# Patient Record
Sex: FEMALE | Race: WHITE | NOT HISPANIC OR LATINO | Employment: UNEMPLOYED | URBAN - METROPOLITAN AREA
[De-identification: names, ages, dates, MRNs, and addresses within clinical notes are randomized per-mention and may not be internally consistent; named-entity substitution may affect disease eponyms.]

---

## 2019-02-08 ENCOUNTER — LAB VISIT (OUTPATIENT)
Dept: LAB | Facility: HOSPITAL | Age: 49
End: 2019-02-08
Attending: INTERNAL MEDICINE
Payer: COMMERCIAL

## 2019-02-08 ENCOUNTER — OFFICE VISIT (OUTPATIENT)
Dept: CARDIOLOGY | Facility: CLINIC | Age: 49
End: 2019-02-08
Payer: COMMERCIAL

## 2019-02-08 ENCOUNTER — CLINICAL SUPPORT (OUTPATIENT)
Dept: CARDIOLOGY | Facility: CLINIC | Age: 49
End: 2019-02-08
Payer: COMMERCIAL

## 2019-02-08 VITALS
WEIGHT: 158 LBS | HEIGHT: 60 IN | DIASTOLIC BLOOD PRESSURE: 80 MMHG | HEART RATE: 72 BPM | SYSTOLIC BLOOD PRESSURE: 110 MMHG | BODY MASS INDEX: 31.02 KG/M2

## 2019-02-08 DIAGNOSIS — I25.810 CORONARY ARTERY DISEASE INVOLVING CORONARY BYPASS GRAFT WITHOUT ANGINA PECTORIS, UNSPECIFIED WHETHER NATIVE OR TRANSPLANTED HEART: Primary | ICD-10-CM

## 2019-02-08 DIAGNOSIS — I50.30 (HFPEF) HEART FAILURE WITH PRESERVED EJECTION FRACTION: ICD-10-CM

## 2019-02-08 DIAGNOSIS — I25.810 CORONARY ARTERY DISEASE INVOLVING CORONARY BYPASS GRAFT WITHOUT ANGINA PECTORIS, UNSPECIFIED WHETHER NATIVE OR TRANSPLANTED HEART: ICD-10-CM

## 2019-02-08 DIAGNOSIS — R00.2 PALPITATIONS: ICD-10-CM

## 2019-02-08 DIAGNOSIS — G47.39 OTHER SLEEP APNEA: ICD-10-CM

## 2019-02-08 DIAGNOSIS — Z95.2 HX OF MECHANICAL AORTIC VALVE REPLACEMENT: ICD-10-CM

## 2019-02-08 DIAGNOSIS — I25.118 CORONARY ARTERY DISEASE OF NATIVE ARTERY OF NATIVE HEART WITH STABLE ANGINA PECTORIS: Primary | ICD-10-CM

## 2019-02-08 DIAGNOSIS — R07.89 OTHER CHEST PAIN: ICD-10-CM

## 2019-02-08 DIAGNOSIS — Z95.1 HX OF CABG: ICD-10-CM

## 2019-02-08 PROBLEM — G47.30 SLEEP APNEA SYNDROME: Status: ACTIVE | Noted: 2019-02-08

## 2019-02-08 LAB
INR PPP: 2.5
PROTHROMBIN TIME: 25.6 SEC

## 2019-02-08 PROCEDURE — 93000 EKG 12-LEAD: ICD-10-PCS | Mod: S$GLB,,, | Performed by: INTERNAL MEDICINE

## 2019-02-08 PROCEDURE — 36415 COLL VENOUS BLD VENIPUNCTURE: CPT

## 2019-02-08 PROCEDURE — 93000 ELECTROCARDIOGRAM COMPLETE: CPT | Mod: S$GLB,,, | Performed by: INTERNAL MEDICINE

## 2019-02-08 PROCEDURE — 99205 OFFICE O/P NEW HI 60 MIN: CPT | Mod: S$GLB,,, | Performed by: INTERNAL MEDICINE

## 2019-02-08 PROCEDURE — 3008F BODY MASS INDEX DOCD: CPT | Mod: CPTII,S$GLB,, | Performed by: INTERNAL MEDICINE

## 2019-02-08 PROCEDURE — 85610 PROTHROMBIN TIME: CPT

## 2019-02-08 PROCEDURE — 3008F PR BODY MASS INDEX (BMI) DOCUMENTED: ICD-10-PCS | Mod: CPTII,S$GLB,, | Performed by: INTERNAL MEDICINE

## 2019-02-08 PROCEDURE — 99999 PR PBB SHADOW E&M-NEW PATIENT-LVL IV: CPT | Mod: PBBFAC,,, | Performed by: INTERNAL MEDICINE

## 2019-02-08 PROCEDURE — 99999 PR PBB SHADOW E&M-NEW PATIENT-LVL IV: ICD-10-PCS | Mod: PBBFAC,,, | Performed by: INTERNAL MEDICINE

## 2019-02-08 PROCEDURE — 99205 PR OFFICE/OUTPT VISIT, NEW, LEVL V, 60-74 MIN: ICD-10-PCS | Mod: S$GLB,,, | Performed by: INTERNAL MEDICINE

## 2019-02-08 RX ORDER — DESVENLAFAXINE 100 MG/1
100 TABLET, EXTENDED RELEASE ORAL DAILY
COMMUNITY
End: 2019-05-06

## 2019-02-08 RX ORDER — TIZANIDINE 4 MG/1
4 TABLET ORAL NIGHTLY
Status: ON HOLD | COMMUNITY
End: 2020-01-28 | Stop reason: HOSPADM

## 2019-02-08 RX ORDER — POTASSIUM CHLORIDE 20 MEQ/1
20 TABLET, EXTENDED RELEASE ORAL 2 TIMES DAILY
COMMUNITY
End: 2020-02-21 | Stop reason: SDUPTHER

## 2019-02-08 RX ORDER — ISOSORBIDE MONONITRATE 30 MG/1
30 TABLET, EXTENDED RELEASE ORAL DAILY
COMMUNITY
End: 2019-02-08

## 2019-02-08 RX ORDER — WARFARIN 6 MG/1
TABLET ORAL
COMMUNITY
End: 2019-05-14 | Stop reason: DRUGHIGH

## 2019-02-08 RX ORDER — NITROGLYCERIN 80 MG/1
1 PATCH TRANSDERMAL DAILY
COMMUNITY
End: 2019-04-15 | Stop reason: SDUPTHER

## 2019-02-08 RX ORDER — DICLOFENAC SODIUM 10 MG/G
2 GEL TOPICAL 4 TIMES DAILY PRN
COMMUNITY

## 2019-02-08 RX ORDER — ISOSORBIDE DINITRATE 10 MG/1
10 TABLET ORAL 3 TIMES DAILY
Qty: 90 TABLET | Refills: 3 | Status: ON HOLD | OUTPATIENT
Start: 2019-02-08 | End: 2019-04-20 | Stop reason: HOSPADM

## 2019-02-08 RX ORDER — FUROSEMIDE 40 MG/1
40 TABLET ORAL 2 TIMES DAILY
COMMUNITY
End: 2019-07-15 | Stop reason: SDUPTHER

## 2019-02-08 RX ORDER — NICOTINE 7MG/24HR
1 PATCH, TRANSDERMAL 24 HOURS TRANSDERMAL
COMMUNITY
End: 2020-02-21 | Stop reason: RX

## 2019-02-08 RX ORDER — CLONAZEPAM 1 MG/1
1 TABLET ORAL 3 TIMES DAILY
COMMUNITY

## 2019-02-08 RX ORDER — NEBIVOLOL 5 MG/1
10 TABLET ORAL DAILY
Status: ON HOLD | COMMUNITY
End: 2019-04-20 | Stop reason: HOSPADM

## 2019-02-08 RX ORDER — TRAMADOL HYDROCHLORIDE 50 MG/1
50 TABLET ORAL EVERY 6 HOURS
Status: ON HOLD | COMMUNITY
End: 2019-04-19

## 2019-02-08 RX ORDER — TRAZODONE HYDROCHLORIDE 150 MG/1
150 TABLET ORAL NIGHTLY
Status: ON HOLD | COMMUNITY
End: 2019-11-19 | Stop reason: HOSPADM

## 2019-02-08 RX ORDER — AMITRIPTYLINE HYDROCHLORIDE 25 MG/1
25 TABLET, FILM COATED ORAL NIGHTLY
COMMUNITY
End: 2019-04-02

## 2019-02-08 RX ORDER — BUSPIRONE HYDROCHLORIDE 15 MG/1
15 TABLET ORAL 2 TIMES DAILY
COMMUNITY

## 2019-02-08 RX ORDER — FLUTICASONE PROPIONATE 50 MCG
1 SPRAY, SUSPENSION (ML) NASAL DAILY
COMMUNITY

## 2019-02-08 RX ORDER — PANTOPRAZOLE SODIUM 40 MG/1
40 TABLET, DELAYED RELEASE ORAL DAILY
Status: ON HOLD | COMMUNITY
End: 2019-11-07 | Stop reason: SDUPTHER

## 2019-02-08 RX ORDER — CHOLECALCIFEROL (VITAMIN D3) 25 MCG
1000 TABLET ORAL DAILY
COMMUNITY
End: 2019-07-03

## 2019-02-08 RX ORDER — ROSUVASTATIN CALCIUM 20 MG/1
20 TABLET, COATED ORAL DAILY
Status: ON HOLD | COMMUNITY
End: 2019-11-19 | Stop reason: HOSPADM

## 2019-02-08 RX ORDER — TOPIRAMATE 100 MG/1
100 TABLET, FILM COATED ORAL 2 TIMES DAILY
COMMUNITY

## 2019-02-08 NOTE — PROGRESS NOTES
Subjective:   Patient ID:  Meg Sal is a 48 y.o. female who presents for cardiac consult of Coronary Artery Disease (consult) and Congestive Heart Failure      HPI  The patient came in today for cardiac consult of Coronary Artery Disease (consult) and Congestive Heart Failure    2/8/19  Meg Sal is a 48 y.o. female with CAD s/p 2V CABG - LAD/LCX with occluded, s/p mechanical AVR, s/p mitral annuloplasty ring, Pulm HTN, HFpEF,CHD, HTN, HLD, CVA presents to establish CV care at [facility].     Pt had first MI at age 31, was taken to [facility], back was hurting. Felt like she had a heavy wet blanket. She was breaking up a dog fight when paramedics came. Dr. Cortez did LHC, had one stent placed. She also had other blockages which were medically treated. She had treadmill nuclear stress tests after, had been off plavix. In 2014, when she went to [place] she couldn't walk and was out of breath. She came back to  and she did another LHC with Dr. Cortez, Dr. Mcmahon was consulted as she had severe LAD disease. She went to Dr. Heller with [facility], had LHC in May 2018 with chronically occluded RCA with collaterals but patent grafts to her left system.     She has been having a lot of chest pain lately. She was taking NTG two or three times a week. She was told to take Ranexa and Imdur but had more depression so stopped taking it. She has no car and has difficulty getting to coumadin clinic, discussed will to get home health for labwork.     Patient feels no leg swelling, no PND, no dizziness, no syncope, no CNS symptoms.    Patient has fairly good exercise tolerance.    Patient is compliant with medications.    ECG - NSR, RBBB    Past Medical History:   Diagnosis Date    Acute coronary syndrome     CHF (congestive heart failure)     Coronary artery disease     Hyperlipidemia     Hypertension     Sleep apnea syndrome 2/8/2019    Stroke        Past Surgical History:   Procedure Laterality Date    CARDIAC  CATHETERIZATION      CARDIAC VALVE SURGERY      CORONARY ANGIOPLASTY      CORONARY ARTERY BYPASS GRAFT         Social History     Tobacco Use    Smoking status: Former Smoker     Last attempt to quit: 2018     Years since quittin.7   Substance Use Topics    Alcohol use: Yes     Comment: rarely    Drug use: Not on file       History reviewed. No pertinent family history.    Patient's Medications   New Prescriptions    ISOSORBIDE DINITRATE (ISORDIL) 10 MG TABLET    Take 1 tablet (10 mg total) by mouth 3 (three) times daily.   Previous Medications    AMITRIPTYLINE (ELAVIL) 25 MG TABLET    Take 25 mg by mouth every evening.    BUSPIRONE (BUSPAR) 15 MG TABLET    Take 15 mg by mouth 3 (three) times daily.    CLONAZEPAM (KLONOPIN) 1 MG TABLET    Take 1 mg by mouth 3 (three) times daily as needed for Anxiety.    DESVENLAFAXINE SUCCINATE (PRISTIQ) 100 MG TB24    Take 100 mg by mouth once daily.    DICLOFENAC SODIUM (VOLTAREN) 1 % GEL    Apply 2 g topically 4 (four) times daily.    FLUTICASONE (FLONASE) 50 MCG/ACTUATION NASAL SPRAY    1 spray by Each Nare route once daily.    FUROSEMIDE (LASIX) 40 MG TABLET    Take 40 mg by mouth 2 (two) times daily.    NEBIVOLOL (BYSTOLIC) 5 MG TAB    Take 10 mg by mouth once daily.    NICOTINE (NICODERM CQ) 14 MG/24 HR    Place 1 patch onto the skin every 24 hours.    NITROGLYCERIN 0.4 MG/HR TD PT24 (NITRODUR) 0.4 MG/HR    Place 1 patch onto the skin once daily.    PANTOPRAZOLE (PROTONIX) 40 MG TABLET    Take 40 mg by mouth once daily.    POTASSIUM CHLORIDE SA (K-DUR,KLOR-CON) 20 MEQ TABLET    Take 20 mEq by mouth 2 (two) times daily.    ROSUVASTATIN (CRESTOR) 20 MG TABLET    Take 20 mg by mouth once daily.    TIZANIDINE (ZANAFLEX) 4 MG TABLET    Take 4 mg by mouth every evening.    TOPIRAMATE (TOPAMAX) 100 MG TABLET    Take 100 mg by mouth 2 (two) times daily.    TRAMADOL (ULTRAM) 50 MG TABLET    Take 50 mg by mouth every 6 (six) hours.    TRAZODONE (DESYREL) 150 MG TABLET     Take 150 mg by mouth nightly.    VITAMIN D (VITAMIN D3) 1000 UNITS TAB    Take 1,000 Units by mouth once daily.    WARFARIN (COUMADIN) 6 MG TABLET    Take 6 mg by mouth once daily.   Modified Medications    No medications on file   Discontinued Medications    ISOSORBIDE MONONITRATE (IMDUR) 30 MG 24 HR TABLET    Take 30 mg by mouth once daily.       Review of Systems   Constitutional: Negative.    HENT: Negative.    Eyes: Negative.    Respiratory: Positive for shortness of breath.    Cardiovascular: Positive for chest pain and palpitations.   Gastrointestinal: Negative.    Genitourinary: Negative.    Musculoskeletal: Negative.    Skin: Negative.    Neurological: Negative.    Endo/Heme/Allergies: Negative.    Psychiatric/Behavioral: The patient is nervous/anxious.    All 12 systems otherwise negative.      Wt Readings from Last 3 Encounters:   02/08/19 71.7 kg (158 lb)     Temp Readings from Last 3 Encounters:   No data found for Temp     BP Readings from Last 3 Encounters:   02/08/19 110/80     Pulse Readings from Last 3 Encounters:   02/08/19 72       /80 (BP Method: Large (Manual))   Pulse 72   Ht 5' (1.524 m)   Wt 71.7 kg (158 lb)   BMI 30.86 kg/m²     Objective:   Physical Exam   Constitutional: She is oriented to person, place, and time. She appears well-developed and well-nourished. No distress.   HENT:   Head: Normocephalic and atraumatic.   Nose: Nose normal.   Mouth/Throat: Oropharynx is clear and moist.   Eyes: Conjunctivae and EOM are normal. No scleral icterus.   Neck: Normal range of motion. Neck supple. No JVD present. No thyromegaly present.   Cardiovascular: Normal rate, regular rhythm, S1 normal and S2 normal. Exam reveals no gallop, no S3, no S4 and no friction rub.   Murmur heard.  Pulmonary/Chest: Effort normal and breath sounds normal. No stridor. No respiratory distress. She has no wheezes. She has no rales. She exhibits no tenderness.   Abdominal: Soft. Bowel sounds are normal. She  exhibits no distension and no mass. There is no tenderness. There is no rebound.   Genitourinary:   Genitourinary Comments: Deferred   Musculoskeletal: Normal range of motion. She exhibits no edema, tenderness or deformity.   Lymphadenopathy:     She has no cervical adenopathy.   Neurological: She is alert and oriented to person, place, and time. She exhibits normal muscle tone. Coordination normal.   Skin: Skin is warm and dry. No rash noted. She is not diaphoretic. No erythema. No pallor.   Psychiatric: She has a normal mood and affect. Her behavior is normal. Judgment and thought content normal.   Nursing note and vitals reviewed.      Lab Results   Component Value Date    INR 2.5 (H) 02/08/2019     Assessment:      1. Coronary artery disease of native artery of native heart with stable angina pectoris    2. (HFpEF) heart failure with preserved ejection fraction    3. Hx of mechanical aortic valve replacement    4. Hx of CABG    5. Other sleep apnea    6. Other chest pain    7. Palpitations        Plan:   1. CAD s/p CABG with CP  - cont current meds  - add Isordil for CP  - exercise nuclear stress to r/o ischemia  - 2D ECHO  - cannot tolerate Imdur or Ranexa    2. HFpEF  - cont lasix and K     3. Palpitations  - Holter    4. Sleep apnea syndrome  - refer for sleep study    5. [facility] AVR  - cont coumadin  - refer to coumadin clinic  - INR today 2.5  - will try to refer to home health for INR checks    Thank you for allowing me to participate in this patient's care. Please do not hesitate to contact me with any questions or concerns. Consult note has been forwarded to the referral physician.

## 2019-02-11 ENCOUNTER — TELEPHONE (OUTPATIENT)
Dept: CARDIOLOGY | Facility: CLINIC | Age: 49
End: 2019-02-11

## 2019-02-12 DIAGNOSIS — I50.30 (HFPEF) HEART FAILURE WITH PRESERVED EJECTION FRACTION: ICD-10-CM

## 2019-02-12 DIAGNOSIS — I50.9 CONGESTIVE HEART FAILURE, UNSPECIFIED HF CHRONICITY, UNSPECIFIED HEART FAILURE TYPE: ICD-10-CM

## 2019-02-12 DIAGNOSIS — R76.8 ANA POSITIVE: Primary | ICD-10-CM

## 2019-02-12 DIAGNOSIS — I25.10 CORONARY ARTERY DISEASE, ANGINA PRESENCE UNSPECIFIED, UNSPECIFIED VESSEL OR LESION TYPE, UNSPECIFIED WHETHER NATIVE OR TRANSPLANTED HEART: ICD-10-CM

## 2019-02-12 DIAGNOSIS — I27.20 PULMONARY HYPERTENSION: ICD-10-CM

## 2019-02-12 DIAGNOSIS — Z95.2 AORTIC VALVE REPLACED: ICD-10-CM

## 2019-02-12 DIAGNOSIS — I25.118 CORONARY ARTERY DISEASE OF NATIVE ARTERY OF NATIVE HEART WITH STABLE ANGINA PECTORIS: ICD-10-CM

## 2019-02-12 DIAGNOSIS — Z95.2 HX OF MECHANICAL AORTIC VALVE REPLACEMENT: Primary | ICD-10-CM

## 2019-02-13 ENCOUNTER — ANTI-COAG VISIT (OUTPATIENT)
Dept: CARDIOLOGY | Facility: CLINIC | Age: 49
End: 2019-02-13

## 2019-02-13 ENCOUNTER — TELEPHONE (OUTPATIENT)
Dept: CARDIOLOGY | Facility: CLINIC | Age: 49
End: 2019-02-13

## 2019-02-13 LAB — INR PPP: 2.3

## 2019-02-13 NOTE — TELEPHONE ENCOUNTER
Returned call to Miley with [facility]--states admitted patient to home health services today--states INR was checked--Miley was advised to call results in to [facility] at [phone]. Miley verbalizes understanding

## 2019-02-13 NOTE — TELEPHONE ENCOUNTER
----- Message from Chico Caraballo sent at 2/13/2019  3:08 PM CST -----  Contact: [facility]  Type:  Needs Medical Advice    Who Called: oren  Symptoms (please be specific): n/a   How long has patient had these symptoms:  n/a  Pharmacy name and phone #:  n/a  Would the patient rather a call back or a response via [facility]? Call back  Best Call Back Number: [phone]  Additional Information: requesting pt PTINR results      Thanks,  Chico Caraballo

## 2019-02-20 ENCOUNTER — TELEPHONE (OUTPATIENT)
Dept: ADMINISTRATIVE | Facility: CLINIC | Age: 49
End: 2019-02-20

## 2019-02-20 NOTE — TELEPHONE ENCOUNTER
Home Health SOC 02/13/2019 - 04/13/2019 with [facility] ([facility]) - Dr. Ayaan Banda. Patient received SN services.

## 2019-02-21 ENCOUNTER — ANTI-COAG VISIT (OUTPATIENT)
Dept: CARDIOLOGY | Facility: CLINIC | Age: 49
End: 2019-02-21

## 2019-02-21 LAB — INR PPP: 2

## 2019-02-21 NOTE — PROGRESS NOTES
Verbal results taken from _Meena with [facility]__. PT/INR _23.6 / 2.0__ Date drawn_2/21/2019.  Reports no recent changes.  Orders given:  Maintain current dose of Warfarin 6 mg every evening.  Recheck on 3/07/2019.  Hard copy (fax) to follow.

## 2019-02-25 ENCOUNTER — HOSPITAL ENCOUNTER (OUTPATIENT)
Dept: RADIOLOGY | Facility: HOSPITAL | Age: 49
Discharge: HOME | End: 2019-02-25
Attending: INTERNAL MEDICINE
Payer: COMMERCIAL

## 2019-02-25 ENCOUNTER — CLINICAL SUPPORT (OUTPATIENT)
Dept: CARDIOLOGY | Facility: CLINIC | Age: 49
End: 2019-02-25
Attending: INTERNAL MEDICINE
Payer: COMMERCIAL

## 2019-02-25 DIAGNOSIS — R00.2 PALPITATIONS: ICD-10-CM

## 2019-02-25 DIAGNOSIS — G47.39 OTHER SLEEP APNEA: ICD-10-CM

## 2019-02-25 DIAGNOSIS — R07.89 OTHER CHEST PAIN: ICD-10-CM

## 2019-02-25 LAB
DIASTOLIC DYSFUNCTION: NO
ESTIMATED PA SYSTOLIC PRESSURE: 60.76
MITRAL VALVE MOBILITY: NORMAL
RETIRED EF AND QEF - SEE NOTES: 55 (ref 55–65)
TRICUSPID VALVE REGURGITATION: ABNORMAL

## 2019-02-25 PROCEDURE — 78452 NM MULTI PHARM STRESS CARDIAC COMPONENT: ICD-10-PCS | Mod: 26,,, | Performed by: NUCLEAR MEDICINE

## 2019-02-25 PROCEDURE — 93224 XTRNL ECG REC UP TO 48 HRS: CPT | Mod: S$GLB,,, | Performed by: INTERNAL MEDICINE

## 2019-02-25 PROCEDURE — 93015 CV STRESS TEST SUPVJ I&R: CPT | Mod: S$GLB,,, | Performed by: NUCLEAR MEDICINE

## 2019-02-25 PROCEDURE — 93306 TTE W/DOPPLER COMPLETE: CPT | Mod: S$GLB,,, | Performed by: NUCLEAR MEDICINE

## 2019-02-25 PROCEDURE — A9502 TC99M TETROFOSMIN: HCPCS

## 2019-02-25 PROCEDURE — 93306 2D ECHO WITH COLOR FLOW DOPPLER: ICD-10-PCS | Mod: S$GLB,,, | Performed by: NUCLEAR MEDICINE

## 2019-02-25 PROCEDURE — 78452 HT MUSCLE IMAGE SPECT MULT: CPT | Mod: 26,,, | Performed by: NUCLEAR MEDICINE

## 2019-02-25 PROCEDURE — 93015 NM MULTI PHARM STRESS CARDIAC COMPONENT: ICD-10-PCS | Mod: S$GLB,,, | Performed by: NUCLEAR MEDICINE

## 2019-02-25 PROCEDURE — 93224 HOLTER MONITOR - 48 HOUR: ICD-10-PCS | Mod: S$GLB,,, | Performed by: INTERNAL MEDICINE

## 2019-02-28 ENCOUNTER — TELEPHONE (OUTPATIENT)
Dept: CARDIOLOGY | Facility: CLINIC | Age: 49
End: 2019-02-28

## 2019-02-28 DIAGNOSIS — I49.3 PVC'S (PREMATURE VENTRICULAR CONTRACTIONS): Primary | ICD-10-CM

## 2019-02-28 RX ORDER — VERAPAMIL HYDROCHLORIDE 120 MG/1
120 CAPSULE, EXTENDED RELEASE ORAL DAILY
Qty: 30 CAPSULE | Refills: 3 | Status: ON HOLD | OUTPATIENT
Start: 2019-02-28 | End: 2019-04-20 | Stop reason: HOSPADM

## 2019-02-28 NOTE — TELEPHONE ENCOUNTER
----- Message from Ayaan Banda MD sent at 2/28/2019  8:57 AM CST -----  Please call the patient regarding her abnormal result. Frequent PVCS - heart beats from bottom chamber of heart, started Verapamil daily. Follow up soon to discuss symptoms and repeat monitor and discuss further treatment.

## 2019-02-28 NOTE — TELEPHONE ENCOUNTER
Spoke with patient, gave abnormal results of Holter monitor. Gave new medication order per Dr Banda, to start Verapamil daily, advised to  from pharmacy. Follow up appointment currently scheduled for 3/22/2019, but will call clinic back when she know for sure what day she can come in sooner to discuss further treatment.

## 2019-03-01 ENCOUNTER — TELEPHONE (OUTPATIENT)
Dept: CARDIOLOGY | Facility: CLINIC | Age: 49
End: 2019-03-01

## 2019-03-01 ENCOUNTER — TELEPHONE (OUTPATIENT)
Dept: PULMONOLOGY | Facility: CLINIC | Age: 49
End: 2019-03-01

## 2019-03-01 NOTE — TELEPHONE ENCOUNTER
----- Message from Vito Siddiqui sent at 3/1/2019 11:03 AM CST -----  Contact: [phone]  Pt would like to consult with the nurse about appt visit.  Please call back at [phone].  x-

## 2019-03-01 NOTE — TELEPHONE ENCOUNTER
Returned call to patient --needs results of echo, nuclear stress test and holter monitor--please advise

## 2019-03-01 NOTE — TELEPHONE ENCOUNTER
----- Message from Jasmin Mars sent at 3/1/2019  1:32 PM CST -----  Contact: Patient  Type:  Needs Medical Advice    Who Called: Patient  Symptoms (please be specific):    How long has patient had these symptoms:    Pharmacy name and phone #:    Would the patient rather a call back or a response via [facility]? call  Best Call Back Number: [phone]  Additional Information: Patient needs to know if she stops taking the Bystolic.

## 2019-03-04 ENCOUNTER — TELEPHONE (OUTPATIENT)
Dept: CARDIOLOGY | Facility: CLINIC | Age: 49
End: 2019-03-04

## 2019-03-04 ENCOUNTER — TELEPHONE (OUTPATIENT)
Dept: PULMONOLOGY | Facility: CLINIC | Age: 49
End: 2019-03-04

## 2019-03-04 NOTE — TELEPHONE ENCOUNTER
Spoke to Meena at  who stated pt gained 2lbs in one day but denied being SOB and no Edema.   just called to let Dr Banda know and also pt was seeing her PCP today.

## 2019-03-04 NOTE — TELEPHONE ENCOUNTER
Called patient and rescheduled appt.   Patient confirmed time and date and had no further questions     ----- Message from Bob Benjamin sent at 3/4/2019  9:51 AM CST -----  Contact: Pt  ..Type:  Sooner Apoointment Request    Caller is requesting a sooner appointment.    Name of Caller: Pt  When is the first available appointment? 03/29  Symptoms: Sleep Apnea  Would the patient rather a call back or a response via [facility]? Call back  Best Call Back Number: ..[phone] (home)     Additional Information:  Pt can't make appt today due to transportation issues and wants to see if she can be worked in another day

## 2019-03-04 NOTE — TELEPHONE ENCOUNTER
Spoke with Meena at  and let her know if pt gains another pound in the next day or two to give an extra dose of lasix.  All questions answered.

## 2019-03-05 NOTE — TELEPHONE ENCOUNTER
Called to patient and gave results of ech o and nuclear stress test--all questions answered--patient has appointment with Dr. Salazar and Dr. Banda on 3/11/19 to discuss

## 2019-03-07 ENCOUNTER — TELEPHONE (OUTPATIENT)
Dept: CARDIOLOGY | Facility: CLINIC | Age: 49
End: 2019-03-07

## 2019-03-07 ENCOUNTER — ANTI-COAG VISIT (OUTPATIENT)
Dept: CARDIOLOGY | Facility: CLINIC | Age: 49
End: 2019-03-07

## 2019-03-07 LAB — INR PPP: 3

## 2019-03-07 NOTE — TELEPHONE ENCOUNTER
[facility]  nurse [facility] called to report she saw pt today and pt c/o pain in her left arm, intermittently radiating under her collar bone and down to her rib cage and to the small of her back. At time of HH visit pain was about 5-6 of 10. Also nurse noted heart sounds click and whoosh. Wt also fluctuating was 154lbs, up to 159lbs a few days ago , back down to 157lbs today.     I called pt for further information. She says her pain started yesterday. She says it was bad enough this morning she was unable to get out of bed. Feels like pressure, now rating pain as 8.5 of 10 on pain scale. She says she took a tramadol and pain was not relieved. BP this AM was 108 systolic. She denies any Pnd or orthopnea. Unsure of any other ANDRES as she has just been relaxing this morning.     Also pt says she was wondering if you had a preference in monitoring device she should wear such as a fitbit. What do you recommend?

## 2019-03-07 NOTE — PROGRESS NOTES
Verbal results taken from _Meena with [facility]__. PT/INR _35.7 / 3.0_ Date drawn_3/07/2019_.  Reports no recent changes.  Orders given:  Maintain current dose of Warfarin 6 mg daily.  Recheck on 3/21/2019.  Fax copy to follow.

## 2019-03-07 NOTE — TELEPHONE ENCOUNTER
The patient has been notified of this information and all questions answered.  appt is scheduled for Monday.

## 2019-03-11 ENCOUNTER — OFFICE VISIT (OUTPATIENT)
Dept: PULMONOLOGY | Facility: CLINIC | Age: 49
End: 2019-03-11
Payer: COMMERCIAL

## 2019-03-11 ENCOUNTER — HOSPITAL ENCOUNTER (OUTPATIENT)
Dept: RADIOLOGY | Facility: HOSPITAL | Age: 49
Discharge: HOME | End: 2019-03-11
Attending: INTERNAL MEDICINE
Payer: COMMERCIAL

## 2019-03-11 ENCOUNTER — OFFICE VISIT (OUTPATIENT)
Dept: CARDIOLOGY | Facility: CLINIC | Age: 49
End: 2019-03-11
Payer: COMMERCIAL

## 2019-03-11 VITALS
BODY MASS INDEX: 31.55 KG/M2 | SYSTOLIC BLOOD PRESSURE: 108 MMHG | WEIGHT: 160.69 LBS | DIASTOLIC BLOOD PRESSURE: 78 MMHG | HEART RATE: 60 BPM | HEIGHT: 60 IN

## 2019-03-11 VITALS
RESPIRATION RATE: 18 BRPM | DIASTOLIC BLOOD PRESSURE: 66 MMHG | HEIGHT: 60 IN | BODY MASS INDEX: 31.25 KG/M2 | OXYGEN SATURATION: 96 % | WEIGHT: 159.19 LBS | SYSTOLIC BLOOD PRESSURE: 114 MMHG | HEART RATE: 68 BPM

## 2019-03-11 DIAGNOSIS — I50.30 (HFPEF) HEART FAILURE WITH PRESERVED EJECTION FRACTION: ICD-10-CM

## 2019-03-11 DIAGNOSIS — I27.20 PULMONARY HYPERTENSION: ICD-10-CM

## 2019-03-11 DIAGNOSIS — I49.3 PVC'S (PREMATURE VENTRICULAR CONTRACTIONS): ICD-10-CM

## 2019-03-11 DIAGNOSIS — R06.83 SNORING: ICD-10-CM

## 2019-03-11 DIAGNOSIS — J44.9 CHRONIC OBSTRUCTIVE PULMONARY DISEASE, UNSPECIFIED COPD TYPE: ICD-10-CM

## 2019-03-11 DIAGNOSIS — R29.818 SUSPECTED SLEEP APNEA: Primary | ICD-10-CM

## 2019-03-11 DIAGNOSIS — G47.19 EXCESSIVE DAYTIME SLEEPINESS: ICD-10-CM

## 2019-03-11 DIAGNOSIS — Z95.1 HX OF CABG: ICD-10-CM

## 2019-03-11 DIAGNOSIS — I25.118 CORONARY ARTERY DISEASE OF NATIVE ARTERY OF NATIVE HEART WITH STABLE ANGINA PECTORIS: Primary | ICD-10-CM

## 2019-03-11 DIAGNOSIS — Z95.2 HX OF MECHANICAL AORTIC VALVE REPLACEMENT: ICD-10-CM

## 2019-03-11 DIAGNOSIS — R76.8 ANA POSITIVE: ICD-10-CM

## 2019-03-11 DIAGNOSIS — G47.39 OTHER SLEEP APNEA: ICD-10-CM

## 2019-03-11 PROCEDURE — 71046 X-RAY EXAM CHEST 2 VIEWS: CPT | Mod: TC

## 2019-03-11 PROCEDURE — 99999 PR PBB SHADOW E&M-EST. PATIENT-LVL III: CPT | Mod: PBBFAC,,, | Performed by: INTERNAL MEDICINE

## 2019-03-11 PROCEDURE — 3008F PR BODY MASS INDEX (BMI) DOCUMENTED: ICD-10-PCS | Mod: CPTII,S$GLB,, | Performed by: INTERNAL MEDICINE

## 2019-03-11 PROCEDURE — 99205 PR OFFICE/OUTPT VISIT, NEW, LEVL V, 60-74 MIN: ICD-10-PCS | Mod: S$GLB,,, | Performed by: INTERNAL MEDICINE

## 2019-03-11 PROCEDURE — 99214 OFFICE O/P EST MOD 30 MIN: CPT | Mod: S$GLB,,, | Performed by: INTERNAL MEDICINE

## 2019-03-11 PROCEDURE — 99999 PR PBB SHADOW E&M-EST. PATIENT-LVL III: ICD-10-PCS | Mod: PBBFAC,,, | Performed by: INTERNAL MEDICINE

## 2019-03-11 PROCEDURE — 3008F BODY MASS INDEX DOCD: CPT | Mod: CPTII,S$GLB,, | Performed by: INTERNAL MEDICINE

## 2019-03-11 PROCEDURE — 99205 OFFICE O/P NEW HI 60 MIN: CPT | Mod: S$GLB,,, | Performed by: INTERNAL MEDICINE

## 2019-03-11 PROCEDURE — 71046 XR CHEST PA AND LATERAL: ICD-10-PCS | Mod: 26,,, | Performed by: RADIOLOGY

## 2019-03-11 PROCEDURE — 99214 PR OFFICE/OUTPT VISIT, EST, LEVL IV, 30-39 MIN: ICD-10-PCS | Mod: S$GLB,,, | Performed by: INTERNAL MEDICINE

## 2019-03-11 PROCEDURE — 71046 X-RAY EXAM CHEST 2 VIEWS: CPT | Mod: 26,,, | Performed by: RADIOLOGY

## 2019-03-11 RX ORDER — SUMATRIPTAN SUCCINATE 100 MG/1
TABLET ORAL
Refills: 3 | COMMUNITY
Start: 2019-03-08 | End: 2019-04-02

## 2019-03-11 RX ORDER — AMOXICILLIN 500 MG/1
500 CAPSULE ORAL 3 TIMES DAILY
Refills: 1 | COMMUNITY
Start: 2019-03-06 | End: 2019-04-02

## 2019-03-11 RX ORDER — ISOSORBIDE MONONITRATE 30 MG/1
30 TABLET, EXTENDED RELEASE ORAL
COMMUNITY
Start: 2019-01-08 | End: 2019-04-02

## 2019-03-11 RX ORDER — PROMETHAZINE HYDROCHLORIDE 12.5 MG/1
TABLET ORAL
COMMUNITY
End: 2019-04-02

## 2019-03-11 RX ORDER — ALBUTEROL SULFATE 90 UG/1
2 AEROSOL, METERED RESPIRATORY (INHALATION) EVERY 6 HOURS PRN
Qty: 18 G | Refills: 11 | Status: SHIPPED | OUTPATIENT
Start: 2019-03-11 | End: 2020-03-10

## 2019-03-11 RX ORDER — CLOTRIMAZOLE AND BETAMETHASONE DIPROPIONATE 10; .64 MG/G; MG/G
CREAM TOPICAL
COMMUNITY
Start: 2019-01-24 | End: 2019-05-06

## 2019-03-11 NOTE — PROGRESS NOTES
Subjective:   Patient ID:  Meg Sal is a 49 y.o. female who presents for cardiac consult of Chest Pain (from tuesday to friday); Shortness of Breath; Dizziness; and Tingling (fingers)      Chest Pain    Associated symptoms include dizziness, palpitations and shortness of breath.   Shortness of Breath   Associated symptoms include chest pain.   Dizziness:    Associated symptoms: palpitations and chest pain.    The patient came in today for cardiac consult of Chest Pain (from tuesday to friday); Shortness of Breath; Dizziness; and Tingling (fingers)      Meg Sal is a 49 y.o. female with CAD s/p 2V CABG - LAD/LCX with occluded, s/p mechanical AVR, s/p mitral annuloplasty ring, Pulm HTN, HFpEF,CHD, HTN, HLD, CVA here for follow up CV eval.     2/8/19  Pt had first MI at age 31, was taken to [facility], back was hurting. Felt like she had a heavy wet blanket. She was breaking up a dog fight when paramedics came. Dr. Cortez did LHC, had one stent placed. She also had other blockages which were medically treated. She had treadmill nuclear stress tests after, had been off plavix. In 2014, when she went to [place] she couldn't walk and was out of breath. She came back to  and she did another LHC with Dr. Cortez, Dr. Mcmahon was consulted as she had severe LAD disease. She went to Dr. Heller with [facility], had LHC in May 2018 with chronically occluded RCA with collaterals but patent grafts to her left system. She has been having a lot of chest pain lately. She was taking NTG two or three times a week. She was told to take Ranexa and Imdur but had more depression so stopped taking it. She has no car and has difficulty getting to coumadin clinic, discussed will to get home health for labwork.     3/11/19  She had another episode of severe chest pain last week. Started on Tues and continued till Friday. Had home health nurse that came. She took NTG which didn't help much, took 2 tabs. Discussed she  needs ER eval if severe CP again that is not improvd with NTG. Started verapamil for PVCs. Will refer to Ep for PVC ablation vs AAD. Pulm eval this AM, will have sleep study/PFTs/rheum referral. Recent stress and echo negative, mech AVR mean gradient 15 mmhg    Patient feels no leg swelling, no PND, no dizziness, no syncope, no CNS symptoms.    Patient has fairly good exercise tolerance.    Patient is compliant with medications.    ECG - NSR, RBBB    Nuclear Quantitative Functional Analysis:   LVEF: 65 %    Impression: NORMAL MYOCARDIAL PERFUSION  1. The perfusion scan is free of evidence for myocardial ischemia or injury.   2. Resting wall motion is physiologic.   3. Resting LV function is normal.   4. The ventricular volumes are normal at rest and stress.   5. The extracardiac distribution of radioactivity is normal.           This document has been electronically    SIGNED BY: Hermilo Pete MD On: 02/25/2019 16:47    CONCLUSIONS     1 - Mild left atrial enlargement.     2 - Eccentric hypertrophy.     3 - No wall motion abnormalities.     4 - Normal left ventricular systolic function (EF 55-60%).     5 - Normal right ventricular systolic function .     6 - Aortic valve prosthesis, CATINA = 1.61 cm2, AVAi = 0.95 cm2/m2, peak velocity = 2.48 m/s, mean gradient = 15 mmHg.     7 - Moderate tricuspid regurgitation.     8 - Pulmonary hypertension. The estimated PA systolic pressure is 61 mmHg.         This document has been electronically    SIGNED BY: Hermilo Pete MD On: 02/25/2019 14:50    TEST DESCRIPTION   PREDOMINANT RHYTHM  1. Sinus rhythm with heart rates varying between 66 and 105 bpm with an average of 78 bpm.   VENTRICULAR ARRHYTHMIAS  1. There were very frequent PVCs totalling 18966 and averaging 489 per hour.  There were 23 couplets. There were 5 triplets.   2. There were no episodes of ventricular tachycardia.    SUPRA VENTRICULAR ARRHYTHMIAS  1. There were rare PACs totalling 382 and averaging 7  per hour.   2. There were no episodes of sustained supraventricular tachycardia.    Past Medical History:   Diagnosis Date    Acute coronary syndrome     CHF (congestive heart failure)     Coronary artery disease     Hyperlipidemia     Hypertension     Sleep apnea syndrome 2019    Stroke        Past Surgical History:   Procedure Laterality Date    CARDIAC CATHETERIZATION      CARDIAC VALVE SURGERY      CORONARY ANGIOPLASTY      CORONARY ARTERY BYPASS GRAFT         Social History     Tobacco Use    Smoking status: Former Smoker     Packs/day: 1.00     Types: Cigarettes     Last attempt to quit: 2018     Years since quittin.8    Smokeless tobacco: Never Used   Substance Use Topics    Alcohol use: Yes     Comment: rarely    Drug use: Not on file       History reviewed. No pertinent family history.    Patient's Medications   New Prescriptions    No medications on file   Previous Medications    ALBUTEROL (VENTOLIN HFA) 90 MCG/ACTUATION INHALER    Inhale 2 puffs into the lungs every 6 (six) hours as needed for Wheezing. Rescue    AMITRIPTYLINE (ELAVIL) 25 MG TABLET    Take 25 mg by mouth every evening.    AMOXICILLIN (AMOXIL) 500 MG CAPSULE    Take 500 mg by mouth 3 (three) times daily.    BUSPIRONE (BUSPAR) 15 MG TABLET    Take 15 mg by mouth 3 (three) times daily.    CLONAZEPAM (KLONOPIN) 1 MG TABLET    Take 1 mg by mouth 3 (three) times daily as needed for Anxiety.    CLOTRIMAZOLE-BETAMETHASONE 1-0.05% (LOTRISONE) CREAM    Apply to affected area 2 times daily    DESVENLAFAXINE SUCCINATE (PRISTIQ) 100 MG TB24    Take 100 mg by mouth once daily.    DICLOFENAC SODIUM (VOLTAREN) 1 % GEL    Apply 2 g topically 4 (four) times daily.    FLUTICASONE (FLONASE) 50 MCG/ACTUATION NASAL SPRAY    1 spray by Each Nare route once daily.    FUROSEMIDE (LASIX) 40 MG TABLET    Take 40 mg by mouth 2 (two) times daily.    ISOSORBIDE DINITRATE (ISORDIL) 10 MG TABLET    Take 1 tablet (10 mg total) by mouth 3  (three) times daily.    ISOSORBIDE MONONITRATE (IMDUR) 30 MG 24 HR TABLET    Take 30 mg by mouth.    NEBIVOLOL (BYSTOLIC) 5 MG TAB    Take 10 mg by mouth once daily.    NICOTINE (NICODERM CQ) 14 MG/24 HR    Place 1 patch onto the skin every 24 hours.    NITROGLYCERIN 0.4 MG/HR TD PT24 (NITRODUR) 0.4 MG/HR    Place 1 patch onto the skin once daily.    PANTOPRAZOLE (PROTONIX) 40 MG TABLET    Take 40 mg by mouth once daily.    POTASSIUM CHLORIDE SA (K-DUR,KLOR-CON) 20 MEQ TABLET    Take 20 mEq by mouth 2 (two) times daily.    PROMETHAZINE (PHENERGAN) 12.5 MG TAB    promethazine 12.5 mg tablet    ROSUVASTATIN (CRESTOR) 20 MG TABLET    Take 20 mg by mouth once daily.    SUMATRIPTAN (IMITREX) 100 MG TABLET    TAKE ONE TABLET BY MOUTH EVERY 2 HOURS AS NEEDED FOR MIGRAINE   LIMIT 2 24 HOURS    TIZANIDINE (ZANAFLEX) 4 MG TABLET    Take 4 mg by mouth every evening.    TOPIRAMATE (TOPAMAX) 100 MG TABLET    Take 100 mg by mouth 2 (two) times daily.    TRAMADOL (ULTRAM) 50 MG TABLET    Take 50 mg by mouth every 6 (six) hours.    TRAZODONE (DESYREL) 150 MG TABLET    Take 150 mg by mouth nightly.    UMECLIDINIUM-VILANTEROL (ANORO ELLIPTA) 62.5-25 MCG/ACTUATION DSDV    Inhale 1 puff into the lungs once daily. Controller    VERAPAMIL (VERELAN) 120 MG C24P    Take 1 capsule (120 mg total) by mouth once daily.    VITAMIN B COMPLEX ORAL    Take 1 capsule by mouth.    VITAMIN D (VITAMIN D3) 1000 UNITS TAB    Take 1,000 Units by mouth once daily.    WARFARIN (COUMADIN) 6 MG TABLET    Take 6 mg by mouth once daily.   Modified Medications    No medications on file   Discontinued Medications    No medications on file       Review of Systems   Constitutional: Negative.    HENT: Negative.    Eyes: Negative.    Respiratory: Positive for shortness of breath.    Cardiovascular: Positive for chest pain and palpitations.   Gastrointestinal: Negative.    Genitourinary: Negative.    Musculoskeletal: Negative.    Skin: Negative.    Neurological:  Positive for dizziness.   Endo/Heme/Allergies: Negative.    Psychiatric/Behavioral: The patient is nervous/anxious.    All 12 systems otherwise negative.      Wt Readings from Last 3 Encounters:   03/11/19 72.9 kg (160 lb 11.5 oz)   03/11/19 72.2 kg (159 lb 2.8 oz)   02/08/19 71.7 kg (158 lb)     Temp Readings from Last 3 Encounters:   No data found for Temp     BP Readings from Last 3 Encounters:   03/11/19 108/78   03/11/19 114/66   02/08/19 110/80     Pulse Readings from Last 3 Encounters:   03/11/19 60   03/11/19 68   02/08/19 72       /78 (BP Location: Right arm, Patient Position: Sitting, BP Method: Medium (Manual))   Pulse 60   Ht 5' (1.524 m)   Wt 72.9 kg (160 lb 11.5 oz)   BMI 31.39 kg/m²     Objective:   Physical Exam   Constitutional: She is oriented to person, place, and time. She appears well-developed and well-nourished. No distress.   HENT:   Head: Normocephalic and atraumatic.   Nose: Nose normal.   Mouth/Throat: Oropharynx is clear and moist.   Eyes: Conjunctivae and EOM are normal. No scleral icterus.   Neck: Normal range of motion. Neck supple. No JVD present. No thyromegaly present.   Cardiovascular: Normal rate, regular rhythm, S1 normal and S2 normal. Exam reveals no gallop, no S3, no S4 and no friction rub.   Murmur heard.  Pulmonary/Chest: Effort normal and breath sounds normal. No stridor. No respiratory distress. She has no wheezes. She has no rales. She exhibits no tenderness.   Abdominal: Soft. Bowel sounds are normal. She exhibits no distension and no mass. There is no tenderness. There is no rebound.   Genitourinary:   Genitourinary Comments: Deferred   Musculoskeletal: Normal range of motion. She exhibits no edema, tenderness or deformity.   Lymphadenopathy:     She has no cervical adenopathy.   Neurological: She is alert and oriented to person, place, and time. She exhibits normal muscle tone. Coordination normal.   Skin: Skin is warm and dry. No rash noted. She is not  diaphoretic. No erythema. No pallor.   Psychiatric: She has a normal mood and affect. Her behavior is normal. Judgment and thought content normal.   Nursing note and vitals reviewed.      Lab Results   Component Value Date    INR 3.0 03/07/2019     Assessment:      1. Coronary artery disease of native artery of native heart with stable angina pectoris    2. (HFpEF) heart failure with preserved ejection fraction    3. Hx of mechanical aortic valve replacement    4. Hx of CABG    5. Other sleep apnea    6. PVC's (premature ventricular contractions)        Plan:   1. CAD s/p CABG, ongoing atypical CP  - cont current meds  - cont Isordil for CP  - exercise nuclear stress to r/o ischemia - negative  - 2D ECHO - stable  - cannot tolerate Ranexa    2. HFpEF  - cont lasix and K     3. PVCs  - refer to EP    4. Sleep apnea syndrome  - cont workup by pulm, appreciate recs    5. [facility] AVR  - recent echo gradient WNL  - cont coumadin  - cont coumadin clinic     Thank you for allowing me to participate in this patient's care. Please do not hesitate to contact me with any questions or concerns. Consult note has been forwarded to the referral physician.

## 2019-03-11 NOTE — PATIENT INSTRUCTIONS
Understanding Premature Ventricular Contractions (PVCs)  Premature ventricular contractions (PVCs) are a type of abnormal heartbeat (arrhythmia). They are very common. They can occur in people of all ages from time to time. They usually cause only mild symptoms.  How PVCs happen    Your heart has 4 chambers: 2 upper atria and 2 lower ventricles. Normally, a special group of cells begins the signal to start your heartbeat. These cells are in the sinoatrial (SA) node in the right atrium. The signal quickly travels down your hearts conducting system. It travels to the left and right ventricle. As it travels, the signal triggers nearby parts of your heart to contract. This allows your heart to squeeze in a coordinated way.  During a premature ventricular contraction, the signal to start your heartbeat instead comes from one of the ventricles. This signal is premature, meaning it happens before the SA node has had a chance to fire. The signal spreads through the rest of your heart, causing a heartbeat. If this happens very soon after the previous heartbeat, your heart will push out very little blood. This causes a feeling of a pause between beats. If it happens a little later, your heart pushes out an almost normal amount of blood. This leads to a feeling of an extra heartbeat. So, the heart has a premature heartbeat in between normal heartbeats.  What causes PVCs?  Certain things can help set off a premature signal in the ventricles. These include:  · Reduced blood flow to your heart  · Scarring after a heart attack (myocardial infarction)  · Electrolyte problems, such as low sodium or potassium levels  · Increased adrenaline, such as with anxiety  · Certain medicines, like digoxin  Many heart conditions raise the risk for PVCs. These include:  · Mitral valve prolapse  · High blood pressure  · Heart attack  · Coronary heart disease  · cardiomyopathy  · Hypertrophic cardiomyopathy  · Congenital heart disease  They  often happen in people without any heart disease. However, PVCs are somewhat more common in people with some kind of heart disease.  Symptoms of PVCs  Most people with occasional PVCs dont have symptoms. You are also more likely to have symptoms if you have PVCs often. When symptoms do happen, they are usually minor. Symptoms may include:  · An awareness of the heart beating  · A fluttering or flip-flop feeling in your chest  · Feeling of a skipped or extra heartbeat  · Dizziness and near-fainting  · A pulsing sensation in the neck  PVCs may cause more severe symptoms if you have another heart problem, such as heart failure.  Diagnosing PVCs  Your healthcare provider will ask about your health history and give you a physical exam. An electrocardiogram (ECG) is the main test for diagnosis. This test allows your provider to look at the signal of your heartbeat for a brief time. Any PVCs that occur during this time will show up on the ECG. In some cases, your healthcare provider might advise ECG monitoring over a day or more, up to 30 days. This can help to catch PVCs that dont happen often. This is done with a monitor you wear night and day for the test period.  These may be the only tests your healthcare provider will need. You may need more testing if you have PVCs often, or many in a row. Your provider may look at other causes, including possible heart problems. These tests might include:  · Echocardiography, to look at your hearts structure and function  · Cardiac stress testing, to see how your heart responds to exercise and to evaluate blood flow through your heart  · Blood tests, to check potassium and thyroid levels  Date Last Reviewed: 2/17/2015  © 5564-2476 [employer]. [address] [place] [address], [place] [address]. All rights reserved. This information is not intended as a substitute for professional medical care. Always follow your healthcare professional's instructions.

## 2019-03-11 NOTE — PROGRESS NOTES
Initial Outpatient Pulmonary Evaluation       SUBJECTIVE:     Chief Complaint   Patient presents with    Sleep Apnea       History of Present Illness:    Patient is a 49 y.o. female referred for evaluation of obstructive sleep apnea.    Patient was admitted to the hospital January 2018 for acute on chronic diastolic heart failure.    Thirty pack year smoking history quit smoking May 2018.    She complains of a chronic cough with white sputum production, intermittent wheezing and shortness of Breath. Pulmonary hypertension was showed to be severe on echo February 2019.    Patient with pulmonary hypertension resulting in inability to carry on any physical activity without symptoms. The patient manifests signs of right heart failure. Dyspnea and/or fatigue may be present even at rest. Discomfort is increased by physical activity. WHO functional class IV.     Patient complain of snoring, excessive daytime sleepiness, Mendenhall Sleepiness Scale score today of 8, chronic fatigue.     Status post CABG, mitral annuloplasty and aortic valve replacement.  On Coumadin.      STOP - BANG Questionnaire:     1. Snoring : Do you snore loudly ?    Yes    2. Tired : Do you often feel tired, fatigued, or sleepy during daytime? Yes    3. Observed: Has anyone observed you stop breathing during your sleep?   Yes     4. Blood pressure : Do you have or are you being treated for high blood pressure?   Yes    5. BMI :BMI more than 35 kg/m2?   No    6. Age : Age over 50 yr old?   Yes    7. Neck circumference:   For male, is your shirt collar 17 inches / 43cm or larger?  For female, is your shirt collar 16 inches / 41cm or larger?    No    8. Gender: Gender male?   No    STOP BANG SCORE 5    High risk of BANDAR: Yes 5 - 8  Intermediate risk of BANDAR: Yes 3 - 4  Low risk of BANDAR: Yes 0 - 2      References:   STOP Questionnaire   A Tool to Screen Patients for Obstructive Sleep Apnea: Martha Sarkar  HEBERT.C.P.C., MINDI GarciaB.B.S., Jesus Hendrickson M.D.,Rae Sarkar, Ph.D., MINDI MederosB.B.S.,_ Nanci Duong.,_ Marie Luna M.D., NIMESH LynR.C.P.C.; Anesthesiology 2008; 108:812-21 Copyright © 2008, the American Society of Anesthesiologists, Inc. [employer]    Review of Systems   Constitutional: Negative for fever and chills.   HENT: Negative for nosebleeds.    Eyes: Negative for redness.   Respiratory: Positive for snoring, cough, shortness of breath, dyspnea on extertion and somnolence. Negative for choking.    Cardiovascular: Positive for leg swelling.   Genitourinary: Negative for hematuria.   Endocrine: Negative for cold intolerance.    Musculoskeletal: Positive for arthralgias.   Skin: Negative for rash.   Gastrointestinal: Negative for vomiting.   Neurological: Negative for syncope.        History of stroke   Hematological: Negative for adenopathy.   Psychiatric/Behavioral: Positive for sleep disturbance. Negative for confusion.       Review of patient's allergies indicates:  No Known Allergies    Current Outpatient Medications   Medication Sig Dispense Refill    amitriptyline (ELAVIL) 25 MG tablet Take 25 mg by mouth every evening.      busPIRone (BUSPAR) 15 MG tablet Take 15 mg by mouth 3 (three) times daily.      clonazePAM (KLONOPIN) 1 MG tablet Take 1 mg by mouth 3 (three) times daily as needed for Anxiety.      clotrimazole-betamethasone 1-0.05% (LOTRISONE) cream Apply to affected area 2 times daily      desvenlafaxine succinate (PRISTIQ) 100 MG Tb24 Take 100 mg by mouth once daily.      diclofenac sodium (VOLTAREN) 1 % Gel Apply 2 g topically 4 (four) times daily.      fluticasone (FLONASE) 50 mcg/actuation nasal spray 1 spray by Each Nare route once daily.      furosemide (LASIX) 40 MG tablet Take 40 mg by mouth 2 (two) times daily.      isosorbide dinitrate (ISORDIL) 10 MG tablet Take 1 tablet (10 mg total) by mouth 3 (three)  times daily. 90 tablet 3    isosorbide mononitrate (IMDUR) 30 MG 24 hr tablet Take 30 mg by mouth.      nebivolol (BYSTOLIC) 5 MG Tab Take 10 mg by mouth once daily.      nicotine (NICODERM CQ) 14 mg/24 hr Place 1 patch onto the skin every 24 hours.      nitroGLYCERIN 0.4 MG/HR TD PT24 (NITRODUR) 0.4 mg/hr Place 1 patch onto the skin once daily.      pantoprazole (PROTONIX) 40 MG tablet Take 40 mg by mouth once daily.      potassium chloride SA (K-DUR,KLOR-CON) 20 MEQ tablet Take 20 mEq by mouth 2 (two) times daily.      rosuvastatin (CRESTOR) 20 MG tablet Take 20 mg by mouth once daily.      tiZANidine (ZANAFLEX) 4 MG tablet Take 4 mg by mouth every evening.      topiramate (TOPAMAX) 100 MG tablet Take 100 mg by mouth 2 (two) times daily.      traMADol (ULTRAM) 50 mg tablet Take 50 mg by mouth every 6 (six) hours.      traZODone (DESYREL) 150 MG tablet Take 150 mg by mouth nightly.      verapamil (VERELAN) 120 MG C24P Take 1 capsule (120 mg total) by mouth once daily. 30 capsule 3    VITAMIN B COMPLEX ORAL Take 1 capsule by mouth.      vitamin D (VITAMIN D3) 1000 units Tab Take 1,000 Units by mouth once daily.      warfarin (COUMADIN) 6 MG tablet Take 6 mg by mouth once daily.      albuterol (VENTOLIN HFA) 90 mcg/actuation inhaler Inhale 2 puffs into the lungs every 6 (six) hours as needed for Wheezing. Rescue 18 g 11    amoxicillin (AMOXIL) 500 MG capsule Take 500 mg by mouth 3 (three) times daily.  1    promethazine (PHENERGAN) 12.5 MG Tab promethazine 12.5 mg tablet      sumatriptan (IMITREX) 100 MG tablet TAKE ONE TABLET BY MOUTH EVERY 2 HOURS AS NEEDED FOR MIGRAINE   LIMIT 2 24 HOURS  3    umeclidinium-vilanterol (ANORO ELLIPTA) 62.5-25 mcg/actuation DsDv Inhale 1 puff into the lungs once daily. Controller 1 each 5     No current facility-administered medications for this visit.        Past Medical History:   Diagnosis Date    Acute coronary syndrome     CHF (congestive heart failure)      Coronary artery disease     Hyperlipidemia     Hypertension     Sleep apnea syndrome 2019    Stroke      Past Surgical History:   Procedure Laterality Date    CARDIAC CATHETERIZATION      CARDIAC VALVE SURGERY      CORONARY ANGIOPLASTY      CORONARY ARTERY BYPASS GRAFT       History reviewed. No pertinent family history.  Social History     Tobacco Use    Smoking status: Former Smoker     Packs/day: 1.00     Types: Cigarettes     Last attempt to quit: 2018     Years since quittin.8    Smokeless tobacco: Never Used   Substance Use Topics    Alcohol use: Yes     Comment: rarely    Drug use: Not on file          OBJECTIVE:     Vital Signs (Most Recent)  Vital Signs  Pulse: 68  Resp: 18  SpO2: 96 %  BP: 114/66  Height and Weight  Height: 5' (152.4 cm)  Weight: 72.2 kg (159 lb 2.8 oz)  BSA (Calculated - sq m): 1.75 sq meters  BMI (Calculated): 31.2  Weight in (lb) to have BMI = 25: 127.7]  Wt Readings from Last 2 Encounters:   19 72.2 kg (159 lb 2.8 oz)   19 71.7 kg (158 lb)         Physical Exam:  Physical Exam   Constitutional: She is oriented to person, place, and time. She appears well-developed.   HENT:   Head: Normocephalic.   Neck: Neck supple.   Cardiovascular: Normal rate and regular rhythm.   Pulmonary/Chest: Normal expansion and effort normal. No stridor. No respiratory distress. She has rales. She exhibits no tenderness.   Abdominal: Soft.   Musculoskeletal: She exhibits edema. She exhibits no tenderness.   Lymphadenopathy:     She has no cervical adenopathy.   Neurological: She is alert and oriented to person, place, and time. Gait normal.   Skin: Skin is warm. Nails show no clubbing.   Psychiatric: She has a normal mood and affect. Her behavior is normal. Judgment and thought content normal.   Nursing note and vitals reviewed.      Laboratory  No results found for: WBC, RBC, HGB, HCT, MCV, MCH, MCHC, RDW, PLT, MPV, GRAN, LYMPH, MONO, EOS, BASO, EOSINOPHIL,  BASOPHIL    BMP  No results found for: NA, K, CL, CO2, BUN, CREATININE, CALCIUM, ANIONGAP, ESTGFRAFRICA, EGFRNONAA, AST, ALT, PROT    No results found for: BNP    No results found for: TSH    No results found for: SEDRATE    No results found for: CRP    8/17 [facility] + [facility]    Diagnostic Results:  I have personally reviewed today the following studies :      CT chest [facility] January 2016   There are small cystic changes within the lungs consistent with COPD. Interstitial scarring noted at size is at the upper limits of normal. Mitral and aortic valve prosthesis are seen. Sternotomy sutures are in place with no alignment of the sternum. No sternal erosive changes or parasternal fluid noted. Within the lungs with no focal pneumonia, mass or nodularity. There is no pleural or pericardial effusion. Aorta is normal in caliber. There are small lymph nodes in the middle mediastinum.    ASSESSMENT/PLAN:     Suspected sleep apnea  -     Polysomnogram (CPAP will be added if patient meets diagnostic criteria.); Future    Snoring  -     Polysomnogram (CPAP will be added if patient meets diagnostic criteria.); Future    Excessive daytime sleepiness  -     Polysomnogram (CPAP will be added if patient meets diagnostic criteria.); Future    Pulmonary hypertension  -     Stress test, pulmonary; Future  -     Brain natriuretic peptide; Future; Expected date: 03/11/2019    (HFpEF) heart failure with preserved ejection fraction  -     Polysomnogram (CPAP will be added if patient meets diagnostic criteria.); Future  -     Stress test, pulmonary; Future    [facility] positive  -     [facility]; Future; Expected date: 03/11/2019  -     ANTI-DNA ANTIBODY, DOUBLE-STRANDED; Future; Expected date: 03/11/2019  -     ANGIOTENSIN CONVERTING ENZYME; Future; Expected date: 03/11/2019    Chronic obstructive pulmonary disease, unspecified COPD type  -     Complete PFT with bronchodilator; Future; Expected date: 03/25/2019  -     X-Ray  Chest PA And Lateral; Future; Expected date: 03/11/2019  -     ALPHA-1-ANTITRYPSIN; Future; Expected date: 03/11/2019  -     Stress test, pulmonary; Future  -     albuterol (VENTOLIN HFA) 90 mcg/actuation inhaler; Inhale 2 puffs into the lungs every 6 (six) hours as needed for Wheezing. Rescue  Dispense: 18 g; Refill: 11  -     umeclidinium-vilanterol (ANORO ELLIPTA) 62.5-25 mcg/actuation DsDv; Inhale 1 puff into the lungs once daily. Controller  Dispense: 1 each; Refill: 5      Patient was referred for evaluation of suspected sleep apnea, she has history of COPD suggested by her evaluation today and her previous CT scan and her smoking history, severe pulmonary hypertension evident on echocardiogram, clinically she complains of shortness of breath with any physical activity.    Pulmonary hypertension WHO functional group 4.  Her pulmonary hypertension is likely related to left ventricular heart disease and hypoxemic respiratory disorders.  Continue Lasix 40 mg b.i.d..  She might benefit from vascular targeted therapy.    Will check [facility] again and anti double stranded DNA.    Will referred to Rheumatology.    She will likely need right heart catheterization.    Further recommendation to follow above workup including a PFT, 6 min walking test, in-lab polysomnography.    Up-to-date on influenza vaccination and P PSV 23.    MEDICAL DECISION MAKING: Moderate to high complexity.  Overall, the multiple problems listed are of moderate to high severity that may impact quality of life and activities of daily living. Side effects of medications, treatment plan as well as options and alternatives reviewed and discussed with patient. There was counseling of patient concerning these issues.     TIME SPENT WITH PATIENT: Time spent: 60 minutes in face to face  discussion concerning diagnosis, prognosis, review of lab and test results, benefits of treatment as well as management of disease, counseling of patient and coordination of  care between various health  care providers . Greater than half the time spent was used for coordination of care and counseling of patient.         Follow-up in about 1 month (around 4/11/2019).    This note was prepared using voice recognition system and is likely to have sound alike errors that may have been overlooked even after proof reading.  Please call me with any questions    Discussed diagnosis, its evaluation, treatment and usual course. All questions answered.    Thank you for the courtesy of participating in the care of this patient    Miguel Salazar MD

## 2019-03-11 NOTE — PATIENT INSTRUCTIONS
Your provider has scheduled you for a sleep study.   You should be receiving a phone call from the sleep lab shortly after your sleep study had been approved by your insurance. Please make sure you have your current phone number in the [email or web address] system. If you do not hear from anyone in the next 10 business days, please call the sleep lab at [phone] to schedule your sleep study.   The sleep studies are performed at [facility] seven nights a week. When you are scheduling your sleep study, they will also make you a follow up appointment with your provider. This follow up appointment will be 10-14 days after your sleep study to review the results. If it is noted that you do not have sleep apnea on your initial sleep study, you may receive a call back for a second night sleep study with the CPAP before you come back to the office.

## 2019-03-11 NOTE — LETTER
March 11, 2019      Ayaan Banda MD  [address]  [place] [address]           [facility]  [address] [place] [id number]  Phone: [phone]  Fax: [phone]          Patient: Meg Sal   MR Number: [id number]   YOB: 1970   Date of Visit: 3/11/2019       Dear Dr. Ayaan Banda:    Thank you for referring Meg Sal to me for evaluation. Attached you will find relevant portions of my assessment and plan of care.    If you have questions, please do not hesitate to call me. I look forward to following Meg Sal along with you.    Sincerely,    Miguel Salazar MD    Enclosure  CC:  No Recipients    If you would like to receive this communication electronically, please contact [email or web address] or [phone] to request more information on [employer] access.    For providers and/or their staff who would like to refer a patient to [facility], please contact us through our one-stop-shop provider referral line, [facility], at [phone].    If you feel you have received this communication in error or would no longer like to receive these types of communications, please e-mail [email or web address]

## 2019-03-13 ENCOUNTER — TELEPHONE (OUTPATIENT)
Dept: RHEUMATOLOGY | Facility: CLINIC | Age: 49
End: 2019-03-13

## 2019-03-13 NOTE — TELEPHONE ENCOUNTER
Called patient regarding referral from Dr. Salazar, scheduled appointment with Dr. Guzman for 3.20.19 at 1 pm. Pt verbalized understanding.

## 2019-03-15 ENCOUNTER — TELEPHONE (OUTPATIENT)
Dept: PULMONOLOGY | Facility: CLINIC | Age: 49
End: 2019-03-15

## 2019-03-15 DIAGNOSIS — J84.9 ILD (INTERSTITIAL LUNG DISEASE): Primary | ICD-10-CM

## 2019-03-15 NOTE — TELEPHONE ENCOUNTER
Called patient and discussed chest x-ray results.  Will proceed with high-resolution CT scan results.

## 2019-03-18 ENCOUNTER — TELEPHONE (OUTPATIENT)
Dept: PULMONOLOGY | Facility: CLINIC | Age: 49
End: 2019-03-18

## 2019-03-18 NOTE — TELEPHONE ENCOUNTER
Returned patients phone call and rescheduled CT scan. Patient confirmed time and date ----- Message from Christiane Choudhury sent at 3/18/2019  2:47 PM CDT -----  Contact: self/[phone]  Type:  Patient Returning Call    Who Called: Meg  Who Left Message for Patient:Priti  Does the patient know what this is regarding?: a CT scan  Would the patient rather a call back or a response via [facility]?  Call back  Best Call Back Number:[phone]  Additional Information: Can she change her CT Scan appointment?  She also wanted it marked urgent

## 2019-03-21 ENCOUNTER — ANTI-COAG VISIT (OUTPATIENT)
Dept: CARDIOLOGY | Facility: CLINIC | Age: 49
End: 2019-03-21

## 2019-03-21 LAB — INR PPP: 3.3

## 2019-03-21 NOTE — PROGRESS NOTES
Verbal result taken from Carole with [facility]. PT/INR 39.5 / 3.3.   Date drawn 3/21/19.  No significant changes or extra doses reported.  No signs of bleeding noted.  Order given: Take coumadin 3mg on today; then resume current dose of 6mg every evening.  Recheck in 2 weeks.

## 2019-03-28 ENCOUNTER — HOSPITAL ENCOUNTER (OUTPATIENT)
Dept: RADIOLOGY | Facility: HOSPITAL | Age: 49
Discharge: HOME | End: 2019-03-28
Attending: INTERNAL MEDICINE
Payer: COMMERCIAL

## 2019-03-28 DIAGNOSIS — J84.9 ILD (INTERSTITIAL LUNG DISEASE): ICD-10-CM

## 2019-03-28 PROCEDURE — 71250 CT THORAX DX C-: CPT | Mod: TC

## 2019-04-02 ENCOUNTER — TELEPHONE (OUTPATIENT)
Dept: RHEUMATOLOGY | Facility: CLINIC | Age: 49
End: 2019-04-02

## 2019-04-02 ENCOUNTER — OFFICE VISIT (OUTPATIENT)
Dept: RHEUMATOLOGY | Facility: CLINIC | Age: 49
End: 2019-04-02
Payer: COMMERCIAL

## 2019-04-02 ENCOUNTER — LAB VISIT (OUTPATIENT)
Dept: LAB | Facility: HOSPITAL | Age: 49
End: 2019-04-02
Payer: COMMERCIAL

## 2019-04-02 VITALS
SYSTOLIC BLOOD PRESSURE: 104 MMHG | BODY MASS INDEX: 30.81 KG/M2 | HEART RATE: 68 BPM | HEIGHT: 60 IN | WEIGHT: 156.94 LBS | DIASTOLIC BLOOD PRESSURE: 68 MMHG

## 2019-04-02 DIAGNOSIS — R76.8 ANA POSITIVE: ICD-10-CM

## 2019-04-02 DIAGNOSIS — Z71.89 COUNSELING ON HEALTH PROMOTION AND DISEASE PREVENTION: ICD-10-CM

## 2019-04-02 DIAGNOSIS — R76.8 ANA POSITIVE: Primary | ICD-10-CM

## 2019-04-02 LAB
C3 SERPL-MCNC: 147 MG/DL (ref 50–180)
C4 SERPL-MCNC: 29 MG/DL (ref 11–44)
CK SERPL-CCNC: 33 U/L (ref 20–180)

## 2019-04-02 PROCEDURE — 86235 NUCLEAR ANTIGEN ANTIBODY: CPT

## 2019-04-02 PROCEDURE — 82085 ASSAY OF ALDOLASE: CPT

## 2019-04-02 PROCEDURE — 99999 PR PBB SHADOW E&M-EST. PATIENT-LVL IV: CPT | Mod: PBBFAC,,, | Performed by: INTERNAL MEDICINE

## 2019-04-02 PROCEDURE — 86160 COMPLEMENT ANTIGEN: CPT

## 2019-04-02 PROCEDURE — 86160 COMPLEMENT ANTIGEN: CPT | Mod: 59

## 2019-04-02 PROCEDURE — 86256 FLUORESCENT ANTIBODY TITER: CPT

## 2019-04-02 PROCEDURE — 3008F BODY MASS INDEX DOCD: CPT | Mod: CPTII,S$GLB,, | Performed by: INTERNAL MEDICINE

## 2019-04-02 PROCEDURE — 99205 PR OFFICE/OUTPT VISIT, NEW, LEVL V, 60-74 MIN: ICD-10-PCS | Mod: S$GLB,,, | Performed by: INTERNAL MEDICINE

## 2019-04-02 PROCEDURE — 82550 ASSAY OF CK (CPK): CPT

## 2019-04-02 PROCEDURE — 99205 OFFICE O/P NEW HI 60 MIN: CPT | Mod: S$GLB,,, | Performed by: INTERNAL MEDICINE

## 2019-04-02 PROCEDURE — 99999 PR PBB SHADOW E&M-EST. PATIENT-LVL IV: ICD-10-PCS | Mod: PBBFAC,,, | Performed by: INTERNAL MEDICINE

## 2019-04-02 PROCEDURE — 3008F PR BODY MASS INDEX (BMI) DOCUMENTED: ICD-10-PCS | Mod: CPTII,S$GLB,, | Performed by: INTERNAL MEDICINE

## 2019-04-02 NOTE — PROGRESS NOTES
RHEUMATOLOGY OUTPATIENT CLINIC NOTE    4/2/2019    Attending Rheumatologist: Misael Guzman  Primary Care Provider: Primary Doctor No   Physician Requesting Consultation: Miguel Salazar MD  [id number] [facility] [place] [address]  Chief Complaint/Reason For Consultation:  Positive TRINITY and pulmonary htn    Subjective:       HPI  Meg Sal is a 49 y.o. White female with positive autoantibodies and pulmonary hypertension refer for rheumatic evaluation.  Noted to have positive TRINITY as part of ILD workup.  Being managed by Cardiology for CAD s/p CABG, s/p mechanical aortic valve replacement and hearth failure, currently on chronic AC with warfarin.  Following with Pulmonary, suspected for sleep apnea in addition to COPD and pHTN.  Patient voices no acute complaints today.  Her main complaint is chronic lower back pain.  Pain is achy in nature, constant.  Worst at the end of the day, aggravated by prolonged standing and activity.  Refers wakes her up at night on occasions.  Denies association fever,  or GI complaints.  Denies having any focal symptoms.  Reports association with daytime fatigue and somnolence.  Also complains of mental fogginess.  Denies any muscle weakness, photosensitive rash, or discoloration fingertips upon cold exposure.    Review of Systems   Constitutional: Positive for malaise/fatigue. Negative for fever and weight loss.   HENT:        Denies eye or mouth sicca symptoms, denies oral ulcers, denies parotid swelling, denies swollen glands.   Eyes: Negative for blurred vision, pain and redness.   Respiratory: Positive for cough (Chronic, nonproductive.) and shortness of breath (Chronic.). Negative for hemoptysis and sputum production.         +snoring.  Former smoker.   Cardiovascular: Negative for chest pain, orthopnea and PND.   Gastrointestinal: Negative for abdominal pain, blood in stool, constipation, diarrhea and heartburn.        Denies dysphagia.   Genitourinary:  Negative for dysuria and hematuria.        Denies genital ulcers or sicca symptoms, denies foaming of the urine, denies nephrolithiasis.   Musculoskeletal: Positive for back pain (No alarm features.  Chronic.) and joint pain (Knees, mechanical pattern.). Negative for myalgias and neck pain.   Skin: Negative for rash.        Denies photosensitivity, denies alopecia, denies sclerodactyly, denies Raynaud's phenomenon,denies digital ulcers, no psoriasis, ulcerations, no purpura, denies nodules.   Neurological: Positive for weakness. Negative for tingling, sensory change, focal weakness, seizures and headaches.        Denies transient ischemic attack, denies strokes, denies seizures   Endo/Heme/Allergies: Does not bruise/bleed easily.        Denies fetal loss,frequent infections, denies deep vein thrombosis or pulmonary embolism.   Psychiatric/Behavioral: Positive for memory loss. The patient has insomnia (Intermittently, mostly due to pain.).    All other systems reviewed and are negative.    Chronic comorbid conditions affecting medical decision making today:  Past Medical History:   Diagnosis Date    Acute coronary syndrome     CHF (congestive heart failure)     Coronary artery disease     Hyperlipidemia     Hypertension     Sleep apnea syndrome 2019    Stroke      Past Surgical History:   Procedure Laterality Date    CARDIAC CATHETERIZATION      CARDIAC VALVE SURGERY      CORONARY ANGIOPLASTY      CORONARY ARTERY BYPASS GRAFT       History reviewed. No pertinent family history.  Social History     Substance and Sexual Activity   Alcohol Use Yes    Comment: rarely     Social History     Tobacco Use   Smoking Status Former Smoker    Packs/day: 1.00    Types: Cigarettes    Last attempt to quit: 2018    Years since quittin.8   Smokeless Tobacco Never Used     Social History     Substance and Sexual Activity   Drug Use Not on file       Current Outpatient Medications:     albuterol (VENTOLIN  HFA) 90 mcg/actuation inhaler, Inhale 2 puffs into the lungs every 6 (six) hours as needed for Wheezing. Rescue, Disp: 18 g, Rfl: 11    busPIRone (BUSPAR) 15 MG tablet, Take 15 mg by mouth 3 (three) times daily., Disp: , Rfl:     clonazePAM (KLONOPIN) 1 MG tablet, Take 1 mg by mouth 3 (three) times daily as needed for Anxiety., Disp: , Rfl:     clotrimazole-betamethasone 1-0.05% (LOTRISONE) cream, Apply to affected area 2 times daily, Disp: , Rfl:     desvenlafaxine succinate (PRISTIQ) 100 MG Tb24, Take 100 mg by mouth once daily., Disp: , Rfl:     diclofenac sodium (VOLTAREN) 1 % Gel, Apply 2 g topically 4 (four) times daily., Disp: , Rfl:     fluticasone (FLONASE) 50 mcg/actuation nasal spray, 1 spray by Each Nare route once daily., Disp: , Rfl:     furosemide (LASIX) 40 MG tablet, Take 40 mg by mouth 2 (two) times daily., Disp: , Rfl:     isosorbide dinitrate (ISORDIL) 10 MG tablet, Take 1 tablet (10 mg total) by mouth 3 (three) times daily., Disp: 90 tablet, Rfl: 3    nebivolol (BYSTOLIC) 5 MG Tab, Take 10 mg by mouth once daily., Disp: , Rfl:     nicotine (NICODERM CQ) 14 mg/24 hr, Place 1 patch onto the skin every 24 hours., Disp: , Rfl:     nitroGLYCERIN 0.4 MG/HR TD PT24 (NITRODUR) 0.4 mg/hr, Place 1 patch onto the skin once daily., Disp: , Rfl:     pantoprazole (PROTONIX) 40 MG tablet, Take 40 mg by mouth once daily., Disp: , Rfl:     potassium chloride SA (K-DUR,KLOR-CON) 20 MEQ tablet, Take 20 mEq by mouth 2 (two) times daily., Disp: , Rfl:     rosuvastatin (CRESTOR) 20 MG tablet, Take 20 mg by mouth once daily., Disp: , Rfl:     tiZANidine (ZANAFLEX) 4 MG tablet, Take 4 mg by mouth every evening., Disp: , Rfl:     topiramate (TOPAMAX) 100 MG tablet, Take 100 mg by mouth 2 (two) times daily., Disp: , Rfl:     traMADol (ULTRAM) 50 mg tablet, Take 50 mg by mouth every 6 (six) hours., Disp: , Rfl:     traZODone (DESYREL) 150 MG tablet, Take 150 mg by mouth nightly., Disp: , Rfl:      umeclidinium-vilanterol (ANORO ELLIPTA) 62.5-25 mcg/actuation DsDv, Inhale 1 puff into the lungs once daily. Controller, Disp: 1 each, Rfl: 5    verapamil (VERELAN) 120 MG C24P, Take 1 capsule (120 mg total) by mouth once daily., Disp: 30 capsule, Rfl: 3    VITAMIN B COMPLEX ORAL, Take 1 capsule by mouth., Disp: , Rfl:     vitamin D (VITAMIN D3) 1000 units Tab, Take 1,000 Units by mouth once daily., Disp: , Rfl:     warfarin (COUMADIN) 6 MG tablet, Take 6 mg by mouth once daily., Disp: , Rfl:      Objective:         Vitals:    04/02/19 1452   BP: 104/68   Pulse: 68     Physical Exam   Nursing note and vitals reviewed.  Constitutional: She is oriented to person, place, and time and well-developed, well-nourished, and in no distress.   Obese BMI 30.6   HENT:   Head: Normocephalic.   no oral ulcers, normal pooling of saliva.  no parotid enlargement.   Eyes: Conjunctivae are normal. Pupils are equal, round, and reactive to light.   Absent Episcleritis/scleritis.   Neck: Normal range of motion.   Cardiovascular: Normal rate and intact distal pulses.    Pulmonary/Chest: Effort normal. No respiratory distress.   Abdominal: Soft. She exhibits no distension.   Lymphadenopathy:     She has no cervical adenopathy.   Neurological: She is alert and oriented to person, place, and time. Gait normal.   absent sensory deficits  muscle strength 5/5 through.   SLR -, Lhermitte's sign - Spurling's test -   Skin: No rash noted.     No Skin fibrosis, teleangiectasias, rashes, discoid lesions, purpura, skin ulcers, nodules, or livedo reticularis, nail pitting.    Capillaroscopy: grossly with slight  arborization.   Musculoskeletal: Normal range of motion. She exhibits no edema, tenderness or deformity.   : strong  No synovitis or enthesitis.    AROM: intact  PROM: intact    Devices used by patient: none    +crepitus knees.       Reviewed old and all outside pertinent medical records available.    All lab results personally  reviewed and interpreted by me.  No results found for: WBC, HGB, HCT, MCV, MCH, MCHC, RDW, PLT, MPV, NEUTROABS, LYMPHOABS, MONOABS, EOSINOABS, BASOSABS, NEUTROPCT, LYMPHOPCT, MONOPCT, EOSINOPCT, BASOPCT, DIFFTYPE, RBCMORPHOLOG, PLTEST    No results found for: NA, K, CL, CO2, GLU, BUN, LABCREA, CALCIUM, PROT, ALBUMIN, BILITOT, AST, ALKPHOS, ALT, GFRAA, GFRNONAA    No results found for: COLORU, APPEARANCEUA, SPECGRAV, PHUR, PROTEINUA, GLUCOSEU, KETONESU, BLOODU, LEUKOCYTESUR, NITRITE, UROBILINOGEN    No results found for: CRP    No results found for: SEDRATE, ERYTHROCYTES    No results found for: TRINITY, RF, SEDRATE    No components found for: 25OHVITDTOT, 94OQXEVY5, 57XKCQNV6, METHODNOTE    No results found for: URICACID    No components found for: TSPOTTB    TSH: WNR, last 8/2017  Alpha1 antitrypsin: 258 (ref <190)    CBC 1/2019: Hb 9.6 (MCV 99) from 10.1 (1/14)(11.9 on 12/2018)  CMP: grossly unremarkable    Rheum Labs:  TRINITY 1 in 320 homogeneous pattern  Double-stranded DNA negative  MORGAN panel negative  Ace within normal range     Infectious Labs:  HIV nonreactive September 2017  Hepatitis profile nonreactive May 2018    Imaging:  All imaging reviewed and independently  interpreted by me.    CT chest without contrast March 2019  no mediastinal mass or bulky adenopathy.  There are scattered small nodes.  No hilar mass or enlargement.  The lungs are grossly abnormal.  There is rather extensive thickening of the interlobular septa throughout with rather pronounced centrilobular emphysematous change.  There are extensive areas of scarring bilaterally.  These are more pronounced along the periphery of the lungs.  There is definite evidence of honeycombing specially involving the posterior aspect of the lungs in the mid to lower lung fields there is no consolidation or effusion.  No neil pneumothorax.    TTE February 2019    1 - Mild left atrial enlargement.     2 - Eccentric hypertrophy.     3 - No wall motion  abnormalities.     4 - Normal left ventricular systolic function (EF 55-60%).     5 - Normal right ventricular systolic function .     6 - Aortic valve prosthesis, CATINA = 1.61 cm2, AVAi = 0.95 cm2/m2, peak velocity = 2.48 m/s, mean gradient = 15 mmHg.     7 - Moderate tricuspid regurgitation.     8 - Pulmonary hypertension. The estimated PA systolic pressure is 61 mmHg.      ASSESSMENT / PLAN:     Meg Sal is a 49 y.o. White female with:    1. TRINITY positive  - honeycombing on imaging, suspected for ILD.  Also with evidence of pulmonary hypertension on echocardiogram.  - recommend for blood work as below to assess for autoimmune disorder with lung involvement  - continue management per Pulmonary and Cardiology.  - will follow-up PFTs.  - CK; Standing  - Anti-scleroderma antibody; Future  - Anti-Centromere Antibody; Future  - CK; Standing  - Aldolase; Future  - C3 complement; Standing  - C4 complement; Standing  - RF/CCP    2. Other specified counseling  - over 10 minutes spent regarding below topics:  - Immunization counseling done.  - Weight loss counseling done.  - Nutrition and exercise counseling.  - Medication counseling provided.    3. Tobacco use  - at least 3 minutes spent on this topic  - smoking cessation encouraged.    Follow up in about 1 month (around 5/2/2019).    Method of contact with patient concerns: [facility]    Disclaimer:  This note is prepared using voice recognition software and as such is likely to have errors and has not been proof read. Please contact me for questions.     Misael Guzman M.D.  Rheumatology Department   [facility]

## 2019-04-02 NOTE — LETTER
April 2, 2019      Miguel Salazar MD  [address] [place] [address] [address] [place] [address]           [facility]  [address] [facility]  [place] [address]  Phone: [phone]  Fax: [phone]          Patient: Meg Sal   MR Number: [id number]   YOB: 1970   Date of Visit: 4/2/2019       Dear Dr. Miguel Salazar:    Thank you for referring Meg Sal to me for evaluation. Attached you will find relevant portions of my assessment and plan of care.    If you have questions, please do not hesitate to call me. I look forward to following Meg Sal along with you.    Sincerely,    Misael Guzman MD    Enclosure  CC:  No Recipients    If you would like to receive this communication electronically, please contact [email or web address] or [phone] to request more information on [employer] Link access.    For providers and/or their staff who would like to refer a patient to [facility], please contact us through our one-stop-shop provider referral line, [facility] , at [phone].    If you feel you have received this communication in error or would no longer like to receive these types of communications, please e-mail [email or web address]

## 2019-04-03 ENCOUNTER — TELEPHONE (OUTPATIENT)
Dept: PULMONOLOGY | Facility: CLINIC | Age: 49
End: 2019-04-03

## 2019-04-03 LAB — ALDOLASE SERPL-CCNC: 4.7 U/L (ref 1.2–7.6)

## 2019-04-03 NOTE — TELEPHONE ENCOUNTER
----- Message from Bob Benjamin sent at 4/2/2019  4:11 PM CDT -----  Contact: Pt  Please give pt a call at .[phone] (home) she needs to have a sleep study scheduled.

## 2019-04-04 ENCOUNTER — TELEPHONE (OUTPATIENT)
Dept: CARDIOLOGY | Facility: CLINIC | Age: 49
End: 2019-04-04

## 2019-04-04 ENCOUNTER — TELEPHONE (OUTPATIENT)
Dept: PULMONOLOGY | Facility: CLINIC | Age: 49
End: 2019-04-04

## 2019-04-04 ENCOUNTER — ANTI-COAG VISIT (OUTPATIENT)
Dept: CARDIOLOGY | Facility: CLINIC | Age: 49
End: 2019-04-04

## 2019-04-04 DIAGNOSIS — M54.9 BACK PAIN, UNSPECIFIED BACK LOCATION, UNSPECIFIED BACK PAIN LATERALITY, UNSPECIFIED CHRONICITY: Primary | ICD-10-CM

## 2019-04-04 LAB — INR PPP: 3.3

## 2019-04-04 NOTE — TELEPHONE ENCOUNTER
Patient stated you and her discussed referral to pain  Management.    Patient requesting referral.

## 2019-04-04 NOTE — TELEPHONE ENCOUNTER
Called patient and informed her that miss saravia will be calling her about sleep study. Patient verbally stated understanding     ----- Message from Jyoti Lilly RN sent at 4/4/2019 10:22 AM CDT -----  Patient stated, been waiting on return call for 2 days.    Thanks

## 2019-04-04 NOTE — PROGRESS NOTES
Verbal result taken from Meena with [facility]. PT/INR 40.0 / 3.3.   Date drawn 4/4/19.  No significant changes or extra doses reported. Home health reports patient noticed a small amount of blood in stool; patient has been constipated. Advised patient should seek immediate medical attention if this bleeding continues and/or worsens. Will hold dose of coumadin on today and monitor closely.   Order given: Hold dose of coumadin on today; then resume current dose of 6mg every evening.  Recheck on 4/10/2019.

## 2019-04-04 NOTE — TELEPHONE ENCOUNTER
----- Message from Bob Benjamin sent at 4/4/2019 10:09 AM CDT -----  Contact: pt  Please give pt a call at ..[phone] (home) regarding her being scheduled for pain management and no one has contacted her

## 2019-04-05 ENCOUNTER — TELEPHONE (OUTPATIENT)
Dept: CARDIOLOGY | Facility: CLINIC | Age: 49
End: 2019-04-05

## 2019-04-05 LAB
ANTI-CENTROMERE ANTIBODY: NEGATIVE
CENTROMERE AB TITR SER IF: NORMAL TITER
ENA SCL70 AB SER-ACNC: 3 UNITS

## 2019-04-05 NOTE — TELEPHONE ENCOUNTER
"Colton with [facility]  called and left voicemail that pt c/o bright red blood in stool.   I called and spoke with pt. She is having problems with constipation right now. She said it was a "good bit" of blood with BM.   Vitals were "good" per Colton. Pt on coumadin.   I advised pt if bleeding does not stop to report to Er. Awaiting further instructions from MD.   "

## 2019-04-05 NOTE — TELEPHONE ENCOUNTER
nitin notified of orders and will notify pt. She says she spoke with coumadin clinic as well. Also pt was having hemorrhoids she did not mention to her before. Cbc to be collected at next visit.

## 2019-04-09 ENCOUNTER — TELEPHONE (OUTPATIENT)
Dept: RHEUMATOLOGY | Facility: CLINIC | Age: 49
End: 2019-04-09

## 2019-04-09 NOTE — TELEPHONE ENCOUNTER
----- Message from Misael Guzman MD sent at 4/9/2019  9:32 AM CDT -----  Please contact the patient and inform I reviewed the lab results.  I am happy to report that the patient's results are within acceptable range.  We can continue with the plan discussed on our previous encounter.  For any questions or concerns, do not hesitate to contact me; and have the patient call the office or send a message through the patient portal for any concerns.    Thank you very much!    Sincerely,    Misael Guzman  Rheumatology Department   [facility]

## 2019-04-09 NOTE — TELEPHONE ENCOUNTER
Called patient to inform her of her lab results and advised her to continue the plan that was discussed, and to follow with pulmonary closely, per Dr. Guzman. Patient verbalized understanding.

## 2019-04-10 ENCOUNTER — LAB VISIT (OUTPATIENT)
Dept: LAB | Facility: HOSPITAL | Age: 49
End: 2019-04-10
Attending: INTERNAL MEDICINE
Payer: COMMERCIAL

## 2019-04-10 ENCOUNTER — ANTI-COAG VISIT (OUTPATIENT)
Dept: CARDIOLOGY | Facility: CLINIC | Age: 49
End: 2019-04-10

## 2019-04-10 DIAGNOSIS — I25.118 ATHSCL HEART DISEASE OF NATIVE COR ART W OTH ANG PCTRS: Primary | ICD-10-CM

## 2019-04-10 LAB
BASOPHILS # BLD AUTO: 0.05 K/UL (ref 0–0.2)
BASOPHILS NFR BLD: 0.7 % (ref 0–1.9)
DIFFERENTIAL METHOD: ABNORMAL
EOSINOPHIL # BLD AUTO: 0.3 K/UL (ref 0–0.5)
EOSINOPHIL NFR BLD: 3.7 % (ref 0–8)
ERYTHROCYTE [DISTWIDTH] IN BLOOD BY AUTOMATED COUNT: 17.7 % (ref 11.5–14.5)
HCT VFR BLD AUTO: 42.8 % (ref 37–48.5)
HGB BLD-MCNC: 12.3 G/DL (ref 12–16)
IMM GRANULOCYTES # BLD AUTO: 0.01 K/UL (ref 0–0.04)
IMM GRANULOCYTES NFR BLD AUTO: 0.1 % (ref 0–0.5)
INR PPP: 1.7
LYMPHOCYTES # BLD AUTO: 1.7 K/UL (ref 1–4.8)
LYMPHOCYTES NFR BLD: 24.8 % (ref 18–48)
MCH RBC QN AUTO: 29.9 PG (ref 27–31)
MCHC RBC AUTO-ENTMCNC: 28.7 G/DL (ref 32–36)
MCV RBC AUTO: 104 FL (ref 82–98)
MONOCYTES # BLD AUTO: 0.5 K/UL (ref 0.3–1)
MONOCYTES NFR BLD: 7.6 % (ref 4–15)
NEUTROPHILS # BLD AUTO: 4.4 K/UL (ref 1.8–7.7)
NEUTROPHILS NFR BLD: 63.1 % (ref 38–73)
NRBC BLD-RTO: 0 /100 WBC
PLATELET # BLD AUTO: 226 K/UL (ref 150–350)
PMV BLD AUTO: 10.1 FL (ref 9.2–12.9)
RBC # BLD AUTO: 4.12 M/UL (ref 4–5.4)
WBC # BLD AUTO: 6.94 K/UL (ref 3.9–12.7)

## 2019-04-10 PROCEDURE — 85025 COMPLETE CBC W/AUTO DIFF WBC: CPT

## 2019-04-10 RX ORDER — AMOXICILLIN 500 MG/1
500 TABLET, FILM COATED ORAL 3 TIMES DAILY
COMMUNITY
Start: 2019-04-08 | End: 2019-04-12

## 2019-04-10 NOTE — PROGRESS NOTES
Verbal results taken from _Meena with [facility]__. PT/INR _20.2 / 1.7__ Date drawn_4/10/2019_.  Started on Amoxicillin 500 mg TID.

## 2019-04-10 NOTE — PROGRESS NOTES
Contacted Meena with [facility].  Mrs. Sal is taking amxiocillin 500mg TID x 7 days, started Monday 04/08.  Due to potential increase in INR while taking antibiotic will resume current dose and monitor closely.  Order given: Continue dose of 6mg every evening.  Recheck on 4/17/19

## 2019-04-11 ENCOUNTER — OFFICE VISIT (OUTPATIENT)
Dept: CARDIOLOGY | Facility: CLINIC | Age: 49
End: 2019-04-11
Payer: COMMERCIAL

## 2019-04-11 ENCOUNTER — OFFICE VISIT (OUTPATIENT)
Dept: PAIN MEDICINE | Facility: CLINIC | Age: 49
End: 2019-04-11
Payer: COMMERCIAL

## 2019-04-11 VITALS
RESPIRATION RATE: 18 BRPM | WEIGHT: 156 LBS | DIASTOLIC BLOOD PRESSURE: 80 MMHG | HEART RATE: 77 BPM | SYSTOLIC BLOOD PRESSURE: 130 MMHG | HEIGHT: 60 IN | BODY MASS INDEX: 30.63 KG/M2

## 2019-04-11 VITALS
HEIGHT: 60 IN | WEIGHT: 156.75 LBS | HEART RATE: 84 BPM | BODY MASS INDEX: 30.77 KG/M2 | DIASTOLIC BLOOD PRESSURE: 70 MMHG | SYSTOLIC BLOOD PRESSURE: 108 MMHG

## 2019-04-11 DIAGNOSIS — M54.9 BACK PAIN, UNSPECIFIED BACK LOCATION, UNSPECIFIED BACK PAIN LATERALITY, UNSPECIFIED CHRONICITY: Primary | ICD-10-CM

## 2019-04-11 DIAGNOSIS — I49.3 PVC (PREMATURE VENTRICULAR CONTRACTION): ICD-10-CM

## 2019-04-11 DIAGNOSIS — Z95.2 HX OF MECHANICAL AORTIC VALVE REPLACEMENT: ICD-10-CM

## 2019-04-11 DIAGNOSIS — M54.16 LUMBAR RADICULOPATHY: ICD-10-CM

## 2019-04-11 DIAGNOSIS — M47.816 LUMBAR SPONDYLOSIS: Primary | ICD-10-CM

## 2019-04-11 DIAGNOSIS — Z95.1 HX OF CABG: Primary | ICD-10-CM

## 2019-04-11 PROCEDURE — 99999 PR PBB SHADOW E&M-EST. PATIENT-LVL III: ICD-10-PCS | Mod: PBBFAC,,, | Performed by: INTERNAL MEDICINE

## 2019-04-11 PROCEDURE — 99999 PR PBB SHADOW E&M-EST. PATIENT-LVL III: CPT | Mod: PBBFAC,,, | Performed by: INTERNAL MEDICINE

## 2019-04-11 PROCEDURE — 3008F BODY MASS INDEX DOCD: CPT | Mod: CPTII,S$GLB,, | Performed by: PAIN MEDICINE

## 2019-04-11 PROCEDURE — 99245 OFF/OP CONSLTJ NEW/EST HI 55: CPT | Mod: S$GLB,,, | Performed by: INTERNAL MEDICINE

## 2019-04-11 PROCEDURE — 3008F PR BODY MASS INDEX (BMI) DOCUMENTED: ICD-10-PCS | Mod: CPTII,S$GLB,, | Performed by: PAIN MEDICINE

## 2019-04-11 PROCEDURE — 99999 PR PBB SHADOW E&M-EST. PATIENT-LVL IV: ICD-10-PCS | Mod: PBBFAC,,, | Performed by: PAIN MEDICINE

## 2019-04-11 PROCEDURE — 99999 PR PBB SHADOW E&M-EST. PATIENT-LVL IV: CPT | Mod: PBBFAC,,, | Performed by: PAIN MEDICINE

## 2019-04-11 PROCEDURE — 99204 PR OFFICE/OUTPT VISIT, NEW, LEVL IV, 45-59 MIN: ICD-10-PCS | Mod: S$GLB,,, | Performed by: PAIN MEDICINE

## 2019-04-11 PROCEDURE — 99245 PR OFFICE CONSULTATION,LEVEL V: ICD-10-PCS | Mod: S$GLB,,, | Performed by: INTERNAL MEDICINE

## 2019-04-11 PROCEDURE — 99204 OFFICE O/P NEW MOD 45 MIN: CPT | Mod: S$GLB,,, | Performed by: PAIN MEDICINE

## 2019-04-11 NOTE — PATIENT INSTRUCTIONS
Provide a referral physical therapy  -obtain lumbar x-ray  -provide a referral to comprehensive pain  -follow up in clinic in 8 weeks

## 2019-04-11 NOTE — PROGRESS NOTES
Chief Pain Complaint:  Low-back Pain      History of Present Illness:   This patient is a 49 y.o. female who presents today complaining of the above noted pain/s. The patient describes the pain as follows.  Ms. Sal is a new patient clinic with complaints of low back pain.  She has been having symptoms for the last several years which has become constant and is currently rated as a 9/10.  She does have radiation from her low back down her bilateral lower extremities to the top of her feet in the L5 distribution bilaterally.  She denies having had surgery on her lumbar spine in addition to injections before.  She has not participated in physical therapy for her lumbar spine.  She does endorse having numbness and weakness in the bilateral lower extremities in the L5 distribution.  She has been tried on numerous medications including Cymbalta, Topamax, Lyrica, gabapentin, tramadol which have all had varying degrees of benefit.  She denies having bowel and bladder difficulties.  She has also received recent prescription for Norco which has been helpful.    Previous Therapy:  Medications:Lortab, Cymbalta, Lyrica, Gabapentin and Tramadol  Injections: None  Surgeries: None  Physical Therapy: None    Past Surgical History:   Procedure Laterality Date    CARDIAC CATHETERIZATION      CARDIAC VALVE SURGERY      CORONARY ANGIOPLASTY      CORONARY ARTERY BYPASS GRAFT         Imaging / Labs / Studies (reviewed on 4/11/2019):    Review of Systems:  Review of Systems   Constitutional: Negative for fever.   Eyes: Negative for blurred vision.   Respiratory: Negative for cough and wheezing.    Cardiovascular: Negative for chest pain and orthopnea.   Gastrointestinal: Negative for constipation, diarrhea, nausea and vomiting.   Genitourinary: Negative for dysuria.   Musculoskeletal: Positive for back pain.   Skin: Negative for itching and rash.   Neurological: Positive for weakness.   Endo/Heme/Allergies: Does not bruise/bleed  easily.       Physical Exam:  /80 (BP Location: Right arm, Patient Position: Sitting, BP Method: Medium (Automatic))   Pulse 77   Resp 18   Ht 5' (1.524 m)   Wt 70.8 kg (156 lb)   BMI 30.47 kg/m²  (reviewed on 2019)\  General    Constitutional: She is oriented to person, place, and time. She appears well-developed and well-nourished.   HENT:   Head: Normocephalic and atraumatic.   Eyes: EOM are normal.   Neck: Neck supple.   Pulmonary/Chest: Effort normal.   Abdominal: She exhibits no distension.   Neurological: She is alert and oriented to person, place, and time. No cranial nerve deficit.   Psychiatric: She has a normal mood and affect.     General Musculoskeletal Exam   Gait: antalgic     Back (L-Spine & T-Spine) / Neck (C-Spine) Exam     Tenderness Right paramedian tenderness of the Lower L-Spine. Left paramedian tenderness of the Lower L-Spine.     Back (L-Spine & T-Spine) Range of Motion   Lateral bend right: normal   Lateral bend left: normal   Rotation right: normal   Rotation left: normal     Spinal Sensation   Right Side Sensation  L-Spine Level: normal  Left Side Sensation  L-Spine Level: normal    Comments:  Minimal increased pain with lumbar flexion extension; straight leg raise negative bilaterally for radicular symptoms      Muscle Strength   Right Lower Extremity   Hip Flexion: 5/5   Quadriceps:  5/5   Hamstrin/5   Anterior tibial:  5/5/5  Left Lower Extremity   Hip Flexion: 5/5   Quadriceps:  5/5   Hamstrin/5   Anterior tibial:  5/5/5     Reflexes     Left Side  Quadriceps:  2+  Achilles:  2+  Ankle Clonus:  absent    Right Side   Quadriceps:  2+  Achilles:  2+  Ankle Clonus:  absent      Assessment  Lumbar Radiculopathy  Lumbar Spondylosis    1. 49 y.o. year old patient with PMH of   Past Medical History:   Diagnosis Date    Acute coronary syndrome     CHF (congestive heart failure)     Coronary artery disease     Hyperlipidemia     Hypertension     Sleep apnea  syndrome 2/8/2019    Stroke       presenting with pain located lumbar spine, bilateral lower extremities  2. Pain Generators / Etiology: Lumbar Radiculopathy and Lumbar Spondylosis  3. Failed Meds (E- Effective, NE- Not Effective):  Norco-effective, tramadol-effective, Lyrica-minimally effective  4. Physical Therapy - None  5. Psychological comorbidities - None  6. Anticoagulants / Antiplatelets: Coumadin     PLAN:  1. Medications:  Recommend turmeric 500 mg twice daily however she would need to discuss with Cardiology for starting this medication; recommend alpha lipoic acid 300 mg twice daily; explained patient that our clinic is not prescribe opioids and that if this is the type of therapy she would like to continue we can provide a referral to another pain clinic which she would like to pursue  2. PT - offered referral to physical therapy however patient reported that she would not be able to attend therapy sessions 7 stat offered home health physical therapy to which she replied that if they come for 1 hr 2 days a week that were require her to lock her dogs up in another room and she seemed reluctant  3. Psychological - none  4. Labs - obtain  none  5. Imaging - obtain lumbar spine x-ray  6. Interventions - schedule none; will consider lumbar epidural steroid injections in the future  7. Referrals - none  8. Records - none  9. Follow up visit - follow up in clinic in 8 weeks  10. Patient Questions - answered all of the patient's questions regarding diagnosis, therapy, and treatment  11.  This condition does not require this patient to take time off of work    KIM Greer MD  Interventional Pain  [facility]

## 2019-04-11 NOTE — PROGRESS NOTES
Subjective:    Patient ID:  Meg Sal is a 49 y.o. female who presents for evaluation of PVCs      49 yoF MI, MVR, AVR, CAD s/p CABG here for management of PVCs. She had MV cleft and aortic valve disease. She underwent CABG, MV annuloplasty and mechanical AVR 2014. She is on warfarin for mechanical Aortic valve. She had a difficult post operative course with residual chest wall pain. She recently noticed palpitations leading to evaluation. She had a normal stress test and normal EF on echo 2/19. Her main complaint is of left breast and chest wall pain. Holter with 12% monomorphic PVCs. RBBB V3 pattern break, with left superior axis, ~160 ms. Her symptoms are vague relative to her PVCs. She hears them more than she feels them.    NM Stress 2/19:  Nuclear Quantitative Functional Analysis:   LVEF: 65 %    Impression: NORMAL MYOCARDIAL PERFUSION  1. The perfusion scan is free of evidence for myocardial ischemia or injury.   2. Resting wall motion is physiologic.   3. Resting LV function is normal.   4. The ventricular volumes are normal at rest and stress.   5. The extracardiac distribution of radioactivity is normal.     Echo 2/19:  CONCLUSIONS     1 - Mild left atrial enlargement.     2 - Eccentric hypertrophy.     3 - No wall motion abnormalities.     4 - Normal left ventricular systolic function (EF 55-60%).     5 - Normal right ventricular systolic function .     6 - Aortic valve prosthesis, CATINA = 1.61 cm2, AVAi = 0.95 cm2/m2, peak velocity = 2.48 m/s, mean gradient = 15 mmHg.     7 - Moderate tricuspid regurgitation.     8 - Pulmonary hypertension. The estimated PA systolic pressure is 61 mmHg.     Past Medical History:  No date: Acute coronary syndrome  No date: CHF (congestive heart failure)  No date: Coronary artery disease  No date: Hyperlipidemia  No date: Hypertension  2/8/2019: Sleep apnea syndrome  No date: Stroke    Past Surgical History:  No date: CARDIAC CATHETERIZATION  No date: CARDIAC  VALVE SURGERY  No date: CORONARY ANGIOPLASTY  No date: CORONARY ARTERY BYPASS GRAFT    Social History    Socioeconomic History      Marital status:       Spouse name: Not on file      Number of children: Not on file      Years of education: Not on file      Highest education level: Not on file    Occupational History      Not on file    Social Needs      Financial resource strain: Not on file      Food insecurity:        Worry: Not on file        Inability: Not on file      Transportation needs:        Medical: Not on file        Non-medical: Not on file    Tobacco Use      Smoking status: Former Smoker        Packs/day: 1.00        Types: Cigarettes        Quit date: 2018        Years since quittin.8      Smokeless tobacco: Never Used    Substance and Sexual Activity      Alcohol use: Yes        Comment: rarely      Drug use: Not on file      Sexual activity: Not on file    Lifestyle      Physical activity:        Days per week: Not on file        Minutes per session: Not on file      Stress: Not on file    Relationships      Social connections:        Talks on phone: Not on file        Gets together: Not on file        Attends Zoroastrian service: Not on file        Active member of club or organization: Not on file        Attends meetings of clubs or organizations: Not on file        Relationship status: Not on file    Other Topics      Concerns:        Not on file    Social History Narrative      Not on file    History reviewed.  No pertinent family history.        Review of Systems   Constitution: Negative.   HENT: Negative.    Eyes: Negative.    Cardiovascular: Positive for chest pain, irregular heartbeat and palpitations. Negative for dyspnea on exertion, leg swelling, near-syncope and syncope.   Respiratory: Negative.  Negative for shortness of breath.    Endocrine: Negative.    Hematologic/Lymphatic: Negative.    Skin: Negative.    Musculoskeletal: Negative.    Gastrointestinal: Negative.     Genitourinary: Negative.    Neurological: Negative.  Negative for dizziness and light-headedness.   Psychiatric/Behavioral: Negative.    Allergic/Immunologic: Negative.         Objective:    Physical Exam   Constitutional: She is oriented to person, place, and time. She appears well-developed and well-nourished. No distress.   HENT:   Head: Normocephalic and atraumatic.   Eyes: Pupils are equal, round, and reactive to light. EOM are normal.   Neck: Normal range of motion. No JVD present. No thyromegaly present.   Cardiovascular: Normal rate, regular rhythm, S1 normal, S2 normal and normal heart sounds. PMI is not displaced. Exam reveals no gallop and no friction rub.   No murmur heard.  Metallic click in aortic position   Pulmonary/Chest: Effort normal and breath sounds normal. No respiratory distress. She has no wheezes. She has no rales.   Abdominal: Soft. Bowel sounds are normal. She exhibits no distension. There is no tenderness. There is no rebound and no guarding.   Musculoskeletal: Normal range of motion. She exhibits no edema or tenderness.   Neurological: She is alert and oriented to person, place, and time. No cranial nerve deficit.   Skin: Skin is warm and dry. No rash noted. No erythema.   Psychiatric: She has a normal mood and affect. Her behavior is normal. Judgment and thought content normal.   Vitals reviewed.    ECG: NSR nl CT, RBBB; PVCs RBBB V3 pattern break, left superior axis        Assessment:       1. Hx of CABG    2. PVC (premature ventricular contraction)    3. Hx of mechanical aortic valve replacement         Plan:       49 yoF CAD s/p CABG, MV annuloplasty and mechanical AVR here for PVC assessment. She has 12% PVCs with normal LV function and no ischemia on stress. She is minimally symptomatic. Given her valvular disease, ablation is not a good option. Also since her LV function is normal and her PVCs are similar in QRS duration to her RBBB, I am not sure ablation is necessary. I would  continue her current therapy. Options for AAD therapy are limited to amiodarone which would not be ideal given her lung condition. RTC 6m

## 2019-04-11 NOTE — LETTER
April 11, 2019      Ayaan Banda MD  [address] [address] [place] [address]           [facility]  [address] [place] [address] , [address]  [place] [address]  Phone: [phone]  Fax: [phone]          Patient: Meg Sal   MR Number: [id number]   YOB: 1970   Date of Visit: 4/11/2019       Dear Dr. Ayaan Banda:    Thank you for referring Meg Sal to me for evaluation. Attached you will find relevant portions of my assessment and plan of care.    If you have questions, please do not hesitate to call me. I look forward to following Meg Sal along with you.    Sincerely,    Ad Montero MD    Enclosure  CC:  No Recipients    If you would like to receive this communication electronically, please contact [email or web address] or [phone] to request more information on [employer] Link access.    For providers and/or their staff who would like to refer a patient to [facility], please contact us through our one-stop-shop provider referral line, [facility], at [phone].    If you feel you have received this communication in error or would no longer like to receive these types of communications, please e-mail [email or web address]

## 2019-04-11 NOTE — LETTER
April 11, 2019      Ayaan Banda MD  [address]  [place] [address]           [facility]  [id number] [place] [address]  Phone: [phone]  Fax: [phone]          Patient: Meg Sal   MR Number: [id number]   YOB: 1970   Date of Visit: 4/11/2019       Dear Dr. Ayaan Banda:    Thank you for referring Meg Sal to me for evaluation. Attached you will find relevant portions of my assessment and plan of care.    If you have questions, please do not hesitate to call me. I look forward to following Meg Sal along with you.    Sincerely,    Ousmane Greer MD    Enclosure  CC:  No Recipients    If you would like to receive this communication electronically, please contact [email or web address] or [phone] to request more information on [employer] Link access.    For providers and/or their staff who would like to refer a patient to [facility], please contact us through our one-stop-shop provider referral line, [facility] , at [phone].    If you feel you have received this communication in error or would no longer like to receive these types of communications, please e-mail [email or web address]

## 2019-04-12 ENCOUNTER — HOSPITAL ENCOUNTER (OUTPATIENT)
Dept: RADIOLOGY | Facility: HOSPITAL | Age: 49
Discharge: HOME | End: 2019-04-12
Attending: PAIN MEDICINE
Payer: COMMERCIAL

## 2019-04-12 ENCOUNTER — OFFICE VISIT (OUTPATIENT)
Dept: PULMONOLOGY | Facility: CLINIC | Age: 49
End: 2019-04-12
Payer: COMMERCIAL

## 2019-04-12 ENCOUNTER — CLINICAL SUPPORT (OUTPATIENT)
Dept: PULMONOLOGY | Facility: CLINIC | Age: 49
End: 2019-04-12
Payer: COMMERCIAL

## 2019-04-12 VITALS
DIASTOLIC BLOOD PRESSURE: 77 MMHG | BODY MASS INDEX: 30.43 KG/M2 | OXYGEN SATURATION: 90 % | HEART RATE: 60 BPM | SYSTOLIC BLOOD PRESSURE: 136 MMHG | RESPIRATION RATE: 16 BRPM | WEIGHT: 155 LBS | WEIGHT: 155.31 LBS | BODY MASS INDEX: 30.49 KG/M2 | HEIGHT: 60 IN | HEIGHT: 60 IN

## 2019-04-12 DIAGNOSIS — J44.9 CHRONIC OBSTRUCTIVE PULMONARY DISEASE, UNSPECIFIED COPD TYPE: ICD-10-CM

## 2019-04-12 DIAGNOSIS — R29.818 SUSPECTED SLEEP APNEA: ICD-10-CM

## 2019-04-12 DIAGNOSIS — M54.9 BACK PAIN, UNSPECIFIED BACK LOCATION, UNSPECIFIED BACK PAIN LATERALITY, UNSPECIFIED CHRONICITY: ICD-10-CM

## 2019-04-12 DIAGNOSIS — Z87.891 HISTORY OF SMOKING: ICD-10-CM

## 2019-04-12 DIAGNOSIS — I50.30 (HFPEF) HEART FAILURE WITH PRESERVED EJECTION FRACTION: ICD-10-CM

## 2019-04-12 DIAGNOSIS — J84.10 PULMONARY FIBROSIS: ICD-10-CM

## 2019-04-12 DIAGNOSIS — J43.2 CENTRILOBULAR EMPHYSEMA: ICD-10-CM

## 2019-04-12 DIAGNOSIS — J41.1 BRONCHITIS, CHRONIC, MUCOPURULENT: ICD-10-CM

## 2019-04-12 DIAGNOSIS — I27.20 PULMONARY HYPERTENSION: Primary | ICD-10-CM

## 2019-04-12 DIAGNOSIS — R06.83 SNORING: ICD-10-CM

## 2019-04-12 DIAGNOSIS — I27.20 PULMONARY HYPERTENSION: ICD-10-CM

## 2019-04-12 DIAGNOSIS — G47.19 EXCESSIVE DAYTIME SLEEPINESS: ICD-10-CM

## 2019-04-12 PROCEDURE — 94618 PULMONARY STRESS TESTING: ICD-10-PCS | Mod: S$GLB,,, | Performed by: INTERNAL MEDICINE

## 2019-04-12 PROCEDURE — 94060 PR EVAL OF BRONCHOSPASM: ICD-10-PCS | Mod: 59,S$GLB,, | Performed by: INTERNAL MEDICINE

## 2019-04-12 PROCEDURE — 99215 OFFICE O/P EST HI 40 MIN: CPT | Mod: 25,S$GLB,, | Performed by: INTERNAL MEDICINE

## 2019-04-12 PROCEDURE — 94618 PULMONARY STRESS TESTING: CPT | Mod: S$GLB,,, | Performed by: INTERNAL MEDICINE

## 2019-04-12 PROCEDURE — 72110 X-RAY EXAM L-2 SPINE 4/>VWS: CPT | Mod: 26,,, | Performed by: RADIOLOGY

## 2019-04-12 PROCEDURE — 3008F BODY MASS INDEX DOCD: CPT | Mod: CPTII,S$GLB,, | Performed by: INTERNAL MEDICINE

## 2019-04-12 PROCEDURE — 94060 EVALUATION OF WHEEZING: CPT | Mod: 59,S$GLB,, | Performed by: INTERNAL MEDICINE

## 2019-04-12 PROCEDURE — 94729 DIFFUSING CAPACITY: CPT | Mod: S$GLB,,, | Performed by: INTERNAL MEDICINE

## 2019-04-12 PROCEDURE — 72110 XR LUMBAR SPINE COMPLETE 5 VIEW: ICD-10-PCS | Mod: 26,,, | Performed by: RADIOLOGY

## 2019-04-12 PROCEDURE — 94729 PR C02/MEMBANE DIFFUSE CAPACITY: ICD-10-PCS | Mod: S$GLB,,, | Performed by: INTERNAL MEDICINE

## 2019-04-12 PROCEDURE — 94726 PULM FUNCT TST PLETHYSMOGRAP: ICD-10-PCS | Mod: S$GLB,,, | Performed by: INTERNAL MEDICINE

## 2019-04-12 PROCEDURE — 99215 PR OFFICE/OUTPT VISIT, EST, LEVL V, 40-54 MIN: ICD-10-PCS | Mod: 25,S$GLB,, | Performed by: INTERNAL MEDICINE

## 2019-04-12 PROCEDURE — 99999 PR PBB SHADOW E&M-EST. PATIENT-LVL III: CPT | Mod: PBBFAC,,, | Performed by: INTERNAL MEDICINE

## 2019-04-12 PROCEDURE — 99999 PR PBB SHADOW E&M-EST. PATIENT-LVL III: ICD-10-PCS | Mod: PBBFAC,,, | Performed by: INTERNAL MEDICINE

## 2019-04-12 PROCEDURE — 3008F PR BODY MASS INDEX (BMI) DOCUMENTED: ICD-10-PCS | Mod: CPTII,S$GLB,, | Performed by: INTERNAL MEDICINE

## 2019-04-12 PROCEDURE — 72110 X-RAY EXAM L-2 SPINE 4/>VWS: CPT | Mod: TC

## 2019-04-12 PROCEDURE — 94726 PLETHYSMOGRAPHY LUNG VOLUMES: CPT | Mod: S$GLB,,, | Performed by: INTERNAL MEDICINE

## 2019-04-12 NOTE — PROGRESS NOTES
Pulmonary Outpatient Follow Up Visit     Subjective:       Patient ID: Meg Sal is a 49 y.o. female.    Chief Complaint: Pulmonary Hypertension; Emphysema; and Pulmonary Fibrosis      HPI          49-year-old female patient presenting for follow-up.    Patient initially referred by Cardiology for evaluation of obstructive sleep apnea.    She did have an acute on chronic diastolic heart failure January 2018 with hospital admission.    She does have about 30 pack year smoking history she did quit smoking in 2018.    She has a chronic cough with whitish sometimes brownish and yellowish sputum production sometimes blood-tinged, in addition to intermittent wheezing and shortness of breath at rest and on exertion.    Pulmonary hypertension was showed to be severe on echo February 2019.     Patient with pulmonary hypertension resulting in inability to carry on any physical activity without symptoms. The patient manifests signs of right heart failure. Dyspnea and/or fatigue may be present even at rest. Discomfort is increased by physical activity. WHO functional class IV.      Patient complain of snoring, excessive daytime sleepiness, Luther Sleepiness Scale score today of 8, chronic fatigue.  Did not undergo in-lab polysomnography due to co-pay.  Will attempt home sleep study.       Status post CABG, mitral annuloplasty and aortic valve replacement.  On Coumadin.       STOP - BANG Questionnaire:      1. Snoring : Do you snore loudly ?    Yes     2. Tired : Do you often feel tired, fatigued, or sleepy during daytime? Yes     3. Observed: Has anyone observed you stop breathing during your sleep?   Yes      4. Blood pressure : Do you have or are you being treated for high blood pressure?   Yes     5. BMI :BMI more than 35 kg/m2?   No     6. Age : Age over 50 yr old?   Yes     7. Neck circumference:   For male, is your shirt collar 17 inches / 43cm or larger?  For female,  is your shirt collar 16 inches / 41cm or larger?     No     8. Gender: Gender male?   No     STOP BANG SCORE 5     High risk of BANDAR: Yes 5 - 8  Intermediate risk of BANDAR: Yes 3 - 4  Low risk of BANDAR: Yes 0 - 2        References:   STOP Questionnaire     A Tool to Screen Patients for Obstructive Sleep Apnea: PAIGE TeranC.P.C., MINDI GarciaB.B.S., Jesus Hendrickson M.D.,Rae Sarkar, Ph.D., KATALINA Mederos.B.B.S.,_ Nanci Duong.,_ Marie Luna M.D., Jean Baron, F.R.C.P.C.; Anesthesiology 2008; 108:812-21 Copyright © 2008, the American Society of Anesthesiologists, Inc. [employer]        Review of Systems   Constitutional: Negative for fever and chills.   HENT: Negative for nosebleeds.    Eyes: Negative for redness.   Respiratory: Positive for snoring, cough, shortness of breath, dyspnea on extertion and somnolence. Negative for choking.    Cardiovascular: Positive for leg swelling.        AVR on Coumadin metallic   Genitourinary: Negative for hematuria.   Endocrine: Negative for cold intolerance.    Musculoskeletal: Positive for arthralgias and back pain.   Skin: Negative for rash.   Gastrointestinal: Negative for vomiting.   Neurological: Negative for syncope.        History of stroke   Hematological: Negative for adenopathy.   Psychiatric/Behavioral: Positive for sleep disturbance. Negative for confusion.       Outpatient Encounter Medications as of 4/12/2019   Medication Sig Dispense Refill    albuterol (VENTOLIN HFA) 90 mcg/actuation inhaler Inhale 2 puffs into the lungs every 6 (six) hours as needed for Wheezing. Rescue 18 g 11    busPIRone (BUSPAR) 15 MG tablet Take 15 mg by mouth 3 (three) times daily.      clonazePAM (KLONOPIN) 1 MG tablet Take 1 mg by mouth 3 (three) times daily as needed for Anxiety.      clotrimazole-betamethasone 1-0.05% (LOTRISONE) cream Apply to affected area 2 times daily      desvenlafaxine succinate (PRISTIQ) 100 MG  Tb24 Take 100 mg by mouth once daily.      diclofenac sodium (VOLTAREN) 1 % Gel Apply 2 g topically 4 (four) times daily.      fluticasone (FLONASE) 50 mcg/actuation nasal spray 1 spray by Each Nare route once daily.      furosemide (LASIX) 40 MG tablet Take 40 mg by mouth 2 (two) times daily.      isosorbide dinitrate (ISORDIL) 10 MG tablet Take 1 tablet (10 mg total) by mouth 3 (three) times daily. 90 tablet 3    nebivolol (BYSTOLIC) 5 MG Tab Take 10 mg by mouth once daily.      nicotine (NICODERM CQ) 14 mg/24 hr Place 1 patch onto the skin every 24 hours.      nitroGLYCERIN 0.4 MG/HR TD PT24 (NITRODUR) 0.4 mg/hr Place 1 patch onto the skin once daily.      pantoprazole (PROTONIX) 40 MG tablet Take 40 mg by mouth once daily.      potassium chloride SA (K-DUR,KLOR-CON) 20 MEQ tablet Take 20 mEq by mouth 2 (two) times daily.      rosuvastatin (CRESTOR) 20 MG tablet Take 20 mg by mouth once daily.      tiZANidine (ZANAFLEX) 4 MG tablet Take 4 mg by mouth every evening.      topiramate (TOPAMAX) 100 MG tablet Take 100 mg by mouth 2 (two) times daily.      traMADol (ULTRAM) 50 mg tablet Take 50 mg by mouth every 6 (six) hours.      traZODone (DESYREL) 150 MG tablet Take 150 mg by mouth nightly.      umeclidinium-vilanterol (ANORO ELLIPTA) 62.5-25 mcg/actuation DsDv Inhale 1 puff into the lungs once daily. Controller 1 each 5    verapamil (VERELAN) 120 MG C24P Take 1 capsule (120 mg total) by mouth once daily. 30 capsule 3    VITAMIN B COMPLEX ORAL Take 1 capsule by mouth.      vitamin D (VITAMIN D3) 1000 units Tab Take 1,000 Units by mouth once daily.      warfarin (COUMADIN) 6 MG tablet Take 6 mg by mouth once daily.      [DISCONTINUED] amoxicillin (AMOXIL) 500 MG Tab Take 500 mg by mouth 3 (three) times daily.       No facility-administered encounter medications on file as of 4/12/2019.        Objective:     Vital Signs (Most Recent)  Vital Signs  Pulse: 60  Resp: 16  SpO2: (!) 90 %  BP:  136/77  Height and Weight  Height: 5' (152.4 cm)  Weight: 70.3 kg (155 lb)  BSA (Calculated - sq m): 1.73 sq meters  BMI (Calculated): 30.3  Weight in (lb) to have BMI = 25: 127.7]  Wt Readings from Last 2 Encounters:   04/12/19 70.3 kg (155 lb)   04/12/19 70.5 kg (155 lb 5 oz)       Physical Exam   Constitutional: She is oriented to person, place, and time. She appears well-developed.   HENT:   Head: Normocephalic.   Neck: Neck supple.   Cardiovascular: Normal rate and regular rhythm.   Murmur heard.  Pulmonary/Chest: Normal expansion and effort normal. No stridor. No respiratory distress. She has rales. She exhibits no tenderness.   Abdominal: Soft.   Musculoskeletal: She exhibits edema. She exhibits no tenderness.   Lymphadenopathy:     She has no cervical adenopathy.   Neurological: She is alert and oriented to person, place, and time. Gait normal.   Skin: Skin is warm. Nails show no clubbing.   Psychiatric: She has a normal mood and affect. Her behavior is normal. Judgment and thought content normal.   Nursing note and vitals reviewed.      Laboratory  Lab Results   Component Value Date    WBC 6.94 04/10/2019    RBC 4.12 04/10/2019    HGB 12.3 04/10/2019    HCT 42.8 04/10/2019     (H) 04/10/2019    MCH 29.9 04/10/2019    MCHC 28.7 (L) 04/10/2019    RDW 17.7 (H) 04/10/2019     04/10/2019    MPV 10.1 04/10/2019    GRAN 4.4 04/10/2019    GRAN 63.1 04/10/2019    LYMPH 1.7 04/10/2019    LYMPH 24.8 04/10/2019    MONO 0.5 04/10/2019    MONO 7.6 04/10/2019    EOS 0.3 04/10/2019    BASO 0.05 04/10/2019    EOSINOPHIL 3.7 04/10/2019    BASOPHIL 0.7 04/10/2019       BMP  No results found for: NA, K, CL, CO2, BUN, CREATININE, CALCIUM, ANIONGAP, ESTGFRAFRICA, EGFRNONAA, AST, ALT, PROT    Lab Results   Component Value Date     (H) 03/11/2019       No results found for: TSH    No results found for: SEDRATE    No results found for: CRP  TRINITY positive otherwise rest of the rheumatology blood work was  negative.    Diagnostic Results:    I have personally reviewed today the following studies:    PFT done 04/12/2019 shows isolated severe reduction of DLCO.  Small airways disease.    CT chest [facility] January 2016   There are small cystic changes within the lungs consistent with COPD. Interstitial scarring noted at size is at the upper limits of normal. Mitral and aortic valve prosthesis are seen. Sternotomy sutures are in place with no alignment of the sternum. No sternal erosive changes or parasternal fluid noted. Within the lungs with no focal pneumonia, mass or nodularity. There is no pleural or pericardial effusion. Aorta is normal in caliber. There are small lymph nodes in the middle mediastinum.    CT scan of the chest March 28, 2019 personally reviewed  The lungs are grossly abnormal.  There is rather extensive thickening of the interlobular septa throughout with rather pronounced centrilobular emphysematous change.  There are extensive areas of scarring bilaterally.  These are more pronounced along the periphery of the lungs.  There is definite evidence of honeycombing specially involving the posterior aspect of the lungs in the mid to lower lung fields there is no consolidation or effusion.  No neil pneumothorax.    6 min walking test today shows moderate exercise capacity reduction and moderate hypoxemia.  No need for O2 on exertion.    Assessment/Plan:   Pulmonary hypertension    -No need for O2 at the moment.  Continue Coumadin which she is receiving for metallic AVR.  Continue diuretics.    Pulmonary fibrosis  -     Sjogrens syndrome-A extractable nuclear antibody; Future; Expected date: 04/12/2019  -     Sjogrens syndrome-B extractable nuclear antibody; Future; Expected date: 04/12/2019  -     Myositis AssessR Plus Rose-1; Future; Expected date: 04/12/2019  -     Anti Sm/RNP Antibody; Future; Expected date: 04/12/2019    Centrilobular emphysema    -Continue albuterol p.r.n..  Continue Anoro  1 puff daily.     (HFpEF) heart failure with preserved ejection fraction    -continue diuresis    History of smoking    -encouragde patient to continue smoking cessation    Bronchitis, chronic, mucopurulent    -     Fungus culture; Future; Expected date: 04/12/2019  -     AFB Culture & Smear; Future; Expected date: 04/12/2019  -     Fungus culture; Future; Expected date: 04/12/2019        -     continue albuterol p.r.n. and Anoro 1 puff daily.      Suspected sleep apnea  -     Home Sleep Studies; Future    Snoring  -     Home Sleep Studies; Future    Excessive daytime sleepiness  -     Home Sleep Studies; Future    Patient has multiple respiratory disorders, with emphysema evident on CT chest in addition to honeycombing and pulmonary fibrosis which is more likely idiopathic with negative rheumatology workup so far, I will proceed with the rest of connective tissue workup.  She will probably benefit from anti fibrotics.  Rheumatology workup remains negative will likely proceed with OFEV.     Her pulmonary hypertension is severe, she has significant functional impairment, pulmonary hypertension is likely related to left ventricular heart disease(valvular disease status post AVR, HFpEF and hypoxemic respiratory disorder.  Will discuss with Cardiology the indication for right heart catheterization to further define her pulmonary hypertension.    MEDICAL DECISION MAKING: Moderate to high complexity.  Overall, the multiple problems listed are of moderate to high severity that may impact quality of life and activities of daily living. Side effects of medications, treatment plan as well as options and alternatives reviewed and discussed with patient. There was counseling of patient concerning these issues.     Total time spent in face to face counseling and coordination of care - 40  minutes over 50% of time was used in discussion of prognosis, risks, benefits of treatment, instructions and compliance with regimen .  Discussion with other physicians or health care providers (DME, NP, pharmacy, respiratory therapy) occurred.    Follow up in about 6 weeks (around 5/24/2019).    This note was prepared using voice recognition system and is likely to have sound alike errors that may have been overlooked even after proof reading.  Please call me with any questions    Discussed diagnosis, its evaluation, treatment and usual course. All questions answered.      Miguel Salazar MD

## 2019-04-12 NOTE — PROCEDURES
[facility]  Six Minute Walk     SUMMARY     Ordering Provider: Dr. Salazar   Interpreting Provider: Dr. Salazar  Performing nurse/tech/RT: ISABEL Cordero RRT  Diagnosis: Pulmonary Hypertension, COPD, Congestive Heart Failure  Height: 5' (152.4 cm)  Weight: 70.5 kg (155 lb 5 oz)  BMI (Calculated): 30.4   Patient Race:             Phase Oxygen Assessment Supplemental O2 Heart   Rate Blood Pressure Ron Dyspnea Scale Rating   Resting 96 % Room Air 76 bpm 136/77 2   Exercise        Minute        1 91 % Room Air 104 bpm     2 94 % Room Air 106 bpm     3 93 % Room Air 112 bpm     4 92 % Room Air 115 bpm     5 91 % Room Air 112 bpm     6  91 % Room Air 114 bpm 153/80 10   Recovery        Minute        1 92 % Room Air 102 bpm     2 95 % Room Air 90 bpm     3 95 % Room Air 96 bpm     4 97 % Room Air 91 bpm 139/76 5-6     Six Minute Walk Summary  6MWT Status: completed without stopping  Patient Reported: Chest pain, Dyspnea, Leg pain     Interpretation:  Did the patient stop or pause?: No                  Total Time Walked (Calculated): 360 seconds  Final Partial Lap Distance (feet): 0 feet  Total Distance Meters (Calculated): 304.8 meters  Predicted Distance Meters (Calculated): 538.23 meters  Percentage of Predicted (Calculated): 56.63  Peak VO2 (Calculated): 13.12  Mets: 3.75  Has The Patient Had a Previous Six Minute Walk Test?: No       Previous 6MWT Results  Has The Patient Had a Previous Six Minute Walk Test?: No

## 2019-04-13 DIAGNOSIS — I27.20 PULMONARY HYPERTENSION: ICD-10-CM

## 2019-04-13 DIAGNOSIS — J84.10 PULMONARY FIBROSIS: Primary | ICD-10-CM

## 2019-04-13 LAB
BRPFT: ABNORMAL
DLCO ADJ PRE: 6.44 ML/(MIN*MMHG) (ref 15.9–27.36)
DLCO SINGLE BREATH LLN: 15.9
DLCO SINGLE BREATH PRE REF: 28.7 %
DLCO SINGLE BREATH REF: 21.63
DLCOC SBVA LLN: 3.3
DLCOC SBVA PRE REF: 40 %
DLCOC SBVA REF: 5.1
DLCOC SINGLE BREATH LLN: 15.9
DLCOC SINGLE BREATH PRE REF: 29.8 %
DLCOC SINGLE BREATH REF: 21.63
DLCOVA LLN: 3.3
DLCOVA PRE REF: 38.6 %
DLCOVA PRE: 1.97 ML/(MIN*MMHG*L) (ref 3.3–6.9)
DLCOVA REF: 5.1
DLVAADJ PRE: 2.04 ML/(MIN*MMHG*L) (ref 3.3–6.9)
ERV LLN: 0.9
ERV PRE REF: 81.6 %
ERV REF: 0.9
FEF 25 75 CHG: 4 %
FEF 25 75 LLN: 1.39
FEF 25 75 POST REF: 62.6 %
FEF 25 75 PRE REF: 60.2 %
FEF 25 75 REF: 2.48
FET100 CHG: -3.4 %
FEV1 CHG: 3.2 %
FEV1 FVC CHG: -0.5 %
FEV1 FVC LLN: 70
FEV1 FVC POST REF: 91.2 %
FEV1 FVC PRE REF: 91.7 %
FEV1 FVC REF: 81
FEV1 LLN: 1.87
FEV1 POST REF: 82.8 %
FEV1 PRE REF: 80.2 %
FEV1 REF: 2.4
FEV6 CHG: 7.3 %
FEV6 LLN: 2.37
FEV6 POST REF: 89.1 %
FEV6 POST: 2.64 L (ref 2.37–3.56)
FEV6 PRE REF: 83 %
FEV6 PRE: 2.46 L (ref 2.37–3.56)
FEV6 REF: 2.97
FRCPLETH LLN: 1.63
FRCPLETH PREREF: 86.2 %
FRCPLETH REF: 2.45
FVC CHG: 3.7 %
FVC LLN: 2.32
FVC POST REF: 90.4 %
FVC PRE REF: 87.2 %
FVC REF: 2.96
IVC PRE: 2.39 L (ref 2.32–3.61)
IVC SINGLE BREATH LLN: 2.32
IVC SINGLE BREATH PRE REF: 80.5 %
IVC SINGLE BREATH REF: 2.96
MVV LLN: 73
MVV PRE REF: 82.4 %
MVV REF: 86
PEF CHG: -9.5 %
PEF LLN: 4.62
PEF POST REF: 63.6 %
PEF PRE REF: 70.3 %
PEF REF: 6.12
POST FEF 25 75: 1.55 L/S (ref 1.39–3.56)
POST FET 100: 7 SEC
POST FEV1 FVC: 73.99 % (ref 69.87–92.34)
POST FEV1: 1.98 L (ref 1.87–2.92)
POST FVC: 2.68 L (ref 2.32–3.61)
POST PEF: 3.89 L/S (ref 4.62–7.61)
PRE DLCO: 6.22 ML/(MIN*MMHG) (ref 15.9–27.36)
PRE ERV: 0.74 L (ref 0.9–0.9)
PRE FEF 25 75: 1.49 L/S (ref 1.39–3.56)
PRE FET 100: 7.25 SEC
PRE FEV1 FVC: 74.34 % (ref 69.87–92.34)
PRE FEV1: 1.92 L (ref 1.87–2.92)
PRE FRC PL: 2.12 L
PRE FVC: 2.58 L (ref 2.32–3.61)
PRE MVV: 71 L/MIN (ref 73.24–99.08)
PRE PEF: 4.3 L/S (ref 4.62–7.61)
PRE RV: 1.38 L (ref 0.98–2.13)
PRE TLC: 4.07 L (ref 3.25–5.23)
RAW LLN: 3.06
RAW PRE REF: 72.4 %
RAW PRE: 2.21 CMH2O*S/L (ref 3.06–3.06)
RAW REF: 3.06
RV LLN: 0.98
RV PRE REF: 88.6 %
RV REF: 1.55
RVTLC LLN: 26
RVTLC PRE REF: 94 %
RVTLC PRE: 33.82 % (ref 26.37–45.55)
RVTLC REF: 36
TLC LLN: 3.25
TLC PRE REF: 95.9 %
TLC REF: 4.24
VA PRE: 3.16 L (ref 4.09–4.09)
VA SINGLE BREATH LLN: 4.09
VA SINGLE BREATH PRE REF: 77.2 %
VA SINGLE BREATH REF: 4.09
VC LLN: 2.32
VC PRE REF: 90.8 %
VC PRE: 2.69 L (ref 2.32–3.61)
VC REF: 2.96
VTGRAWPRE: 2.84 L

## 2019-04-15 ENCOUNTER — TELEPHONE (OUTPATIENT)
Dept: TRANSPLANT | Facility: CLINIC | Age: 49
End: 2019-04-15

## 2019-04-15 ENCOUNTER — TELEPHONE (OUTPATIENT)
Dept: PULMONOLOGY | Facility: CLINIC | Age: 49
End: 2019-04-15

## 2019-04-15 DIAGNOSIS — R07.9 CHEST PAIN, UNSPECIFIED TYPE: Primary | ICD-10-CM

## 2019-04-15 DIAGNOSIS — Z76.82 LUNG TRANSPLANT CANDIDATE: ICD-10-CM

## 2019-04-15 DIAGNOSIS — M35.02 SJOGREN'S SYNDROME WITH LUNG INVOLVEMENT: Primary | ICD-10-CM

## 2019-04-15 RX ORDER — NITROGLYCERIN 80 MG/1
1 PATCH TRANSDERMAL DAILY
Qty: 30 PATCH | Refills: 3 | Status: ON HOLD | OUTPATIENT
Start: 2019-04-15 | End: 2019-04-20 | Stop reason: HOSPADM

## 2019-04-15 NOTE — TELEPHONE ENCOUNTER
----- Message from Nettie Neely sent at 4/15/2019 10:27 AM CDT -----  Contact: pt  She's calling in regards to pain management and Rx nitroGLYCERIN 0.4 MG/HR  pls call pt back at [phone] (home)

## 2019-04-15 NOTE — TELEPHONE ENCOUNTER
Called to patient and informed that Dr. Banda would like for patient to schedule office appointment in a few weeks to discuss right heart catheterization--patient states she has appointment with a provider at [facility] in [place] concerning a transplant--states she would like to inform Dr. Banda -states she would not like to schedule appointment until Dr. Banda and Dr. Salazar is aware

## 2019-04-15 NOTE — TELEPHONE ENCOUNTER
----- Message from Miguel Salazar MD sent at 4/13/2019  9:51 PM CDT -----  Please inform patient , I would like her to have an initial evaluation by the transplant team in [facility].   Order in     Thank you

## 2019-04-15 NOTE — TELEPHONE ENCOUNTER
Contacted patient regarding scheduling consult. Offered to schedule her 4/25.  She prefers a later date as her  is out of town next week. Offered, 5/2.  She accepts.  Scheduling card placed on 's desk.     ----- Message from Mike Lamb MD sent at 4/14/2019  9:06 PM CDT -----  Consult  ----- Message -----  From: Miguel Salazar MD  Sent: 4/13/2019   9:43 PM  To: Mike Lamb MD    Please evaluate this patient for pulmonary transplant. Pulm fibrosis / pulmonary HTN .     Thank you

## 2019-04-15 NOTE — TELEPHONE ENCOUNTER
Spoke to patient and informed her that Dr. Salazar was working rounds in the hospital. Informed her I will send him a message inquiring about a letter to her pain esther.  Patient understood and had no further questions ----- Message from Fred Meraz sent at 4/15/2019  2:31 PM CDT -----  Contact: pt   .Type:  Patient Returning Call    Who Called: pt   Who Left Message for Patient: Priti   Does the patient know what this is regarding? Pt declined to elaborate   Would the patient rather a call back or a response via [facility]?  Callback   Best Call Back Number: ..[phone] (home)    Additional Information:

## 2019-04-15 NOTE — LETTER
04/17/2019    Miguel Salazar  [address] [place] [address] [address] [address]  Phone: [phone]  Fax: [phone]         Dear Dr. Miguel Salazar    Patient: Meg Sal     MR Number: [id number]     YOB: 1970       Thank you for the referral of Meg Sal to our lung transplant program. An initial appointment with the transplant team has been scheduled for 5/2/19. You will receive an after-visit summary following the completion of your patients appointment in our clinic.    Thank you again for your trust in our program. If there is anything we can do for you or your staff, please feel free to contact us at [phone].    Sincerely,         Mike Lamb MD   Director, Lung Transplantation   Pulmonary & Critical Care Medicine    [facility]  [address] [place] [id number]  [phone]

## 2019-04-15 NOTE — TELEPHONE ENCOUNTER
Returned call to patient--states needs refill for nitroglycerin patches sent to pharmacy--informed patient medication refill will be sent--patient verbalizes understanding

## 2019-04-15 NOTE — TELEPHONE ENCOUNTER
----- Message from Ayaan Banda MD sent at 4/15/2019 10:29 AM CDT -----  Regarding: Follow up appt  Hi can you schedule her to see me in a few weeks. I want to discuss right heart cath. Thanks    - PM  ----- Message -----  From: Miguel Salazar MD  Sent: 4/13/2019   9:32 PM  To: MD Joanie Torres,     I think this patient will benefit from a right heart cath , I think we need to explore her wedge pressure and real PA pressure to better define her Pulm HTN and if diuresis versus vascular targeted therapy can be more beneficial for her symptoms.     Please advise     Miguel

## 2019-04-15 NOTE — TELEPHONE ENCOUNTER
----- Message from Shannon Young sent at 4/15/2019 10:15 AM CDT -----  Contact: self/[phone]  Would like to consult with nurse regarding appt on 04-12-19, please call back at [phone]. Thanks/ar

## 2019-04-16 NOTE — TELEPHONE ENCOUNTER
Pt had questions regarding the urine screens.  Explained what they are for.  She states she wears a nicotine patch.  Explained that she would have to be 6 months nicotine free to be consdered for lung listing.  Pt states she sits outside with her daughter while she smokes and her daughter also smokes marijuana.  Advised patient to avoid second hand smoke exposure of any kind.  Explained that if she comes back as positive for THC, we have no way of differentiating how she has been exposed, whether it be first hand, second hand, smoking, or edibles.  She verbalized understanding.

## 2019-04-17 ENCOUNTER — TELEPHONE (OUTPATIENT)
Dept: PAIN MEDICINE | Facility: CLINIC | Age: 49
End: 2019-04-17

## 2019-04-17 ENCOUNTER — ANTI-COAG VISIT (OUTPATIENT)
Dept: CARDIOLOGY | Facility: CLINIC | Age: 49
End: 2019-04-17
Payer: COMMERCIAL

## 2019-04-17 DIAGNOSIS — M47.816 LUMBAR SPONDYLOSIS: Primary | ICD-10-CM

## 2019-04-17 DIAGNOSIS — M54.16 LUMBAR RADICULOPATHY: ICD-10-CM

## 2019-04-17 LAB — INR PPP: 2.6

## 2019-04-17 PROCEDURE — 93793 PR ANTICOAGULANT MGMT FOR PT TAKING WARFARIN: ICD-10-PCS | Mod: S$GLB,,, | Performed by: INTERNAL MEDICINE

## 2019-04-17 PROCEDURE — 93793 ANTICOAG MGMT PT WARFARIN: CPT | Mod: S$GLB,,, | Performed by: INTERNAL MEDICINE

## 2019-04-17 NOTE — TELEPHONE ENCOUNTER
----- Message from Bernie Huber MA sent at 4/17/2019 10:51 AM CDT -----  Please switch referral to [facility]

## 2019-04-17 NOTE — PROGRESS NOTES
Verbal results taken from _Meena ZELAYA with [facility]_. PT/INR _30.8 / 2.6_ Date drawn_4/17/2019_.  Amxiocillin 500mg - completed Sunday, 4/14/2019.  No other changes reported.  Orders given:  Maintain current dose of Warfarin 6 mg daily.  Recheck on Wednesday, 4/24/2019.  Fax orders to follow.

## 2019-04-18 ENCOUNTER — TELEPHONE (OUTPATIENT)
Dept: CARDIOLOGY | Facility: CLINIC | Age: 49
End: 2019-04-18

## 2019-04-18 ENCOUNTER — HOSPITAL ENCOUNTER (OUTPATIENT)
Facility: HOSPITAL | Age: 49
Discharge: HOME | End: 2019-04-20
Attending: EMERGENCY MEDICINE | Admitting: INTERNAL MEDICINE
Payer: COMMERCIAL

## 2019-04-18 DIAGNOSIS — M47.816 LUMBAR SPONDYLOSIS: Primary | ICD-10-CM

## 2019-04-18 DIAGNOSIS — R55 SYNCOPE: ICD-10-CM

## 2019-04-18 DIAGNOSIS — R09.02 HYPOXIA: ICD-10-CM

## 2019-04-18 DIAGNOSIS — I27.20 PULMONARY HTN: ICD-10-CM

## 2019-04-18 DIAGNOSIS — J44.1 COPD EXACERBATION: Primary | ICD-10-CM

## 2019-04-18 LAB
ALBUMIN SERPL BCP-MCNC: 4.4 G/DL (ref 3.5–5.2)
ALP SERPL-CCNC: 116 U/L (ref 55–135)
ALT SERPL W/O P-5'-P-CCNC: 27 U/L (ref 10–44)
ANION GAP SERPL CALC-SCNC: 13 MMOL/L (ref 8–16)
AST SERPL-CCNC: 39 U/L (ref 10–40)
BASOPHILS # BLD AUTO: 0.05 K/UL (ref 0–0.2)
BASOPHILS NFR BLD: 0.5 % (ref 0–1.9)
BILIRUB SERPL-MCNC: 0.5 MG/DL (ref 0.1–1)
BUN SERPL-MCNC: 14 MG/DL (ref 6–20)
CALCIUM SERPL-MCNC: 9.8 MG/DL (ref 8.7–10.5)
CHLORIDE SERPL-SCNC: 103 MMOL/L (ref 95–110)
CK MB SERPL-MCNC: 0.8 NG/ML (ref 0.1–6.5)
CK MB SERPL-RTO: 1.3 % (ref 0–5)
CK SERPL-CCNC: 64 U/L (ref 20–180)
CK SERPL-CCNC: 64 U/L (ref 20–180)
CO2 SERPL-SCNC: 23 MMOL/L (ref 23–29)
CREAT SERPL-MCNC: 1.3 MG/DL (ref 0.5–1.4)
DIFFERENTIAL METHOD: ABNORMAL
EOSINOPHIL # BLD AUTO: 0.2 K/UL (ref 0–0.5)
EOSINOPHIL NFR BLD: 2.5 % (ref 0–8)
ERYTHROCYTE [DISTWIDTH] IN BLOOD BY AUTOMATED COUNT: 17.9 % (ref 11.5–14.5)
EST. GFR  (AFRICAN AMERICAN): 55 ML/MIN/1.73 M^2
EST. GFR  (NON AFRICAN AMERICAN): 48 ML/MIN/1.73 M^2
GLUCOSE SERPL-MCNC: 110 MG/DL (ref 70–110)
HCT VFR BLD AUTO: 37.6 % (ref 37–48.5)
HGB BLD-MCNC: 11.6 G/DL (ref 12–16)
INR PPP: 2.5 (ref 0.8–1.2)
LYMPHOCYTES # BLD AUTO: 3 K/UL (ref 1–4.8)
LYMPHOCYTES NFR BLD: 32.1 % (ref 18–48)
MAGNESIUM SERPL-MCNC: 2.1 MG/DL (ref 1.6–2.6)
MCH RBC QN AUTO: 29.9 PG (ref 27–31)
MCHC RBC AUTO-ENTMCNC: 30.9 G/DL (ref 32–36)
MCV RBC AUTO: 97 FL (ref 82–98)
MONOCYTES # BLD AUTO: 0.7 K/UL (ref 0.3–1)
MONOCYTES NFR BLD: 7.9 % (ref 4–15)
NEUTROPHILS # BLD AUTO: 5.3 K/UL (ref 1.8–7.7)
NEUTROPHILS NFR BLD: 57.2 % (ref 38–73)
PLATELET # BLD AUTO: 185 K/UL (ref 150–350)
PMV BLD AUTO: 10.1 FL (ref 9.2–12.9)
POTASSIUM SERPL-SCNC: 3.7 MMOL/L (ref 3.5–5.1)
PROT SERPL-MCNC: 8.4 G/DL (ref 6–8.4)
PROTHROMBIN TIME: 26.6 SEC (ref 9–12.5)
RBC # BLD AUTO: 3.88 M/UL (ref 4–5.4)
SODIUM SERPL-SCNC: 139 MMOL/L (ref 136–145)
TROPONIN I SERPL DL<=0.01 NG/ML-MCNC: 0.01 NG/ML (ref 0–0.03)
WBC # BLD AUTO: 9.31 K/UL (ref 3.9–12.7)

## 2019-04-18 PROCEDURE — 93010 EKG 12-LEAD: ICD-10-PCS | Mod: NTX,,, | Performed by: INTERNAL MEDICINE

## 2019-04-18 PROCEDURE — 93010 ELECTROCARDIOGRAM REPORT: CPT | Mod: NTX,,, | Performed by: INTERNAL MEDICINE

## 2019-04-18 PROCEDURE — 93005 ELECTROCARDIOGRAM TRACING: CPT | Mod: NTX

## 2019-04-18 PROCEDURE — 99285 EMERGENCY DEPT VISIT HI MDM: CPT | Mod: 25,NTX

## 2019-04-18 PROCEDURE — 84484 ASSAY OF TROPONIN QUANT: CPT | Mod: NTX

## 2019-04-18 PROCEDURE — 82550 ASSAY OF CK (CPK): CPT | Mod: NTX

## 2019-04-18 PROCEDURE — 85025 COMPLETE CBC W/AUTO DIFF WBC: CPT | Mod: NTX

## 2019-04-18 PROCEDURE — 85610 PROTHROMBIN TIME: CPT | Mod: NTX

## 2019-04-18 PROCEDURE — 80053 COMPREHEN METABOLIC PANEL: CPT | Mod: NTX

## 2019-04-18 PROCEDURE — 83735 ASSAY OF MAGNESIUM: CPT | Mod: NTX

## 2019-04-18 PROCEDURE — 82553 CREATINE MB FRACTION: CPT | Mod: NTX

## 2019-04-18 PROCEDURE — 36415 COLL VENOUS BLD VENIPUNCTURE: CPT | Mod: NTX

## 2019-04-18 RX ORDER — HYDROCODONE BITARTRATE AND ACETAMINOPHEN 7.5; 325 MG/1; MG/1
1 TABLET ORAL EVERY 6 HOURS PRN
Status: DISCONTINUED | OUTPATIENT
Start: 2019-04-19 | End: 2019-04-19

## 2019-04-18 RX ADMIN — HYDROCODONE BITARTRATE AND ACETAMINOPHEN 1 TABLET: 7.5; 325 TABLET ORAL at 11:04

## 2019-04-18 NOTE — TELEPHONE ENCOUNTER
Colton returned call--states patient reported on 4/17/19 that she fell and hit her head on bed rail on yesterday--states tried to encourage patient to go to E.R. for evaluation due to patient taking blood thinner--states patient refused to go to E.R.--states PT and OT will be going out on today to evaluate for services--will have them give our office a call with updated report of symptoms

## 2019-04-18 NOTE — TELEPHONE ENCOUNTER
Called patient to check symptoms from fall on 4/17/19--left voice message to call our office back at [phone].

## 2019-04-18 NOTE — TELEPHONE ENCOUNTER
----- Message from Hollie Edwards sent at 4/18/2019 10:25 AM CDT -----  Contact: pt   Rije farrar/ [facility] states that pt had a fall yesterday and due to her being on blood thinner and losing consciousness caller suggested she go to ER and pt refuse. Caller said pt think she hit her head on wooden bed rail.   [phone]

## 2019-04-18 NOTE — TELEPHONE ENCOUNTER
----- Message from Shannon Young sent at 4/18/2019 12:58 PM CDT -----  Contact: self/[phone]  Type:  Patient Returning Call    Who Called:Meg Sal    Who Left Message for Patient:nurse  Does the patient know what this is regarding?:no  Would the patient rather a call back or a response via [email or web address]? Call back   Best Call Back Number:[phone]  Additional Information:

## 2019-04-18 NOTE — TELEPHONE ENCOUNTER
Spoke with patient--states she fell on the morning of 4/18/19 and hurt her neck--states she can't turn her neck due to the pain--states received multiple bruises on arms, legs and back--denies any bleeding--advised patient to go to E.R. for evaluation--patient is taking coumadin

## 2019-04-19 PROBLEM — Z95.1 STATUS POST CORONARY ARTERY BYPASS GRAFT: Status: ACTIVE | Noted: 2018-06-21

## 2019-04-19 PROBLEM — I50.30 (HFPEF) HEART FAILURE WITH PRESERVED EJECTION FRACTION: Chronic | Status: ACTIVE | Noted: 2019-02-08

## 2019-04-19 PROBLEM — I10 ESSENTIAL HYPERTENSION: Status: ACTIVE | Noted: 2017-08-03

## 2019-04-19 PROBLEM — F51.04 PSYCHOPHYSIOLOGICAL INSOMNIA: Status: ACTIVE | Noted: 2017-02-14

## 2019-04-19 PROBLEM — R55 SYNCOPE: Status: ACTIVE | Noted: 2019-04-19

## 2019-04-19 PROBLEM — R07.89 CHEST PAIN, ATYPICAL: Status: ACTIVE | Noted: 2019-04-19

## 2019-04-19 PROBLEM — I27.20 PULMONARY HTN: Chronic | Status: ACTIVE | Noted: 2017-08-15

## 2019-04-19 PROBLEM — R73.03 PRE-DIABETES: Status: ACTIVE | Noted: 2017-05-15

## 2019-04-19 PROBLEM — J43.2 CENTRILOBULAR EMPHYSEMA: Status: ACTIVE | Noted: 2019-04-16

## 2019-04-19 PROBLEM — J96.21 ACUTE ON CHRONIC RESPIRATORY FAILURE WITH HYPOXIA: Status: ACTIVE | Noted: 2019-04-19

## 2019-04-19 PROBLEM — R10.13 DYSPEPSIA: Status: ACTIVE | Noted: 2018-12-18

## 2019-04-19 PROBLEM — J84.10 PULMONARY FIBROSIS: Chronic | Status: ACTIVE | Noted: 2019-04-16

## 2019-04-19 PROBLEM — K59.01 SLOW TRANSIT CONSTIPATION: Status: ACTIVE | Noted: 2018-05-15

## 2019-04-19 PROBLEM — I35.2 NONRHEUMATIC AORTIC INSUFFICIENCY WITH AORTIC STENOSIS: Status: ACTIVE | Noted: 2018-06-21

## 2019-04-19 PROBLEM — F32.A DEPRESSION: Status: ACTIVE | Noted: 2018-09-17

## 2019-04-19 PROBLEM — N17.9 AKI (ACUTE KIDNEY INJURY): Status: ACTIVE | Noted: 2019-04-19

## 2019-04-19 PROBLEM — G61.9 INFLAMMATORY NEUROPATHY: Status: ACTIVE | Noted: 2017-08-07

## 2019-04-19 PROBLEM — J44.1 COPD EXACERBATION: Status: ACTIVE | Noted: 2019-04-19

## 2019-04-19 PROBLEM — J84.10 PULMONARY FIBROSIS: Status: ACTIVE | Noted: 2019-04-16

## 2019-04-19 PROBLEM — I25.118 CORONARY ARTERY DISEASE OF NATIVE ARTERY OF NATIVE HEART WITH STABLE ANGINA PECTORIS: Chronic | Status: ACTIVE | Noted: 2019-02-08

## 2019-04-19 PROBLEM — I27.20 PULMONARY HTN: Status: ACTIVE | Noted: 2017-08-15

## 2019-04-19 PROBLEM — Z98.890 H/O MITRAL VALVE REPAIR: Status: ACTIVE | Noted: 2018-06-21

## 2019-04-19 PROBLEM — Z79.899 POLYPHARMACY: Status: ACTIVE | Noted: 2019-04-19

## 2019-04-19 PROBLEM — E78.49 OTHER HYPERLIPIDEMIA: Status: ACTIVE | Noted: 2019-01-08

## 2019-04-19 LAB
ALLENS TEST: ABNORMAL
ANION GAP SERPL CALC-SCNC: 8 MMOL/L (ref 8–16)
BASOPHILS # BLD AUTO: 0.04 K/UL (ref 0–0.2)
BASOPHILS NFR BLD: 0.5 % (ref 0–1.9)
BNP SERPL-MCNC: 748 PG/ML (ref 0–99)
BUN SERPL-MCNC: 13 MG/DL (ref 6–20)
CALCIUM SERPL-MCNC: 9.3 MG/DL (ref 8.7–10.5)
CHLORIDE SERPL-SCNC: 104 MMOL/L (ref 95–110)
CO2 SERPL-SCNC: 25 MMOL/L (ref 23–29)
CREAT SERPL-MCNC: 1.1 MG/DL (ref 0.5–1.4)
DELSYS: ABNORMAL
DIFFERENTIAL METHOD: ABNORMAL
EOSINOPHIL # BLD AUTO: 0.3 K/UL (ref 0–0.5)
EOSINOPHIL NFR BLD: 3.6 % (ref 0–8)
ERYTHROCYTE [DISTWIDTH] IN BLOOD BY AUTOMATED COUNT: 18.1 % (ref 11.5–14.5)
EST. GFR  (AFRICAN AMERICAN): >60 ML/MIN/1.73 M^2
EST. GFR  (NON AFRICAN AMERICAN): 59 ML/MIN/1.73 M^2
FIO2: 21
GLUCOSE SERPL-MCNC: 124 MG/DL (ref 70–110)
HCO3 UR-SCNC: 23.6 MMOL/L (ref 24–28)
HCT VFR BLD AUTO: 33.4 % (ref 37–48.5)
HGB BLD-MCNC: 10.1 G/DL (ref 12–16)
INR PPP: 2.2 (ref 0.8–1.2)
LYMPHOCYTES # BLD AUTO: 2.7 K/UL (ref 1–4.8)
LYMPHOCYTES NFR BLD: 34 % (ref 18–48)
MAGNESIUM SERPL-MCNC: 2.1 MG/DL (ref 1.6–2.6)
MCH RBC QN AUTO: 29.4 PG (ref 27–31)
MCHC RBC AUTO-ENTMCNC: 30.2 G/DL (ref 32–36)
MCV RBC AUTO: 97 FL (ref 82–98)
MODE: ABNORMAL
MONOCYTES # BLD AUTO: 0.7 K/UL (ref 0.3–1)
MONOCYTES NFR BLD: 8.6 % (ref 4–15)
NEUTROPHILS # BLD AUTO: 4.2 K/UL (ref 1.8–7.7)
NEUTROPHILS NFR BLD: 53.3 % (ref 38–73)
PCO2 BLDA: 42.9 MMHG (ref 35–45)
PH SMN: 7.35 [PH] (ref 7.35–7.45)
PLATELET # BLD AUTO: 145 K/UL (ref 150–350)
PMV BLD AUTO: 9.5 FL (ref 9.2–12.9)
PO2 BLDA: 55 MMHG (ref 80–100)
POC BE: -2 MMOL/L
POC SATURATED O2: 87 % (ref 95–100)
POTASSIUM SERPL-SCNC: 3.6 MMOL/L (ref 3.5–5.1)
PROTHROMBIN TIME: 23 SEC (ref 9–12.5)
RBC # BLD AUTO: 3.44 M/UL (ref 4–5.4)
SAMPLE: ABNORMAL
SITE: ABNORMAL
SODIUM SERPL-SCNC: 137 MMOL/L (ref 136–145)
TROPONIN I SERPL DL<=0.01 NG/ML-MCNC: 0.01 NG/ML (ref 0–0.03)
WBC # BLD AUTO: 7.79 K/UL (ref 3.9–12.7)

## 2019-04-19 PROCEDURE — 99900035 HC TECH TIME PER 15 MIN (STAT): Mod: NTX

## 2019-04-19 PROCEDURE — 84484 ASSAY OF TROPONIN QUANT: CPT | Mod: NTX

## 2019-04-19 PROCEDURE — 25000003 PHARM REV CODE 250: Mod: NTX | Performed by: INTERNAL MEDICINE

## 2019-04-19 PROCEDURE — 27000221 HC OXYGEN, UP TO 24 HOURS: Mod: NTX

## 2019-04-19 PROCEDURE — 25000003 PHARM REV CODE 250: Mod: NTX | Performed by: NURSE PRACTITIONER

## 2019-04-19 PROCEDURE — 25000242 PHARM REV CODE 250 ALT 637 W/ HCPCS: Mod: NTX | Performed by: INTERNAL MEDICINE

## 2019-04-19 PROCEDURE — 82803 BLOOD GASES ANY COMBINATION: CPT | Mod: NTX

## 2019-04-19 PROCEDURE — G0378 HOSPITAL OBSERVATION PER HR: HCPCS | Mod: NTX

## 2019-04-19 PROCEDURE — 83735 ASSAY OF MAGNESIUM: CPT | Mod: NTX

## 2019-04-19 PROCEDURE — 25000242 PHARM REV CODE 250 ALT 637 W/ HCPCS: Mod: NTX | Performed by: NURSE PRACTITIONER

## 2019-04-19 PROCEDURE — 25000003 PHARM REV CODE 250: Mod: NTX | Performed by: EMERGENCY MEDICINE

## 2019-04-19 PROCEDURE — 36415 COLL VENOUS BLD VENIPUNCTURE: CPT | Mod: NTX

## 2019-04-19 PROCEDURE — 94760 N-INVAS EAR/PLS OXIMETRY 1: CPT | Mod: NTX

## 2019-04-19 PROCEDURE — 85610 PROTHROMBIN TIME: CPT | Mod: NTX

## 2019-04-19 PROCEDURE — 63600175 PHARM REV CODE 636 W HCPCS: Mod: NTX | Performed by: NURSE PRACTITIONER

## 2019-04-19 PROCEDURE — 99244 OFF/OP CNSLTJ NEW/EST MOD 40: CPT | Mod: NTX,,, | Performed by: INTERNAL MEDICINE

## 2019-04-19 PROCEDURE — 80048 BASIC METABOLIC PNL TOTAL CA: CPT | Mod: NTX

## 2019-04-19 PROCEDURE — 94640 AIRWAY INHALATION TREATMENT: CPT | Mod: NTX

## 2019-04-19 PROCEDURE — 83880 ASSAY OF NATRIURETIC PEPTIDE: CPT | Mod: NTX

## 2019-04-19 PROCEDURE — 99244 PR OFFICE CONSULTATION,LEVEL IV: ICD-10-PCS | Mod: NTX,,, | Performed by: INTERNAL MEDICINE

## 2019-04-19 PROCEDURE — 96374 THER/PROPH/DIAG INJ IV PUSH: CPT

## 2019-04-19 PROCEDURE — 36600 WITHDRAWAL OF ARTERIAL BLOOD: CPT | Mod: NTX

## 2019-04-19 PROCEDURE — 85025 COMPLETE CBC W/AUTO DIFF WBC: CPT | Mod: NTX

## 2019-04-19 PROCEDURE — 96376 TX/PRO/DX INJ SAME DRUG ADON: CPT | Mod: NTX

## 2019-04-19 RX ORDER — IPRATROPIUM BROMIDE AND ALBUTEROL SULFATE 2.5; .5 MG/3ML; MG/3ML
3 SOLUTION RESPIRATORY (INHALATION) EVERY 4 HOURS PRN
Status: DISCONTINUED | OUTPATIENT
Start: 2019-04-19 | End: 2019-04-19

## 2019-04-19 RX ORDER — ROSUVASTATIN CALCIUM 10 MG/1
20 TABLET, COATED ORAL DAILY
Status: DISCONTINUED | OUTPATIENT
Start: 2019-04-19 | End: 2019-04-19

## 2019-04-19 RX ORDER — VERAPAMIL HYDROCHLORIDE 120 MG/1
120 TABLET, FILM COATED, EXTENDED RELEASE ORAL DAILY
Status: DISCONTINUED | OUTPATIENT
Start: 2019-04-19 | End: 2019-04-19

## 2019-04-19 RX ORDER — AMITRIPTYLINE HYDROCHLORIDE 25 MG/1
25 TABLET, FILM COATED ORAL NIGHTLY
COMMUNITY
Start: 2019-04-16

## 2019-04-19 RX ORDER — BUSPIRONE HYDROCHLORIDE 5 MG/1
15 TABLET ORAL 3 TIMES DAILY
Status: DISCONTINUED | OUTPATIENT
Start: 2019-04-19 | End: 2019-04-20 | Stop reason: HOSPADM

## 2019-04-19 RX ORDER — POTASSIUM CHLORIDE 20 MEQ/1
20 TABLET, EXTENDED RELEASE ORAL 2 TIMES DAILY
Status: DISCONTINUED | OUTPATIENT
Start: 2019-04-19 | End: 2019-04-19

## 2019-04-19 RX ORDER — ISOSORBIDE DINITRATE 10 MG/1
10 TABLET ORAL 3 TIMES DAILY
Status: DISCONTINUED | OUTPATIENT
Start: 2019-04-19 | End: 2019-04-19

## 2019-04-19 RX ORDER — AMITRIPTYLINE HYDROCHLORIDE 25 MG/1
25 TABLET, FILM COATED ORAL NIGHTLY
Status: DISCONTINUED | OUTPATIENT
Start: 2019-04-19 | End: 2019-04-20 | Stop reason: HOSPADM

## 2019-04-19 RX ORDER — ROSUVASTATIN CALCIUM 10 MG/1
20 TABLET, COATED ORAL NIGHTLY
Status: DISCONTINUED | OUTPATIENT
Start: 2019-04-19 | End: 2019-04-20 | Stop reason: HOSPADM

## 2019-04-19 RX ORDER — PANTOPRAZOLE SODIUM 40 MG/1
40 TABLET, DELAYED RELEASE ORAL DAILY
Status: DISCONTINUED | OUTPATIENT
Start: 2019-04-19 | End: 2019-04-19

## 2019-04-19 RX ORDER — CLONAZEPAM 0.5 MG/1
0.5 TABLET ORAL 2 TIMES DAILY
Status: DISCONTINUED | OUTPATIENT
Start: 2019-04-19 | End: 2019-04-20 | Stop reason: HOSPADM

## 2019-04-19 RX ORDER — IPRATROPIUM BROMIDE 0.5 MG/2.5ML
0.5 SOLUTION RESPIRATORY (INHALATION) EVERY 6 HOURS
Status: DISCONTINUED | OUTPATIENT
Start: 2019-04-19 | End: 2019-04-19

## 2019-04-19 RX ORDER — TIZANIDINE 4 MG/1
4 TABLET ORAL NIGHTLY PRN
Status: DISCONTINUED | OUTPATIENT
Start: 2019-04-19 | End: 2019-04-20 | Stop reason: HOSPADM

## 2019-04-19 RX ORDER — WARFARIN 2 MG/1
6 TABLET ORAL DAILY
Status: DISCONTINUED | OUTPATIENT
Start: 2019-04-19 | End: 2019-04-20

## 2019-04-19 RX ORDER — SUMATRIPTAN SUCCINATE 100 MG/1
100 TABLET ORAL
COMMUNITY
Start: 2019-04-16

## 2019-04-19 RX ORDER — PANTOPRAZOLE SODIUM 40 MG/1
40 TABLET, DELAYED RELEASE ORAL DAILY
Status: DISCONTINUED | OUTPATIENT
Start: 2019-04-19 | End: 2019-04-20 | Stop reason: HOSPADM

## 2019-04-19 RX ORDER — ACETAMINOPHEN 325 MG/1
650 TABLET ORAL EVERY 6 HOURS PRN
Status: DISCONTINUED | OUTPATIENT
Start: 2019-04-19 | End: 2019-04-20 | Stop reason: HOSPADM

## 2019-04-19 RX ORDER — SODIUM CHLORIDE 9 MG/ML
INJECTION, SOLUTION INTRAVENOUS CONTINUOUS
Status: DISCONTINUED | OUTPATIENT
Start: 2019-04-19 | End: 2019-04-20 | Stop reason: HOSPADM

## 2019-04-19 RX ORDER — NEBIVOLOL 5 MG/1
10 TABLET ORAL DAILY
Status: DISCONTINUED | OUTPATIENT
Start: 2019-04-19 | End: 2019-04-19

## 2019-04-19 RX ORDER — HYDROCODONE BITARTRATE AND ACETAMINOPHEN 5; 325 MG/1; MG/1
TABLET ORAL
COMMUNITY
Start: 2019-04-11 | End: 2019-05-06

## 2019-04-19 RX ORDER — FUROSEMIDE 40 MG/1
40 TABLET ORAL 2 TIMES DAILY
Status: DISCONTINUED | OUTPATIENT
Start: 2019-04-19 | End: 2019-04-19

## 2019-04-19 RX ORDER — IPRATROPIUM BROMIDE AND ALBUTEROL SULFATE 2.5; .5 MG/3ML; MG/3ML
3 SOLUTION RESPIRATORY (INHALATION) EVERY 6 HOURS
Status: DISCONTINUED | OUTPATIENT
Start: 2019-04-19 | End: 2019-04-20 | Stop reason: HOSPADM

## 2019-04-19 RX ORDER — ONDANSETRON 2 MG/ML
4 INJECTION INTRAMUSCULAR; INTRAVENOUS EVERY 6 HOURS PRN
Status: DISCONTINUED | OUTPATIENT
Start: 2019-04-19 | End: 2019-04-20 | Stop reason: HOSPADM

## 2019-04-19 RX ORDER — FUROSEMIDE 10 MG/ML
20 INJECTION INTRAMUSCULAR; INTRAVENOUS ONCE
Status: DISCONTINUED | OUTPATIENT
Start: 2019-04-19 | End: 2019-04-19

## 2019-04-19 RX ORDER — HYDROCODONE BITARTRATE AND ACETAMINOPHEN 7.5; 325 MG/1; MG/1
1 TABLET ORAL EVERY 6 HOURS PRN
Status: DISCONTINUED | OUTPATIENT
Start: 2019-04-19 | End: 2019-04-19 | Stop reason: SDUPTHER

## 2019-04-19 RX ORDER — TOPIRAMATE 100 MG/1
100 TABLET, FILM COATED ORAL 2 TIMES DAILY
Status: DISCONTINUED | OUTPATIENT
Start: 2019-04-19 | End: 2019-04-20 | Stop reason: HOSPADM

## 2019-04-19 RX ORDER — ARFORMOTEROL TARTRATE 15 UG/2ML
15 SOLUTION RESPIRATORY (INHALATION) EVERY 12 HOURS
Status: DISCONTINUED | OUTPATIENT
Start: 2019-04-19 | End: 2019-04-20 | Stop reason: HOSPADM

## 2019-04-19 RX ORDER — HYDROCODONE BITARTRATE AND ACETAMINOPHEN 5; 325 MG/1; MG/1
1 TABLET ORAL EVERY 6 HOURS PRN
Status: DISCONTINUED | OUTPATIENT
Start: 2019-04-19 | End: 2019-04-20 | Stop reason: HOSPADM

## 2019-04-19 RX ADMIN — IPRATROPIUM BROMIDE AND ALBUTEROL SULFATE 3 ML: .5; 3 SOLUTION RESPIRATORY (INHALATION) at 12:04

## 2019-04-19 RX ADMIN — PANTOPRAZOLE SODIUM 40 MG: 40 TABLET, DELAYED RELEASE ORAL at 03:04

## 2019-04-19 RX ADMIN — BUSPIRONE HYDROCHLORIDE 15 MG: 5 TABLET ORAL at 04:04

## 2019-04-19 RX ADMIN — BUSPIRONE HYDROCHLORIDE 15 MG: 5 TABLET ORAL at 08:04

## 2019-04-19 RX ADMIN — ROSUVASTATIN CALCIUM 20 MG: 10 TABLET, FILM COATED ORAL at 08:04

## 2019-04-19 RX ADMIN — IPRATROPIUM BROMIDE AND ALBUTEROL SULFATE 3 ML: .5; 3 SOLUTION RESPIRATORY (INHALATION) at 07:04

## 2019-04-19 RX ADMIN — ARFORMOTEROL TARTRATE 15 MCG: 15 SOLUTION RESPIRATORY (INHALATION) at 07:04

## 2019-04-19 RX ADMIN — CLONAZEPAM 0.5 MG: 0.5 TABLET ORAL at 08:04

## 2019-04-19 RX ADMIN — HYDROCODONE BITARTRATE AND ACETAMINOPHEN 1 TABLET: 7.5; 325 TABLET ORAL at 07:04

## 2019-04-19 RX ADMIN — BUSPIRONE HYDROCHLORIDE 15 MG: 5 TABLET ORAL at 10:04

## 2019-04-19 RX ADMIN — IPRATROPIUM BROMIDE 0.5 MG: 0.5 SOLUTION RESPIRATORY (INHALATION) at 12:04

## 2019-04-19 RX ADMIN — WARFARIN SODIUM 6 MG: 5 TABLET ORAL at 04:04

## 2019-04-19 RX ADMIN — AMITRIPTYLINE HYDROCHLORIDE 25 MG: 25 TABLET, FILM COATED ORAL at 08:04

## 2019-04-19 RX ADMIN — AMITRIPTYLINE HYDROCHLORIDE 25 MG: 25 TABLET, FILM COATED ORAL at 03:04

## 2019-04-19 RX ADMIN — ROSUVASTATIN CALCIUM 20 MG: 10 TABLET, FILM COATED ORAL at 03:04

## 2019-04-19 RX ADMIN — METHYLPREDNISOLONE SODIUM SUCCINATE 40 MG: 40 INJECTION, POWDER, FOR SOLUTION INTRAMUSCULAR; INTRAVENOUS at 12:04

## 2019-04-19 RX ADMIN — HYDROCODONE BITARTRATE AND ACETAMINOPHEN 1 TABLET: 7.5; 325 TABLET ORAL at 01:04

## 2019-04-19 RX ADMIN — TOPIRAMATE 100 MG: 100 TABLET, FILM COATED ORAL at 08:04

## 2019-04-19 RX ADMIN — WARFARIN SODIUM 6 MG: 5 TABLET ORAL at 03:04

## 2019-04-19 RX ADMIN — IPRATROPIUM BROMIDE 0.5 MG: 0.5 SOLUTION RESPIRATORY (INHALATION) at 06:04

## 2019-04-19 RX ADMIN — SODIUM CHLORIDE: 0.9 INJECTION, SOLUTION INTRAVENOUS at 12:04

## 2019-04-19 RX ADMIN — HYDROCODONE BITARTRATE AND ACETAMINOPHEN 1 TABLET: 5; 325 TABLET ORAL at 08:04

## 2019-04-19 RX ADMIN — METHYLPREDNISOLONE SODIUM SUCCINATE 40 MG: 40 INJECTION, POWDER, FOR SOLUTION INTRAMUSCULAR; INTRAVENOUS at 06:04

## 2019-04-19 NOTE — ASSESSMENT & PLAN NOTE
- Possibly secondary to pulmonary hypertension vs. Orthostasis.  - Check orthostatic vital signs.  - BP borderline low in the ED.  Will hold home antihypertensives for now to prevent hypotension.  - CT of the head negative for acute process.  - Cardiology consult for pHTN eval.  - Continue O2 supplementation for hypoxia.  - Neuro checks.  - Fall precautions.

## 2019-04-19 NOTE — SUBJECTIVE & OBJECTIVE
Interval History:+SOB, dizziness     Review of Systems   Constitutional: Positive for fatigue. Negative for appetite change, chills, diaphoresis, fever and unexpected weight change.   HENT: Negative for congestion, nosebleeds, sinus pressure and sore throat.    Eyes: Negative for pain, discharge and visual disturbance.   Respiratory: Positive for shortness of breath. Negative for cough, chest tightness, wheezing and stridor.    Cardiovascular: Negative for chest pain, palpitations and leg swelling.   Gastrointestinal: Negative for abdominal distention, abdominal pain, blood in stool, constipation, diarrhea, nausea and vomiting.   Endocrine: Negative for cold intolerance and heat intolerance.   Genitourinary: Negative for difficulty urinating, dysuria, flank pain, frequency and urgency.   Musculoskeletal: Negative for arthralgias, back pain, joint swelling, myalgias, neck pain and neck stiffness.   Skin: Negative for rash and wound.   Allergic/Immunologic: Negative for food allergies and immunocompromised state.   Neurological: Positive for dizziness and weakness. Negative for seizures, syncope, facial asymmetry, speech difficulty, light-headedness, numbness and headaches.   Hematological: Negative for adenopathy.   Psychiatric/Behavioral: Negative for agitation, confusion and hallucinations.     Objective:     Vital Signs (Most Recent):  Temp: 98.3 °F (36.8 °C) (04/19/19 1126)  Pulse: 79 (04/19/19 1225)  Resp: 18 (04/19/19 1225)  BP: (!) 133/58 (04/19/19 1202)  SpO2: 97 % (04/19/19 1225) Vital Signs (24h Range):  Temp:  [98.3 °F (36.8 °C)-98.6 °F (37 °C)] 98.3 °F (36.8 °C)  Pulse:  [57-83] 79  Resp:  [17-26] 18  SpO2:  [90 %-98 %] 97 %  BP: ()/(50-77) 133/58     Weight: 73.3 kg (161 lb 9.6 oz)  Body mass index is 31.56 kg/m².  No intake or output data in the 24 hours ending 04/19/19 1326   Physical Exam   Constitutional: She is oriented to person, place, and time. She appears well-developed and well-nourished.  No distress.   HENT:   Head: Normocephalic and atraumatic.   Nose: Nose normal.   Mouth/Throat: Oropharynx is clear and moist.   Eyes: Conjunctivae and EOM are normal. No scleral icterus.   Neck: Normal range of motion. Neck supple. No tracheal deviation present.   Cardiovascular: Normal rate, regular rhythm, normal heart sounds and intact distal pulses. Exam reveals no gallop and no friction rub.   No murmur heard.  Chest wall tenderness    Pulmonary/Chest: Effort normal. No stridor. No respiratory distress. She has no wheezes. She has no rales. She exhibits no tenderness.   Fine crackles bilaterally    Abdominal: Soft. Bowel sounds are normal. She exhibits no distension and no mass. There is no tenderness. There is no rebound and no guarding.   Musculoskeletal: Normal range of motion. She exhibits no edema, tenderness or deformity.   Neurological: She is alert and oriented to person, place, and time. No cranial nerve deficit. She exhibits normal muscle tone. Coordination normal.   Skin: Skin is warm and dry. No rash noted. She is not diaphoretic. No erythema. No pallor.   Psychiatric: She has a normal mood and affect. Her behavior is normal. Thought content normal.   Nursing note and vitals reviewed.      Significant Labs:   BMP:   Recent Labs   Lab 04/19/19  0643   *      K 3.6      CO2 25   BUN 13   CREATININE 1.1   CALCIUM 9.3   MG 2.1     CBC:   Recent Labs   Lab 04/18/19 2250 04/19/19  0643   WBC 9.31 7.79   HGB 11.6* 10.1*   HCT 37.6 33.4*    145*     CMP:   Recent Labs   Lab 04/18/19 2250 04/19/19  0643    137   K 3.7 3.6    104   CO2 23 25    124*   BUN 14 13   CREATININE 1.3 1.1   CALCIUM 9.8 9.3   PROT 8.4  --    ALBUMIN 4.4  --    BILITOT 0.5  --    ALKPHOS 116  --    AST 39  --    ALT 27  --    ANIONGAP 13 8   EGFRNONAA 48* 59*     All pertinent labs within the past 24 hours have been reviewed.    Significant Imaging:   Imaging Results          X-Ray Chest  PA And Lateral (Final result)  Result time 04/18/19 22:41:33    Final result by Kayode Desai Jr., MD (04/18/19 22:41:33)                 Impression:      1. Stable exam.  No acute chest findings.      Electronically signed by: Kayode Desai Jr., MD  Date:    04/18/2019  Time:    22:41             Narrative:    EXAMINATION:  XR CHEST PA AND LATERAL    CLINICAL HISTORY:  Syncope and collapse    COMPARISON:  03/11/2019    FINDINGS:  Coarsened interstitium consistent with pulmonary fibrosis unchanged.  No consolidation or effusion.  No pneumothorax.  The cardiac silhouette remains enlarged.  Previous CABG and valve replacement.  Osseous structures intact.                               CT Head Without Contrast (Final result)  Result time 04/18/19 22:40:19    Final result by Kayode Desai Jr., MD (04/18/19 22:40:19)                 Impression:      1. No acute intracranial findings.  All CT scans at this facility are performed  using dose modulation techniques as appropriate to performed exam including the following:  automated exposure control; adjustment of mA and/or kV according to the patients size (this includes techniques or standardized protocols for targeted exams where dose is matched to indication/reason for exam: i.e. extremities or head);  iterative reconstruction technique.      Electronically signed by: Kayode Desai Jr., MD  Date:    04/18/2019  Time:    22:40             Narrative:    EXAMINATION:  CT HEAD WITHOUT CONTRAST    CLINICAL HISTORY:  Headache, acute, norm neuro exam;    TECHNIQUE:  CT scan was obtained of the head without administration of contrast.    COMPARISON:  None    FINDINGS:  Ventricles and basal cisterns are normal.  No hemorrhage, mass effect or midline shift.  No cerebral or cerebellar parenchymal abnormality.  Paranasal sinuses are clear.  Mastoid air cells are clear.  Calvarium is intact.

## 2019-04-19 NOTE — ASSESSMENT & PLAN NOTE
- Recent 2D echo on 2/25 showed mild left atrial enlargement, eccentric hypertrophy, no wall motion abnormalities, normal left ventricular systolic function (EF 55-60%), normal right ventricular systolic function, aortic valve prosthesis (CATINA = 1.61 cm2, AVAi = 0.95 cm2/m2, peak velocity = 2.48 m/s, mean gradient = 15 mmHg), moderate tricuspid regurgitation, pulmonary hypertension with PAP 61 mmHg.

## 2019-04-19 NOTE — PLAN OF CARE
Problem: Adult Inpatient Plan of Care  Goal: Plan of Care Review  Outcome: Ongoing (interventions implemented as appropriate)  Patient on O2 tolerating well. No distress noted at this time.

## 2019-04-19 NOTE — PROGRESS NOTES
Pharmacy Coumadin Consult Note    INR=2.5  Hx of mechanical aortic valve replacement  Goal INR=2-3 (per last coumadin clinic visit)    Home dose: coumadin 6mg daily  Home dose can be resumed tonight (4/19 @1700)    Daily INR ordered.  Pharmacy is consulted to dose  Patient will need to be educated.    Thank you for allowing us to participate in this patient's care.  Lucia Wilcox, Pharm D 4/19/2019 3:19 AM

## 2019-04-19 NOTE — ED PROVIDER NOTES
SCRIBE #1 NOTE: I, Kulwant Farooq/Alice Carson, am scribing for, and in the presence of, Mandi Ydaav MD. I have scribed the entire note.      History      Chief Complaint   Patient presents with    Head Injury     pt reports she had syncopal event on the 17th around 5am; pt reports drowsiness and generalized pain; takes coumadin       Review of patient's allergies indicates:   Allergen Reactions    Nsaids (non-steroidal anti-inflammatory drug) Other (See Comments)     Mechanical heart valve        HPI   HPI    4/18/2019, 10:12 PM   History obtained from the patient      History of Present Illness: Meg Sal is a 49 y.o. female patient with a PMHx of GERD, HTN, Hyperlipidemia, CAD, stroke, and CHF who presents to the Emergency Department for head injury, onset 2 days ago. Pt reports she had a syncopal episode after getting out of bed which is when she struck her head. Symptoms are constant and moderate in severity. No mitigating or exacerbating factors reported. Associated sxs include diffuse back pain. Patient denies any SOB, chest tightness, n/v, weakness, numbness, dizziness, visual disturbances, incontinence, saddle anesthesia, and all other sxs at this time. No prior Tx reported. No further complaints or concerns at this time.     Arrival mode: Personal vehicle     PCP: Primary Doctor No       Past Medical History:  Past Medical History:   Diagnosis Date    Acute coronary syndrome     CHF (congestive heart failure)     Coronary artery disease     Hyperlipidemia     Hypertension     Sleep apnea syndrome 2/8/2019    Stroke        Past Surgical History:  Past Surgical History:   Procedure Laterality Date    CARDIAC CATHETERIZATION      CARDIAC VALVE SURGERY      CORONARY ANGIOPLASTY      CORONARY ARTERY BYPASS GRAFT           Family History:  History reviewed. No pertinent family history.     Social History:  Social History     Tobacco Use    Smoking status: Former Smoker      Packs/day: 1.00     Types: Cigarettes     Last attempt to quit: 2018     Years since quittin.9    Smokeless tobacco: Never Used   Substance and Sexual Activity    Alcohol use: Yes     Comment: rarely    Drug use: Unknown    Sexual activity: Unknown       ROS   Review of Systems   Constitutional: Negative for chills, diaphoresis and fever.   HENT: Negative for sore throat.    Respiratory: Negative for chest tightness and shortness of breath.    Cardiovascular: Negative for chest pain.   Gastrointestinal: Negative for abdominal pain, diarrhea, nausea and vomiting.   Genitourinary: Negative for dysuria.   Musculoskeletal: Positive for back pain.   Skin: Negative for rash.   Neurological: Positive for syncope. Negative for dizziness, seizures, speech difficulty, weakness, light-headedness, numbness and headaches.        (+) head trauma   Hematological: Does not bruise/bleed easily.   All other systems reviewed and are negative.    Physical Exam      Initial Vitals [19 2131]   BP Pulse Resp Temp SpO2   (!) 111/59 79 20 98.6 °F (37 °C) (!) 90 %      MAP       --          Physical Exam  Nursing Notes and Vital Signs Reviewed.  Constitutional: Patient is in no acute distress. Well-developed and well-nourished.  Head: Atraumatic. Normocephalic.  Eyes: PERRL. EOM intact. Conjunctivae are not pale. No scleral icterus.  ENT: Mucous membranes are moist. Oropharynx is clear and symmetric.    Neck: Supple. Full ROM. No lymphadenopathy.  Cardiovascular: Regular rate. Regular rhythm. No murmurs, rubs, or gallops. Distal pulses are 2+ and symmetric.  Pulmonary/Chest: No respiratory distress. Clear to auscultation bilaterally. No wheezing or rales.  Abdominal: Soft and non-distended.  There is no tenderness.  No rebound, guarding, or rigidity. Good bowel sounds.  Musculoskeletal: Moves all extremities. No obvious deformities. No edema. No calf tenderness.   Skin: Warm and dry.  Neurological: Patient is alert and  oriented to person, place and time. Pupils ERRL and EOM normal. Cranial nerves II-XII are intact. Strength is full bilaterally; it is equal and 5/5 in bilateral upper and lower extremities. There is no pronator drift of outstretched arms. Light touch sense is intact. Speech is clear and normal. No acute focal neurological deficits noted.  Psychiatric: Normal affect. Good eye contact. Appropriate in content.    ED Course    Procedures  ED Vital Signs:  Vitals:    04/18/19 2131 04/18/19 2255 04/18/19 2257 04/18/19 2300   BP: (!) 111/59 123/60 (!) 108/57 (!) 107/57   Pulse: 79 (!) 57     Resp: 20      Temp: 98.6 °F (37 °C)      TempSrc: Oral      SpO2: (!) 90% 97% 95% 95%   Weight: 73.3 kg (161 lb 9.6 oz)      Height: 5' (1.524 m)       04/18/19 2335 04/19/19 0020 04/19/19 0130 04/19/19 0200   BP: 104/62 114/74 (!) 106/56 117/62   Pulse: 70 72 65 70   Resp: 20 19 17 20   Temp:  98.5 °F (36.9 °C)     TempSrc:  Oral     SpO2: 95% (!) 93% 95% 96%   Weight:       Height:           Abnormal Lab Results:  Labs Reviewed   PROTIME-INR - Abnormal; Notable for the following components:       Result Value    Prothrombin Time 26.6 (*)     INR 2.5 (*)     All other components within normal limits   CBC W/ AUTO DIFFERENTIAL - Abnormal; Notable for the following components:    RBC 3.88 (*)     Hemoglobin 11.6 (*)     MCHC 30.9 (*)     RDW 17.9 (*)     All other components within normal limits   COMPREHENSIVE METABOLIC PANEL - Abnormal; Notable for the following components:    eGFR if  55 (*)     eGFR if non  48 (*)     All other components within normal limits   ISTAT PROCEDURE - Abnormal; Notable for the following components:    POC PH 7.349 (*)     POC PO2 55 (*)     POC HCO3 23.6 (*)     POC SATURATED O2 87 (*)     All other components within normal limits   CK   CK-MB   TROPONIN I   MAGNESIUM        All Lab Results:  Results for orders placed or performed during the hospital encounter of 04/18/19    CK   Result Value Ref Range    CPK 64 20 - 180 U/L   CK-MB   Result Value Ref Range    CPK 64 20 - 180 U/L    CPK MB 0.8 0.1 - 6.5 ng/mL    MB% 1.3 0.0 - 5.0 %   Troponin I   Result Value Ref Range    Troponin I 0.012 0.000 - 0.026 ng/mL   Magnesium   Result Value Ref Range    Magnesium 2.1 1.6 - 2.6 mg/dL   Protime-INR   Result Value Ref Range    Prothrombin Time 26.6 (H) 9.0 - 12.5 sec    INR 2.5 (H) 0.8 - 1.2   CBC auto differential   Result Value Ref Range    WBC 9.31 3.90 - 12.70 K/uL    RBC 3.88 (L) 4.00 - 5.40 M/uL    Hemoglobin 11.6 (L) 12.0 - 16.0 g/dL    Hematocrit 37.6 37.0 - 48.5 %    MCV 97 82 - 98 fL    MCH 29.9 27.0 - 31.0 pg    MCHC 30.9 (L) 32.0 - 36.0 g/dL    RDW 17.9 (H) 11.5 - 14.5 %    Platelets 185 150 - 350 K/uL    MPV 10.1 9.2 - 12.9 fL    Gran # (ANC) 5.3 1.8 - 7.7 K/uL    Lymph # 3.0 1.0 - 4.8 K/uL    Mono # 0.7 0.3 - 1.0 K/uL    Eos # 0.2 0.0 - 0.5 K/uL    Baso # 0.05 0.00 - 0.20 K/uL    Gran% 57.2 38.0 - 73.0 %    Lymph% 32.1 18.0 - 48.0 %    Mono% 7.9 4.0 - 15.0 %    Eosinophil% 2.5 0.0 - 8.0 %    Basophil% 0.5 0.0 - 1.9 %    Differential Method Automated    Comprehensive metabolic panel   Result Value Ref Range    Sodium 139 136 - 145 mmol/L    Potassium 3.7 3.5 - 5.1 mmol/L    Chloride 103 95 - 110 mmol/L    CO2 23 23 - 29 mmol/L    Glucose 110 70 - 110 mg/dL    BUN, Bld 14 6 - 20 mg/dL    Creatinine 1.3 0.5 - 1.4 mg/dL    Calcium 9.8 8.7 - 10.5 mg/dL    Total Protein 8.4 6.0 - 8.4 g/dL    Albumin 4.4 3.5 - 5.2 g/dL    Total Bilirubin 0.5 0.1 - 1.0 mg/dL    Alkaline Phosphatase 116 55 - 135 U/L    AST 39 10 - 40 U/L    ALT 27 10 - 44 U/L    Anion Gap 13 8 - 16 mmol/L    eGFR if African American 55 (A) >60 mL/min/1.73 m^2    eGFR if non African American 48 (A) >60 mL/min/1.73 m^2   ISTAT PROCEDURE   Result Value Ref Range    POC PH 7.349 (L) 7.35 - 7.45    POC PCO2 42.9 35 - 45 mmHg    POC PO2 55 (LL) 80 - 100 mmHg    POC HCO3 23.6 (L) 24 - 28 mmol/L    POC BE -2 -2 to 2 mmol/L    POC  SATURATED O2 87 (L) 95 - 100 %    Sample ARTERIAL     Site RR     Allens Test Pass     DelSys Room Air     Mode SPONT     FiO2 21          Imaging Results:  Imaging Results          X-Ray Chest PA And Lateral (Final result)  Result time 04/18/19 22:41:33    Final result by Kayode Desai Jr., MD (04/18/19 22:41:33)                 Impression:      1. Stable exam.  No acute chest findings.      Electronically signed by: Kayode Desai Jr., MD  Date:    04/18/2019  Time:    22:41             Narrative:    EXAMINATION:  XR CHEST PA AND LATERAL    CLINICAL HISTORY:  Syncope and collapse    COMPARISON:  03/11/2019    FINDINGS:  Coarsened interstitium consistent with pulmonary fibrosis unchanged.  No consolidation or effusion.  No pneumothorax.  The cardiac silhouette remains enlarged.  Previous CABG and valve replacement.  Osseous structures intact.                               CT Head Without Contrast (Final result)  Result time 04/18/19 22:40:19    Final result by Kayode Desai Jr., MD (04/18/19 22:40:19)                 Impression:      1. No acute intracranial findings.  All CT scans at this facility are performed  using dose modulation techniques as appropriate to performed exam including the following:  automated exposure control; adjustment of mA and/or kV according to the patients size (this includes techniques or standardized protocols for targeted exams where dose is matched to indication/reason for exam: i.e. extremities or head);  iterative reconstruction technique.      Electronically signed by: Kayode Desai Jr., MD  Date:    04/18/2019  Time:    22:40             Narrative:    EXAMINATION:  CT HEAD WITHOUT CONTRAST    CLINICAL HISTORY:  Headache, acute, norm neuro exam;    TECHNIQUE:  CT scan was obtained of the head without administration of contrast.    COMPARISON:  None    FINDINGS:  Ventricles and basal cisterns are normal.  No hemorrhage, mass effect or midline shift.  No cerebral or cerebellar  parenchymal abnormality.  Paranasal sinuses are clear.  Mastoid air cells are clear.  Calvarium is intact.                              The EKG was ordered, reviewed, and independently interpreted by the ED provider.  Interpretation time: 2235  Rate: 76 BPM  Rhythm: Sinus rhythm with frequent premature ventricular complexes  Interpretation: RBBB. No STEMI.  When compared to EKG performed on 2/25/19, previous ECG has undetermined rhythm, needs review. RBBB is now present.            The Emergency Provider reviewed the vital signs and test results, which are outlined above.    ED Discussion     12:40 AM: Discussed pt's case with Dr. Salazar (Pulmonology), who is very familiar with the pt and recommends that the pt be admitted to . He states pt's syncope is likely related to severe pulmonary HTN.    12:50 AM: Discussed case with Laurie Antonio NP (Hospital Medicine). Dr. Everett agrees with current care and management of pt and accepts admission.   Admitting Service: Hospital Medicine   Admitting Physician: Dr. Everett  Admit to: Obs Tele    1:00 AM: Re-evaluated pt. I have discussed test results, shared treatment plan, and the need for admission with patient and family at bedside. Pt and family express understanding at this time and agree with all information. All questions answered. Pt and family have no further questions or concerns at this time. Pt is ready for admit.    ED Medication(s):  Medications   HYDROcodone-acetaminophen 7.5-325 mg per tablet 1 tablet (1 tablet Oral Given 4/18/19 3051)         Medical Decision Making    Medical Decision Making:   Clinical Tests:   Lab Tests: Ordered and Reviewed  Radiological Study: Ordered and Reviewed  Medical Tests: Ordered and Reviewed           Scribe Attestation:   Scribe #1: I performed the above scribed service and the documentation accurately describes the services I performed. I attest to the accuracy of the note.    Attending:   Physician Attestation Statement  for Scribe #1: I, Mandi Yadav MD, personally performed the services described in this documentation, as scribed by Kulwant Farooq/Alice Carson, in my presence, and it is both accurate and complete.          Clinical Impression       ICD-10-CM ICD-9-CM   1. Hypoxia R09.02 799.02   2. Syncope R55 780.2   3. Pulmonary HTN I27.20 416.8       Disposition:   Disposition: Placed in Observation  Condition: Fair         Mandi Yadav MD  04/19/19 7842

## 2019-04-19 NOTE — HOSPITAL COURSE
The pt was placed in observation for hypoxemia, ARF, chest pain, and syncope on IV hydration. +orthostasis. CT head showed nothing acute. EKG NSR and PVC. Troponin normal. Cardiac echo 2/2019 showed normal LVEF, Mechanical Aortic valve, Pulmonary hypertension. Carotid U/S pending. Bystolic and Isosorbide were held. Pt reports she had just started a new medication- she placed a Nitropatch on the day of the fall. Pt was also taking isosorbide. Cardiology evaluated pt and felt syncope likely secondary to positional hypotension at night. He recommended Hold Nitro patch and Verapamil and continue IV hydration. Needs PET scan as op for ischemia burden evaluation  Pt also noted to have hypoxemia. Fine crackles noted. Hypoxemia likely secondary to ILD exacerbation. Pt placed on IV Steroids, MANOHAR, ICS, and LABA.    As of 4/20 patient feels better and is requesting to DC home today.  Carotid ultrasound showed 50-59% stenosis within the right internal carotid artery.  CT head on admit was negative for acute findings.  MICHELLE resolved with IV fluids.  Elevation in BG today is attributed to IV steroids and will improve once steroids are stopped.  She is currently resting comfortably in bed with her  at bedside in NAD.  Respirations are even and nonlabored, clear to auscultation bilaterally.  Patient is noted to have extreme anxiety regarding her multiple co morbidities and is not redirectable.  Attempted to answer all questions to alleviate anxiety, but patient was not receptive.  BP is better since home Verapamil, Bystolic, Isosorbide, and NTG patch have been held, and patient is currently denying any further syncopal episodes.  Case d/w Dr. Van, ok to DC home today from Cardiology standpoint with outpatient follow-up in clinic next week.  Per Dr. Van, patient will need to undergo a heart catheterization as an outpatient to further evaluate pulmonary HTN and will need a PET scan as outpatient to evaluate ischemic  lexie.  Per Cardiology recommendations, patient's home nitroglycerin patch, Bystolic, Isosorbide, and Verapamil will be held at the time of discharge until patient can be re-evaluated by Dr. Van next week.  Per Cardiology patient can resume her home Lasix at 40 mg twice daily.  Home oxygen evaluation completed today and patient qualified with an exertional room air O2 saturation of 87% which improved to 96% on 2 L nasal cannula.  Social work consulted to arrange oxygen prior to discharge, and patient will DC home on 2L NC.  Patient was also instructed to follow up with her primary care physician within 3 days for post hospital evaluation and with Dr. Marte within 1-2 weeks for monitoring of oxygen requirements, and verbalized positive understanding.  Given history of extreme anxiety patient was also instructed to establish care with a psychiatrist who can monitor and adjust her home medications accordingly.  Given mild exacerbation of COPD for which patient was started on IV Solu-Medrol yesterday, patient will discharge home on a Prednisone taper.  [facility] will be resumed at time of discharge to monitor patient's INR on a weekly basis and report results to the [facility] and patient's primary care provider.  Medications reconciled for discharge home.

## 2019-04-19 NOTE — ASSESSMENT & PLAN NOTE
Compensated  - Appears mildly hypovolemic on admission.  - Will hold home Lasix for now.  Monitor closely for volume overload.  - Strict I&Os, daily weights, NA/fluid restriction.

## 2019-04-19 NOTE — SUBJECTIVE & OBJECTIVE
Past Medical History:   Diagnosis Date    Acute coronary syndrome     CHF (congestive heart failure)     Coronary artery disease     Hyperlipidemia     Hypertension     Sleep apnea syndrome 2/8/2019    Stroke        Past Surgical History:   Procedure Laterality Date    CARDIAC CATHETERIZATION      CARDIAC VALVE SURGERY      CORONARY ANGIOPLASTY      CORONARY ARTERY BYPASS GRAFT         Review of patient's allergies indicates:   Allergen Reactions    Nsaids (non-steroidal anti-inflammatory drug) Other (See Comments)     Mechanical heart valve       No current facility-administered medications on file prior to encounter.      Current Outpatient Medications on File Prior to Encounter   Medication Sig    amitriptyline (ELAVIL) 25 MG tablet Take 25 mg by mouth.    HYDROcodone-acetaminophen (NORCO) 5-325 mg per tablet TK 1 T PO  Q 6 H PRN    sumatriptan (IMITREX) 100 MG tablet Take 100 mg by mouth every 2 (two) hours as needed.    albuterol (VENTOLIN HFA) 90 mcg/actuation inhaler Inhale 2 puffs into the lungs every 6 (six) hours as needed for Wheezing. Rescue    busPIRone (BUSPAR) 15 MG tablet Take 15 mg by mouth 3 (three) times daily.    clonazePAM (KLONOPIN) 1 MG tablet Take 1 mg by mouth 3 (three) times daily as needed for Anxiety.    clotrimazole-betamethasone 1-0.05% (LOTRISONE) cream Apply to affected area 2 times daily    desvenlafaxine succinate (PRISTIQ) 100 MG Tb24 Take 100 mg by mouth once daily.    diclofenac sodium (VOLTAREN) 1 % Gel Apply 2 g topically 4 (four) times daily.    fluticasone (FLONASE) 50 mcg/actuation nasal spray 1 spray by Each Nare route once daily.    furosemide (LASIX) 40 MG tablet Take 40 mg by mouth 2 (two) times daily.    isosorbide dinitrate (ISORDIL) 10 MG tablet Take 1 tablet (10 mg total) by mouth 3 (three) times daily.    nebivolol (BYSTOLIC) 5 MG Tab Take 10 mg by mouth once daily.    nicotine (NICODERM CQ) 14 mg/24 hr Place 1 patch onto the skin every 24  hours.    nitroGLYCERIN 0.4 MG/HR TD PT24 (NITRODUR) 0.4 mg/hr Place 1 patch onto the skin once daily.    pantoprazole (PROTONIX) 40 MG tablet Take 40 mg by mouth once daily.    potassium chloride SA (K-DUR,KLOR-CON) 20 MEQ tablet Take 20 mEq by mouth 2 (two) times daily.    rosuvastatin (CRESTOR) 20 MG tablet Take 20 mg by mouth once daily.    tiZANidine (ZANAFLEX) 4 MG tablet Take 4 mg by mouth every evening.    topiramate (TOPAMAX) 100 MG tablet Take 100 mg by mouth 2 (two) times daily.    traMADol (ULTRAM) 50 mg tablet Take 50 mg by mouth every 6 (six) hours.    traZODone (DESYREL) 150 MG tablet Take 150 mg by mouth nightly.    umeclidinium-vilanterol (ANORO ELLIPTA) 62.5-25 mcg/actuation DsDv Inhale 1 puff into the lungs once daily. Controller    verapamil (VERELAN) 120 MG C24P Take 1 capsule (120 mg total) by mouth once daily.    VITAMIN B COMPLEX ORAL Take 1 capsule by mouth.    vitamin D (VITAMIN D3) 1000 units Tab Take 1,000 Units by mouth once daily.    warfarin (COUMADIN) 6 MG tablet Take 6 mg by mouth once daily.     Family History     Reviewed and not pertinent.        Tobacco Use    Smoking status: Former Smoker     Packs/day: 1.00     Types: Cigarettes     Last attempt to quit: 2018     Years since quittin.9    Smokeless tobacco: Never Used   Substance and Sexual Activity    Alcohol use: Yes     Comment: rarely    Drug use: Not on file    Sexual activity: Not on file     Review of Systems   Constitutional: Negative for activity change, chills, diaphoresis, fatigue, fever and unexpected weight change.   HENT: Negative for congestion and sore throat.    Eyes: Negative for visual disturbance.   Respiratory: Negative for cough, shortness of breath and wheezing.    Cardiovascular: Negative for chest pain, palpitations and leg swelling.   Gastrointestinal: Negative for abdominal distention, abdominal pain, blood in stool, constipation, diarrhea, nausea and vomiting.    Genitourinary: Negative for dysuria and hematuria.   Musculoskeletal: Positive for back pain and neck pain. Negative for arthralgias, myalgias and neck stiffness.   Skin: Negative for pallor, rash and wound.   Neurological: Positive for syncope and headaches. Negative for dizziness, seizures, facial asymmetry, speech difficulty, weakness, light-headedness and numbness.   Psychiatric/Behavioral: Negative for confusion. The patient is not nervous/anxious.    All other systems reviewed and are negative.    Objective:     Vital Signs (Most Recent):  Temp: 98.5 °F (36.9 °C) (04/19/19 0020)  Pulse: 71 (04/19/19 0308)  Resp: 18 (04/19/19 0308)  BP: (!) 98/53 (04/19/19 0308)  SpO2: 95 % (04/19/19 0305) Vital Signs (24h Range):  Temp:  [98.5 °F (36.9 °C)-98.6 °F (37 °C)] 98.5 °F (36.9 °C)  Pulse:  [57-79] 71  Resp:  [17-20] 18  SpO2:  [90 %-97 %] 95 %  BP: ()/(50-74) 98/53     Weight: 73.3 kg (161 lb 9.6 oz)  Body mass index is 31.56 kg/m².    Physical Exam   Constitutional: She is oriented to person, place, and time. She appears well-developed and well-nourished. No distress.   HENT:   Head: Normocephalic and atraumatic.   Eyes: Conjunctivae are normal.   PERRL; EOM intact.   Neck: Normal range of motion. Neck supple. No JVD present.   Cardiovascular: Normal rate, regular rhythm, S1 normal, S2 normal and intact distal pulses. Frequent extrasystoles are present. Exam reveals no gallop and no friction rub.   Murmur heard.  Pulses:       Radial pulses are 2+ on the right side, and 2+ on the left side.        Dorsalis pedis pulses are 2+ on the right side, and 2+ on the left side.        Posterior tibial pulses are 2+ on the right side, and 2+ on the left side.   Mechanical click appreciated.   Pulmonary/Chest: Effort normal. No accessory muscle usage. No tachypnea. No respiratory distress. She has no wheezes. She has no rhonchi. She has rales.   Abdominal: Soft. Bowel sounds are normal. She exhibits no distension.  There is no tenderness. There is no rebound, no guarding and no CVA tenderness.   Musculoskeletal: Normal range of motion. She exhibits no edema, tenderness or deformity.   Neurological: She is alert and oriented to person, place, and time. She has normal strength. No cranial nerve deficit or sensory deficit. GCS eye subscore is 4. GCS verbal subscore is 5. GCS motor subscore is 6.   No acute focal deficits appreciated.   Skin: Skin is warm, dry and intact. Capillary refill takes less than 2 seconds. No rash noted. She is not diaphoretic. No cyanosis or erythema.   Psychiatric: She has a normal mood and affect. Her speech is normal and behavior is normal. Cognition and memory are normal.   Nursing note and vitals reviewed.          Significant Labs:  Results for orders placed or performed during the hospital encounter of 04/18/19   CK   Result Value Ref Range    CPK 64 20 - 180 U/L   CK-MB   Result Value Ref Range    CPK 64 20 - 180 U/L    CPK MB 0.8 0.1 - 6.5 ng/mL    MB% 1.3 0.0 - 5.0 %   Troponin I   Result Value Ref Range    Troponin I 0.012 0.000 - 0.026 ng/mL   Magnesium   Result Value Ref Range    Magnesium 2.1 1.6 - 2.6 mg/dL   Protime-INR   Result Value Ref Range    Prothrombin Time 26.6 (H) 9.0 - 12.5 sec    INR 2.5 (H) 0.8 - 1.2   CBC auto differential   Result Value Ref Range    WBC 9.31 3.90 - 12.70 K/uL    RBC 3.88 (L) 4.00 - 5.40 M/uL    Hemoglobin 11.6 (L) 12.0 - 16.0 g/dL    Hematocrit 37.6 37.0 - 48.5 %    MCV 97 82 - 98 fL    MCH 29.9 27.0 - 31.0 pg    MCHC 30.9 (L) 32.0 - 36.0 g/dL    RDW 17.9 (H) 11.5 - 14.5 %    Platelets 185 150 - 350 K/uL    MPV 10.1 9.2 - 12.9 fL    Gran # (ANC) 5.3 1.8 - 7.7 K/uL    Lymph # 3.0 1.0 - 4.8 K/uL    Mono # 0.7 0.3 - 1.0 K/uL    Eos # 0.2 0.0 - 0.5 K/uL    Baso # 0.05 0.00 - 0.20 K/uL    Gran% 57.2 38.0 - 73.0 %    Lymph% 32.1 18.0 - 48.0 %    Mono% 7.9 4.0 - 15.0 %    Eosinophil% 2.5 0.0 - 8.0 %    Basophil% 0.5 0.0 - 1.9 %    Differential Method Automated     Comprehensive metabolic panel   Result Value Ref Range    Sodium 139 136 - 145 mmol/L    Potassium 3.7 3.5 - 5.1 mmol/L    Chloride 103 95 - 110 mmol/L    CO2 23 23 - 29 mmol/L    Glucose 110 70 - 110 mg/dL    BUN, Bld 14 6 - 20 mg/dL    Creatinine 1.3 0.5 - 1.4 mg/dL    Calcium 9.8 8.7 - 10.5 mg/dL    Total Protein 8.4 6.0 - 8.4 g/dL    Albumin 4.4 3.5 - 5.2 g/dL    Total Bilirubin 0.5 0.1 - 1.0 mg/dL    Alkaline Phosphatase 116 55 - 135 U/L    AST 39 10 - 40 U/L    ALT 27 10 - 44 U/L    Anion Gap 13 8 - 16 mmol/L    eGFR if African American 55 (A) >60 mL/min/1.73 m^2    eGFR if non African American 48 (A) >60 mL/min/1.73 m^2   ISTAT PROCEDURE   Result Value Ref Range    POC PH 7.349 (L) 7.35 - 7.45    POC PCO2 42.9 35 - 45 mmHg    POC PO2 55 (LL) 80 - 100 mmHg    POC HCO3 23.6 (L) 24 - 28 mmol/L    POC BE -2 -2 to 2 mmol/L    POC SATURATED O2 87 (L) 95 - 100 %    Sample ARTERIAL     Site RR     Allens Test Pass     DelSys Room Air     Mode SPONT     FiO2 21       All pertinent labs within the past 24 hours have been reviewed.    Significant Imaging:  Imaging Results          X-Ray Chest PA And Lateral (Final result)  Result time 04/18/19 22:41:33    Final result by Kayode Desai Jr., MD (04/18/19 22:41:33)                 Impression:      1. Stable exam.  No acute chest findings.      Electronically signed by: Kayode Desai Jr., MD  Date:    04/18/2019  Time:    22:41             Narrative:    EXAMINATION:  XR CHEST PA AND LATERAL    CLINICAL HISTORY:  Syncope and collapse    COMPARISON:  03/11/2019    FINDINGS:  Coarsened interstitium consistent with pulmonary fibrosis unchanged.  No consolidation or effusion.  No pneumothorax.  The cardiac silhouette remains enlarged.  Previous CABG and valve replacement.  Osseous structures intact.                               CT Head Without Contrast (Final result)  Result time 04/18/19 22:40:19    Final result by Kayode Desai Jr., MD (04/18/19 22:40:19)                  Impression:      1. No acute intracranial findings.  All CT scans at this facility are performed  using dose modulation techniques as appropriate to performed exam including the following:  automated exposure control; adjustment of mA and/or kV according to the patients size (this includes techniques or standardized protocols for targeted exams where dose is matched to indication/reason for exam: i.e. extremities or head);  iterative reconstruction technique.      Electronically signed by: Kayode Desai Jr., MD  Date:    04/18/2019  Time:    22:40             Narrative:    EXAMINATION:  CT HEAD WITHOUT CONTRAST    CLINICAL HISTORY:  Headache, acute, norm neuro exam;    TECHNIQUE:  CT scan was obtained of the head without administration of contrast.    COMPARISON:  None    FINDINGS:  Ventricles and basal cisterns are normal.  No hemorrhage, mass effect or midline shift.  No cerebral or cerebellar parenchymal abnormality.  Paranasal sinuses are clear.  Mastoid air cells are clear.  Calvarium is intact.                               I have reviewed all pertinent imaging results/findings within the past 24 hours.     EKG: (personally reviewed)  Sinus rhythm with frequent PVCs, RBBB (chronic).  No significant change from previous tracings.

## 2019-04-19 NOTE — ASSESSMENT & PLAN NOTE
Cont gentle hydration   +orthostasis  Cardiology evaluated pt- Suspect the syncope caused by positional hypotension at night  Hold verapamil   - CT of the head negative for acute process.  - Neuro checks.  - Fall precautions.  Carotid U/S pending

## 2019-04-19 NOTE — ASSESSMENT & PLAN NOTE
- Secondary to severe pulmonary hypertension + ILD.  - O2 sat stable on 2 L NC, continue supplementation and wean as tolerated.  - Continue bronchodilators.  - Cardiology and pulmonology consults for further recommendations.

## 2019-04-19 NOTE — SUBJECTIVE & OBJECTIVE
Past Medical History:   Diagnosis Date    Acute coronary syndrome     CHF (congestive heart failure)     Coronary artery disease     Hyperlipidemia     Hypertension     Sleep apnea syndrome 2/8/2019    Stroke        Past Surgical History:   Procedure Laterality Date    CARDIAC CATHETERIZATION      CARDIAC VALVE SURGERY      CORONARY ANGIOPLASTY      CORONARY ARTERY BYPASS GRAFT         Review of patient's allergies indicates:   Allergen Reactions    Nsaids (non-steroidal anti-inflammatory drug) Other (See Comments)     Mechanical heart valve       No current facility-administered medications on file prior to encounter.      Current Outpatient Medications on File Prior to Encounter   Medication Sig    amitriptyline (ELAVIL) 25 MG tablet Take 25 mg by mouth.    HYDROcodone-acetaminophen (NORCO) 5-325 mg per tablet TK 1 T PO  Q 6 H PRN    sumatriptan (IMITREX) 100 MG tablet Take 100 mg by mouth every 2 (two) hours as needed.    albuterol (VENTOLIN HFA) 90 mcg/actuation inhaler Inhale 2 puffs into the lungs every 6 (six) hours as needed for Wheezing. Rescue    busPIRone (BUSPAR) 15 MG tablet Take 15 mg by mouth 3 (three) times daily.    clonazePAM (KLONOPIN) 1 MG tablet Take 1 mg by mouth 3 (three) times daily as needed for Anxiety.    clotrimazole-betamethasone 1-0.05% (LOTRISONE) cream Apply to affected area 2 times daily    desvenlafaxine succinate (PRISTIQ) 100 MG Tb24 Take 100 mg by mouth once daily.    diclofenac sodium (VOLTAREN) 1 % Gel Apply 2 g topically 4 (four) times daily.    fluticasone (FLONASE) 50 mcg/actuation nasal spray 1 spray by Each Nare route once daily.    furosemide (LASIX) 40 MG tablet Take 40 mg by mouth 2 (two) times daily.    isosorbide dinitrate (ISORDIL) 10 MG tablet Take 1 tablet (10 mg total) by mouth 3 (three) times daily.    nebivolol (BYSTOLIC) 5 MG Tab Take 10 mg by mouth once daily.    nicotine (NICODERM CQ) 14 mg/24 hr Place 1 patch onto the skin every 24  hours.    nitroGLYCERIN 0.4 MG/HR TD PT24 (NITRODUR) 0.4 mg/hr Place 1 patch onto the skin once daily.    pantoprazole (PROTONIX) 40 MG tablet Take 40 mg by mouth once daily.    potassium chloride SA (K-DUR,KLOR-CON) 20 MEQ tablet Take 20 mEq by mouth 2 (two) times daily.    rosuvastatin (CRESTOR) 20 MG tablet Take 20 mg by mouth once daily.    tiZANidine (ZANAFLEX) 4 MG tablet Take 4 mg by mouth every evening.    topiramate (TOPAMAX) 100 MG tablet Take 100 mg by mouth 2 (two) times daily.    traMADol (ULTRAM) 50 mg tablet Take 50 mg by mouth every 6 (six) hours.    traZODone (DESYREL) 150 MG tablet Take 150 mg by mouth nightly.    umeclidinium-vilanterol (ANORO ELLIPTA) 62.5-25 mcg/actuation DsDv Inhale 1 puff into the lungs once daily. Controller    verapamil (VERELAN) 120 MG C24P Take 1 capsule (120 mg total) by mouth once daily.    VITAMIN B COMPLEX ORAL Take 1 capsule by mouth.    vitamin D (VITAMIN D3) 1000 units Tab Take 1,000 Units by mouth once daily.    warfarin (COUMADIN) 6 MG tablet Take 6 mg by mouth once daily.     Family History     None        Tobacco Use    Smoking status: Former Smoker     Packs/day: 1.00     Types: Cigarettes     Last attempt to quit: 2018     Years since quittin.9    Smokeless tobacco: Never Used   Substance and Sexual Activity    Alcohol use: Yes     Comment: rarely    Drug use: Not on file    Sexual activity: Not on file     Review of Systems   Constitution: Negative for decreased appetite, diaphoresis, fever, malaise/fatigue and night sweats.   HENT: Negative for nosebleeds.    Eyes: Negative for blurred vision and double vision.   Cardiovascular: Positive for chest pain. Negative for claudication, dyspnea on exertion, irregular heartbeat, leg swelling, near-syncope, orthopnea, palpitations, paroxysmal nocturnal dyspnea and syncope.   Respiratory: Negative for cough, shortness of breath, sleep disturbances due to breathing, snoring, sputum production  and wheezing.    Endocrine: Negative for cold intolerance and polyuria.   Hematologic/Lymphatic: Does not bruise/bleed easily.   Skin: Negative for rash.   Musculoskeletal: Negative for back pain, falls, joint pain, joint swelling and neck pain.   Gastrointestinal: Negative for abdominal pain, heartburn, nausea and vomiting.   Genitourinary: Negative for dysuria, frequency and hematuria.   Neurological: Negative for difficulty with concentration, dizziness, focal weakness, headaches, light-headedness, numbness, seizures and weakness.   Psychiatric/Behavioral: Negative for depression, memory loss and substance abuse. The patient does not have insomnia.    Allergic/Immunologic: Negative for HIV exposure and hives.     Objective:     Vital Signs (Most Recent):  Temp: 98.4 °F (36.9 °C) (04/19/19 0308)  Pulse: 66 (04/19/19 0832)  Resp: (!) 22 (04/19/19 0701)  BP: 99/65 (04/19/19 0832)  SpO2: 97 % (04/19/19 0802) Vital Signs (24h Range):  Temp:  [98.4 °F (36.9 °C)-98.6 °F (37 °C)] 98.4 °F (36.9 °C)  Pulse:  [57-80] 66  Resp:  [17-26] 22  SpO2:  [90 %-98 %] 97 %  BP: ()/(50-77) 99/65     Weight: 73.3 kg (161 lb 9.6 oz)  Body mass index is 31.56 kg/m².    SpO2: 97 %  O2 Device (Oxygen Therapy): nasal cannula    No intake or output data in the 24 hours ending 04/19/19 0910    Lines/Drains/Airways     Peripheral Intravenous Line                 Peripheral IV - Single Lumen 04/18/19 2311 20 G Left Antecubital less than 1 day                Physical Exam   Constitutional: She is oriented to person, place, and time. She appears well-nourished.   HENT:   Head: Normocephalic.   Eyes: Pupils are equal, round, and reactive to light.   Neck: Normal carotid pulses and no JVD present. Carotid bruit is not present. No thyromegaly present.   Cardiovascular: Normal rate, regular rhythm and normal pulses.  No extrasystoles are present. PMI is not displaced. Exam reveals no gallop and no S3.   Murmur heard.  Pulmonary/Chest: Breath  sounds normal. No stridor. No respiratory distress.   Chest wall tenderness on left chest and sternal surgical scar   Abdominal: Soft. Bowel sounds are normal. There is no tenderness. There is no rebound.   Musculoskeletal: Normal range of motion.   Neurological: She is alert and oriented to person, place, and time.   Skin: Skin is intact. No rash noted.   Psychiatric: Her behavior is normal.       Significant Labs:   ABG:   Recent Labs   Lab 04/19/19  0020   PH 7.349*   PCO2 42.9   HCO3 23.6*   POCSATURATED 87*   BE -2   , Blood Culture: No results for input(s): LABBLOO in the last 48 hours., BMP:   Recent Labs   Lab 04/18/19 2250 04/19/19  0643    124*    137   K 3.7 3.6    104   CO2 23 25   BUN 14 13   CREATININE 1.3 1.1   CALCIUM 9.8 9.3   MG 2.1 2.1   , CMP   Recent Labs   Lab 04/18/19 2250 04/19/19  0643    137   K 3.7 3.6    104   CO2 23 25    124*   BUN 14 13   CREATININE 1.3 1.1   CALCIUM 9.8 9.3   PROT 8.4  --    ALBUMIN 4.4  --    BILITOT 0.5  --    ALKPHOS 116  --    AST 39  --    ALT 27  --    ANIONGAP 13 8   ESTGFRAFRICA 55* >60   EGFRNONAA 48* 59*   , CBC   Recent Labs   Lab 04/18/19 2250 04/19/19  0643   WBC 9.31 7.79   HGB 11.6* 10.1*   HCT 37.6 33.4*    145*   , INR   Recent Labs   Lab 04/18/19 2250 04/19/19  0643   INR 2.5* 2.2*   , Lipid Panel No results for input(s): CHOL, HDL, LDLCALC, TRIG, CHOLHDL in the last 48 hours. and Troponin   Recent Labs   Lab 04/18/19 2250   TROPONINI 0.012       Significant Imaging: EKG:

## 2019-04-19 NOTE — ASSESSMENT & PLAN NOTE
Some exertional component. Can not r/o chest wall pain.    Hold NTG pastes and verapamil now due to low BP  IVF  Needs PET scan as op for ischemia burden evaluation.

## 2019-04-19 NOTE — PROGRESS NOTES
Pharmacy Brief Progress Note: Coumadin Education     Patient/caregiver educated on warfarin indication, side effects, and drug interactions. Discussed importance of medication compliance and INR monitoring and reviewed signs of abnormal bleeding. Patient/caregiver given warfarin educational handout. Patient/caregiver expressed understanding and had no further questions.    Thank you for allowing us to participate in this patient's care.     Carloina Goff 4/19/2019 1:59 PM

## 2019-04-19 NOTE — ASSESSMENT & PLAN NOTE
- Appears mildly hypovolemic on admission.  - Will hold home Lasix for now.  Monitor closely for volume overload.  - Strict I&Os, daily weights, NA/fluid restriction.

## 2019-04-19 NOTE — H&P
[facility] Hospital Medicine  History & Physical    Patient Name: Meg Sal  MRN: [id number]  Admission Date: 4/19/2019  Attending Physician: Abner Page, *   Primary Care Provider: Primary Doctor No         Patient information was obtained from patient, past medical records and ER records.     Subjective:     Principal Problem:Syncope    Chief Complaint:   Chief Complaint   Patient presents with    Head Injury     pt reports she had syncopal event on the 17th around 5am; pt reports drowsiness and generalized pain; takes coumadin        HPI:  Ms. Sal is a 48 yo female with a past medical history of CAD with remote CABG, valvular heart disease with remote mechanical AVR on Coumadin and mitral valve repair, severe pulmonary hypertension, interstitial lung disease, hypertension, hyperlipidemia, GERD, anxiety, and chronic back pain. She presented to the ED with complaints of headache that started after she hit her head on the side of her bed during a syncopal episode yesterday morning.  Associated neck pain and chronic back pain. No aggravating or alleviating factors.  Denies any visual disturbances, altered mental status, lightheadedness or dizziness, focal deficits, palpitations, chest pain, shortness of breath, cough, abdominal pain, nausea, vomiting, diarrhea, dysuria, fever, or chills.  In the ED, patient found to be mildly hypoxic with O2 sat of 90% on room air.  ABG showed pH of 7.349, pCO2 43, PO2 55, SA O2 87 on room air.  CT of the head was negative for acute process, CXR unremarkable, EKG unrevealing.  Case was discussed with pulmonology per ED, who recommend admission for cardiology evaluation of pulmonary hypertension.  Hospital Medicine was called for admission.      Past Medical History:   Diagnosis Date    Acute coronary syndrome     CHF (congestive heart failure)     Coronary artery disease     Hyperlipidemia     Hypertension     Sleep apnea syndrome  2/8/2019    Stroke        Past Surgical History:   Procedure Laterality Date    CARDIAC CATHETERIZATION      CARDIAC VALVE SURGERY      CORONARY ANGIOPLASTY      CORONARY ARTERY BYPASS GRAFT         Review of patient's allergies indicates:   Allergen Reactions    Nsaids (non-steroidal anti-inflammatory drug) Other (See Comments)     Mechanical heart valve       No current facility-administered medications on file prior to encounter.      Current Outpatient Medications on File Prior to Encounter   Medication Sig    amitriptyline (ELAVIL) 25 MG tablet Take 25 mg by mouth.    HYDROcodone-acetaminophen (NORCO) 5-325 mg per tablet TK 1 T PO  Q 6 H PRN    sumatriptan (IMITREX) 100 MG tablet Take 100 mg by mouth every 2 (two) hours as needed.    albuterol (VENTOLIN HFA) 90 mcg/actuation inhaler Inhale 2 puffs into the lungs every 6 (six) hours as needed for Wheezing. Rescue    busPIRone (BUSPAR) 15 MG tablet Take 15 mg by mouth 3 (three) times daily.    clonazePAM (KLONOPIN) 1 MG tablet Take 1 mg by mouth 3 (three) times daily as needed for Anxiety.    clotrimazole-betamethasone 1-0.05% (LOTRISONE) cream Apply to affected area 2 times daily    desvenlafaxine succinate (PRISTIQ) 100 MG Tb24 Take 100 mg by mouth once daily.    diclofenac sodium (VOLTAREN) 1 % Gel Apply 2 g topically 4 (four) times daily.    fluticasone (FLONASE) 50 mcg/actuation nasal spray 1 spray by Each Nare route once daily.    furosemide (LASIX) 40 MG tablet Take 40 mg by mouth 2 (two) times daily.    isosorbide dinitrate (ISORDIL) 10 MG tablet Take 1 tablet (10 mg total) by mouth 3 (three) times daily.    nebivolol (BYSTOLIC) 5 MG Tab Take 10 mg by mouth once daily.    nicotine (NICODERM CQ) 14 mg/24 hr Place 1 patch onto the skin every 24 hours.    nitroGLYCERIN 0.4 MG/HR TD PT24 (NITRODUR) 0.4 mg/hr Place 1 patch onto the skin once daily.    pantoprazole (PROTONIX) 40 MG tablet Take 40 mg by mouth once daily.    potassium  chloride SA (K-DUR,KLOR-CON) 20 MEQ tablet Take 20 mEq by mouth 2 (two) times daily.    rosuvastatin (CRESTOR) 20 MG tablet Take 20 mg by mouth once daily.    tiZANidine (ZANAFLEX) 4 MG tablet Take 4 mg by mouth every evening.    topiramate (TOPAMAX) 100 MG tablet Take 100 mg by mouth 2 (two) times daily.    traMADol (ULTRAM) 50 mg tablet Take 50 mg by mouth every 6 (six) hours.    traZODone (DESYREL) 150 MG tablet Take 150 mg by mouth nightly.    umeclidinium-vilanterol (ANORO ELLIPTA) 62.5-25 mcg/actuation DsDv Inhale 1 puff into the lungs once daily. Controller    verapamil (VERELAN) 120 MG C24P Take 1 capsule (120 mg total) by mouth once daily.    VITAMIN B COMPLEX ORAL Take 1 capsule by mouth.    vitamin D (VITAMIN D3) 1000 units Tab Take 1,000 Units by mouth once daily.    warfarin (COUMADIN) 6 MG tablet Take 6 mg by mouth once daily.     Family History     Reviewed and not pertinent.        Tobacco Use    Smoking status: Former Smoker     Packs/day: 1.00     Types: Cigarettes     Last attempt to quit: 2018     Years since quittin.9    Smokeless tobacco: Never Used   Substance and Sexual Activity    Alcohol use: Yes     Comment: rarely    Drug use: No       Review of Systems   Constitutional: Negative for activity change, chills, diaphoresis, fatigue, fever and unexpected weight change.   HENT: Negative for congestion and sore throat.    Eyes: Negative for visual disturbance.   Respiratory: Negative for cough, shortness of breath and wheezing.    Cardiovascular: Negative for chest pain, palpitations and leg swelling.   Gastrointestinal: Negative for abdominal distention, abdominal pain, blood in stool, constipation, diarrhea, nausea and vomiting.   Genitourinary: Negative for dysuria and hematuria.   Musculoskeletal: Positive for back pain and neck pain. Negative for arthralgias, myalgias and neck stiffness.   Skin: Negative for pallor, rash and wound.   Neurological: Positive for  syncope and headaches. Negative for dizziness, seizures, facial asymmetry, speech difficulty, weakness, light-headedness and numbness.   Psychiatric/Behavioral: Negative for confusion. The patient is not nervous/anxious.    All other systems reviewed and are negative.    Objective:     Vital Signs (Most Recent):  Temp: 98.5 °F (36.9 °C)   Pulse: 71   Resp: 18   BP: (!) 98/53   SpO2: 95 %  Vital Signs (24h Range):  Temp:  [98.5 °F (36.9 °C)-98.6 °F (37 °C)] 98.5 °F (36.9 °C)  Pulse:  [57-79] 71  Resp:  [17-20] 18  SpO2:  [90 %-97 %] 95 %  BP: ()/(50-74) 98/53     Weight: 73.3 kg (161 lb 9.6 oz)  Body mass index is 31.56 kg/m².    Physical Exam   Constitutional: She is oriented to person, place, and time. She appears well-developed and well-nourished. No distress.   HENT:   Head: Normocephalic and atraumatic.   Eyes: Conjunctivae are normal.   PERRL; EOM intact.   Neck: Normal range of motion. Neck supple. No JVD present.   Cardiovascular: Normal rate, regular rhythm, S1 normal, S2 normal and intact distal pulses. Frequent extrasystoles are present. Exam reveals no gallop and no friction rub.   Murmur heard.  Pulses:       Radial pulses are 2+ on the right side, and 2+ on the left side.        Dorsalis pedis pulses are 2+ on the right side, and 2+ on the left side.        Posterior tibial pulses are 2+ on the right side, and 2+ on the left side.   Mechanical click appreciated.   Pulmonary/Chest: Effort normal. No accessory muscle usage. No tachypnea. No respiratory distress. She has no wheezes. She has no rhonchi. She has rales.   Abdominal: Soft. Bowel sounds are normal. She exhibits no distension. There is no tenderness. There is no rebound, no guarding and no CVA tenderness.   Musculoskeletal: Normal range of motion. She exhibits no edema, tenderness or deformity.   Neurological: She is alert and oriented to person, place, and time. She has normal strength. No cranial nerve deficit or sensory deficit. GCS eye  subscore is 4. GCS verbal subscore is 5. GCS motor subscore is 6.   No acute focal deficits appreciated.   Skin: Skin is warm, dry and intact. Capillary refill takes less than 2 seconds. No rash noted. She is not diaphoretic. No cyanosis or erythema.   Psychiatric: She has a normal mood and affect. Her speech is normal and behavior is normal. Cognition and memory are normal.   Nursing note and vitals reviewed.          Significant Labs:  Results for orders placed or performed during the hospital encounter of 04/18/19   CK   Result Value Ref Range    CPK 64 20 - 180 U/L   CK-MB   Result Value Ref Range    CPK 64 20 - 180 U/L    CPK MB 0.8 0.1 - 6.5 ng/mL    MB% 1.3 0.0 - 5.0 %   Troponin I   Result Value Ref Range    Troponin I 0.012 0.000 - 0.026 ng/mL   Magnesium   Result Value Ref Range    Magnesium 2.1 1.6 - 2.6 mg/dL   Protime-INR   Result Value Ref Range    Prothrombin Time 26.6 (H) 9.0 - 12.5 sec    INR 2.5 (H) 0.8 - 1.2   CBC auto differential   Result Value Ref Range    WBC 9.31 3.90 - 12.70 K/uL    RBC 3.88 (L) 4.00 - 5.40 M/uL    Hemoglobin 11.6 (L) 12.0 - 16.0 g/dL    Hematocrit 37.6 37.0 - 48.5 %    MCV 97 82 - 98 fL    MCH 29.9 27.0 - 31.0 pg    MCHC 30.9 (L) 32.0 - 36.0 g/dL    RDW 17.9 (H) 11.5 - 14.5 %    Platelets 185 150 - 350 K/uL    MPV 10.1 9.2 - 12.9 fL    Gran # (ANC) 5.3 1.8 - 7.7 K/uL    Lymph # 3.0 1.0 - 4.8 K/uL    Mono # 0.7 0.3 - 1.0 K/uL    Eos # 0.2 0.0 - 0.5 K/uL    Baso # 0.05 0.00 - 0.20 K/uL    Gran% 57.2 38.0 - 73.0 %    Lymph% 32.1 18.0 - 48.0 %    Mono% 7.9 4.0 - 15.0 %    Eosinophil% 2.5 0.0 - 8.0 %    Basophil% 0.5 0.0 - 1.9 %    Differential Method Automated    Comprehensive metabolic panel   Result Value Ref Range    Sodium 139 136 - 145 mmol/L    Potassium 3.7 3.5 - 5.1 mmol/L    Chloride 103 95 - 110 mmol/L    CO2 23 23 - 29 mmol/L    Glucose 110 70 - 110 mg/dL    BUN, Bld 14 6 - 20 mg/dL    Creatinine 1.3 0.5 - 1.4 mg/dL    Calcium 9.8 8.7 - 10.5 mg/dL    Total Protein  8.4 6.0 - 8.4 g/dL    Albumin 4.4 3.5 - 5.2 g/dL    Total Bilirubin 0.5 0.1 - 1.0 mg/dL    Alkaline Phosphatase 116 55 - 135 U/L    AST 39 10 - 40 U/L    ALT 27 10 - 44 U/L    Anion Gap 13 8 - 16 mmol/L    eGFR if African American 55 (A) >60 mL/min/1.73 m^2    eGFR if non African American 48 (A) >60 mL/min/1.73 m^2   ISTAT PROCEDURE   Result Value Ref Range    POC PH 7.349 (L) 7.35 - 7.45    POC PCO2 42.9 35 - 45 mmHg    POC PO2 55 (LL) 80 - 100 mmHg    POC HCO3 23.6 (L) 24 - 28 mmol/L    POC BE -2 -2 to 2 mmol/L    POC SATURATED O2 87 (L) 95 - 100 %    Sample ARTERIAL     Site RR     Allens Test Pass     DelSys Room Air     Mode SPONT     FiO2 21       All pertinent labs within the past 24 hours have been reviewed.    Significant Imaging:  Imaging Results          X-Ray Chest PA And Lateral (Final result)  Result time 04/18/19 22:41:33    Final result by Kayode Desai Jr., MD (04/18/19 22:41:33)                 Impression:      1. Stable exam.  No acute chest findings.      Electronically signed by: Kayode Desai Jr., MD  Date:    04/18/2019  Time:    22:41             Narrative:    EXAMINATION:  XR CHEST PA AND LATERAL    CLINICAL HISTORY:  Syncope and collapse    COMPARISON:  03/11/2019    FINDINGS:  Coarsened interstitium consistent with pulmonary fibrosis unchanged.  No consolidation or effusion.  No pneumothorax.  The cardiac silhouette remains enlarged.  Previous CABG and valve replacement.  Osseous structures intact.                               CT Head Without Contrast (Final result)  Result time 04/18/19 22:40:19    Final result by Kayode Desai Jr., MD (04/18/19 22:40:19)                 Impression:      1. No acute intracranial findings.  All CT scans at this facility are performed  using dose modulation techniques as appropriate to performed exam including the following:  automated exposure control; adjustment of mA and/or kV according to the patients size (this includes techniques or  standardized protocols for targeted exams where dose is matched to indication/reason for exam: i.e. extremities or head);  iterative reconstruction technique.      Electronically signed by: Kayode Desai Jr., MD  Date:    04/18/2019  Time:    22:40             Narrative:    EXAMINATION:  CT HEAD WITHOUT CONTRAST    CLINICAL HISTORY:  Headache, acute, norm neuro exam;    TECHNIQUE:  CT scan was obtained of the head without administration of contrast.    COMPARISON:  None    FINDINGS:  Ventricles and basal cisterns are normal.  No hemorrhage, mass effect or midline shift.  No cerebral or cerebellar parenchymal abnormality.  Paranasal sinuses are clear.  Mastoid air cells are clear.  Calvarium is intact.                               I have reviewed all pertinent imaging results/findings within the past 24 hours.     EKG: (personally reviewed)  Sinus rhythm with frequent PVCs, RBBB (chronic).  No significant change from previous tracings.            Assessment/Plan:     * Syncope  - Possibly secondary to pulmonary hypertension vs. Orthostasis.  - Check orthostatic vital signs.  - BP borderline low in the ED.  Will hold home antihypertensives for now to prevent hypotension.  - CT of the head negative for acute process.  - Cardiology consult for pHTN eval.  - Continue O2 supplementation for hypoxia.  - Neuro checks.  - Fall precautions.    Acute on chronic respiratory failure with hypoxia  - Secondary to severe pulmonary hypertension + ILD.  - O2 sat stable on 2 L NC, continue supplementation and wean as tolerated.  - Continue bronchodilators.  - Cardiology and pulmonology consults for further recommendations.    Pulmonary HTN  - Recent 2D echo on 2/25 showed mild left atrial enlargement, eccentric hypertrophy, no wall motion abnormalities, normal left ventricular systolic function (EF 55-60%), normal right ventricular systolic function, aortic valve prosthesis (CATINA = 1.61 cm2, AVAi = 0.95 cm2/m2, peak velocity = 2.48  m/s, mean gradient = 15 mmHg), moderate tricuspid regurgitation, pulmonary hypertension with PAP 61 mmHg.   - Cardiology consult for further evaluation and recommendations.    MICHELLE (acute kidney injury)  - Initial creatinine 1.3 (baseline 0.9), possibly secondary to IVVD.  - Avoid nephrotoxic agents.  Hold diuretics for now.  - Follow labs.    Hx of mechanical aortic valve replacement  - INR therapeutic on admission.  - Continue Coumadin, pharmacy consult for dosing.  - Daily INR.    (HFpEF) heart failure with preserved ejection fraction  - Appears mildly hypovolemic on admission.  - Will hold home Lasix for now.  Monitor closely for volume overload.  - Strict I&Os, daily weights, NA/fluid restriction.    Coronary artery disease of native artery of native heart with stable angina pectoris  - Stable, no angina.  - Continue statin.  Beta-blocker on hold as above.    - No aspirin due to allergy.      VTE Risk Mitigation (From admission, onward)        Ordered     warfarin tablet 6 mg  Daily      04/19/19 0321     Place sequential compression device  Until discontinued      04/19/19 0300             Laurie Antonio NP  Department of Hospital Medicine   [facility]

## 2019-04-19 NOTE — ASSESSMENT & PLAN NOTE
- Initial creatinine 1.3 (baseline 0.9), possibly secondary to IVVD.  - Avoid nephrotoxic agents.  Hold diuretics for now.  - Follow labs.

## 2019-04-19 NOTE — HPI
Ms. Sal is a 48 yo female with a past medical history of CAD with remote CABG, valvular heart disease with remote mechanical AVR on Coumadin and mitral valve repair, severe pulmonary hypertension, interstitial lung disease, hypertension, hyperlipidemia, GERD, anxiety, and chronic back pain. She presented to the ED with complaints of headache that started after she hit her head on the side of her bed during a syncopal episode yesterday morning.  Associated neck pain and chronic back pain. No aggravating or alleviating factors.  Denies any visual disturbances, altered mental status, lightheadedness or dizziness, focal deficits, palpitations, chest pain, shortness of breath, cough, abdominal pain, nausea, vomiting, diarrhea, dysuria, fever, or chills.  In the ED, patient found to be mildly hypoxic with O2 sat of 90% on room air.  ABG showed pH of 7.349, pCO2 43, PO2 55, SA O2 87 on room air.  CT of the head was negative for acute process, CXR unremarkable, EKG unrevealing.  Case was discussed with pulmonology per ED, who recommend admission for cardiology evaluation of pulmonary hypertension.  Hospital Medicine was called for admission.

## 2019-04-19 NOTE — PLAN OF CARE
Problem: Adult Inpatient Plan of Care  Goal: Plan of Care Review  Outcome: Ongoing (interventions implemented as appropriate)  Patient AAOX4. No c/o pain at this time. Patient NSR during shift on Tele monitor. Patient admitted to unit from ED. Patient informed of pain management during shift. Patient's vitals wnl during shift. Family present at bedside. Will continue to monitor patient

## 2019-04-19 NOTE — ASSESSMENT & PLAN NOTE
Suspect the syncope caused by positional hypotension at night.  -advise IVF  -hold NTG paste and verapamil  -fall precaution

## 2019-04-19 NOTE — PROGRESS NOTES
[facility] Hospital Medicine  Progress Note    Patient Name: Meg Sal  MRN: [id number]  Patient Class: OP- Observation   Admission Date: 4/18/2019  Length of Stay: 0 days  Attending Physician: Abner Page, *  Primary Care Provider: Primary Doctor No        Subjective:     Principal Problem:Syncope    HPI:   Ms. Sal is a 50 yo female with a past medical history of CAD with remote CABG, valvular heart disease with remote mechanical AVR on Coumadin and mitral valve repair, severe pulmonary hypertension, interstitial lung disease, hypertension, hyperlipidemia, GERD, anxiety, and chronic back pain. She presented to the ED with complaints of headache that started after she hit her head on the side of her bed during a syncopal episode yesterday morning.  Associated neck pain and chronic back pain. No aggravating or alleviating factors.  Denies any visual disturbances, altered mental status, lightheadedness or dizziness, focal deficits, palpitations, chest pain, shortness of breath, cough, abdominal pain, nausea, vomiting, diarrhea, dysuria, fever, or chills.  In the ED, patient found to be mildly hypoxic with O2 sat of 90% on room air.  ABG showed pH of 7.349, pCO2 43, PO2 55, SA O2 87 on room air.  CT of the head was negative for acute process, CXR unremarkable, EKG unrevealing.  Case was discussed with pulmonology per ED, who recommend admission for cardiology evaluation of pulmonary hypertension.  Hospital Medicine was called for admission.      Hospital Course:    The pt was placed in observation for hypoxemia, ARF, chest pain, and syncope on IV hydration. +orthostasis. CT head showed nothing acute. EKG NSR and PVC. Troponin normal. Cardiac echo 2/2019 showed normal LVEF, Mechanical Aortic valve, Pulmonary hypertension. Carotid U/S pending. Bystolic and Isosorbide were held. Pt reports she had just started a new medication- she placed a Nitropatch on the day of the fall. Pt  was also taking isosorbide. Cardiology evaluated pt and felt syncope likely secondary to positional hypotension at night. He recommended Hold Nitro patch and Verapamil and continue IV hydration. Needs PET scan as op for ischemia burden evaluation.   Besides taking Isosorbide and nitro patch, pt is also on multiple sedating home medications including Klonopin 1mg TID, Buspar, Pristiq, Trazodone, Zanaflex, Topamax, Norco.   Pt also noted to have hypoxemia. Fine crackles noted. Hypoxemia likely secondary to ILD exacerbation. Pt placed on IV Steroids, MANOHAR, ICS, and LABA,           Interval History:+SOB, dizziness     Review of Systems   Constitutional: Positive for fatigue. Negative for appetite change, chills, diaphoresis, fever and unexpected weight change.   HENT: Negative for congestion, nosebleeds, sinus pressure and sore throat.    Eyes: Negative for pain, discharge and visual disturbance.   Respiratory: Positive for shortness of breath. Negative for cough, chest tightness, wheezing and stridor.    Cardiovascular: Negative for chest pain, palpitations and leg swelling.   Gastrointestinal: Negative for abdominal distention, abdominal pain, blood in stool, constipation, diarrhea, nausea and vomiting.   Endocrine: Negative for cold intolerance and heat intolerance.   Genitourinary: Negative for difficulty urinating, dysuria, flank pain, frequency and urgency.   Musculoskeletal: Negative for arthralgias, back pain, joint swelling, myalgias, neck pain and neck stiffness.   Skin: Negative for rash and wound.   Allergic/Immunologic: Negative for food allergies and immunocompromised state.   Neurological: Positive for dizziness and weakness. Negative for seizures, syncope, facial asymmetry, speech difficulty, light-headedness, numbness and headaches.   Hematological: Negative for adenopathy.   Psychiatric/Behavioral: Negative for agitation, confusion and hallucinations.     Objective:     Vital Signs (Most  Recent):  Temp: 98.3 °F (36.8 °C) (04/19/19 1126)  Pulse: 79 (04/19/19 1225)  Resp: 18 (04/19/19 1225)  BP: (!) 133/58 (04/19/19 1202)  SpO2: 97 % (04/19/19 1225) Vital Signs (24h Range):  Temp:  [98.3 °F (36.8 °C)-98.6 °F (37 °C)] 98.3 °F (36.8 °C)  Pulse:  [57-83] 79  Resp:  [17-26] 18  SpO2:  [90 %-98 %] 97 %  BP: ()/(50-77) 133/58     Weight: 73.3 kg (161 lb 9.6 oz)  Body mass index is 31.56 kg/m².  No intake or output data in the 24 hours ending 04/19/19 1326   Physical Exam   Constitutional: She is oriented to person, place, and time. She appears well-developed and well-nourished. No distress.   HENT:   Head: Normocephalic and atraumatic.   Nose: Nose normal.   Mouth/Throat: Oropharynx is clear and moist.   Eyes: Conjunctivae and EOM are normal. No scleral icterus.   Neck: Normal range of motion. Neck supple. No tracheal deviation present.   Cardiovascular: Normal rate, regular rhythm, normal heart sounds and intact distal pulses. Exam reveals no gallop and no friction rub.   No murmur heard.  Chest wall tenderness    Pulmonary/Chest: Effort normal. No stridor. No respiratory distress. She has no wheezes. She has no rales. She exhibits no tenderness.   Fine crackles bilaterally    Abdominal: Soft. Bowel sounds are normal. She exhibits no distension and no mass. There is no tenderness. There is no rebound and no guarding.   Musculoskeletal: Normal range of motion. She exhibits no edema, tenderness or deformity.   Neurological: She is alert and oriented to person, place, and time. No cranial nerve deficit. She exhibits normal muscle tone. Coordination normal.   Skin: Skin is warm and dry. No rash noted. She is not diaphoretic. No erythema. No pallor.   Psychiatric: She has a normal mood and affect. Her behavior is normal. Thought content normal.   Nursing note and vitals reviewed.      Significant Labs:   BMP:   Recent Labs   Lab 04/19/19  0643   *      K 3.6      CO2 25   BUN 13    CREATININE 1.1   CALCIUM 9.3   MG 2.1     CBC:   Recent Labs   Lab 04/18/19 2250 04/19/19  0643   WBC 9.31 7.79   HGB 11.6* 10.1*   HCT 37.6 33.4*    145*     CMP:   Recent Labs   Lab 04/18/19 2250 04/19/19  0643    137   K 3.7 3.6    104   CO2 23 25    124*   BUN 14 13   CREATININE 1.3 1.1   CALCIUM 9.8 9.3   PROT 8.4  --    ALBUMIN 4.4  --    BILITOT 0.5  --    ALKPHOS 116  --    AST 39  --    ALT 27  --    ANIONGAP 13 8   EGFRNONAA 48* 59*     All pertinent labs within the past 24 hours have been reviewed.    Significant Imaging:   Imaging Results          X-Ray Chest PA And Lateral (Final result)  Result time 04/18/19 22:41:33    Final result by Kayode Desai Jr., MD (04/18/19 22:41:33)                 Impression:      1. Stable exam.  No acute chest findings.      Electronically signed by: Kayode Desai Jr., MD  Date:    04/18/2019  Time:    22:41             Narrative:    EXAMINATION:  XR CHEST PA AND LATERAL    CLINICAL HISTORY:  Syncope and collapse    COMPARISON:  03/11/2019    FINDINGS:  Coarsened interstitium consistent with pulmonary fibrosis unchanged.  No consolidation or effusion.  No pneumothorax.  The cardiac silhouette remains enlarged.  Previous CABG and valve replacement.  Osseous structures intact.                               CT Head Without Contrast (Final result)  Result time 04/18/19 22:40:19    Final result by Kayode Desai Jr., MD (04/18/19 22:40:19)                 Impression:      1. No acute intracranial findings.  All CT scans at this facility are performed  using dose modulation techniques as appropriate to performed exam including the following:  automated exposure control; adjustment of mA and/or kV according to the patients size (this includes techniques or standardized protocols for targeted exams where dose is matched to indication/reason for exam: i.e. extremities or head);  iterative reconstruction technique.      Electronically signed  by: Kayode Desai Jr., MD  Date:    04/18/2019  Time:    22:40             Narrative:    EXAMINATION:  CT HEAD WITHOUT CONTRAST    CLINICAL HISTORY:  Headache, acute, norm neuro exam;    TECHNIQUE:  CT scan was obtained of the head without administration of contrast.    COMPARISON:  None    FINDINGS:  Ventricles and basal cisterns are normal.  No hemorrhage, mass effect or midline shift.  No cerebral or cerebellar parenchymal abnormality.  Paranasal sinuses are clear.  Mastoid air cells are clear.  Calvarium is intact.                              Assessment/Plan:      * Syncope  Cont gentle hydration   +orthostasis  Pt was taking Isosorbide and placed a nitro patch for first time the day of syncopal episode.   Pt is also on multiple sedating home medications  BP was low- Nitro patch, Isosorbide, Bystolic were held   Cardiology recommended also holding Verapamil rdiology evaluated pt- Suspect the syncope caused by positional hypotension at night  CT of the head negative for acute process.  - Neuro checks.  - Fall precautions.  Carotid U/S pending     COPD exacerbation  IV steroids  ICS, MANOHAR, LABA neb txs  Oxygen       Acute on chronic respiratory failure with hypoxia  Cont oxygen     Chest pain, atypical  Chest wall pain.   Cardiology recommends PET scan as op for ischemia burden evaluation.          MICHELLE (acute kidney injury)  Improved   Cont IVFs  - Avoid nephrotoxic agents.  Hold diuretics for now.  - Follow labs.    Pulmonary HTN  - Recent 2D echo on 2/25 showed mild left atrial enlargement, eccentric hypertrophy, no wall motion abnormalities, normal left ventricular systolic function (EF 55-60%), normal right ventricular systolic function, aortic valve prosthesis (CATINA = 1.61 cm2, AVAi = 0.95 cm2/m2, peak velocity = 2.48 m/s, mean gradient = 15 mmHg), moderate tricuspid regurgitation, pulmonary hypertension with PAP 61 mmHg.       Hx of mechanical aortic valve replacement  - INR therapeutic   - Continue  Coumadin, pharmacy consult for dosing.  - Daily INR.    (HFpEF) heart failure with preserved ejection fraction  Compensated  - Appears mildly hypovolemic on admission.  - Will hold home Lasix for now.  Monitor closely for volume overload.  - Strict I&Os, daily weights, NA/fluid restriction.    Coronary artery disease of native artery of native heart with stable angina pectoris  Continue statin.    Hold Bystolic for now   - No aspirin due to allergy.    Polypharmacy  Pt needs to be counseled on polypharmacy      VTE Risk Mitigation (From admission, onward)        Ordered     warfarin tablet 6 mg  Daily      04/19/19 0321     Place sequential compression device  Until discontinued      04/19/19 0300              Niecy Rivas NP  Department of Hospital Medicine   [facility]

## 2019-04-19 NOTE — ASSESSMENT & PLAN NOTE
- Recent 2D echo on 2/25 showed mild left atrial enlargement, eccentric hypertrophy, no wall motion abnormalities, normal left ventricular systolic function (EF 55-60%), normal right ventricular systolic function, aortic valve prosthesis (CATINA = 1.61 cm2, AVAi = 0.95 cm2/m2, peak velocity = 2.48 m/s, mean gradient = 15 mmHg), moderate tricuspid regurgitation, pulmonary hypertension with PAP 61 mmHg.   - Cardiology consult for further evaluation and recommendations.

## 2019-04-19 NOTE — CONSULTS
[facility]  Cardiology  Consult Note    Patient Name: Meg Sal  MRN: [id number]  Admission Date: 4/18/2019  Hospital Length of Stay: 0 days  Code Status: Full Code   Attending Provider: Abner Page, *   Consulting Provider: Jamar Van MD  Primary Care Physician: Primary Doctor No  Principal Problem:Syncope    Patient information was obtained from patient and ER records.     Inpatient consult to Cardiology  Consult performed by: Jamar Van MD  Consult ordered by: Laurie Antonio NP        Subjective:       HPI:   50 yo female, consult for syncope. F/u with Dr. Banda at cardiology office.  PMH CAD s/p CABGx2, s/p mechanical AVR and MV annuloplasty in 2014, normal EF, frequent PVCs, interstitial lung fibrosis,  And HTN.  C/o chest pain multiple times a day, at rest and with exertion. Over a yr. Also f/u with EP for PVCs.  Verapamil added in  for PVCs and NTG paste added one day ago. Could not tolerate Ranexa.  One ago, at night woke up due to GERD and blacked out after standing up from the bed. Woke up by herself and called the cardiology office in the morning and came to ER yesterday.  Now some chest and back pain.  SBP at 90 to 99 mmHg  EKG NSR And PVC.  CXr and brain CT negative   Troponin negative  BNP chronic elevation      Past Medical History:   Diagnosis Date    Acute coronary syndrome     CHF (congestive heart failure)     Coronary artery disease     Hyperlipidemia     Hypertension     Sleep apnea syndrome 2/8/2019    Stroke        Past Surgical History:   Procedure Laterality Date    CARDIAC CATHETERIZATION      CARDIAC VALVE SURGERY      CORONARY ANGIOPLASTY      CORONARY ARTERY BYPASS GRAFT         Review of patient's allergies indicates:   Allergen Reactions    Nsaids (non-steroidal anti-inflammatory drug) Other (See Comments)     Mechanical heart valve       No current facility-administered medications on file prior to encounter.      Current  Outpatient Medications on File Prior to Encounter   Medication Sig    amitriptyline (ELAVIL) 25 MG tablet Take 25 mg by mouth.    HYDROcodone-acetaminophen (NORCO) 5-325 mg per tablet TK 1 T PO  Q 6 H PRN    sumatriptan (IMITREX) 100 MG tablet Take 100 mg by mouth every 2 (two) hours as needed.    albuterol (VENTOLIN HFA) 90 mcg/actuation inhaler Inhale 2 puffs into the lungs every 6 (six) hours as needed for Wheezing. Rescue    busPIRone (BUSPAR) 15 MG tablet Take 15 mg by mouth 3 (three) times daily.    clonazePAM (KLONOPIN) 1 MG tablet Take 1 mg by mouth 3 (three) times daily as needed for Anxiety.    clotrimazole-betamethasone 1-0.05% (LOTRISONE) cream Apply to affected area 2 times daily    desvenlafaxine succinate (PRISTIQ) 100 MG Tb24 Take 100 mg by mouth once daily.    diclofenac sodium (VOLTAREN) 1 % Gel Apply 2 g topically 4 (four) times daily.    fluticasone (FLONASE) 50 mcg/actuation nasal spray 1 spray by Each Nare route once daily.    furosemide (LASIX) 40 MG tablet Take 40 mg by mouth 2 (two) times daily.    isosorbide dinitrate (ISORDIL) 10 MG tablet Take 1 tablet (10 mg total) by mouth 3 (three) times daily.    nebivolol (BYSTOLIC) 5 MG Tab Take 10 mg by mouth once daily.    nicotine (NICODERM CQ) 14 mg/24 hr Place 1 patch onto the skin every 24 hours.    nitroGLYCERIN 0.4 MG/HR TD PT24 (NITRODUR) 0.4 mg/hr Place 1 patch onto the skin once daily.    pantoprazole (PROTONIX) 40 MG tablet Take 40 mg by mouth once daily.    potassium chloride SA (K-DUR,KLOR-CON) 20 MEQ tablet Take 20 mEq by mouth 2 (two) times daily.    rosuvastatin (CRESTOR) 20 MG tablet Take 20 mg by mouth once daily.    tiZANidine (ZANAFLEX) 4 MG tablet Take 4 mg by mouth every evening.    topiramate (TOPAMAX) 100 MG tablet Take 100 mg by mouth 2 (two) times daily.    traMADol (ULTRAM) 50 mg tablet Take 50 mg by mouth every 6 (six) hours.    traZODone (DESYREL) 150 MG tablet Take 150 mg by mouth nightly.     umeclidinium-vilanterol (ANORO ELLIPTA) 62.5-25 mcg/actuation DsDv Inhale 1 puff into the lungs once daily. Controller    verapamil (VERELAN) 120 MG C24P Take 1 capsule (120 mg total) by mouth once daily.    VITAMIN B COMPLEX ORAL Take 1 capsule by mouth.    vitamin D (VITAMIN D3) 1000 units Tab Take 1,000 Units by mouth once daily.    warfarin (COUMADIN) 6 MG tablet Take 6 mg by mouth once daily.     Family History     None        Tobacco Use    Smoking status: Former Smoker     Packs/day: 1.00     Types: Cigarettes     Last attempt to quit: 2018     Years since quittin.9    Smokeless tobacco: Never Used   Substance and Sexual Activity    Alcohol use: Yes     Comment: rarely    Drug use: Not on file    Sexual activity: Not on file     Review of Systems   Constitution: Negative for decreased appetite, diaphoresis, fever, malaise/fatigue and night sweats.   HENT: Negative for nosebleeds.    Eyes: Negative for blurred vision and double vision.   Cardiovascular: Positive for chest pain. Negative for claudication, dyspnea on exertion, irregular heartbeat, leg swelling, near-syncope, orthopnea, palpitations, paroxysmal nocturnal dyspnea and syncope.   Respiratory: Negative for cough, shortness of breath, sleep disturbances due to breathing, snoring, sputum production and wheezing.    Endocrine: Negative for cold intolerance and polyuria.   Hematologic/Lymphatic: Does not bruise/bleed easily.   Skin: Negative for rash.   Musculoskeletal: Negative for back pain, falls, joint pain, joint swelling and neck pain.   Gastrointestinal: Negative for abdominal pain, heartburn, nausea and vomiting.   Genitourinary: Negative for dysuria, frequency and hematuria.   Neurological: Negative for difficulty with concentration, dizziness, focal weakness, headaches, light-headedness, numbness, seizures and weakness.   Psychiatric/Behavioral: Negative for depression, memory loss and substance abuse. The patient does not  have insomnia.    Allergic/Immunologic: Negative for HIV exposure and hives.     Objective:     Vital Signs (Most Recent):  Temp: 98.4 °F (36.9 °C) (04/19/19 0308)  Pulse: 66 (04/19/19 0832)  Resp: (!) 22 (04/19/19 0701)  BP: 99/65 (04/19/19 0832)  SpO2: 97 % (04/19/19 0802) Vital Signs (24h Range):  Temp:  [98.4 °F (36.9 °C)-98.6 °F (37 °C)] 98.4 °F (36.9 °C)  Pulse:  [57-80] 66  Resp:  [17-26] 22  SpO2:  [90 %-98 %] 97 %  BP: ()/(50-77) 99/65     Weight: 73.3 kg (161 lb 9.6 oz)  Body mass index is 31.56 kg/m².    SpO2: 97 %  O2 Device (Oxygen Therapy): nasal cannula    No intake or output data in the 24 hours ending 04/19/19 0910    Lines/Drains/Airways     Peripheral Intravenous Line                 Peripheral IV - Single Lumen 04/18/19 2311 20 G Left Antecubital less than 1 day                Physical Exam   Constitutional: She is oriented to person, place, and time. She appears well-nourished.   HENT:   Head: Normocephalic.   Eyes: Pupils are equal, round, and reactive to light.   Neck: Normal carotid pulses and no JVD present. Carotid bruit is not present. No thyromegaly present.   Cardiovascular: Normal rate, regular rhythm and normal pulses.  No extrasystoles are present. PMI is not displaced. Exam reveals no gallop and no S3.   Murmur heard.  Pulmonary/Chest: Breath sounds normal. No stridor. No respiratory distress.   Chest wall tenderness on left chest and sternal surgical scar   Abdominal: Soft. Bowel sounds are normal. There is no tenderness. There is no rebound.   Musculoskeletal: Normal range of motion.   Neurological: She is alert and oriented to person, place, and time.   Skin: Skin is intact. No rash noted.   Psychiatric: Her behavior is normal.       Significant Labs:   ABG:   Recent Labs   Lab 04/19/19  0020   PH 7.349*   PCO2 42.9   HCO3 23.6*   POCSATURATED 87*   BE -2   , Blood Culture: No results for input(s): LABBLOO in the last 48 hours., BMP:   Recent Labs   Lab 04/18/19  1167  04/19/19  0643    124*    137   K 3.7 3.6    104   CO2 23 25   BUN 14 13   CREATININE 1.3 1.1   CALCIUM 9.8 9.3   MG 2.1 2.1   , CMP   Recent Labs   Lab 04/18/19 2250 04/19/19  0643    137   K 3.7 3.6    104   CO2 23 25    124*   BUN 14 13   CREATININE 1.3 1.1   CALCIUM 9.8 9.3   PROT 8.4  --    ALBUMIN 4.4  --    BILITOT 0.5  --    ALKPHOS 116  --    AST 39  --    ALT 27  --    ANIONGAP 13 8   ESTGFRAFRICA 55* >60   EGFRNONAA 48* 59*   , CBC   Recent Labs   Lab 04/18/19 2250 04/19/19  0643   WBC 9.31 7.79   HGB 11.6* 10.1*   HCT 37.6 33.4*    145*   , INR   Recent Labs   Lab 04/18/19 2250 04/19/19  0643   INR 2.5* 2.2*   , Lipid Panel No results for input(s): CHOL, HDL, LDLCALC, TRIG, CHOLHDL in the last 48 hours. and Troponin   Recent Labs   Lab 04/18/19 2250   TROPONINI 0.012       Significant Imaging: EKG:      Assessment and Plan:     * Syncope  Suspect the syncope caused by positional hypotension at night.  -advise IVF  -hold NTG paste and verapamil  -fall precaution    Chest pain, atypical  Some exertional component. Can not r/o chest wall pain.    Hold NTG pastes and verapamil now due to low BP  IVF  Needs PET scan as op for ischemia burden evaluation.    Pulmonary HTN  Due to diastolic dysfunction and interstitial lung dz    Hx of mechanical aortic valve replacement  continue coumadin    (HFpEF) heart failure with preserved ejection fraction  Low BP   Will IVF at 50 cc/hr    Coronary artery disease of native artery of native heart with stable angina pectoris  Continue ASA and statin        VTE Risk Mitigation (From admission, onward)        Ordered     warfarin tablet 6 mg  Daily      04/19/19 0321     Place sequential compression device  Until discontinued      04/19/19 0300          Thank you for your consult. I will follow-up with patient. Please contact us if you have any additional questions.    Jamar Van MD  Cardiology   [facility]

## 2019-04-19 NOTE — PROGRESS NOTES
Home Oxygen Evaluation    Date Performed: 2019    1) Patient's Home O2 Sat on room air, while at rest: 94        If O2 sats on room air at rest are 88% or below, patient qualifies. No additional testing needed. Document N/A in steps 2 and 3. If 89% or above, complete steps 2.      2) Patient's O2 Sat on room air while exercisin                                     Patient stated her oxygen drops when laying down, once test was done I laid her down and                                               oxygen dropped down to 88% but went back up to 90% once I sat her back up.        If O2 sats on room air while exercising remain 89% or above patient does not qualify, no further testing needed Document N/A in step 3. If O2 sats on room air while exercising are 88% or below, continue to step 3.      3) Patient's O2 Sat while exercising on O2:  at  LPM         (Must show improvement from #2 for patients to qualify)    If O2 sats improve on oxygen, patient qualifies for portable oxygen. If not, the patient does not qualify.

## 2019-04-19 NOTE — ASSESSMENT & PLAN NOTE
- Stable, no angina.  - Continue statin.  Beta-blocker on hold as above.    - No aspirin due to allergy.

## 2019-04-20 VITALS
HEART RATE: 84 BPM | SYSTOLIC BLOOD PRESSURE: 113 MMHG | RESPIRATION RATE: 18 BRPM | HEIGHT: 60 IN | TEMPERATURE: 98 F | OXYGEN SATURATION: 95 % | WEIGHT: 161.63 LBS | BODY MASS INDEX: 31.73 KG/M2 | DIASTOLIC BLOOD PRESSURE: 55 MMHG

## 2019-04-20 PROBLEM — J44.1 COPD EXACERBATION: Status: RESOLVED | Noted: 2019-04-19 | Resolved: 2019-04-20

## 2019-04-20 PROBLEM — N17.9 AKI (ACUTE KIDNEY INJURY): Status: RESOLVED | Noted: 2019-04-19 | Resolved: 2019-04-20

## 2019-04-20 PROBLEM — R07.89 CHEST PAIN, ATYPICAL: Status: RESOLVED | Noted: 2019-04-19 | Resolved: 2019-04-20

## 2019-04-20 PROBLEM — R55 SYNCOPE: Status: RESOLVED | Noted: 2019-04-19 | Resolved: 2019-04-20

## 2019-04-20 LAB
ANION GAP SERPL CALC-SCNC: 12 MMOL/L (ref 8–16)
BASOPHILS # BLD AUTO: 0 K/UL (ref 0–0.2)
BASOPHILS NFR BLD: 0 % (ref 0–1.9)
BUN SERPL-MCNC: 10 MG/DL (ref 6–20)
CALCIUM SERPL-MCNC: 9.6 MG/DL (ref 8.7–10.5)
CHLORIDE SERPL-SCNC: 105 MMOL/L (ref 95–110)
CO2 SERPL-SCNC: 21 MMOL/L (ref 23–29)
CREAT SERPL-MCNC: 0.9 MG/DL (ref 0.5–1.4)
DIFFERENTIAL METHOD: ABNORMAL
EOSINOPHIL # BLD AUTO: 0 K/UL (ref 0–0.5)
EOSINOPHIL NFR BLD: 0 % (ref 0–8)
ERYTHROCYTE [DISTWIDTH] IN BLOOD BY AUTOMATED COUNT: 18.2 % (ref 11.5–14.5)
EST. GFR  (AFRICAN AMERICAN): >60 ML/MIN/1.73 M^2
EST. GFR  (NON AFRICAN AMERICAN): >60 ML/MIN/1.73 M^2
GLUCOSE SERPL-MCNC: 291 MG/DL (ref 70–110)
HCT VFR BLD AUTO: 34.8 % (ref 37–48.5)
HGB BLD-MCNC: 10.3 G/DL (ref 12–16)
INR PPP: 3.3 (ref 0.8–1.2)
LYMPHOCYTES # BLD AUTO: 0.6 K/UL (ref 1–4.8)
LYMPHOCYTES NFR BLD: 6.9 % (ref 18–48)
MCH RBC QN AUTO: 29.3 PG (ref 27–31)
MCHC RBC AUTO-ENTMCNC: 29.6 G/DL (ref 32–36)
MCV RBC AUTO: 99 FL (ref 82–98)
MONOCYTES # BLD AUTO: 0.1 K/UL (ref 0.3–1)
MONOCYTES NFR BLD: 1.2 % (ref 4–15)
NEUTROPHILS # BLD AUTO: 7.8 K/UL (ref 1.8–7.7)
NEUTROPHILS NFR BLD: 91.9 % (ref 38–73)
PLATELET # BLD AUTO: 138 K/UL (ref 150–350)
PMV BLD AUTO: 10 FL (ref 9.2–12.9)
POCT GLUCOSE: 297 MG/DL (ref 70–110)
POTASSIUM SERPL-SCNC: 3.4 MMOL/L (ref 3.5–5.1)
PROTHROMBIN TIME: 34.8 SEC (ref 9–12.5)
RBC # BLD AUTO: 3.51 M/UL (ref 4–5.4)
SODIUM SERPL-SCNC: 138 MMOL/L (ref 136–145)
WBC # BLD AUTO: 8.49 K/UL (ref 3.9–12.7)

## 2019-04-20 PROCEDURE — 85610 PROTHROMBIN TIME: CPT | Mod: NTX

## 2019-04-20 PROCEDURE — 99215 OFFICE O/P EST HI 40 MIN: CPT | Mod: NTX,,, | Performed by: INTERNAL MEDICINE

## 2019-04-20 PROCEDURE — 80048 BASIC METABOLIC PNL TOTAL CA: CPT | Mod: NTX

## 2019-04-20 PROCEDURE — 36415 COLL VENOUS BLD VENIPUNCTURE: CPT | Mod: NTX

## 2019-04-20 PROCEDURE — 25000242 PHARM REV CODE 250 ALT 637 W/ HCPCS: Mod: NTX | Performed by: INTERNAL MEDICINE

## 2019-04-20 PROCEDURE — 99215 PR OFFICE/OUTPT VISIT, EST, LEVL V, 40-54 MIN: ICD-10-PCS | Mod: NTX,,, | Performed by: INTERNAL MEDICINE

## 2019-04-20 PROCEDURE — 96376 TX/PRO/DX INJ SAME DRUG ADON: CPT | Mod: NTX

## 2019-04-20 PROCEDURE — 63600175 PHARM REV CODE 636 W HCPCS: Mod: NTX | Performed by: NURSE PRACTITIONER

## 2019-04-20 PROCEDURE — 25000003 PHARM REV CODE 250: Mod: NTX | Performed by: NURSE PRACTITIONER

## 2019-04-20 PROCEDURE — 25000003 PHARM REV CODE 250: Mod: NTX | Performed by: INTERNAL MEDICINE

## 2019-04-20 PROCEDURE — G0378 HOSPITAL OBSERVATION PER HR: HCPCS | Mod: NTX

## 2019-04-20 PROCEDURE — 99225 PR SUBSEQUENT OBSERVATION CARE,LEVEL II: CPT | Mod: NTX,,, | Performed by: INTERNAL MEDICINE

## 2019-04-20 PROCEDURE — 25000242 PHARM REV CODE 250 ALT 637 W/ HCPCS: Mod: NTX | Performed by: NURSE PRACTITIONER

## 2019-04-20 PROCEDURE — 99225 PR SUBSEQUENT OBSERVATION CARE,LEVEL II: ICD-10-PCS | Mod: NTX,,, | Performed by: INTERNAL MEDICINE

## 2019-04-20 PROCEDURE — 94761 N-INVAS EAR/PLS OXIMETRY MLT: CPT | Mod: NTX

## 2019-04-20 PROCEDURE — 85025 COMPLETE CBC W/AUTO DIFF WBC: CPT | Mod: NTX

## 2019-04-20 PROCEDURE — 94640 AIRWAY INHALATION TREATMENT: CPT | Mod: NTX

## 2019-04-20 RX ORDER — POTASSIUM CHLORIDE 20 MEQ/1
20 TABLET, EXTENDED RELEASE ORAL ONCE
Status: COMPLETED | OUTPATIENT
Start: 2019-04-20 | End: 2019-04-20

## 2019-04-20 RX ORDER — WARFARIN 2 MG/1
6 TABLET ORAL DAILY
Status: DISCONTINUED | OUTPATIENT
Start: 2019-04-22 | End: 2019-04-20 | Stop reason: HOSPADM

## 2019-04-20 RX ORDER — PREDNISONE 10 MG/1
TABLET ORAL
Qty: 30 TABLET | Refills: 0 | Status: SHIPPED | OUTPATIENT
Start: 2019-04-20 | End: 2019-04-30

## 2019-04-20 RX ADMIN — HYDROCODONE BITARTRATE AND ACETAMINOPHEN 1 TABLET: 5; 325 TABLET ORAL at 08:04

## 2019-04-20 RX ADMIN — TOPIRAMATE 100 MG: 100 TABLET, FILM COATED ORAL at 08:04

## 2019-04-20 RX ADMIN — METHYLPREDNISOLONE SODIUM SUCCINATE 40 MG: 40 INJECTION, POWDER, FOR SOLUTION INTRAMUSCULAR; INTRAVENOUS at 01:04

## 2019-04-20 RX ADMIN — ARFORMOTEROL TARTRATE 15 MCG: 15 SOLUTION RESPIRATORY (INHALATION) at 07:04

## 2019-04-20 RX ADMIN — HYDROCODONE BITARTRATE AND ACETAMINOPHEN 1 TABLET: 5; 325 TABLET ORAL at 02:04

## 2019-04-20 RX ADMIN — IPRATROPIUM BROMIDE AND ALBUTEROL SULFATE 3 ML: .5; 3 SOLUTION RESPIRATORY (INHALATION) at 12:04

## 2019-04-20 RX ADMIN — BUSPIRONE HYDROCHLORIDE 15 MG: 5 TABLET ORAL at 02:04

## 2019-04-20 RX ADMIN — ACETAMINOPHEN 650 MG: 325 TABLET ORAL at 10:04

## 2019-04-20 RX ADMIN — IPRATROPIUM BROMIDE AND ALBUTEROL SULFATE 3 ML: .5; 3 SOLUTION RESPIRATORY (INHALATION) at 07:04

## 2019-04-20 RX ADMIN — CLONAZEPAM 0.5 MG: 0.5 TABLET ORAL at 08:04

## 2019-04-20 RX ADMIN — METHYLPREDNISOLONE SODIUM SUCCINATE 40 MG: 40 INJECTION, POWDER, FOR SOLUTION INTRAMUSCULAR; INTRAVENOUS at 05:04

## 2019-04-20 RX ADMIN — POTASSIUM CHLORIDE 20 MEQ: 1500 TABLET, EXTENDED RELEASE ORAL at 11:04

## 2019-04-20 RX ADMIN — IPRATROPIUM BROMIDE AND ALBUTEROL SULFATE 3 ML: .5; 3 SOLUTION RESPIRATORY (INHALATION) at 01:04

## 2019-04-20 RX ADMIN — SODIUM CHLORIDE: 0.9 INJECTION, SOLUTION INTRAVENOUS at 08:04

## 2019-04-20 RX ADMIN — TIZANIDINE 4 MG: 4 TABLET ORAL at 05:04

## 2019-04-20 RX ADMIN — METHYLPREDNISOLONE SODIUM SUCCINATE 40 MG: 40 INJECTION, POWDER, FOR SOLUTION INTRAMUSCULAR; INTRAVENOUS at 11:04

## 2019-04-20 RX ADMIN — BUSPIRONE HYDROCHLORIDE 15 MG: 5 TABLET ORAL at 08:04

## 2019-04-20 RX ADMIN — PANTOPRAZOLE SODIUM 40 MG: 40 TABLET, DELAYED RELEASE ORAL at 08:04

## 2019-04-20 NOTE — CONSULTS
[facility] -   Pulmonology  Consult Note    Patient Name: Meg Sal  MRN: [id number]  Admission Date: 4/18/2019  Hospital Length of Stay: 0 days  Code Status: Full Code  Attending Physician: Abner Page, *  Primary Care Provider: Lucero Banda MD   Principal Problem: Syncope and collapse      Subjective:     HPI:  49-year-old female patient admitted to the hospital for syncope.  Prior to her admission the day before, she applied nitro patch.  Complained of dizziness and fell at home around 3:00 a.m..  Known with emphysema, pulmonary fibrosis, severe pulmonary hypertension, mechanical aortic valve replacement on Coumadin and mitral annuloplasty.    She did have an acute on chronic diastolic heart failure January 2018 with hospital admission.     She does have about 30 pack year smoking history she did quit smoking in 2018.    Due to her severe pulmonary hypertension, pulmonary fibrosis and emphysema, severe exercise shortness of breath,Patient with pulmonary hypertension resulting in inability to carry on any physical activity without symptoms. The patient manifests signs of right heart failure. Dyspnea and/or fatigue may be present even at rest. Discomfort is increased by physical activity. WHO functional class IV, I referred her to transplant team for evaluation           Interval History:   Review of Systems   Constitutional: Positive for malaise/fatigue.   HENT: Negative for hearing loss and nosebleeds.    Eyes: Negative for discharge.   Respiratory: Positive for cough and shortness of breath.    Cardiovascular: Positive for orthopnea and leg swelling.        Aortic valve replacement   Gastrointestinal: Negative for abdominal pain and vomiting.   Genitourinary: Negative for hematuria.   Musculoskeletal: Negative for back pain.   Skin: Negative for itching.   Neurological: Negative for focal weakness.   Endo/Heme/Allergies: Bruises/bleeds easily.        On Coumadin    Psychiatric/Behavioral: The patient is nervous/anxious.          Objective:     Vital Signs (Most Recent):  Temp: 98.4 °F (36.9 °C) (04/20/19 0815)  Pulse: 87 (04/20/19 0915)  Resp: 18 (04/20/19 0815)  BP: (!) 113/55 (04/20/19 0815)  SpO2: (!) 92 % (04/20/19 0815) Vital Signs (24h Range):  Temp:  [97.7 °F (36.5 °C)-98.7 °F (37.1 °C)] 98.4 °F (36.9 °C)  Pulse:  [75-97] 87  Resp:  [17-20] 18  SpO2:  [88 %-98 %] 92 %  BP: (107-131)/(53-78) 113/55     Weight: 73.3 kg (161 lb 9.6 oz)  Body mass index is 31.56 kg/m².      Intake/Output Summary (Last 24 hours) at 4/20/2019 1226  Last data filed at 4/20/2019 0400  Gross per 24 hour   Intake 300.42 ml   Output 1450 ml   Net -1149.58 ml       Physical Exam   Constitutional: She is oriented to person, place, and time. She appears well-developed and well-nourished.   HENT:   Head: Normocephalic and atraumatic.   Eyes: EOM are normal.   Neck: Neck supple.   Cardiovascular: Regular rhythm.   Murmur heard.  Pulmonary/Chest: Effort normal. No respiratory distress.   Decreased breath sounds bilaterally   Abdominal: Soft.   Musculoskeletal:   Bilateral varicose vein lower extremity   Neurological: She is alert and oriented to person, place, and time.   Skin: Skin is warm. There is pallor.   Psychiatric: She has a normal mood and affect. Her behavior is normal. Judgment and thought content normal.       Vents:  Oxygen Concentration (%): 28 (04/20/19 0050)    Lines/Drains/Airways     Peripheral Intravenous Line                 Peripheral IV - Single Lumen 04/18/19 2311 20 G Left Antecubital 1 day                Significant Labs:    CBC/Anemia Profile:  Recent Labs   Lab 04/18/19  2250 04/19/19  0643 04/20/19  0441   WBC 9.31 7.79 8.49   HGB 11.6* 10.1* 10.3*   HCT 37.6 33.4* 34.8*    145* 138*   MCV 97 97 99*   RDW 17.9* 18.1* 18.2*        Chemistries:  Recent Labs   Lab 04/18/19  2250 04/19/19  0643 04/20/19  0441    137 138   K 3.7 3.6 3.4*    104 105   CO2 23 25  21*   BUN 14 13 10   CREATININE 1.3 1.1 0.9   CALCIUM 9.8 9.3 9.6   ALBUMIN 4.4  --   --    PROT 8.4  --   --    BILITOT 0.5  --   --    ALKPHOS 116  --   --    ALT 27  --   --    AST 39  --   --    MG 2.1 2.1  --      Echo4/20:        1 - Mild left atrial enlargement.     2 - Eccentric hypertrophy.     3 - No wall motion abnormalities.     4 - Normal left ventricular systolic function (EF 55-60%).     5 - Normal right ventricular systolic function .     6 - Aortic valve prosthesis, CATINA = 1.61 cm2, AVAi = 0.95 cm2/m2, peak velocity = 2.48 m/s, mean gradient = 15 mmHg.     7 - Moderate tricuspid regurgitation.     8 - Pulmonary hypertension. The estimated PA systolic pressure is 61 mmHg.       Significant Imaging:  CT: I have reviewed all pertinent results/findings within the past 24 hours and my personal findings are:  Honeycombing of the lungs.  Emphysema.  CXR: I have reviewed all pertinent results/findings within the past 24 hours and my personal findings are:  Bilateral interstitial densities.      ABG  Recent Labs   Lab 04/19/19  0020   PH 7.349*   PO2 55*   PCO2 42.9   HCO3 23.6*   BE -2     Assessment/Plan:     * Syncope and collapse  4/20 hyportension from nitro versus pulmonary hypertension related.  DC nitro.     Acute on chronic respiratory failure with hypoxia  4/20 O2 on exertion, home inhalers on DC.    Pulmonary HTN  4/20 likely related to left heart disease in addition to hypoxemic respiratory disorder.  Right heart catheterization will help further define her pulmonary hypertension and guide pulmonary hypertension therapy with vascular targeted therapy i.e. ambrisentan and tadalafil.  Discussed with Cardiology.    Hx of mechanical aortic valve replacement  4/20 status post AVR.  On Coumadin.    (HFpEF) heart failure with preserved ejection fraction  4/20 on Lasix 40 mg b.i.d. at home.          Thank you for your consult. I will follow-up with patient. Please contact us if you have any additional  questions.     Miguel Salazar MD  Pulmonology  [facility] [place]

## 2019-04-20 NOTE — HOSPITAL COURSE
O2 sat 92% on room air.  Home O2 eval showed O2 sat 87% with exercise.  Patient will need O2 on discharge.  Have discussed her case with Cardiology who evaluated the patient this admission, might undergo right heart catheterization to further define her pulmonary hypertension, patient might symptomatically benefit from vascular targeted therapy .

## 2019-04-20 NOTE — PROGRESS NOTES
Clinical Pharmacy Progress Note: Coumadin Dosing and Monitoring     Hx of mechanical aortic valve replacement  Goal INR=2-3 (per last coumadin clinic visit)  Lab Results   Component Value Date    INR 3.3 (H) 04/20/2019    INR 2.2 (H) 04/19/2019    INR 2.5 (H) 04/18/2019   Patient has been educated by pharmacist this admission.     Current dose: 6 mg daily     Plan: Increase in INR by 1.1 is greater than recommended increase. Will hold today's Coumadin dose and resume when INR < 3.   PT/INR will be monitored daily. Dose adjustments will be made accordingly.      Thank you for allowing us to participate in this patient's care.    Carolina Goff, PharmD 4/20/2019 7:11 AM

## 2019-04-20 NOTE — HPI
49-year-old female patient admitted to the hospital for syncope.  Prior to her admission the day before, she applied nitro patch.  Complained of dizziness and fell at home around 3:00 a.m..  Known with emphysema, pulmonary fibrosis, severe pulmonary hypertension, mechanical aortic valve replacement on Coumadin and mitral annuloplasty.    She did have an acute on chronic diastolic heart failure January 2018 with hospital admission.     She does have about 30 pack year smoking history she did quit smoking in 2018.    Due to her severe pulmonary hypertension, pulmonary fibrosis and emphysema, severe exercise shortness of breath,Patient with pulmonary hypertension resulting in inability to carry on any physical activity without symptoms. The patient manifests signs of right heart failure. Dyspnea and/or fatigue may be present even at rest. Discomfort is increased by physical activity. WHO functional class IV, I referred her to transplant team for evaluation

## 2019-04-20 NOTE — SUBJECTIVE & OBJECTIVE
ROS  Objective:     Vital Signs (Most Recent):  Temp: 98.4 °F (36.9 °C) (04/20/19 0815)  Pulse: 84 (04/20/19 1312)  Resp: 18 (04/20/19 1312)  BP: (!) 113/55 (04/20/19 0815)  SpO2: 95 % (04/20/19 1312) Vital Signs (24h Range):  Temp:  [97.7 °F (36.5 °C)-98.7 °F (37.1 °C)] 98.4 °F (36.9 °C)  Pulse:  [75-97] 84  Resp:  [17-20] 18  SpO2:  [88 %-98 %] 95 %  BP: (107-131)/(53-78) 113/55     Weight: 73.3 kg (161 lb 9.6 oz)  Body mass index is 31.56 kg/m².     SpO2: 95 %  O2 Device (Oxygen Therapy): room air      Intake/Output Summary (Last 24 hours) at 4/20/2019 1318  Last data filed at 4/20/2019 0400  Gross per 24 hour   Intake 300.42 ml   Output 1450 ml   Net -1149.58 ml       Lines/Drains/Airways     Peripheral Intravenous Line                 Peripheral IV - Single Lumen 04/18/19 2311 20 G Left Antecubital 1 day                Physical Exam   Constitutional: She is oriented to person, place, and time. She appears well-nourished.   HENT:   Head: Normocephalic.   Eyes: Pupils are equal, round, and reactive to light.   Neck: Normal carotid pulses and no JVD present. Carotid bruit is not present. No thyromegaly present.   Cardiovascular: Normal rate, regular rhythm and normal pulses.  No extrasystoles are present. PMI is not displaced. Exam reveals no gallop and no S3.   Murmur heard.  Pulmonary/Chest: Breath sounds normal. No stridor. No respiratory distress.   Chest wall tenderness on left chest and sternal surgical scar   Abdominal: Soft. Bowel sounds are normal. There is no tenderness. There is no rebound.   Musculoskeletal: Normal range of motion.   Neurological: She is alert and oriented to person, place, and time.   Skin: Skin is intact. No rash noted.   Psychiatric: Her behavior is normal.       Significant Labs:   ABG:   Recent Labs   Lab 04/19/19  0020   PH 7.349*   PCO2 42.9   HCO3 23.6*   POCSATURATED 87*   BE -2   , Blood Culture: No results for input(s): LABBLOO in the last 48 hours., BMP:   Recent Labs    Lab 04/18/19 2250 04/19/19  0643 04/20/19  0441    124* 291*    137 138   K 3.7 3.6 3.4*    104 105   CO2 23 25 21*   BUN 14 13 10   CREATININE 1.3 1.1 0.9   CALCIUM 9.8 9.3 9.6   MG 2.1 2.1  --    , CMP   Recent Labs   Lab 04/18/19 2250 04/19/19  0643 04/20/19  0441    137 138   K 3.7 3.6 3.4*    104 105   CO2 23 25 21*    124* 291*   BUN 14 13 10   CREATININE 1.3 1.1 0.9   CALCIUM 9.8 9.3 9.6   PROT 8.4  --   --    ALBUMIN 4.4  --   --    BILITOT 0.5  --   --    ALKPHOS 116  --   --    AST 39  --   --    ALT 27  --   --    ANIONGAP 13 8 12   ESTGFRAFRICA 55* >60 >60   EGFRNONAA 48* 59* >60   , CBC   Recent Labs   Lab 04/18/19 2250 04/19/19  0643 04/20/19  0441   WBC 9.31 7.79 8.49   HGB 11.6* 10.1* 10.3*   HCT 37.6 33.4* 34.8*    145* 138*   , INR   Recent Labs   Lab 04/18/19 2250 04/19/19  0643 04/20/19  0441   INR 2.5* 2.2* 3.3*   , Lipid Panel No results for input(s): CHOL, HDL, LDLCALC, TRIG, CHOLHDL in the last 48 hours. and Troponin   Recent Labs   Lab 04/18/19 2250 04/19/19  1338   TROPONINI 0.012 0.006       Significant Imaging: EKG:

## 2019-04-20 NOTE — DISCHARGE SUMMARY
[facility] Hospital Medicine  Discharge Summary      Patient Name: Meg Sal  MRN: [id number]  Admission Date: 4/18/2019  Hospital Length of Stay: 0 days  Discharge Date and Time:  04/20/2019 11:27 AM  Attending Physician: Abner Page, *   Discharging Provider: So Ambrocio NP  Primary Care Provider: Lucero Banda MD      HPI:    Ms. Sal is a 50 yo female with a past medical history of CAD with remote CABG, valvular heart disease with remote mechanical AVR on Coumadin and mitral valve repair, severe pulmonary hypertension, interstitial lung disease, hypertension, hyperlipidemia, GERD, anxiety, and chronic back pain. She presented to the ED with complaints of headache that started after she hit her head on the side of her bed during a syncopal episode yesterday morning.  Associated neck pain and chronic back pain. No aggravating or alleviating factors.  Denies any visual disturbances, altered mental status, lightheadedness or dizziness, focal deficits, palpitations, chest pain, shortness of breath, cough, abdominal pain, nausea, vomiting, diarrhea, dysuria, fever, or chills.  In the ED, patient found to be mildly hypoxic with O2 sat of 90% on room air.  ABG showed pH of 7.349, pCO2 43, PO2 55, SA O2 87 on room air.  CT of the head was negative for acute process, CXR unremarkable, EKG unrevealing.  Case was discussed with pulmonology per ED, who recommend admission for cardiology evaluation of pulmonary hypertension.  Hospital Medicine was called for admission.      * No surgery found *      Hospital Course:   The pt was placed in observation for hypoxemia, ARF, chest pain, and syncope on IV hydration. +orthostasis. CT head showed nothing acute. EKG NSR and PVC. Troponin normal. Cardiac echo 2/2019 showed normal LVEF, Mechanical Aortic valve, Pulmonary hypertension. Carotid U/S pending. Bystolic and Isosorbide were held. Pt reports she had just started a new  medication- she placed a Nitropatch on the day of the fall. Pt was also taking isosorbide. Cardiology evaluated pt and felt syncope likely secondary to positional hypotension at night. He recommended Hold Nitro patch and Verapamil and continue IV hydration. Needs PET scan as op for ischemia burden evaluation  Pt also noted to have hypoxemia. Fine crackles noted. Hypoxemia likely secondary to ILD exacerbation. Pt placed on IV Steroids, MANOHAR, ICS, and LABA.    As of 4/20 patient feels better and is requesting to DC home today.  Carotid ultrasound showed 50-59% stenosis within the right internal carotid artery.  CT head on admit was negative for acute findings.  MICHELLE resolved with IV fluids.  Elevation in BG today is attributed to IV steroids and will improve once steroids are stopped.  She is currently resting comfortably in bed with her  at bedside in NAD.  Respirations are even and nonlabored, clear to auscultation bilaterally.  Patient is noted to have extreme anxiety regarding her multiple co morbidities and is not redirectable.  Attempted to answer all questions to alleviate anxiety, but patient was not receptive.  BP is better since home Verapamil, Bystolic, Isosorbide, and NTG patch have been held, and patient is currently denying any further syncopal episodes.  Case d/w Dr. Van, ok to DC home today from Cardiology standpoint with outpatient follow-up in clinic next week.  Per Dr. Van, patient will need to undergo a heart catheterization as an outpatient to further evaluate pulmonary HTN and will need a PET scan as outpatient to evaluate ischemic burden.  Per Cardiology recommendations, patient's home nitroglycerin patch, Bystolic, Isosorbide, and Verapamil will be held at the time of discharge until patient can be re-evaluated by Dr. Van next week.  Per Cardiology patient can resume her home Lasix at 40 mg twice daily.  Home oxygen evaluation completed today and patient qualified with an exertional  room air O2 saturation of 87% which improved to 96% on 2 L nasal cannula.  Social work consulted to arrange oxygen prior to discharge, and patient will DC home on 2L NC.  Patient was also instructed to follow up with her primary care physician within 3 days for post hospital evaluation and with Dr. Marte within 1-2 weeks for monitoring of oxygen requirements, and verbalized positive understanding.  Given history of extreme anxiety patient was also instructed to establish care with a psychiatrist who can monitor and adjust her home medications accordingly.  Given mild exacerbation of COPD for which patient was started on IV Solu-Medrol yesterday, patient will discharge home on a Prednisone taper.  [facility] will be resumed at time of discharge to monitor patient's INR on a weekly basis and report results to the [facility]madin [facility] and patient's primary care provider.  Medications reconciled for discharge home.               Consults:   Consults (From admission, onward)        Status Ordering Provider     Inpatient consult to Cardiology  Once     Provider:  (Not yet assigned)    Completed YASMIN SHIELDS     Inpatient consult to Social Work  Once     Provider:  (Not yet assigned)    Acknowledged LARRY KEITH     Pharmacy to dose Warfarin consult  Once     Provider:  (Not yet assigned)    Acknowledged MELYSSA NUGENT            Final Active Diagnoses:    Diagnosis Date Noted POA    Acute on chronic respiratory failure with hypoxia [J96.21] 04/19/2019 Yes    COPD exacerbation [J44.1] 04/19/2019 Yes    Polypharmacy [Z79.899] 04/19/2019 Not Applicable    (HFpEF) heart failure with preserved ejection fraction [I50.30] 02/08/2019 Yes     Chronic    Pulmonary HTN [I27.20] 08/15/2017 Yes     Chronic    Coronary artery disease of native artery of native heart with stable angina pectoris [I25.118] 11/21/2016 Yes    Hx of mechanical aortic valve replacement [Z95.2] 11/21/2016 Not Applicable     Chronic       Problems Resolved During this Admission:    Diagnosis Date Noted Date Resolved POA    PRINCIPAL PROBLEM:  Syncope [R55] 04/19/2019 04/20/2019 Yes    MICHELLE (acute kidney injury) [N17.9] 04/19/2019 04/20/2019 Yes    Chest pain, atypical [R07.89] 04/19/2019 04/20/2019 Unknown       Discharged Condition: good    Disposition: Home or Self Care    Follow Up:  Follow-up Information     Lucero Banda MD In 3 days.    Specialty:  Internal Medicine  Why:  Follow up with PCP within 3 days for post hospital evaluation and monitoring.  Contact information:  [address]  [facility]  [facility] [address]  [phone]             Jamar Van MD In 1 week.    Specialties:  Cardiology, Cardiovascular Disease  Why:  Follow up with Dr. Van in clinic next week for post hospital evaluation next week and to arrange outpatient heart catheterization.  Contact information:  [address]  [place] [address]  [phone]             Jaydon Marte MD In 1 week.    Specialty:  Pulmonary Disease  Why:  Follow up with Pulmonary in 1-2 weeks for monitoring of oxygen requirements.    Contact information:  [address]  [place] [address]  [phone]                 Patient Instructions:      OXYGEN FOR HOME USE     Order Specific Question Answer Comments   Liter Flow 2    Duration Continuous    Qualifying SpO2: 87%    Testing done at: Exercise/Activity    Route nasal cannula    Device home concentrator with portable unit    Length of need (in months): 12 mos    Patient condition with qualifying saturation COPD    Height: 5' (1.524 m)    Weight: 73.3 kg (161 lb 9.6 oz)    Does patient have medical equipment at home? none    Alternative treatment measures have been tried or considered and deemed clinically ineffective. Yes      Diet Cardiac     Notify your health care provider if you experience any of the following:  severe uncontrolled pain     Notify your health care provider if you experience  any of the following:  persistent nausea and vomiting or diarrhea     Notify your health care provider if you experience any of the following:  temperature >100.4     Notify your health care provider if you experience any of the following:  difficulty breathing or increased cough     Notify your health care provider if you experience any of the following:  persistent dizziness, light-headedness, or visual disturbances     Notify your health care provider if you experience any of the following:  increased confusion or weakness     Activity as tolerated       Significant Diagnostic Studies: Labs:   BMP:   Recent Labs   Lab 04/18/19 2250 04/19/19 0643 04/20/19  0441    124* 291*    137 138   K 3.7 3.6 3.4*    104 105   CO2 23 25 21*   BUN 14 13 10   CREATININE 1.3 1.1 0.9   CALCIUM 9.8 9.3 9.6   MG 2.1 2.1  --    , CMP   Recent Labs   Lab 04/18/19 2250 04/19/19 0643 04/20/19  0441    137 138   K 3.7 3.6 3.4*    104 105   CO2 23 25 21*    124* 291*   BUN 14 13 10   CREATININE 1.3 1.1 0.9   CALCIUM 9.8 9.3 9.6   PROT 8.4  --   --    ALBUMIN 4.4  --   --    BILITOT 0.5  --   --    ALKPHOS 116  --   --    AST 39  --   --    ALT 27  --   --    ANIONGAP 13 8 12   ESTGFRAFRICA 55* >60 >60   EGFRNONAA 48* 59* >60   , CBC   Recent Labs   Lab 04/18/19 2250 04/19/19 0643 04/20/19  0441   WBC 9.31 7.79 8.49   HGB 11.6* 10.1* 10.3*   HCT 37.6 33.4* 34.8*    145* 138*     Recent Labs   Lab 04/19/19  1338   TROPONINI 0.006        Pending Diagnostic Studies:     None         Imaging Results          US Carotid Bilateral (Final result)  Result time 04/19/19 13:44:36    Final result by Peter Marx MD (04/19/19 13:44:36)                 Impression:      50-59% stenosis within the right internal carotid artery    **Categories of stenosis (0-15%, 16-49%, 50-69% and 70-99% ) are based on published criteria that have been internally validated.  Degree of stenosis is based on NASCET  criteria and refers to the degree of luminal diameter narrowing as a percentage of the normal ICA distal to the stenosis and any area of post stenotic dilation.      Electronically signed by: Peter Marx MD  Date:    04/19/2019  Time:    13:44             Narrative:    EXAMINATION:  US CAROTID BILATERAL    CLINICAL HISTORY:  syncope;    COMPARISON:  None    FINDINGS:  Right common carotid:    Peak systolic velocity 114 cm/sec.    Right internal carotid artery: Moderate plaque is seen in the bulb    Peak systolic velocity: 168 cm/sec.    IC/CC ratio:  1.5.    Left common carotid:    Peak systolic velocity 83 cm/sec.    Left internal carotid artery: Mild plaque is seen in the bulb    Peak systolic velocity 103 cm/sec.    IC/CC ratio 1.2.    Both vertebral arteries demonstrate antegrade flow.                               X-Ray Chest PA And Lateral (Final result)  Result time 04/18/19 22:41:33    Final result by Kayode Desai Jr., MD (04/18/19 22:41:33)                 Impression:      1. Stable exam.  No acute chest findings.      Electronically signed by: Kayode Desai Jr., MD  Date:    04/18/2019  Time:    22:41             Narrative:    EXAMINATION:  XR CHEST PA AND LATERAL    CLINICAL HISTORY:  Syncope and collapse    COMPARISON:  03/11/2019    FINDINGS:  Coarsened interstitium consistent with pulmonary fibrosis unchanged.  No consolidation or effusion.  No pneumothorax.  The cardiac silhouette remains enlarged.  Previous CABG and valve replacement.  Osseous structures intact.                               CT Head Without Contrast (Final result)  Result time 04/18/19 22:40:19    Final result by Kayode Desai Jr., MD (04/18/19 22:40:19)                 Impression:      1. No acute intracranial findings.  All CT scans at this facility are performed  using dose modulation techniques as appropriate to performed exam including the following:  automated exposure control; adjustment of mA and/or kV according to  the patients size (this includes techniques or standardized protocols for targeted exams where dose is matched to indication/reason for exam: i.e. extremities or head);  iterative reconstruction technique.      Electronically signed by: Kayode Desai Jr., MD  Date:    04/18/2019  Time:    22:40             Narrative:    EXAMINATION:  CT HEAD WITHOUT CONTRAST    CLINICAL HISTORY:  Headache, acute, norm neuro exam;    TECHNIQUE:  CT scan was obtained of the head without administration of contrast.    COMPARISON:  None    FINDINGS:  Ventricles and basal cisterns are normal.  No hemorrhage, mass effect or midline shift.  No cerebral or cerebellar parenchymal abnormality.  Paranasal sinuses are clear.  Mastoid air cells are clear.  Calvarium is intact.                                Medications:  Reconciled Home Medications:      Medication List      START taking these medications    predniSONE 10 MG tablet  Commonly known as:  DELTASONE  Prednisone 40 mg po daily x 4 days, then 30 mg po daily x 3 days, then 20 mg po daily x 2 days, then 10 mg po x 1 day then stop.        CONTINUE taking these medications    albuterol 90 mcg/actuation inhaler  Commonly known as:  VENTOLIN HFA  Inhale 2 puffs into the lungs every 6 (six) hours as needed for Wheezing. Rescue     amitriptyline 25 MG tablet  Commonly known as:  ELAVIL  Take 25 mg by mouth every evening.     busPIRone 15 MG tablet  Commonly known as:  BUSPAR  Take 15 mg by mouth 3 (three) times daily.     clotrimazole-betamethasone 1-0.05% cream  Commonly known as:  LOTRISONE  Apply to affected area 2 times daily     desvenlafaxine succinate 100 MG Tb24  Commonly known as:  PRISTIQ  Take 100 mg by mouth once daily.     diclofenac sodium 1 % Gel  Commonly known as:  VOLTAREN  Apply 2 g topically 4 (four) times daily.     fluticasone 50 mcg/actuation nasal spray  Commonly known as:  FLONASE  1 spray by Each Nare route once daily.     furosemide 40 MG tablet  Commonly known as:   LASIX  Take 40 mg by mouth 2 (two) times daily.     HYDROcodone-acetaminophen 5-325 mg per tablet  Commonly known as:  NORCO  TK 1 T PO  Q 6 H PRN     KlonoPIN 1 MG tablet  Generic drug:  clonazePAM  Take 1 mg by mouth 3 (three) times daily.     nicotine 14 mg/24 hr  Commonly known as:  NICODERM CQ  Place 1 patch onto the skin every 24 hours.     pantoprazole 40 MG tablet  Commonly known as:  PROTONIX  Take 40 mg by mouth once daily.     potassium chloride SA 20 MEQ tablet  Commonly known as:  K-DUR,KLOR-CON  Take 20 mEq by mouth 2 (two) times daily.     rosuvastatin 20 MG tablet  Commonly known as:  CRESTOR  Take 20 mg by mouth once daily.     sumatriptan 100 MG tablet  Commonly known as:  IMITREX  Take 100 mg by mouth every 2 (two) hours as needed.     tiZANidine 4 MG tablet  Commonly known as:  ZANAFLEX  Take 4 mg by mouth every evening.     topiramate 100 MG tablet  Commonly known as:  TOPAMAX  Take 100 mg by mouth 2 (two) times daily.     traZODone 150 MG tablet  Commonly known as:  DESYREL  Take 150 mg by mouth nightly.     umeclidinium-vilanterol 62.5-25 mcg/actuation Dsdv  Commonly known as:  ANORO ELLIPTA  Inhale 1 puff into the lungs once daily. Controller     VITAMIN B COMPLEX ORAL  Take 1 capsule by mouth.     vitamin D 1000 units Tab  Commonly known as:  VITAMIN D3  Take 1,000 Units by mouth once daily.     warfarin 6 MG tablet  Commonly known as:  COUMADIN  Take 6 mg by mouth once daily.        STOP taking these medications    isosorbide dinitrate 10 MG tablet  Commonly known as:  ISORDIL     nebivolol 5 MG Tab  Commonly known as:  BYSTOLIC     nitroGLYCERIN 0.4 MG/HR TD PT24 0.4 mg/hr  Commonly known as:  NITRODUR     verapamil 120 MG C24p  Commonly known as:  VERELAN            Indwelling Lines/Drains at time of discharge:   Lines/Drains/Airways          None          Time spent on the discharge of patient: 45 minutes  Patient was seen and examined on the date of discharge and determined to be  suitable for discharge.         So Ambrocio, GARETT, ACNP-BC  Department of Hospital Medicine  [facility]

## 2019-04-20 NOTE — SUBJECTIVE & OBJECTIVE
Interval History:   Review of Systems   Constitutional: Positive for malaise/fatigue.   HENT: Negative for hearing loss and nosebleeds.    Eyes: Negative for discharge.   Respiratory: Positive for cough and shortness of breath.    Cardiovascular: Positive for orthopnea and leg swelling.        Aortic valve replacement   Gastrointestinal: Negative for abdominal pain and vomiting.   Genitourinary: Negative for hematuria.   Musculoskeletal: Negative for back pain.   Skin: Negative for itching.   Neurological: Negative for focal weakness.   Endo/Heme/Allergies: Bruises/bleeds easily.        On Coumadin   Psychiatric/Behavioral: The patient is nervous/anxious.          Objective:     Vital Signs (Most Recent):  Temp: 98.4 °F (36.9 °C) (04/20/19 0815)  Pulse: 87 (04/20/19 0915)  Resp: 18 (04/20/19 0815)  BP: (!) 113/55 (04/20/19 0815)  SpO2: (!) 92 % (04/20/19 0815) Vital Signs (24h Range):  Temp:  [97.7 °F (36.5 °C)-98.7 °F (37.1 °C)] 98.4 °F (36.9 °C)  Pulse:  [75-97] 87  Resp:  [17-20] 18  SpO2:  [88 %-98 %] 92 %  BP: (107-131)/(53-78) 113/55     Weight: 73.3 kg (161 lb 9.6 oz)  Body mass index is 31.56 kg/m².      Intake/Output Summary (Last 24 hours) at 4/20/2019 1226  Last data filed at 4/20/2019 0400  Gross per 24 hour   Intake 300.42 ml   Output 1450 ml   Net -1149.58 ml       Physical Exam   Constitutional: She is oriented to person, place, and time. She appears well-developed and well-nourished.   HENT:   Head: Normocephalic and atraumatic.   Eyes: EOM are normal.   Neck: Neck supple.   Cardiovascular: Regular rhythm.   Murmur heard.  Pulmonary/Chest: Effort normal. No respiratory distress.   Decreased breath sounds bilaterally   Abdominal: Soft.   Musculoskeletal:   Bilateral varicose vein lower extremity   Neurological: She is alert and oriented to person, place, and time.   Skin: Skin is warm. There is pallor.   Psychiatric: She has a normal mood and affect. Her behavior is normal. Judgment and thought  content normal.       Vents:  Oxygen Concentration (%): 28 (04/20/19 0050)    Lines/Drains/Airways     Peripheral Intravenous Line                 Peripheral IV - Single Lumen 04/18/19 2311 20 G Left Antecubital 1 day                Significant Labs:    CBC/Anemia Profile:  Recent Labs   Lab 04/18/19 2250 04/19/19  0643 04/20/19  0441   WBC 9.31 7.79 8.49   HGB 11.6* 10.1* 10.3*   HCT 37.6 33.4* 34.8*    145* 138*   MCV 97 97 99*   RDW 17.9* 18.1* 18.2*        Chemistries:  Recent Labs   Lab 04/18/19 2250 04/19/19  0643 04/20/19  0441    137 138   K 3.7 3.6 3.4*    104 105   CO2 23 25 21*   BUN 14 13 10   CREATININE 1.3 1.1 0.9   CALCIUM 9.8 9.3 9.6   ALBUMIN 4.4  --   --    PROT 8.4  --   --    BILITOT 0.5  --   --    ALKPHOS 116  --   --    ALT 27  --   --    AST 39  --   --    MG 2.1 2.1  --      Echo4/20:        1 - Mild left atrial enlargement.     2 - Eccentric hypertrophy.     3 - No wall motion abnormalities.     4 - Normal left ventricular systolic function (EF 55-60%).     5 - Normal right ventricular systolic function .     6 - Aortic valve prosthesis, CATINA = 1.61 cm2, AVAi = 0.95 cm2/m2, peak velocity = 2.48 m/s, mean gradient = 15 mmHg.     7 - Moderate tricuspid regurgitation.     8 - Pulmonary hypertension. The estimated PA systolic pressure is 61 mmHg.       Significant Imaging:  CT: I have reviewed all pertinent results/findings within the past 24 hours and my personal findings are:  Honeycombing of the lungs.  Emphysema.  CXR: I have reviewed all pertinent results/findings within the past 24 hours and my personal findings are:  Bilateral interstitial densities.

## 2019-04-20 NOTE — ASSESSMENT & PLAN NOTE
4/20 likely related to left heart disease in addition to hypoxemic respiratory disorder.  Right heart catheterization will help further define her pulmonary hypertension and guide pulmonary hypertension therapy with vascular targeted therapy i.e. ambrisentan and tadalafil.  Discussed with Cardiology.

## 2019-04-20 NOTE — CONSULTS
Sw provided home oxygen to patient at the bedside. Sw also faxed information to [employer] and [facility]

## 2019-04-20 NOTE — SIGNIFICANT EVENT
"Received call from charge nurse that patient is requesting narcotic pain medication prescription upon DC and is "refusing to leave until I get it". Per  review, patient received 120 tablets of Tramadol 50 mg that she filled on 4/16.  Patient also noted to have received 20 tablets of Norco 5mg on 4/6 and another 20 tablets on 4/11.  She is absolutely demonstrating drug seeking behavior and will not be provided with additional narcotics at time of DC.  I attempted to explain this to the patient, however, she left the hospital without knowledge of any staff.  Phone call later received from Dr. Salazar stating the patient "called the exchange crying and requesting narcotic pain medication".  Above information explained to Dr. Salazar.    "

## 2019-04-20 NOTE — PROGRESS NOTES
[facility] -   Cardiology  Progress Note    Patient Name: Meg Sal  MRN: [id number]  Admission Date: 4/18/2019  Hospital Length of Stay: 0 days  Code Status: Full Code   Attending Physician: Abner Page, *   Primary Care Physician: Lucero Banda MD  Expected Discharge Date: 4/20/2019  Principal Problem:Syncope and collapse    Subjective:     Hospital Course:   50 yo female, consult for syncope. F/u with Dr. Banda at cardiology office.  PMH CAD s/p CABGx2, s/p mechanical AVR and MV annuloplasty in 2014, normal EF, frequent PVCs, interstitial lung fibrosis,  And HTN.  C/o chest pain multiple times a day, at rest and with exertion. Over a yr. Also f/u with EP for PVCs.  Verapamil added in  for PVCs and NTG paste added one day ago. Could not tolerate Ranexa.  One ago, at night woke up due to GERD and blacked out after standing up from the bed. Woke up by herself and called the cardiology office in the morning and came to ER yesterday.  Now some chest and back pain.  SBP at 90 to 99 mmHg  EKG NSR And PVC.  CXr and brain CT negative   Troponin negative  BNP chronic elevation      04/20 BP improved for o/n. No chest pain, dyspnea and orthopnea. Hypoxia 87% on room air      ROS  Objective:     Vital Signs (Most Recent):  Temp: 98.4 °F (36.9 °C) (04/20/19 0815)  Pulse: 84 (04/20/19 1312)  Resp: 18 (04/20/19 1312)  BP: (!) 113/55 (04/20/19 0815)  SpO2: 95 % (04/20/19 1312) Vital Signs (24h Range):  Temp:  [97.7 °F (36.5 °C)-98.7 °F (37.1 °C)] 98.4 °F (36.9 °C)  Pulse:  [75-97] 84  Resp:  [17-20] 18  SpO2:  [88 %-98 %] 95 %  BP: (107-131)/(53-78) 113/55     Weight: 73.3 kg (161 lb 9.6 oz)  Body mass index is 31.56 kg/m².     SpO2: 95 %  O2 Device (Oxygen Therapy): room air      Intake/Output Summary (Last 24 hours) at 4/20/2019 1318  Last data filed at 4/20/2019 0400  Gross per 24 hour   Intake 300.42 ml   Output 1450 ml   Net -1149.58 ml       Lines/Drains/Airways      Peripheral Intravenous Line                 Peripheral IV - Single Lumen 04/18/19 2311 20 G Left Antecubital 1 day                Physical Exam   Constitutional: She is oriented to person, place, and time. She appears well-nourished.   HENT:   Head: Normocephalic.   Eyes: Pupils are equal, round, and reactive to light.   Neck: Normal carotid pulses and no JVD present. Carotid bruit is not present. No thyromegaly present.   Cardiovascular: Normal rate, regular rhythm and normal pulses.  No extrasystoles are present. PMI is not displaced. Exam reveals no gallop and no S3.   Murmur heard.  Pulmonary/Chest: Breath sounds normal. No stridor. No respiratory distress.   Chest wall tenderness on left chest and sternal surgical scar   Abdominal: Soft. Bowel sounds are normal. There is no tenderness. There is no rebound.   Musculoskeletal: Normal range of motion.   Neurological: She is alert and oriented to person, place, and time.   Skin: Skin is intact. No rash noted.   Psychiatric: Her behavior is normal.       Significant Labs:   ABG:   Recent Labs   Lab 04/19/19  0020   PH 7.349*   PCO2 42.9   HCO3 23.6*   POCSATURATED 87*   BE -2   , Blood Culture: No results for input(s): LABBLOO in the last 48 hours., BMP:   Recent Labs   Lab 04/18/19  2250 04/19/19  0643 04/20/19  0441    124* 291*    137 138   K 3.7 3.6 3.4*    104 105   CO2 23 25 21*   BUN 14 13 10   CREATININE 1.3 1.1 0.9   CALCIUM 9.8 9.3 9.6   MG 2.1 2.1  --    , CMP   Recent Labs   Lab 04/18/19  2250 04/19/19  0643 04/20/19  0441    137 138   K 3.7 3.6 3.4*    104 105   CO2 23 25 21*    124* 291*   BUN 14 13 10   CREATININE 1.3 1.1 0.9   CALCIUM 9.8 9.3 9.6   PROT 8.4  --   --    ALBUMIN 4.4  --   --    BILITOT 0.5  --   --    ALKPHOS 116  --   --    AST 39  --   --    ALT 27  --   --    ANIONGAP 13 8 12   ESTGFRAFRICA 55* >60 >60   EGFRNONAA 48* 59* >60   , CBC   Recent Labs   Lab 04/18/19  1568 04/19/19  0639  04/20/19  0441   WBC 9.31 7.79 8.49   HGB 11.6* 10.1* 10.3*   HCT 37.6 33.4* 34.8*    145* 138*   , INR   Recent Labs   Lab 04/18/19 2250 04/19/19  0643 04/20/19  0441   INR 2.5* 2.2* 3.3*   , Lipid Panel No results for input(s): CHOL, HDL, LDLCALC, TRIG, CHOLHDL in the last 48 hours. and Troponin   Recent Labs   Lab 04/18/19 2250 04/19/19  1338   TROPONINI 0.012 0.006       Significant Imaging: EKG:      Assessment and Plan:         * Syncope and collapse  Suspect the syncope caused by positional hypotension at night.  -advise IVF  -hold NTG paste and verapamil/BB.  -fall precaution    Pulmonary HTN  Due to diastolic dysfunction and interstitial lung dz.   PSAP 60 mmHG in ,.   Repeat echo as OP  Advise to f/u at pHTN clinic at [facility], discussed with her pulmonologist    Hx of mechanical aortic valve replacement  continue coumadin    (HFpEF) heart failure with preserved ejection fraction  Continue lasix    Coronary artery disease of native artery of native heart with stable angina pectoris  Continue ASA and statin    F/u in 1 week    VTE Risk Mitigation (From admission, onward)        Ordered     warfarin (COUMADIN) tablet 6 mg  Daily      04/20/19 0713     Place sequential compression device  Until discontinued      04/19/19 0300          Jamar Van MD  Cardiology  [facility]

## 2019-04-21 ENCOUNTER — NURSE TRIAGE (OUTPATIENT)
Dept: ADMINISTRATIVE | Facility: CLINIC | Age: 49
End: 2019-04-21

## 2019-04-21 NOTE — TELEPHONE ENCOUNTER
Reason for Disposition   [1] Caller requests to speak ONLY to PCP AND [2] urgent question    Protocols used: PCP CALL - NO TRIAGE-A-  pt requests to speak with pulmonary MD. Pt transferred to speak with MD.

## 2019-04-22 ENCOUNTER — TELEPHONE (OUTPATIENT)
Dept: CARDIOLOGY | Facility: CLINIC | Age: 49
End: 2019-04-22

## 2019-04-22 NOTE — TELEPHONE ENCOUNTER
Spoke with pt and scheduled follow up.      ----- Message from Jamar Van MD sent at 4/20/2019  1:21 PM CDT -----  Need see her this week as f/u.

## 2019-04-24 ENCOUNTER — TELEPHONE (OUTPATIENT)
Dept: PULMONOLOGY | Facility: CLINIC | Age: 49
End: 2019-04-24

## 2019-04-24 ENCOUNTER — ANTI-COAG VISIT (OUTPATIENT)
Dept: CARDIOLOGY | Facility: CLINIC | Age: 49
End: 2019-04-24
Payer: COMMERCIAL

## 2019-04-24 ENCOUNTER — TELEPHONE (OUTPATIENT)
Dept: CARDIOLOGY | Facility: CLINIC | Age: 49
End: 2019-04-24

## 2019-04-24 DIAGNOSIS — Z79.01 LONG TERM (CURRENT) USE OF ANTICOAGULANTS: ICD-10-CM

## 2019-04-24 LAB — INR PPP: 3.7

## 2019-04-24 PROCEDURE — 93793 ANTICOAG MGMT PT WARFARIN: CPT | Mod: S$GLB,,,

## 2019-04-24 PROCEDURE — 93793 PR ANTICOAGULANT MGMT FOR PT TAKING WARFARIN: ICD-10-PCS | Mod: S$GLB,,,

## 2019-04-24 NOTE — TELEPHONE ENCOUNTER
----- Message from Carlota Murguia sent at 4/24/2019  2:30 PM CDT -----  patient states she wanted to speak to you about her hospital visit and coughing up blood since she fell.  Please call pt at [phone].

## 2019-04-24 NOTE — TELEPHONE ENCOUNTER
Spoke with patient about not going through with the transplant. She had questions about her O2 order informed her she would have to speak with the  That put the order in.     ----- Message from Gibran Polk sent at 4/24/2019  2:38 PM CDT -----  Contact: [phone]  Would like a nurse to contact her regarding some issues she is having with her oxygen , please contact her @ [phone], she states she has left several messages. Thanks

## 2019-04-24 NOTE — PROGRESS NOTES
Verbal results taken from Meena ZELAYA with [facility]. PT/INR  43.8 / 3.7.   Date drawn 4/24/2019.  Mrs. Sal was admitted to the hospital 4/19-4/20 with c/o hypotension, syncope, MICHELLE, chest pain.  Isosorbide dinitrate, nebivilol, nitroglycerin, and verapamil were discontinued.  Patient was also started on a prednisone taper x 10 days  (4/20-4/30).  Orders given: Hold dose of coumadin on today; then resume dose of 6mg every evening.  Recheck on Wednesday, 5/1/19.

## 2019-04-24 NOTE — TELEPHONE ENCOUNTER
Returned call to patient--states would like to inform  that she plans to cancel appointment in [place] for pre-lung transplant consult on on 5/2/19 and will follow up with Dr. Banda on 5/3/19 to discuss hospital stay and if needs to have heart cath

## 2019-04-25 ENCOUNTER — TELEPHONE (OUTPATIENT)
Dept: TRANSPLANT | Facility: CLINIC | Age: 49
End: 2019-04-25

## 2019-04-25 NOTE — TELEPHONE ENCOUNTER
Pt states she needs to cancel her consult appointment since she has had some recent medical events.  She will call to reschedule when ready.

## 2019-04-26 ENCOUNTER — TELEPHONE (OUTPATIENT)
Dept: PULMONOLOGY | Facility: CLINIC | Age: 49
End: 2019-04-26

## 2019-04-26 DIAGNOSIS — R06.02 SOB (SHORTNESS OF BREATH): Primary | ICD-10-CM

## 2019-04-26 NOTE — TELEPHONE ENCOUNTER
----- Message from Bob Benjamin sent at 4/26/2019 11:48 AM CDT -----  Contact: Pt  Pt was treated in the hospital and was placed on Oxygen and Dr. Waggoner signed off on the order. Pt states that she's having issues with the portable oxygen and states that she needs the oxygen in the bag because she can't roll the tank and hold her walker at the same time. Please give pt a call at ..[phone] (home) Pt would also like to schedule a hospital f/u with this provider as well.

## 2019-04-26 NOTE — TELEPHONE ENCOUNTER
"Pt states order for O2 needs to read "pulse dose portable concentrator".  Can you amend order asap so pt can get before weekend.  Thank you.    "

## 2019-04-29 ENCOUNTER — OFFICE VISIT (OUTPATIENT)
Dept: CARDIOLOGY | Facility: CLINIC | Age: 49
End: 2019-04-29
Payer: COMMERCIAL

## 2019-04-29 VITALS
BODY MASS INDEX: 30.52 KG/M2 | DIASTOLIC BLOOD PRESSURE: 82 MMHG | HEART RATE: 76 BPM | HEIGHT: 60 IN | SYSTOLIC BLOOD PRESSURE: 142 MMHG | WEIGHT: 155.44 LBS

## 2019-04-29 DIAGNOSIS — E78.49 OTHER HYPERLIPIDEMIA: ICD-10-CM

## 2019-04-29 DIAGNOSIS — Z95.2 HX OF MECHANICAL AORTIC VALVE REPLACEMENT: Chronic | ICD-10-CM

## 2019-04-29 DIAGNOSIS — I25.118 CORONARY ARTERY DISEASE OF NATIVE ARTERY OF NATIVE HEART WITH STABLE ANGINA PECTORIS: ICD-10-CM

## 2019-04-29 DIAGNOSIS — I10 ESSENTIAL HYPERTENSION: ICD-10-CM

## 2019-04-29 DIAGNOSIS — I35.2 NONRHEUMATIC AORTIC INSUFFICIENCY WITH AORTIC STENOSIS: ICD-10-CM

## 2019-04-29 DIAGNOSIS — Z98.890 H/O MITRAL VALVE REPAIR: ICD-10-CM

## 2019-04-29 DIAGNOSIS — Z79.01 ANTICOAGULATED ON COUMADIN: ICD-10-CM

## 2019-04-29 DIAGNOSIS — I49.3 PVC (PREMATURE VENTRICULAR CONTRACTION): ICD-10-CM

## 2019-04-29 DIAGNOSIS — Z95.1 STATUS POST CORONARY ARTERY BYPASS GRAFT: ICD-10-CM

## 2019-04-29 DIAGNOSIS — I50.30 (HFPEF) HEART FAILURE WITH PRESERVED EJECTION FRACTION: Primary | Chronic | ICD-10-CM

## 2019-04-29 PROCEDURE — 3077F PR MOST RECENT SYSTOLIC BLOOD PRESSURE >= 140 MM HG: ICD-10-PCS | Mod: CPTII,NTX,S$GLB, | Performed by: INTERNAL MEDICINE

## 2019-04-29 PROCEDURE — 99215 PR OFFICE/OUTPT VISIT, EST, LEVL V, 40-54 MIN: ICD-10-PCS | Mod: NTX,S$GLB,, | Performed by: INTERNAL MEDICINE

## 2019-04-29 PROCEDURE — 99215 OFFICE O/P EST HI 40 MIN: CPT | Mod: NTX,S$GLB,, | Performed by: INTERNAL MEDICINE

## 2019-04-29 PROCEDURE — 3008F BODY MASS INDEX DOCD: CPT | Mod: CPTII,NTX,S$GLB, | Performed by: INTERNAL MEDICINE

## 2019-04-29 PROCEDURE — 3077F SYST BP >= 140 MM HG: CPT | Mod: CPTII,NTX,S$GLB, | Performed by: INTERNAL MEDICINE

## 2019-04-29 PROCEDURE — 3008F PR BODY MASS INDEX (BMI) DOCUMENTED: ICD-10-PCS | Mod: CPTII,NTX,S$GLB, | Performed by: INTERNAL MEDICINE

## 2019-04-29 PROCEDURE — 99999 PR PBB SHADOW E&M-EST. PATIENT-LVL III: ICD-10-PCS | Mod: PBBFAC,TXP,, | Performed by: INTERNAL MEDICINE

## 2019-04-29 PROCEDURE — 3079F DIAST BP 80-89 MM HG: CPT | Mod: CPTII,NTX,S$GLB, | Performed by: INTERNAL MEDICINE

## 2019-04-29 PROCEDURE — 99999 PR PBB SHADOW E&M-EST. PATIENT-LVL III: CPT | Mod: PBBFAC,TXP,, | Performed by: INTERNAL MEDICINE

## 2019-04-29 PROCEDURE — 3079F PR MOST RECENT DIASTOLIC BLOOD PRESSURE 80-89 MM HG: ICD-10-PCS | Mod: CPTII,NTX,S$GLB, | Performed by: INTERNAL MEDICINE

## 2019-04-29 RX ORDER — ISOSORBIDE MONONITRATE 30 MG/1
30 TABLET, EXTENDED RELEASE ORAL DAILY
Qty: 30 TABLET | Refills: 11 | Status: SHIPPED | OUTPATIENT
Start: 2019-04-29 | End: 2019-05-06

## 2019-04-29 NOTE — PROGRESS NOTES
Subjective:   Patient ID:  Meg Sal is a 49 y.o. female who presents for cardiac consult of Dizziness      Chest Pain    Associated symptoms include dizziness, palpitations and shortness of breath.   Shortness of Breath   Associated symptoms include chest pain.   Dizziness:    Associated symptoms: palpitations and chest pain.    The patient came in today for cardiac consult of Dizziness      Meg Sal is a 49 y.o. female with CAD s/p 2V CABG - LAD/LCX with occluded, s/p mechanical AVR, s/p mitral annuloplasty ring, Pulm HTN, HFpEF,CHD, HTN, HLD, CVA here for follow up CV eval.     2/8/19  Pt had first MI at age 31, was taken to [facility], back was hurting. Felt like she had a heavy wet blanket. She was breaking up a dog fight when paramedics came. Dr. Cortez did LHC, had one stent placed. She also had other blockages which were medically treated. She had treadmill nuclear stress tests after, had been off plavix. In 2014, when she went to [place] she couldn't walk and was out of breath. She came back to  and she did another LHC with Dr. Cortez, Dr. Mcmahon was consulted as she had severe LAD disease. She went to Dr. Heller with [facility], had LHC in May 2018 with chronically occluded RCA with collaterals but patent grafts to her left system. She has been having a lot of chest pain lately. She was taking NTG two or three times a week. She was told to take Ranexa and Imdur but had more depression so stopped taking it. She has no car and has difficulty getting to coumadin clinic, discussed will to get home health for labwork.     3/11/19  She had another episode of severe chest pain last week. Started on Tues and continued till Friday. Had home health nurse that came. She took NTG which didn't help much, took 2 tabs. Discussed she needs ER eval if severe CP again that is not improvd with NTG. Started verapamil for PVCs. Will refer to Ep for PVC ablation vs AAD. Pulm eval this AM, will have  sleep study/PFTs/rheum referral. Recent stress and echo negative, mech AVR mean gradient 15 mmhg    4/29/19  She had a recent syncopal episode at home and presented to [facility] ER and was admitted. IN the ED, patient found to be mildly hypoxic with O2 sat of 90% on room air.  ABG showed pH of 7.349, pCO2 43, PO2 55, SA O2 87 on room air.  CT of the head was negative for acute process, CXR unremarkable, EKG unrevealing. BP is better since home Verapamil, Bystolic, Isosorbide, and NTG patch have been held. Home oxygen evaluation completed and patient qualified with an exertional room air O2 saturation of 87% which improved to 96% on 2 L nasal cannula, she was DC home on 2L NC.    Pt had eval by Dr. Montero for PVCs, discussed ablation not a good option, may try amio but not a great option due to lung disease. Had pulm eval by Dr. Mondragon and rec RHC to eval PH and lung transplant eval. BP elevated today, had been off NTG/imdur, will restart today.     Patient has dec exercise tolerance.    Patient is compliant with medications.    ECG - NSR, RBBB      50-59% stenosis within the right internal carotid artery    **Categories of stenosis (0-15%, 16-49%, 50-69% and 70-99% ) are based on published criteria that have been internally validated.  Degree of stenosis is based on NASCET criteria and refers to the degree of luminal diameter narrowing as a percentage of the normal ICA distal to the stenosis and any area of post stenotic dilation.      Electronically signed by: Peter Marx MD  Date: 04/19/2019  Time: 13:44    Nuclear Quantitative Functional Analysis:   LVEF: 65 %    Impression: NORMAL MYOCARDIAL PERFUSION  1. The perfusion scan is free of evidence for myocardial ischemia or injury.   2. Resting wall motion is physiologic.   3. Resting LV function is normal.   4. The ventricular volumes are normal at rest and stress.   5. The extracardiac distribution of radioactivity is normal.           This document has been  electronically    SIGNED BY: Hermilo Pete MD On: 2019 16:47    CONCLUSIONS     1 - Mild left atrial enlargement.     2 - Eccentric hypertrophy.     3 - No wall motion abnormalities.     4 - Normal left ventricular systolic function (EF 55-60%).     5 - Normal right ventricular systolic function .     6 - Aortic valve prosthesis, CATINA = 1.61 cm2, AVAi = 0.95 cm2/m2, peak velocity = 2.48 m/s, mean gradient = 15 mmHg.     7 - Moderate tricuspid regurgitation.     8 - Pulmonary hypertension. The estimated PA systolic pressure is 61 mmHg.     This document has been electronically    SIGNED BY: Hermilo Pete MD On: 2019 14:50    TEST DESCRIPTION   PREDOMINANT RHYTHM  1. Sinus rhythm with heart rates varying between 66 and 105 bpm with an average of 78 bpm.   VENTRICULAR ARRHYTHMIAS  1. There were very frequent PVCs totalling 27013 and averaging 489 per hour.  There were 23 couplets. There were 5 triplets.   2. There were no episodes of ventricular tachycardia.    SUPRA VENTRICULAR ARRHYTHMIAS  1. There were rare PACs totalling 382 and averaging 7 per hour.   2. There were no episodes of sustained supraventricular tachycardia.    Past Medical History:   Diagnosis Date    Acute coronary syndrome     CHF (congestive heart failure)     Coronary artery disease     Hyperlipidemia     Hypertension     Sleep apnea syndrome 2019    Stroke        Past Surgical History:   Procedure Laterality Date    CARDIAC CATHETERIZATION      CARDIAC VALVE SURGERY      CORONARY ANGIOPLASTY      CORONARY ARTERY BYPASS GRAFT         Social History     Tobacco Use    Smoking status: Former Smoker     Packs/day: 1.00     Types: Cigarettes     Last attempt to quit: 2018     Years since quittin.9    Smokeless tobacco: Never Used   Substance Use Topics    Alcohol use: Not Currently    Drug use: Not on file       History reviewed. No pertinent family history.    Patient's Medications   New Prescriptions     ISOSORBIDE MONONITRATE (IMDUR) 30 MG 24 HR TABLET    Take 1 tablet (30 mg total) by mouth once daily.   Previous Medications    ALBUTEROL (VENTOLIN HFA) 90 MCG/ACTUATION INHALER    Inhale 2 puffs into the lungs every 6 (six) hours as needed for Wheezing. Rescue    AMITRIPTYLINE (ELAVIL) 25 MG TABLET    Take 25 mg by mouth every evening.     BUSPIRONE (BUSPAR) 15 MG TABLET    Take 15 mg by mouth 3 (three) times daily.    CLONAZEPAM (KLONOPIN) 1 MG TABLET    Take 1 mg by mouth 3 (three) times daily.     CLOTRIMAZOLE-BETAMETHASONE 1-0.05% (LOTRISONE) CREAM    Apply to affected area 2 times daily    DESVENLAFAXINE SUCCINATE (PRISTIQ) 100 MG TB24    Take 100 mg by mouth once daily.    DICLOFENAC SODIUM (VOLTAREN) 1 % GEL    Apply 2 g topically 4 (four) times daily.    FLUTICASONE (FLONASE) 50 MCG/ACTUATION NASAL SPRAY    1 spray by Each Nare route once daily.    FUROSEMIDE (LASIX) 40 MG TABLET    Take 40 mg by mouth 2 (two) times daily.    HYDROCODONE-ACETAMINOPHEN (NORCO) 5-325 MG PER TABLET    TK 1 T PO  Q 6 H PRN    NICOTINE (NICODERM CQ) 14 MG/24 HR    Place 1 patch onto the skin every 24 hours.    PANTOPRAZOLE (PROTONIX) 40 MG TABLET    Take 40 mg by mouth once daily.    POTASSIUM CHLORIDE SA (K-DUR,KLOR-CON) 20 MEQ TABLET    Take 20 mEq by mouth 2 (two) times daily.    PREDNISONE (DELTASONE) 10 MG TABLET    Prednisone 40 mg po daily x 4 days, then 30 mg po daily x 3 days, then 20 mg po daily x 2 days, then 10 mg po x 1 day then stop.    ROSUVASTATIN (CRESTOR) 20 MG TABLET    Take 20 mg by mouth once daily.    SUMATRIPTAN (IMITREX) 100 MG TABLET    Take 100 mg by mouth every 2 (two) hours as needed.    TIZANIDINE (ZANAFLEX) 4 MG TABLET    Take 4 mg by mouth every evening.    TOPIRAMATE (TOPAMAX) 100 MG TABLET    Take 100 mg by mouth 2 (two) times daily.    TRAZODONE (DESYREL) 150 MG TABLET    Take 150 mg by mouth nightly.    UMECLIDINIUM-VILANTEROL (ANORO ELLIPTA) 62.5-25 MCG/ACTUATION DSDV    Inhale 1 puff into  the lungs once daily. Controller    VITAMIN B COMPLEX ORAL    Take 1 capsule by mouth.    VITAMIN D (VITAMIN D3) 1000 UNITS TAB    Take 1,000 Units by mouth once daily.    WARFARIN (COUMADIN) 6 MG TABLET    Take 6 mg by mouth once daily.   Modified Medications    No medications on file   Discontinued Medications    No medications on file       Review of Systems   Constitutional: Negative.    HENT: Negative.    Eyes: Negative.    Respiratory: Positive for shortness of breath.    Cardiovascular: Positive for chest pain and palpitations.   Gastrointestinal: Negative.    Genitourinary: Negative.    Musculoskeletal: Negative.    Skin: Negative.    Neurological: Positive for dizziness.   Endo/Heme/Allergies: Negative.    Psychiatric/Behavioral: The patient is nervous/anxious.    All 12 systems otherwise negative.      Wt Readings from Last 3 Encounters:   04/29/19 70.5 kg (155 lb 6.8 oz)   04/18/19 73.3 kg (161 lb 9.6 oz)   04/12/19 70.3 kg (155 lb)     Temp Readings from Last 3 Encounters:   04/20/19 98.4 °F (36.9 °C) (Oral)     BP Readings from Last 3 Encounters:   04/29/19 (!) 142/82   04/20/19 (!) 113/55   04/12/19 136/77     Pulse Readings from Last 3 Encounters:   04/29/19 76   04/20/19 84   04/12/19 60       BP (!) 142/82 (Patient Position: Sitting)   Pulse 76   Ht 5' (1.524 m)   Wt 70.5 kg (155 lb 6.8 oz)   BMI 30.35 kg/m²     Objective:   Physical Exam   Constitutional: She is oriented to person, place, and time. She appears well-developed and well-nourished. No distress.   HENT:   Head: Normocephalic and atraumatic.   Nose: Nose normal.   Mouth/Throat: Oropharynx is clear and moist.   Eyes: Conjunctivae and EOM are normal. No scleral icterus.   Neck: Normal range of motion. Neck supple. No JVD present. No thyromegaly present.   Cardiovascular: Normal rate, regular rhythm, S1 normal and S2 normal. Exam reveals no gallop, no S3, no S4 and no friction rub.   Murmur heard.  Pulmonary/Chest: Effort normal and  breath sounds normal. No stridor. No respiratory distress. She has no wheezes. She has no rales. She exhibits no tenderness.   Abdominal: Soft. Bowel sounds are normal. She exhibits no distension and no mass. There is no tenderness. There is no rebound.   Genitourinary:   Genitourinary Comments: Deferred   Musculoskeletal: Normal range of motion. She exhibits no edema, tenderness or deformity.   Lymphadenopathy:     She has no cervical adenopathy.   Neurological: She is alert and oriented to person, place, and time. She exhibits normal muscle tone. Coordination normal.   Skin: Skin is warm and dry. No rash noted. She is not diaphoretic. No erythema. No pallor.   Psychiatric: She has a normal mood and affect. Her behavior is normal. Judgment and thought content normal.   Nursing note and vitals reviewed.      Lab Results   Component Value Date     04/20/2019    K 3.4 (L) 04/20/2019     04/20/2019    CO2 21 (L) 04/20/2019    BUN 10 04/20/2019    CREATININE 0.9 04/20/2019     (H) 04/20/2019    MG 2.1 04/19/2019    AST 39 04/18/2019    ALT 27 04/18/2019    ALBUMIN 4.4 04/18/2019    PROT 8.4 04/18/2019    BILITOT 0.5 04/18/2019    WBC 8.49 04/20/2019    HGB 10.3 (L) 04/20/2019    HCT 34.8 (L) 04/20/2019    MCV 99 (H) 04/20/2019     (L) 04/20/2019    INR 3.7 04/24/2019     (H) 04/19/2019     Assessment:      1. (HFpEF) heart failure with preserved ejection fraction    2. Hx of mechanical aortic valve replacement    3. Coronary artery disease of native artery of native heart with stable angina pectoris    4. Status post coronary artery bypass graft    5. PVC (premature ventricular contraction)    6. Essential hypertension    7. H/O mitral valve repair    8. Nonrheumatic aortic insufficiency with aortic stenosis    9. Other hyperlipidemia    10. Anticoagulated on Coumadin        Plan:   1. CAD s/p CABG  - cont current meds  - restart Imdur  - exercise nuclear stress to r/o ischemia -  negative  - cannot tolerate Ranexa    2. HFpEF  - cont lasix and K     3. PVCs  - not candidate for ablation  - amiodarone high risk for pulm toxicity    4. PulmHTN/fibrosis  - RHC ordered  - f/u with pulm as needed  - pt does not want to do lung transplant eval now    5. [facility] AVR  - recent echo gradient WNL  - cont coumadin clinic     RHC to be scheduled, will discuss further tx after.     Thank you for allowing me to participate in this patient's care. Please do not hesitate to contact me with any questions or concerns. Consult note has been forwarded to the referral physician.

## 2019-04-30 ENCOUNTER — OFFICE VISIT (OUTPATIENT)
Dept: PULMONOLOGY | Facility: CLINIC | Age: 49
End: 2019-04-30
Payer: COMMERCIAL

## 2019-04-30 ENCOUNTER — CLINICAL SUPPORT (OUTPATIENT)
Dept: PULMONOLOGY | Facility: CLINIC | Age: 49
End: 2019-04-30
Payer: COMMERCIAL

## 2019-04-30 VITALS
HEIGHT: 60 IN | RESPIRATION RATE: 17 BRPM | BODY MASS INDEX: 30.99 KG/M2 | OXYGEN SATURATION: 96 % | WEIGHT: 157.63 LBS | HEART RATE: 100 BPM | HEIGHT: 60 IN | DIASTOLIC BLOOD PRESSURE: 82 MMHG | BODY MASS INDEX: 30.95 KG/M2 | WEIGHT: 157.88 LBS | SYSTOLIC BLOOD PRESSURE: 130 MMHG

## 2019-04-30 DIAGNOSIS — R09.02 EXERCISE HYPOXEMIA: ICD-10-CM

## 2019-04-30 DIAGNOSIS — J84.10 PULMONARY FIBROSIS: Primary | ICD-10-CM

## 2019-04-30 DIAGNOSIS — I50.30 (HFPEF) HEART FAILURE WITH PRESERVED EJECTION FRACTION: ICD-10-CM

## 2019-04-30 DIAGNOSIS — R06.02 SOB (SHORTNESS OF BREATH): ICD-10-CM

## 2019-04-30 DIAGNOSIS — J43.2 CENTRILOBULAR EMPHYSEMA: ICD-10-CM

## 2019-04-30 DIAGNOSIS — I27.20 PULMONARY HYPERTENSION: ICD-10-CM

## 2019-04-30 PROCEDURE — 3079F PR MOST RECENT DIASTOLIC BLOOD PRESSURE 80-89 MM HG: ICD-10-PCS | Mod: CPTII,NTX,S$GLB, | Performed by: INTERNAL MEDICINE

## 2019-04-30 PROCEDURE — 3075F SYST BP GE 130 - 139MM HG: CPT | Mod: CPTII,NTX,S$GLB, | Performed by: INTERNAL MEDICINE

## 2019-04-30 PROCEDURE — 3079F DIAST BP 80-89 MM HG: CPT | Mod: CPTII,NTX,S$GLB, | Performed by: INTERNAL MEDICINE

## 2019-04-30 PROCEDURE — 99999 PR PBB SHADOW E&M-EST. PATIENT-LVL I: CPT | Mod: PBBFAC,TXP,,

## 2019-04-30 PROCEDURE — 3008F PR BODY MASS INDEX (BMI) DOCUMENTED: ICD-10-PCS | Mod: CPTII,NTX,S$GLB, | Performed by: INTERNAL MEDICINE

## 2019-04-30 PROCEDURE — 99215 OFFICE O/P EST HI 40 MIN: CPT | Mod: NTX,S$GLB,, | Performed by: INTERNAL MEDICINE

## 2019-04-30 PROCEDURE — 99999 PR PBB SHADOW E&M-EST. PATIENT-LVL I: ICD-10-PCS | Mod: PBBFAC,TXP,,

## 2019-04-30 PROCEDURE — 99999 PR PBB SHADOW E&M-EST. PATIENT-LVL III: ICD-10-PCS | Mod: PBBFAC,TXP,, | Performed by: INTERNAL MEDICINE

## 2019-04-30 PROCEDURE — 99999 PR PBB SHADOW E&M-EST. PATIENT-LVL III: CPT | Mod: PBBFAC,TXP,, | Performed by: INTERNAL MEDICINE

## 2019-04-30 PROCEDURE — 94618 PULMONARY STRESS TESTING: ICD-10-PCS | Mod: NTX,S$GLB,, | Performed by: INTERNAL MEDICINE

## 2019-04-30 PROCEDURE — 94618 PULMONARY STRESS TESTING: CPT | Mod: NTX,S$GLB,, | Performed by: INTERNAL MEDICINE

## 2019-04-30 PROCEDURE — 3008F BODY MASS INDEX DOCD: CPT | Mod: CPTII,NTX,S$GLB, | Performed by: INTERNAL MEDICINE

## 2019-04-30 PROCEDURE — 3075F PR MOST RECENT SYSTOLIC BLOOD PRESS GE 130-139MM HG: ICD-10-PCS | Mod: CPTII,NTX,S$GLB, | Performed by: INTERNAL MEDICINE

## 2019-04-30 PROCEDURE — 99215 PR OFFICE/OUTPT VISIT, EST, LEVL V, 40-54 MIN: ICD-10-PCS | Mod: NTX,S$GLB,, | Performed by: INTERNAL MEDICINE

## 2019-04-30 RX ORDER — NITROGLYCERIN 0.4 MG/1
TABLET SUBLINGUAL
COMMUNITY
Start: 2019-04-27

## 2019-04-30 NOTE — PROCEDURES
[facility] - Pulmonary Function Sv  Six Minute Walk     SUMMARY     Ordering Provider: Dr. Marte    Interpreting Provider: Dr. Marte  Performing nurse/tech/RT: ANH Shanks  Diagnosis: Shortness of Breath  Height: 5' (152.4 cm)  Weight: 71.6 kg (157 lb 13.6 oz)  BMI (Calculated): 30.9   Patient Race:             Phase Oxygen Assessment Supplemental O2 Heart   Rate Blood Pressure Ron Dyspnea Scale Rating   Resting 97 % Room Air 101 bpm 147/88 1   Exercise        Minute        1 93 % Room Air 124 bpm     2 91 % Room Air 132 bpm     3 91 % Room Air 133 bpm     4 92 % Room Air 128 bpm     5 88 % Room Air 125 bpm     6  91 % Room Air 126 bpm 177/85 9   Recovery        Minute        1 94 % Room Air 119 bpm     2 97 % Room Air 99 bpm     3 99 % Room Air 98 bpm     4 98 % Room Air 98 bpm 125/75 7-8     Six Minute Walk Summary  6MWT Status: completed with stops  Patient Reported: Chest pain, Calf pain, Dyspnea, Leg pain     Interpretation:  Did the patient stop or pause?: Yes  How many times did the patient stop or pause?: 1  Stop Time 1: 153.3  Restart Time 1: 246.60                  Total Time Walked (Calculated): 360 seconds  Final Partial Lap Distance (feet): 0 feet  Total Distance Meters (Calculated): 365.76 meters  Predicted Distance Meters (Calculated): 535.6 meters  Percentage of Predicted (Calculated): 68.29  Peak VO2 (Calculated): 14.95  Mets: 4.27  Has The Patient Had a Previous Six Minute Walk Test?: Yes       Previous 6MWT Results  Has The Patient Had a Previous Six Minute Walk Test?: Yes  Date of Previous Test: 04/12/19  Total Time Walked: 360 seconds  Total Distance (meters): 304.8  Predicted Distance (meters): 538.23 meters  Percentage of Predicted: 56.63  Percent Change (Calculated): -0.2

## 2019-04-30 NOTE — H&P (VIEW-ONLY)
Pulmonary Outpatient Follow Up Visit     Subjective:       Patient ID: Meg Sal is a 49 y.o. female.    Chief Complaint: Shortness of Breath      HPI        49-year-old female patient presenting for follow-up.  Recent hospital admission with COPD exacerbation.    Initially referred by Cardiology for obstructive sleep apnea.    Has HFpEF.  Severe pulmonary hypertension.  Metallic AVR.  Emphysema and honeycombing.    Pulmonary hypertension was showed to be severe on echo February 2019.     Patient with pulmonary hypertension resulting in inability to carry on any physical activity without symptoms. The patient manifests signs of right heart failure. Dyspnea and/or fatigue may be present even at rest. Discomfort is increased by physical activity. WHO functional class IV.        Seen Cardiology today.  Referred for right heart catheterization.    Continue to have coughing sometimes productive, shortness of breath and wheezing.  On albuterol and Anoro.    She does have about 30 pack year smoking history she did quit smoking in 2018.          Review of Systems   Constitutional: Negative for fever and chills.   HENT: Negative for nosebleeds.    Eyes: Negative for redness.   Respiratory: Positive for snoring, cough, shortness of breath, dyspnea on extertion and somnolence. Negative for choking.    Cardiovascular: Positive for leg swelling.        AVR on Coumadin metallic   Genitourinary: Negative for hematuria.   Endocrine: Negative for cold intolerance.    Musculoskeletal: Positive for arthralgias and back pain.   Skin: Negative for rash.   Gastrointestinal: Negative for vomiting.   Neurological: Negative for syncope.        History of stroke   Hematological: Negative for adenopathy.   Psychiatric/Behavioral: Positive for sleep disturbance. Negative for confusion.       Outpatient Encounter Medications as of 4/30/2019   Medication Sig Dispense Refill    albuterol  (VENTOLIN HFA) 90 mcg/actuation inhaler Inhale 2 puffs into the lungs every 6 (six) hours as needed for Wheezing. Rescue 18 g 11    amitriptyline (ELAVIL) 25 MG tablet Take 25 mg by mouth every evening.       busPIRone (BUSPAR) 15 MG tablet Take 15 mg by mouth 3 (three) times daily.      clonazePAM (KLONOPIN) 1 MG tablet Take 1 mg by mouth 3 (three) times daily.       desvenlafaxine succinate (PRISTIQ) 100 MG Tb24 Take 100 mg by mouth once daily.      fluticasone (FLONASE) 50 mcg/actuation nasal spray 1 spray by Each Nare route once daily.      furosemide (LASIX) 40 MG tablet Take 40 mg by mouth 2 (two) times daily.      isosorbide mononitrate (IMDUR) 30 MG 24 hr tablet Take 1 tablet (30 mg total) by mouth once daily. 30 tablet 11    nicotine (NICODERM CQ) 7 mg/24 hr Place 1 patch onto the skin every 24 hours.      nitroGLYCERIN (NITROSTAT) 0.4 MG SL tablet       pantoprazole (PROTONIX) 40 MG tablet Take 40 mg by mouth once daily.      potassium chloride SA (K-DUR,KLOR-CON) 20 MEQ tablet Take 20 mEq by mouth 2 (two) times daily.      rosuvastatin (CRESTOR) 20 MG tablet Take 20 mg by mouth once daily.      sumatriptan (IMITREX) 100 MG tablet Take 100 mg by mouth every 2 (two) hours as needed.      tiZANidine (ZANAFLEX) 4 MG tablet Take 4 mg by mouth every evening.      topiramate (TOPAMAX) 100 MG tablet Take 100 mg by mouth 2 (two) times daily.      traZODone (DESYREL) 150 MG tablet Take 150 mg by mouth nightly.      umeclidinium-vilanterol (ANORO ELLIPTA) 62.5-25 mcg/actuation DsDv Inhale 1 puff into the lungs once daily. Controller 1 each 5    VITAMIN B COMPLEX ORAL Take 1 capsule by mouth.      vitamin D (VITAMIN D3) 1000 units Tab Take 1,000 Units by mouth once daily.      warfarin (COUMADIN) 6 MG tablet Take 6 mg by mouth once daily.      clotrimazole-betamethasone 1-0.05% (LOTRISONE) cream Apply to affected area 2 times daily      diclofenac sodium (VOLTAREN) 1 % Gel Apply 2 g topically 4  (four) times daily.      HYDROcodone-acetaminophen (NORCO) 5-325 mg per tablet TK 1 T PO  Q 6 H PRN      [DISCONTINUED] predniSONE (DELTASONE) 10 MG tablet Prednisone 40 mg po daily x 4 days, then 30 mg po daily x 3 days, then 20 mg po daily x 2 days, then 10 mg po x 1 day then stop. 30 tablet 0     No facility-administered encounter medications on file as of 4/30/2019.        Objective:     Vital Signs (Most Recent)  Vital Signs  Pulse: 100  Resp: 17  SpO2: 96 %  BP: 130/82  Height and Weight  Height: 5' (152.4 cm)  Weight: 71.5 kg (157 lb 10.1 oz)  BSA (Calculated - sq m): 1.74 sq meters  BMI (Calculated): 30.8  Weight in (lb) to have BMI = 25: 127.7]  Wt Readings from Last 2 Encounters:   04/30/19 71.5 kg (157 lb 10.1 oz)   04/30/19 71.6 kg (157 lb 13.6 oz)       Physical Exam   Constitutional: She is oriented to person, place, and time. She appears well-developed. She appears not cachectic.   HENT:   Head: Normocephalic.   Neck: Neck supple.   Cardiovascular: Normal rate and regular rhythm.   Murmur heard.  Pulmonary/Chest: Normal expansion and effort normal. No stridor. No respiratory distress. She has rales. She exhibits no tenderness.   Abdominal: Soft.   Musculoskeletal: She exhibits edema. She exhibits no tenderness.   Lymphadenopathy:     She has no cervical adenopathy.   Neurological: She is alert and oriented to person, place, and time. Gait normal.   Skin: Skin is warm. Nails show no clubbing.   Psychiatric: She has a normal mood and affect. Her behavior is normal. Judgment and thought content normal.   Nursing note and vitals reviewed.      Laboratory  Lab Results   Component Value Date    WBC 8.49 04/20/2019    RBC 3.51 (L) 04/20/2019    HGB 10.3 (L) 04/20/2019    HCT 34.8 (L) 04/20/2019    MCV 99 (H) 04/20/2019    MCH 29.3 04/20/2019    MCHC 29.6 (L) 04/20/2019    RDW 18.2 (H) 04/20/2019     (L) 04/20/2019    MPV 10.0 04/20/2019    GRAN 7.8 (H) 04/20/2019    GRAN 91.9 (H) 04/20/2019    LYMPH  0.6 (L) 04/20/2019    LYMPH 6.9 (L) 04/20/2019    MONO 0.1 (L) 04/20/2019    MONO 1.2 (L) 04/20/2019    EOS 0.0 04/20/2019    BASO 0.00 04/20/2019    EOSINOPHIL 0.0 04/20/2019    BASOPHIL 0.0 04/20/2019       BMP  Lab Results   Component Value Date     04/20/2019    K 3.4 (L) 04/20/2019     04/20/2019    CO2 21 (L) 04/20/2019    BUN 10 04/20/2019    CREATININE 0.9 04/20/2019    CALCIUM 9.6 04/20/2019    ANIONGAP 12 04/20/2019    ESTGFRAFRICA >60 04/20/2019    EGFRNONAA >60 04/20/2019    AST 39 04/18/2019    ALT 27 04/18/2019    PROT 8.4 04/18/2019       Lab Results   Component Value Date     (H) 04/19/2019     (H) 03/11/2019       No results found for: TSH    No results found for: SEDRATE    No results found for: CRP    Diagnostic Results:    I have personally reviewed today the following studies:    PFT done 04/12/2019 shows isolated severe reduction of DLCO.  Small airways disease.     CT chest [facility] January 2016   There are small cystic changes within the lungs consistent with COPD. Interstitial scarring noted at size is at the upper limits of normal. Mitral and aortic valve prosthesis are seen. Sternotomy sutures are in place with no alignment of the sternum. No sternal erosive changes or parasternal fluid noted. Within the lungs with no focal pneumonia, mass or nodularity. There is no pleural or pericardial effusion. Aorta is normal in caliber. There are small lymph nodes in the middle mediastinum.     CT scan of the chest March 28, 2019 personally reviewed:  The lungs are grossly abnormal.  There is rather extensive thickening of the interlobular septa throughout with rather pronounced centrilobular emphysematous change.  There are extensive areas of scarring bilaterally.  These are more pronounced along the periphery of the lungs.  There is definite evidence of honeycombing specially involving the posterior aspect of the lungs in the mid to lower lung fields there is  no consolidation or effusion.  No neil pneumothorax.     Echo 2/2019:       1 - Mild left atrial enlargement.     2 - Eccentric hypertrophy.     3 - No wall motion abnormalities.     4 - Normal left ventricular systolic function (EF 55-60%).     5 - Normal right ventricular systolic function .     6 - Aortic valve prosthesis, CATINA = 1.61 cm2, AVAi = 0.95 cm2/m2, peak velocity = 2.48 m/s, mean gradient = 15 mmHg.     7 - Moderate tricuspid regurgitation.     8 - Pulmonary hypertension. The estimated PA systolic pressure is 61 mmHg      6 min walking test today showed O2 sat dropped to 88% on room air at minute 5.      Assessment/Plan:   Pulmonary fibrosis  -     OXYGEN FOR HOME USE    Pulmonary hypertension  -     OXYGEN FOR HOME USE    Centrilobular emphysema  -     OXYGEN FOR HOME USE    (HFpEF) heart failure with preserved ejection fraction  -     OXYGEN FOR HOME USE    Exercise hypoxemia  -     OXYGEN FOR HOME USE      Continue O2 2 L on exertion.  I reached DME personnel regarding providing her with portable O2 concentrator.  She should be receiving it within a week.    Continue albuterol p.r.n..  Continue Anoro.    Continue Lasix 40 mg 2 tablets daily.    Further recommendation regarding pulmonary hypertension management which is likely due to left heart disease in addition to hypoxemic respiratory disorder.  To follow right heart catheterization.    Continue Coumadin.    Advised the patient to get initial evaluation with lung transplant.  Might consider anti fibrotic.      Pulmonary fibrosis appears idiopathic, honeycombing evident on the CT scan with middle to lower lobe prevalence.  Emphysema noted as well.     Awaiting results from home sleep study.  Patient perform the test.    MEDICAL DECISION MAKING: Moderate to high complexity.  Overall, the multiple problems listed are of moderate to high severity that may impact quality of life and activities of daily living. Side effects of medications, treatment  plan as well as options and alternatives reviewed and discussed with patient. There was counseling of patient concerning these issues.     Total time spent in face to face counseling and coordination of care - 40  minutes over 50% of time was used in discussion of prognosis, risks, benefits of treatment, instructions and compliance with regimen . Discussion with other physicians or health care providers (DME, NP, pharmacy, respiratory therapy) occurred.      Follow up in about 2 months (around 6/30/2019).    This note was prepared using voice recognition system and is likely to have sound alike errors that may have been overlooked even after proof reading.  Please call me with any questions    Discussed diagnosis, its evaluation, treatment and usual course. All questions answered.      Miguel Salazar MD

## 2019-05-01 ENCOUNTER — ANTI-COAG VISIT (OUTPATIENT)
Dept: CARDIOLOGY | Facility: CLINIC | Age: 49
End: 2019-05-01
Payer: COMMERCIAL

## 2019-05-01 ENCOUNTER — PROCEDURE VISIT (OUTPATIENT)
Dept: SLEEP MEDICINE | Facility: CLINIC | Age: 49
End: 2019-05-01
Payer: COMMERCIAL

## 2019-05-01 DIAGNOSIS — G47.19 EXCESSIVE DAYTIME SLEEPINESS: ICD-10-CM

## 2019-05-01 DIAGNOSIS — R29.818 SUSPECTED SLEEP APNEA: ICD-10-CM

## 2019-05-01 DIAGNOSIS — Z79.01 LONG TERM (CURRENT) USE OF ANTICOAGULANTS: ICD-10-CM

## 2019-05-01 DIAGNOSIS — R06.83 PRIMARY SNORING: Primary | ICD-10-CM

## 2019-05-01 LAB — INR PPP: 2.6

## 2019-05-01 PROCEDURE — 99499 NO LOS: ICD-10-PCS | Mod: NTX,S$GLB,, | Performed by: INTERNAL MEDICINE

## 2019-05-01 PROCEDURE — 93793 ANTICOAG MGMT PT WARFARIN: CPT | Mod: S$GLB,,,

## 2019-05-01 PROCEDURE — 93793 PR ANTICOAGULANT MGMT FOR PT TAKING WARFARIN: ICD-10-PCS | Mod: S$GLB,,,

## 2019-05-01 PROCEDURE — 95806 SLEEP STUDY UNATT&RESP EFFT: CPT | Mod: 26,52,NTX,S$GLB | Performed by: INTERNAL MEDICINE

## 2019-05-01 PROCEDURE — 95806 PR SLEEP STUDY, UNATTENDED, SIMUL RECORD HR/O2 SAT/RESP FLOW/RESP EFFT: ICD-10-PCS | Mod: 26,52,NTX,S$GLB | Performed by: INTERNAL MEDICINE

## 2019-05-01 PROCEDURE — 99499 UNLISTED E&M SERVICE: CPT | Mod: NTX,S$GLB,, | Performed by: INTERNAL MEDICINE

## 2019-05-01 NOTE — Clinical Note
Assessment and RecommendationsPRIMARY SNORING with overall AHI 0.2 /hr ( 1 events)Test duration 4 hr 35 minsOxygen desaturation: 86 %. SpO2 between 85% to 89% for 2 min 8 sec.SpO2 was below 86% for 47% study timePatient snored 100% time above 50 .Heart rate range: 73 bpm - 97 bpmREC's:REPEAT SLEEP STUDY. INLAB STUDY PREFERED DUE TO HEART FAILURE.OPTIMISE HEART FAILUREBenzodiazepine and opioid based medication may worsen respiratory instability.Avoid drowsy driving.Follow up in sleep clinic to maximize adherence and ensure resolution of symptoms.

## 2019-05-01 NOTE — TELEPHONE ENCOUNTER
Spoke with patient states need to have Norco removed from medication list because she is no longer taking--informed patient will list as discontinued taking medication --patient verbalizes understanding

## 2019-05-01 NOTE — PROCEDURES
Home Sleep Studies  Date/Time: 5/1/2019 6:27 PM  Performed by: Jaydon Marte MD  Authorized by: Miguel Salazar MD       Assessment and Recommendations  PRIMARY SNORING with overall AHI 0.2 /hr ( 1 events)  Test duration 4 hr 35 mins  Oxygen desaturation: 86 %. SpO2 between 85% to 89% for 2 min 8 sec.  SpO2 was below 86% for 47% study time  Patient snored 100% time above 50 .  Heart rate range: 73 bpm - 97 bpm  REC's:  REPEAT SLEEP STUDY. INLAB STUDY PREFERED DUE TO HEART FAILURE.  OPTIMISE HEART FAILURE  Benzodiazepine and opioid based medication may worsen respiratory instability.  Avoid drowsy driving.  Follow up in sleep clinic to maximize adherence and ensure resolution of symptoms.

## 2019-05-01 NOTE — PROGRESS NOTES
Assessment and Recommendations  PRIMARY SNORING with overall AHI 0.2 /hr ( 1 events)  Test duration 4 hr 35 mins  Oxygen desaturation: 86 %. SpO2 between 85% to 89% for 2 min 8 sec.  SpO2 was below 86% for 47% study time  Patient snored 100% time above 50 .  Heart rate range: 73 bpm - 97 bpm  REC's:  REPEAT SLEEP STUDY. INLAB STUDY PREFERED DUE TO HEART FAILURE.  OPTIMISE HEART FAILURE  Benzodiazepine and opioid based medication may worsen respiratory instability.  Avoid drowsy driving.  Follow up in sleep clinic to maximize adherence and ensure resolution of symptoms.

## 2019-05-01 NOTE — PROGRESS NOTES
Verbal results taken from _Meena ZELAYA with [facility]_. PT/INR _31.2 / 2.6_ Date drawn_5/01/2019_.  Reports last dose of Prednisone taken today.  Patient has followed previous instructions given on last INR testing.  Order given:  Maintain current dose of Warfarin 6 mg daily.  Recheck on 5/08/2019.  Fax order to follow.

## 2019-05-01 NOTE — TELEPHONE ENCOUNTER
----- Message from Bob Benjamin sent at 5/1/2019  3:09 PM CDT -----  Contact: Pt  Please give pt a call at .[phone] (home) regarding some question about her medication

## 2019-05-03 ENCOUNTER — TELEPHONE (OUTPATIENT)
Dept: CARDIOLOGY | Facility: CLINIC | Age: 49
End: 2019-05-03

## 2019-05-03 NOTE — TELEPHONE ENCOUNTER
"Called to patient--states experiencing increased heart rate of 100-128, blood pressure 120's/80's --states, "I am feeling very nervous and I can hardly sit still"--states, " I had this reaction before while taking the Imdur."--please advise   "

## 2019-05-03 NOTE — TELEPHONE ENCOUNTER
----- Message from Carlota Blade sent at 5/3/2019 10:11 AM CDT -----  Please call pt back at [phone] regarding meds you put her back on. Pt states she feels like her skin is crawling and nerveous. Her ppm is 100 to 120.

## 2019-05-03 NOTE — TELEPHONE ENCOUNTER
Called to patient and informed to stop Imdur and monitor blood pressure and heart rate later today and tomorrow--call after hours if blood pressure becomes elevated--otherwise, give our office a call on Monday, May 6, 2019 with readings--patient verbalizes understanding of all information

## 2019-05-06 ENCOUNTER — TELEPHONE (OUTPATIENT)
Dept: CARDIOLOGY | Facility: CLINIC | Age: 49
End: 2019-05-06

## 2019-05-06 ENCOUNTER — TELEPHONE (OUTPATIENT)
Dept: PULMONOLOGY | Facility: CLINIC | Age: 49
End: 2019-05-06

## 2019-05-06 RX ORDER — METOPROLOL SUCCINATE 25 MG/1
25 TABLET, EXTENDED RELEASE ORAL DAILY
Qty: 30 TABLET | Refills: 1 | Status: SHIPPED | OUTPATIENT
Start: 2019-05-06 | End: 2019-07-11 | Stop reason: SDUPTHER

## 2019-05-06 NOTE — TELEPHONE ENCOUNTER
Returned patients phone call and informed her that the papers she wanted from Dr. Salazar are ready and told her what days we would be in each location ----- Message from Sadie Fuentes sent at 5/6/2019 12:34 PM CDT -----  Contact: Pt  Pt sates she has requested a prescription for a handicap plate and she have not yet received moblie oxygen . Pt asked that nurse please return call as soon as possible at ([phone])

## 2019-05-06 NOTE — TELEPHONE ENCOUNTER
----- Message from Rayne Delgado sent at 5/6/2019  1:05 PM CDT -----  Contact: PATIENT  IN REFERENCE TO MESSAGE SENT: States she was on 2 BP medication when she collapsed Verapamil 120 mg and Vystoloc 5 mg. Please call patient @ [phone]. Thanks, joya

## 2019-05-06 NOTE — TELEPHONE ENCOUNTER
Patient was instructed to stop imdur, due to reports of  Skin crawling,  Nervous, can't sit still, same symptoms as before when taking imdur.    Patient stated resting heart rate 105-108  HR with minimal activity 125 -128    Blood pressure SBP                              DBP 60's-70's    Please advise

## 2019-05-06 NOTE — TELEPHONE ENCOUNTER
Patient was called back and informed medications were discontinued at recent [facility] discharge\.    Patient also verbalized of medications on her active medication list, which are not being removed.    I reviewed all medications and removed all medications asked to remove due to no longer taking.

## 2019-05-06 NOTE — TELEPHONE ENCOUNTER
Telephoned patient to discuss /confirm RHC with Dr. Brown.  Offered Mon-Fri of next week, answered questions and patient will call back to confirm

## 2019-05-06 NOTE — TELEPHONE ENCOUNTER
----- Message from Sadie Fuentes sent at 5/6/2019 12:36 PM CDT -----  Contact: Pt  Pt wanted to consult with nurse, pt states her BPM has not yet gone over 100. Pt asked that nurse please give her a callback ([phone])          Blodd pressure check today at 11:30am:  134/87

## 2019-05-06 NOTE — TELEPHONE ENCOUNTER
Patient was called and notified of the following;  MD Jyoti Torres, RN   Caller: Unspecified (Today,  1:04 PM)             Ok I ordered  toprol 25mg, she can start and can monitor Bp/HR. Follow up with me in 1 -2 weeks, will need to get a Holter/Zio then. Thanks      Patient also wanted to know why verapamil and bystolic was discontinued.      TURNER Clark reaching out to [facility] and patient will see you again post RHC.

## 2019-05-06 NOTE — TELEPHONE ENCOUNTER
----- Message from Ayaan Banda MD sent at 4/29/2019  3:56 PM CDT -----  Regarding: RHC needed per pulm  Hi this pt needs a RHC, has pulm HTN, pulm fibrosis was evaluated for lung transplant but that is on hold for now. Can we schedule RHC with Dr. Brown in 2-3 weeks? She does not want to do it next 2 weeks. Complicated med history but seems stable cardiac wise now. Thanks    - PM

## 2019-05-08 ENCOUNTER — ANTI-COAG VISIT (OUTPATIENT)
Dept: CARDIOLOGY | Facility: CLINIC | Age: 49
End: 2019-05-08
Payer: COMMERCIAL

## 2019-05-08 DIAGNOSIS — Z79.01 LONG TERM (CURRENT) USE OF ANTICOAGULANTS: ICD-10-CM

## 2019-05-08 LAB — INR PPP: 3.9

## 2019-05-08 PROCEDURE — 93793 PR ANTICOAGULANT MGMT FOR PT TAKING WARFARIN: ICD-10-PCS | Mod: S$GLB,,,

## 2019-05-08 PROCEDURE — 93793 ANTICOAG MGMT PT WARFARIN: CPT | Mod: S$GLB,,,

## 2019-05-08 NOTE — PROGRESS NOTES
Verbal results taken from Meena ZELAYA with [facility]. PT/INR  46.8 / 3.9.   Date drawn 2019. Patient has been taking high doses of Tylenol.  [facility] also reports patient had broccoli, guacamole and wine on last health -  nurse will educate patient on interactions.  Mrs. Sal has a heart catheterization scheduled for  - coumadin will be held for 5 days without Lovenox bridge per Dr. Brown despite valve replacement.  Will lower dose due to previous elevated readings and patient states she is in pain that requires current Tylenol dose (~3000mg per day).  Orders given: Decrease dose of coumadin to 3mg on  and ; and 6mg on all other days of the week.  Coumadin will be held -.  Resumed the evening of  unless provider advises otherwise.  Post-procedure coumadin dosin/17: 6mg, -: 9mg.   Recheck on Monday, 19.

## 2019-05-09 ENCOUNTER — TELEPHONE (OUTPATIENT)
Dept: CARDIOLOGY | Facility: CLINIC | Age: 49
End: 2019-05-09

## 2019-05-09 NOTE — TELEPHONE ENCOUNTER
Spoke with patient regarding Warfarin and Tylenol.  Advised her that A combination of the two can increase her INR.  Patient would like to take Ultram, but her PCP was against it because of her lungs.  Advised her to speak with that PCP or with her Pulmonologist for more information. Patient verbalized understanding, and will call Dr. Mondragon.

## 2019-05-10 ENCOUNTER — TELEPHONE (OUTPATIENT)
Dept: PULMONOLOGY | Facility: CLINIC | Age: 49
End: 2019-05-10

## 2019-05-10 NOTE — TELEPHONE ENCOUNTER
Returned patients phone call and advised her to speak with the provider that prescribed her the medication. Patient stated understanding and had no further questions   ----- Message from Miguel Salazar MD sent at 5/10/2019 10:26 AM CDT -----  Contact: pt  Please advice patient to to check with  the prescribing provider.  I did not prescribe any of the mention medication.  Thank you  ----- Message -----  From: Priti Reese LPN  Sent: 5/10/2019   9:58 AM  To: Miguel Salazar MD        ----- Message -----  From: Lydia Skaggs  Sent: 5/10/2019   9:32 AM  To: Martin Rivera Staff    Pt request a call back pt needs to verify if she can take untram/ Tramadol  Because she cant take tylenol any more , pt was advised by another doctor that she cant take untram because of her condition pt needs clarification from Dr. Salazar ... [phone] (home)

## 2019-05-13 ENCOUNTER — TELEPHONE (OUTPATIENT)
Dept: TRANSPLANT | Facility: CLINIC | Age: 49
End: 2019-05-13

## 2019-05-13 NOTE — TELEPHONE ENCOUNTER
Returned patient's call and offered to schedule her with Dr. Neal on 5/27.  She accepts knowing this is Memorial Day.  She states this would be good because her  will be off of work and her daughter will be off of school.    Pt is scheduled with Dr. Salazar on 5/27.  Spoke with patient to find out if she wanted to reschedule her appointment with Dr. Salazar and keep her appointment with Dr. Neal on 5/27, or if she would rather see Dr. Neal another day.  She states she would rather see Dr. Neal another day.  Offered several other dates, and even dates with Dr. Lamb.  She chose 6/10 as she would rather see Dr. Neal. Notified Anh.    ----- Message from Yeimi Wooten sent at 5/13/2019  4:08 PM CDT -----  Calling to resched appt from 5/2 w/Dr. Neal    Pt contact [phone]

## 2019-05-14 ENCOUNTER — OFFICE VISIT (OUTPATIENT)
Dept: INTERNAL MEDICINE | Facility: CLINIC | Age: 49
End: 2019-05-14
Payer: COMMERCIAL

## 2019-05-14 ENCOUNTER — PATIENT MESSAGE (OUTPATIENT)
Dept: CARDIOLOGY | Facility: CLINIC | Age: 49
End: 2019-05-14

## 2019-05-14 ENCOUNTER — LAB VISIT (OUTPATIENT)
Dept: LAB | Facility: HOSPITAL | Age: 49
End: 2019-05-14
Attending: FAMILY MEDICINE
Payer: COMMERCIAL

## 2019-05-14 VITALS
WEIGHT: 159.19 LBS | SYSTOLIC BLOOD PRESSURE: 126 MMHG | DIASTOLIC BLOOD PRESSURE: 70 MMHG | BODY MASS INDEX: 31.25 KG/M2 | TEMPERATURE: 97 F | HEIGHT: 60 IN | HEART RATE: 86 BPM

## 2019-05-14 DIAGNOSIS — R73.09 ABNORMAL GLUCOSE: ICD-10-CM

## 2019-05-14 DIAGNOSIS — G89.29 CHRONIC LOW BACK PAIN, UNSPECIFIED BACK PAIN LATERALITY, WITH SCIATICA PRESENCE UNSPECIFIED: ICD-10-CM

## 2019-05-14 DIAGNOSIS — F41.9 ANXIETY AND DEPRESSION: Primary | ICD-10-CM

## 2019-05-14 DIAGNOSIS — G61.9 INFLAMMATORY NEUROPATHY: ICD-10-CM

## 2019-05-14 DIAGNOSIS — D64.9 ANEMIA, UNSPECIFIED TYPE: ICD-10-CM

## 2019-05-14 DIAGNOSIS — Z12.39 BREAST CANCER SCREENING: ICD-10-CM

## 2019-05-14 DIAGNOSIS — K62.5 RECTAL BLEEDING: ICD-10-CM

## 2019-05-14 DIAGNOSIS — Z79.899 POLYPHARMACY: ICD-10-CM

## 2019-05-14 DIAGNOSIS — F32.A ANXIETY AND DEPRESSION: Primary | ICD-10-CM

## 2019-05-14 DIAGNOSIS — E55.9 VITAMIN D DEFICIENCY: ICD-10-CM

## 2019-05-14 DIAGNOSIS — M54.5 CHRONIC LOW BACK PAIN, UNSPECIFIED BACK PAIN LATERALITY, WITH SCIATICA PRESENCE UNSPECIFIED: ICD-10-CM

## 2019-05-14 DIAGNOSIS — J96.21 ACUTE ON CHRONIC RESPIRATORY FAILURE WITH HYPOXIA: ICD-10-CM

## 2019-05-14 LAB
BASOPHILS # BLD AUTO: 0.05 K/UL (ref 0–0.2)
BASOPHILS NFR BLD: 0.5 % (ref 0–1.9)
DIFFERENTIAL METHOD: ABNORMAL
EOSINOPHIL # BLD AUTO: 0.5 K/UL (ref 0–0.5)
EOSINOPHIL NFR BLD: 5 % (ref 0–8)
ERYTHROCYTE [DISTWIDTH] IN BLOOD BY AUTOMATED COUNT: 17.8 % (ref 11.5–14.5)
HCT VFR BLD AUTO: 38 % (ref 37–48.5)
HGB BLD-MCNC: 11.7 G/DL (ref 12–16)
LYMPHOCYTES # BLD AUTO: 2.4 K/UL (ref 1–4.8)
LYMPHOCYTES NFR BLD: 25.2 % (ref 18–48)
MCH RBC QN AUTO: 29.8 PG (ref 27–31)
MCHC RBC AUTO-ENTMCNC: 30.8 G/DL (ref 32–36)
MCV RBC AUTO: 97 FL (ref 82–98)
MONOCYTES # BLD AUTO: 0.5 K/UL (ref 0.3–1)
MONOCYTES NFR BLD: 5.2 % (ref 4–15)
NEUTROPHILS # BLD AUTO: 6.2 K/UL (ref 1.8–7.7)
NEUTROPHILS NFR BLD: 64.1 % (ref 38–73)
PLATELET # BLD AUTO: 262 K/UL (ref 150–350)
PMV BLD AUTO: 9.8 FL (ref 9.2–12.9)
RBC # BLD AUTO: 3.93 M/UL (ref 4–5.4)
WBC # BLD AUTO: 9.65 K/UL (ref 3.9–12.7)

## 2019-05-14 PROCEDURE — 82728 ASSAY OF FERRITIN: CPT | Mod: TXP

## 2019-05-14 PROCEDURE — 99999 PR PBB SHADOW E&M-EST. PATIENT-LVL V: ICD-10-PCS | Mod: PBBFAC,,, | Performed by: FAMILY MEDICINE

## 2019-05-14 PROCEDURE — 82306 VITAMIN D 25 HYDROXY: CPT | Mod: TXP

## 2019-05-14 PROCEDURE — 83036 HEMOGLOBIN GLYCOSYLATED A1C: CPT | Mod: NTX

## 2019-05-14 PROCEDURE — 99204 PR OFFICE/OUTPT VISIT, NEW, LEVL IV, 45-59 MIN: ICD-10-PCS | Mod: S$GLB,,, | Performed by: FAMILY MEDICINE

## 2019-05-14 PROCEDURE — 99999 PR PBB SHADOW E&M-EST. PATIENT-LVL V: CPT | Mod: PBBFAC,,, | Performed by: FAMILY MEDICINE

## 2019-05-14 PROCEDURE — 99204 OFFICE O/P NEW MOD 45 MIN: CPT | Mod: S$GLB,,, | Performed by: FAMILY MEDICINE

## 2019-05-14 PROCEDURE — 3078F DIAST BP <80 MM HG: CPT | Mod: CPTII,S$GLB,, | Performed by: FAMILY MEDICINE

## 2019-05-14 PROCEDURE — 3078F PR MOST RECENT DIASTOLIC BLOOD PRESSURE < 80 MM HG: ICD-10-PCS | Mod: CPTII,S$GLB,, | Performed by: FAMILY MEDICINE

## 2019-05-14 PROCEDURE — 36415 COLL VENOUS BLD VENIPUNCTURE: CPT | Mod: PO,TXP

## 2019-05-14 PROCEDURE — 82607 VITAMIN B-12: CPT | Mod: TXP

## 2019-05-14 PROCEDURE — 85025 COMPLETE CBC W/AUTO DIFF WBC: CPT | Mod: NTX

## 2019-05-14 PROCEDURE — 3008F BODY MASS INDEX DOCD: CPT | Mod: CPTII,S$GLB,, | Performed by: FAMILY MEDICINE

## 2019-05-14 PROCEDURE — 3074F PR MOST RECENT SYSTOLIC BLOOD PRESSURE < 130 MM HG: ICD-10-PCS | Mod: CPTII,S$GLB,, | Performed by: FAMILY MEDICINE

## 2019-05-14 PROCEDURE — 3008F PR BODY MASS INDEX (BMI) DOCUMENTED: ICD-10-PCS | Mod: CPTII,S$GLB,, | Performed by: FAMILY MEDICINE

## 2019-05-14 PROCEDURE — 83540 ASSAY OF IRON: CPT | Mod: TXP

## 2019-05-14 PROCEDURE — 3074F SYST BP LT 130 MM HG: CPT | Mod: CPTII,S$GLB,, | Performed by: FAMILY MEDICINE

## 2019-05-14 RX ORDER — ISOSORBIDE MONONITRATE 30 MG/1
30 TABLET, EXTENDED RELEASE ORAL DAILY
COMMUNITY
End: 2019-06-04

## 2019-05-14 RX ORDER — WARFARIN 6 MG/1
6 TABLET ORAL DAILY
Status: ON HOLD | COMMUNITY
End: 2019-11-19 | Stop reason: SDUPTHER

## 2019-05-14 RX ORDER — TRAMADOL HYDROCHLORIDE 50 MG/1
50 TABLET ORAL EVERY 4 HOURS PRN
COMMUNITY
Start: 2019-05-13

## 2019-05-14 NOTE — PROGRESS NOTES
Subjective:      Patient ID: Meg Sal is a 49 y.o. female.    Chief Complaint: Establish Care    HPI  50 yo female with a complex medical history here to establish care.  She is followed by Dr. Banda for CAD, Heart failure, hx of mechanical aortic valve replacement on coumadin.  She is followed by Dr. Salazar for pulmonary fibrosis, Pulm HTN, emphysema.  She is recently started oxygen.  Will be going for R heart cath end of this week.  Fam hx of heart diease.  She quit smoking 2018.  She reports having chronic low back pain.  Has been getting Tramadol to use 4 times daily from her PCP.  She has tried cymbalta topamax, lyrica, gabapentin with minimal relief.    She reports norco, or anything with tylenol interferes with her coumadin.  She saw Dr. Greer, he advised PT and comprehensive pain management if she wants to continue with pain medications.  She has anxiety and depression and is currently taking Pristiq 100mg daily, buspar 15mg bid, klonopin 1mg tid prn, Elavil 25mg nightly, Trazodone 150mg nightly. She reports she has to take the klonopin before her daughter gets home to deal with her and again before her  gets home to deal with him.    She has not seen Psychiatry.  She will be seeing transplant team most likely for her lung disease.    Past Medical History:   Diagnosis Date    Acute coronary syndrome     Anxiety and depression     CHF (congestive heart failure)     Coronary artery disease     Hyperlipidemia     Hypertension     Sleep apnea syndrome 2/8/2019    Stroke      Family History   Problem Relation Age of Onset    Heart disease Mother     Breast cancer Sister     Coronary artery disease Father     Lung cancer Brother      Past Surgical History:   Procedure Laterality Date    CARDIAC CATHETERIZATION      CARDIAC VALVE SURGERY      CORONARY ANGIOPLASTY      CORONARY ARTERY BYPASS GRAFT      HYSTERECTOMY       Social History     Tobacco Use    Smoking status:  Former Smoker     Packs/day: 1.00     Types: Cigarettes     Last attempt to quit: 2018     Years since quittin.9    Smokeless tobacco: Never Used   Substance Use Topics    Alcohol use: Not Currently     Frequency: 2-4 times a month     Drinks per session: 1 or 2     Binge frequency: Never    Drug use: Not Currently       /70   Pulse 86   Temp 97 °F (36.1 °C) (Tympanic)   Ht 5' (1.524 m)   Wt 72.2 kg (159 lb 2.8 oz)   BMI 31.09 kg/m²     Review of Systems   Constitutional: Positive for activity change. Negative for unexpected weight change.   HENT: Negative for hearing loss, rhinorrhea and trouble swallowing.    Eyes: Positive for discharge and visual disturbance.   Respiratory: Positive for chest tightness and wheezing.    Cardiovascular: Positive for chest pain and palpitations.   Gastrointestinal: Positive for constipation. Negative for blood in stool, diarrhea and vomiting.   Endocrine: Negative for polydipsia and polyuria.   Genitourinary: Positive for difficulty urinating. Negative for dysuria, hematuria and menstrual problem.   Musculoskeletal: Positive for arthralgias and neck pain. Negative for joint swelling.   Neurological: Positive for headaches. Negative for weakness.   Psychiatric/Behavioral: Positive for dysphoric mood. Negative for confusion.       Objective:     Physical Exam   Constitutional: She is oriented to person, place, and time. She appears well-developed and well-nourished.   HENT:   Nose: Nose normal.   Mouth/Throat: Oropharynx is clear and moist.   Eyes: Pupils are equal, round, and reactive to light. Conjunctivae are normal.   Neck: Neck supple.   Cardiovascular: Normal rate and regular rhythm.   Murmur heard.  Pulmonary/Chest: Effort normal. No stridor. No respiratory distress. She has rales.   Abdominal: Soft. Bowel sounds are normal. She exhibits no distension. There is no tenderness.   Musculoskeletal: She exhibits no edema.   Neurological: She is alert and  oriented to person, place, and time.   Skin: Skin is warm and dry.   Psychiatric: She has a normal mood and affect. Her behavior is normal. Judgment and thought content normal.   Nursing note and vitals reviewed.      Lab Results   Component Value Date    WBC 9.65 05/14/2019    HGB 11.7 (L) 05/14/2019    HCT 38.0 05/14/2019     05/14/2019    ALT 27 04/18/2019    AST 39 04/18/2019     04/20/2019    K 3.4 (L) 04/20/2019     04/20/2019    CREATININE 0.9 04/20/2019    BUN 10 04/20/2019    CO2 21 (L) 04/20/2019    INR 3.9 05/08/2019    HGBA1C 5.9 (H) 05/14/2019       Assessment:     1. Anxiety and depression    2. Anemia, unspecified type    3. Abnormal glucose    4. Breast cancer screening    5. Polypharmacy    6. Acute on chronic respiratory failure with hypoxia    7. Vitamin D deficiency    8. Inflammatory neuropathy    9. Chronic low back pain, unspecified back pain laterality, with sciatica presence unspecified    10. Rectal bleeding         Plan:     Anxiety and depression  -     Ambulatory referral to Psychiatry    Anemia, unspecified type  -     CBC auto differential; Future; Expected date: 05/14/2019  -     Ferritin; Future; Expected date: 05/14/2019  -     Iron and TIBC; Future; Expected date: 05/14/2019  -     Vitamin B12; Future; Expected date: 05/14/2019  -     CBC auto differential; Future; Expected date: 05/17/2019  -     Ferritin; Future; Expected date: 05/17/2019  -     Iron and TIBC; Future; Expected date: 05/17/2019    Abnormal glucose  -     Hemoglobin A1c; Future; Expected date: 05/14/2019  -     URINALYSIS  -     Hemoglobin A1c; Future; Expected date: 05/17/2019    Breast cancer screening  -     Mammo Digital Screening Bilat; Future; Expected date: 05/14/2019    Polypharmacy    Acute on chronic respiratory failure with hypoxia    Vitamin D deficiency  -     Vitamin D; Future; Expected date: 05/14/2019    Inflammatory neuropathy    Chronic low back pain, unspecified back pain  laterality, with sciatica presence unspecified    Rectal bleeding  -     Ambulatory referral to Gastroenterology    Other orders  -     desvenlafaxine succinate (PRISTIQ) 100 MG Tb24; Take 1 tablet (100 mg total) by mouth once daily.  Dispense: 30 tablet; Refill: 11    Pt mentions some rectal bleeding and that she is due to have scope done.  Will refer to GI as she is on coumadin as well.  Cont with Cards/Pulm  Have explained to pt that she is on so much medication for her mood I am not comfortable making adjustments.  She needs to see Psychiatry and see about potentially weaning down/off some meds.  Concern for interactions given her cardiac issues as well as resp depression with the tramadol and her lung condition.  I agree with seeing Pain management, I will not manage her tramadol medication.  Reviewed her notes and labs.  Pt has some impaired glucose levels.  A1C screening shows 5.9//prediabetes.  Needs to be eating better diet.  Exercise is limited given her lung disease/cardiac disease.    Pt has some iron deficiency.  Need to see GI and needs to start some daily iron supplements.   Will monitor levels along with her A1C  F/u with me 3 mos

## 2019-05-15 LAB
25(OH)D3+25(OH)D2 SERPL-MCNC: 37 NG/ML (ref 30–96)
ESTIMATED AVG GLUCOSE: 123 MG/DL (ref 68–131)
FERRITIN SERPL-MCNC: 98 NG/ML (ref 20–300)
HBA1C MFR BLD HPLC: 5.9 % (ref 4–5.6)
IRON SERPL-MCNC: 85 UG/DL (ref 30–160)
SATURATED IRON: 16 % (ref 20–50)
TOTAL IRON BINDING CAPACITY: 527 UG/DL (ref 250–450)
TRANSFERRIN SERPL-MCNC: 356 MG/DL (ref 200–375)
VIT B12 SERPL-MCNC: 944 PG/ML (ref 210–950)

## 2019-05-17 ENCOUNTER — HOSPITAL ENCOUNTER (OUTPATIENT)
Facility: HOSPITAL | Age: 49
Discharge: HOME | End: 2019-05-17
Attending: INTERNAL MEDICINE | Admitting: INTERNAL MEDICINE
Payer: COMMERCIAL

## 2019-05-17 ENCOUNTER — PATIENT MESSAGE (OUTPATIENT)
Dept: INTERNAL MEDICINE | Facility: CLINIC | Age: 49
End: 2019-05-17

## 2019-05-17 VITALS
BODY MASS INDEX: 31.22 KG/M2 | HEART RATE: 69 BPM | RESPIRATION RATE: 16 BRPM | WEIGHT: 159 LBS | OXYGEN SATURATION: 99 % | DIASTOLIC BLOOD PRESSURE: 60 MMHG | SYSTOLIC BLOOD PRESSURE: 106 MMHG | HEIGHT: 60 IN | TEMPERATURE: 98 F

## 2019-05-17 DIAGNOSIS — I25.118 ATHEROSCLEROTIC HEART DISEASE OF NATIVE CORONARY ARTERY WITH OTHER FORMS OF ANGINA PECTORIS: ICD-10-CM

## 2019-05-17 DIAGNOSIS — I27.20 PULMONARY HTN: ICD-10-CM

## 2019-05-17 DIAGNOSIS — I50.9 CHF (CONGESTIVE HEART FAILURE): ICD-10-CM

## 2019-05-17 PROCEDURE — 99152 MOD SED SAME PHYS/QHP 5/>YRS: CPT | Mod: TXP

## 2019-05-17 PROCEDURE — 99152 CATH LAB PROCEDURE: ICD-10-PCS | Mod: NTX,,, | Performed by: INTERNAL MEDICINE

## 2019-05-17 PROCEDURE — 25000003 PHARM REV CODE 250: Mod: TXP

## 2019-05-17 PROCEDURE — 99152 MOD SED SAME PHYS/QHP 5/>YRS: CPT | Mod: NTX,,, | Performed by: INTERNAL MEDICINE

## 2019-05-17 PROCEDURE — 63600175 PHARM REV CODE 636 W HCPCS: Mod: TXP

## 2019-05-17 PROCEDURE — 93451 RIGHT HEART CATH: CPT | Mod: 26,NTX,, | Performed by: INTERNAL MEDICINE

## 2019-05-17 PROCEDURE — 25000003 PHARM REV CODE 250: Mod: TXP | Performed by: INTERNAL MEDICINE

## 2019-05-17 PROCEDURE — 93451 CATH LAB PROCEDURE: ICD-10-PCS | Mod: 26,NTX,, | Performed by: INTERNAL MEDICINE

## 2019-05-17 RX ORDER — SODIUM CHLORIDE 9 MG/ML
INJECTION, SOLUTION INTRAVENOUS CONTINUOUS
Status: DISCONTINUED | OUTPATIENT
Start: 2019-05-17 | End: 2019-05-17 | Stop reason: HOSPADM

## 2019-05-17 RX ORDER — ACETAMINOPHEN AND CODEINE PHOSPHATE 300; 30 MG/1; MG/1
1 TABLET ORAL ONCE
Status: COMPLETED | OUTPATIENT
Start: 2019-05-17 | End: 2019-05-17

## 2019-05-17 RX ORDER — DIAZEPAM 5 MG/1
5 TABLET ORAL
Status: DISCONTINUED | OUTPATIENT
Start: 2019-05-17 | End: 2019-05-17 | Stop reason: HOSPADM

## 2019-05-17 RX ORDER — DESVENLAFAXINE 100 MG/1
100 TABLET, EXTENDED RELEASE ORAL DAILY
Qty: 30 TABLET | Refills: 11
Start: 2019-05-17 | End: 2020-05-16

## 2019-05-17 RX ORDER — DIPHENHYDRAMINE HCL 50 MG
50 CAPSULE ORAL ONCE
Status: COMPLETED | OUTPATIENT
Start: 2019-05-17 | End: 2019-05-17

## 2019-05-17 RX ADMIN — ACETAMINOPHEN AND CODEINE PHOSPHATE 1 TABLET: 30; 300 TABLET ORAL at 12:05

## 2019-05-17 RX ADMIN — Medication 50 MG: at 10:05

## 2019-05-17 RX ADMIN — SODIUM CHLORIDE: 9 INJECTION, SOLUTION INTRAVENOUS at 10:05

## 2019-05-17 RX ADMIN — DIAZEPAM 5 MG: 5 TABLET ORAL at 10:05

## 2019-05-17 NOTE — DISCHARGE INSTRUCTIONS
POST PROCEDURE INSTRUCTIONS    · ACTIVITY/ SAFETY: BECAUSE OF THE AFTER EFFECT OF THE SEDATION, WE ADVISE YOU TO REFRAIN FROM THE FOLLOWING ACTIVITIES FOR AT LEAST 12-16 HOURS:    A. DO NOT DRIVE A CAR OR OPERATE MACHINERY. YOUR  REFLEXES               AND COORDINATION ARE ALTERED.    B. HAVE STANDBY ASSISTANCE ON STAIRWAYS.    C. DO NOT RETURN TO WORK.    D. DO NOT OPERATE APPLIANCES IF ALONE (I.E.STOVE,IRON,LAWN                    MOWER)    E. DO NOT SIGN IMPORTANT PAPERS OR MAKE IMPORTANT DECISIONS.    F.  A RESPONSIBLE PERSON MUST STAY WITH THE PATIENT FOR 12             HOURS POST PROCEDURE.    G. YOU MAY SHOWER OR BATHE THE NEXT DAY.      · MEDICATIONS: 1.) RESUME TAKING YOUR USUAL MEDICINES AS SOON AS YOU START TO EAT. TAKE ONLY THE MEDICINES THAT YOUR DOCTORS PRESCRIBED OR APPROVED. 2.) THERE IS ONLY MINOR PAIN AFTER. IF YOUR DOCTOR SAYS IT IS OK FOR YOU TO TAKE ACETAMINOPHEN (TYLENOL), THIS SHOULD EASE ANY DISCOMFORT YOU HAVE. IF YOUR DOCTOR EXPECTS YOU TO HAVE MORE SEVERE PAIN, YOU WILL RECEIVE A PRESCRIPTION FOR A STRONGER PAIN MEDICINE.    · WHEN TO CALL: CALL US RIGHT AWAY IF YOU HAVE : 1. BLEEDING IN YOUR NECK WHERE THE CATHETER WAS INSERTED. 2. ABDOMINAL PAIN.  3. DIZZINESS

## 2019-05-17 NOTE — BRIEF OP NOTE
Date: 05/17/2019  Surgeon/Physician: Aure Brown MD  Assistants:none    Pre Op Diagnosis: pulmonary htn     Post OP Diagnosis: pulmonary htn     Procedure Performed: rhc    ANESTHESIA:RN IV SEDATION    COMPLICATION: none    Specimen / Tissue Removed: No    Estimated Blood Loss: <50 cc    Prostetics/Devices: None    Findings / Operative Note:   Moderate pulmonary htn   Pa 58/16 mmhg  ra 13 mmhg  pcwp 16 mmhg v wave 29 mmhg      PLAN:  Medical therapy     Discharge Note  Short Stay      SUMMARY     Admit Date: 5/17/2019    Attending Physician: Derek Brown MD     Discharge Physician: Aure Brown MD     Discharge Date: 5/17/2019    Final Diagnosis: pulmonary htn     Outcome of Stay:patient tolerated procedure well no complications. She was deemed stable for discharge.has moderate pulmonarty htn .she will follow up with [facility] FOR FURTHER CARE    Disposition: Home or Self Care    Patient Instructions:   Current Discharge Medication List      CONTINUE these medications which have NOT CHANGED    Details   albuterol (VENTOLIN HFA) 90 mcg/actuation inhaler Inhale 2 puffs into the lungs every 6 (six) hours as needed for Wheezing. Rescue  Qty: 18 g, Refills: 11    Associated Diagnoses: Chronic obstructive pulmonary disease, unspecified COPD type      amitriptyline (ELAVIL) 25 MG tablet Take 25 mg by mouth every evening.       busPIRone (BUSPAR) 15 MG tablet Take 15 mg by mouth 2 (two) times daily.       clonazePAM (KLONOPIN) 1 MG tablet Take 1 mg by mouth 3 (three) times daily.       furosemide (LASIX) 40 MG tablet Take 40 mg by mouth 2 (two) times daily.      metoprolol succinate (TOPROL-XL) 25 MG 24 hr tablet Take 1 tablet (25 mg total) by mouth once daily.  Qty: 30 tablet, Refills: 1      nicotine (NICODERM CQ) 7 mg/24 hr Place 1 patch onto the skin every 24 hours.      nitroGLYCERIN (NITROSTAT) 0.4 MG SL tablet       pantoprazole (PROTONIX) 40 MG tablet Take 40 mg by mouth once daily.       potassium chloride SA (K-DUR,KLOR-CON) 20 MEQ tablet Take 20 mEq by mouth 2 (two) times daily.      rosuvastatin (CRESTOR) 20 MG tablet Take 20 mg by mouth once daily.      sumatriptan (IMITREX) 100 MG tablet Take 100 mg by mouth every 2 (two) hours as needed.      tiZANidine (ZANAFLEX) 4 MG tablet Take 4 mg by mouth every evening.      topiramate (TOPAMAX) 100 MG tablet Take 100 mg by mouth 2 (two) times daily.      traMADol (ULTRAM) 50 mg tablet Take 50 mg by mouth every 4 (four) hours as needed.       traZODone (DESYREL) 150 MG tablet Take 150 mg by mouth nightly.      umeclidinium-vilanterol (ANORO ELLIPTA) 62.5-25 mcg/actuation DsDv Inhale 1 puff into the lungs once daily. Controller  Qty: 1 each, Refills: 5    Associated Diagnoses: Chronic obstructive pulmonary disease, unspecified COPD type      VITAMIN B COMPLEX ORAL Take 1 capsule by mouth.      vitamin D (VITAMIN D3) 1000 units Tab Take 1,000 Units by mouth once daily.      warfarin (COUMADIN) 6 MG tablet Take 6 mg by mouth once daily.      diclofenac sodium (VOLTAREN) 1 % Gel Apply 2 g topically 4 (four) times daily.      fluticasone (FLONASE) 50 mcg/actuation nasal spray 1 spray by Each Nare route once daily.      isosorbide mononitrate (IMDUR) 30 MG 24 hr tablet Take 30 mg by mouth once daily.             Discharge Procedure Orders (must include Diet, Follow-up, Activity)   Discharge Procedure Orders (must include Diet, Follow-up, Activity)   Diet general     Call MD for:  temperature >100.4     Call MD for:  persistent nausea and vomiting     Call MD for:  severe uncontrolled pain     Call MD for:  difficulty breathing, headache or visual disturbances     Call MD for:  redness, tenderness, or signs of infection (pain, swelling, redness, odor or green/yellow discharge around incision site)     Call MD for:  hives     Call MD for:  persistent dizziness or light-headedness     Call MD for:  extreme fatigue     Follow-up Information     Miguel Salazar,  MD In 2 weeks.    Specialty:  Pulmonary Disease  Contact information:  [address] [place] [address] DR Radha JALLOH [id number] [phone]

## 2019-05-17 NOTE — INTERVAL H&P NOTE
The patient has been examined and the H&P has been reviewed:    I concur with the findings and no changes have occurred since H&P was written.  Has pulmonary htn fibrossi will proceed with rhc   Anesthesia/Surgery risks, benefits and alternative options discussed and understood by patient/family.  I have explained the risks, benefits , and alternatives of the procedure in detail.the patient voices understanding and all questions have been answered.the patient agrees to proceed as planned.          Active Hospital Problems    Diagnosis  POA    Pulmonary HTN [I27.20]  Yes     Priority: Low     Chronic     Overview:   CATH (May 2018):  Right heart pressures: RA mean 7mmhg; RV 39/5mmHg; PA 46/20 , mean 29mmHg, PCWP mean 18mmHg  Pulse oximetry: PA 70.0% ; FA 95.0%  Cardiac output: 4.35 L/min  Opening aortic pressure: 87/55mmHg, MAP 70 mmHg  Left ventricular end-diastolic pressure 22 mmHg.    Last Assessment & Plan:   Management per cardiology        Resolved Hospital Problems   No resolved problems to display.

## 2019-05-20 ENCOUNTER — ANTI-COAG VISIT (OUTPATIENT)
Dept: CARDIOLOGY | Facility: CLINIC | Age: 49
End: 2019-05-20
Payer: COMMERCIAL

## 2019-05-20 ENCOUNTER — TELEPHONE (OUTPATIENT)
Dept: PULMONOLOGY | Facility: CLINIC | Age: 49
End: 2019-05-20

## 2019-05-20 DIAGNOSIS — Z98.890 H/O MITRAL VALVE REPAIR: ICD-10-CM

## 2019-05-20 DIAGNOSIS — Z79.01 LONG TERM (CURRENT) USE OF ANTICOAGULANTS: ICD-10-CM

## 2019-05-20 LAB — INR PPP: 1.2

## 2019-05-20 PROCEDURE — 93793 PR ANTICOAGULANT MGMT FOR PT TAKING WARFARIN: ICD-10-PCS | Mod: S$GLB,,,

## 2019-05-20 PROCEDURE — 93793 ANTICOAG MGMT PT WARFARIN: CPT | Mod: S$GLB,,,

## 2019-05-20 NOTE — PROGRESS NOTES
Verbal results taken from Carole with [facility]. PT/INR  14.0 / 1.2.   Date drawn 05/20/2019.  Coumadin was held for 5 days for right heart cath.  Restarted on Friday, 5/17/19.  No other changes reported.  Orders given: Take coumadin 12mg on today; then resume dose of 3mg on Wednesdays, Fridays and 6mg on all other days of the week.  Patient voiced understanding.  Recheck on 5/29/19.

## 2019-05-20 NOTE — TELEPHONE ENCOUNTER
Called pt and let her know that Dr. Hightower wants her to see Kaur Cardenas NP for her 3 mo f/u and lab.  Booked for 8-20-19 for labs and 8-27-19 @ 2:20 pm with Kaur

## 2019-05-22 ENCOUNTER — TELEPHONE (OUTPATIENT)
Dept: CARDIOLOGY | Facility: CLINIC | Age: 49
End: 2019-05-22

## 2019-05-22 NOTE — TELEPHONE ENCOUNTER
----- Message from Saniya Sutton sent at 5/22/2019 11:10 AM CDT -----  Contact: [facility] Nurse   [facility] Nurse is calling regarding requesting to have nurse call. Nurse is calling  requesting orders to continue home health psychical therapy for strengthen and activity time.  #[phone] Nurse Barbara       .Thank You  Gerri Sutton

## 2019-05-22 NOTE — TELEPHONE ENCOUNTER
Barbara with [facility] ([phone]) called stating would like to extend patient's PT. Please advise.

## 2019-05-23 NOTE — TELEPHONE ENCOUNTER
Called back to Barbara with [facility] to inform okay to extend patient's home health per Dr. Banda--did not reach Barbara and no voice mail  to leave message

## 2019-05-24 ENCOUNTER — TELEPHONE (OUTPATIENT)
Dept: ADMINISTRATIVE | Facility: CLINIC | Age: 49
End: 2019-05-24

## 2019-05-24 ENCOUNTER — TELEPHONE (OUTPATIENT)
Dept: INTERNAL MEDICINE | Facility: CLINIC | Age: 49
End: 2019-05-24

## 2019-05-24 NOTE — TELEPHONE ENCOUNTER
----- Message from Rayne Delgado sent at 5/24/2019 10:17 AM CDT -----  Contact: Jennifer with [facility]  Calling to request PT orders for strengthening, activity tolerance and balance for patient. Please call Jennifer @ [phone]. Thanks, joya

## 2019-05-29 ENCOUNTER — ANTI-COAG VISIT (OUTPATIENT)
Dept: CARDIOLOGY | Facility: CLINIC | Age: 49
End: 2019-05-29
Payer: COMMERCIAL

## 2019-05-29 DIAGNOSIS — Z79.01 LONG TERM (CURRENT) USE OF ANTICOAGULANTS: ICD-10-CM

## 2019-05-29 DIAGNOSIS — Z98.890 H/O MITRAL VALVE REPAIR: ICD-10-CM

## 2019-05-29 LAB — INR PPP: 1.9

## 2019-05-29 PROCEDURE — 93793 ANTICOAG MGMT PT WARFARIN: CPT | Mod: S$GLB,,,

## 2019-05-29 PROCEDURE — 93793 PR ANTICOAGULANT MGMT FOR PT TAKING WARFARIN: ICD-10-PCS | Mod: S$GLB,,,

## 2019-05-29 NOTE — PROGRESS NOTES
Patient's INR continues to be sub-therapeutic.  No missed doses or significant changes reported.  Will increase dose.  Orders given: Take coumadin 3mg on Fridays and 6mg on all other days of the week.   Recheck in 1 week.

## 2019-05-29 NOTE — PROGRESS NOTES
Verbal results taken from _Meena ZELAYA with [facility]__. PT/INR _22.3 / 1.9 (subtherapeutic)_ Date drawn_5/29/2019_.

## 2019-06-04 ENCOUNTER — LAB VISIT (OUTPATIENT)
Dept: LAB | Facility: HOSPITAL | Age: 49
End: 2019-06-04
Attending: INTERNAL MEDICINE
Payer: COMMERCIAL

## 2019-06-04 ENCOUNTER — OFFICE VISIT (OUTPATIENT)
Dept: PULMONOLOGY | Facility: CLINIC | Age: 49
End: 2019-06-04
Payer: COMMERCIAL

## 2019-06-04 VITALS
DIASTOLIC BLOOD PRESSURE: 76 MMHG | HEART RATE: 102 BPM | HEIGHT: 60 IN | BODY MASS INDEX: 31.46 KG/M2 | SYSTOLIC BLOOD PRESSURE: 112 MMHG | RESPIRATION RATE: 16 BRPM | WEIGHT: 160.25 LBS | OXYGEN SATURATION: 98 %

## 2019-06-04 DIAGNOSIS — R09.02 EXERCISE HYPOXEMIA: ICD-10-CM

## 2019-06-04 DIAGNOSIS — J43.2 CENTRILOBULAR EMPHYSEMA: ICD-10-CM

## 2019-06-04 DIAGNOSIS — G47.34 NOCTURNAL HYPOXEMIA: ICD-10-CM

## 2019-06-04 DIAGNOSIS — K21.9 CHRONIC GERD: ICD-10-CM

## 2019-06-04 DIAGNOSIS — I50.30 (HFPEF) HEART FAILURE WITH PRESERVED EJECTION FRACTION: ICD-10-CM

## 2019-06-04 DIAGNOSIS — J84.112 IDIOPATHIC PULMONARY FIBROSIS: ICD-10-CM

## 2019-06-04 DIAGNOSIS — I27.20 PULMONARY HYPERTENSION: ICD-10-CM

## 2019-06-04 DIAGNOSIS — J84.112 IDIOPATHIC PULMONARY FIBROSIS: Primary | ICD-10-CM

## 2019-06-04 LAB
ALBUMIN SERPL BCP-MCNC: 4.2 G/DL (ref 3.5–5.2)
ALP SERPL-CCNC: 108 U/L (ref 55–135)
ALT SERPL W/O P-5'-P-CCNC: 20 U/L (ref 10–44)
ANION GAP SERPL CALC-SCNC: 8 MMOL/L (ref 8–16)
AST SERPL-CCNC: 36 U/L (ref 10–40)
BASOPHILS # BLD AUTO: 0.07 K/UL (ref 0–0.2)
BASOPHILS NFR BLD: 0.8 % (ref 0–1.9)
BILIRUB SERPL-MCNC: 0.6 MG/DL (ref 0.1–1)
BUN SERPL-MCNC: 14 MG/DL (ref 6–20)
CALCIUM SERPL-MCNC: 10.6 MG/DL (ref 8.7–10.5)
CHLORIDE SERPL-SCNC: 107 MMOL/L (ref 95–110)
CO2 SERPL-SCNC: 26 MMOL/L (ref 23–29)
CREAT SERPL-MCNC: 0.9 MG/DL (ref 0.5–1.4)
DIFFERENTIAL METHOD: ABNORMAL
EOSINOPHIL # BLD AUTO: 0.6 K/UL (ref 0–0.5)
EOSINOPHIL NFR BLD: 6.4 % (ref 0–8)
ERYTHROCYTE [DISTWIDTH] IN BLOOD BY AUTOMATED COUNT: 19.1 % (ref 11.5–14.5)
EST. GFR  (AFRICAN AMERICAN): >60 ML/MIN/1.73 M^2
EST. GFR  (NON AFRICAN AMERICAN): >60 ML/MIN/1.73 M^2
GLUCOSE SERPL-MCNC: 93 MG/DL (ref 70–110)
HCT VFR BLD AUTO: 38.8 % (ref 37–48.5)
HGB BLD-MCNC: 12.2 G/DL (ref 12–16)
IMM GRANULOCYTES # BLD AUTO: 0.03 K/UL (ref 0–0.04)
IMM GRANULOCYTES NFR BLD AUTO: 0.3 % (ref 0–0.5)
LYMPHOCYTES # BLD AUTO: 2.3 K/UL (ref 1–4.8)
LYMPHOCYTES NFR BLD: 25.4 % (ref 18–48)
MCH RBC QN AUTO: 32 PG (ref 27–31)
MCHC RBC AUTO-ENTMCNC: 31.4 G/DL (ref 32–36)
MCV RBC AUTO: 102 FL (ref 82–98)
MONOCYTES # BLD AUTO: 0.6 K/UL (ref 0.3–1)
MONOCYTES NFR BLD: 6.6 % (ref 4–15)
NEUTROPHILS # BLD AUTO: 5.5 K/UL (ref 1.8–7.7)
NEUTROPHILS NFR BLD: 60.5 % (ref 38–73)
NRBC BLD-RTO: 0 /100 WBC
PLATELET # BLD AUTO: 227 K/UL (ref 150–350)
PMV BLD AUTO: 10.4 FL (ref 9.2–12.9)
POTASSIUM SERPL-SCNC: 4.2 MMOL/L (ref 3.5–5.1)
PROT SERPL-MCNC: 8.1 G/DL (ref 6–8.4)
RBC # BLD AUTO: 3.81 M/UL (ref 4–5.4)
SODIUM SERPL-SCNC: 141 MMOL/L (ref 136–145)
WBC # BLD AUTO: 9.06 K/UL (ref 3.9–12.7)

## 2019-06-04 PROCEDURE — 99215 PR OFFICE/OUTPT VISIT, EST, LEVL V, 40-54 MIN: ICD-10-PCS | Mod: NTX,S$GLB,, | Performed by: INTERNAL MEDICINE

## 2019-06-04 PROCEDURE — 80053 COMPREHEN METABOLIC PANEL: CPT | Mod: TXP

## 2019-06-04 PROCEDURE — 3008F BODY MASS INDEX DOCD: CPT | Mod: CPTII,NTX,S$GLB, | Performed by: INTERNAL MEDICINE

## 2019-06-04 PROCEDURE — 85025 COMPLETE CBC W/AUTO DIFF WBC: CPT | Mod: TXP

## 2019-06-04 PROCEDURE — 99999 PR PBB SHADOW E&M-EST. PATIENT-LVL III: ICD-10-PCS | Mod: PBBFAC,TXP,, | Performed by: INTERNAL MEDICINE

## 2019-06-04 PROCEDURE — 3074F SYST BP LT 130 MM HG: CPT | Mod: CPTII,NTX,S$GLB, | Performed by: INTERNAL MEDICINE

## 2019-06-04 PROCEDURE — 3078F DIAST BP <80 MM HG: CPT | Mod: CPTII,NTX,S$GLB, | Performed by: INTERNAL MEDICINE

## 2019-06-04 PROCEDURE — 3008F PR BODY MASS INDEX (BMI) DOCUMENTED: ICD-10-PCS | Mod: CPTII,NTX,S$GLB, | Performed by: INTERNAL MEDICINE

## 2019-06-04 PROCEDURE — 99999 PR PBB SHADOW E&M-EST. PATIENT-LVL III: CPT | Mod: PBBFAC,TXP,, | Performed by: INTERNAL MEDICINE

## 2019-06-04 PROCEDURE — 3074F PR MOST RECENT SYSTOLIC BLOOD PRESSURE < 130 MM HG: ICD-10-PCS | Mod: CPTII,NTX,S$GLB, | Performed by: INTERNAL MEDICINE

## 2019-06-04 PROCEDURE — 99215 OFFICE O/P EST HI 40 MIN: CPT | Mod: NTX,S$GLB,, | Performed by: INTERNAL MEDICINE

## 2019-06-04 PROCEDURE — 3078F PR MOST RECENT DIASTOLIC BLOOD PRESSURE < 80 MM HG: ICD-10-PCS | Mod: CPTII,NTX,S$GLB, | Performed by: INTERNAL MEDICINE

## 2019-06-04 PROCEDURE — 36415 COLL VENOUS BLD VENIPUNCTURE: CPT | Mod: NTX

## 2019-06-04 RX ORDER — LIDOCAINE AND TETRACAINE 70; 70 MG/G; MG/G
CREAM TOPICAL
COMMUNITY
Start: 2019-05-17 | End: 2019-07-02

## 2019-06-04 NOTE — PROGRESS NOTES
Pulmonary Outpatient Follow Up Visit     Subjective:       Patient ID: Meg Sal is a 49 y.o. female.    Chief Complaint: Pulmonary Fibrosis      HPI      49-year-old female patient presenting for follow-up.    Known with COPD/emphysema honeycombing and pulmonary fibrosis, pulmonary hypertension status post right heart catheterization showing normal wedge pressure and elevated mean pulmonary artery pressure to 35 mm Hg.  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    Former smoker quit a year ago.    On oxygen 2 L via nasal cannula  on exertion.  Using it during sleep as well.    On albuterol p.r.n. and Anoro 1 puff daily as controller inhaler.    Stable overall.         Review of Systems   Constitutional: Negative for fever and chills.   HENT: Negative for nosebleeds.    Eyes: Negative for redness.   Respiratory: Positive for snoring, cough, shortness of breath, dyspnea on extertion and somnolence. Negative for apnea and choking.    Cardiovascular: Positive for leg swelling.        AVR on Coumadin metallic   Genitourinary: Negative for hematuria.   Endocrine: Negative for cold intolerance.    Musculoskeletal: Positive for arthralgias and back pain.   Skin: Negative for rash.   Gastrointestinal: Negative for vomiting.   Neurological: Negative for syncope.        History of stroke   Hematological: Negative for adenopathy.   Psychiatric/Behavioral: Positive for sleep disturbance. Negative for confusion.       Outpatient Encounter Medications as of 6/4/2019   Medication Sig Dispense Refill    albuterol (VENTOLIN HFA) 90 mcg/actuation inhaler Inhale 2 puffs into the lungs every 6 (six) hours as needed for Wheezing. Rescue 18 g 11    amitriptyline (ELAVIL) 25 MG tablet Take 25 mg by mouth every evening.       busPIRone (BUSPAR) 15 MG tablet Take 15 mg by mouth 2 (two) times daily.       clonazePAM (KLONOPIN) 1 MG tablet Take 1 mg by mouth 3 (three) times  daily.       desvenlafaxine succinate (PRISTIQ) 100 MG Tb24 Take 1 tablet (100 mg total) by mouth once daily. 30 tablet 11    diclofenac sodium (VOLTAREN) 1 % Gel Apply 2 g topically 4 (four) times daily.      fluticasone (FLONASE) 50 mcg/actuation nasal spray 1 spray by Each Nare route once daily.      furosemide (LASIX) 40 MG tablet Take 40 mg by mouth 2 (two) times daily.      metoprolol succinate (TOPROL-XL) 25 MG 24 hr tablet Take 1 tablet (25 mg total) by mouth once daily. 30 tablet 1    nicotine (NICODERM CQ) 7 mg/24 hr Place 1 patch onto the skin every 24 hours.      nitroGLYCERIN (NITROSTAT) 0.4 MG SL tablet       pantoprazole (PROTONIX) 40 MG tablet Take 40 mg by mouth once daily.      potassium chloride SA (K-DUR,KLOR-CON) 20 MEQ tablet Take 20 mEq by mouth 2 (two) times daily.      rosuvastatin (CRESTOR) 20 MG tablet Take 20 mg by mouth once daily.      sumatriptan (IMITREX) 100 MG tablet Take 100 mg by mouth every 2 (two) hours as needed.      tiZANidine (ZANAFLEX) 4 MG tablet Take 4 mg by mouth every evening.      topiramate (TOPAMAX) 100 MG tablet Take 100 mg by mouth 2 (two) times daily.      traMADol (ULTRAM) 50 mg tablet Take 50 mg by mouth every 4 (four) hours as needed.       traZODone (DESYREL) 150 MG tablet Take 150 mg by mouth nightly.      umeclidinium-vilanterol (ANORO ELLIPTA) 62.5-25 mcg/actuation DsDv Inhale 1 puff into the lungs once daily. Controller 1 each 5    VITAMIN B COMPLEX ORAL Take 1 capsule by mouth.      vitamin D (VITAMIN D3) 1000 units Tab Take 1,000 Units by mouth once daily.      warfarin (COUMADIN) 6 MG tablet Take 1/2 tablet (3mg) by mouth on Fridays; and 1 tablet (6mg) on all other days of the week.      [DISCONTINUED] isosorbide mononitrate (IMDUR) 30 MG 24 hr tablet Take 30 mg by mouth once daily.      lidocaine-tetracaine 7-7 % Crea       nintedanib (OFEV) 150 mg Cap Take 150 mg by mouth 2 (two) times daily. 60 capsule 3     No  facility-administered encounter medications on file as of 6/4/2019.        Objective:     Vital Signs (Most Recent)  Vital Signs  Pulse: 102  Resp: 16  SpO2: 98 %  BP: 112/76  Height and Weight  Height: 5' (152.4 cm)  Weight: 72.7 kg (160 lb 4.4 oz)  BSA (Calculated - sq m): 1.75 sq meters  BMI (Calculated): 31.4  Weight in (lb) to have BMI = 25: 127.7]  Wt Readings from Last 2 Encounters:   06/04/19 72.7 kg (160 lb 4.4 oz)   05/17/19 72.1 kg (159 lb)       Physical Exam   Constitutional: She is oriented to person, place, and time. She appears well-developed. She appears not cachectic.   HENT:   Head: Normocephalic.   Neck: Neck supple.   Cardiovascular: Normal rate and regular rhythm.   Murmur heard.  Pulmonary/Chest: Normal expansion and effort normal. No stridor. No respiratory distress. She has rales. She exhibits no tenderness.   Abdominal: Soft.   Musculoskeletal: She exhibits edema. She exhibits no tenderness.   Lymphadenopathy:     She has no cervical adenopathy.   Neurological: She is alert and oriented to person, place, and time. Gait normal.   Skin: Skin is warm. Nails show no clubbing.   Psychiatric: She has a normal mood and affect. Her behavior is normal. Judgment and thought content normal.   Nursing note and vitals reviewed.      Laboratory  Lab Results   Component Value Date    WBC 9.65 05/14/2019    RBC 3.93 (L) 05/14/2019    HGB 11.7 (L) 05/14/2019    HCT 38.0 05/14/2019    MCV 97 05/14/2019    MCH 29.8 05/14/2019    MCHC 30.8 (L) 05/14/2019    RDW 17.8 (H) 05/14/2019     05/14/2019    MPV 9.8 05/14/2019    GRAN 6.2 05/14/2019    GRAN 64.1 05/14/2019    LYMPH 2.4 05/14/2019    LYMPH 25.2 05/14/2019    MONO 0.5 05/14/2019    MONO 5.2 05/14/2019    EOS 0.5 05/14/2019    BASO 0.05 05/14/2019    EOSINOPHIL 5.0 05/14/2019    BASOPHIL 0.5 05/14/2019       BMP  Lab Results   Component Value Date     04/20/2019    K 3.4 (L) 04/20/2019     04/20/2019    CO2 21 (L) 04/20/2019    BUN 10  04/20/2019    CREATININE 0.9 04/20/2019    CALCIUM 9.6 04/20/2019    ANIONGAP 12 04/20/2019    ESTGFRAFRICA >60 04/20/2019    EGFRNONAA >60 04/20/2019    AST 39 04/18/2019    ALT 27 04/18/2019    PROT 8.4 04/18/2019       Lab Results   Component Value Date     (H) 04/19/2019     (H) 03/11/2019       No results found for: TSH    No results found for: SEDRATE    No results found for: CRP      Diagnostic Results:  I have personally reviewed today the following studies:    Home sleep study April 27, 2019 shows AHI less than 5 events per hour.  O2 sat less than 90% for 128 min.  Snoring 100% of the time.    . Hemodynamic Results       AIR REST:  RA: 13/12 (11)  RV: 57  RVEDP: 13     PW: 15/29 (16)  PA: 58/16 (34)    AIR REST (5/17/2019 10:57:34):  THERMALCO: 3.7200    Calculated pulmonary vascular resistance is 389 dynes sec/ cm    PFT done 04/12/2019 shows isolated severe reduction of DLCO.  Small airways disease.     CT chest [facility] January 2016   There are small cystic changes within the lungs consistent with COPD. Interstitial scarring noted at size is at the upper limits of normal. Mitral and aortic valve prosthesis are seen. Sternotomy sutures are in place with no alignment of the sternum. No sternal erosive changes or parasternal fluid noted. Within the lungs with no focal pneumonia, mass or nodularity. There is no pleural or pericardial effusion. Aorta is normal in caliber. There are small lymph nodes in the middle mediastinum.     CT scan of the chest March 28, 2019 personally reviewed:  The lungs are grossly abnormal.  There is rather extensive thickening of the interlobular septa throughout with rather pronounced centrilobular emphysematous change.  There are extensive areas of scarring bilaterally.  These are more pronounced along the periphery of the lungs.  There is definite evidence of honeycombing specially involving the posterior aspect of the lungs in the mid to lower lung  fields there is no consolidation or effusion.  No neil pneumothorax.     Echo 2/2019:       1 - Mild left atrial enlargement.     2 - Eccentric hypertrophy.     3 - No wall motion abnormalities.     4 - Normal left ventricular systolic function (EF 55-60%).     5 - Normal right ventricular systolic function .     6 - Aortic valve prosthesis, CATINA = 1.61 cm2, AVAi = 0.95 cm2/m2, peak velocity = 2.48 m/s, mean gradient = 15 mmHg.     7 - Moderate tricuspid regurgitation.     8 - Pulmonary hypertension. The estimated PA systolic pressure is 61 mmHg        6 min walking test 04/30/2019  showed O2 sat dropped to 88% on room air at minute 5.  Predicted walk distance 68%.     Assessment/Plan:   Idiopathic pulmonary fibrosis  -     Comprehensive metabolic panel; Future; Expected date: 06/04/2019  -     CBC auto differential; Future; Expected date: 06/04/2019  -     nintedanib (OFEV) 150 mg Cap; Take 150 mg by mouth 2 (two) times daily.  Dispense: 60 capsule; Refill: 3    Centrilobular emphysema    (HFpEF) heart failure with preserved ejection fraction    Pulmonary hypertension    Exercise hypoxemia    Nocturnal hypoxemia    Chronic GERD      O2 on exertion and during sleep.    Albuterol p.r.n. and Anoro 1 puff daily.    Encourage patient to continue smoking cessation.    Continue Lasix 40 mg twice a day.    Patient pulmonary hypertension most likely related to hypoxemic respiratory disorder, rheumatology workup negative, no alternative diagnosis to IPF as a cause of her honeycombing evident on high-resolution CT scan.    Will start the patient on a trial of OFEV.  Will check LFTs.  Discussed possible side effects.    Patient has an appointment with transplant team next week.    Patient to follow with GI regarding chronic GERD.  Continue PPI and antihistamine.    Up-to-date on influenza vaccination and pneumococcal vaccines.  MEDICAL DECISION MAKING: Moderate to high complexity.  Overall, the multiple problems listed are of  moderate to high severity that may impact quality of life and activities of daily living. Side effects of medications, treatment plan as well as options and alternatives reviewed and discussed with patient. There was counseling of patient concerning these issues.     Total time spent in face to face counseling and coordination of care - 40  minutes over 50% of time was used in discussion of prognosis, risks, benefits of treatment, instructions and compliance with regimen . Discussion with other physicians or health care providers (DME, NP, pharmacy, respiratory therapy) occurred.      Follow up in about 6 weeks (around 7/16/2019).    This note was prepared using voice recognition system and is likely to have sound alike errors that may have been overlooked even after proof reading.  Please call me with any questions    Discussed diagnosis, its evaluation, treatment and usual course. All questions answered.      Miguel Salazar MD

## 2019-06-05 ENCOUNTER — ANTI-COAG VISIT (OUTPATIENT)
Dept: CARDIOLOGY | Facility: CLINIC | Age: 49
End: 2019-06-05
Payer: COMMERCIAL

## 2019-06-05 ENCOUNTER — PATIENT MESSAGE (OUTPATIENT)
Dept: PULMONOLOGY | Facility: CLINIC | Age: 49
End: 2019-06-05

## 2019-06-05 DIAGNOSIS — Z79.01 LONG TERM (CURRENT) USE OF ANTICOAGULANTS: ICD-10-CM

## 2019-06-05 DIAGNOSIS — Z95.2 HX OF MECHANICAL AORTIC VALVE REPLACEMENT: Chronic | ICD-10-CM

## 2019-06-05 LAB — INR PPP: 1.6

## 2019-06-05 PROCEDURE — 93793 ANTICOAG MGMT PT WARFARIN: CPT | Mod: S$GLB,,,

## 2019-06-05 PROCEDURE — 93793 PR ANTICOAGULANT MGMT FOR PT TAKING WARFARIN: ICD-10-PCS | Mod: S$GLB,,,

## 2019-06-05 NOTE — PROGRESS NOTES
Patient's INR is sub-therapeutic at 1.6 due to missed dose.  Ms. Sal was prescribed nintedanib 150mg BID on 6/4; advised of increased risk of bleeding.   No abnormal pain or numbness noted.  Orders given : Take warfarin 9mg on today; then resume current dose of 3mg on Fridays and 6mg on all other days of the week.  Recheck in 1 week.

## 2019-06-05 NOTE — PROGRESS NOTES
Verbal results taken from Augusto ZELAYA with [facility] at [phone]__. PT/INR _19.6 / 1.6__ Date drawn_6/05/2019.  Reports a missed dose last Wednesday, 5/29/2019 and patient had taken 6 mg on Friday, 5/31/2019.

## 2019-06-07 ENCOUNTER — TELEPHONE (OUTPATIENT)
Dept: CARDIOLOGY | Facility: CLINIC | Age: 49
End: 2019-06-07

## 2019-06-07 NOTE — TELEPHONE ENCOUNTER
Pt states that she has been having chest tightness and states that 30 MG and her body wasn't able to tolerate. So pt wants to know if she can do isobabide 10 MG three times a day?.//rf              ----- Message from Carlota Murguia sent at 6/7/2019 12:02 PM CDT -----  Please call pt back at [phone]. pt would like to know if she can get back on isobabide for chest pain. Pt state she can take 10 mg. Pt uses [facility] pharmacy.

## 2019-06-10 NOTE — TELEPHONE ENCOUNTER
S/W Pt and notified pt that she can try to take the isobabide 10 MG three times a day. Follow up for changes in meds. Pt has follow up scheduled on 6/14/2019. Pt verbalized of all understanding.//rf

## 2019-06-11 ENCOUNTER — TELEPHONE (OUTPATIENT)
Dept: PULMONOLOGY | Facility: CLINIC | Age: 49
End: 2019-06-11

## 2019-06-11 NOTE — TELEPHONE ENCOUNTER
Tried to call Vineet back at [facility] and left message. They called about a cardio message.

## 2019-06-12 ENCOUNTER — TELEPHONE (OUTPATIENT)
Dept: TRANSPLANT | Facility: CLINIC | Age: 49
End: 2019-06-12

## 2019-06-12 LAB — INR PPP: 2

## 2019-06-12 NOTE — TELEPHONE ENCOUNTER
"Pt states she had to cancel her consult appointment on Monday because her  was called to a job and had to leave on Sunday. He would not have been able to accompany her to her appointment on Monday.  Pt states that her Cardiologist told her that there is more "work that needs to be done on her heart before she can be considered for transplant".  Explained that with a diagnosis of IPF our doctors prefer to see patients at diagnosis because it can be, but not always, rapidly progressive. That way patients are in our system and being followed to determine accurate timing for transplant evaluation.  Explained that consult is more of an informational appointment where a better understanding of their diagnosis is gained and an explanation of where they are in the transplant process is also explained.  Told patient that patients may be at a place where the doctor recommends evaluation for listing following that appointment, or they don't meet criteria because they are either too well, don't meet required walk distance, need to lose or gain weight, etc., at which time they would come back for assessment at a certain interval of time determined by the physician.  Explained that patients may be deemed too sick for transplant and may not undergo evaluation. Explained that patients who are listed are monitored every 2 months and may be removed from the list for multiple reasons including not meeting walk distance, becoming an increased surgical risk, etc.  Pt thanked me for my time and states that her mind has been set at ease as she was very anxious about this whole process.  She states she will discuss transplant re-referral with Dr. Salazar in a few months.     ----- Message from Stefania Nails sent at 6/12/2019 10:34 AM CDT -----  Contact: patient   Please call pt at [phone]    Patient would like to explain her recent appt cancellation.     Also stated that her cardiologist thinks she should wait to " schedule future lung appts    Thank you

## 2019-06-13 ENCOUNTER — ANTI-COAG VISIT (OUTPATIENT)
Dept: CARDIOLOGY | Facility: CLINIC | Age: 49
End: 2019-06-13
Payer: COMMERCIAL

## 2019-06-13 DIAGNOSIS — Z79.01 LONG TERM (CURRENT) USE OF ANTICOAGULANTS: ICD-10-CM

## 2019-06-13 DIAGNOSIS — Z95.2 HX OF MECHANICAL AORTIC VALVE REPLACEMENT: Chronic | ICD-10-CM

## 2019-06-13 DIAGNOSIS — Z98.890 H/O MITRAL VALVE REPAIR: ICD-10-CM

## 2019-06-13 PROCEDURE — G0179 PR HOME HEALTH MD RECERTIFICATION: ICD-10-PCS | Mod: NTX,,, | Performed by: INTERNAL MEDICINE

## 2019-06-13 PROCEDURE — 93793 PR ANTICOAGULANT MGMT FOR PT TAKING WARFARIN: ICD-10-PCS | Mod: S$GLB,,,

## 2019-06-13 PROCEDURE — G0179 MD RECERTIFICATION HHA PT: HCPCS | Mod: NTX,,, | Performed by: INTERNAL MEDICINE

## 2019-06-13 PROCEDURE — 93793 ANTICOAG MGMT PT WARFARIN: CPT | Mod: S$GLB,,,

## 2019-06-13 NOTE — PROGRESS NOTES
Patient's INR is therapeutic at 2.0 but remains at lower end of range.  No abnormal pain or numbness noted.  Orders given : Increase dose of warfarin to 6mg every evening. Recheck on 6/24/19.

## 2019-06-13 NOTE — PROGRESS NOTES
Verbal results by Carole with [facility] at [phone]:  PT:  23.7 and INR: 2.0.  Patient has taken medication as previously instructed.

## 2019-06-20 ENCOUNTER — TELEPHONE (OUTPATIENT)
Dept: PULMONOLOGY | Facility: CLINIC | Age: 49
End: 2019-06-20

## 2019-06-20 NOTE — TELEPHONE ENCOUNTER
[phone]  Faxed over order for meds to [facility].       ----- Message from Ilana Milian sent at 6/20/2019 10:31 AM CDT -----  Contact: [facility] [phone]  Type:  RX Refill Request    Who Called: [facility]   Refill or New Rx:new rx  RX Name and Strength:ofev   How is the patient currently taking it? (ex. 1XDay):unk  Is this a 30 day or 90 day RX: 90 day  Preferred Pharmacy with phone number: [facility] [phone]  Local or Mail Order:mail order  Ordering Provider:Martin  Would the patient rather a call back or a response via [facility]? Call back  Best Call Back Number:[phone]  Additional Information: States that this will be a new rx for the pharm. States that this is the 4th attempt to get the rx.

## 2019-06-21 ENCOUNTER — HOSPITAL ENCOUNTER (OUTPATIENT)
Facility: HOSPITAL | Age: 49
Discharge: HOME | End: 2019-06-22
Attending: EMERGENCY MEDICINE | Admitting: FAMILY MEDICINE
Payer: COMMERCIAL

## 2019-06-21 DIAGNOSIS — R07.9 CHEST PAIN: ICD-10-CM

## 2019-06-21 DIAGNOSIS — J81.0 ACUTE PULMONARY EDEMA: Primary | ICD-10-CM

## 2019-06-21 DIAGNOSIS — R19.7 DIARRHEA, UNSPECIFIED TYPE: ICD-10-CM

## 2019-06-21 DIAGNOSIS — R06.00 DYSPNEA: ICD-10-CM

## 2019-06-21 DIAGNOSIS — J84.10 PULMONARY FIBROSIS: Chronic | ICD-10-CM

## 2019-06-21 DIAGNOSIS — I50.30 (HFPEF) HEART FAILURE WITH PRESERVED EJECTION FRACTION: Chronic | ICD-10-CM

## 2019-06-21 DIAGNOSIS — R79.89 ELEVATED TROPONIN: ICD-10-CM

## 2019-06-21 DIAGNOSIS — Z95.2 H/O HEART VALVE REPLACEMENT WITH MECHANICAL VALVE: ICD-10-CM

## 2019-06-21 DIAGNOSIS — R10.9 ABDOMINAL PAIN, UNSPECIFIED ABDOMINAL LOCATION: ICD-10-CM

## 2019-06-21 DIAGNOSIS — D64.9 ANEMIA, UNSPECIFIED TYPE: ICD-10-CM

## 2019-06-21 DIAGNOSIS — Z79.01 ANTICOAGULANT LONG-TERM USE: ICD-10-CM

## 2019-06-21 LAB
ALBUMIN SERPL BCP-MCNC: 3.9 G/DL (ref 3.5–5.2)
ALP SERPL-CCNC: 101 U/L (ref 55–135)
ALT SERPL W/O P-5'-P-CCNC: 65 U/L (ref 10–44)
ANION GAP SERPL CALC-SCNC: 10 MMOL/L (ref 8–16)
AST SERPL-CCNC: 64 U/L (ref 10–40)
BASOPHILS # BLD AUTO: 0.02 K/UL (ref 0–0.2)
BASOPHILS NFR BLD: 0.2 % (ref 0–1.9)
BILIRUB SERPL-MCNC: 0.9 MG/DL (ref 0.1–1)
BUN SERPL-MCNC: 9 MG/DL (ref 6–20)
CALCIUM SERPL-MCNC: 8.8 MG/DL (ref 8.7–10.5)
CHLORIDE SERPL-SCNC: 100 MMOL/L (ref 95–110)
CO2 SERPL-SCNC: 25 MMOL/L (ref 23–29)
CREAT SERPL-MCNC: 1 MG/DL (ref 0.5–1.4)
DIFFERENTIAL METHOD: ABNORMAL
EOSINOPHIL # BLD AUTO: 0.3 K/UL (ref 0–0.5)
EOSINOPHIL NFR BLD: 3.6 % (ref 0–8)
ERYTHROCYTE [DISTWIDTH] IN BLOOD BY AUTOMATED COUNT: 18.3 % (ref 11.5–14.5)
EST. GFR  (AFRICAN AMERICAN): >60 ML/MIN/1.73 M^2
EST. GFR  (NON AFRICAN AMERICAN): >60 ML/MIN/1.73 M^2
GLUCOSE SERPL-MCNC: 94 MG/DL (ref 70–110)
HCT VFR BLD AUTO: 35.7 % (ref 37–48.5)
HGB BLD-MCNC: 11 G/DL (ref 12–16)
LIPASE SERPL-CCNC: 14 U/L (ref 4–60)
LYMPHOCYTES # BLD AUTO: 2.5 K/UL (ref 1–4.8)
LYMPHOCYTES NFR BLD: 29.8 % (ref 18–48)
MCH RBC QN AUTO: 31.4 PG (ref 27–31)
MCHC RBC AUTO-ENTMCNC: 30.8 G/DL (ref 32–36)
MCV RBC AUTO: 102 FL (ref 82–98)
MONOCYTES # BLD AUTO: 0.8 K/UL (ref 0.3–1)
MONOCYTES NFR BLD: 9.7 % (ref 4–15)
NEUTROPHILS # BLD AUTO: 4.8 K/UL (ref 1.8–7.7)
NEUTROPHILS NFR BLD: 56.9 % (ref 38–73)
PLATELET # BLD AUTO: 163 K/UL (ref 150–350)
PMV BLD AUTO: 10.4 FL (ref 9.2–12.9)
POTASSIUM SERPL-SCNC: 3.4 MMOL/L (ref 3.5–5.1)
PROT SERPL-MCNC: 7.2 G/DL (ref 6–8.4)
RBC # BLD AUTO: 3.5 M/UL (ref 4–5.4)
SODIUM SERPL-SCNC: 135 MMOL/L (ref 136–145)
TROPONIN I SERPL DL<=0.01 NG/ML-MCNC: 0.23 NG/ML (ref 0–0.03)
WBC # BLD AUTO: 8.53 K/UL (ref 3.9–12.7)

## 2019-06-21 PROCEDURE — 85730 THROMBOPLASTIN TIME PARTIAL: CPT | Mod: NTX

## 2019-06-21 PROCEDURE — 93010 ELECTROCARDIOGRAM REPORT: CPT | Mod: NTX,,, | Performed by: INTERNAL MEDICINE

## 2019-06-21 PROCEDURE — 86703 HIV-1/HIV-2 1 RESULT ANTBDY: CPT | Mod: NTX

## 2019-06-21 PROCEDURE — 93005 ELECTROCARDIOGRAM TRACING: CPT | Mod: NTX

## 2019-06-21 PROCEDURE — 80053 COMPREHEN METABOLIC PANEL: CPT | Mod: NTX

## 2019-06-21 PROCEDURE — 85025 COMPLETE CBC W/AUTO DIFF WBC: CPT | Mod: NTX

## 2019-06-21 PROCEDURE — 25000003 PHARM REV CODE 250: Mod: NTX | Performed by: EMERGENCY MEDICINE

## 2019-06-21 PROCEDURE — 83880 ASSAY OF NATRIURETIC PEPTIDE: CPT | Mod: NTX

## 2019-06-21 PROCEDURE — 83690 ASSAY OF LIPASE: CPT | Mod: NTX

## 2019-06-21 PROCEDURE — 93010 EKG 12-LEAD: ICD-10-PCS | Mod: NTX,,, | Performed by: INTERNAL MEDICINE

## 2019-06-21 PROCEDURE — 85610 PROTHROMBIN TIME: CPT | Mod: NTX

## 2019-06-21 PROCEDURE — 84484 ASSAY OF TROPONIN QUANT: CPT | Mod: NTX

## 2019-06-21 PROCEDURE — 99291 CRITICAL CARE FIRST HOUR: CPT | Mod: 25,NTX

## 2019-06-21 PROCEDURE — 96374 THER/PROPH/DIAG INJ IV PUSH: CPT | Mod: NTX

## 2019-06-21 RX ADMIN — NITROGLYCERIN 1 INCH: 20 OINTMENT TOPICAL at 11:06

## 2019-06-22 VITALS
SYSTOLIC BLOOD PRESSURE: 115 MMHG | HEART RATE: 61 BPM | BODY MASS INDEX: 32.68 KG/M2 | RESPIRATION RATE: 16 BRPM | DIASTOLIC BLOOD PRESSURE: 67 MMHG | TEMPERATURE: 98 F | OXYGEN SATURATION: 94 % | HEIGHT: 60 IN | WEIGHT: 166.44 LBS

## 2019-06-22 PROBLEM — E87.6 HYPOKALEMIA: Status: ACTIVE | Noted: 2019-06-22

## 2019-06-22 PROBLEM — E87.6 HYPOKALEMIA: Status: RESOLVED | Noted: 2019-06-22 | Resolved: 2019-06-22

## 2019-06-22 PROBLEM — J81.0 ACUTE PULMONARY EDEMA: Status: RESOLVED | Noted: 2019-06-22 | Resolved: 2019-06-22

## 2019-06-22 PROBLEM — J81.0 ACUTE PULMONARY EDEMA: Status: ACTIVE | Noted: 2019-06-22

## 2019-06-22 LAB
ANION GAP SERPL CALC-SCNC: 9 MMOL/L (ref 8–16)
APTT BLDCRRT: 27.1 SEC (ref 21–32)
APTT BLDCRRT: 41.3 SEC (ref 21–32)
BASOPHILS # BLD AUTO: 0.05 K/UL (ref 0–0.2)
BASOPHILS NFR BLD: 0.7 % (ref 0–1.9)
BILIRUB UR QL STRIP: NEGATIVE
BNP SERPL-MCNC: 770 PG/ML (ref 0–99)
BUN SERPL-MCNC: 7 MG/DL (ref 6–20)
CALCIUM SERPL-MCNC: 8.8 MG/DL (ref 8.7–10.5)
CHLORIDE SERPL-SCNC: 102 MMOL/L (ref 95–110)
CHOLEST SERPL-MCNC: 101 MG/DL (ref 120–199)
CHOLEST/HDLC SERPL: 3.6 {RATIO} (ref 2–5)
CLARITY UR: CLEAR
CO2 SERPL-SCNC: 30 MMOL/L (ref 23–29)
COLOR UR: YELLOW
CREAT SERPL-MCNC: 1 MG/DL (ref 0.5–1.4)
DIFFERENTIAL METHOD: ABNORMAL
EOSINOPHIL # BLD AUTO: 0.4 K/UL (ref 0–0.5)
EOSINOPHIL NFR BLD: 4.7 % (ref 0–8)
ERYTHROCYTE [DISTWIDTH] IN BLOOD BY AUTOMATED COUNT: 17.5 % (ref 11.5–14.5)
EST. GFR  (AFRICAN AMERICAN): >60 ML/MIN/1.73 M^2
EST. GFR  (NON AFRICAN AMERICAN): >60 ML/MIN/1.73 M^2
ESTIMATED AVG GLUCOSE: 103 MG/DL (ref 68–131)
GLUCOSE SERPL-MCNC: 120 MG/DL (ref 70–110)
GLUCOSE UR QL STRIP: NEGATIVE
HBA1C MFR BLD HPLC: 5.2 % (ref 4–5.6)
HCT VFR BLD AUTO: 34.1 % (ref 37–48.5)
HDLC SERPL-MCNC: 28 MG/DL (ref 40–75)
HDLC SERPL: 27.7 % (ref 20–50)
HGB BLD-MCNC: 10.4 G/DL (ref 12–16)
HGB UR QL STRIP: ABNORMAL
INR PPP: 2.8 (ref 0.8–1.2)
INR PPP: 3.4 (ref 0.8–1.2)
KETONES UR QL STRIP: NEGATIVE
LDLC SERPL CALC-MCNC: 58.2 MG/DL (ref 63–159)
LEUKOCYTE ESTERASE UR QL STRIP: NEGATIVE
LYMPHOCYTES # BLD AUTO: 1.6 K/UL (ref 1–4.8)
LYMPHOCYTES NFR BLD: 21 % (ref 18–48)
MAGNESIUM SERPL-MCNC: 2.5 MG/DL (ref 1.6–2.6)
MCH RBC QN AUTO: 32 PG (ref 27–31)
MCHC RBC AUTO-ENTMCNC: 30.5 G/DL (ref 32–36)
MCV RBC AUTO: 105 FL (ref 82–98)
MICROSCOPIC COMMENT: NORMAL
MONOCYTES # BLD AUTO: 0.7 K/UL (ref 0.3–1)
MONOCYTES NFR BLD: 9.8 % (ref 4–15)
NEUTROPHILS # BLD AUTO: 4.8 K/UL (ref 1.8–7.7)
NEUTROPHILS NFR BLD: 63.5 % (ref 38–73)
NITRITE UR QL STRIP: NEGATIVE
NONHDLC SERPL-MCNC: 73 MG/DL
NRBC BLD-RTO: 0 /100 WBC
PH UR STRIP: 8 [PH] (ref 5–8)
PHOSPHATE SERPL-MCNC: 2.1 MG/DL (ref 2.7–4.5)
PLATELET # BLD AUTO: 152 K/UL (ref 150–350)
PMV BLD AUTO: 10.8 FL (ref 9.2–12.9)
POTASSIUM SERPL-SCNC: 3.4 MMOL/L (ref 3.5–5.1)
PROT UR QL STRIP: NEGATIVE
PROTHROMBIN TIME: 29.5 SEC (ref 9–12.5)
PROTHROMBIN TIME: 35.6 SEC (ref 9–12.5)
RBC # BLD AUTO: 3.25 M/UL (ref 4–5.4)
RBC #/AREA URNS HPF: 2 /HPF (ref 0–4)
SODIUM SERPL-SCNC: 141 MMOL/L (ref 136–145)
SP GR UR STRIP: 1.01 (ref 1–1.03)
TRIGL SERPL-MCNC: 74 MG/DL (ref 30–150)
TROPONIN I SERPL DL<=0.01 NG/ML-MCNC: 0.18 NG/ML (ref 0–0.03)
TROPONIN I SERPL DL<=0.01 NG/ML-MCNC: 0.22 NG/ML (ref 0–0.03)
TSH SERPL DL<=0.005 MIU/L-ACNC: 1.98 UIU/ML (ref 0.4–4)
URN SPEC COLLECT METH UR: ABNORMAL
UROBILINOGEN UR STRIP-ACNC: NEGATIVE EU/DL
WBC # BLD AUTO: 7.58 K/UL (ref 3.9–12.7)

## 2019-06-22 PROCEDURE — 25000003 PHARM REV CODE 250: Mod: NTX | Performed by: EMERGENCY MEDICINE

## 2019-06-22 PROCEDURE — 85025 COMPLETE CBC W/AUTO DIFF WBC: CPT | Mod: NTX

## 2019-06-22 PROCEDURE — 84100 ASSAY OF PHOSPHORUS: CPT | Mod: NTX

## 2019-06-22 PROCEDURE — 84484 ASSAY OF TROPONIN QUANT: CPT | Mod: NTX

## 2019-06-22 PROCEDURE — 25000242 PHARM REV CODE 250 ALT 637 W/ HCPCS: Mod: NTX | Performed by: FAMILY MEDICINE

## 2019-06-22 PROCEDURE — 94761 N-INVAS EAR/PLS OXIMETRY MLT: CPT | Mod: NTX

## 2019-06-22 PROCEDURE — 85610 PROTHROMBIN TIME: CPT | Mod: NTX

## 2019-06-22 PROCEDURE — 36415 COLL VENOUS BLD VENIPUNCTURE: CPT | Mod: NTX

## 2019-06-22 PROCEDURE — 63600175 PHARM REV CODE 636 W HCPCS: Mod: NTX | Performed by: NURSE PRACTITIONER

## 2019-06-22 PROCEDURE — 96376 TX/PRO/DX INJ SAME DRUG ADON: CPT | Mod: NTX | Performed by: EMERGENCY MEDICINE

## 2019-06-22 PROCEDURE — 81000 URINALYSIS NONAUTO W/SCOPE: CPT | Mod: NTX

## 2019-06-22 PROCEDURE — 80048 BASIC METABOLIC PNL TOTAL CA: CPT | Mod: NTX

## 2019-06-22 PROCEDURE — G0378 HOSPITAL OBSERVATION PER HR: HCPCS | Mod: NTX

## 2019-06-22 PROCEDURE — 25000003 PHARM REV CODE 250: Mod: NTX | Performed by: NURSE PRACTITIONER

## 2019-06-22 PROCEDURE — 94640 AIRWAY INHALATION TREATMENT: CPT | Mod: NTX

## 2019-06-22 PROCEDURE — 84443 ASSAY THYROID STIM HORMONE: CPT | Mod: NTX

## 2019-06-22 PROCEDURE — 80061 LIPID PANEL: CPT | Mod: NTX

## 2019-06-22 PROCEDURE — 27000221 HC OXYGEN, UP TO 24 HOURS: Mod: NTX

## 2019-06-22 PROCEDURE — 63600175 PHARM REV CODE 636 W HCPCS: Mod: NTX | Performed by: EMERGENCY MEDICINE

## 2019-06-22 PROCEDURE — 83735 ASSAY OF MAGNESIUM: CPT | Mod: NTX

## 2019-06-22 PROCEDURE — 85730 THROMBOPLASTIN TIME PARTIAL: CPT | Mod: NTX

## 2019-06-22 PROCEDURE — 83036 HEMOGLOBIN GLYCOSYLATED A1C: CPT | Mod: NTX

## 2019-06-22 RX ORDER — FUROSEMIDE 10 MG/ML
40 INJECTION INTRAMUSCULAR; INTRAVENOUS 2 TIMES DAILY
Status: DISCONTINUED | OUTPATIENT
Start: 2019-06-22 | End: 2019-06-22 | Stop reason: HOSPADM

## 2019-06-22 RX ORDER — POTASSIUM CHLORIDE 750 MG/1
30 TABLET, EXTENDED RELEASE ORAL
Status: COMPLETED | OUTPATIENT
Start: 2019-06-22 | End: 2019-06-22

## 2019-06-22 RX ORDER — ARFORMOTEROL TARTRATE 15 UG/2ML
15 SOLUTION RESPIRATORY (INHALATION) 2 TIMES DAILY
Status: DISCONTINUED | OUTPATIENT
Start: 2019-06-22 | End: 2019-06-22 | Stop reason: HOSPADM

## 2019-06-22 RX ORDER — FUROSEMIDE 10 MG/ML
80 INJECTION INTRAMUSCULAR; INTRAVENOUS
Status: COMPLETED | OUTPATIENT
Start: 2019-06-22 | End: 2019-06-22

## 2019-06-22 RX ORDER — METOPROLOL SUCCINATE 25 MG/1
25 TABLET, EXTENDED RELEASE ORAL DAILY
Status: DISCONTINUED | OUTPATIENT
Start: 2019-06-22 | End: 2019-06-22 | Stop reason: HOSPADM

## 2019-06-22 RX ORDER — DESVENLAFAXINE 100 MG/1
100 TABLET, EXTENDED RELEASE ORAL DAILY
Status: DISCONTINUED | OUTPATIENT
Start: 2019-06-22 | End: 2019-06-22 | Stop reason: HOSPADM

## 2019-06-22 RX ORDER — WARFARIN 3 MG/1
6 TABLET ORAL DAILY
Status: DISCONTINUED | OUTPATIENT
Start: 2019-06-22 | End: 2019-06-22 | Stop reason: HOSPADM

## 2019-06-22 RX ORDER — CLONAZEPAM 1 MG/1
1 TABLET ORAL 3 TIMES DAILY
Status: DISCONTINUED | OUTPATIENT
Start: 2019-06-22 | End: 2019-06-22 | Stop reason: HOSPADM

## 2019-06-22 RX ORDER — TOPIRAMATE 100 MG/1
100 TABLET, FILM COATED ORAL 2 TIMES DAILY
Status: DISCONTINUED | OUTPATIENT
Start: 2019-06-22 | End: 2019-06-22 | Stop reason: HOSPADM

## 2019-06-22 RX ORDER — DICYCLOMINE HYDROCHLORIDE 20 MG/1
20 TABLET ORAL
Status: COMPLETED | OUTPATIENT
Start: 2019-06-22 | End: 2019-06-22

## 2019-06-22 RX ORDER — IPRATROPIUM BROMIDE 0.5 MG/2.5ML
0.5 SOLUTION RESPIRATORY (INHALATION) EVERY 6 HOURS
Status: DISCONTINUED | OUTPATIENT
Start: 2019-06-22 | End: 2019-06-22 | Stop reason: HOSPADM

## 2019-06-22 RX ORDER — PANTOPRAZOLE SODIUM 40 MG/1
40 TABLET, DELAYED RELEASE ORAL DAILY
Status: DISCONTINUED | OUTPATIENT
Start: 2019-06-22 | End: 2019-06-22 | Stop reason: HOSPADM

## 2019-06-22 RX ORDER — ROSUVASTATIN CALCIUM 10 MG/1
20 TABLET, COATED ORAL DAILY
Status: DISCONTINUED | OUTPATIENT
Start: 2019-06-22 | End: 2019-06-22 | Stop reason: HOSPADM

## 2019-06-22 RX ORDER — FUROSEMIDE 40 MG/1
40 TABLET ORAL 2 TIMES DAILY
Status: DISCONTINUED | OUTPATIENT
Start: 2019-06-22 | End: 2019-06-22

## 2019-06-22 RX ADMIN — TOPIRAMATE 100 MG: 100 TABLET, FILM COATED ORAL at 09:06

## 2019-06-22 RX ADMIN — ROSUVASTATIN CALCIUM 20 MG: 10 TABLET, COATED ORAL at 09:06

## 2019-06-22 RX ADMIN — FUROSEMIDE 80 MG: 10 INJECTION, SOLUTION INTRAMUSCULAR; INTRAVENOUS at 01:06

## 2019-06-22 RX ADMIN — METOPROLOL SUCCINATE 25 MG: 25 TABLET, EXTENDED RELEASE ORAL at 09:06

## 2019-06-22 RX ADMIN — BUSPIRONE HYDROCHLORIDE 15 MG: 10 TABLET ORAL at 09:06

## 2019-06-22 RX ADMIN — ARFORMOTEROL TARTRATE 15 MCG: 15 SOLUTION RESPIRATORY (INHALATION) at 07:06

## 2019-06-22 RX ADMIN — PANTOPRAZOLE SODIUM 40 MG: 40 TABLET, DELAYED RELEASE ORAL at 09:06

## 2019-06-22 RX ADMIN — FUROSEMIDE 40 MG: 10 INJECTION, SOLUTION INTRAMUSCULAR; INTRAVENOUS at 09:06

## 2019-06-22 RX ADMIN — DICYCLOMINE HYDROCHLORIDE 20 MG: 20 TABLET ORAL at 03:06

## 2019-06-22 RX ADMIN — CLONAZEPAM 1 MG: 1 TABLET ORAL at 09:06

## 2019-06-22 RX ADMIN — POTASSIUM CHLORIDE 30 MEQ: 750 TABLET, EXTENDED RELEASE ORAL at 01:06

## 2019-06-22 RX ADMIN — DESVENLAFAXINE SUCCINATE 100 MG: 100 TABLET, FILM COATED, EXTENDED RELEASE ORAL at 09:06

## 2019-06-22 RX ADMIN — IPRATROPIUM BROMIDE 0.5 MG: 0.5 SOLUTION RESPIRATORY (INHALATION) at 07:06

## 2019-06-22 NOTE — SUBJECTIVE & OBJECTIVE
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, implants and grafts, initial encounter     Hyperlipidemia     Hypertension     Sleep apnea syndrome 2/8/2019    Stroke        Past Surgical History:   Procedure Laterality Date    CARDIAC CATHETERIZATION      CARDIAC VALVE SURGERY      CORONARY ANGIOPLASTY      CORONARY ARTERY BYPASS GRAFT      HYSTERECTOMY      INSERTION, CATHETER, RIGHT HEART Right 5/17/2019    Performed by Derek Brown MD at [facility]       Review of patient's allergies indicates:   Allergen Reactions    Nsaids (non-steroidal anti-inflammatory drug) Other (See Comments)     Mechanical heart valve       No current facility-administered medications on file prior to encounter.      Current Outpatient Medications on File Prior to Encounter   Medication Sig    amitriptyline (ELAVIL) 25 MG tablet Take 25 mg by mouth every evening.     busPIRone (BUSPAR) 15 MG tablet Take 15 mg by mouth 2 (two) times daily.     clonazePAM (KLONOPIN) 1 MG tablet Take 1 mg by mouth 3 (three) times daily.     desvenlafaxine succinate (PRISTIQ) 100 MG Tb24 Take 1 tablet (100 mg total) by mouth once daily.    furosemide (LASIX) 40 MG tablet Take 40 mg by mouth 2 (two) times daily.    lidocaine-tetracaine 7-7 % Crea     metoprolol succinate (TOPROL-XL) 25 MG 24 hr tablet Take 1 tablet (25 mg total) by mouth once daily.    nitroGLYCERIN (NITROSTAT) 0.4 MG SL tablet     pantoprazole (PROTONIX) 40 MG tablet Take 40 mg by mouth once daily.    rosuvastatin (CRESTOR) 20 MG tablet Take 20 mg by mouth once daily.    sumatriptan (IMITREX) 100 MG tablet Take 100 mg by mouth every 2 (two) hours as needed.    tiZANidine (ZANAFLEX) 4 MG tablet Take 4 mg by mouth every evening.    topiramate  (TOPAMAX) 100 MG tablet Take 100 mg by mouth 2 (two) times daily.    traMADol (ULTRAM) 50 mg tablet Take 50 mg by mouth every 4 (four) hours as needed.     traZODone (DESYREL) 150 MG tablet Take 150 mg by mouth nightly.    albuterol (VENTOLIN HFA) 90 mcg/actuation inhaler Inhale 2 puffs into the lungs every 6 (six) hours as needed for Wheezing. Rescue    diclofenac sodium (VOLTAREN) 1 % Gel Apply 2 g topically 4 (four) times daily.    fluticasone (FLONASE) 50 mcg/actuation nasal spray 1 spray by Each Nare route once daily.    nicotine (NICODERM CQ) 7 mg/24 hr Place 1 patch onto the skin every 24 hours.    nintedanib (OFEV) 150 mg Cap Take 150 mg by mouth 2 (two) times daily.    potassium chloride SA (K-DUR,KLOR-CON) 20 MEQ tablet Take 20 mEq by mouth 2 (two) times daily.    umeclidinium-vilanterol (ANORO ELLIPTA) 62.5-25 mcg/actuation DsDv Inhale 1 puff into the lungs once daily. Controller    [] VITAMIN B COMPLEX ORAL Take 1 capsule by mouth.    vitamin D (VITAMIN D3) 1000 units Tab Take 1,000 Units by mouth once daily.    warfarin (COUMADIN) 6 MG tablet Take 6 mg by mouth Daily.      Family History     Problem Relation (Age of Onset)    Breast cancer Sister    Coronary artery disease Father    Heart disease Mother    Lung cancer Brother        Tobacco Use    Smoking status: Former Smoker     Packs/day: 1.00     Types: Cigarettes     Last attempt to quit: 2018     Years since quittin.0    Smokeless tobacco: Never Used   Substance and Sexual Activity    Alcohol use: Not Currently     Frequency: 2-4 times a month     Drinks per session: 1 or 2     Binge frequency: Never    Drug use: Not Currently    Sexual activity: Not on file     Review of Systems   Constitutional: Negative for chills, diaphoresis, fatigue and fever.   HENT: Negative for congestion, sore throat and voice change.    Eyes: Negative for photophobia and visual disturbance.   Respiratory: Positive for shortness of  breath. Negative for cough, wheezing and stridor.    Cardiovascular: Positive for chest pain. Negative for leg swelling.   Gastrointestinal: Negative for abdominal distention, abdominal pain, constipation, diarrhea, nausea and vomiting.   Endocrine: Negative for polydipsia, polyphagia and polyuria.   Genitourinary: Negative for difficulty urinating, dysuria, flank pain, pelvic pain, urgency and vaginal discharge.   Musculoskeletal: Negative for back pain, joint swelling, neck pain and neck stiffness.   Skin: Negative for color change and rash.   Allergic/Immunologic: Negative for immunocompromised state.   Neurological: Negative for dizziness, syncope, weakness, numbness and headaches.   Hematological: Does not bruise/bleed easily.   Psychiatric/Behavioral: Negative for agitation, behavioral problems and confusion.     Objective:     Vital Signs (Most Recent):  Temp: 98.3 °F (36.8 °C) (06/21/19 2043)  Pulse: (!) 59 (06/21/19 2240)  Resp: 20 (06/21/19 2240)  BP: 110/64 (06/21/19 2240)  SpO2: 99 % (06/21/19 2240) Vital Signs (24h Range):  Temp:  [98.3 °F (36.8 °C)] 98.3 °F (36.8 °C)  Pulse:  [59-75] 59  Resp:  [18-20] 20  SpO2:  [99 %] 99 %  BP: (110-129)/(63-64) 110/64     Weight: 75.5 kg (166 lb 7.2 oz)  Body mass index is 32.51 kg/m².    Physical Exam   Constitutional: She is oriented to person, place, and time. She appears well-developed and well-nourished. No distress.   HENT:   Head: Normocephalic and atraumatic.   Nose: Nose normal.   Eyes: Pupils are equal, round, and reactive to light. Conjunctivae and EOM are normal. No scleral icterus.   Neck: Normal range of motion. Neck supple. No tracheal deviation present.   Cardiovascular: Normal rate, regular rhythm and intact distal pulses.   Murmur heard.  Pulmonary/Chest: Effort normal. No stridor. No respiratory distress. She has no wheezes. She has no rales.   Course     Abdominal: Soft. Bowel sounds are normal. She exhibits no distension. There is no tenderness.  There is no guarding.   Genitourinary:   Genitourinary Comments: Check ua     Musculoskeletal: Normal range of motion. She exhibits no edema or deformity.   Neurological: She is alert and oriented to person, place, and time. No cranial nerve deficit.   Skin: Skin is warm and dry. Capillary refill takes 2 to 3 seconds. No rash noted. She is not diaphoretic.   Psychiatric: She has a normal mood and affect. Her behavior is normal. Judgment and thought content normal.   Nursing note and vitals reviewed.        CRANIAL NERVES     CN III, IV, VI   Pupils are equal, round, and reactive to light.  Extraocular motions are normal.        Significant Labs: All pertinent labs within the past 24 hours have been reviewed.  Results for orders placed or performed during the hospital encounter of 06/21/19   CBC auto differential   Result Value Ref Range    WBC 8.53 3.90 - 12.70 K/uL    RBC 3.50 (L) 4.00 - 5.40 M/uL    Hemoglobin 11.0 (L) 12.0 - 16.0 g/dL    Hematocrit 35.7 (L) 37.0 - 48.5 %    Mean Corpuscular Volume 102 (H) 82 - 98 fL    Mean Corpuscular Hemoglobin 31.4 (H) 27.0 - 31.0 pg    Mean Corpuscular Hemoglobin Conc 30.8 (L) 32.0 - 36.0 g/dL    RDW 18.3 (H) 11.5 - 14.5 %    Platelets 163 150 - 350 K/uL    MPV 10.4 9.2 - 12.9 fL    Gran # (ANC) 4.8 1.8 - 7.7 K/uL    Lymph # 2.5 1.0 - 4.8 K/uL    Mono # 0.8 0.3 - 1.0 K/uL    Eos # 0.3 0.0 - 0.5 K/uL    Baso # 0.02 0.00 - 0.20 K/uL    Gran% 56.9 38.0 - 73.0 %    Lymph% 29.8 18.0 - 48.0 %    Mono% 9.7 4.0 - 15.0 %    Eosinophil% 3.6 0.0 - 8.0 %    Basophil% 0.2 0.0 - 1.9 %    Differential Method Automated    Comprehensive metabolic panel   Result Value Ref Range    Sodium 135 (L) 136 - 145 mmol/L    Potassium 3.4 (L) 3.5 - 5.1 mmol/L    Chloride 100 95 - 110 mmol/L    CO2 25 23 - 29 mmol/L    Glucose 94 70 - 110 mg/dL    BUN, Bld 9 6 - 20 mg/dL    Creatinine 1.0 0.5 - 1.4 mg/dL    Calcium 8.8 8.7 - 10.5 mg/dL    Total Protein 7.2 6.0 - 8.4 g/dL    Albumin 3.9 3.5 - 5.2 g/dL     Total Bilirubin 0.9 0.1 - 1.0 mg/dL    Alkaline Phosphatase 101 55 - 135 U/L    AST 64 (H) 10 - 40 U/L    ALT 65 (H) 10 - 44 U/L    Anion Gap 10 8 - 16 mmol/L    eGFR if African American >60 >60 mL/min/1.73 m^2    eGFR if non African American >60 >60 mL/min/1.73 m^2   Protime-INR   Result Value Ref Range    Prothrombin Time 35.6 (H) 9.0 - 12.5 sec    INR 3.4 (H) 0.8 - 1.2   APTT   Result Value Ref Range    aPTT 41.3 (H) 21.0 - 32.0 sec   Troponin I   Result Value Ref Range    Troponin I 0.233 (H) 0.000 - 0.026 ng/mL   Lipase   Result Value Ref Range    Lipase 14 4 - 60 U/L   Brain natriuretic peptide   Result Value Ref Range     (H) 0 - 99 pg/mL       Significant Imaging: I have reviewed all pertinent imaging results/findings within the past 24 hours.   Imaging Results          CT Abdomen Pelvis  Without Contrast (In process)                X-Ray Chest AP Portable (Final result)  Result time 06/21/19 23:05:09    Final result by Ben Lugo Jr., MD (06/21/19 23:05:09)                 Impression:      Mild pulmonary edema superimposed upon interstitial lung disease.  The edema has developed in the interval.      Electronically signed by: Ben Lugo Jr., MD  Date:    06/21/2019  Time:    23:05             Narrative:    EXAMINATION:  XR CHEST AP PORTABLE    CLINICAL HISTORY:  Dyspnea, unspecified    COMPARISON:  Prior radiograph from April 18, 2019.    FINDINGS:  Prior median sternotomy.  Valve replacement in place.  There are peripheral reticular opacities and Kerley's B-lines, slightly more pronounced within the left lung than on the prior study.  Heart is enlarged.  No pleural fluid or pneumothorax.No significant bony findings.

## 2019-06-22 NOTE — HPI
49-year-old female with history of valve replacement (mechanical) presents today with complaint of chest pain that started over the past day, radiates to arms.  Activity worsen.  Associated symptoms include shortness of breath.  Prior treatment nitroglycerin and Lasix.  No improvement.  Patient evaluated emergency room chest x-ray positive for pulmonary edema, troponin 0.233, .  INR 3.4.  Patient on Coumadin.  Patient given IV Lasix in ED with slight improvement.  Hospital Medicine consulted patient placed in observation.

## 2019-06-22 NOTE — ASSESSMENT & PLAN NOTE
Lasix related.Borderline.  Potassium supplementation.  Monitor and additional as needed  Further evaluation/diagnostics/interventions/consults pending course

## 2019-06-22 NOTE — PLAN OF CARE
Problem: Adult Inpatient Plan of Care  Goal: Plan of Care Review  Outcome: Ongoing (interventions implemented as appropriate)  The patient has been sinus rhythm on the monitor. Pt stated she has not started OFEV yet due to waiting on it in the mail. Peter [facility] notified. Pt has had an uneventful night and is resting quietly, will continue to monitor.

## 2019-06-22 NOTE — HOSPITAL COURSE
Patient 50 yo female admitted to hospital with SOB. 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 Patient with Pulmonary support in place and referral to the transplant service, instructed to follow up as directed for transplant consideration. Patient to see Dr Mitchell to re-energize her transplant effort. Patient further to follow with cardiology as indicated. Patient was comfortable on exam today with unlabored breathing. We discussed at length the need to go through the process to insure she is afforded the best opportunity to receive the appropriate care before it is at a critical juncture. Patient expressed her understanding of this recommendation.

## 2019-06-22 NOTE — ASSESSMENT & PLAN NOTE
Continue aspirin, Coumadin, beta-blocker, statin  Trend troponins, suspect elevation related to demand  Risk reduction: check lipid, a1c, tsh  Consult Cardiology pending course  Further evaluation/diagnostics/interventions/consults pending course

## 2019-06-22 NOTE — ASSESSMENT & PLAN NOTE
IV Lasix  Consider repeat echo pending course.  Encourage patient to be compliant with medications and diet as appropriate/applicable.  Further evaluation/diagnostics/interventions/consults pending course

## 2019-06-22 NOTE — H&P
[facility] Medicine  History & Physical    Patient Name: Meg Sal  MRN: [id number]  Admission Date: 6/21/2019  Attending Physician: Ling Davis MD   Primary Care Provider: Aquilino Hightower MD         Patient information was obtained from patient, past medical records and ER records.     Subjective:     Principal Problem:Acute pulmonary edema    Chief Complaint:   Chief Complaint   Patient presents with    Chest Pain     pt reports sob worsening over past few days; epigastric pain with pain in upper back and arms that is somewhat relieved by ntg at home, began this morning        HPI: 49-year-old female with history of valve replacement (mechanical) presents today with complaint of chest pain that started over the past day, radiates to arms.  Activity worsen.  Associated symptoms include shortness of breath.  Prior treatment nitroglycerin and Lasix.  No improvement.  Patient evaluated emergency room chest x-ray positive for pulmonary edema, troponin 0.233, .  INR 3.4.  Patient on Coumadin.  Patient given IV Lasix in ED with slight improvement.  Hospital Medicine consulted patient placed in observation.    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, implants and grafts, initial encounter     Hyperlipidemia     Hypertension     Sleep apnea syndrome 2/8/2019    Stroke        Past Surgical History:   Procedure Laterality Date    CARDIAC CATHETERIZATION      CARDIAC VALVE SURGERY      CORONARY ANGIOPLASTY      CORONARY ARTERY BYPASS GRAFT      HYSTERECTOMY      INSERTION, CATHETER, RIGHT HEART Right 5/17/2019    Performed by Derek Brown MD at [facility] CATH LAB       Review of patient's allergies indicates:   Allergen Reactions     Nsaids (non-steroidal anti-inflammatory drug) Other (See Comments)     Mechanical heart valve       No current facility-administered medications on file prior to encounter.      Current Outpatient Medications on File Prior to Encounter   Medication Sig    amitriptyline (ELAVIL) 25 MG tablet Take 25 mg by mouth every evening.     busPIRone (BUSPAR) 15 MG tablet Take 15 mg by mouth 2 (two) times daily.     clonazePAM (KLONOPIN) 1 MG tablet Take 1 mg by mouth 3 (three) times daily.     desvenlafaxine succinate (PRISTIQ) 100 MG Tb24 Take 1 tablet (100 mg total) by mouth once daily.    furosemide (LASIX) 40 MG tablet Take 40 mg by mouth 2 (two) times daily.    lidocaine-tetracaine 7-7 % Crea     metoprolol succinate (TOPROL-XL) 25 MG 24 hr tablet Take 1 tablet (25 mg total) by mouth once daily.    nitroGLYCERIN (NITROSTAT) 0.4 MG SL tablet     pantoprazole (PROTONIX) 40 MG tablet Take 40 mg by mouth once daily.    rosuvastatin (CRESTOR) 20 MG tablet Take 20 mg by mouth once daily.    sumatriptan (IMITREX) 100 MG tablet Take 100 mg by mouth every 2 (two) hours as needed.    tiZANidine (ZANAFLEX) 4 MG tablet Take 4 mg by mouth every evening.    topiramate (TOPAMAX) 100 MG tablet Take 100 mg by mouth 2 (two) times daily.    traMADol (ULTRAM) 50 mg tablet Take 50 mg by mouth every 4 (four) hours as needed.     traZODone (DESYREL) 150 MG tablet Take 150 mg by mouth nightly.    albuterol (VENTOLIN HFA) 90 mcg/actuation inhaler Inhale 2 puffs into the lungs every 6 (six) hours as needed for Wheezing. Rescue    diclofenac sodium (VOLTAREN) 1 % Gel Apply 2 g topically 4 (four) times daily.    fluticasone (FLONASE) 50 mcg/actuation nasal spray 1 spray by Each Nare route once daily.    nicotine (NICODERM CQ) 7 mg/24 hr Place 1 patch onto the skin every 24 hours.    nintedanib (OFEV) 150 mg Cap Take 150 mg by mouth 2 (two) times daily.    potassium chloride SA (K-DUR,KLOR-CON) 20 MEQ tablet Take 20 mEq by mouth  2 (two) times daily.    umeclidinium-vilanterol (ANORO ELLIPTA) 62.5-25 mcg/actuation DsDv Inhale 1 puff into the lungs once daily. Controller    [] VITAMIN B COMPLEX ORAL Take 1 capsule by mouth.    vitamin D (VITAMIN D3) 1000 units Tab Take 1,000 Units by mouth once daily.    warfarin (COUMADIN) 6 MG tablet Take 6 mg by mouth Daily.      Family History     Problem Relation (Age of Onset)    Breast cancer Sister    Coronary artery disease Father    Heart disease Mother    Lung cancer Brother        Tobacco Use    Smoking status: Former Smoker     Packs/day: 1.00     Types: Cigarettes     Last attempt to quit: 2018     Years since quittin.0    Smokeless tobacco: Never Used   Substance and Sexual Activity    Alcohol use: Not Currently     Frequency: 2-4 times a month     Drinks per session: 1 or 2     Binge frequency: Never    Drug use: Not Currently    Sexual activity: Not on file     Review of Systems   Constitutional: Negative for chills, diaphoresis, fatigue and fever.   HENT: Negative for congestion, sore throat and voice change.    Eyes: Negative for photophobia and visual disturbance.   Respiratory: Positive for shortness of breath. Negative for cough, wheezing and stridor.    Cardiovascular: Positive for chest pain. Negative for leg swelling.   Gastrointestinal: Negative for abdominal distention, abdominal pain, constipation, diarrhea, nausea and vomiting.   Endocrine: Negative for polydipsia, polyphagia and polyuria.   Genitourinary: Negative for difficulty urinating, dysuria, flank pain, pelvic pain, urgency and vaginal discharge.   Musculoskeletal: Negative for back pain, joint swelling, neck pain and neck stiffness.   Skin: Negative for color change and rash.   Allergic/Immunologic: Negative for immunocompromised state.   Neurological: Negative for dizziness, syncope, weakness, numbness and headaches.   Hematological: Does not bruise/bleed easily.   Psychiatric/Behavioral:  Negative for agitation, behavioral problems and confusion.     Objective:     Vital Signs (Most Recent):  Temp: 98.3 °F (36.8 °C) (06/21/19 2043)  Pulse: (!) 59 (06/21/19 2240)  Resp: 20 (06/21/19 2240)  BP: 110/64 (06/21/19 2240)  SpO2: 99 % (06/21/19 2240) Vital Signs (24h Range):  Temp:  [98.3 °F (36.8 °C)] 98.3 °F (36.8 °C)  Pulse:  [59-75] 59  Resp:  [18-20] 20  SpO2:  [99 %] 99 %  BP: (110-129)/(63-64) 110/64     Weight: 75.5 kg (166 lb 7.2 oz)  Body mass index is 32.51 kg/m².    Physical Exam   Constitutional: She is oriented to person, place, and time. She appears well-developed and well-nourished. No distress.   HENT:   Head: Normocephalic and atraumatic.   Nose: Nose normal.   Eyes: Pupils are equal, round, and reactive to light. Conjunctivae and EOM are normal. No scleral icterus.   Neck: Normal range of motion. Neck supple. No tracheal deviation present.   Cardiovascular: Normal rate, regular rhythm and intact distal pulses.   Murmur heard.  Pulmonary/Chest: Effort normal. No stridor. No respiratory distress. She has no wheezes. She has no rales.   Course     Abdominal: Soft. Bowel sounds are normal. She exhibits no distension. There is no tenderness. There is no guarding.   Genitourinary:   Genitourinary Comments: Check ua     Musculoskeletal: Normal range of motion. She exhibits no edema or deformity.   Neurological: She is alert and oriented to person, place, and time. No cranial nerve deficit.   Skin: Skin is warm and dry. Capillary refill takes 2 to 3 seconds. No rash noted. She is not diaphoretic.   Psychiatric: She has a normal mood and affect. Her behavior is normal. Judgment and thought content normal.   Nursing note and vitals reviewed.        CRANIAL NERVES     CN III, IV, VI   Pupils are equal, round, and reactive to light.  Extraocular motions are normal.        Significant Labs: All pertinent labs within the past 24 hours have been reviewed.  Results for orders placed or performed during  the hospital encounter of 06/21/19   CBC auto differential   Result Value Ref Range    WBC 8.53 3.90 - 12.70 K/uL    RBC 3.50 (L) 4.00 - 5.40 M/uL    Hemoglobin 11.0 (L) 12.0 - 16.0 g/dL    Hematocrit 35.7 (L) 37.0 - 48.5 %    Mean Corpuscular Volume 102 (H) 82 - 98 fL    Mean Corpuscular Hemoglobin 31.4 (H) 27.0 - 31.0 pg    Mean Corpuscular Hemoglobin Conc 30.8 (L) 32.0 - 36.0 g/dL    RDW 18.3 (H) 11.5 - 14.5 %    Platelets 163 150 - 350 K/uL    MPV 10.4 9.2 - 12.9 fL    Gran # (ANC) 4.8 1.8 - 7.7 K/uL    Lymph # 2.5 1.0 - 4.8 K/uL    Mono # 0.8 0.3 - 1.0 K/uL    Eos # 0.3 0.0 - 0.5 K/uL    Baso # 0.02 0.00 - 0.20 K/uL    Gran% 56.9 38.0 - 73.0 %    Lymph% 29.8 18.0 - 48.0 %    Mono% 9.7 4.0 - 15.0 %    Eosinophil% 3.6 0.0 - 8.0 %    Basophil% 0.2 0.0 - 1.9 %    Differential Method Automated    Comprehensive metabolic panel   Result Value Ref Range    Sodium 135 (L) 136 - 145 mmol/L    Potassium 3.4 (L) 3.5 - 5.1 mmol/L    Chloride 100 95 - 110 mmol/L    CO2 25 23 - 29 mmol/L    Glucose 94 70 - 110 mg/dL    BUN, Bld 9 6 - 20 mg/dL    Creatinine 1.0 0.5 - 1.4 mg/dL    Calcium 8.8 8.7 - 10.5 mg/dL    Total Protein 7.2 6.0 - 8.4 g/dL    Albumin 3.9 3.5 - 5.2 g/dL    Total Bilirubin 0.9 0.1 - 1.0 mg/dL    Alkaline Phosphatase 101 55 - 135 U/L    AST 64 (H) 10 - 40 U/L    ALT 65 (H) 10 - 44 U/L    Anion Gap 10 8 - 16 mmol/L    eGFR if African American >60 >60 mL/min/1.73 m^2    eGFR if non African American >60 >60 mL/min/1.73 m^2   Protime-INR   Result Value Ref Range    Prothrombin Time 35.6 (H) 9.0 - 12.5 sec    INR 3.4 (H) 0.8 - 1.2   APTT   Result Value Ref Range    aPTT 41.3 (H) 21.0 - 32.0 sec   Troponin I   Result Value Ref Range    Troponin I 0.233 (H) 0.000 - 0.026 ng/mL   Lipase   Result Value Ref Range    Lipase 14 4 - 60 U/L   Brain natriuretic peptide   Result Value Ref Range     (H) 0 - 99 pg/mL       Significant Imaging: I have reviewed all pertinent imaging results/findings within the past 24  hours.   Imaging Results          CT Abdomen Pelvis  Without Contrast (In process)                X-Ray Chest AP Portable (Final result)  Result time 06/21/19 23:05:09    Final result by Ben Lugo Jr., MD (06/21/19 23:05:09)                 Impression:      Mild pulmonary edema superimposed upon interstitial lung disease.  The edema has developed in the interval.      Electronically signed by: Ben Lugo Jr., MD  Date:    06/21/2019  Time:    23:05             Narrative:    EXAMINATION:  XR CHEST AP PORTABLE    CLINICAL HISTORY:  Dyspnea, unspecified    COMPARISON:  Prior radiograph from April 18, 2019.    FINDINGS:  Prior median sternotomy.  Valve replacement in place.  There are peripheral reticular opacities and Kerley's B-lines, slightly more pronounced within the left lung than on the prior study.  Heart is enlarged.  No pleural fluid or pneumothorax.No significant bony findings.                                  Assessment/Plan:     * Acute pulmonary edema  IV Lasix  Consider repeat echo pending course.  Encourage patient to be compliant with medications and diet as appropriate/applicable.  Further evaluation/diagnostics/interventions/consults pending course         Hypokalemia  Lasix related.Borderline.  Potassium supplementation.  Monitor and additional as needed  Further evaluation/diagnostics/interventions/consults pending course           Essential hypertension    Continue home meds monitor trends.  Further evaluation/diagnostics/interventions/consults pending course       Anticoagulated on Coumadin  Continue for mechanical heart valve  Pharmacy to manage  Further evaluation/diagnostics/interventions/consults pending course         Coronary artery disease of native artery of native heart with stable angina pectoris  Continue aspirin, Coumadin, beta-blocker, statin  Trend troponins, suspect elevation related to demand  Risk reduction: check lipid, a1c, tsh  Consult Cardiology pending course  Further  evaluation/diagnostics/interventions/consults pending course           VTE Risk Mitigation (From admission, onward)        Ordered     Place sequential compression device  Until discontinued      06/22/19 0135     Place IRENE hose  Until discontinued     On oral warfarin 06/22/19 0135         **Portions of this note has been dictated using speech recognition software, M*Modal Fluency Direct; although, time has been taken to proof read and revise it may still contain misspellings, grammatical and or other errors.**      Peter Sierra NP  Department of Hospital Medicine   [facility]

## 2019-06-22 NOTE — ED PROVIDER NOTES
SCRIBE #1 NOTE: I, Mani Roberson/Kulwant Farooq, am scribing for, and in the presence of, Gardenia Gomes DO. I have scribed the entire note.       History     Chief Complaint   Patient presents with    Chest Pain     pt reports sob worsening over past few days; epigastric pain with pain in upper back and arms that is somewhat relieved by ntg at home, began this morning     Review of patient's allergies indicates:   Allergen Reactions    Nsaids (non-steroidal anti-inflammatory drug) Other (See Comments)     Mechanical heart valve         History of Present Illness     HPI    6/21/2019, 10:19 PM  History obtained from the patient      History of Present Illness: Meg Sal is a 49 y.o. female patient with a PMHx of CHF, CAD, HLD, and HTN who presents to the Emergency Department for evaluation of CP which onset gradually 2 days ago. Symptoms are constant and moderate in severity. No mitigating or exacerbating factors reported. Pt states she is on 2L of O2 at home. Associated sxs include abd pain, diarrhea, generalized weakness, productive cough with clear sputum, nausea, and recent weight gain. Patient denies any fever, chills, sore throat, and all other sxs at this time. Prior Tx includes NTG at home with relief. No further complaints or concerns at this time.    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 CATH LAB         Family History:  Family History   Problem Relation Age of Onset    Heart disease Mother     Breast cancer Sister     Coronary artery disease Father     Lung cancer Brother        Social History:  Social History     Tobacco Use    Smoking status: Former Smoker     Packs/day: 1.00     Types: Cigarettes     Last attempt to quit: 2018     Years since quittin.0    Smokeless tobacco: Never Used   Substance and Sexual Activity    Alcohol use: Not Currently     Frequency: 2-4 times a month     Drinks per session: 1 or 2     Binge frequency: Never    Drug use: Not Currently    Sexual activity: Unknown        Review of Systems     Review of Systems   Constitutional: Negative for fever.        (+) Recent weight gain   HENT: Negative for sore throat.    Respiratory: Positive for cough (productive with clear sputum). Negative for shortness of breath.    Cardiovascular: Positive for chest pain.   Gastrointestinal: Positive for abdominal pain, diarrhea and nausea. Negative for vomiting.   Genitourinary: Negative for dysuria.   Musculoskeletal: Negative for back pain.   Skin: Negative for rash and wound.   Neurological: Positive for weakness (generalized). Negative for dizziness, light-headedness, numbness and headaches.   Hematological: Does not bruise/bleed easily.   All other systems reviewed and are negative.       Physical Exam     Initial Vitals [19]   BP Pulse Resp Temp SpO2   129/63 75 18 98.3 °F (36.8 °C) 99 %      MAP       --          Physical Exam  Nursing Notes and Vital Signs Reviewed.  Constitutional: Patient is in no acute distress. Chronically ill appearing.  Head: Atraumatic. Normocephalic.  Eyes: PERRL. EOM intact. Conjunctivae are not pale. No scleral icterus.  ENT: Mucous membranes are moist. Oropharynx is clear and  symmetric.    Neck: Supple. Full ROM. No lymphadenopathy.  Cardiovascular: Regular rate. Regular rhythm. No murmurs, rubs, or gallops. Distal pulses are 2+ and symmetric.  Pulmonary/Chest: No respiratory distress. Crackles bilaterally.  Abdominal: Soft and non-distended.  There is no tenderness.  No rebound, guarding, or rigidity. Good bowel sounds.  Genitourinary: No CVA tenderness  Musculoskeletal: Moves all extremities. No obvious deformities. No edema. No calf tenderness.  Skin: Warm and dry.  Neurological:  Alert, awake, and appropriate.  Normal speech.  No acute focal neurological deficits are appreciated.  Psychiatric: Normal affect. Good eye contact. Appropriate in content.     ED Course   Critical Care  Date/Time: 6/22/2019 1:28 AM  Performed by: Gardenia Gomes DO  Authorized by: Gardenia Gomes DO   Direct patient critical care time: 10 minutes  Additional history critical care time: 5 minutes  Ordering / reviewing critical care time: 5 minutes  Documentation critical care time: 5 minutes  Consulting other physicians critical care time: 5 minutes  Consult with family critical care time: 5 minutes  Total critical care time (exclusive of procedural time) : 35 minutes  Critical care time was exclusive of separately billable procedures and treating other patients and teaching time.  Critical care was necessary to treat or prevent imminent or life-threatening deterioration of the following conditions: Acute pulmonary edema, elevated troponin, dyspnea.  Critical care was time spent personally by me on the following activities: blood draw for specimens, development of treatment plan with patient or surrogate, discussions with consultants, interpretation of cardiac output measurements, evaluation of patient's response to treatment, examination of patient, obtaining history from patient or surrogate, ordering and performing treatments and interventions, ordering and review of laboratory studies, ordering and  review of radiographic studies, pulse oximetry and re-evaluation of patient's condition.        ED Vital Signs:  Vitals:    06/21/19 2043 06/21/19 2240   BP: 129/63 110/64   Pulse: 75 (!) 59   Resp: 18 20   Temp: 98.3 °F (36.8 °C)    TempSrc: Oral    SpO2: 99% 99%   Weight: 75.5 kg (166 lb 7.2 oz)    Height: 5' (1.524 m)        Abnormal Lab Results:  Labs Reviewed   CBC W/ AUTO DIFFERENTIAL - Abnormal; Notable for the following components:       Result Value    RBC 3.50 (*)     Hemoglobin 11.0 (*)     Hematocrit 35.7 (*)     Mean Corpuscular Volume 102 (*)     Mean Corpuscular Hemoglobin 31.4 (*)     Mean Corpuscular Hemoglobin Conc 30.8 (*)     RDW 18.3 (*)     All other components within normal limits   COMPREHENSIVE METABOLIC PANEL - Abnormal; Notable for the following components:    Sodium 135 (*)     Potassium 3.4 (*)     AST 64 (*)     ALT 65 (*)     All other components within normal limits   PROTIME-INR - Abnormal; Notable for the following components:    Prothrombin Time 35.6 (*)     INR 3.4 (*)     All other components within normal limits   APTT - Abnormal; Notable for the following components:    aPTT 41.3 (*)     All other components within normal limits   TROPONIN I - Abnormal; Notable for the following components:    Troponin I 0.233 (*)     All other components within normal limits   B-TYPE NATRIURETIC PEPTIDE - Abnormal; Notable for the following components:     (*)     All other components within normal limits   LIPASE   HIV 1 / 2 ANTIBODY   URINALYSIS, REFLEX TO URINE CULTURE   TROPONIN I   CBC W/ AUTO DIFFERENTIAL   BASIC METABOLIC PANEL   MAGNESIUM   PHOSPHORUS   HEMOGLOBIN A1C   LIPID PANEL   TSH   APTT   PROTIME-INR   PROTIME-INR        All Lab Results:  Results for orders placed or performed during the hospital encounter of 06/21/19   CBC auto differential   Result Value Ref Range    WBC 8.53 3.90 - 12.70 K/uL    RBC 3.50 (L) 4.00 - 5.40 M/uL    Hemoglobin 11.0 (L) 12.0 - 16.0 g/dL     Hematocrit 35.7 (L) 37.0 - 48.5 %    Mean Corpuscular Volume 102 (H) 82 - 98 fL    Mean Corpuscular Hemoglobin 31.4 (H) 27.0 - 31.0 pg    Mean Corpuscular Hemoglobin Conc 30.8 (L) 32.0 - 36.0 g/dL    RDW 18.3 (H) 11.5 - 14.5 %    Platelets 163 150 - 350 K/uL    MPV 10.4 9.2 - 12.9 fL    Gran # (ANC) 4.8 1.8 - 7.7 K/uL    Lymph # 2.5 1.0 - 4.8 K/uL    Mono # 0.8 0.3 - 1.0 K/uL    Eos # 0.3 0.0 - 0.5 K/uL    Baso # 0.02 0.00 - 0.20 K/uL    Gran% 56.9 38.0 - 73.0 %    Lymph% 29.8 18.0 - 48.0 %    Mono% 9.7 4.0 - 15.0 %    Eosinophil% 3.6 0.0 - 8.0 %    Basophil% 0.2 0.0 - 1.9 %    Differential Method Automated    Comprehensive metabolic panel   Result Value Ref Range    Sodium 135 (L) 136 - 145 mmol/L    Potassium 3.4 (L) 3.5 - 5.1 mmol/L    Chloride 100 95 - 110 mmol/L    CO2 25 23 - 29 mmol/L    Glucose 94 70 - 110 mg/dL    BUN, Bld 9 6 - 20 mg/dL    Creatinine 1.0 0.5 - 1.4 mg/dL    Calcium 8.8 8.7 - 10.5 mg/dL    Total Protein 7.2 6.0 - 8.4 g/dL    Albumin 3.9 3.5 - 5.2 g/dL    Total Bilirubin 0.9 0.1 - 1.0 mg/dL    Alkaline Phosphatase 101 55 - 135 U/L    AST 64 (H) 10 - 40 U/L    ALT 65 (H) 10 - 44 U/L    Anion Gap 10 8 - 16 mmol/L    eGFR if African American >60 >60 mL/min/1.73 m^2    eGFR if non African American >60 >60 mL/min/1.73 m^2   Protime-INR   Result Value Ref Range    Prothrombin Time 35.6 (H) 9.0 - 12.5 sec    INR 3.4 (H) 0.8 - 1.2   APTT   Result Value Ref Range    aPTT 41.3 (H) 21.0 - 32.0 sec   Troponin I   Result Value Ref Range    Troponin I 0.233 (H) 0.000 - 0.026 ng/mL   Lipase   Result Value Ref Range    Lipase 14 4 - 60 U/L   Brain natriuretic peptide   Result Value Ref Range     (H) 0 - 99 pg/mL         Imaging Results:  Imaging Results          CT Abdomen Pelvis  Without Contrast (In process)                X-Ray Chest AP Portable (Final result)  Result time 06/21/19 23:05:09    Final result by Ben Lugo Jr., MD (06/21/19 23:05:09)                 Impression:      Mild  pulmonary edema superimposed upon interstitial lung disease.  The edema has developed in the interval.      Electronically signed by: Ben Lugo Jr., MD  Date:    06/21/2019  Time:    23:05             Narrative:    EXAMINATION:  XR CHEST AP PORTABLE    CLINICAL HISTORY:  Dyspnea, unspecified    COMPARISON:  Prior radiograph from April 18, 2019.    FINDINGS:  Prior median sternotomy.  Valve replacement in place.  There are peripheral reticular opacities and Kerley's B-lines, slightly more pronounced within the left lung than on the prior study.  Heart is enlarged.  No pleural fluid or pneumothorax.No significant bony findings.                               1:11 AM: Per STAT radiology, pt's CT results: Appendix not identified. No neil colonic mural thickening.      The EKG was ordered, reviewed, and independently interpreted by the ED provider.  Interpretation time: 20:48  Rate: 74 BPM  Rhythm: Accelerated Junctional rhythm with frequent PVC  Interpretation: Right ventricular hypertrophy with repolarization abnormality. T wave abnormality, consider inferior ischemia. No STEMI.             The Emergency Provider reviewed the vital signs and test results, which are outlined above.     ED Discussion     1:26 AM: Discussed case with Peter Sierra NP ([facility]). Dr. Davis agrees with current care and management of pt and accepts admission.   Admitting Service: [facility]  Admitting Physician: Dr. Davis  Admit to: Obs Tele    1:27 AM: Re-evaluated pt. I have discussed test results, shared treatment plan, and the need for admission with patient and family at bedside. Pt and family express understanding at this time and agree with all information. All questions answered. Pt and family have no further questions or concerns at this time. Pt is ready for admit.    1:47 AM: Re-evaluated pt. Pt states she had diarrhea since last re-evaluation.  D/w pt all pertinent results. D/w pt any concerns expressed at this  time. Answered all questions. Pt expresses understanding at this time.      ED Medication(s):  Medications   pantoprazole EC tablet 40 mg (has no administration in time range)   furosemide injection 40 mg (has no administration in time range)   busPIRone tablet 15 mg (has no administration in time range)   clonazePAM tablet 1 mg (has no administration in time range)   desvenlafaxine succinate 100 MG 24 hr tablet 100 mg (has no administration in time range)   metoprolol succinate (TOPROL-XL) 24 hr tablet 25 mg (has no administration in time range)   nintedanib Cap 150 mg (has no administration in time range)   rosuvastatin tablet 20 mg (has no administration in time range)   topiramate tablet 100 mg (has no administration in time range)   umeclidinium-vilanterol 62.5-25 mcg/actuation DsDv 1 puff (has no administration in time range)   nitroGLYCERIN 2% TD oint ointment 1 inch (1 inch Topical (Top) Given 6/21/19 2311)   furosemide injection 80 mg (80 mg Intravenous Given 6/22/19 0126)   potassium chloride SA CR tablet 30 mEq (30 mEq Oral Given 6/22/19 0125)       New Prescriptions    No medications on file       Medical Decision Making:   Clinical Tests:   Lab Tests: Ordered and Reviewed  Radiological Study: Ordered and Reviewed  Medical Tests: Ordered and Reviewed             Scribe Attestation:   Scribe #1: I performed the above scribed service and the documentation accurately describes the services I performed. I attest to the accuracy of the note.     Attending:   Physician Attestation Statement for Scribe #1: I, Gardenia Gomes DO, personally performed the services described in this documentation, as scribed by Mani Roberson/Kulwant Farooq, in my presence, and it is both accurate and complete.           Clinical Impression       ICD-10-CM ICD-9-CM   1. Acute pulmonary edema J81.0 518.4   2. Chest pain R07.9 786.50   3. Dyspnea R06.00 786.09   4. Abdominal pain, unspecified abdominal location R10.9 789.00   5.  Elevated troponin R74.8 790.6   6. H/O heart valve replacement with mechanical valve Z95.2 V43.3   7. Anticoagulant long-term use Z79.01 V58.61   8. Anemia, unspecified type D64.9 285.9   9. Diarrhea, unspecified type R19.7 787.91       Disposition:   Disposition: Placed in Observation  Condition: Cher Gomes,   06/22/19 0232

## 2019-06-22 NOTE — ASSESSMENT & PLAN NOTE
Continue home meds monitor trends.  Further evaluation/diagnostics/interventions/consults pending course

## 2019-06-22 NOTE — ED NOTES
Patient complains of abd pain CP . Symptoms have been present for since 2 days and ar. Patient describes symptoms as  Epigastric pain and a discomforting cp  Previous treatment includes  Associated symptoms include diarrhea and cough  Patient denies fever    Level of Consciousness: The patient is awake, alert, and oriented with appropriate affect and speech; oriented to person, place and time.  Appearance: Sitting up in bed with no acute distress noted. Clothing and hygiene are clean and worn appropriately.  Skin: Skin is warm and dry with good skin turgor; intact; color consistent with ethnicity.  Mucous membranes are moist.   Musculoskeletal: Moves all extremities well in full range of motion. No obvious deformities or swelling noted.  Respiratory: Airway open and patent, respirations spontaneous, even and unlabored. No accessory muscles in use. Breath sounds course. Pt on 2l of home oxygen    Cardiac: Regular rate and rhythm, no peripheral edema noted, good pulses palpated peripherally, capillary refill < 3 seconds.  Abdomen: Soft, tender to palpation. No distention noted.  Neurologic: PERRLA, face exhibits symmetrical expression, hand grasps equal and even bilaterally, reports normal sensation to all extremities and face.  Psychosocial: calm     Patient verbalized understanding of status and plan of care. Patient changed into hospital gown with assistance. Side rails up x 2, call light in reach, bed low and locked. Cardiac monitor applied to patient; alarms on, audible, and set. 120-60 at bedside.  Will continue to monitor.

## 2019-06-22 NOTE — DISCHARGE SUMMARY
[facility] Hospital Medicine  Discharge Summary      Patient Name: Meg Sal  MRN: [id number]  Admission Date: 6/21/2019  Hospital Length of Stay: 0 days  Discharge Date and Time: 6/22/2019  2:53 PM  Attending Physician: No att. providers found   Discharging Provider: Greyson Obrien MD  Primary Care Provider: Aquilino Hightower MD      HPI:   49-year-old female with history of valve replacement (mechanical) presents today with complaint of chest pain that started over the past day, radiates to arms.  Activity worsen.  Associated symptoms include shortness of breath.  Prior treatment nitroglycerin and Lasix.  No improvement.  Patient evaluated emergency room chest x-ray positive for pulmonary edema, troponin 0.233, .  INR 3.4.  Patient on Coumadin.  Patient given IV Lasix in ED with slight improvement.  Hospital Medicine consulted patient placed in observation.    * No surgery found *      Hospital Course:   Patient 50 yo female admitted to hospital with SOB. 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 Patient with Pulmonary support in place and referral to the transplant service, instructed to follow up as directed for transplant consideration. Patient to see Dr Mitchell to re-energize her transplant effort. Patient further to follow with cardiology as indicated. Patient was comfortable on exam today with unlabored breathing. We discussed at length the need to go through the process to insure she is afforded the best opportunity to receive the appropriate care before it is at a critical juncture. Patient expressed her understanding of this recommendation.     Consults:   Consults (From admission, onward)        Status Ordering Provider     Inpatient consult to Hospitalist  Once     Provider:  (Not yet assigned)    MIS Sorenson     Nutrition Services Referral  Once     Provider:  (Not yet assigned)     Acknowledged LEYLA NOWAK     Pharmacy to dose Warfarin consult  Once     Provider:  (Not yet assigned)    Acknowledged VIPIN PARRY          No new Assessment & Plan notes have been filed under this hospital service since the last note was generated.  Service: Hospital Medicine    Final Active Diagnoses:    Diagnosis Date Noted POA    Pulmonary fibrosis [J84.10] 04/16/2019 Yes     Chronic    H/O mitral valve repair [Z98.890] 06/21/2018 Not Applicable    Pulmonary HTN [I27.20] 08/15/2017 Yes     Chronic    Essential hypertension [I10] 08/03/2017 Yes    Anticoagulated on Coumadin [Z51.81, Z79.01] 11/21/2016 Not Applicable    Anxiety disorder [F41.9] 11/21/2016 Yes      Problems Resolved During this Admission:    Diagnosis Date Noted Date Resolved POA    PRINCIPAL PROBLEM:  Acute pulmonary edema [J81.0] 06/22/2019 06/22/2019 Yes    Hypokalemia [E87.6] 06/22/2019 06/22/2019 Yes    Coronary artery disease of native artery of native heart with stable angina pectoris [I25.118] 11/21/2016 06/22/2019 Yes       Discharged Condition: stable    Disposition: Home or Self Care    Follow Up:  Follow-up Information     Aquilino Hightower MD In 3 days.    Specialty:  Family Medicine  Contact information:  [address] [place] [address] [phone]             Ayaan Banda Md, MD.    Specialties:  Cardiology, Internal Medicine  Why:  As needed  Contact information:  [address]  [place] [address] [phone]             Miguel Salazar MD In 2 weeks.    Specialty:  Pulmonary Disease  Contact information:  [address] [place] [address] [phone]                 Patient Instructions:      Diet Cardiac   Order Comments: Coumadin Diet     Activity as tolerated       Significant Diagnostic Studies: Labs:   BMP:   Recent Labs   Lab 06/21/19 2255 06/22/19  0634   GLU 94 120*   * 141   K 3.4* 3.4*    102   CO2 25 30*   BUN 9 7   CREATININE 1.0 1.0   CALCIUM 8.8  8.8   MG  --  2.5   , CMP   Recent Labs   Lab 06/21/19 2255 06/22/19  0634   * 141   K 3.4* 3.4*    102   CO2 25 30*   GLU 94 120*   BUN 9 7   CREATININE 1.0 1.0   CALCIUM 8.8 8.8   PROT 7.2  --    ALBUMIN 3.9  --    BILITOT 0.9  --    ALKPHOS 101  --    AST 64*  --    ALT 65*  --    ANIONGAP 10 9   ESTGFRAFRICA >60 >60   EGFRNONAA >60 >60   , CBC   Recent Labs   Lab 06/21/19 2255 06/22/19  0634   WBC 8.53 7.58   HGB 11.0* 10.4*   HCT 35.7* 34.1*    152   , Troponin   Recent Labs   Lab 06/22/19  1030   TROPONINI 0.180*    and All labs within the past 24 hours have been reviewed    Pending Diagnostic Studies:     Procedure Component Value Units Date/Time    HIV 1/2 Ag/Ab (4th Gen) [[id number]] Collected:  06/21/19 2255    Order Status:  Sent Lab Status:  In process Updated:  06/22/19 0237    Specimen:  Blood          Medications:  Reconciled Home Medications:      Medication List      CONTINUE taking these medications    albuterol 90 mcg/actuation inhaler  Commonly known as:  VENTOLIN HFA  Inhale 2 puffs into the lungs every 6 (six) hours as needed for Wheezing. Rescue     amitriptyline 25 MG tablet  Commonly known as:  ELAVIL  Take 25 mg by mouth every evening.     busPIRone 15 MG tablet  Commonly known as:  BUSPAR  Take 15 mg by mouth 2 (two) times daily.     desvenlafaxine succinate 100 MG Tb24  Commonly known as:  PRISTIQ  Take 1 tablet (100 mg total) by mouth once daily.     diclofenac sodium 1 % Gel  Commonly known as:  VOLTAREN  Apply 2 g topically 4 (four) times daily.     fluticasone propionate 50 mcg/actuation nasal spray  Commonly known as:  FLONASE  1 spray by Each Nare route once daily.     furosemide 40 MG tablet  Commonly known as:  LASIX  Take 40 mg by mouth 2 (two) times daily.     KlonoPIN 1 MG tablet  Generic drug:  clonazePAM  Take 1 mg by mouth 3 (three) times daily.     lidocaine-tetracaine 7-7 % Crea     metoprolol succinate 25 MG 24 hr tablet  Commonly known as:   TOPROL-XL  Take 1 tablet (25 mg total) by mouth once daily.     nicotine 7 mg/24 hr  Commonly known as:  NICODERM CQ  Place 1 patch onto the skin every 24 hours.     nintedanib 150 mg Cap  Commonly known as:  OFEV  Take 150 mg by mouth 2 (two) times daily.     nitroGLYCERIN 0.4 MG SL tablet  Commonly known as:  NITROSTAT     pantoprazole 40 MG tablet  Commonly known as:  PROTONIX  Take 40 mg by mouth once daily.     potassium chloride SA 20 MEQ tablet  Commonly known as:  K-DUR,KLOR-CON  Take 20 mEq by mouth 2 (two) times daily.     rosuvastatin 20 MG tablet  Commonly known as:  CRESTOR  Take 20 mg by mouth once daily.     sumatriptan 100 MG tablet  Commonly known as:  IMITREX  Take 100 mg by mouth every 2 (two) hours as needed.     tiZANidine 4 MG tablet  Commonly known as:  ZANAFLEX  Take 4 mg by mouth every evening.     topiramate 100 MG tablet  Commonly known as:  TOPAMAX  Take 100 mg by mouth 2 (two) times daily.     traMADol 50 mg tablet  Commonly known as:  ULTRAM  Take 50 mg by mouth every 4 (four) hours as needed.     traZODone 150 MG tablet  Commonly known as:  DESYREL  Take 150 mg by mouth nightly.     umeclidinium-vilanterol 62.5-25 mcg/actuation Dsdv  Commonly known as:  ANORO ELLIPTA  Inhale 1 puff into the lungs once daily. Controller     vitamin D 1000 units Tab  Commonly known as:  VITAMIN D3  Take 1,000 Units by mouth once daily.     warfarin 6 MG tablet  Commonly known as:  COUMADIN  Take 6 mg by mouth Daily.        STOP taking these medications    VITAMIN B COMPLEX ORAL            Indwelling Lines/Drains at time of discharge:   Lines/Drains/Airways          None          Time spent on the discharge of patient: 45 minutes  Patient was seen and examined on the date of discharge and determined to be suitable for discharge.         Greyson Obrien MD  Department of Hospital Medicine  [facility]

## 2019-06-22 NOTE — PROGRESS NOTES
Meg Shar Doron 's Coumadin will be dosed and monitored by Pharmacy at the request of Dr. Obrien.    Indication: Hx of mechanical aortic valve replacement .  Target INR is 2.0-3.0.  INR   Date Value Ref Range Status   06/22/2019 2.8 (H) 0.8 - 1.2 Final     Comment:     Coumadin Therapy:  2.0 - 3.0 for INR for all indicators except mechanical heart valves  and antiphospholipid syndromes which should use 2.5 - 3.5.     06/12/2019 2.0  Final     Patient will be continued on a dose of 6 mg per day.  Coumadin was held on 6/21/19 due to INR of 3.4. PT/INR will be monitored daily. Dose adjustments will be made accordingly.  Patient has been educated and handout given.    Thank you for allowing us to participate in this patient's care.     Anahi Doshi 6/22/2019 11:13 AM

## 2019-06-22 NOTE — ASSESSMENT & PLAN NOTE
Continue for mechanical heart valve  Pharmacy to manage  Further evaluation/diagnostics/interventions/consults pending course

## 2019-06-24 ENCOUNTER — ANTI-COAG VISIT (OUTPATIENT)
Dept: CARDIOLOGY | Facility: CLINIC | Age: 49
End: 2019-06-24
Payer: COMMERCIAL

## 2019-06-24 ENCOUNTER — EXTERNAL HOME HEALTH (OUTPATIENT)
Dept: HOME HEALTH SERVICES | Facility: HOSPITAL | Age: 49
End: 2019-06-24
Payer: COMMERCIAL

## 2019-06-24 DIAGNOSIS — Z98.890 H/O MITRAL VALVE REPAIR: ICD-10-CM

## 2019-06-24 DIAGNOSIS — Z79.01 LONG TERM (CURRENT) USE OF ANTICOAGULANTS: ICD-10-CM

## 2019-06-24 LAB
HIV 1+2 AB+HIV1 P24 AG SERPL QL IA: NEGATIVE
INR PPP: 2.1

## 2019-06-24 PROCEDURE — 93793 ANTICOAG MGMT PT WARFARIN: CPT | Mod: S$GLB,,,

## 2019-06-24 PROCEDURE — 93793 PR ANTICOAGULANT MGMT FOR PT TAKING WARFARIN: ICD-10-PCS | Mod: S$GLB,,,

## 2019-06-24 NOTE — PROGRESS NOTES
Verbal results taken from Meena ZELAYA with [facility] at [phone].  PT:  25.4 // INR:  2.1.   Recently seen in the ED for N/V reported by HH.  Patient is currently taking Warfarin 6 mg daily.  Orders given:  Will maintain Warfarin 6 mg daily.  Recheck on Monday, 7/01/2019.  Fax orders to follow.

## 2019-07-01 ENCOUNTER — ANTI-COAG VISIT (OUTPATIENT)
Dept: CARDIOLOGY | Facility: CLINIC | Age: 49
End: 2019-07-01
Payer: COMMERCIAL

## 2019-07-01 DIAGNOSIS — Z95.2 HX OF MECHANICAL AORTIC VALVE REPLACEMENT: Chronic | ICD-10-CM

## 2019-07-01 DIAGNOSIS — Z79.01 LONG TERM (CURRENT) USE OF ANTICOAGULANTS: ICD-10-CM

## 2019-07-01 LAB — INR PPP: 3

## 2019-07-01 PROCEDURE — 93793 PR ANTICOAGULANT MGMT FOR PT TAKING WARFARIN: ICD-10-PCS | Mod: S$GLB,,,

## 2019-07-01 PROCEDURE — 93793 ANTICOAG MGMT PT WARFARIN: CPT | Mod: S$GLB,,,

## 2019-07-01 NOTE — PROGRESS NOTES
Verbal results taken from Meena ZELAYA with [facility].  PT / INR  36.4 / 3.0.   INR is at higher end of range, will give lower dose for today and continue to monitor closely.   Orders given:  Take warfarin 3mg on today; then resume current dose of 3mg every evening..  Recheck on Friday, 7/5/19.

## 2019-07-02 ENCOUNTER — HOSPITAL ENCOUNTER (OUTPATIENT)
Dept: RADIOLOGY | Facility: HOSPITAL | Age: 49
Discharge: HOME | End: 2019-07-02
Attending: NURSE PRACTITIONER
Payer: COMMERCIAL

## 2019-07-02 ENCOUNTER — OFFICE VISIT (OUTPATIENT)
Dept: GASTROENTEROLOGY | Facility: CLINIC | Age: 49
End: 2019-07-02
Payer: COMMERCIAL

## 2019-07-02 VITALS — BODY MASS INDEX: 30.99 KG/M2 | WEIGHT: 157.88 LBS | HEIGHT: 60 IN

## 2019-07-02 DIAGNOSIS — K59.01 SLOW TRANSIT CONSTIPATION: ICD-10-CM

## 2019-07-02 DIAGNOSIS — R19.7 DIARRHEA, UNSPECIFIED TYPE: Primary | ICD-10-CM

## 2019-07-02 DIAGNOSIS — R19.7 DIARRHEA, UNSPECIFIED TYPE: ICD-10-CM

## 2019-07-02 PROCEDURE — 74019 XR ABDOMEN FLAT AND ERECT: ICD-10-PCS | Mod: 26,NTX,, | Performed by: RADIOLOGY

## 2019-07-02 PROCEDURE — 99999 PR PBB SHADOW E&M-EST. PATIENT-LVL IV: ICD-10-PCS | Mod: PBBFAC,TXP,, | Performed by: NURSE PRACTITIONER

## 2019-07-02 PROCEDURE — 3008F PR BODY MASS INDEX (BMI) DOCUMENTED: ICD-10-PCS | Mod: CPTII,NTX,S$GLB, | Performed by: NURSE PRACTITIONER

## 2019-07-02 PROCEDURE — 3008F BODY MASS INDEX DOCD: CPT | Mod: CPTII,NTX,S$GLB, | Performed by: NURSE PRACTITIONER

## 2019-07-02 PROCEDURE — 74019 RADEX ABDOMEN 2 VIEWS: CPT | Mod: TC,NTX

## 2019-07-02 PROCEDURE — 99999 PR PBB SHADOW E&M-EST. PATIENT-LVL IV: CPT | Mod: PBBFAC,TXP,, | Performed by: NURSE PRACTITIONER

## 2019-07-02 PROCEDURE — 99204 PR OFFICE/OUTPT VISIT, NEW, LEVL IV, 45-59 MIN: ICD-10-PCS | Mod: NTX,S$GLB,, | Performed by: NURSE PRACTITIONER

## 2019-07-02 PROCEDURE — 74019 RADEX ABDOMEN 2 VIEWS: CPT | Mod: 26,NTX,, | Performed by: RADIOLOGY

## 2019-07-02 PROCEDURE — 99204 OFFICE O/P NEW MOD 45 MIN: CPT | Mod: NTX,S$GLB,, | Performed by: NURSE PRACTITIONER

## 2019-07-02 NOTE — PROGRESS NOTES
Clinic Consult:  [facility] Consultation Note    Reason for Consult:  The primary encounter diagnosis was Diarrhea, unspecified type. A diagnosis of Slow transit constipation was also pertinent to this visit.    PCP: Aquilino Hightower   No address on file    HPI:  This is a 49 y.o. female here for evaluation of the above  Pt states that over the last month, she had had a new onset of diarrhea with abdominal discomfort.  She states that the diarrhea started suddenly without any known exacerbating factors.  She denies any fever.  No known sick contacts.   No recent abx  She is having upwards of 6-10 watery stools per day  No melena or hematochezia.   No weight loss  She has a PMH of chronic constipation.  She is unclear when her last colonoscopy        Review of Systems   Constitutional: Positive for malaise/fatigue. Negative for chills, fever and weight loss.   Respiratory: Negative for cough.    Cardiovascular: Negative for chest pain.   Gastrointestinal:        Per HPI   Musculoskeletal: Negative for myalgias.   Skin: Negative for itching and rash.   Neurological: Negative for headaches.   Psychiatric/Behavioral: The patient is not nervous/anxious.        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, implants and grafts, initial encounter     Hyperlipidemia     Hypertension     Sleep apnea syndrome 2/8/2019    Stroke        Surgical History:  Past Surgical History:   Procedure Laterality Date    CARDIAC CATHETERIZATION      CARDIAC VALVE SURGERY      CORONARY ANGIOPLASTY      CORONARY ARTERY BYPASS GRAFT      HYSTERECTOMY      INSERTION, CATHETER, RIGHT HEART Right 5/17/2019    Performed by Derek Brown MD at [facility] CATH LAB       Family History:    Family History   Problem Relation Age of Onset    Heart disease Mother     Breast cancer Sister     Coronary artery disease Father     Lung cancer Brother        Social History:   Social History     Tobacco Use    Smoking status: Former Smoker     Packs/day: 1.00     Types: Cigarettes     Last attempt to quit: 2018     Years since quittin.1    Smokeless tobacco: Never Used   Substance Use Topics    Alcohol use: Not Currently     Frequency: 2-4 times a month     Drinks per session: 1 or 2     Binge frequency: Never    Drug use: Not Currently       Allergies: Reviewed    Home Medications:   Current Outpatient Medications on File Prior to Visit   Medication Sig Dispense Refill    albuterol (VENTOLIN HFA) 90 mcg/actuation inhaler Inhale 2 puffs into the lungs every 6 (six) hours as needed for Wheezing. Rescue 18 g 11    amitriptyline (ELAVIL) 25 MG tablet Take 25 mg by mouth every evening.       busPIRone (BUSPAR) 15 MG tablet Take 15 mg by mouth 2 (two) times daily.       clonazePAM (KLONOPIN) 1 MG tablet Take 1 mg by mouth 3 (three) times daily.       desvenlafaxine succinate (PRISTIQ) 100 MG Tb24 Take 1 tablet (100 mg total) by mouth once daily. 30 tablet 11    diclofenac sodium (VOLTAREN) 1 % Gel Apply 2 g topically 4 (four) times daily.      fluticasone (FLONASE) 50 mcg/actuation nasal spray 1 spray by Each Nare route once daily.      furosemide (LASIX) 40 MG tablet Take 40 mg by mouth 2 (two) times daily.      metoprolol succinate (TOPROL-XL) 25 MG 24 hr tablet Take 1 tablet (25 mg total) by mouth once daily. 30 tablet 1    nicotine (NICODERM CQ) 7 mg/24 hr Place 1 patch onto the skin every 24 hours.      nitroGLYCERIN (NITROSTAT) 0.4 MG SL tablet       pantoprazole (PROTONIX) 40 MG tablet Take 40 mg by mouth once daily.      potassium chloride SA (K-DUR,KLOR-CON) 20 MEQ tablet Take 20 mEq by mouth 2 (two) times daily.      rosuvastatin (CRESTOR) 20 MG tablet Take 20 mg by mouth  once daily.      sumatriptan (IMITREX) 100 MG tablet Take 100 mg by mouth every 2 (two) hours as needed.      tiZANidine (ZANAFLEX) 4 MG tablet Take 4 mg by mouth every evening.      topiramate (TOPAMAX) 100 MG tablet Take 100 mg by mouth 2 (two) times daily.      traMADol (ULTRAM) 50 mg tablet Take 50 mg by mouth every 4 (four) hours as needed.       traZODone (DESYREL) 150 MG tablet Take 150 mg by mouth nightly.      umeclidinium-vilanterol (ANORO ELLIPTA) 62.5-25 mcg/actuation DsDv Inhale 1 puff into the lungs once daily. Controller 1 each 5    warfarin (COUMADIN) 6 MG tablet Take 6 mg by mouth Daily.       nintedanib (OFEV) 150 mg Cap Take 150 mg by mouth 2 (two) times daily. 60 capsule 3    vitamin D (VITAMIN D3) 1000 units Tab Take 1,000 Units by mouth once daily.      [DISCONTINUED] lidocaine-tetracaine 7-7 % Crea        No current facility-administered medications on file prior to visit.        Physical Exam:  Vital Signs:  Ht 5' (1.524 m)   Wt 71.6 kg (157 lb 13.6 oz)   BMI 30.83 kg/m²   Body mass index is 30.83 kg/m².  Physical Exam   Constitutional: She is oriented to person, place, and time. She appears well-developed.   HENT:   Head: Normocephalic.   Eyes: No scleral icterus.   Neck: Normal range of motion.   Cardiovascular: Normal rate and regular rhythm.   Pulmonary/Chest: Effort normal.   On o2 per NC   Abdominal: Soft. Bowel sounds are normal. She exhibits no distension. There is no tenderness.   Musculoskeletal: Normal range of motion.   Neurological: She is alert and oriented to person, place, and time.   Skin: Skin is warm and dry.   Psychiatric: She has a normal mood and affect.   Vitals reviewed.      Labs: Pertinent labs reviewed.    Assessment:  1. Diarrhea, unspecified type    2. Slow transit constipation         Recommendations:  .ass  - infection vs overflow vs other etiologies  - will plan for X-ray today to determine stool burden.   - stool studies to R/O infection.   - if  above is unremarkable, will plan for colonoscopy.       Follow up to be determined by results of above.        Thank you so much for allowing me to participate in the care of Meg Shoemaker, MONICA-FLORIAN

## 2019-07-02 NOTE — PROGRESS NOTES
Clinic Follow Up:  [facility] Follow Up Note    Reason for Follow Up:  The primary encounter diagnosis was Diarrhea, unspecified type. A diagnosis of Slow transit constipation was also pertinent to this visit.    PCP: Aquilino Hightower   No address on file    HPI:  This is a 49 y.o. 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, implants and grafts, initial encounter     Hyperlipidemia     Hypertension     Sleep apnea syndrome 2019    Stroke        Surgical History:   Past Surgical History:   Procedure Laterality Date    CARDIAC CATHETERIZATION      CARDIAC VALVE SURGERY      CORONARY ANGIOPLASTY      CORONARY ARTERY BYPASS GRAFT      HYSTERECTOMY      INSERTION, CATHETER, RIGHT HEART Right 2019    Performed by [facility] at [facility] CATH LAB       Family History:   Family History   Problem Relation Age of Onset    Heart disease Mother     Breast cancer Sister     Coronary artery disease Father     Lung cancer Brother        Social History:   Social History     Tobacco Use    Smoking status: Former Smoker     Packs/day: 1.00     Types: Cigarettes     Last attempt to quit: 2018     Years since quittin.1    Smokeless tobacco: Never Used   Substance Use Topics    Alcohol use: Not Currently     Frequency: 2-4 times a month     Drinks per session: 1 or 2     Binge frequency: Never    Drug use: Not Currently       Allergies: Reviewed    Home Medications:  Current Outpatient Medications on File Prior to Visit   Medication Sig Dispense Refill    albuterol (VENTOLIN HFA) 90 mcg/actuation inhaler Inhale 2 puffs into the lungs every 6 (six) hours as needed for Wheezing. Rescue 18 g 11     amitriptyline (ELAVIL) 25 MG tablet Take 25 mg by mouth every evening.       busPIRone (BUSPAR) 15 MG tablet Take 15 mg by mouth 2 (two) times daily.       clonazePAM (KLONOPIN) 1 MG tablet Take 1 mg by mouth 3 (three) times daily.       desvenlafaxine succinate (PRISTIQ) 100 MG Tb24 Take 1 tablet (100 mg total) by mouth once daily. 30 tablet 11    diclofenac sodium (VOLTAREN) 1 % Gel Apply 2 g topically 4 (four) times daily.      fluticasone (FLONASE) 50 mcg/actuation nasal spray 1 spray by Each Nare route once daily.      furosemide (LASIX) 40 MG tablet Take 40 mg by mouth 2 (two) times daily.      metoprolol succinate (TOPROL-XL) 25 MG 24 hr tablet Take 1 tablet (25 mg total) by mouth once daily. 30 tablet 1    nicotine (NICODERM CQ) 7 mg/24 hr Place 1 patch onto the skin every 24 hours.      nitroGLYCERIN (NITROSTAT) 0.4 MG SL tablet       pantoprazole (PROTONIX) 40 MG tablet Take 40 mg by mouth once daily.      potassium chloride SA (K-DUR,KLOR-CON) 20 MEQ tablet Take 20 mEq by mouth 2 (two) times daily.      rosuvastatin (CRESTOR) 20 MG tablet Take 20 mg by mouth once daily.      sumatriptan (IMITREX) 100 MG tablet Take 100 mg by mouth every 2 (two) hours as needed.      tiZANidine (ZANAFLEX) 4 MG tablet Take 4 mg by mouth every evening.      topiramate (TOPAMAX) 100 MG tablet Take 100 mg by mouth 2 (two) times daily.      traMADol (ULTRAM) 50 mg tablet Take 50 mg by mouth every 4 (four) hours as needed.       traZODone (DESYREL) 150 MG tablet Take 150 mg by mouth nightly.      umeclidinium-vilanterol (ANORO ELLIPTA) 62.5-25 mcg/actuation DsDv Inhale 1 puff into the lungs once daily. Controller 1 each 5    warfarin (COUMADIN) 6 MG tablet Take 6 mg by mouth Daily.       nintedanib (OFEV) 150 mg Cap Take 150 mg by mouth 2 (two) times daily. 60 capsule 3    vitamin D (VITAMIN D3) 1000 units Tab Take 1,000 Units by mouth once daily.      [DISCONTINUED] lidocaine-tetracaine 7-7 % Crea         No current facility-administered medications on file prior to visit.        Physical Exam:  Vital Signs:  Ht 5' (1.524 m)   Wt 71.6 kg (157 lb 13.6 oz)   BMI 30.83 kg/m²   Body mass index is 30.83 kg/m².  Physical Exam    Labs: Pertinent labs reviewed.    Endoscopy:      CRC Screening:     Assessment:  1. Diarrhea, unspecified type    2. Slow transit constipation        Recommendations:  Diarrhea, unspecified type  -     WBC, Stool; Future; Expected date: 07/02/2019  -     Stool culture; Future; Expected date: 07/02/2019  -     Stool Exam-Ova,Cysts,Parasites; Future; Expected date: 07/02/2019  -     Clostridium difficile EIA; Future; Expected date: 07/02/2019  -     Giardia / Cryptosporidum, EIA; Future; Expected date: 07/02/2019  -     X-Ray Abdomen Flat And Erect; Future; Expected date: 07/02/2019    Slow transit constipation  -     X-Ray Abdomen Flat And Erect; Future; Expected date: 07/02/2019          Return to Clinic:    No follow-ups on file.

## 2019-07-03 ENCOUNTER — APPOINTMENT (OUTPATIENT)
Dept: LAB | Facility: HOSPITAL | Age: 49
End: 2019-07-03
Payer: COMMERCIAL

## 2019-07-03 ENCOUNTER — OFFICE VISIT (OUTPATIENT)
Dept: CARDIOLOGY | Facility: CLINIC | Age: 49
End: 2019-07-03
Payer: COMMERCIAL

## 2019-07-03 VITALS
HEART RATE: 78 BPM | HEIGHT: 60 IN | SYSTOLIC BLOOD PRESSURE: 130 MMHG | DIASTOLIC BLOOD PRESSURE: 80 MMHG | WEIGHT: 158.75 LBS | BODY MASS INDEX: 31.17 KG/M2

## 2019-07-03 DIAGNOSIS — I49.3 PVC (PREMATURE VENTRICULAR CONTRACTION): ICD-10-CM

## 2019-07-03 DIAGNOSIS — R07.9 CHEST PAIN: Primary | ICD-10-CM

## 2019-07-03 DIAGNOSIS — I10 ESSENTIAL HYPERTENSION: ICD-10-CM

## 2019-07-03 DIAGNOSIS — R07.9 CHEST PAIN: ICD-10-CM

## 2019-07-03 DIAGNOSIS — I35.2 NONRHEUMATIC AORTIC INSUFFICIENCY WITH AORTIC STENOSIS: ICD-10-CM

## 2019-07-03 DIAGNOSIS — I27.20 PULMONARY HTN: Chronic | ICD-10-CM

## 2019-07-03 DIAGNOSIS — Z98.890 H/O MITRAL VALVE REPAIR: ICD-10-CM

## 2019-07-03 DIAGNOSIS — J84.10 PULMONARY FIBROSIS: Chronic | ICD-10-CM

## 2019-07-03 DIAGNOSIS — Z95.1 STATUS POST CORONARY ARTERY BYPASS GRAFT: ICD-10-CM

## 2019-07-03 DIAGNOSIS — I50.30 (HFPEF) HEART FAILURE WITH PRESERVED EJECTION FRACTION: Primary | Chronic | ICD-10-CM

## 2019-07-03 DIAGNOSIS — E78.49 OTHER HYPERLIPIDEMIA: ICD-10-CM

## 2019-07-03 DIAGNOSIS — Z95.2 HX OF MECHANICAL AORTIC VALVE REPLACEMENT: Chronic | ICD-10-CM

## 2019-07-03 PROCEDURE — 3079F PR MOST RECENT DIASTOLIC BLOOD PRESSURE 80-89 MM HG: ICD-10-PCS | Mod: CPTII,NTX,S$GLB, | Performed by: INTERNAL MEDICINE

## 2019-07-03 PROCEDURE — 3075F SYST BP GE 130 - 139MM HG: CPT | Mod: CPTII,NTX,S$GLB, | Performed by: INTERNAL MEDICINE

## 2019-07-03 PROCEDURE — 93000 EKG 12-LEAD: ICD-10-PCS | Mod: NTX,S$GLB,, | Performed by: INTERNAL MEDICINE

## 2019-07-03 PROCEDURE — 3008F BODY MASS INDEX DOCD: CPT | Mod: CPTII,NTX,S$GLB, | Performed by: INTERNAL MEDICINE

## 2019-07-03 PROCEDURE — 99214 OFFICE O/P EST MOD 30 MIN: CPT | Mod: NTX,S$GLB,, | Performed by: INTERNAL MEDICINE

## 2019-07-03 PROCEDURE — 3075F PR MOST RECENT SYSTOLIC BLOOD PRESS GE 130-139MM HG: ICD-10-PCS | Mod: CPTII,NTX,S$GLB, | Performed by: INTERNAL MEDICINE

## 2019-07-03 PROCEDURE — 99999 PR PBB SHADOW E&M-EST. PATIENT-LVL III: CPT | Mod: PBBFAC,TXP,, | Performed by: INTERNAL MEDICINE

## 2019-07-03 PROCEDURE — 93000 ELECTROCARDIOGRAM COMPLETE: CPT | Mod: NTX,S$GLB,, | Performed by: INTERNAL MEDICINE

## 2019-07-03 PROCEDURE — 99214 PR OFFICE/OUTPT VISIT, EST, LEVL IV, 30-39 MIN: ICD-10-PCS | Mod: NTX,S$GLB,, | Performed by: INTERNAL MEDICINE

## 2019-07-03 PROCEDURE — 3079F DIAST BP 80-89 MM HG: CPT | Mod: CPTII,NTX,S$GLB, | Performed by: INTERNAL MEDICINE

## 2019-07-03 PROCEDURE — 99999 PR PBB SHADOW E&M-EST. PATIENT-LVL III: ICD-10-PCS | Mod: PBBFAC,TXP,, | Performed by: INTERNAL MEDICINE

## 2019-07-03 PROCEDURE — 3008F PR BODY MASS INDEX (BMI) DOCUMENTED: ICD-10-PCS | Mod: CPTII,NTX,S$GLB, | Performed by: INTERNAL MEDICINE

## 2019-07-03 RX ORDER — NITROGLYCERIN 80 MG/1
1 PATCH TRANSDERMAL DAILY
Qty: 30 PATCH | Refills: 1 | Status: SHIPPED | OUTPATIENT
Start: 2019-07-03

## 2019-07-03 RX ORDER — NITROGLYCERIN 80 MG/1
1 PATCH TRANSDERMAL DAILY
COMMUNITY
End: 2019-07-03 | Stop reason: SDUPTHER

## 2019-07-03 NOTE — PROGRESS NOTES
Subjective:   Patient ID:  Meg Sal is a 49 y.o. female who presents for cardiac consult of Hypertension (left side chest and back pain, diarrhea)      Chest Pain    Associated symptoms include dizziness, palpitations and shortness of breath.   Shortness of Breath   Associated symptoms include chest pain.   Dizziness:    Associated symptoms: palpitations and chest pain.  Hypertension   Associated symptoms include chest pain, palpitations and shortness of breath.     The patient came in today for cardiac consult of Hypertension (left side chest and back pain, diarrhea)      Meg Sal is a 49 y.o. female with CAD s/p 2V CABG - LAD/LCX with occluded, s/p mechanical AVR, s/p mitral annuloplasty ring, pulm fibrosis, Pulm HTN, HFpEF,CHD, HTN, HLD, CVA here for follow up CV eval.     2/8/19  Pt had first MI at age 31, was taken to [facility], back was hurting. Felt like she had a heavy wet blanket. She was breaking up a dog fight when paramedics came. Dr. Cortez did LHC, had one stent placed. She also had other blockages which were medically treated. She had treadmill nuclear stress tests after, had been off plavix. In 2014, when she went to [place] she couldn't walk and was out of breath. She came back to  and she did another LHC with Dr. Cortez, Dr. Mcmahon was consulted as she had severe LAD disease. She went to Dr. Heller with [facility], had LHC in May 2018 with chronically occluded RCA with collaterals but patent grafts to her left system. She has been having a lot of chest pain lately. She was taking NTG two or three times a week. She was told to take Ranexa and Imdur but had more depression so stopped taking it. She has no car and has difficulty getting to coumadin clinic, discussed will to get home health for labwork.     3/11/19  She had another episode of severe chest pain last week. Started on Tues and continued till Friday. Had home health nurse that came. She took NTG which didn't  help much, took 2 tabs. Discussed she needs ER eval if severe CP again that is not improvd with NTG. Started verapamil for PVCs. Will refer to Ep for PVC ablation vs AAD. Pulm eval this AM, will have sleep study/PFTs/rheum referral. Recent stress and echo negative, mech AVR mean gradient 15 mmhg    4/29/19  She had a recent syncopal episode at home and presented to [facility] ER and was admitted. IN the ED, patient found to be mildly hypoxic with O2 sat of 90% on room air.  ABG showed pH of 7.349, pCO2 43, PO2 55, SA O2 87 on room air.  CT of the head was negative for acute process, CXR unremarkable, EKG unrevealing. BP is better since home Verapamil, Bystolic, Isosorbide, and NTG patch have been held. Home oxygen evaluation completed and patient qualified with an exertional room air O2 saturation of 87% which improved to 96% on 2 L nasal cannula, she was DC home on 2L NC.Pt had eval by Dr. Montero for PVCs, discussed ablation not a good option, may try amio but not a great option due to lung disease. Had pulm eval by Dr. Mondragon and rec RHC to eval PH and lung transplant eval. BP elevated today, had been off NTG/imdur, will restart today.     7/3/19  She presented to the ER last month and admitted to hospital with SOB. 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 Patient with Pulmonary support in place and referral to the transplant service, instructed to follow up as directed for transplant consideration. RHC last month -   Mildly elevated right Filling Pressures, Moderate Pulmonary Hypertension. She was started on nintenanib - OFEV - just received it yesterday. Still considering lung transplant event. BNP was elevated, discussed to increase lasix.   ECG - NSR, PACs, inc RBBB, anterior ischemia - old    Patient has dec exercise tolerance.    Patient is compliant with medications.    ECG - NSR, RBBB    RHC 5/17/19    AIR REST:  RA: 13/12 (11)  RV: 57  RVEDP:  13     PW: 15/29 (16)  PA: 58/16 (34)      50-59% stenosis within the right internal carotid artery  **Categories of stenosis (0-15%, 16-49%, 50-69% and 70-99% ) are based on published criteria that have been internally validated.  Degree of stenosis is based on NASCET criteria and refers to the degree of luminal diameter narrowing as a percentage of the normal ICA distal to the stenosis and any area of post stenotic dilation.      Nuclear Quantitative Functional Analysis:   LVEF: 65 %    Impression: NORMAL MYOCARDIAL PERFUSION  1. The perfusion scan is free of evidence for myocardial ischemia or injury.   2. Resting wall motion is physiologic.   3. Resting LV function is normal.   4. The ventricular volumes are normal at rest and stress.   5. The extracardiac distribution of radioactivity is normal.       This document has been electronically    SIGNED BY: Hermilo Pete MD On: 02/25/2019 16:47    CONCLUSIONS     1 - Mild left atrial enlargement.     2 - Eccentric hypertrophy.     3 - No wall motion abnormalities.     4 - Normal left ventricular systolic function (EF 55-60%).     5 - Normal right ventricular systolic function .     6 - Aortic valve prosthesis, CATINA = 1.61 cm2, AVAi = 0.95 cm2/m2, peak velocity = 2.48 m/s, mean gradient = 15 mmHg.     7 - Moderate tricuspid regurgitation.     8 - Pulmonary hypertension. The estimated PA systolic pressure is 61 mmHg.     This document has been electronically    SIGNED BY: Hermilo Pete MD On: 02/25/2019 14:50    TEST DESCRIPTION   PREDOMINANT RHYTHM  1. Sinus rhythm with heart rates varying between 66 and 105 bpm with an average of 78 bpm.   VENTRICULAR ARRHYTHMIAS  1. There were very frequent PVCs totalling 10573 and averaging 489 per hour.  There were 23 couplets. There were 5 triplets.   2. There were no episodes of ventricular tachycardia.    SUPRA VENTRICULAR ARRHYTHMIAS  1. There were rare PACs totalling 382 and averaging 7 per hour.   2. There were  no episodes of sustained supraventricular tachycardia.    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, implants and grafts, initial encounter     Hyperlipidemia     Hypertension     Sleep apnea syndrome 2019    Stroke        Past Surgical History:   Procedure Laterality Date    CARDIAC CATHETERIZATION      CARDIAC VALVE SURGERY      CORONARY ANGIOPLASTY      CORONARY ARTERY BYPASS GRAFT      HYSTERECTOMY      INSERTION, CATHETER, RIGHT HEART Right 2019    Performed by Derek Brown MD at [facility] CATH LAB       Social History     Tobacco Use    Smoking status: Former Smoker     Packs/day: 1.00     Types: Cigarettes     Last attempt to quit: 2018     Years since quittin.1    Smokeless tobacco: Never Used   Substance Use Topics    Alcohol use: Not Currently     Frequency: 2-4 times a month     Drinks per session: 1 or 2     Binge frequency: Never    Drug use: Not Currently       Family History   Problem Relation Age of Onset    Heart disease Mother     Breast cancer Sister     Coronary artery disease Father     Lung cancer Brother        Patient's Medications   New Prescriptions    No medications on file   Previous Medications    ALBUTEROL (VENTOLIN HFA) 90 MCG/ACTUATION INHALER    Inhale 2 puffs into the lungs every 6 (six) hours as needed for Wheezing. Rescue    AMITRIPTYLINE (ELAVIL) 25 MG TABLET    Take 25 mg by mouth every evening.     BUSPIRONE (BUSPAR) 15 MG TABLET    Take 15 mg by mouth 2 (two) times daily.     CLONAZEPAM (KLONOPIN) 1 MG TABLET    Take 1 mg by mouth 3 (three) times daily.     DESVENLAFAXINE SUCCINATE (PRISTIQ) 100 MG TB24    Take 1 tablet (100 mg total) by mouth once daily.    DICLOFENAC SODIUM (VOLTAREN) 1 % GEL    Apply 2 g topically 4  (four) times daily.    FLUTICASONE (FLONASE) 50 MCG/ACTUATION NASAL SPRAY    1 spray by Each Nare route once daily.    FUROSEMIDE (LASIX) 40 MG TABLET    Take 40 mg by mouth 2 (two) times daily.    METOPROLOL SUCCINATE (TOPROL-XL) 25 MG 24 HR TABLET    Take 1 tablet (25 mg total) by mouth once daily.    NICOTINE (NICODERM CQ) 7 MG/24 HR    Place 1 patch onto the skin every 24 hours.    NINTEDANIB (OFEV) 150 MG CAP    Take 150 mg by mouth 2 (two) times daily.    NITROGLYCERIN (NITROSTAT) 0.4 MG SL TABLET        PANTOPRAZOLE (PROTONIX) 40 MG TABLET    Take 40 mg by mouth once daily.    POTASSIUM CHLORIDE SA (K-DUR,KLOR-CON) 20 MEQ TABLET    Take 20 mEq by mouth 2 (two) times daily.    ROSUVASTATIN (CRESTOR) 20 MG TABLET    Take 20 mg by mouth once daily.    SUMATRIPTAN (IMITREX) 100 MG TABLET    Take 100 mg by mouth every 2 (two) hours as needed.    TIZANIDINE (ZANAFLEX) 4 MG TABLET    Take 4 mg by mouth every evening.    TOPIRAMATE (TOPAMAX) 100 MG TABLET    Take 100 mg by mouth 2 (two) times daily.    TRAMADOL (ULTRAM) 50 MG TABLET    Take 50 mg by mouth every 4 (four) hours as needed.     TRAZODONE (DESYREL) 150 MG TABLET    Take 150 mg by mouth nightly.    UMECLIDINIUM-VILANTEROL (ANORO ELLIPTA) 62.5-25 MCG/ACTUATION DSDV    Inhale 1 puff into the lungs once daily. Controller    WARFARIN (COUMADIN) 6 MG TABLET    Take 6 mg by mouth Daily.    Modified Medications    Modified Medication Previous Medication    NITROGLYCERIN 0.4 MG/HR TD PT24 (NITRODUR) 0.4 MG/HR nitroGLYCERIN 0.4 MG/HR TD PT24 (NITRODUR) 0.4 mg/hr       Place 1 patch onto the skin once daily.    Place 1 patch onto the skin once daily.   Discontinued Medications    VITAMIN D (VITAMIN D3) 1000 UNITS TAB    Take 1,000 Units by mouth once daily.       Review of Systems   Constitutional: Negative.    HENT: Negative.    Eyes: Negative.    Respiratory: Positive for shortness of breath.    Cardiovascular: Positive for chest pain and palpitations.    Gastrointestinal: Negative.    Genitourinary: Negative.    Musculoskeletal: Negative.    Skin: Negative.    Neurological: Positive for dizziness.   Endo/Heme/Allergies: Negative.    Psychiatric/Behavioral: The patient is nervous/anxious.    All 12 systems otherwise negative.      Wt Readings from Last 3 Encounters:   07/03/19 72 kg (158 lb 11.7 oz)   07/02/19 71.6 kg (157 lb 13.6 oz)   06/21/19 75.5 kg (166 lb 7.2 oz)     Temp Readings from Last 3 Encounters:   06/22/19 98 °F (36.7 °C) (Oral)   05/17/19 97.6 °F (36.4 °C) (Oral)   05/14/19 97 °F (36.1 °C) (Tympanic)     BP Readings from Last 3 Encounters:   07/03/19 130/80   06/22/19 115/67   06/04/19 112/76     Pulse Readings from Last 3 Encounters:   07/03/19 78   06/22/19 61   06/04/19 102       /80 (Patient Position: Sitting, BP Method: Small (Manual))   Pulse 78   Ht 5' (1.524 m)   Wt 72 kg (158 lb 11.7 oz)   BMI 31.00 kg/m²     Objective:   Physical Exam   Constitutional: She is oriented to person, place, and time. She appears well-developed and well-nourished. No distress.   HENT:   Head: Normocephalic and atraumatic.   Nose: Nose normal.   Mouth/Throat: Oropharynx is clear and moist.   Eyes: Conjunctivae and EOM are normal. No scleral icterus.   Neck: Normal range of motion. Neck supple. No JVD present. No thyromegaly present.   Cardiovascular: Normal rate, regular rhythm, S1 normal and S2 normal. Exam reveals no gallop, no S3, no S4 and no friction rub.   Murmur heard.  Pulmonary/Chest: Effort normal and breath sounds normal. No stridor. No respiratory distress. She has no wheezes. She has no rales. She exhibits no tenderness.   Abdominal: Soft. Bowel sounds are normal. She exhibits no distension and no mass. There is no tenderness. There is no rebound.   Genitourinary:   Genitourinary Comments: Deferred   Musculoskeletal: Normal range of motion. She exhibits no edema, tenderness or deformity.   Lymphadenopathy:     She has no cervical  adenopathy.   Neurological: She is alert and oriented to person, place, and time. She exhibits normal muscle tone. Coordination normal.   Skin: Skin is warm and dry. No rash noted. She is not diaphoretic. No erythema. No pallor.   Psychiatric: She has a normal mood and affect. Her behavior is normal. Judgment and thought content normal.   Nursing note and vitals reviewed.      Lab Results   Component Value Date     06/22/2019    K 3.4 (L) 06/22/2019     06/22/2019    CO2 30 (H) 06/22/2019    BUN 7 06/22/2019    CREATININE 1.0 06/22/2019     (H) 06/22/2019    HGBA1C 5.2 06/22/2019    MG 2.5 06/22/2019    AST 64 (H) 06/21/2019    ALT 65 (H) 06/21/2019    ALBUMIN 3.9 06/21/2019    PROT 7.2 06/21/2019    BILITOT 0.9 06/21/2019    WBC 7.58 06/22/2019    HGB 10.4 (L) 06/22/2019    HCT 34.1 (L) 06/22/2019     (H) 06/22/2019     06/22/2019    INR 3.0 07/01/2019    TSH 1.982 06/22/2019    CHOL 101 (L) 06/22/2019    HDL 28 (L) 06/22/2019    LDLCALC 58.2 (L) 06/22/2019    TRIG 74 06/22/2019     (H) 06/21/2019     Assessment:      1. (HFpEF) heart failure with preserved ejection fraction    2. Hx of mechanical aortic valve replacement    3. Status post coronary artery bypass graft    4. PVC (premature ventricular contraction)    5. Essential hypertension    6. H/O mitral valve repair    7. Nonrheumatic aortic insufficiency with aortic stenosis    8. Other hyperlipidemia    9. Pulmonary HTN    10. Pulmonary fibrosis        Plan:   1. CAD s/p CABG  - cont current meds  - restart Imdur  - exercise nuclear stress to r/o ischemia - negative  - cannot tolerate Ranexa    2. HFpEF  - cont lasix and K   - increase lasix to 80 BID  - BNP, BMP, Mg on Friday    3. PVCs  - not candidate for ablation  - amiodarone high risk for pulm toxicity    4. PulmHTN/fibrosis  - RHC  With PH and elevated filling pressures  - f/u with pulm as needed - started on new IPF med  - f/u with pulm for BANDAR    [facility]. [facility]  - recent echo gradient WNL  - cont coumadin clinic       Thank you for allowing me to participate in this patient's care. Please do not hesitate to contact me with any questions or concerns. Consult note has been forwarded to the referral physician.

## 2019-07-05 ENCOUNTER — TELEPHONE (OUTPATIENT)
Dept: INTERNAL MEDICINE | Facility: CLINIC | Age: 49
End: 2019-07-05

## 2019-07-05 ENCOUNTER — LAB VISIT (OUTPATIENT)
Dept: LAB | Facility: HOSPITAL | Age: 49
End: 2019-07-05
Attending: INTERNAL MEDICINE
Payer: COMMERCIAL

## 2019-07-05 ENCOUNTER — ANTI-COAG VISIT (OUTPATIENT)
Dept: CARDIOLOGY | Facility: CLINIC | Age: 49
End: 2019-07-05
Payer: COMMERCIAL

## 2019-07-05 DIAGNOSIS — Z79.01 LONG TERM (CURRENT) USE OF ANTICOAGULANTS: ICD-10-CM

## 2019-07-05 DIAGNOSIS — I50.30 HEART FAILURE, DIASTOLIC: Primary | ICD-10-CM

## 2019-07-05 DIAGNOSIS — Z95.2 HX OF MECHANICAL AORTIC VALVE REPLACEMENT: Chronic | ICD-10-CM

## 2019-07-05 LAB
ANION GAP SERPL CALC-SCNC: 10 MMOL/L (ref 8–16)
BNP SERPL-MCNC: 62 PG/ML (ref 0–99)
BUN SERPL-MCNC: 17 MG/DL (ref 6–20)
CALCIUM SERPL-MCNC: 9.7 MG/DL (ref 8.7–10.5)
CHLORIDE SERPL-SCNC: 105 MMOL/L (ref 95–110)
CO2 SERPL-SCNC: 27 MMOL/L (ref 23–29)
CREAT SERPL-MCNC: 1 MG/DL (ref 0.5–1.4)
EST. GFR  (AFRICAN AMERICAN): >60 ML/MIN/1.73 M^2
EST. GFR  (NON AFRICAN AMERICAN): >60 ML/MIN/1.73 M^2
GLUCOSE SERPL-MCNC: 125 MG/DL (ref 70–110)
INR PPP: 1.9 (ref 0.8–1.2)
MAGNESIUM SERPL-MCNC: 2.4 MG/DL (ref 1.6–2.6)
POTASSIUM SERPL-SCNC: 4.1 MMOL/L (ref 3.5–5.1)
PROTHROMBIN TIME: 19.8 SEC (ref 9–12.5)
SODIUM SERPL-SCNC: 142 MMOL/L (ref 136–145)

## 2019-07-05 PROCEDURE — 83735 ASSAY OF MAGNESIUM: CPT | Mod: NTX

## 2019-07-05 PROCEDURE — 85610 PROTHROMBIN TIME: CPT | Mod: NTX

## 2019-07-05 PROCEDURE — 83880 ASSAY OF NATRIURETIC PEPTIDE: CPT | Mod: TXP

## 2019-07-05 PROCEDURE — 93793 PR ANTICOAGULANT MGMT FOR PT TAKING WARFARIN: ICD-10-PCS | Mod: S$GLB,,,

## 2019-07-05 PROCEDURE — 93793 ANTICOAG MGMT PT WARFARIN: CPT | Mod: S$GLB,,,

## 2019-07-05 PROCEDURE — 80048 BASIC METABOLIC PNL TOTAL CA: CPT | Mod: TXP

## 2019-07-05 NOTE — TELEPHONE ENCOUNTER
Spoke with patient to reschedule her mammogram patient will callback when she is ready to schedule.

## 2019-07-05 NOTE — PROGRESS NOTES
Patient's INR is slightly sub-therapeutic at 1.9.  Carole with [facility] contacted.   No significant changes reported.   Orders given: Resume dose of warfarin 6mg daily.  Recheck on 7/10/19.

## 2019-07-09 ENCOUNTER — PATIENT MESSAGE (OUTPATIENT)
Dept: PULMONOLOGY | Facility: CLINIC | Age: 49
End: 2019-07-09

## 2019-07-09 DIAGNOSIS — R73.09 ABNORMAL GLUCOSE: Primary | ICD-10-CM

## 2019-07-09 RX ORDER — METOPROLOL SUCCINATE 25 MG/1
25 TABLET, EXTENDED RELEASE ORAL DAILY
Qty: 30 TABLET | Refills: 1 | Status: CANCELLED | OUTPATIENT
Start: 2019-07-09 | End: 2020-07-08

## 2019-07-11 ENCOUNTER — TELEPHONE (OUTPATIENT)
Dept: CARDIOLOGY | Facility: CLINIC | Age: 49
End: 2019-07-11

## 2019-07-11 ENCOUNTER — ANTI-COAG VISIT (OUTPATIENT)
Dept: CARDIOLOGY | Facility: CLINIC | Age: 49
End: 2019-07-11
Payer: COMMERCIAL

## 2019-07-11 ENCOUNTER — PATIENT MESSAGE (OUTPATIENT)
Dept: PULMONOLOGY | Facility: CLINIC | Age: 49
End: 2019-07-11

## 2019-07-11 DIAGNOSIS — I50.9 CONGESTIVE HEART FAILURE, UNSPECIFIED HF CHRONICITY, UNSPECIFIED HEART FAILURE TYPE: Primary | ICD-10-CM

## 2019-07-11 DIAGNOSIS — Z79.01 LONG TERM (CURRENT) USE OF ANTICOAGULANTS: ICD-10-CM

## 2019-07-11 DIAGNOSIS — Z95.2 HX OF MECHANICAL AORTIC VALVE REPLACEMENT: Chronic | ICD-10-CM

## 2019-07-11 LAB — INR PPP: 2.8

## 2019-07-11 PROCEDURE — 93793 PR ANTICOAGULANT MGMT FOR PT TAKING WARFARIN: ICD-10-PCS | Mod: S$GLB,,,

## 2019-07-11 PROCEDURE — 93793 ANTICOAG MGMT PT WARFARIN: CPT | Mod: S$GLB,,,

## 2019-07-11 RX ORDER — METOPROLOL SUCCINATE 25 MG/1
25 TABLET, EXTENDED RELEASE ORAL DAILY
Qty: 30 TABLET | Refills: 6 | Status: ON HOLD | OUTPATIENT
Start: 2019-07-11 | End: 2020-01-28 | Stop reason: SDUPTHER

## 2019-07-11 NOTE — TELEPHONE ENCOUNTER
Spoke with patient--states needs refill for metoprolol sent to [facility]--informed patient will send to provider to sign and send today--patient verbalizes understanding

## 2019-07-11 NOTE — PROGRESS NOTES
Verbal results taken from Meena ZELAYA with [facility] at [phone].  Patient's PT:  33.8 // INR:  2.8 (therapeutic).  Patient has taken medication as previously instructed.  No other changes reported.  Orders given:  Maintain current dose of Warfarin 6 mg daily.  Recheck on Thursday, 7/18/2019.  Hard copy to follow.

## 2019-07-11 NOTE — TELEPHONE ENCOUNTER
----- Message from Carlota Murguia sent at 7/11/2019  2:51 PM CDT -----  Please call pt back about refill denial toprol. Please call her with this concern at [phone].

## 2019-07-12 ENCOUNTER — TELEPHONE (OUTPATIENT)
Dept: PULMONOLOGY | Facility: CLINIC | Age: 49
End: 2019-07-12

## 2019-07-12 ENCOUNTER — TELEPHONE (OUTPATIENT)
Dept: PULMONOLOGY | Facility: HOSPITAL | Age: 49
End: 2019-07-12

## 2019-07-12 DIAGNOSIS — R11.0 NAUSEA: Primary | ICD-10-CM

## 2019-07-12 RX ORDER — ONDANSETRON 4 MG/1
4 TABLET, FILM COATED ORAL EVERY 8 HOURS PRN
Qty: 90 TABLET | Refills: 2 | Status: SHIPPED | OUTPATIENT
Start: 2019-07-12 | End: 2019-11-08 | Stop reason: SDUPTHER

## 2019-07-12 NOTE — TELEPHONE ENCOUNTER
Returned patients phone call. Informed her that DR. Salazar will be calling her today about the meds making her nauseas.  She stated understanding and had no more questions      ----- Message from Sadie Fuentes sent at 7/12/2019 10:40 AM CDT -----  Contact: Pt  Pt asked that nurse please return call. Pt states she asked for medication for vomiting and she would like something orally. Please contact pt at ([phone])

## 2019-07-15 DIAGNOSIS — I50.9 CONGESTIVE HEART FAILURE, UNSPECIFIED HF CHRONICITY, UNSPECIFIED HEART FAILURE TYPE: Primary | ICD-10-CM

## 2019-07-15 RX ORDER — FUROSEMIDE 40 MG/1
TABLET ORAL
Qty: 120 TABLET | Refills: 6 | Status: ON HOLD | OUTPATIENT
Start: 2019-07-15 | End: 2020-01-28 | Stop reason: SDUPTHER

## 2019-07-17 ENCOUNTER — TELEPHONE (OUTPATIENT)
Dept: CARDIOLOGY | Facility: CLINIC | Age: 49
End: 2019-07-17

## 2019-07-17 NOTE — TELEPHONE ENCOUNTER
----- Message from Anna Ryder LPN sent at 7/17/2019  2:47 PM CDT -----  Contact: Patient  Please contact patient regarding an impending dental procedure, possible extraction - needs Warfarin dose instructions.

## 2019-07-18 ENCOUNTER — TELEPHONE (OUTPATIENT)
Dept: CARDIOLOGY | Facility: CLINIC | Age: 49
End: 2019-07-18

## 2019-07-18 NOTE — TELEPHONE ENCOUNTER
Spoke with patient.  She states she has a tooth that needs to be pulled.  Advised patient to contact cardiologist for cardiac clearance.    Patient agreed to make an appt to see cardiologist.

## 2019-07-19 ENCOUNTER — ANTI-COAG VISIT (OUTPATIENT)
Dept: CARDIOLOGY | Facility: CLINIC | Age: 49
End: 2019-07-19
Payer: COMMERCIAL

## 2019-07-19 DIAGNOSIS — Z98.890 H/O MITRAL VALVE REPAIR: ICD-10-CM

## 2019-07-19 DIAGNOSIS — Z79.01 LONG TERM (CURRENT) USE OF ANTICOAGULANTS: ICD-10-CM

## 2019-07-19 LAB — INR PPP: 3

## 2019-07-19 PROCEDURE — 93793 PR ANTICOAGULANT MGMT FOR PT TAKING WARFARIN: ICD-10-PCS | Mod: S$GLB,,,

## 2019-07-19 PROCEDURE — 93793 ANTICOAG MGMT PT WARFARIN: CPT | Mod: S$GLB,,,

## 2019-07-19 NOTE — PROGRESS NOTES
Verbal results taken from Meena ZELAYA with [facility].  PT / INR  36.6 / 3.0.   INR is at higher end of range, despite patient missing dose of warfarin this week.  Patient also reports having diarrhea this week - symptoms resolved after taking Imodium.  Will give lower dose for today and continue to monitor closely.   Orders given:  Take warfarin 3mg on today; then resume current dose of 6mg every evening..  Recheck on 7/25/19.

## 2019-07-25 ENCOUNTER — ANTI-COAG VISIT (OUTPATIENT)
Dept: CARDIOLOGY | Facility: CLINIC | Age: 49
End: 2019-07-25
Payer: COMMERCIAL

## 2019-07-25 DIAGNOSIS — Z79.01 LONG TERM (CURRENT) USE OF ANTICOAGULANTS: ICD-10-CM

## 2019-07-25 DIAGNOSIS — Z95.2 HX OF MECHANICAL AORTIC VALVE REPLACEMENT: Chronic | ICD-10-CM

## 2019-07-25 LAB — INR PPP: 1.2

## 2019-07-25 PROCEDURE — 93793 ANTICOAG MGMT PT WARFARIN: CPT | Mod: S$GLB,,,

## 2019-07-25 PROCEDURE — 93793 PR ANTICOAGULANT MGMT FOR PT TAKING WARFARIN: ICD-10-PCS | Mod: S$GLB,,,

## 2019-07-25 NOTE — PROGRESS NOTES
Verbal results taken from Meena ZELAYA with [facility].  PT / INR  14.2 / 1.2.  Date drawn: 7/25/19.  Mrs. Sal completed a course of Augmentin on yesterday, which may explain last week's reading.  Orders given: Take warfarin 9mg on today; then resume current dose of 6mg every evening.  Recheck on 8/1/19.     HH spoke with patient regarding dental procedure.  Patient states it has not been scheduled but understands she must contact coumadin clinic when it is scheduled.

## 2019-07-31 ENCOUNTER — TELEPHONE (OUTPATIENT)
Dept: CARDIOLOGY | Facility: CLINIC | Age: 49
End: 2019-07-31

## 2019-07-31 NOTE — TELEPHONE ENCOUNTER
----- Message from Kerri Del Rosario sent at 7/31/2019  3:04 PM CDT -----  Contact: Beatrice- [facility] -[phone]  Would like to consult with the nurse, Ms Beatrice  From [facility] would like to know  The the  med console was received, please call back at  [phone], thanks sj

## 2019-08-01 ENCOUNTER — ANTI-COAG VISIT (OUTPATIENT)
Dept: CARDIOLOGY | Facility: CLINIC | Age: 49
End: 2019-08-01
Payer: COMMERCIAL

## 2019-08-01 DIAGNOSIS — Z79.01 LONG TERM (CURRENT) USE OF ANTICOAGULANTS: ICD-10-CM

## 2019-08-01 DIAGNOSIS — Z95.2 HX OF MECHANICAL AORTIC VALVE REPLACEMENT: Chronic | ICD-10-CM

## 2019-08-01 LAB — INR PPP: 2.5

## 2019-08-01 PROCEDURE — 93793 PR ANTICOAGULANT MGMT FOR PT TAKING WARFARIN: ICD-10-PCS | Mod: S$GLB,,,

## 2019-08-01 PROCEDURE — 93793 ANTICOAG MGMT PT WARFARIN: CPT | Mod: S$GLB,,,

## 2019-08-01 NOTE — TELEPHONE ENCOUNTER
----- Message from Chico Caraballo sent at 8/1/2019  2:25 PM CDT -----  Contact: self  Type:  Needs Medical Advice    Who Called: pt  Symptoms (please be specific):n/a   How long has patient had these symptoms: n/a  Pharmacy name and phone #: n/a  Would the patient rather a call back or a response via [facility]? Call back  Best Call Back Number: [phone]  Additional Information: pt wants to know if provider discontinued her home health and do pt needs to keep coumadin appt.    Thanks,  Chico Caraballo

## 2019-08-01 NOTE — TELEPHONE ENCOUNTER
Returned call to patient--states she is needing to have some dental work done and needs to know if okay to be off of coumadin--states she is having a mold of teeth made for denture plate----please advise

## 2019-08-07 ENCOUNTER — OFFICE VISIT (OUTPATIENT)
Dept: CARDIOLOGY | Facility: CLINIC | Age: 49
End: 2019-08-07
Payer: COMMERCIAL

## 2019-08-07 VITALS
DIASTOLIC BLOOD PRESSURE: 64 MMHG | BODY MASS INDEX: 30.86 KG/M2 | SYSTOLIC BLOOD PRESSURE: 100 MMHG | HEART RATE: 73 BPM | WEIGHT: 158 LBS

## 2019-08-07 DIAGNOSIS — I35.2 NONRHEUMATIC AORTIC INSUFFICIENCY WITH AORTIC STENOSIS: ICD-10-CM

## 2019-08-07 DIAGNOSIS — Z95.2 HX OF MECHANICAL AORTIC VALVE REPLACEMENT: Chronic | ICD-10-CM

## 2019-08-07 DIAGNOSIS — E78.49 OTHER HYPERLIPIDEMIA: ICD-10-CM

## 2019-08-07 DIAGNOSIS — I10 ESSENTIAL HYPERTENSION: ICD-10-CM

## 2019-08-07 DIAGNOSIS — J84.10 PULMONARY FIBROSIS: Chronic | ICD-10-CM

## 2019-08-07 DIAGNOSIS — I49.3 PVC (PREMATURE VENTRICULAR CONTRACTION): ICD-10-CM

## 2019-08-07 DIAGNOSIS — Z95.1 STATUS POST CORONARY ARTERY BYPASS GRAFT: ICD-10-CM

## 2019-08-07 DIAGNOSIS — Z79.01 ANTICOAGULATED ON COUMADIN: ICD-10-CM

## 2019-08-07 DIAGNOSIS — I50.30 (HFPEF) HEART FAILURE WITH PRESERVED EJECTION FRACTION: Primary | Chronic | ICD-10-CM

## 2019-08-07 DIAGNOSIS — I27.20 PULMONARY HTN: Chronic | ICD-10-CM

## 2019-08-07 DIAGNOSIS — Z98.890 H/O MITRAL VALVE REPAIR: ICD-10-CM

## 2019-08-07 PROCEDURE — 3008F PR BODY MASS INDEX (BMI) DOCUMENTED: ICD-10-PCS | Mod: CPTII,NTX,S$GLB, | Performed by: INTERNAL MEDICINE

## 2019-08-07 PROCEDURE — 3078F PR MOST RECENT DIASTOLIC BLOOD PRESSURE < 80 MM HG: ICD-10-PCS | Mod: CPTII,NTX,S$GLB, | Performed by: INTERNAL MEDICINE

## 2019-08-07 PROCEDURE — 3074F SYST BP LT 130 MM HG: CPT | Mod: CPTII,NTX,S$GLB, | Performed by: INTERNAL MEDICINE

## 2019-08-07 PROCEDURE — 3078F DIAST BP <80 MM HG: CPT | Mod: CPTII,NTX,S$GLB, | Performed by: INTERNAL MEDICINE

## 2019-08-07 PROCEDURE — 99214 PR OFFICE/OUTPT VISIT, EST, LEVL IV, 30-39 MIN: ICD-10-PCS | Mod: NTX,S$GLB,, | Performed by: INTERNAL MEDICINE

## 2019-08-07 PROCEDURE — 99999 PR PBB SHADOW E&M-EST. PATIENT-LVL III: CPT | Mod: PBBFAC,TXP,, | Performed by: INTERNAL MEDICINE

## 2019-08-07 PROCEDURE — 99999 PR PBB SHADOW E&M-EST. PATIENT-LVL III: ICD-10-PCS | Mod: PBBFAC,TXP,, | Performed by: INTERNAL MEDICINE

## 2019-08-07 PROCEDURE — 3074F PR MOST RECENT SYSTOLIC BLOOD PRESSURE < 130 MM HG: ICD-10-PCS | Mod: CPTII,NTX,S$GLB, | Performed by: INTERNAL MEDICINE

## 2019-08-07 PROCEDURE — 3008F BODY MASS INDEX DOCD: CPT | Mod: CPTII,NTX,S$GLB, | Performed by: INTERNAL MEDICINE

## 2019-08-07 PROCEDURE — 99214 OFFICE O/P EST MOD 30 MIN: CPT | Mod: NTX,S$GLB,, | Performed by: INTERNAL MEDICINE

## 2019-08-07 RX ORDER — ENOXAPARIN SODIUM 100 MG/ML
1 INJECTION SUBCUTANEOUS 2 TIMES DAILY
Qty: 3 SYRINGE | Refills: 2 | Status: ON HOLD | OUTPATIENT
Start: 2019-08-07 | End: 2019-11-07 | Stop reason: SDUPTHER

## 2019-08-07 RX ORDER — HYDROCODONE BITARTRATE AND ACETAMINOPHEN 10; 325 MG/1; MG/1
1 TABLET ORAL EVERY 6 HOURS PRN
Refills: 0 | COMMUNITY
Start: 2019-08-06

## 2019-08-07 NOTE — PROGRESS NOTES
Subjective:   Patient ID:  Meg Sal is a 49 y.o. female who presents for cardiac consult of Follow-up      Hypertension   Associated symptoms include chest pain, palpitations and shortness of breath.   Chest Pain    Associated symptoms include dizziness, palpitations and shortness of breath.   Shortness of Breath   Associated symptoms include chest pain.   Dizziness:    Associated symptoms: palpitations and chest pain.    The patient came in today for cardiac consult of Follow-up      Meg Sal is a 49 y.o. female with CAD s/p 2V CABG - LAD/LCX with occluded, s/p mechanical AVR, s/p mitral annuloplasty ring, pulm fibrosis, Pulm HTN, HFpEF,CHD, HTN, HLD, CVA here for follow up CV eval.     2/8/19  Pt had first MI at age 31, was taken to [facility], back was hurting. Felt like she had a heavy wet blanket. She was breaking up a dog fight when paramedics came. Dr. Cortez did LHC, had one stent placed. She also had other blockages which were medically treated. She had treadmill nuclear stress tests after, had been off plavix. In 2014, when she went to [place] she couldn't walk and was out of breath. She came back to  and she did another LHC with Dr. Cortez, Dr. Mcmahon was consulted as she had severe LAD disease. She went to Dr. Heller with L[facility], had LHC in May 2018 with chronically occluded RCA with collaterals but patent grafts to her left system. She has been having a lot of chest pain lately. She was taking NTG two or three times a week. She was told to take Ranexa and Imdur but had more depression so stopped taking it. She has no car and has difficulty getting to coumadin clinic, discussed will to get home health for labwork.     3/11/19  She had another episode of severe chest pain last week. Started on Tues and continued till Friday. Had home health nurse that came. She took NTG which didn't help much, took 2 tabs. Discussed she needs ER eval if severe CP again that is not improvd  with NTG. Started verapamil for PVCs. Will refer to Ep for PVC ablation vs AAD. Pulm eval this AM, will have sleep study/PFTs/rheum referral. Recent stress and echo negative, mech AVR mean gradient 15 mmhg    4/29/19  She had a recent syncopal episode at home and presented to [facility] ER and was admitted. IN the ED, patient found to be mildly hypoxic with O2 sat of 90% on room air.  ABG showed pH of 7.349, pCO2 43, PO2 55, SA O2 87 on room air.  CT of the head was negative for acute process, CXR unremarkable, EKG unrevealing. BP is better since home Verapamil, Bystolic, Isosorbide, and NTG patch have been held. Home oxygen evaluation completed and patient qualified with an exertional room air O2 saturation of 87% which improved to 96% on 2 L nasal cannula, she was DC home on 2L NC.Pt had eval by Dr. Montero for PVCs, discussed ablation not a good option, may try amio but not a great option due to lung disease. Had pulm eval by Dr. Mondragon and rec RHC to eval PH and lung transplant eval. BP elevated today, had been off NTG/imdur, will restart today.     7/3/19  She presented to the ER last month and admitted to hospital with SOB. 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 Patient with Pulmonary support in place and referral to the transplant service, instructed to follow up as directed for transplant consideration. RHC last month -   Mildly elevated right Filling Pressures, Moderate Pulmonary Hypertension. She was started on nintenanib - OFEV - just received it yesterday. Still considering lung transplant event. BNP was elevated, discussed to increase lasix.  ECG - NSR, PACs, inc RBBB, anterior ischemia - old      8/7/19  She had a recent fall, broke collar bone. She was trying to put water on night stand and fell. She went to ortho in [place], does not need surgery. She had the fall yesterday morning. She has migraines but discussed if headache occurs  needs ER eval to r/o intracranial bleeding. She was out of Imitrex and has been taking Fiorcicet but didn't help much.     Patient has dec exercise tolerance.    Patient is compliant with medications.    ECG - NSR, RBBB    RHC 5/17/19    AIR REST:  RA: 13/12 (11)  RV: 57  RVEDP: 13     PW: 15/29 (16)  PA: 58/16 (34)      50-59% stenosis within the right internal carotid artery  **Categories of stenosis (0-15%, 16-49%, 50-69% and 70-99% ) are based on published criteria that have been internally validated.  Degree of stenosis is based on NASCET criteria and refers to the degree of luminal diameter narrowing as a percentage of the normal ICA distal to the stenosis and any area of post stenotic dilation.      Nuclear Quantitative Functional Analysis:   LVEF: 65 %    Impression: NORMAL MYOCARDIAL PERFUSION  1. The perfusion scan is free of evidence for myocardial ischemia or injury.   2. Resting wall motion is physiologic.   3. Resting LV function is normal.   4. The ventricular volumes are normal at rest and stress.   5. The extracardiac distribution of radioactivity is normal.       This document has been electronically    SIGNED BY: Hermilo Pete MD On: 02/25/2019 16:47    CONCLUSIONS     1 - Mild left atrial enlargement.     2 - Eccentric hypertrophy.     3 - No wall motion abnormalities.     4 - Normal left ventricular systolic function (EF 55-60%).     5 - Normal right ventricular systolic function .     6 - Aortic valve prosthesis, CATINA = 1.61 cm2, AVAi = 0.95 cm2/m2, peak velocity = 2.48 m/s, mean gradient = 15 mmHg.     7 - Moderate tricuspid regurgitation.     8 - Pulmonary hypertension. The estimated PA systolic pressure is 61 mmHg.     This document has been electronically    SIGNED BY: Hermilo Pete MD On: 02/25/2019 14:50    TEST DESCRIPTION   PREDOMINANT RHYTHM  1. Sinus rhythm with heart rates varying between 66 and 105 bpm with an average of 78 bpm.   VENTRICULAR ARRHYTHMIAS  1. There were  very frequent PVCs totalling 08769 and averaging 489 per hour.  There were 23 couplets. There were 5 triplets.   2. There were no episodes of ventricular tachycardia.    SUPRA VENTRICULAR ARRHYTHMIAS  1. There were rare PACs totalling 382 and averaging 7 per hour.   2. There were no episodes of sustained supraventricular tachycardia.    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, implants and grafts, initial encounter     Hyperlipidemia     Hypertension     Sleep apnea syndrome 2019    Stroke        Past Surgical History:   Procedure Laterality Date    CARDIAC CATHETERIZATION      CARDIAC VALVE SURGERY      CORONARY ANGIOPLASTY      CORONARY ARTERY BYPASS GRAFT      HYSTERECTOMY      INSERTION, CATHETER, RIGHT HEART Right 2019    Performed by Derek Brown MD at [facility]       Social History     Tobacco Use    Smoking status: Former Smoker     Packs/day: 1.00     Types: Cigarettes     Last attempt to quit: 2018     Years since quittin.2    Smokeless tobacco: Never Used   Substance Use Topics    Alcohol use: Not Currently     Frequency: 2-4 times a month     Drinks per session: 1 or 2     Binge frequency: Never    Drug use: Not Currently       Family History   Problem Relation Age of Onset    Heart disease Mother     Breast cancer Sister     Coronary artery disease Father     Lung cancer Brother        Patient's Medications   New Prescriptions    ENOXAPARIN (LOVENOX) 100 MG/ML SYRG    Inject 0.7 mLs (70 mg total) into the skin 2 (two) times daily.   Previous Medications    ALBUTEROL (VENTOLIN HFA) 90 MCG/ACTUATION INHALER    Inhale 2 puffs into the lungs every 6 (six) hours as needed for Wheezing. Rescue    AMITRIPTYLINE (ELAVIL) 25 MG TABLET    Take  25 mg by mouth every evening.     BUSPIRONE (BUSPAR) 15 MG TABLET    Take 15 mg by mouth 2 (two) times daily.     CLONAZEPAM (KLONOPIN) 1 MG TABLET    Take 1 mg by mouth 3 (three) times daily.     DESVENLAFAXINE SUCCINATE (PRISTIQ) 100 MG TB24    Take 1 tablet (100 mg total) by mouth once daily.    DICLOFENAC SODIUM (VOLTAREN) 1 % GEL    Apply 2 g topically 4 (four) times daily.    FLUTICASONE (FLONASE) 50 MCG/ACTUATION NASAL SPRAY    1 spray by Each Nare route once daily.    FUROSEMIDE (LASIX) 40 MG TABLET    Take two 40 mg tablets by mouth twice a day    HYDROCODONE-ACETAMINOPHEN (NORCO)  MG PER TABLET    Take 1 tablet by mouth every 6 (six) hours as needed for Pain.    METOPROLOL SUCCINATE (TOPROL-XL) 25 MG 24 HR TABLET    Take 1 tablet (25 mg total) by mouth once daily.    NICOTINE (NICODERM CQ) 7 MG/24 HR    Place 1 patch onto the skin every 24 hours.    NINTEDANIB (OFEV) 150 MG CAP    Take 150 mg by mouth 2 (two) times daily.    NITROGLYCERIN (NITROSTAT) 0.4 MG SL TABLET        NITROGLYCERIN 0.4 MG/HR TD PT24 (NITRODUR) 0.4 MG/HR    Place 1 patch onto the skin once daily.    ONDANSETRON (ZOFRAN) 4 MG TABLET    Take 1 tablet (4 mg total) by mouth every 8 (eight) hours as needed for Nausea.    PANTOPRAZOLE (PROTONIX) 40 MG TABLET    Take 40 mg by mouth once daily.    POTASSIUM CHLORIDE SA (K-DUR,KLOR-CON) 20 MEQ TABLET    Take 20 mEq by mouth 2 (two) times daily.    ROSUVASTATIN (CRESTOR) 20 MG TABLET    Take 20 mg by mouth once daily.    SUMATRIPTAN (IMITREX) 100 MG TABLET    Take 100 mg by mouth every 2 (two) hours as needed.    TIZANIDINE (ZANAFLEX) 4 MG TABLET    Take 4 mg by mouth every evening.    TOPIRAMATE (TOPAMAX) 100 MG TABLET    Take 100 mg by mouth 2 (two) times daily.    TRAMADOL (ULTRAM) 50 MG TABLET    Take 50 mg by mouth every 4 (four) hours as needed.     TRAZODONE (DESYREL) 150 MG TABLET    Take 150 mg by mouth nightly.    UMECLIDINIUM-VILANTEROL (ANORO ELLIPTA) 62.5-25 MCG/ACTUATION  DSDV    Inhale 1 puff into the lungs once daily. Controller    WARFARIN (COUMADIN) 6 MG TABLET    Take 6 mg by mouth Daily.    Modified Medications    No medications on file   Discontinued Medications    No medications on file       Review of Systems   Constitutional: Negative.    HENT: Negative.    Eyes: Negative.    Respiratory: Positive for shortness of breath.    Cardiovascular: Positive for chest pain and palpitations.   Gastrointestinal: Negative.    Genitourinary: Negative.    Musculoskeletal: Negative.    Skin: Negative.    Neurological: Positive for dizziness.   Endo/Heme/Allergies: Negative.    Psychiatric/Behavioral: The patient is nervous/anxious.    All 12 systems otherwise negative.      Wt Readings from Last 3 Encounters:   08/07/19 71.7 kg (158 lb)   07/03/19 72 kg (158 lb 11.7 oz)   07/02/19 71.6 kg (157 lb 13.6 oz)     Temp Readings from Last 3 Encounters:   06/22/19 98 °F (36.7 °C) (Oral)   05/17/19 97.6 °F (36.4 °C) (Oral)   05/14/19 97 °F (36.1 °C) (Tympanic)     BP Readings from Last 3 Encounters:   08/07/19 100/64   07/03/19 130/80   06/22/19 115/67     Pulse Readings from Last 3 Encounters:   08/07/19 73   07/03/19 78   06/22/19 61       /64 (BP Location: Right arm, BP Method: Large (Manual))   Pulse 73   Wt 71.7 kg (158 lb)   BMI 30.86 kg/m²     Objective:   Physical Exam   Constitutional: She is oriented to person, place, and time. She appears well-developed and well-nourished. No distress.   HENT:   Head: Normocephalic and atraumatic.   Nose: Nose normal.   Mouth/Throat: Oropharynx is clear and moist.   Eyes: Conjunctivae and EOM are normal. No scleral icterus.   Neck: Normal range of motion. Neck supple. No JVD present. No thyromegaly present.   Cardiovascular: Normal rate, regular rhythm, S1 normal and S2 normal. Exam reveals no gallop, no S3, no S4 and no friction rub.   Murmur heard.  Pulmonary/Chest: Effort normal and breath sounds normal. No stridor. No respiratory distress.  She has no wheezes. She has no rales. She exhibits no tenderness.   Abdominal: Soft. Bowel sounds are normal. She exhibits no distension and no mass. There is no tenderness. There is no rebound.   Genitourinary:   Genitourinary Comments: Deferred   Musculoskeletal: Normal range of motion. She exhibits no edema, tenderness or deformity.   Lymphadenopathy:     She has no cervical adenopathy.   Neurological: She is alert and oriented to person, place, and time. She exhibits normal muscle tone. Coordination normal.   Skin: Skin is warm and dry. No rash noted. She is not diaphoretic. No erythema. No pallor.   Psychiatric: She has a normal mood and affect. Her behavior is normal. Judgment and thought content normal.   Nursing note and vitals reviewed.      Lab Results   Component Value Date     07/05/2019    K 4.1 07/05/2019     07/05/2019    CO2 27 07/05/2019    BUN 17 07/05/2019    CREATININE 1.0 07/05/2019     (H) 07/05/2019    HGBA1C 5.2 06/22/2019    MG 2.4 07/05/2019    AST 64 (H) 06/21/2019    ALT 65 (H) 06/21/2019    ALBUMIN 3.9 06/21/2019    PROT 7.2 06/21/2019    BILITOT 0.9 06/21/2019    WBC 7.58 06/22/2019    HGB 10.4 (L) 06/22/2019    HCT 34.1 (L) 06/22/2019     (H) 06/22/2019     06/22/2019    INR 2.5 08/01/2019    TSH 1.982 06/22/2019    CHOL 101 (L) 06/22/2019    HDL 28 (L) 06/22/2019    LDLCALC 58.2 (L) 06/22/2019    TRIG 74 06/22/2019    BNP 62 07/05/2019     Assessment:      1. (HFpEF) heart failure with preserved ejection fraction    2. Hx of mechanical aortic valve replacement    3. Status post coronary artery bypass graft    4. PVC (premature ventricular contraction)    5. Essential hypertension    6. H/O mitral valve repair    7. Nonrheumatic aortic insufficiency with aortic stenosis    8. Other hyperlipidemia    9. Pulmonary HTN    10. Pulmonary fibrosis    11. Anticoagulated on Coumadin        Plan:   1. CAD s/p CABG  - cont current meds  - exercise nuclear stress  to r/o ischemia - negative  - cannot tolerate Ranexa    2. HFpEF  - cont lasix and K   - low salt diet     3. PVCs  - not candidate for ablation  - amiodarone high risk for pulm toxicity    4. PulmHTN/fibrosis  - RHC  With PH and elevated filling pressures  - f/u with pulm as needed - started on new IPF med  - f/u with pulm for BANDAR    [facility]  - recent echo gradient WNL  - cont coumadin clinic   - needs Lovenox Bridging prior to any tooth extraction  - ok to have tooth imprint but not extraction off coumadin/Lovenox    Will order Home health again.       Thank you for allowing me to participate in this patient's care. Please do not hesitate to contact me with any questions or concerns. Consult note has been forwarded to the referral physician.

## 2019-08-08 ENCOUNTER — ANTI-COAG VISIT (OUTPATIENT)
Dept: CARDIOLOGY | Facility: CLINIC | Age: 49
End: 2019-08-08
Payer: COMMERCIAL

## 2019-08-08 DIAGNOSIS — Z79.01 LONG TERM (CURRENT) USE OF ANTICOAGULANTS: ICD-10-CM

## 2019-08-08 DIAGNOSIS — Z95.2 HX OF MECHANICAL AORTIC VALVE REPLACEMENT: Chronic | ICD-10-CM

## 2019-08-08 LAB — INR PPP: 2

## 2019-08-08 PROCEDURE — 93793 ANTICOAG MGMT PT WARFARIN: CPT | Mod: S$GLB,,,

## 2019-08-08 PROCEDURE — 93793 PR ANTICOAGULANT MGMT FOR PT TAKING WARFARIN: ICD-10-PCS | Mod: S$GLB,,,

## 2019-08-08 NOTE — PROGRESS NOTES
Verbal results taken from Meena ZELAYA with [facility] at [phone].  PT:  23.8 // INR:  2.0 (therapeutic).  Tested on 8/08/2019.  Reports no recent changes.  Patient has taken medication as previously instructed.  Orders given:  Maintain current dose of Warfarin 6 mg every evening.  Recheck on Thursday, 8/22/2019.  Fax orders to follow.

## 2019-08-15 ENCOUNTER — TELEPHONE (OUTPATIENT)
Dept: PULMONOLOGY | Facility: CLINIC | Age: 49
End: 2019-08-15

## 2019-08-15 ENCOUNTER — TELEPHONE (OUTPATIENT)
Dept: CARDIOLOGY | Facility: CLINIC | Age: 49
End: 2019-08-15

## 2019-08-15 NOTE — TELEPHONE ENCOUNTER
----- Message from Miguel Salazar MD sent at 8/15/2019  4:05 PM CDT -----  Contact: home health nurse pierre  Please schedule patient for cardiology follow up , she is noticing a hR of 34 on the pulse oximeter.   Thank you       ----- Message -----  From: Priti Reese LPN  Sent: 8/15/2019   2:36 PM  To: Miguel Salazar MD    Patients home health nurse called and said that her heart rate was dropping and that she is on O2 but she sleeps with her mouth open.     Please advise.   ty  ----- Message -----  From: Nettie Neely  Sent: 8/15/2019   2:25 PM  To: Martin Rivera Staff    She's calling in regards to oxygen when pt sleep at night     Heart rate dropping; lowest 34     Home health Nurse has a few additional questions       pls call pt back at [phone]

## 2019-08-15 NOTE — TELEPHONE ENCOUNTER
----- Message from Priti Reese LPN sent at 8/15/2019  2:36 PM CDT -----  Contact: home health nurse pierre Victor home health nurse called and said that her heart rate was dropping and that she is on O2 but she sleeps with her mouth open.     Please advise.   ty  ----- Message -----  From: Nettie Neely  Sent: 8/15/2019   2:25 PM  To: Martin Rivera Staff    She's calling in regards to oxygen when pt sleep at night     Heart rate dropping; lowest 34     Home health Nurse has a few additional questions       pls call pt back at [phone]

## 2019-08-15 NOTE — TELEPHONE ENCOUNTER
Patient states need instructions for Lovenox bridge--states she is scheduled to have tooth extraction on 8/20/19--please advise

## 2019-08-15 NOTE — TELEPHONE ENCOUNTER
Called Colton , the patient nurse who stated patient sometimes notices O2 SAT at 80's on exrtion , and HR 30 , advised to follow with cardiology and to wear O2 on exertion .

## 2019-08-15 NOTE — TELEPHONE ENCOUNTER
"Called to patient to do symptom check--patient states on one day last week her heart rate dropped to 33 in the middle of the night while sleeping and on Tuesday night August 13, 2019 heart rate dropped to 34 in the middle of the night when she got up to go to the bathroom--states she checked the heart rate and oxygen because she felt "off balance" at that time--I asked patient to check heart rate while on call--patient states heart rate is 75 and O2  is 99% while on 2 Liters of oxygen--patient states was told by home health nurse to call Dr. Salazar's office to request an O2 mask to use when sleeping rather than the nasal canula--patient states, "I feel fine right now."  "

## 2019-08-15 NOTE — TELEPHONE ENCOUNTER
Called patients home health nurse and informed her that I will send a message to Dr. Salazar informing him of this along with her O2 dropping ----- Message from Nettie Neely sent at 8/15/2019  2:25 PM CDT -----  Contact: home health nurse pierre  She's calling in regards to oxygen when pt sleep at night     Heart rate dropping; lowest 34     Home health Nurse has a few additional questions       pls call pt back at [phone]

## 2019-08-16 ENCOUNTER — TELEPHONE (OUTPATIENT)
Dept: CARDIOLOGY | Facility: CLINIC | Age: 49
End: 2019-08-16

## 2019-08-16 ENCOUNTER — PATIENT MESSAGE (OUTPATIENT)
Dept: CARDIOLOGY | Facility: CLINIC | Age: 49
End: 2019-08-16

## 2019-08-16 DIAGNOSIS — I27.20 PULMONARY HTN: Primary | Chronic | ICD-10-CM

## 2019-08-16 NOTE — PROGRESS NOTES
Mrs. Sal will be having a dental extraction 8/19/19.    Warfarin will be held 3 days prior to procedure.  Lovenox bridge required due to mechanical AVR.    Lovenox Dose= 70mg every 12 hours  Dental extraction      Pre-Procedure Instructions:  Hold coumadin 8/16-8/18; resume dose the evening 8/19  UNLESS performing MD advises otherwise.    Start enoxaparin injections the evening of 8/17/19  Enoxaparin dose is: 70mg Every 12 hours (Twice Daily)  Last dose of enoxaparin pre procedure will be the morning of   8/18/19  (AM ONLY   *24 hours prior to the procedure)                         Post-Procedure Instructions:     Restart warfarin the evening of 8/19/19             At a dose of    12mg                        Unless directed otherwise by your physician  8/19/19: Warfarin 12mg  8/20/19: Warfarin 12mg  8/21/19: Warfarin 6mg       Restart lovenox injections on the morning of  8/20/19    Unless directed otherwise by your physician    Continue Lovenox 70mg twice daily injections until next INR check    Patient wrote instructions down and repeated back.      Carole with [facility] provided with verbal orders as well.    Recheck INR on 8/22/19.     *Call the coumadin clinic if your physician has different recommendations post procedure

## 2019-08-16 NOTE — TELEPHONE ENCOUNTER
Called to patient and informed that  says she can schedule follow up and may order holter/zio--also needs o2 at night--also informed patient that an appointment for overnight oximetry has been scheduled on 8/21/19 with pulmonary--patient states will not be able to come into office on 8/21/19 due to cannot drive at this time--patient states will keep current appointment with Dr. Banda on 9/23/19

## 2019-08-16 NOTE — TELEPHONE ENCOUNTER
----- Message from Ayaan Banda MD sent at 8/16/2019  9:15 AM CDT -----  Contact: [facility] Nurse Colton   Ok have her follow up we can order a Holter/Zio but needs oxygen at night to prevent desats  ----- Message -----  From: Bruna Murguia LPN  Sent: 8/16/2019   9:03 AM  To: Ayaan Banda MD        ----- Message -----  From: Saniya Sutton  Sent: 8/16/2019   8:07 AM  To: Solo Kuo Staff    [facility] Nurse Colton states that she saw Pt on Wednesday of this weeks. Pt Oxygen dropped  80% and Heart rate has been dropping as well lowest recorded was 34. Pulmonary DR suggest talking to Dr. Banda. [phone]       ..Thank You  Gerri Sutton

## 2019-08-19 ENCOUNTER — EXTERNAL HOME HEALTH (OUTPATIENT)
Dept: HOME HEALTH SERVICES | Facility: HOSPITAL | Age: 49
End: 2019-08-19
Payer: COMMERCIAL

## 2019-08-22 ENCOUNTER — ANTI-COAG VISIT (OUTPATIENT)
Dept: CARDIOLOGY | Facility: CLINIC | Age: 49
End: 2019-08-22
Payer: COMMERCIAL

## 2019-08-22 DIAGNOSIS — Z79.01 LONG TERM (CURRENT) USE OF ANTICOAGULANTS: Primary | ICD-10-CM

## 2019-08-22 DIAGNOSIS — Z98.890 H/O MITRAL VALVE REPAIR: ICD-10-CM

## 2019-08-22 LAB — INR PPP: 3.1

## 2019-08-22 PROCEDURE — 93793 ANTICOAG MGMT PT WARFARIN: CPT | Mod: S$GLB,,,

## 2019-08-22 PROCEDURE — 93793 PR ANTICOAGULANT MGMT FOR PT TAKING WARFARIN: ICD-10-PCS | Mod: S$GLB,,,

## 2019-08-22 NOTE — PROGRESS NOTES
Verbal results taken from Juan torres [facility].  PT / INR  31.7 / 3.1.    Date drawn: 8/22/19.  All procedure instructions followed.   Orders given:  Resume current dose of warfarin 6mg every evening. Lovenox will be discontinued.  Ms. Sal will be discharged from [facility] on today.  Patient contacted and agreed to lab appt on next week.   Recheck INR on 8/29/19.

## 2019-08-27 ENCOUNTER — LAB VISIT (OUTPATIENT)
Dept: LAB | Facility: HOSPITAL | Age: 49
End: 2019-08-27
Attending: INTERNAL MEDICINE
Payer: COMMERCIAL

## 2019-08-27 ENCOUNTER — TELEPHONE (OUTPATIENT)
Dept: HOME HEALTH SERVICES | Facility: HOSPITAL | Age: 49
End: 2019-08-27

## 2019-08-27 DIAGNOSIS — J44.9 CHRONIC OBSTRUCTIVE PULMONARY DISEASE, UNSPECIFIED COPD TYPE: ICD-10-CM

## 2019-08-27 DIAGNOSIS — Z79.01 LONG TERM (CURRENT) USE OF ANTICOAGULANTS: ICD-10-CM

## 2019-08-27 LAB
INR PPP: 1.1 (ref 0.8–1.2)
PROTHROMBIN TIME: 12.1 SEC (ref 9–12.5)

## 2019-08-27 PROCEDURE — 85610 PROTHROMBIN TIME: CPT | Mod: NTX

## 2019-08-27 PROCEDURE — 36415 COLL VENOUS BLD VENIPUNCTURE: CPT | Mod: PO,TXP

## 2019-08-27 RX ORDER — UMECLIDINIUM BROMIDE AND VILANTEROL TRIFENATATE 62.5; 25 UG/1; UG/1
POWDER RESPIRATORY (INHALATION)
Qty: 60 EACH | Refills: 5 | Status: SHIPPED | OUTPATIENT
Start: 2019-08-27

## 2019-08-28 ENCOUNTER — ANTI-COAG VISIT (OUTPATIENT)
Dept: CARDIOLOGY | Facility: CLINIC | Age: 49
End: 2019-08-28
Payer: COMMERCIAL

## 2019-08-28 DIAGNOSIS — Z98.890 H/O MITRAL VALVE REPAIR: ICD-10-CM

## 2019-08-28 DIAGNOSIS — Z79.01 LONG TERM (CURRENT) USE OF ANTICOAGULANTS: ICD-10-CM

## 2019-08-28 PROCEDURE — 93793 PR ANTICOAGULANT MGMT FOR PT TAKING WARFARIN: ICD-10-PCS | Mod: S$GLB,,,

## 2019-08-28 PROCEDURE — 93793 ANTICOAG MGMT PT WARFARIN: CPT | Mod: S$GLB,,,

## 2019-08-28 NOTE — PROGRESS NOTES
Patient's INR is sub-therapeutic at 1.1.  Patient contacted.  Mrs. Sla states she may have missed a few doses of her warfarin this past week.  Her daughter usually helps with her medications but she has been extremely busy.    No abnormal pain, swelling or weakness noted.  Instructions given for patient to take warfarin 12mg on today; then resume current dose of warfarin 6mg every evening.  Mrs. Sal verified that her warfarin tablet were in her pillbox for this week.  Patient repeated directions and voiced understanding.  Follow-up on 9/05/19.

## 2019-09-05 ENCOUNTER — LAB VISIT (OUTPATIENT)
Dept: LAB | Facility: HOSPITAL | Age: 49
End: 2019-09-05
Attending: INTERNAL MEDICINE
Payer: COMMERCIAL

## 2019-09-05 ENCOUNTER — ANTI-COAG VISIT (OUTPATIENT)
Dept: CARDIOLOGY | Facility: CLINIC | Age: 49
End: 2019-09-05
Payer: COMMERCIAL

## 2019-09-05 DIAGNOSIS — Z79.01 LONG TERM (CURRENT) USE OF ANTICOAGULANTS: ICD-10-CM

## 2019-09-05 DIAGNOSIS — Z95.2 HX OF MECHANICAL AORTIC VALVE REPLACEMENT: Chronic | ICD-10-CM

## 2019-09-05 LAB
INR PPP: 1.8 (ref 0.8–1.2)
PROTHROMBIN TIME: 19.2 SEC (ref 9–12.5)

## 2019-09-05 PROCEDURE — 85610 PROTHROMBIN TIME: CPT | Mod: TXP

## 2019-09-05 PROCEDURE — 93793 PR ANTICOAGULANT MGMT FOR PT TAKING WARFARIN: ICD-10-PCS | Mod: S$GLB,,,

## 2019-09-05 PROCEDURE — 36415 COLL VENOUS BLD VENIPUNCTURE: CPT | Mod: PO,TXP

## 2019-09-05 PROCEDURE — 93793 ANTICOAG MGMT PT WARFARIN: CPT | Mod: S$GLB,,,

## 2019-09-05 NOTE — PROGRESS NOTES
Patient's INR continues to be sub-therapeutic at 1.8.  Patient contacted. No missed doses or dietary changes reported.  No abnormal pain, swelling or weakness noted.  Instructions given for patient to increase dose of warfarin to 9mg on Thursdays and 6mg on all other days of the week.  Patient voiced understanding.  Follow-up in 2 weeks.

## 2019-09-11 ENCOUNTER — TELEPHONE (OUTPATIENT)
Dept: CARDIOLOGY | Facility: CLINIC | Age: 49
End: 2019-09-11

## 2019-09-11 NOTE — TELEPHONE ENCOUNTER
Returned call. Patient stated her Orthopedic provider wants to know if it's okay to have a MRI, due to a fall and broken collar bone.     Please advise.

## 2019-09-11 NOTE — TELEPHONE ENCOUNTER
Patient notified of Dr. Banda's response;                    Yes safe to proceed but radiologist tech should know to change sequences if needed. But likely will not matter as the image will be of a joint and not chest or heart.        Patient verbalized understanding.

## 2019-09-11 NOTE — TELEPHONE ENCOUNTER
----- Message from Kerri Del Rosario sent at 9/11/2019  3:31 PM CDT -----  Contact: [phone]  Would like to consult with the nurse, Patient fell on Aug 6 and broke her collar Bone,  The Orthopedic would like to know if the Patient could have a Mri Done, Patient would like to speak with the nurse concerning this, please call back at [phone], Thanks sj

## 2019-09-19 ENCOUNTER — LAB VISIT (OUTPATIENT)
Dept: LAB | Facility: HOSPITAL | Age: 49
End: 2019-09-19
Attending: INTERNAL MEDICINE
Payer: COMMERCIAL

## 2019-09-19 DIAGNOSIS — Z79.01 LONG TERM (CURRENT) USE OF ANTICOAGULANTS: ICD-10-CM

## 2019-09-19 LAB
INR PPP: 2.9 (ref 0.8–1.2)
PROTHROMBIN TIME: 30.2 SEC (ref 9–12.5)

## 2019-09-19 PROCEDURE — 36415 COLL VENOUS BLD VENIPUNCTURE: CPT | Mod: PO,TXP

## 2019-09-19 PROCEDURE — 85610 PROTHROMBIN TIME: CPT | Mod: NTX

## 2019-09-20 ENCOUNTER — ANTI-COAG VISIT (OUTPATIENT)
Dept: CARDIOLOGY | Facility: CLINIC | Age: 49
End: 2019-09-20
Payer: COMMERCIAL

## 2019-09-20 DIAGNOSIS — Z95.2 HX OF MECHANICAL AORTIC VALVE REPLACEMENT: Chronic | ICD-10-CM

## 2019-09-20 DIAGNOSIS — Z79.01 LONG TERM (CURRENT) USE OF ANTICOAGULANTS: ICD-10-CM

## 2019-09-20 PROCEDURE — 93793 PR ANTICOAGULANT MGMT FOR PT TAKING WARFARIN: ICD-10-PCS | Mod: S$GLB,,,

## 2019-09-20 PROCEDURE — 93793 ANTICOAG MGMT PT WARFARIN: CPT | Mod: S$GLB,,,

## 2019-09-20 NOTE — PROGRESS NOTES
Patient contacted - Left V/M:  INR is therapeutic at 2.9.  Instructions given to maintain current dose of Warfarin 9 mg every Thursday and 6 mg on all other days per week.  Recheck on 9/30/2019 at [facility] before or after Pulmonary appointment.  Any questions or concerns, please contact the Coumadin Clinic at [phone].

## 2019-10-01 ENCOUNTER — LAB VISIT (OUTPATIENT)
Dept: LAB | Facility: HOSPITAL | Age: 49
End: 2019-10-01
Attending: ANESTHESIOLOGY
Payer: COMMERCIAL

## 2019-10-01 ENCOUNTER — TELEPHONE (OUTPATIENT)
Dept: CARDIOLOGY | Facility: CLINIC | Age: 49
End: 2019-10-01

## 2019-10-01 ENCOUNTER — CLINICAL SUPPORT (OUTPATIENT)
Dept: PULMONOLOGY | Facility: CLINIC | Age: 49
End: 2019-10-01
Payer: COMMERCIAL

## 2019-10-01 DIAGNOSIS — Z79.01 LONG TERM (CURRENT) USE OF ANTICOAGULANTS: ICD-10-CM

## 2019-10-01 DIAGNOSIS — I27.20 PULMONARY HTN: Chronic | ICD-10-CM

## 2019-10-01 LAB
INR PPP: 3.8 (ref 0.8–1.2)
PROTHROMBIN TIME: 37.9 SEC (ref 9–12.5)

## 2019-10-01 PROCEDURE — 94762 PR NONINVASV OXYGEN SATUR, CONT: ICD-10-PCS | Mod: NTX,S$GLB,, | Performed by: INTERNAL MEDICINE

## 2019-10-01 PROCEDURE — 94762 N-INVAS EAR/PLS OXIMTRY CONT: CPT | Mod: NTX,S$GLB,, | Performed by: INTERNAL MEDICINE

## 2019-10-01 PROCEDURE — 99999 PR PBB SHADOW E&M-EST. PATIENT-LVL I: ICD-10-PCS | Mod: PBBFAC,TXP,,

## 2019-10-01 PROCEDURE — 85610 PROTHROMBIN TIME: CPT | Mod: NTX

## 2019-10-01 PROCEDURE — 99999 PR PBB SHADOW E&M-EST. PATIENT-LVL I: CPT | Mod: PBBFAC,TXP,,

## 2019-10-01 PROCEDURE — 36415 COLL VENOUS BLD VENIPUNCTURE: CPT | Mod: TXP

## 2019-10-03 ENCOUNTER — CLINICAL SUPPORT (OUTPATIENT)
Dept: PULMONOLOGY | Facility: CLINIC | Age: 49
End: 2019-10-03
Payer: COMMERCIAL

## 2019-10-03 ENCOUNTER — ANTI-COAG VISIT (OUTPATIENT)
Dept: CARDIOLOGY | Facility: CLINIC | Age: 49
End: 2019-10-03
Payer: COMMERCIAL

## 2019-10-03 ENCOUNTER — OFFICE VISIT (OUTPATIENT)
Dept: PULMONOLOGY | Facility: CLINIC | Age: 49
End: 2019-10-03
Payer: COMMERCIAL

## 2019-10-03 VITALS
SYSTOLIC BLOOD PRESSURE: 124 MMHG | BODY MASS INDEX: 30.95 KG/M2 | HEIGHT: 60 IN | HEART RATE: 89 BPM | RESPIRATION RATE: 15 BRPM | WEIGHT: 157.63 LBS | OXYGEN SATURATION: 90 % | DIASTOLIC BLOOD PRESSURE: 82 MMHG

## 2019-10-03 DIAGNOSIS — Z51.81 THERAPEUTIC DRUG MONITORING: ICD-10-CM

## 2019-10-03 DIAGNOSIS — J96.91 RESPIRATORY FAILURE WITH HYPOXIA, UNSPECIFIED CHRONICITY: ICD-10-CM

## 2019-10-03 DIAGNOSIS — Z51.81 THERAPEUTIC DRUG MONITORING: Primary | ICD-10-CM

## 2019-10-03 DIAGNOSIS — Z79.01 LONG TERM (CURRENT) USE OF ANTICOAGULANTS: ICD-10-CM

## 2019-10-03 DIAGNOSIS — J43.2 CENTRILOBULAR EMPHYSEMA: ICD-10-CM

## 2019-10-03 DIAGNOSIS — Z95.2 HX OF MECHANICAL AORTIC VALVE REPLACEMENT: Chronic | ICD-10-CM

## 2019-10-03 DIAGNOSIS — J96.21 ACUTE ON CHRONIC RESPIRATORY FAILURE WITH HYPOXIA: ICD-10-CM

## 2019-10-03 DIAGNOSIS — J84.10 PULMONARY FIBROSIS: Chronic | ICD-10-CM

## 2019-10-03 PROCEDURE — 3008F BODY MASS INDEX DOCD: CPT | Mod: CPTII,NTX,S$GLB, | Performed by: NURSE PRACTITIONER

## 2019-10-03 PROCEDURE — 99214 PR OFFICE/OUTPT VISIT, EST, LEVL IV, 30-39 MIN: ICD-10-PCS | Mod: NTX,S$GLB,, | Performed by: NURSE PRACTITIONER

## 2019-10-03 PROCEDURE — 94762 N-INVAS EAR/PLS OXIMTRY CONT: CPT | Mod: NTX,S$GLB,, | Performed by: INTERNAL MEDICINE

## 2019-10-03 PROCEDURE — 99999 PR PBB SHADOW E&M-EST. PATIENT-LVL I: CPT | Mod: PBBFAC,TXP,,

## 2019-10-03 PROCEDURE — 3074F SYST BP LT 130 MM HG: CPT | Mod: CPTII,NTX,S$GLB, | Performed by: NURSE PRACTITIONER

## 2019-10-03 PROCEDURE — 3008F PR BODY MASS INDEX (BMI) DOCUMENTED: ICD-10-PCS | Mod: CPTII,NTX,S$GLB, | Performed by: NURSE PRACTITIONER

## 2019-10-03 PROCEDURE — 99999 PR PBB SHADOW E&M-EST. PATIENT-LVL I: ICD-10-PCS | Mod: PBBFAC,TXP,,

## 2019-10-03 PROCEDURE — 93793 ANTICOAG MGMT PT WARFARIN: CPT | Mod: S$GLB,,,

## 2019-10-03 PROCEDURE — 99214 OFFICE O/P EST MOD 30 MIN: CPT | Mod: NTX,S$GLB,, | Performed by: NURSE PRACTITIONER

## 2019-10-03 PROCEDURE — 94762 PR NONINVASV OXYGEN SATUR, CONT: ICD-10-PCS | Mod: NTX,S$GLB,, | Performed by: INTERNAL MEDICINE

## 2019-10-03 PROCEDURE — 3074F PR MOST RECENT SYSTOLIC BLOOD PRESSURE < 130 MM HG: ICD-10-PCS | Mod: CPTII,NTX,S$GLB, | Performed by: NURSE PRACTITIONER

## 2019-10-03 PROCEDURE — 93793 PR ANTICOAGULANT MGMT FOR PT TAKING WARFARIN: ICD-10-PCS | Mod: S$GLB,,,

## 2019-10-03 PROCEDURE — 3079F DIAST BP 80-89 MM HG: CPT | Mod: CPTII,NTX,S$GLB, | Performed by: NURSE PRACTITIONER

## 2019-10-03 PROCEDURE — 99999 PR PBB SHADOW E&M-EST. PATIENT-LVL V: ICD-10-PCS | Mod: PBBFAC,TXP,, | Performed by: NURSE PRACTITIONER

## 2019-10-03 PROCEDURE — 3079F PR MOST RECENT DIASTOLIC BLOOD PRESSURE 80-89 MM HG: ICD-10-PCS | Mod: CPTII,NTX,S$GLB, | Performed by: NURSE PRACTITIONER

## 2019-10-03 PROCEDURE — 99999 PR PBB SHADOW E&M-EST. PATIENT-LVL V: CPT | Mod: PBBFAC,TXP,, | Performed by: NURSE PRACTITIONER

## 2019-10-03 NOTE — PROGRESS NOTES
PharmD tried to reach patient on 10/01/2019 - no return call.  Patient contacted:  INR was supratherapeutic on 10/01/2019 at 3.8.  Reports no recent medication changes.  Patient did have an intake of spinach yesterday.  No signs of any bleeding episodes reported.  Instructions given to take Warfarin 3 mg today (Thursday) - only, then resume on regular schedule dose of Warfarin 9 mg every Thursday and 6 mg on all other days per week.  Recheck on Wednesday, 10/09/2019 ([facility]).  Patient repeated back instructions and voiced understanding.

## 2019-10-03 NOTE — PROGRESS NOTES
Subjective:      Patient ID: Meg Sal is a 49 y.o. female.    Chief Complaint: Pulmonary Fibrosis    HPI  Patient of Dr. Salazar. Known with COPD/emphysema honeycombing and pulmonary fibrosis, pulmonary hypertension status post right heart catheterization showing normal wedge pressure and elevated mean pulmonary artery pressure to 35 mm Hg.  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  Patient pulmonary hypertension most likely related to hypoxemic respiratory disorder, rheumatology workup negative, no alternative diagnosis to IPF as a cause of her honeycombing evident on high-resolution CT scan.   She was referred to transplant team. She has rescheduled/cancelled her appointments. She will schedule soon.   Dr. Salazar started pt on OFEV.  She is taking on most days. Has issues with nasuea.     Former smoker quit a year ago.     On oxygen 2 L via nasal cannula  on exertion and with sleep. . Negative sleep study for BANDAR. She was having problems with oxygen cannula falling off at night with low oxygen levels. Her daughter is helping her keep it on now.     On albuterol p.r.n. and Anoro 1 puff daily as controller inhaler.       /82   Pulse 89   Resp 15   Ht 5' (1.524 m)   Wt 71.5 kg (157 lb 10.1 oz)   SpO2 (!) 90%   BMI 30.78 kg/m²   Body mass index is 30.78 kg/m².    Review of Systems   Respiratory: Positive for dyspnea on extertion.    Musculoskeletal: Positive for arthralgias.   Psychiatric/Behavioral: Positive for sleep disturbance.   All other systems reviewed and are negative.    Objective:      Physical Exam   Constitutional: She is oriented to person, place, and time. She appears well-developed and well-nourished.   HENT:   Head: Normocephalic and atraumatic.   Mouth/Throat: Oropharynx is clear and moist.   Neck: Normal range of motion. Neck supple.   Cardiovascular: Normal rate and regular rhythm. Exam reveals no gallop.   Murmur heard.  Pulmonary/Chest: Effort normal. She has  rales.   Abdominal: Soft. She exhibits no mass. There is no tenderness.   Musculoskeletal: Normal range of motion. She exhibits no edema.   Neurological: She is alert and oriented to person, place, and time.   Skin: Skin is warm and dry.   Psychiatric: She has a normal mood and affect.      Personal Diagnostic Review    Narrative     Kamryn Malhotra, STEPHAN     10/3/2019  3:23 PM  [facility] [place], [place]  Test date: 10/02/19 Start: 10/02/19 00:34:18 Meg Sal  Doctor: Dr Salazar End: 10/02/19 08:42:30 [id number]  Oximetry: Summary Report  Comments: ROOM AIR  Recording time: 08:08:12 Highest pulse: 99 Highest SpO2: 95%  Excluded samplin:00:00 Lowest pulse: 61 Lowest SpO2: 73%  Total valid samplin:08:12 Mean pulse: 81 Mean SpO2: 86.0%  1 S.D.: 2.5 1 S.D.: 2.3  Time with SpO2<90: 7:54:48, 97.3%  Time with SpO2<80: 0:09:20, 1.9%  Time with SpO2<70: 0:00:00, 0.0%  Time with SpO2<60: 0:00:00, 0.0%  Time with SpO2<89: 7:24:36, 91.1%  Time with SpO2 =>90: 0:13:24, 2.7%  Time with SpO2=>80 & <90: 7:45:28, 95.3%  Time with SpO2=>70 & <80: 0:09:20, 1.9%  Time with SpO2=>60 & <70: 0:00:00, 0.0%  The longest continuous time with saturation <=88 was 01:55:40,   which started at  10/02/19 01:02:06.  A desaturation event was defined as a decrease of saturation by 4   or more.  No events were excluded due to artifact.  There were 7 desaturation events over 3 minutes duration.  There were 67 desaturation events of less than 3 minutes duration   during which:  The mean high was 87.2%. The mean low was 81.4%.  The number of these events that were:  > 0 & <10 seconds: 6 > 0 seconds: 67  =>10 & <20 seconds: 13 =>10 seconds: 61  =>20 & <30 seconds: 13 =>20 seconds: 48  =>30 & <40 seconds: 9 =>30 seconds: 35  =>40 & <50 seconds: 7 =>40 seconds: 26  =>50 & <60 seconds: 1 =>50 seconds: 19  =>60 seconds: 18 =>60 seconds: 18  The mean length of desaturation events that were >=10 sec & <=3   mins was: 49.5  sec.  Desaturation event index (events >=10 sec per sampled hour): 7.5  Desaturation event index (events >= 0 sec per sampled hour): 8.2         Assessment:       1. Therapeutic drug monitoring    2. Pulmonary fibrosis    3. Respiratory failure with hypoxia, unspecified chronicity    4. Acute on chronic respiratory failure with hypoxia    5. Centrilobular emphysema        Outpatient Encounter Medications as of 10/3/2019   Medication Sig Dispense Refill    albuterol (VENTOLIN HFA) 90 mcg/actuation inhaler Inhale 2 puffs into the lungs every 6 (six) hours as needed for Wheezing. Rescue 18 g 11    amitriptyline (ELAVIL) 25 MG tablet Take 25 mg by mouth every evening.       ANORO ELLIPTA 62.5-25 mcg/actuation DsDv INHALE 1 PUFF INTO THE LUNGS ONCE A DAY. CONTROLLER. 60 each 5    busPIRone (BUSPAR) 15 MG tablet Take 15 mg by mouth 2 (two) times daily.       clonazePAM (KLONOPIN) 1 MG tablet Take 1 mg by mouth 3 (three) times daily.       desvenlafaxine succinate (PRISTIQ) 100 MG Tb24 Take 1 tablet (100 mg total) by mouth once daily. 30 tablet 11    diclofenac sodium (VOLTAREN) 1 % Gel Apply 2 g topically 4 (four) times daily.      enoxaparin (LOVENOX) 100 mg/mL Syrg Inject 0.7 mLs (70 mg total) into the skin 2 (two) times daily. 3 Syringe 2    fluticasone (FLONASE) 50 mcg/actuation nasal spray 1 spray by Each Nare route once daily.      furosemide (LASIX) 40 MG tablet Take two 40 mg tablets by mouth twice a day 120 tablet 6    HYDROcodone-acetaminophen (NORCO)  mg per tablet Take 1 tablet by mouth every 6 (six) hours as needed for Pain.  0    metoprolol succinate (TOPROL-XL) 25 MG 24 hr tablet Take 1 tablet (25 mg total) by mouth once daily. 30 tablet 6    nicotine (NICODERM CQ) 7 mg/24 hr Place 1 patch onto the skin every 24 hours.      nintedanib (OFEV) 150 mg Cap Take 150 mg by mouth 2 (two) times daily. 60 capsule 3    nitroGLYCERIN (NITROSTAT) 0.4 MG SL tablet       nitroGLYCERIN 0.4 MG/HR TD  PT24 (NITRODUR) 0.4 mg/hr Place 1 patch onto the skin once daily. 30 patch 1    ondansetron (ZOFRAN) 4 MG tablet Take 1 tablet (4 mg total) by mouth every 8 (eight) hours as needed for Nausea. 90 tablet 2    pantoprazole (PROTONIX) 40 MG tablet Take 40 mg by mouth once daily.      potassium chloride SA (K-DUR,KLOR-CON) 20 MEQ tablet Take 20 mEq by mouth 2 (two) times daily.      rosuvastatin (CRESTOR) 20 MG tablet Take 20 mg by mouth once daily.      sumatriptan (IMITREX) 100 MG tablet Take 100 mg by mouth every 2 (two) hours as needed.      tiZANidine (ZANAFLEX) 4 MG tablet Take 4 mg by mouth every evening.      topiramate (TOPAMAX) 100 MG tablet Take 100 mg by mouth 2 (two) times daily.      traMADol (ULTRAM) 50 mg tablet Take 50 mg by mouth every 4 (four) hours as needed.       traZODone (DESYREL) 150 MG tablet Take 150 mg by mouth nightly.      warfarin (COUMADIN) 6 MG tablet Take 6 mg by mouth Daily. Except 9mg on Thursdays.       No facility-administered encounter medications on file as of 10/3/2019.      Orders Placed This Encounter   Procedures    Hepatic function panel     Standing Status:   Future     Standing Expiration Date:   12/1/2020    PULSE OXIMETRY OVERNIGHT     Standing Status:   Future     Number of Occurrences:   1     Standing Expiration Date:   10/3/2020     Order Specific Question:   Reason for Test?     Answer:   Other     Order Specific Question:   Symptoms:     Answer:   Other     Order Specific Question:   Oxygen Settings:     Answer:   2L     Order Specific Question:   BiPAP Settings:     Answer:   .     Order Specific Question:   CPAP Settings:     Answer:   .     Order Specific Question:   Room Air:     Answer:   No     Plan:      LFT for OPEV monitoring.   Schedule appt with transplant to establish care when she can.  Significant desaturations on room air.   Continue oxygen at night . O2 at 2L and perform overnight sat on oxygen to review for correction of hypoxia.        Problem List Items Addressed This Visit        Pulmonary    Pulmonary fibrosis (Chronic)    Relevant Orders    Hepatic function panel    PULSE OXIMETRY OVERNIGHT    Centrilobular emphysema    Relevant Orders    PULSE OXIMETRY OVERNIGHT    Acute on chronic respiratory failure with hypoxia    Relevant Orders    PULSE OXIMETRY OVERNIGHT      Other Visit Diagnoses     Therapeutic drug monitoring    -  Primary    Relevant Orders    Hepatic function panel    PULSE OXIMETRY OVERNIGHT    Respiratory failure with hypoxia, unspecified chronicity        Relevant Orders    PULSE OXIMETRY OVERNIGHT

## 2019-10-03 NOTE — PROCEDURES
[facility]  [place]  Test date: 10/02/19 Start: 10/02/19 00:34:18 Meg Sal  Doctor: Dr Salazar End: 10/02/19 08:42:30 [id number]  Oximetry: Summary Report  Comments: ROOM AIR  Recording time: 08:08:12 Highest pulse: 99 Highest SpO2: 95%  Excluded samplin:00:00 Lowest pulse: 61 Lowest SpO2: 73%  Total valid samplin:08:12 Mean pulse: 81 Mean SpO2: 86.0%  1 S.D.: 2.5 1 S.D.: 2.3  Time with SpO2<90: 7:54:48, 97.3%  Time with SpO2<80: 0:09:20, 1.9%  Time with SpO2<70: 0:00:00, 0.0%  Time with SpO2<60: 0:00:00, 0.0%  Time with SpO2<89: 7:24:36, 91.1%  Time with SpO2 =>90: 0:13:24, 2.7%  Time with SpO2=>80 & <90: 7:45:28, 95.3%  Time with SpO2=>70 & <80: 0:09:20, 1.9%  Time with SpO2=>60 & <70: 0:00:00, 0.0%  The longest continuous time with saturation <=88 was 01:55:40, which started at  10/02/19 01:02:06.  A desaturation event was defined as a decrease of saturation by 4 or more.  No events were excluded due to artifact.  There were 7 desaturation events over 3 minutes duration.  There were 67 desaturation events of less than 3 minutes duration during which:  The mean high was 87.2%. The mean low was 81.4%.  The number of these events that were:  > 0 & <10 seconds: 6 > 0 seconds: 67  =>10 & <20 seconds: 13 =>10 seconds: 61  =>20 & <30 seconds: 13 =>20 seconds: 48  =>30 & <40 seconds: 9 =>30 seconds: 35  =>40 & <50 seconds: 7 =>40 seconds: 26  =>50 & <60 seconds: 1 =>50 seconds: 19  =>60 seconds: 18 =>60 seconds: 18  The mean length of desaturation events that were >=10 sec & <=3 mins was: 49.5 sec.  Desaturation event index (events >=10 sec per sampled hour): 7.5  Desaturation event index (events >= 0 sec per sampled hour): 8.2

## 2019-10-09 ENCOUNTER — OFFICE VISIT (OUTPATIENT)
Dept: CARDIOLOGY | Facility: CLINIC | Age: 49
End: 2019-10-09
Payer: COMMERCIAL

## 2019-10-09 ENCOUNTER — LAB VISIT (OUTPATIENT)
Dept: LAB | Facility: HOSPITAL | Age: 49
End: 2019-10-09
Attending: NURSE PRACTITIONER
Payer: COMMERCIAL

## 2019-10-09 VITALS
BODY MASS INDEX: 31.22 KG/M2 | WEIGHT: 159.81 LBS | HEART RATE: 88 BPM | SYSTOLIC BLOOD PRESSURE: 90 MMHG | DIASTOLIC BLOOD PRESSURE: 70 MMHG

## 2019-10-09 DIAGNOSIS — I35.2 NONRHEUMATIC AORTIC INSUFFICIENCY WITH AORTIC STENOSIS: ICD-10-CM

## 2019-10-09 DIAGNOSIS — Z95.1 STATUS POST CORONARY ARTERY BYPASS GRAFT: ICD-10-CM

## 2019-10-09 DIAGNOSIS — I50.30 (HFPEF) HEART FAILURE WITH PRESERVED EJECTION FRACTION: Primary | Chronic | ICD-10-CM

## 2019-10-09 DIAGNOSIS — J84.10 PULMONARY FIBROSIS: Chronic | ICD-10-CM

## 2019-10-09 DIAGNOSIS — E78.49 OTHER HYPERLIPIDEMIA: ICD-10-CM

## 2019-10-09 DIAGNOSIS — Z98.890 H/O MITRAL VALVE REPAIR: ICD-10-CM

## 2019-10-09 DIAGNOSIS — Z51.81 THERAPEUTIC DRUG MONITORING: ICD-10-CM

## 2019-10-09 DIAGNOSIS — I49.3 PVC (PREMATURE VENTRICULAR CONTRACTION): ICD-10-CM

## 2019-10-09 DIAGNOSIS — Z95.2 HX OF MECHANICAL AORTIC VALVE REPLACEMENT: Chronic | ICD-10-CM

## 2019-10-09 DIAGNOSIS — J43.2 CENTRILOBULAR EMPHYSEMA: ICD-10-CM

## 2019-10-09 DIAGNOSIS — I27.20 PULMONARY HTN: Chronic | ICD-10-CM

## 2019-10-09 DIAGNOSIS — G47.39 OTHER SLEEP APNEA: ICD-10-CM

## 2019-10-09 DIAGNOSIS — Z79.01 ANTICOAGULATED ON COUMADIN: ICD-10-CM

## 2019-10-09 DIAGNOSIS — Z79.01 LONG TERM (CURRENT) USE OF ANTICOAGULANTS: ICD-10-CM

## 2019-10-09 DIAGNOSIS — I10 ESSENTIAL HYPERTENSION: ICD-10-CM

## 2019-10-09 LAB
ALBUMIN SERPL BCP-MCNC: 3.9 G/DL (ref 3.5–5.2)
ALP SERPL-CCNC: 107 U/L (ref 55–135)
ALT SERPL W/O P-5'-P-CCNC: 15 U/L (ref 10–44)
AST SERPL-CCNC: 23 U/L (ref 10–40)
BILIRUB DIRECT SERPL-MCNC: 0.2 MG/DL (ref 0.1–0.3)
BILIRUB SERPL-MCNC: 0.3 MG/DL (ref 0.1–1)
PROT SERPL-MCNC: 7.3 G/DL (ref 6–8.4)

## 2019-10-09 PROCEDURE — 3008F BODY MASS INDEX DOCD: CPT | Mod: CPTII,NTX,S$GLB, | Performed by: INTERNAL MEDICINE

## 2019-10-09 PROCEDURE — 99999 PR PBB SHADOW E&M-EST. PATIENT-LVL III: CPT | Mod: PBBFAC,TXP,, | Performed by: INTERNAL MEDICINE

## 2019-10-09 PROCEDURE — 99999 PR PBB SHADOW E&M-EST. PATIENT-LVL III: ICD-10-PCS | Mod: PBBFAC,TXP,, | Performed by: INTERNAL MEDICINE

## 2019-10-09 PROCEDURE — 3074F PR MOST RECENT SYSTOLIC BLOOD PRESSURE < 130 MM HG: ICD-10-PCS | Mod: CPTII,NTX,S$GLB, | Performed by: INTERNAL MEDICINE

## 2019-10-09 PROCEDURE — 99214 OFFICE O/P EST MOD 30 MIN: CPT | Mod: NTX,S$GLB,, | Performed by: INTERNAL MEDICINE

## 2019-10-09 PROCEDURE — 3074F SYST BP LT 130 MM HG: CPT | Mod: CPTII,NTX,S$GLB, | Performed by: INTERNAL MEDICINE

## 2019-10-09 PROCEDURE — 3008F PR BODY MASS INDEX (BMI) DOCUMENTED: ICD-10-PCS | Mod: CPTII,NTX,S$GLB, | Performed by: INTERNAL MEDICINE

## 2019-10-09 PROCEDURE — 99214 PR OFFICE/OUTPT VISIT, EST, LEVL IV, 30-39 MIN: ICD-10-PCS | Mod: NTX,S$GLB,, | Performed by: INTERNAL MEDICINE

## 2019-10-09 PROCEDURE — 36415 COLL VENOUS BLD VENIPUNCTURE: CPT | Mod: NTX

## 2019-10-09 PROCEDURE — 3078F PR MOST RECENT DIASTOLIC BLOOD PRESSURE < 80 MM HG: ICD-10-PCS | Mod: CPTII,NTX,S$GLB, | Performed by: INTERNAL MEDICINE

## 2019-10-09 PROCEDURE — 80076 HEPATIC FUNCTION PANEL: CPT | Mod: TXP

## 2019-10-09 PROCEDURE — 3078F DIAST BP <80 MM HG: CPT | Mod: CPTII,NTX,S$GLB, | Performed by: INTERNAL MEDICINE

## 2019-10-09 NOTE — PROGRESS NOTES
Subjective:   Patient ID:  Meg Sal is a 49 y.o. female who presents for cardiac consult of Hyperlipidemia (6 week f/u) and Shortness of Breath      Hypertension   Associated symptoms include chest pain, palpitations and shortness of breath.   Chest Pain    Associated symptoms include dizziness, palpitations and shortness of breath.   Her past medical history is significant for hyperlipidemia.   Shortness of Breath   Associated symptoms include chest pain.   Dizziness:    Associated symptoms: palpitations and chest pain.  Hyperlipidemia   Associated symptoms include chest pain and shortness of breath.     The patient came in today for cardiac consult of Hyperlipidemia (6 week f/u) and Shortness of Breath      Meg Sal is a 49 y.o. female with CAD s/p 2V CABG - LAD/LCX with occluded, s/p mechanical AVR, s/p mitral annuloplasty ring, pulm fibrosis, Pulm HTN, HFpEF, CHD, HTN, HLD, CVA here for follow up CV eval.     2/8/19  Pt had first MI at age 31, was taken to [facility], back was hurting. Felt like she had a heavy wet blanket. She was breaking up a dog fight when paramedics came. Dr. Cortez did LHC, had one stent placed. She also had other blockages which were medically treated. She had treadmill nuclear stress tests after, had been off plavix. In 2014, when she went to [place] she couldn't walk and was out of breath. She came back to  and she did another LHC with Dr. Cortez, Dr. Mcmahon was consulted as she had severe LAD disease. She went to Dr. Heller with LCA, had LHC in May 2018 with chronically occluded RCA with collaterals but patent grafts to her left system. She has been having a lot of chest pain lately. She was taking NTG two or three times a week. She was told to take Ranexa and Imdur but had more depression so stopped taking it. She has no car and has difficulty getting to coumadin clinic, discussed will to get home health for labwork.     3/11/19  She had another episode of  severe chest pain last week. Started on Tues and continued till Friday. Had home health nurse that came. She took NTG which didn't help much, took 2 tabs. Discussed she needs ER eval if severe CP again that is not improvd with NTG. Started verapamil for PVCs. Will refer to Ep for PVC ablation vs AAD. Pulm eval this AM, will have sleep study/PFTs/rheum referral. Recent stress and echo negative, mech AVR mean gradient 15 mmhg    4/29/19  She had a recent syncopal episode at home and presented to [facility] ER and was admitted. IN the ED, patient found to be mildly hypoxic with O2 sat of 90% on room air.  ABG showed pH of 7.349, pCO2 43, PO2 55, SA O2 87 on room air.  CT of the head was negative for acute process, CXR unremarkable, EKG unrevealing. BP is better since home Verapamil, Bystolic, Isosorbide, and NTG patch have been held. Home oxygen evaluation completed and patient qualified with an exertional room air O2 saturation of 87% which improved to 96% on 2 L nasal cannula, she was DC home on 2L NC.Pt had eval by Dr. Montero for PVCs, discussed ablation not a good option, may try amio but not a great option due to lung disease. Had pulm eval by Dr. Mondragon and rec RHC to eval PH and lung transplant eval. BP elevated today, had been off NTG/imdur, will restart today.     7/3/19  She presented to the ER last month and admitted to hospital with SOB. 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 Patient with Pulmonary support in place and referral to the transplant service, instructed to follow up as directed for transplant consideration. RHC last month -   Mildly elevated right Filling Pressures, Moderate Pulmonary Hypertension. She was started on nintenanib - OFEV - just received it yesterday. Still considering lung transplant event. BNP was elevated, discussed to increase lasix.  ECG - NSR, PACs, inc RBBB, anterior ischemia - old      8/7/19  She had a recent  fall, broke collar bone. She was trying to put water on night stand and fell. She went to ortho in [place], does not need surgery. She had the fall yesterday morning. She has migraines but discussed if headache occurs needs ER eval to r/o intracranial bleeding. She was out of Imitrex and has been taking Fiorcicet but didn't help much.     10/9/19  Needs to get more teeth pulled out and add to her dentures. She has NTG patch but still has to use PRN NTG pills, had to take 3 for severe palpitations. No CP. Recent INR was 3.8. Will repeat INR today.     Patient has dec exercise tolerance.    Patient is compliant with medications.    ECG - NSR, RBBB    RHC 5/17/19    AIR REST:  RA: 13/12 (11)  RV: 57  RVEDP: 13     PW: 15/29 (16)  PA: 58/16 (34)      50-59% stenosis within the right internal carotid artery  **Categories of stenosis (0-15%, 16-49%, 50-69% and 70-99% ) are based on published criteria that have been internally validated.  Degree of stenosis is based on NASCET criteria and refers to the degree of luminal diameter narrowing as a percentage of the normal ICA distal to the stenosis and any area of post stenotic dilation.      Nuclear Quantitative Functional Analysis:   LVEF: 65 %    Impression: NORMAL MYOCARDIAL PERFUSION  1. The perfusion scan is free of evidence for myocardial ischemia or injury.   2. Resting wall motion is physiologic.   3. Resting LV function is normal.   4. The ventricular volumes are normal at rest and stress.   5. The extracardiac distribution of radioactivity is normal.       This document has been electronically    SIGNED BY: Hermilo Pete MD On: 02/25/2019 16:47    CONCLUSIONS     1 - Mild left atrial enlargement.     2 - Eccentric hypertrophy.     3 - No wall motion abnormalities.     4 - Normal left ventricular systolic function (EF 55-60%).     5 - Normal right ventricular systolic function .     6 - Aortic valve prosthesis, CATINA = 1.61 cm2, AVAi = 0.95 cm2/m2, peak velocity  = 2.48 m/s, mean gradient = 15 mmHg.     7 - Moderate tricuspid regurgitation.     8 - Pulmonary hypertension. The estimated PA systolic pressure is 61 mmHg.     This document has been electronically    SIGNED BY: Hermilo Pete MD On: 2019 14:50    TEST DESCRIPTION   PREDOMINANT RHYTHM  1. Sinus rhythm with heart rates varying between 66 and 105 bpm with an average of 78 bpm.   VENTRICULAR ARRHYTHMIAS  1. There were very frequent PVCs totalling 74937 and averaging 489 per hour.  There were 23 couplets. There were 5 triplets.   2. There were no episodes of ventricular tachycardia.    SUPRA VENTRICULAR ARRHYTHMIAS  1. There were rare PACs totalling 382 and averaging 7 per hour.   2. There were no episodes of sustained supraventricular tachycardia.    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, implants and grafts, initial encounter     Hyperlipidemia     Hypertension     Sleep apnea syndrome 2019    Stroke        Past Surgical History:   Procedure Laterality Date    CARDIAC CATHETERIZATION      CARDIAC VALVE SURGERY      CORONARY ANGIOPLASTY      CORONARY ARTERY BYPASS GRAFT      HYSTERECTOMY      RIGHT HEART CATHETERIZATION Right 2019    Procedure: INSERTION, CATHETER, RIGHT HEART;  Surgeon: Derek Brown MD;  Location: [facility] CATH LAB;  Service: Cardiology;  Laterality: Right;  malur pt/detailed hx       Social History     Tobacco Use    Smoking status: Former Smoker     Packs/day: 1.00     Types: Cigarettes     Last attempt to quit: 2018     Years since quittin.3    Smokeless tobacco: Never Used   Substance Use Topics    Alcohol use: Not Currently     Frequency: 2-4 times a month     Drinks per session: 1 or 2     Binge frequency: Never    Drug  use: Not Currently       Family History   Problem Relation Age of Onset    Heart disease Mother     Breast cancer Sister     Coronary artery disease Father     Lung cancer Brother        Patient's Medications   New Prescriptions    No medications on file   Previous Medications    ALBUTEROL (VENTOLIN HFA) 90 MCG/ACTUATION INHALER    Inhale 2 puffs into the lungs every 6 (six) hours as needed for Wheezing. Rescue    AMITRIPTYLINE (ELAVIL) 25 MG TABLET    Take 25 mg by mouth every evening.     ANORO ELLIPTA 62.5-25 MCG/ACTUATION DSDV    INHALE 1 PUFF INTO THE LUNGS ONCE A DAY. CONTROLLER.    BUSPIRONE (BUSPAR) 15 MG TABLET    Take 15 mg by mouth 2 (two) times daily.     CLONAZEPAM (KLONOPIN) 1 MG TABLET    Take 1 mg by mouth 3 (three) times daily.     DESVENLAFAXINE SUCCINATE (PRISTIQ) 100 MG TB24    Take 1 tablet (100 mg total) by mouth once daily.    DICLOFENAC SODIUM (VOLTAREN) 1 % GEL    Apply 2 g topically 4 (four) times daily.    ENOXAPARIN (LOVENOX) 100 MG/ML SYRG    Inject 0.7 mLs (70 mg total) into the skin 2 (two) times daily.    FLUTICASONE (FLONASE) 50 MCG/ACTUATION NASAL SPRAY    1 spray by Each Nare route once daily.    FUROSEMIDE (LASIX) 40 MG TABLET    Take two 40 mg tablets by mouth twice a day    HYDROCODONE-ACETAMINOPHEN (NORCO)  MG PER TABLET    Take 1 tablet by mouth every 6 (six) hours as needed for Pain.    METOPROLOL SUCCINATE (TOPROL-XL) 25 MG 24 HR TABLET    Take 1 tablet (25 mg total) by mouth once daily.    NICOTINE (NICODERM CQ) 7 MG/24 HR    Place 1 patch onto the skin every 24 hours.    NINTEDANIB (OFEV) 150 MG CAP    Take 150 mg by mouth 2 (two) times daily.    NITROGLYCERIN (NITROSTAT) 0.4 MG SL TABLET        NITROGLYCERIN 0.4 MG/HR TD PT24 (NITRODUR) 0.4 MG/HR    Place 1 patch onto the skin once daily.    ONDANSETRON (ZOFRAN) 4 MG TABLET    Take 1 tablet (4 mg total) by mouth every 8 (eight) hours as needed for Nausea.    PANTOPRAZOLE (PROTONIX) 40 MG TABLET    Take 40 mg by  mouth once daily.    POTASSIUM CHLORIDE SA (K-DUR,KLOR-CON) 20 MEQ TABLET    Take 20 mEq by mouth 2 (two) times daily.    ROSUVASTATIN (CRESTOR) 20 MG TABLET    Take 20 mg by mouth once daily.    SUMATRIPTAN (IMITREX) 100 MG TABLET    Take 100 mg by mouth every 2 (two) hours as needed.    TIZANIDINE (ZANAFLEX) 4 MG TABLET    Take 4 mg by mouth every evening.    TOPIRAMATE (TOPAMAX) 100 MG TABLET    Take 100 mg by mouth 2 (two) times daily.    TRAMADOL (ULTRAM) 50 MG TABLET    Take 50 mg by mouth every 4 (four) hours as needed.     TRAZODONE (DESYREL) 150 MG TABLET    Take 150 mg by mouth nightly.    WARFARIN (COUMADIN) 6 MG TABLET    Take 6 mg by mouth Daily. Except 9mg on Thursdays.   Modified Medications    No medications on file   Discontinued Medications    No medications on file       Review of Systems   Constitutional: Negative.    HENT: Negative.    Eyes: Negative.    Respiratory: Positive for shortness of breath.    Cardiovascular: Positive for chest pain and palpitations.   Gastrointestinal: Negative.    Genitourinary: Negative.    Musculoskeletal: Negative.    Skin: Negative.    Neurological: Positive for dizziness.   Endo/Heme/Allergies: Negative.    Psychiatric/Behavioral: The patient is nervous/anxious.    All 12 systems otherwise negative.      Wt Readings from Last 3 Encounters:   10/09/19 72.5 kg (159 lb 13.3 oz)   10/03/19 71.5 kg (157 lb 10.1 oz)   08/07/19 71.7 kg (158 lb)     Temp Readings from Last 3 Encounters:   06/22/19 98 °F (36.7 °C) (Oral)   05/17/19 97.6 °F (36.4 °C) (Oral)   05/14/19 97 °F (36.1 °C) (Tympanic)     BP Readings from Last 3 Encounters:   10/09/19 90/70   10/03/19 124/82   08/07/19 100/64     Pulse Readings from Last 3 Encounters:   10/09/19 88   10/03/19 89   08/07/19 73       BP 90/70 (BP Location: Right arm, Patient Position: Sitting, BP Method: Medium (Manual))   Pulse 88   Wt 72.5 kg (159 lb 13.3 oz)   BMI 31.22 kg/m²     Objective:   Physical Exam   Constitutional:  She is oriented to person, place, and time. She appears well-developed and well-nourished. No distress.   HENT:   Head: Normocephalic and atraumatic.   Nose: Nose normal.   Mouth/Throat: Oropharynx is clear and moist.   Eyes: Conjunctivae and EOM are normal. No scleral icterus.   Neck: Normal range of motion. Neck supple. No JVD present. No thyromegaly present.   Cardiovascular: Normal rate, regular rhythm, S1 normal and S2 normal. Exam reveals no gallop, no S3, no S4 and no friction rub.   Murmur heard.  Pulmonary/Chest: Effort normal and breath sounds normal. No stridor. No respiratory distress. She has no wheezes. She has no rales. She exhibits no tenderness.   Abdominal: Soft. Bowel sounds are normal. She exhibits no distension and no mass. There is no tenderness. There is no rebound.   Genitourinary:   Genitourinary Comments: Deferred   Musculoskeletal: Normal range of motion. She exhibits no edema, tenderness or deformity.   Lymphadenopathy:     She has no cervical adenopathy.   Neurological: She is alert and oriented to person, place, and time. She exhibits normal muscle tone. Coordination normal.   Skin: Skin is warm and dry. No rash noted. She is not diaphoretic. No erythema. No pallor.   Psychiatric: She has a normal mood and affect. Her behavior is normal. Judgment and thought content normal.   Nursing note and vitals reviewed.      Lab Results   Component Value Date     07/05/2019    K 4.1 07/05/2019     07/05/2019    CO2 27 07/05/2019    BUN 17 07/05/2019    CREATININE 1.0 07/05/2019     (H) 07/05/2019    HGBA1C 5.2 06/22/2019    MG 2.4 07/05/2019    AST 64 (H) 06/21/2019    ALT 65 (H) 06/21/2019    ALBUMIN 3.9 06/21/2019    PROT 7.2 06/21/2019    BILITOT 0.9 06/21/2019    WBC 7.58 06/22/2019    HGB 10.4 (L) 06/22/2019    HCT 34.1 (L) 06/22/2019     (H) 06/22/2019     06/22/2019    INR 3.8 (H) 10/01/2019    INR 3.1 08/22/2019    TSH 1.982 06/22/2019    CHOL 101 (L)  06/22/2019    HDL 28 (L) 06/22/2019    LDLCALC 58.2 (L) 06/22/2019    TRIG 74 06/22/2019    BNP 62 07/05/2019     Assessment:      1. (HFpEF) heart failure with preserved ejection fraction    2. Hx of mechanical aortic valve replacement    3. Status post coronary artery bypass graft    4. PVC (premature ventricular contraction)    5. Essential hypertension    6. H/O mitral valve repair    7. Nonrheumatic aortic insufficiency with aortic stenosis    8. Other hyperlipidemia    9. Pulmonary HTN    10. Centrilobular emphysema    11. Pulmonary fibrosis    12. Other sleep apnea    13. Anticoagulated on Coumadin        Plan:   1. CAD s/p CABG  - cont current meds  - exercise nuclear stress to r/o ischemia - negative  - cannot tolerate Ranexa; cont NTG patch and PRN NTG tabs    2. HFpEF  - cont lasix and K   - low salt diet     3. PVCs  - not candidate for ablation  - amiodarone high risk for pulm toxicity    4. PulmHTN/fibrosis  - RHC  With PH and elevated filling pressures  - f/u with pulm as needed - started on new IPF med  - f/u with pulm for BANDAR    5. [facility] AVR  - recent echo gradient WNL  - cont coumadin clinic   - needs Lovenox Bridging prior to any tooth extraction  - ok to have tooth imprint but not extraction off coumadin/Lovenox      Thank you for allowing me to participate in this patient's care. Please do not hesitate to contact me with any questions or concerns. Consult note has been forwarded to the referral physician.

## 2019-10-09 NOTE — PROCEDURES
[facility]  sharon Garcia  Test date: 10/08/19 Start: 10/08/19 23:49:46 Doron  Doctor: L LeJeune NP End: 10/09/19 09:00:14 Meg HARTMANN  Oximetry: Summary Report  Comments: NC2L  Recording time: 09:10:28 Highest pulse: 78 Highest SpO2: 100%  Excluded samplin:04:56 Lowest pulse: 31 Lowest SpO2: 91%  Total valid samplin:05:32 Mean pulse: 56 Mean SpO2: 98.0%  1 S.D.: 5.1 1 S.D.: .8  Time with SpO2<90: 0:00:00, 0.0%  Time with SpO2<80: 0:00:00, 0.0%  Time with SpO2<70: 0:00:00, 0.0%  Time with SpO2<60: 0:00:00, 0.0%  Time with SpO2<89: 0:00:00, 0.0%  Time with SpO2 =>90: 9:05:32, 100.0%  Time with SpO2=>80 & <90: 0:00:00, 0.0%  Time with SpO2=>70 & <80: 0:00:00, 0.0%  Time with SpO2=>60 & <70: 0:00:00, 0.0%  There was no time spent with a saturation less than or equal to 88.  A desaturation event was defined as a decrease of saturation by 4 or more.  No events were excluded due to artifact.  There was one desaturation event over 3 minutes duration.  There was one desaturation event of less than 3 minutes duration during which:  The mean high was 99.0%. The mean low was 95.0%.  The number of these events that were:  > 0 & <10 seconds: 0 > 0 seconds: 1  =>10 & <20 seconds: 0 =>10 seconds: 1  =>20 & <30 seconds: 1 =>20 seconds: 1  =>30 & <40 seconds: 0 =>30 seconds: 0  =>40 & <50 seconds: 0 =>40 seconds: 0  =>50 & <60 seconds: 0 =>50 seconds: 0  =>60 seconds: 0 =>60 seconds: 0  The mean length of desaturation events that were >=10 sec & <=3 mins was: 24.0 sec.  Desaturation event index (events >=10 sec per sampled hour): 0.1  Desaturation event index (events >= 0 sec per sampled hour): 0.1    OVERNIGHT OXIMETRY REPORT:    Dictated by: Jaydon Marte MD  Test date: 10/08/2019  Dictated on: 10/09/2019      Comment: This test was performed on OXYGEN 2.0  L/MIN     A desaturation event was defined as a decrease of saturation by 4 or more.    REPORT SUMMARY  Total valid samplin:05:32   High SpO2:  100%    Low SpO2: 91%    Mean SpO2  98 %  Cumulative time with oxygen saturation less than 88% (TC88): 0:00:00    CLINICAL INTERPRETATION  Results are normal,  There is no significant nocturnal oxygen desaturation and  Clinical correlation is advised       Jaydon Marte MD    Details about Medicare Group Criteria coverage can be found at http://www.cms.hhs.gov/manuals/downloads/

## 2019-10-14 ENCOUNTER — TELEPHONE (OUTPATIENT)
Dept: INTERNAL MEDICINE | Facility: CLINIC | Age: 49
End: 2019-10-14

## 2019-10-15 ENCOUNTER — LAB VISIT (OUTPATIENT)
Dept: LAB | Facility: HOSPITAL | Age: 49
End: 2019-10-15
Attending: INTERNAL MEDICINE
Payer: COMMERCIAL

## 2019-10-15 DIAGNOSIS — Z79.01 LONG TERM (CURRENT) USE OF ANTICOAGULANTS: ICD-10-CM

## 2019-10-15 LAB
INR PPP: 3 (ref 0.8–1.2)
PROTHROMBIN TIME: 28.8 SEC (ref 9–12.5)

## 2019-10-15 PROCEDURE — 85610 PROTHROMBIN TIME: CPT | Mod: TXP

## 2019-10-15 PROCEDURE — 36415 COLL VENOUS BLD VENIPUNCTURE: CPT | Mod: PO,NTX

## 2019-10-16 ENCOUNTER — ANTI-COAG VISIT (OUTPATIENT)
Dept: CARDIOLOGY | Facility: CLINIC | Age: 49
End: 2019-10-16
Payer: COMMERCIAL

## 2019-10-16 DIAGNOSIS — Z79.01 LONG TERM (CURRENT) USE OF ANTICOAGULANTS: ICD-10-CM

## 2019-10-16 DIAGNOSIS — Z95.2 HX OF MECHANICAL AORTIC VALVE REPLACEMENT: Chronic | ICD-10-CM

## 2019-10-16 PROCEDURE — 93793 PR ANTICOAGULANT MGMT FOR PT TAKING WARFARIN: ICD-10-PCS | Mod: S$GLB,,,

## 2019-10-16 PROCEDURE — 93793 ANTICOAG MGMT PT WARFARIN: CPT | Mod: S$GLB,,,

## 2019-10-16 NOTE — PROGRESS NOTES
Patient contacted:  INR is therapeutic at 3.0.  Patient has taken medication as previously instructed.  No other changes reported.  Will rechallenge current dose of Warfarin 9 mg every Thursday and 6 mg on all other days per week.  Recheck on 10/23/2019 ([facility]), along with other appointment.  Patient repeated back instructions and voiced understanding.

## 2019-10-23 ENCOUNTER — ANTI-COAG VISIT (OUTPATIENT)
Dept: CARDIOLOGY | Facility: CLINIC | Age: 49
End: 2019-10-23

## 2019-10-25 NOTE — PROGRESS NOTES
Opened in error.    Left message for patient to call coumadin clinic [phone], regarding overdue INR results.

## 2019-11-04 ENCOUNTER — HOSPITAL ENCOUNTER (INPATIENT)
Facility: HOSPITAL | Age: 49
LOS: 3 days | Discharge: HOME | DRG: 812 | End: 2019-11-07
Attending: EMERGENCY MEDICINE | Admitting: EMERGENCY MEDICINE
Payer: COMMERCIAL

## 2019-11-04 DIAGNOSIS — Z79.01 ANTICOAGULANT LONG-TERM USE: Primary | ICD-10-CM

## 2019-11-04 DIAGNOSIS — Z95.2 H/O MECHANICAL AORTIC VALVE REPLACEMENT: ICD-10-CM

## 2019-11-04 DIAGNOSIS — Z95.2 HX OF MECHANICAL AORTIC VALVE REPLACEMENT: Chronic | ICD-10-CM

## 2019-11-04 DIAGNOSIS — R06.02 SOB (SHORTNESS OF BREATH): ICD-10-CM

## 2019-11-04 DIAGNOSIS — D64.9 SYMPTOMATIC ANEMIA: ICD-10-CM

## 2019-11-04 DIAGNOSIS — R07.9 CHEST PAIN: ICD-10-CM

## 2019-11-04 PROBLEM — I50.32 CHRONIC DIASTOLIC HEART FAILURE: Status: ACTIVE | Noted: 2019-02-08

## 2019-11-04 PROBLEM — J96.11 CHRONIC RESPIRATORY FAILURE WITH HYPOXIA: Status: ACTIVE | Noted: 2019-11-04

## 2019-11-04 PROBLEM — R79.89 ELEVATED TROPONIN: Status: ACTIVE | Noted: 2019-11-04

## 2019-11-04 LAB
ABO + RH BLD: NORMAL
ALBUMIN SERPL BCP-MCNC: 3.8 G/DL (ref 3.5–5.2)
ALP SERPL-CCNC: 103 U/L (ref 55–135)
ALT SERPL W/O P-5'-P-CCNC: 11 U/L (ref 10–44)
ANION GAP SERPL CALC-SCNC: 14 MMOL/L (ref 8–16)
APTT BLDCRRT: 31.6 SEC (ref 21–32)
APTT BLDCRRT: 31.9 SEC (ref 21–32)
AST SERPL-CCNC: 31 U/L (ref 10–40)
BASOPHILS # BLD AUTO: 0.06 K/UL (ref 0–0.2)
BASOPHILS NFR BLD: 0.8 % (ref 0–1.9)
BILIRUB SERPL-MCNC: 0.4 MG/DL (ref 0.1–1)
BLD GP AB SCN CELLS X3 SERPL QL: NORMAL
BLD PROD TYP BPU: NORMAL
BLOOD UNIT EXPIRATION DATE: NORMAL
BLOOD UNIT TYPE CODE: 9500
BLOOD UNIT TYPE: NORMAL
BNP SERPL-MCNC: 389 PG/ML (ref 0–99)
BUN SERPL-MCNC: 18 MG/DL (ref 6–20)
CALCIUM SERPL-MCNC: 9.2 MG/DL (ref 8.7–10.5)
CHLORIDE SERPL-SCNC: 98 MMOL/L (ref 95–110)
CHOLEST SERPL-MCNC: 172 MG/DL (ref 120–199)
CHOLEST/HDLC SERPL: 4.4 {RATIO} (ref 2–5)
CO2 SERPL-SCNC: 26 MMOL/L (ref 23–29)
CODING SYSTEM: NORMAL
CREAT SERPL-MCNC: 1.2 MG/DL (ref 0.5–1.4)
DIFFERENTIAL METHOD: ABNORMAL
DISPENSE STATUS: NORMAL
EOSINOPHIL # BLD AUTO: 0.4 K/UL (ref 0–0.5)
EOSINOPHIL NFR BLD: 4.6 % (ref 0–8)
ERYTHROCYTE [DISTWIDTH] IN BLOOD BY AUTOMATED COUNT: 15.3 % (ref 11.5–14.5)
EST. GFR  (AFRICAN AMERICAN): >60 ML/MIN/1.73 M^2
EST. GFR  (NON AFRICAN AMERICAN): 53 ML/MIN/1.73 M^2
ESTIMATED PA SYSTOLIC PRESSURE: 56.58
GLUCOSE SERPL-MCNC: 143 MG/DL (ref 70–110)
HCT VFR BLD AUTO: 25.5 % (ref 37–48.5)
HCT VFR BLD AUTO: 27.9 % (ref 37–48.5)
HDLC SERPL-MCNC: 39 MG/DL (ref 40–75)
HDLC SERPL: 22.7 % (ref 20–50)
HGB BLD-MCNC: 8 G/DL (ref 12–16)
HGB BLD-MCNC: 8.4 G/DL (ref 12–16)
IMM GRANULOCYTES # BLD AUTO: 0.02 K/UL (ref 0–0.04)
IMM GRANULOCYTES NFR BLD AUTO: 0.3 % (ref 0–0.5)
INR PPP: 1.7 (ref 0.8–1.2)
LDLC SERPL CALC-MCNC: 107.2 MG/DL (ref 63–159)
LYMPHOCYTES # BLD AUTO: 1.9 K/UL (ref 1–4.8)
LYMPHOCYTES NFR BLD: 24.4 % (ref 18–48)
MCH RBC QN AUTO: 30.1 PG (ref 27–31)
MCHC RBC AUTO-ENTMCNC: 30.1 G/DL (ref 32–36)
MCV RBC AUTO: 100 FL (ref 82–98)
MITRAL VALVE REGURGITATION: ABNORMAL
MONOCYTES # BLD AUTO: 0.6 K/UL (ref 0.3–1)
MONOCYTES NFR BLD: 8 % (ref 4–15)
NEUTROPHILS # BLD AUTO: 4.7 K/UL (ref 1.8–7.7)
NEUTROPHILS NFR BLD: 61.9 % (ref 38–73)
NONHDLC SERPL-MCNC: 133 MG/DL
NRBC BLD-RTO: 0 /100 WBC
NUM UNITS TRANS PACKED RBC: NORMAL
PLATELET # BLD AUTO: 242 K/UL (ref 150–350)
PMV BLD AUTO: 10.4 FL (ref 9.2–12.9)
POTASSIUM SERPL-SCNC: 3.4 MMOL/L (ref 3.5–5.1)
PROT SERPL-MCNC: 7.7 G/DL (ref 6–8.4)
PROTHROMBIN TIME: 18.3 SEC (ref 9–12.5)
RBC # BLD AUTO: 2.79 M/UL (ref 4–5.4)
RETIRED EF AND QEF - SEE NOTES: 55 (ref 55–65)
SODIUM SERPL-SCNC: 138 MMOL/L (ref 136–145)
TRICUSPID VALVE REGURGITATION: ABNORMAL
TRIGL SERPL-MCNC: 129 MG/DL (ref 30–150)
TROPONIN I SERPL DL<=0.01 NG/ML-MCNC: 0.18 NG/ML (ref 0–0.03)
TROPONIN I SERPL DL<=0.01 NG/ML-MCNC: 0.26 NG/ML (ref 0–0.03)
TROPONIN I SERPL DL<=0.01 NG/ML-MCNC: 0.28 NG/ML (ref 0–0.03)
WBC # BLD AUTO: 7.65 K/UL (ref 3.9–12.7)

## 2019-11-04 PROCEDURE — 99255 PR INITIAL INPATIENT CONSULT,LEVL V: ICD-10-PCS | Mod: 25,NTX,, | Performed by: INTERNAL MEDICINE

## 2019-11-04 PROCEDURE — 84484 ASSAY OF TROPONIN QUANT: CPT | Mod: NTX

## 2019-11-04 PROCEDURE — 63600175 PHARM REV CODE 636 W HCPCS: Mod: NTX | Performed by: PHYSICIAN ASSISTANT

## 2019-11-04 PROCEDURE — S4991 NICOTINE PATCH NONLEGEND: HCPCS | Mod: NTX | Performed by: PHYSICIAN ASSISTANT

## 2019-11-04 PROCEDURE — 93010 EKG 12-LEAD: ICD-10-PCS | Mod: NTX,,, | Performed by: INTERNAL MEDICINE

## 2019-11-04 PROCEDURE — 36415 COLL VENOUS BLD VENIPUNCTURE: CPT | Mod: NTX

## 2019-11-04 PROCEDURE — 99285 EMERGENCY DEPT VISIT HI MDM: CPT | Mod: 25,NTX

## 2019-11-04 PROCEDURE — 85730 THROMBOPLASTIN TIME PARTIAL: CPT | Mod: 91,NTX

## 2019-11-04 PROCEDURE — 63600175 PHARM REV CODE 636 W HCPCS: Mod: NTX | Performed by: FAMILY MEDICINE

## 2019-11-04 PROCEDURE — 85610 PROTHROMBIN TIME: CPT | Mod: NTX

## 2019-11-04 PROCEDURE — 85018 HEMOGLOBIN: CPT | Mod: NTX

## 2019-11-04 PROCEDURE — 93005 ELECTROCARDIOGRAM TRACING: CPT | Mod: NTX

## 2019-11-04 PROCEDURE — 80053 COMPREHEN METABOLIC PANEL: CPT | Mod: NTX

## 2019-11-04 PROCEDURE — 36430 TRANSFUSION BLD/BLD COMPNT: CPT | Mod: NTX

## 2019-11-04 PROCEDURE — 83880 ASSAY OF NATRIURETIC PEPTIDE: CPT | Mod: NTX

## 2019-11-04 PROCEDURE — 25000242 PHARM REV CODE 250 ALT 637 W/ HCPCS: Mod: NTX | Performed by: PHYSICIAN ASSISTANT

## 2019-11-04 PROCEDURE — 85730 THROMBOPLASTIN TIME PARTIAL: CPT | Mod: NTX

## 2019-11-04 PROCEDURE — 25000003 PHARM REV CODE 250: Mod: NTX | Performed by: PHYSICIAN ASSISTANT

## 2019-11-04 PROCEDURE — 85025 COMPLETE CBC W/AUTO DIFF WBC: CPT | Mod: NTX

## 2019-11-04 PROCEDURE — 93010 ELECTROCARDIOGRAM REPORT: CPT | Mod: NTX,,, | Performed by: INTERNAL MEDICINE

## 2019-11-04 PROCEDURE — 86920 COMPATIBILITY TEST SPIN: CPT | Mod: NTX

## 2019-11-04 PROCEDURE — P9016 RBC LEUKOCYTES REDUCED: HCPCS | Mod: NTX

## 2019-11-04 PROCEDURE — 94640 AIRWAY INHALATION TREATMENT: CPT | Mod: NTX

## 2019-11-04 PROCEDURE — 99255 IP/OBS CONSLTJ NEW/EST HI 80: CPT | Mod: 25,NTX,, | Performed by: INTERNAL MEDICINE

## 2019-11-04 PROCEDURE — 85014 HEMATOCRIT: CPT | Mod: NTX

## 2019-11-04 PROCEDURE — 93306 TTE W/DOPPLER COMPLETE: CPT | Mod: 26,NTX,, | Performed by: INTERNAL MEDICINE

## 2019-11-04 PROCEDURE — 93306 TTE W/DOPPLER COMPLETE: CPT | Mod: NTX

## 2019-11-04 PROCEDURE — 21400001 HC TELEMETRY ROOM: Mod: NTX

## 2019-11-04 PROCEDURE — 63600175 PHARM REV CODE 636 W HCPCS: Mod: NTX | Performed by: EMERGENCY MEDICINE

## 2019-11-04 PROCEDURE — 86850 RBC ANTIBODY SCREEN: CPT | Mod: NTX

## 2019-11-04 PROCEDURE — 96360 HYDRATION IV INFUSION INIT: CPT | Mod: NTX

## 2019-11-04 PROCEDURE — 93306 2D ECHO ONLY: ICD-10-PCS | Mod: 26,NTX,, | Performed by: INTERNAL MEDICINE

## 2019-11-04 PROCEDURE — 84484 ASSAY OF TROPONIN QUANT: CPT | Mod: 91,NTX

## 2019-11-04 PROCEDURE — 80061 LIPID PANEL: CPT | Mod: NTX

## 2019-11-04 RX ORDER — HYDROCODONE BITARTRATE AND ACETAMINOPHEN 10; 325 MG/1; MG/1
1 TABLET ORAL EVERY 8 HOURS PRN
Status: DISCONTINUED | OUTPATIENT
Start: 2019-11-04 | End: 2019-11-05

## 2019-11-04 RX ORDER — POTASSIUM CHLORIDE 20 MEQ/1
20 TABLET, EXTENDED RELEASE ORAL 2 TIMES DAILY
Status: DISCONTINUED | OUTPATIENT
Start: 2019-11-04 | End: 2019-11-05

## 2019-11-04 RX ORDER — DESVENLAFAXINE 100 MG/1
100 TABLET, EXTENDED RELEASE ORAL DAILY
Status: DISCONTINUED | OUTPATIENT
Start: 2019-11-05 | End: 2019-11-07 | Stop reason: HOSPADM

## 2019-11-04 RX ORDER — IPRATROPIUM BROMIDE AND ALBUTEROL SULFATE 2.5; .5 MG/3ML; MG/3ML
3 SOLUTION RESPIRATORY (INHALATION) EVERY 6 HOURS PRN
Status: DISCONTINUED | OUTPATIENT
Start: 2019-11-04 | End: 2019-11-07 | Stop reason: HOSPADM

## 2019-11-04 RX ORDER — WARFARIN 2 MG/1
6 TABLET ORAL DAILY
Status: DISCONTINUED | OUTPATIENT
Start: 2019-11-04 | End: 2019-11-04

## 2019-11-04 RX ORDER — CLONAZEPAM 0.5 MG/1
0.5 TABLET ORAL 3 TIMES DAILY
Status: DISCONTINUED | OUTPATIENT
Start: 2019-11-04 | End: 2019-11-07 | Stop reason: HOSPADM

## 2019-11-04 RX ORDER — ACETAMINOPHEN 325 MG/1
650 TABLET ORAL EVERY 6 HOURS PRN
Status: DISCONTINUED | OUTPATIENT
Start: 2019-11-04 | End: 2019-11-07 | Stop reason: HOSPADM

## 2019-11-04 RX ORDER — BUDESONIDE 0.5 MG/2ML
0.5 INHALANT ORAL EVERY 12 HOURS
Status: DISCONTINUED | OUTPATIENT
Start: 2019-11-04 | End: 2019-11-07 | Stop reason: HOSPADM

## 2019-11-04 RX ORDER — ROSUVASTATIN CALCIUM 10 MG/1
20 TABLET, COATED ORAL DAILY
Status: DISCONTINUED | OUTPATIENT
Start: 2019-11-05 | End: 2019-11-07 | Stop reason: HOSPADM

## 2019-11-04 RX ORDER — HYDROCODONE BITARTRATE AND ACETAMINOPHEN 500; 5 MG/1; MG/1
TABLET ORAL
Status: DISCONTINUED | OUTPATIENT
Start: 2019-11-04 | End: 2019-11-07 | Stop reason: HOSPADM

## 2019-11-04 RX ORDER — HEPARIN SODIUM,PORCINE/D5W 25000/250
12 INTRAVENOUS SOLUTION INTRAVENOUS CONTINUOUS
Status: DISCONTINUED | OUTPATIENT
Start: 2019-11-04 | End: 2019-11-06

## 2019-11-04 RX ORDER — NICOTINE 7MG/24HR
1 PATCH, TRANSDERMAL 24 HOURS TRANSDERMAL
Status: DISCONTINUED | OUTPATIENT
Start: 2019-11-04 | End: 2019-11-07 | Stop reason: HOSPADM

## 2019-11-04 RX ORDER — AMITRIPTYLINE HYDROCHLORIDE 25 MG/1
25 TABLET, FILM COATED ORAL NIGHTLY
Status: DISCONTINUED | OUTPATIENT
Start: 2019-11-04 | End: 2019-11-07 | Stop reason: HOSPADM

## 2019-11-04 RX ORDER — ONDANSETRON 8 MG/1
8 TABLET, ORALLY DISINTEGRATING ORAL EVERY 8 HOURS PRN
Status: DISCONTINUED | OUTPATIENT
Start: 2019-11-04 | End: 2019-11-07 | Stop reason: HOSPADM

## 2019-11-04 RX ORDER — TIZANIDINE 4 MG/1
4 TABLET ORAL NIGHTLY
Status: DISCONTINUED | OUTPATIENT
Start: 2019-11-04 | End: 2019-11-07 | Stop reason: HOSPADM

## 2019-11-04 RX ORDER — ARFORMOTEROL TARTRATE 15 UG/2ML
15 SOLUTION RESPIRATORY (INHALATION) 2 TIMES DAILY
Status: DISCONTINUED | OUTPATIENT
Start: 2019-11-04 | End: 2019-11-07 | Stop reason: HOSPADM

## 2019-11-04 RX ORDER — PANTOPRAZOLE SODIUM 40 MG/1
40 TABLET, DELAYED RELEASE ORAL DAILY
Status: DISCONTINUED | OUTPATIENT
Start: 2019-11-05 | End: 2019-11-06

## 2019-11-04 RX ORDER — TOPIRAMATE 100 MG/1
100 TABLET, FILM COATED ORAL 2 TIMES DAILY
Status: DISCONTINUED | OUTPATIENT
Start: 2019-11-04 | End: 2019-11-07 | Stop reason: HOSPADM

## 2019-11-04 RX ORDER — ASPIRIN 81 MG/1
81 TABLET ORAL DAILY
Status: DISCONTINUED | OUTPATIENT
Start: 2019-11-05 | End: 2019-11-07 | Stop reason: HOSPADM

## 2019-11-04 RX ADMIN — TOPIRAMATE 100 MG: 100 TABLET, FILM COATED ORAL at 08:11

## 2019-11-04 RX ADMIN — SODIUM CHLORIDE 500 ML: 0.9 INJECTION, SOLUTION INTRAVENOUS at 09:11

## 2019-11-04 RX ADMIN — CLONAZEPAM 0.5 MG: 0.5 TABLET ORAL at 08:11

## 2019-11-04 RX ADMIN — ARFORMOTEROL TARTRATE 15 MCG: 15 SOLUTION RESPIRATORY (INHALATION) at 07:11

## 2019-11-04 RX ADMIN — TRAZODONE HYDROCHLORIDE 150 MG: 100 TABLET ORAL at 08:11

## 2019-11-04 RX ADMIN — BUDESONIDE 0.5 MG: 0.5 INHALANT RESPIRATORY (INHALATION) at 07:11

## 2019-11-04 RX ADMIN — AMITRIPTYLINE HYDROCHLORIDE 25 MG: 25 TABLET, FILM COATED ORAL at 05:11

## 2019-11-04 RX ADMIN — HYDROCODONE BITARTRATE AND ACETAMINOPHEN 1 TABLET: 10; 325 TABLET ORAL at 05:11

## 2019-11-04 RX ADMIN — POTASSIUM CHLORIDE 20 MEQ: 20 TABLET, EXTENDED RELEASE ORAL at 06:11

## 2019-11-04 RX ADMIN — SODIUM CHLORIDE 250 ML: 0.9 INJECTION, SOLUTION INTRAVENOUS at 01:11

## 2019-11-04 RX ADMIN — TIZANIDINE 4 MG: 4 TABLET ORAL at 08:11

## 2019-11-04 RX ADMIN — NICOTINE 1 PATCH: 7 PATCH, EXTENDED RELEASE TRANSDERMAL at 05:11

## 2019-11-04 RX ADMIN — HEPARIN SODIUM AND DEXTROSE 12 UNITS/KG/HR: 10000; 5 INJECTION INTRAVENOUS at 05:11

## 2019-11-04 RX ADMIN — BUSPIRONE HYDROCHLORIDE 15 MG: 10 TABLET ORAL at 09:11

## 2019-11-04 NOTE — NURSING
Pt's admission assessment questions completed at this time following arrival to tele unit. Pt oriented to room and unit protocols as well as fall risk precautions. Educated pt on importance of collecting accurate I&Os. Urine hat placed in toilet. Communication board updated with current plan of care, reviewed plan with pt and spouse. Pt has no complaints at this time, instructed to call for assistance.

## 2019-11-04 NOTE — SUBJECTIVE & OBJECTIVE
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, implants and grafts, initial encounter     Hyperlipidemia     Hypertension     Sleep apnea syndrome 2/8/2019    Stroke        Past Surgical History:   Procedure Laterality Date    AORTIC VALVE REPLACEMENT      mechanical    CARDIAC CATHETERIZATION      CORONARY ANGIOPLASTY      CORONARY ARTERY BYPASS GRAFT      HYSTERECTOMY      RIGHT HEART CATHETERIZATION Right 5/17/2019    Procedure: INSERTION, CATHETER, RIGHT HEART;  Surgeon: Derek Brown MD;  Location: [facility] CATH LAB;  Service: Cardiology;  Laterality: Right;  malur pt/detailed hx       Review of patient's allergies indicates:   Allergen Reactions    Nsaids (non-steroidal anti-inflammatory drug) Other (See Comments)     Mechanical heart valve       No current facility-administered medications on file prior to encounter.      Current Outpatient Medications on File Prior to Encounter   Medication Sig    albuterol (VENTOLIN HFA) 90 mcg/actuation inhaler Inhale 2 puffs into the lungs every 6 (six) hours as needed for Wheezing. Rescue    amitriptyline (ELAVIL) 25 MG tablet Take 25 mg by mouth every evening.     ANORO ELLIPTA 62.5-25 mcg/actuation DsDv INHALE 1 PUFF INTO THE LUNGS ONCE A DAY. CONTROLLER.    busPIRone (BUSPAR) 15 MG tablet Take 15 mg by mouth 2 (two) times daily.     clonazePAM (KLONOPIN) 1 MG tablet Take 1 mg by mouth 3 (three) times daily.     desvenlafaxine succinate (PRISTIQ) 100 MG Tb24 Take 1 tablet (100 mg total) by mouth once daily.    diclofenac sodium (VOLTAREN) 1 % Gel Apply 2 g topically 4 (four) times daily as needed.     furosemide (LASIX) 40 MG tablet Take two 40 mg tablets by mouth twice a day    HYDROcodone-acetaminophen (NORCO)  mg per tablet Take 1 tablet by mouth every 6 (six) hours as  needed for Pain.    metoprolol succinate (TOPROL-XL) 25 MG 24 hr tablet Take 1 tablet (25 mg total) by mouth once daily.    nicotine (NICODERM CQ) 7 mg/24 hr Place 1 patch onto the skin every 24 hours.    nintedanib (OFEV) 150 mg Cap Take 150 mg by mouth 2 (two) times daily.    nitroGLYCERIN (NITROSTAT) 0.4 MG SL tablet     nitroGLYCERIN 0.4 MG/HR TD PT24 (NITRODUR) 0.4 mg/hr Place 1 patch onto the skin once daily.    ondansetron (ZOFRAN) 4 MG tablet Take 1 tablet (4 mg total) by mouth every 8 (eight) hours as needed for Nausea.    pantoprazole (PROTONIX) 40 MG tablet Take 40 mg by mouth once daily.    potassium chloride SA (K-DUR,KLOR-CON) 20 MEQ tablet Take 20 mEq by mouth 2 (two) times daily.    rosuvastatin (CRESTOR) 20 MG tablet Take 20 mg by mouth once daily.    sumatriptan (IMITREX) 100 MG tablet Take 100 mg by mouth every 2 (two) hours as needed.    tiZANidine (ZANAFLEX) 4 MG tablet Take 4 mg by mouth every evening.    topiramate (TOPAMAX) 100 MG tablet Take 100 mg by mouth 2 (two) times daily.    traMADol (ULTRAM) 50 mg tablet Take 50 mg by mouth every 4 (four) hours as needed.     traZODone (DESYREL) 150 MG tablet Take 150 mg by mouth nightly.    warfarin (COUMADIN) 6 MG tablet Take 6 mg by mouth Daily. Except 9mg on .    enoxaparin (LOVENOX) 100 mg/mL Syrg Inject 0.7 mLs (70 mg total) into the skin 2 (two) times daily. (Patient not taking: Reported on 10/16/2019)    fluticasone (FLONASE) 50 mcg/actuation nasal spray 1 spray by Each Nare route once daily.     Family History     Problem Relation (Age of Onset)    Breast cancer Sister    Coronary artery disease Father    Heart disease Mother    Lung cancer Brother        Tobacco Use    Smoking status: Former Smoker     Packs/day: 1.00     Types: Cigarettes     Last attempt to quit: 2018     Years since quittin.4    Smokeless tobacco: Never Used   Substance and Sexual Activity    Alcohol use: Not Currently     Frequency:  2-4 times a month     Drinks per session: 1 or 2     Binge frequency: Never    Drug use: Not Currently    Sexual activity: Not on file     Review of Systems   Constitutional: Negative for appetite change, chills, diaphoresis, fatigue and fever.   HENT: Negative for congestion, ear pain, mouth sores, sore throat and trouble swallowing.    Eyes: Negative for pain and visual disturbance.   Respiratory: Negative for cough, chest tightness and shortness of breath.    Cardiovascular: Positive for chest pain. Negative for palpitations and leg swelling.   Gastrointestinal: Positive for diarrhea and nausea. Negative for abdominal pain, constipation and vomiting.   Endocrine: Negative for cold intolerance, heat intolerance, polydipsia and polyuria.   Genitourinary: Negative for dysuria, frequency and hematuria.   Musculoskeletal: Negative for arthralgias, back pain, myalgias and neck pain.   Skin: Negative for pallor, rash and wound.   Allergic/Immunologic: Negative for environmental allergies and immunocompromised state.   Neurological: Positive for weakness. Negative for dizziness, seizures, syncope, numbness and headaches.   Hematological: Negative for adenopathy. Does not bruise/bleed easily.   Psychiatric/Behavioral: Negative for agitation, confusion and sleep disturbance.     Objective:     Vital Signs (Most Recent):  Temp: 97.2 °F (36.2 °C) (11/04/19 1527)  Pulse: (!) 50 (11/04/19 1527)  Resp: 18 (11/04/19 1527)  BP: (!) 95/44 (11/04/19 1527)  SpO2: 100 % (11/04/19 1527) Vital Signs (24h Range):  Temp:  [97.2 °F (36.2 °C)-98.3 °F (36.8 °C)] 97.2 °F (36.2 °C)  Pulse:  [50-61] 50  Resp:  [16-25] 18  SpO2:  [96 %-100 %] 100 %  BP: ()/(38-56) 95/44     Weight: 75.5 kg (166 lb 6.4 oz)  Body mass index is 32.5 kg/m².    Physical Exam   Constitutional: She is oriented to person, place, and time. She appears well-developed and well-nourished. No distress.   HENT:   Head: Normocephalic and atraumatic.   Eyes:  Conjunctivae are normal.   PERRL   Neck: Neck supple. No JVD present.   Cardiovascular: Normal rate and regular rhythm.   Murmur heard.  Pulmonary/Chest: Effort normal and breath sounds normal.   Abdominal: Soft. Bowel sounds are normal. She exhibits no distension. There is no tenderness.   Musculoskeletal: Normal range of motion.   Neurological: She is alert and oriented to person, place, and time.   Skin: Skin is warm and dry.   Psychiatric: She has a normal mood and affect. Her behavior is normal. Thought content normal.   Nursing note and vitals reviewed.          Significant Labs:   BMP:   Recent Labs   Lab 11/04/19  1232   *      K 3.4*   CL 98   CO2 26   BUN 18   CREATININE 1.2   CALCIUM 9.2     CBC:   Recent Labs   Lab 11/04/19  1232   WBC 7.65   HGB 8.4*   HCT 27.9*        Coagulation:   Recent Labs   Lab 11/04/19  1232   INR 1.7*   APTT 31.9     Troponin:   Recent Labs   Lab 11/04/19  1232   TROPONINI 0.275*       Significant Imaging: CXR: I have reviewed all pertinent results/findings within the past 24 hours and my personal findings are:  Unremarkable

## 2019-11-04 NOTE — HPI
"Meg Sal is a 49-year-old female with a mechanical aortic valve, pulmonary fibrosis, CHF, CAD, HLD and HTN that presented to the ED on 11/04/19 after she collapsed on the way to the bathroom in the middle of the night. She states that she has been experiencing weakness in her arms, back, and legs for 4 days. This progressively worsened until she collapsed while walking to the bathroom and had to be helped off the floor by her . There are no aggravating or alleviating factors. Associated symptoms include chest pain that she describes as a "pressure" in the center of her chest, nausea and diarrhea. She describes the combination of symptoms as similar to her previous heart attacks. She denies worsened shortness of breath and vomiting. Of note, the patient states that she recently had black stools for 4 days after returning from a trip to [place], but resolved 4 days prior to presentation. Labs are remarkable for a hemoglobin of 8.4, hematocrit of 27.9, INR of 1.7 and troponin of 0.275.  "

## 2019-11-04 NOTE — ASSESSMENT & PLAN NOTE
-Appears compensated.   -Hold metoprolol and diuretics due to bradycardia and hypotension, respectively.   -Monitor volume status.

## 2019-11-04 NOTE — PLAN OF CARE
11/04/19 1706   Discharge Assessment   Assessment Type Discharge Planning Assessment   Assessment information obtained from? Medical Record   Prior to hospitilization cognitive status: Alert/Oriented   Prior to hospitalization functional status: Independent   Lives With spouse     Attempted to meet with pt for DC assessment. Pt was DILIP. CM will FU to complete assessment at a later time.  Dick Brito LMSW 11/4/2019 5:08 PM

## 2019-11-04 NOTE — HPI
Meg Sal is a 49 y.o. female with CAD s/p 2V CABG - LAD/LCX with occluded, s/p mechanical AVR, s/p mitral annuloplasty ring in 2014, pulm fibrosis, Pulm HTN, HFpEF, CHD, HTN, HLD, CVA.   Pt had first MI at age 31.. Dr. Cortez did LHC, had one stent placed. She also had other blockages which were treated medically. Had repeat unstable angina symptoms which lead to repeat LHC with Dr. Cortez.  Dr. Mcmahon was consulted as she had severe LAD disease. She went to Dr. Heller with LCA, had LHC in May 2018 with chronically occluded RCA with collaterals but patent grafts to her left system    She presented to the ED with complaints of chest pain, arm heaviness and back pain. Patient states that she has been under lots of stress after her father was involved in MVA 2 weeks ago. She and her sister decided to withdraw care of father, so has been emotional. Reports taking at least 2 nitro a day for the last couple of days. Felt weak walking to the bathroom recently and had to sit down on the floor to keep from passing out. Checked her BP at home and was 70's/30's. Has been taking Lasix and metoprolol daily and has had poor PO intake.     Has been intolerant to Ranexa and Imdur.  RHC in June 2019-   Mildly elevated right Filling Pressures, Moderate Pulmonary Hypertension. She was started on nintenanib by Pulmonary.   Echo in Feb 2019 revealed Aortic valve prosthesis, CATINA = 1.61 cm2, AVAi = 0.95 cm2/m2, peak velocity = 2.48 m/s, mean gradient = 15 mmHg, LVF 55%.     INR 1.7, ,Troponin 0.275 chronically elevated, K 3.4, H/H 8.4/27.9

## 2019-11-04 NOTE — ASSESSMENT & PLAN NOTE
-Concerning for ACS.   -Trend troponin.   -Continue daily ASA and Crestor.   -Hold metoprolol due to bradycardia.   -Cardiology consult.

## 2019-11-04 NOTE — CONSULTS
[facility]  Cardiology  Consult Note    Patient Name: Meg Sal  MRN: [id number]  Admission Date: 11/4/2019  Hospital Length of Stay: 0 days  Code Status: Full Code   Attending Provider: Jean Pierre Mcdermott MD   Consulting Provider: MONICA Garcia  Primary Care Physician: Aquilino Hightower MD  Principal Problem:<principal problem not specified>    Patient information was obtained from patient and ER records.     Inpatient consult to Cardiology  Consult performed by: MONICA Rollins  Consult ordered by: VANESSA Mendoza  Reason for consult: chest pain         Subjective:     Chief Complaint:  Chest pain      HPI:   Meg Sal is a 49 y.o. female with CAD s/p 2V CABG - LAD/LCX with occluded, s/p mechanical AVR, s/p mitral annuloplasty ring in 2014, pulm fibrosis, Pulm HTN, HFpEF, CHD, HTN, HLD, CVA.   Pt had first MI at age 31.. Dr. Cortez did LHC, had one stent placed. She also had other blockages which were treated medically. Had repeat unstable angina symptoms which lead to repeat LHC with Dr. Cortez.  Dr. Mcmahon was consulted as she had severe LAD disease. She went to Dr. Heller with LCA, had LHC in May 2018 with chronically occluded RCA with collaterals but patent grafts to her left system    She presented to the ED with complaints of chest pain, arm heaviness and back pain. Patient states that she has been under lots of stress after her father was involved in MVA 2 weeks ago. She and her sister decided to withdraw care of father, so has been emotional. Reports taking at least 2 nitro a day for the last couple of days. Felt weak walking to the bathroom recently and had to sit down on the floor to keep from passing out. Checked her BP at home and was 70's/30's. Has been taking Lasix and metoprolol daily and has had poor PO intake.     Has been intolerant to Ranexa and Imdur.  RHC in June 2019-   Mildly elevated right Filling Pressures, Moderate Pulmonary  Hypertension. She was started on nintenanib by Pulmonary.   Echo in Feb 2019 revealed Aortic valve prosthesis, CATINA = 1.61 cm2, AVAi = 0.95 cm2/m2, peak velocity = 2.48 m/s, mean gradient = 15 mmHg, LVF 55%.     INR 1.7, ,Troponin 0.275 chronically elevated, K 3.4, H/H 8.4/27.9    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, implants and grafts, initial encounter     Hyperlipidemia     Hypertension     Sleep apnea syndrome 2/8/2019    Stroke        Past Surgical History:   Procedure Laterality Date    AORTIC VALVE REPLACEMENT      mechanical    CARDIAC CATHETERIZATION      CORONARY ANGIOPLASTY      CORONARY ARTERY BYPASS GRAFT      HYSTERECTOMY      RIGHT HEART CATHETERIZATION Right 5/17/2019    Procedure: INSERTION, CATHETER, RIGHT HEART;  Surgeon: Derek Brown MD;  Location: [facility] CATH LAB;  Service: Cardiology;  Laterality: Right;  malur pt/detailed hx       Review of patient's allergies indicates:   Allergen Reactions    Nsaids (non-steroidal anti-inflammatory drug) Other (See Comments)     Mechanical heart valve       No current facility-administered medications on file prior to encounter.      Current Outpatient Medications on File Prior to Encounter   Medication Sig    albuterol (VENTOLIN HFA) 90 mcg/actuation inhaler Inhale 2 puffs into the lungs every 6 (six) hours as needed for Wheezing. Rescue    amitriptyline (ELAVIL) 25 MG tablet Take 25 mg by mouth every evening.     ANORO ELLIPTA 62.5-25 mcg/actuation DsDv INHALE 1 PUFF INTO THE LUNGS ONCE A DAY. CONTROLLER.    busPIRone (BUSPAR) 15 MG tablet Take 15 mg by mouth 2 (two) times daily.     clonazePAM (KLONOPIN) 1 MG tablet Take 1 mg by mouth 3 (three) times daily.     desvenlafaxine succinate (PRISTIQ) 100 MG Tb24 Take 1 tablet (100 mg total) by mouth once  daily.    diclofenac sodium (VOLTAREN) 1 % Gel Apply 2 g topically 4 (four) times daily as needed.     furosemide (LASIX) 40 MG tablet Take two 40 mg tablets by mouth twice a day    HYDROcodone-acetaminophen (NORCO)  mg per tablet Take 1 tablet by mouth every 6 (six) hours as needed for Pain.    metoprolol succinate (TOPROL-XL) 25 MG 24 hr tablet Take 1 tablet (25 mg total) by mouth once daily.    nicotine (NICODERM CQ) 7 mg/24 hr Place 1 patch onto the skin every 24 hours.    nintedanib (OFEV) 150 mg Cap Take 150 mg by mouth 2 (two) times daily.    nitroGLYCERIN (NITROSTAT) 0.4 MG SL tablet     nitroGLYCERIN 0.4 MG/HR TD PT24 (NITRODUR) 0.4 mg/hr Place 1 patch onto the skin once daily.    ondansetron (ZOFRAN) 4 MG tablet Take 1 tablet (4 mg total) by mouth every 8 (eight) hours as needed for Nausea.    pantoprazole (PROTONIX) 40 MG tablet Take 40 mg by mouth once daily.    potassium chloride SA (K-DUR,KLOR-CON) 20 MEQ tablet Take 20 mEq by mouth 2 (two) times daily.    rosuvastatin (CRESTOR) 20 MG tablet Take 20 mg by mouth once daily.    sumatriptan (IMITREX) 100 MG tablet Take 100 mg by mouth every 2 (two) hours as needed.    tiZANidine (ZANAFLEX) 4 MG tablet Take 4 mg by mouth every evening.    topiramate (TOPAMAX) 100 MG tablet Take 100 mg by mouth 2 (two) times daily.    traMADol (ULTRAM) 50 mg tablet Take 50 mg by mouth every 4 (four) hours as needed.     traZODone (DESYREL) 150 MG tablet Take 150 mg by mouth nightly.    warfarin (COUMADIN) 6 MG tablet Take 6 mg by mouth Daily. Except 9mg on Thursdays.    enoxaparin (LOVENOX) 100 mg/mL Syrg Inject 0.7 mLs (70 mg total) into the skin 2 (two) times daily. (Patient not taking: Reported on 10/16/2019)    fluticasone (FLONASE) 50 mcg/actuation nasal spray 1 spray by Each Nare route once daily.     Family History     Problem Relation (Age of Onset)    Breast cancer Sister    Coronary artery disease Father    Heart disease Mother    Lung  cancer Brother        Tobacco Use    Smoking status: Former Smoker     Packs/day: 1.00     Types: Cigarettes     Last attempt to quit: 2018     Years since quittin.4    Smokeless tobacco: Never Used   Substance and Sexual Activity    Alcohol use: Not Currently     Frequency: 2-4 times a month     Drinks per session: 1 or 2     Binge frequency: Never    Drug use: Not Currently    Sexual activity: Not on file     Review of Systems   Constitution: Positive for decreased appetite and malaise/fatigue. Negative for diaphoresis, weight gain and weight loss.   HENT: Negative for congestion and nosebleeds.    Cardiovascular: Positive for chest pain. Negative for claudication, cyanosis, dyspnea on exertion, irregular heartbeat, leg swelling, near-syncope, orthopnea, palpitations, paroxysmal nocturnal dyspnea and syncope.   Respiratory: Negative for cough, hemoptysis, shortness of breath, sleep disturbances due to breathing, snoring, sputum production and wheezing.    Hematologic/Lymphatic: Negative for bleeding problem. Does not bruise/bleed easily.   Skin: Negative for rash.   Musculoskeletal: Positive for back pain. Negative for arthritis, falls, joint pain, muscle cramps and muscle weakness.        Arm and leg weakness    Gastrointestinal: Negative for abdominal pain, constipation, diarrhea, heartburn, hematemesis, hematochezia, melena, nausea and vomiting.   Genitourinary: Negative for dysuria, hematuria and nocturia.   Neurological: Positive for dizziness and light-headedness. Negative for excessive daytime sleepiness, headaches, loss of balance, numbness, vertigo and weakness.     Objective:     Vital Signs (Most Recent):  Temp: 97.2 °F (36.2 °C) (19 152)  Pulse: (!) 50 (19 152)  Resp: 18 (19 152)  BP: (!) 95/44 (19 152)  SpO2: 100 % (19) Vital Signs (24h Range):  Temp:  [97.2 °F (36.2 °C)-98.3 °F (36.8 °C)] 97.2 °F (36.2 °C)  Pulse:  [50-61] 50  Resp:  [16-25]  18  SpO2:  [96 %-100 %] 100 %  BP: ()/(38-56) 95/44     Weight: 75.5 kg (166 lb 6.4 oz)  Body mass index is 32.5 kg/m².    SpO2: 100 %  O2 Device (Oxygen Therapy): nasal cannula(patient states she wears 2L PRN at home and requests O2)      Intake/Output Summary (Last 24 hours) at 11/4/2019 1539  Last data filed at 11/4/2019 1418  Gross per 24 hour   Intake 250 ml   Output --   Net 250 ml       Lines/Drains/Airways     None                 Physical Exam    Significant Labs:   All pertinent lab results from the last 24 hours have been reviewed. and   Recent Lab Results       11/04/19  1232        Albumin 3.8     Alkaline Phosphatase 103     ALT 11     Anion Gap 14     aPTT 31.9  Comment:  aPTT therapeutic range = 39-69 seconds     AST 31     Baso # 0.06     Basophil% 0.8     BILIRUBIN TOTAL 0.4  Comment:  For infants and newborns, interpretation of results should be based  on gestational age, weight and in agreement with clinical  observations.  Premature Infant recommended reference ranges:  Up to 24 hours.............<8.0 mg/dL  Up to 48 hours............<12.0 mg/dL  3-5 days..................<15.0 mg/dL  6-29 days.................<15.0 mg/dL         Comment:  Values of less than 100 pg/ml are consistent with non-CHF populations.     BUN, Bld 18     Calcium 9.2     Chloride 98     CO2 26     Creatinine 1.2     Differential Method Automated     eGFR if  >60     eGFR if non  53  Comment:  Calculation used to obtain the estimated glomerular filtration  rate (eGFR) is the CKD-EPI equation.        Eos # 0.4     Eosinophil% 4.6     Glucose 143     Gran # (ANC) 4.7     Gran% 61.9     Hematocrit 27.9     Hemoglobin 8.4     Immature Grans (Abs) 0.02  Comment:  Mild elevation in immature granulocytes is non specific and   can be seen in a variety of conditions including stress response,   acute inflammation, trauma and pregnancy. Correlation with other   laboratory and clinical  findings is essential.       Immature Granulocytes 0.3     Coumadin Monitoring INR 1.7  Comment:  Coumadin Therapy:  2.0 - 3.0 for INR for all indicators except mechanical heart valves  and antiphospholipid syndromes which should use 2.5 - 3.5.       Lymph # 1.9     Lymph% 24.4     MCH 30.1     MCHC 30.1          Mono # 0.6     Mono% 8.0     MPV 10.4     nRBC 0     Platelets 242     Potassium 3.4     PROTEIN TOTAL 7.7     Protime 18.3     RBC 2.79     RDW 15.3     Sodium 138     Troponin I 0.275  Comment:  The reference interval for Troponin I represents the 99th percentile   cutoff   for our facility and is consistent with 3rd generation assay   performance.       WBC 7.65           Significant Imaging: Echocardiogram:   2D echo with color flow doppler:   Results for orders placed or performed in visit on 02/25/19   2D echo with color flow doppler   Result Value Ref Range    QEF 55 55 - 65    Est. PA Systolic Pressure 60.76 (A)     Mitral Valve Mobility NORMAL     Tricuspid Valve Regurgitation MODERATE (A)     Narrative    Date of Procedure: 02/25/2019        TEST DESCRIPTION       Aorta: The aortic root is normal in size, measuring 2.3 cm at sinotubular junction and 1.9 cm at Sinuses of Valsalva.     Left Atrium: The left atrial volume index is mildly enlarged, measuring 36.20 cc/m2.     Left Ventricle: The left ventricle is normal in size, with an end-diastolic diameter of 5.1 cm, and an end-systolic diameter of 3.8 cm. LV wall thickness is normal, with the septum measuring 1.2 cm and the posterior wall measuring 1.1 cm across. Relative   wall thickness was normal at 0.43, and the LV mass index was increased at 159.7 g/m2 consistent with eccentric left ventricular hypertrophy. There are no regional wall motion abnormalities. Left ventricular systolic function appears normal. Visually   estimated ejection fraction is 55-60%. The LV Doppler derived stroke volume equals 83.0 ccs.         Right Atrium: The  right atrium is normal in size, measuring 4.4 cm in length and 3.0 cm in width in the apical view.     Right Ventricle: The right ventricle is normal in size measuring 2.6 cm at the base in the apical right ventricle-focused view. Global right ventricular systolic function appears normal. The estimated PA systolic pressure is 61 mmHg.     Aortic Valve:  There is a mechanical prosthesis present in the aortic position. The aortic valve prosthesis is well seated. The peak velocity obtained across the aortic valve is 2.48 m/s, which translates to a peak gradient of 25 mmHg. The mean gradient   is 15 mmHg. Using a left ventricular outflow tract diameter of 2.0 cm, a left ventricular outflow tract velocity time integral of 27 cm, and a peak instantaneous transvalvular velocity time integral of 51 cm, the effective prosthetic valve area is 1.61   cm2(p AVAi is 0.95 cm2/m2).     Mitral Valve:  The mitral valve is moderately sclerotic with normal leaflet mobility. There is a mitral annular ring present.     Tricuspid Valve:  The tricuspid valve is normal in structure. There is moderate tricuspid regurgitation.     Pulmonary Valve:  The pulmonic valve is normal in structure.     IVC: IVC is normal in size and collapses > 50% with a sniff, suggesting normal right atrial pressure of 3 mmHg.     Intracavitary: There is no evidence of pericardial effusion, intracavity mass, thrombi, or vegetation.         CONCLUSIONS     1 - Mild left atrial enlargement.     2 - Eccentric hypertrophy.     3 - No wall motion abnormalities.     4 - Normal left ventricular systolic function (EF 55-60%).     5 - Normal right ventricular systolic function .     6 - Aortic valve prosthesis, CATINA = 1.61 cm2, AVAi = 0.95 cm2/m2, peak velocity = 2.48 m/s, mean gradient = 15 mmHg.     7 - Moderate tricuspid regurgitation.     8 - Pulmonary hypertension. The estimated PA systolic pressure is 61 mmHg.             This document has been electronically     SIGNED BY: Hermilo Pete MD On: 02/25/2019 14:50    and X-Ray:     FINDINGS:  Coarsening of bronchovascular markings/COPD.  Remote aortic valve replacement surgery.  Cardiomegaly.    The cardiac silhouette is upper limits of normal in size.  The hilar and mediastinal contours are unremarkable.      Impression       COPD similar to the prior film dated 06/21/2019.  No acute cardiopulmonary disease.      Electronically signed by: Peter Bishop MD  Date: 11/04/2019  Time: 13:04            Assessment and Plan:     Elevated troponin with chest pain  -Initial troponin 0.275  -Patient with chronically elevated troponin   -Dr. Van would like to trend enzymes for now   -Check 2D echo  -Treat anemia and hypotension.  Could be having chest pain from anemia, kassandra given her history of CAD  -Resume Statin  -Recommend holding BB for today and will likely need to cut dose to 12.5mg daily   -Hold Coumadin and start Heparin gtt until troponin is trended out   -No ASA due to allergy to NSAID      Symptomatic anemia  Recommend transfusing.   Anemia possibly causing chest pain and hypotension     Essential hypertension  -Patient hypotensive in the ED and BP  70's/30's at home PTA  -Recommend holding nitro patch, Lasix and BB today   -Consider gentle hydration with NS at 50/hr overnight  -Restart Toprol at 12.5mg daily when BP allows       Hx of mechanical aortic valve replacement  -Will check 2D echo   -hold Coumadin and start Heparin gtt until Troponin trended out         VTE Risk Mitigation (From admission, onward)         Ordered     heparin 25,000 units in dextrose 5% 250 mL (100 units/mL) infusion LOW INTENSITY nomogram - [facility]  Continuous      11/04/19 1552     heparin 25,000 units in dextrose 5% (100 units/ml) IV bolus from bag - ADDITIONAL PRN BOLUS - 60 units/kg (max bolus 4000 units)  As needed (PRN)      11/04/19 1552     heparin 25,000 units in dextrose 5% (100 units/ml) IV bolus from bag - ADDITIONAL PRN BOLUS - 30  units/kg (max bolus 4000 units)  As needed (PRN)      11/04/19 1552     Reason for no Mechanical VTE Prophylaxis  Once      11/04/19 1549              Chart reviewed. Patient examined by Dr. Van and agrees with plan that has been outlined.     Thank you for your consult. I will follow-up with patient. Please contact us if you have any additional questions.    Laura Aguilar, MONICA  Cardiology   [facility]

## 2019-11-04 NOTE — ED NOTES
Unable to do orthostatic blood pressures at this time due to hospital medicine speaking with patient.

## 2019-11-04 NOTE — H&P
"[facility] Hospital Medicine  History & Physical    Patient Name: Meg Sal  MRN: [id number]  Admission Date: 11/4/2019  Attending Physician: Jean Pierre Mcdermott MD   Primary Care Provider: Aquilino Hightower MD         Patient information was obtained from patient, past medical records and ER records.     Subjective:     Principal Problem:Symptomatic anemia    Chief Complaint:   Chief Complaint   Patient presents with    Shortness of Breath     collapsed last night while walking to bathroom; currently c/o SOB        HPI: Meg Sal is a 49-year-old female with a mechanical aortic valve, pulmonary fibrosis, CHF, CAD, HLD and HTN that presented to the ED on 11/04/19 after she collapsed on the way to the bathroom in the middle of the night. She states that she has been experiencing weakness in her arms, back, and legs for 4 days. This progressively worsened until she collapsed while walking to the bathroom and had to be helped off the floor by her . There are no aggravating or alleviating factors. Associated symptoms include chest pain that she describes as a "pressure" in the center of her chest, nausea and diarrhea. She describes the combination of symptoms as similar to her previous heart attacks. She denies worsened shortness of breath and vomiting. Of note, the patient states that she recently had black stools for 4 days after returning from a trip to [place], but resolved 4 days prior to presentation. Labs are remarkable for a hemoglobin of 8.4, hematocrit of 27.9, INR of 1.7 and troponin of 0.275.    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, implants and grafts, initial encounter     Hyperlipidemia     Hypertension     Sleep apnea syndrome 2/8/2019    Stroke        Past Surgical History:   Procedure " Laterality Date    AORTIC VALVE REPLACEMENT      mechanical    CARDIAC CATHETERIZATION      CORONARY ANGIOPLASTY      CORONARY ARTERY BYPASS GRAFT      HYSTERECTOMY      RIGHT HEART CATHETERIZATION Right 5/17/2019    Procedure: INSERTION, CATHETER, RIGHT HEART;  Surgeon: Derek Brown MD;  Location: [facility] CATH LAB;  Service: Cardiology;  Laterality: Right;  malur pt/detailed hx       Review of patient's allergies indicates:   Allergen Reactions    Nsaids (non-steroidal anti-inflammatory drug) Other (See Comments)     Mechanical heart valve       No current facility-administered medications on file prior to encounter.      Current Outpatient Medications on File Prior to Encounter   Medication Sig    albuterol (VENTOLIN HFA) 90 mcg/actuation inhaler Inhale 2 puffs into the lungs every 6 (six) hours as needed for Wheezing. Rescue    amitriptyline (ELAVIL) 25 MG tablet Take 25 mg by mouth every evening.     ANORO ELLIPTA 62.5-25 mcg/actuation DsDv INHALE 1 PUFF INTO THE LUNGS ONCE A DAY. CONTROLLER.    busPIRone (BUSPAR) 15 MG tablet Take 15 mg by mouth 2 (two) times daily.     clonazePAM (KLONOPIN) 1 MG tablet Take 1 mg by mouth 3 (three) times daily.     desvenlafaxine succinate (PRISTIQ) 100 MG Tb24 Take 1 tablet (100 mg total) by mouth once daily.    diclofenac sodium (VOLTAREN) 1 % Gel Apply 2 g topically 4 (four) times daily as needed.     furosemide (LASIX) 40 MG tablet Take two 40 mg tablets by mouth twice a day    HYDROcodone-acetaminophen (NORCO)  mg per tablet Take 1 tablet by mouth every 6 (six) hours as needed for Pain.    metoprolol succinate (TOPROL-XL) 25 MG 24 hr tablet Take 1 tablet (25 mg total) by mouth once daily.    nicotine (NICODERM CQ) 7 mg/24 hr Place 1 patch onto the skin every 24 hours.    nintedanib (OFEV) 150 mg Cap Take 150 mg by mouth 2 (two) times daily.    nitroGLYCERIN (NITROSTAT) 0.4 MG SL tablet     nitroGLYCERIN 0.4 MG/HR TD PT24 (NITRODUR) 0.4 mg/hr  Place 1 patch onto the skin once daily.    ondansetron (ZOFRAN) 4 MG tablet Take 1 tablet (4 mg total) by mouth every 8 (eight) hours as needed for Nausea.    pantoprazole (PROTONIX) 40 MG tablet Take 40 mg by mouth once daily.    potassium chloride SA (K-DUR,KLOR-CON) 20 MEQ tablet Take 20 mEq by mouth 2 (two) times daily.    rosuvastatin (CRESTOR) 20 MG tablet Take 20 mg by mouth once daily.    sumatriptan (IMITREX) 100 MG tablet Take 100 mg by mouth every 2 (two) hours as needed.    tiZANidine (ZANAFLEX) 4 MG tablet Take 4 mg by mouth every evening.    topiramate (TOPAMAX) 100 MG tablet Take 100 mg by mouth 2 (two) times daily.    traMADol (ULTRAM) 50 mg tablet Take 50 mg by mouth every 4 (four) hours as needed.     traZODone (DESYREL) 150 MG tablet Take 150 mg by mouth nightly.    warfarin (COUMADIN) 6 MG tablet Take 6 mg by mouth Daily. Except 9mg on .    enoxaparin (LOVENOX) 100 mg/mL Syrg Inject 0.7 mLs (70 mg total) into the skin 2 (two) times daily. (Patient not taking: Reported on 10/16/2019)    fluticasone (FLONASE) 50 mcg/actuation nasal spray 1 spray by Each Nare route once daily.     Family History     Problem Relation (Age of Onset)    Breast cancer Sister    Coronary artery disease Father    Heart disease Mother    Lung cancer Brother        Tobacco Use    Smoking status: Former Smoker     Packs/day: 1.00     Types: Cigarettes     Last attempt to quit: 2018     Years since quittin.4    Smokeless tobacco: Never Used   Substance and Sexual Activity    Alcohol use: Not Currently     Frequency: 2-4 times a month     Drinks per session: 1 or 2     Binge frequency: Never    Drug use: Not Currently    Sexual activity: Not on file     Review of Systems   Constitutional: Negative for appetite change, chills, diaphoresis, fatigue and fever.   HENT: Negative for congestion, ear pain, mouth sores, sore throat and trouble swallowing.    Eyes: Negative for pain and visual  disturbance.   Respiratory: Negative for cough, chest tightness and shortness of breath.    Cardiovascular: Positive for chest pain. Negative for palpitations and leg swelling.   Gastrointestinal: Positive for diarrhea and nausea. Negative for abdominal pain, constipation and vomiting.   Endocrine: Negative for cold intolerance, heat intolerance, polydipsia and polyuria.   Genitourinary: Negative for dysuria, frequency and hematuria.   Musculoskeletal: Negative for arthralgias, back pain, myalgias and neck pain.   Skin: Negative for pallor, rash and wound.   Allergic/Immunologic: Negative for environmental allergies and immunocompromised state.   Neurological: Positive for weakness. Negative for dizziness, seizures, syncope, numbness and headaches.   Hematological: Negative for adenopathy. Does not bruise/bleed easily.   Psychiatric/Behavioral: Negative for agitation, confusion and sleep disturbance.     Objective:     Vital Signs (Most Recent):  Temp: 97.2 °F (36.2 °C) (11/04/19 1527)  Pulse: (!) 50 (11/04/19 1527)  Resp: 18 (11/04/19 1527)  BP: (!) 95/44 (11/04/19 1527)  SpO2: 100 % (11/04/19 1527) Vital Signs (24h Range):  Temp:  [97.2 °F (36.2 °C)-98.3 °F (36.8 °C)] 97.2 °F (36.2 °C)  Pulse:  [50-61] 50  Resp:  [16-25] 18  SpO2:  [96 %-100 %] 100 %  BP: ()/(38-56) 95/44     Weight: 75.5 kg (166 lb 6.4 oz)  Body mass index is 32.5 kg/m².    Physical Exam   Constitutional: She is oriented to person, place, and time. She appears well-developed and well-nourished. No distress.   HENT:   Head: Normocephalic and atraumatic.   Eyes: Conjunctivae are normal.   PERRL   Neck: Neck supple. No JVD present.   Cardiovascular: Normal rate and regular rhythm.   Murmur heard.  Pulmonary/Chest: Effort normal and breath sounds normal.   Abdominal: Soft. Bowel sounds are normal. She exhibits no distension. There is no tenderness.   Musculoskeletal: Normal range of motion.   Neurological: She is alert and oriented to person,  place, and time.   Skin: Skin is warm and dry.   Psychiatric: She has a normal mood and affect. Her behavior is normal. Thought content normal.   Nursing note and vitals reviewed.          Significant Labs:   BMP:   Recent Labs   Lab 11/04/19  1232   *      K 3.4*   CL 98   CO2 26   BUN 18   CREATININE 1.2   CALCIUM 9.2     CBC:   Recent Labs   Lab 11/04/19  1232   WBC 7.65   HGB 8.4*   HCT 27.9*        Coagulation:   Recent Labs   Lab 11/04/19  1232   INR 1.7*   APTT 31.9     Troponin:   Recent Labs   Lab 11/04/19  1232   TROPONINI 0.275*       Significant Imaging: CXR: I have reviewed all pertinent results/findings within the past 24 hours and my personal findings are:  Unremarkable    Assessment/Plan:     * Symptomatic anemia  -Transfuse one unit PRBCs.   -Monitor for response as well as signs and symptoms of bleeding.       Elevated troponin with chest pain  -Concerning for ACS.   -Trend troponin.   -Continue daily ASA and Crestor.   -Hold metoprolol due to bradycardia.   -Cardiology consult.       Hypokalemia  Monitor and replace as needed.       Chronic respiratory failure with hypoxia  -Due to pulmonary fibrosis and pulmonary hypertension.   -Continue Ofev, inhaled medications and oxygen therapy as needed.       Essential hypertension  -Patient is currently hypotensive and bradycardic.   -Hold metoprolol and diuretics.       Anxiety with depression  Continue Elavil, Buspar, Klonopin, Pristiq and Trazodone.       Hx of mechanical aortic valve replacement  -Continue coumadin, cautiously.  -Monitor daily INR.     Chronic diastolic heart failure  -Appears compensated.   -Hold metoprolol and diuretics due to bradycardia and hypotension, respectively.   -Monitor volume status.       VTE Risk Mitigation (From admission, onward)         Ordered     heparin 25,000 units in dextrose 5% 250 mL (100 units/mL) infusion LOW INTENSITY nomogram - [facility]  Continuous      11/04/19 1552     heparin 25,000 units  in dextrose 5% (100 units/ml) IV bolus from bag - ADDITIONAL PRN BOLUS - 60 units/kg (max bolus 4000 units)  As needed (PRN)      11/04/19 1552     heparin 25,000 units in dextrose 5% (100 units/ml) IV bolus from bag - ADDITIONAL PRN BOLUS - 30 units/kg (max bolus 4000 units)  As needed (PRN)      11/04/19 1552     Reason for no Mechanical VTE Prophylaxis  Once      11/04/19 1549                   VANESSA Mendoza  Department of Hospital Medicine   [facility]

## 2019-11-04 NOTE — ED PROVIDER NOTES
SCRIBE #1 NOTE: I, Dandre Allen, am scribing for, and in the presence of, Gardenia Gomes DO. I have scribed the entire note.       History     Chief Complaint   Patient presents with    Shortness of Breath     collapsed last night while walking to bathroom; currently c/o SOB     Review of patient's allergies indicates:   Allergen Reactions    Nsaids (non-steroidal anti-inflammatory drug) Other (See Comments)     Mechanical heart valve         History of Present Illness     HPI    11/4/2019, 12:26 PM  History obtained from the patient      History of Present Illness: Meg Sal is a 49 y.o. female patient with a PMHx of acute coronary syndrome, CHF, CAD, HLD, ulcers, and HTN who is s/p aortic valve replacement and mitral valve surgery who presents to the Emergency Department for evaluation of intermittent shortness of breath which onset gradually several years PTA. Pt is presenting to the ED because she collapsed last night while walking to the bathroom. Pt states that her legs gave out and she held onto the counter as she controlled her collapse onto the floor. Symptoms are constant and moderate in severity. No mitigating or exacerbating factors reported. Associated sxs include central CP, melena, and weakness. Patient denies any recent travel, long car trips, fever, leg swelling, cough, N/V/D, congestion, rhinorrhea, chills, core throat, HA, dizziness, dysuria, hematuria, and all other sxs at this time. No prior Tx reported. No further complaints or concerns at this time.         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, implants and grafts, initial encounter     Hyperlipidemia     Hypertension     Sleep apnea syndrome 2/8/2019     Stroke        Past Surgical History:  Past Surgical History:   Procedure Laterality Date    AORTIC VALVE REPLACEMENT      mechanical    CARDIAC CATHETERIZATION      CORONARY ANGIOPLASTY      CORONARY ARTERY BYPASS GRAFT      HYSTERECTOMY      RIGHT HEART CATHETERIZATION Right 2019    Procedure: INSERTION, CATHETER, RIGHT HEART;  Surgeon: Derek Brown MD;  Location: [facility], [place] CATH LAB;  Service: Cardiology;  Laterality: Right;  malur pt/detailed hx         Family History:  Family History   Problem Relation Age of Onset    Heart disease Mother     Breast cancer Sister     Coronary artery disease Father     Lung cancer Brother        Social History:  Social History     Tobacco Use    Smoking status: Former Smoker     Packs/day: 1.00     Types: Cigarettes     Last attempt to quit: 2018     Years since quittin.4    Smokeless tobacco: Never Used   Substance and Sexual Activity    Alcohol use: Not Currently     Frequency: 2-4 times a month     Drinks per session: 1 or 2     Binge frequency: Never    Drug use: Not Currently    Sexual activity: Unknown        Review of Systems     Review of Systems   Constitutional: Negative for chills, diaphoresis and fever.        (-) Recent travel  (-) Long car trips   HENT: Negative for congestion, rhinorrhea and sore throat.    Respiratory: Positive for shortness of breath. Negative for cough.    Cardiovascular: Positive for chest pain (Central). Negative for leg swelling.   Gastrointestinal: Positive for blood in stool (Melena). Negative for abdominal pain, diarrhea, nausea and vomiting.   Genitourinary: Negative for dysuria and hematuria.   Musculoskeletal: Negative for back pain, neck pain and neck stiffness.   Skin: Negative for rash and wound.   Neurological: Positive for weakness. Negative for dizziness, light-headedness, numbness and headaches.   Hematological: Does not bruise/bleed easily.   All other systems reviewed and are negative.     Physical  Exam     Initial Vitals [11/04/19 1200]   BP Pulse Resp Temp SpO2   (!) 93/38 61 16 97.7 °F (36.5 °C) 97 %      MAP       --          Physical Exam  Nursing Notes and Vital Signs Reviewed.  Constitutional: Patient is in no acute distress. Well-developed and well-nourished. Pale-appearing.  Head: Atraumatic. Normocephalic.  Eyes: PERRL. EOM intact. No scleral icterus.  ENT: Mucous membranes are dry. Oropharynx is clear and symmetric.    Neck: Supple. Full ROM. No lymphadenopathy.  Cardiovascular: Regular rate. Regular rhythm. No murmurs, rubs, or gallops. Distal pulses are 2+ and symmetric. No JVD.  Pulmonary/Chest: No respiratory distress. Clear to auscultation bilaterally. No wheezing or rales.  Abdominal: Soft and non-distended.  There is no tenderness.  No rebound, guarding, or rigidity. Good bowel sounds.  Genitourinary: No CVA tenderness  Musculoskeletal: Moves all extremities. No obvious deformities. No edema. No calf tenderness.  Skin: Warm and dry.  Neurological:  Alert, awake, and appropriate.  Normal speech.  No acute focal neurological deficits are appreciated. GCS 15. CN II-XII intact.   Psychiatric: Normal affect. Good eye contact. Appropriate in content.     ED Course   Procedures  ED Vital Signs:  Vitals:    11/04/19 2313 11/05/19 0035 11/05/19 0305 11/05/19 0348   BP: (!) 79/43 (!) 83/49 (!) 75/46 (!) 87/52   Pulse: (!) 57 60 60 60   Resp: 18  18    Temp: 97.8 °F (36.6 °C)  97.6 °F (36.4 °C)    TempSrc: Oral  Oral    SpO2:   100%    Weight:       Height:        11/05/19 0600 11/05/19 0608 11/05/19 0739 11/05/19 0836   BP:  (!) 105/52 (!) 94/46    Pulse:  69 64 73   Resp:   18 20   Temp:   97.7 °F (36.5 °C)    TempSrc:   Oral    SpO2:   100% 95%   Weight: 76.1 kg (167 lb 12.3 oz)      Height:        11/05/19 0925 11/05/19 0930 11/05/19 1105 11/05/19 1122   BP: (!) 111/51  (!) 103/51    Pulse: 71 72 81 71   Resp:   18    Temp:   98.2 °F (36.8 °C)    TempSrc:   Oral    SpO2:   100%    Weight:        Height:        11/05/19 1317 11/05/19 1517 11/05/19 1519   BP:   (!) 115/56   Pulse: 84 76 80   Resp:   18   Temp:   97.4 °F (36.3 °C)   TempSrc:   Oral   SpO2:   97%   Weight:      Height:          Abnormal Lab Results:  Labs Reviewed   CBC W/ AUTO DIFFERENTIAL - Abnormal; Notable for the following components:       Result Value    RBC 2.79 (*)     Hemoglobin 8.4 (*)     Hematocrit 27.9 (*)     Mean Corpuscular Volume 100 (*)     Mean Corpuscular Hemoglobin Conc 30.1 (*)     RDW 15.3 (*)     All other components within normal limits   COMPREHENSIVE METABOLIC PANEL - Abnormal; Notable for the following components:    Potassium 3.4 (*)     Glucose 143 (*)     eGFR if non  53 (*)     All other components within normal limits   TROPONIN I - Abnormal; Notable for the following components:    Troponin I 0.275 (*)     All other components within normal limits   B-TYPE NATRIURETIC PEPTIDE - Abnormal; Notable for the following components:     (*)     All other components within normal limits   PROTIME-INR - Abnormal; Notable for the following components:    Prothrombin Time 18.3 (*)     INR 1.7 (*)     All other components within normal limits   APTT        All Lab Results:  Results for orders placed or performed during the hospital encounter of 11/04/19   CBC auto differential   Result Value Ref Range    WBC 7.65 3.90 - 12.70 K/uL    RBC 2.79 (L) 4.00 - 5.40 M/uL    Hemoglobin 8.4 (L) 12.0 - 16.0 g/dL    Hematocrit 27.9 (L) 37.0 - 48.5 %    Mean Corpuscular Volume 100 (H) 82 - 98 fL    Mean Corpuscular Hemoglobin 30.1 27.0 - 31.0 pg    Mean Corpuscular Hemoglobin Conc 30.1 (L) 32.0 - 36.0 g/dL    RDW 15.3 (H) 11.5 - 14.5 %    Platelets 242 150 - 350 K/uL    MPV 10.4 9.2 - 12.9 fL    Immature Granulocytes 0.3 0.0 - 0.5 %    Gran # (ANC) 4.7 1.8 - 7.7 K/uL    Immature Grans (Abs) 0.02 0.00 - 0.04 K/uL    Lymph # 1.9 1.0 - 4.8 K/uL    Mono # 0.6 0.3 - 1.0 K/uL    Eos # 0.4 0.0 - 0.5 K/uL    Baso #  0.06 0.00 - 0.20 K/uL    nRBC 0 0 /100 WBC    Gran% 61.9 38.0 - 73.0 %    Lymph% 24.4 18.0 - 48.0 %    Mono% 8.0 4.0 - 15.0 %    Eosinophil% 4.6 0.0 - 8.0 %    Basophil% 0.8 0.0 - 1.9 %    Differential Method Automated    Comprehensive metabolic panel   Result Value Ref Range    Sodium 138 136 - 145 mmol/L    Potassium 3.4 (L) 3.5 - 5.1 mmol/L    Chloride 98 95 - 110 mmol/L    CO2 26 23 - 29 mmol/L    Glucose 143 (H) 70 - 110 mg/dL    BUN, Bld 18 6 - 20 mg/dL    Creatinine 1.2 0.5 - 1.4 mg/dL    Calcium 9.2 8.7 - 10.5 mg/dL    Total Protein 7.7 6.0 - 8.4 g/dL    Albumin 3.8 3.5 - 5.2 g/dL    Total Bilirubin 0.4 0.1 - 1.0 mg/dL    Alkaline Phosphatase 103 55 - 135 U/L    AST 31 10 - 40 U/L    ALT 11 10 - 44 U/L    Anion Gap 14 8 - 16 mmol/L    eGFR if African American >60 >60 mL/min/1.73 m^2    eGFR if non African American 53 (A) >60 mL/min/1.73 m^2   Troponin I #1   Result Value Ref Range    Troponin I 0.275 (H) 0.000 - 0.026 ng/mL   B-Type natriuretic peptide (BNP)   Result Value Ref Range     (H) 0 - 99 pg/mL   Protime-INR   Result Value Ref Range    Prothrombin Time 18.3 (H) 9.0 - 12.5 sec    INR 1.7 (H) 0.8 - 1.2   APTT   Result Value Ref Range    aPTT 31.9 21.0 - 32.0 sec   Troponin I   Result Value Ref Range    Troponin I 0.256 (H) 0.000 - 0.026 ng/mL   APTT   Result Value Ref Range    aPTT 31.6 21.0 - 32.0 sec   Lipid panel   Result Value Ref Range    Cholesterol 172 120 - 199 mg/dL    Triglycerides 129 30 - 150 mg/dL    HDL 39 (L) 40 - 75 mg/dL    LDL Cholesterol 107.2 63.0 - 159.0 mg/dL    Hdl/Cholesterol Ratio 22.7 20.0 - 50.0 %    Total Cholesterol/HDL Ratio 4.4 2.0 - 5.0    Non-HDL Cholesterol 133 mg/dL   Troponin I   Result Value Ref Range    Troponin I 0.180 (H) 0.000 - 0.026 ng/mL   Hemoglobin   Result Value Ref Range    Hemoglobin 8.0 (L) 12.0 - 16.0 g/dL   Hematocrit   Result Value Ref Range    Hematocrit 25.5 (L) 37.0 - 48.5 %   APTT   Result Value Ref Range    aPTT 52.2 (H) 21.0 - 32.0 sec    Basic metabolic panel   Result Value Ref Range    Sodium 138 136 - 145 mmol/L    Potassium 2.8 (L) 3.5 - 5.1 mmol/L    Chloride 102 95 - 110 mmol/L    CO2 26 23 - 29 mmol/L    Glucose 122 (H) 70 - 110 mg/dL    BUN, Bld 16 6 - 20 mg/dL    Creatinine 0.9 0.5 - 1.4 mg/dL    Calcium 8.6 (L) 8.7 - 10.5 mg/dL    Anion Gap 10 8 - 16 mmol/L    eGFR if African American >60 >60 mL/min/1.73 m^2    eGFR if non African American >60 >60 mL/min/1.73 m^2   Magnesium   Result Value Ref Range    Magnesium 2.2 1.6 - 2.6 mg/dL   Phosphorus   Result Value Ref Range    Phosphorus 3.0 2.7 - 4.5 mg/dL   CBC auto differential   Result Value Ref Range    WBC 5.82 3.90 - 12.70 K/uL    RBC 2.73 (L) 4.00 - 5.40 M/uL    Hemoglobin 7.9 (L) 12.0 - 16.0 g/dL    Hematocrit 26.6 (L) 37.0 - 48.5 %    Mean Corpuscular Volume 97 82 - 98 fL    Mean Corpuscular Hemoglobin 28.9 27.0 - 31.0 pg    Mean Corpuscular Hemoglobin Conc 29.7 (L) 32.0 - 36.0 g/dL    RDW 16.7 (H) 11.5 - 14.5 %    Platelets 167 150 - 350 K/uL    MPV 9.9 9.2 - 12.9 fL    Immature Granulocytes 0.2 0.0 - 0.5 %    Gran # (ANC) 2.9 1.8 - 7.7 K/uL    Immature Grans (Abs) 0.01 0.00 - 0.04 K/uL    Lymph # 2.1 1.0 - 4.8 K/uL    Mono # 0.5 0.3 - 1.0 K/uL    Eos # 0.3 0.0 - 0.5 K/uL    Baso # 0.05 0.00 - 0.20 K/uL    nRBC 0 0 /100 WBC    Gran% 49.6 38.0 - 73.0 %    Lymph% 35.9 18.0 - 48.0 %    Mono% 7.9 4.0 - 15.0 %    Eosinophil% 5.5 0.0 - 8.0 %    Basophil% 0.9 0.0 - 1.9 %    Differential Method Automated    Protime-INR   Result Value Ref Range    Prothrombin Time 17.4 (H) 9.0 - 12.5 sec    INR 1.6 (H) 0.8 - 1.2   APTT   Result Value Ref Range    aPTT 58.4 (H) 21.0 - 32.0 sec   Hemoglobin   Result Value Ref Range    Hemoglobin 8.0 (L) 12.0 - 16.0 g/dL   Hematocrit   Result Value Ref Range    Hematocrit 26.1 (L) 37.0 - 48.5 %   Hemoglobin   Result Value Ref Range    Hemoglobin 8.5 (L) 12.0 - 16.0 g/dL   Hematocrit   Result Value Ref Range    Hematocrit 28.1 (L) 37.0 - 48.5 %   Potassium    Result Value Ref Range    Potassium 3.7 3.5 - 5.1 mmol/L   Type & Screen   Result Value Ref Range    Group & Rh O NEG     Indirect Coty NEG    Prepare RBC 1 Unit   Result Value Ref Range    UNIT NUMBER [id number]     Product Code [id number]     DISPENSE STATUS TRANSFUSED     CODING SYSTEM TGCO410     Unit Blood Type Code 9500     Unit Blood Type O NEG     Unit Expiration [id number]          Imaging Results:  Imaging Results          X-Ray Chest AP Portable (Final result)  Result time 11/04/19 13:04:11    Final result by Peter Bishop MD (11/04/19 13:04:11)                 Impression:      COPD similar to the prior film dated 06/21/2019.  No acute cardiopulmonary disease.      Electronically signed by: Peter Bishop MD  Date:    11/04/2019  Time:    13:04             Narrative:    EXAMINATION:  XR CHEST AP PORTABLE    CLINICAL HISTORY:  Chest Pain;    TECHNIQUE:  Single frontal view of the chest was performed.    COMPARISON:  06/21/2019    FINDINGS:  Coarsening of bronchovascular markings/COPD.  Remote aortic valve replacement surgery.  Cardiomegaly.    The cardiac silhouette is upper limits of normal in size.  The hilar and mediastinal contours are unremarkable.                                 The EKG was ordered, reviewed, and independently interpreted by the ED provider.  Interpretation time: 1205  Rate: 59 BPM  Rhythm: Sinus bradycardia with frequent premature ventricular complexes  Interpretation: Right bundle branch block. Possible inferior infarct. No STEMI.           The Emergency Provider reviewed the vital signs and test results, which are outlined above.     ED Discussion     1:29 PM: Discussed case with Jean Pierre Mcdermott MD ([facility]). Dr. Mcdermott agrees with current care and management of pt and accepts admission.   Admitting Service: Hospital Medicine  Admitting Physician: Jean Pierre Mcdermott MD  Admit to: Observation/Tele    1:36 PM: Re-evaluated pt. I have discussed test results, shared  treatment plan, and the need for admission with patient and family at bedside. Pt and family express understanding at this time and agree with all information. All questions answered. Pt and family have no further questions or concerns at this time. Pt is ready for admit.    3:09 PM: Discussed pt's case with Dr. Van (Cardiology) who recommends fluids, possible blood transfusion, and serial enzymes.       Medical Decision Making:   History:   Old Medical Records: I decided to obtain old medical records.  Old Records Summarized: records from previous admission(s).       <> Summary of Records: Meg Sal is a 49 y.o. female with CAD s/p 2V CABG - LAD/LCX with occluded, s/p mechanical AVR, s/p mitral annuloplasty ring, pulm fibrosis, Pulm HTN, HFpEF, CHD, HTN, HLD, CVA here for follow up CV eval.      2/8/19  Pt had first MI at age 31, was taken to [facility], back was hurting. Felt like she had a heavy wet blanket. She was breaking up a dog fight when paramedics came. Dr. Cortez did LHC, had one stent placed. She also had other blockages which were medically treated. She had treadmill nuclear stress tests after, had been off plavix. In 2014, when she went to [place] she couldn't walk and was out of breath. She came back to  and she did another LHC with Dr. Cortez, Dr. Mcmahon was consulted as she had severe LAD disease. She went to Dr. Heller with LCA, had LHC in May 2018 with chronically occluded RCA with collaterals but patent grafts to her left system. She has been having a lot of chest pain lately. She was taking NTG two or three times a week. She was told to take Ranexa and Imdur but had more depression so stopped taking it. She has no car and has difficulty getting to coumadin clinic, discussed will to get home health for labwork.      3/11/19  She had another episode of severe chest pain last week. Started on Tues and continued till Friday. Had home health nurse that came. She took NTG which didn't  help much, took 2 tabs. Discussed she needs ER eval if severe CP again that is not improvd with NTG. Started verapamil for PVCs. Will refer to Ep for PVC ablation vs AAD. Pulm eval this AM, will have sleep study/PFTs/rheum referral. Recent stress and echo negative, mech AVR mean gradient 15 mmhg     4/29/19  She had a recent syncopal episode at home and presented to [facility] ER and was admitted. IN the ED, patient found to be mildly hypoxic with O2 sat of 90% on room air.  ABG showed pH of 7.349, pCO2 43, PO2 55, SA O2 87 on room air.  CT of the head was negative for acute process, CXR unremarkable, EKG unrevealing. BP is better since home Verapamil, Bystolic, Isosorbide, and NTG patch have been held. Home oxygen evaluation completed and patient qualified with an exertional room air O2 saturation of 87% which improved to 96% on 2 L nasal cannula, she was DC home on 2L NC.Pt had eval by Dr. Montero for PVCs, discussed ablation not a good option, may try amio but not a great option due to lung disease. Had pulm eval by Dr. Mondragon and rec RHC to eval PH and lung transplant eval. BP elevated today, had been off NTG/imdur, will restart today.      7/3/19  She presented to the ER last month and admitted to hospital with SOB. 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 Patient with Pulmonary support in place and referral to the transplant service, instructed to follow up as directed for transplant consideration. RHC last month -   Mildly elevated right Filling Pressures, Moderate Pulmonary Hypertension. She was started on nintenanib - OFEV - just received it yesterday. Still considering lung transplant event. BNP was elevated, discussed to increase lasix.  ECG - NSR, PACs, inc RBBB, anterior ischemia - old        8/7/19  She had a recent fall, broke collar bone. She was trying to put water on night stand and fell. She went to ortho in [place], does not need  surgery. She had the fall yesterday morning. She has migraines but discussed if headache occurs needs ER eval to r/o intracranial bleeding. She was out of Imitrex and has been taking Fiorcicet but didn't help much.      10/9/19  Needs to get more teeth pulled out and add to her dentures. She has NTG patch but still has to use PRN NTG pills, had to take 3 for severe palpitations. No CP. Recent INR was 3.8. Will repeat INR today.      Clinical Tests:   Lab Tests: Ordered and Reviewed  Radiological Study: Ordered and Reviewed  Medical Tests: Ordered and Reviewed           ED Medication(s):  Medications   amitriptyline tablet 25 mg (25 mg Oral Given 11/4/19 1752)   arformoterol nebulizer solution 15 mcg (15 mcg Nebulization Given 11/5/19 0836)   budesonide nebulizer solution 0.5 mg (0.5 mg Nebulization Given 11/5/19 0836)   busPIRone tablet 15 mg (15 mg Oral Given 11/5/19 0932)   clonazePAM tablet 0.5 mg (0.5 mg Oral Given 11/5/19 1550)   desvenlafaxine succinate 100 MG 24 hr tablet 100 mg (100 mg Oral Given 11/5/19 0932)   nicotine 7 mg/24 hr 1 patch (1 patch Transdermal Patch Applied 11/5/19 1718)   nintedanib Cap 150 mg (150 mg Oral Not Given 11/5/19 0900)   aspirin EC tablet 81 mg (81 mg Oral Given 11/5/19 0932)   pantoprazole EC tablet 40 mg (40 mg Oral Given 11/5/19 0931)   rosuvastatin tablet 20 mg (20 mg Oral Given 11/5/19 0931)   tiZANidine tablet 4 mg (4 mg Oral Given 11/4/19 2050)   topiramate tablet 100 mg (100 mg Oral Given 11/5/19 0930)   traZODone tablet 150 mg (150 mg Oral Given 11/4/19 2050)   acetaminophen tablet 650 mg (has no administration in time range)   ondansetron disintegrating tablet 8 mg (8 mg Oral Given 11/5/19 1718)   0.9%  NaCl infusion (for blood administration) (has no administration in time range)   albuterol-ipratropium 2.5 mg-0.5 mg/3 mL nebulizer solution 3 mL (has no administration in time range)   heparin 25,000 units in dextrose 5% 250 mL (100 units/mL) infusion LOW INTENSITY  nomogram - OHS (12 Units/kg/hr × 57.5 kg (Adjusted) Intravenous Handoff 11/5/19 0709)   heparin 25,000 units in dextrose 5% (100 units/ml) IV bolus from bag - ADDITIONAL PRN BOLUS - 60 units/kg (max bolus 4000 units) (has no administration in time range)   heparin 25,000 units in dextrose 5% (100 units/ml) IV bolus from bag - ADDITIONAL PRN BOLUS - 30 units/kg (max bolus 4000 units) (has no administration in time range)   0.9%  NaCl infusion ( Intravenous New Bag 11/5/19 0033)   HYDROcodone-acetaminophen  mg per tablet 1 tablet (1 tablet Oral Given 11/5/19 1718)   sodium chloride 0.9% bolus 250 mL (0 mLs Intravenous Stopped 11/4/19 1418)   sodium chloride 0.9% bolus 500 mL ( Intravenous Canceled Entry 11/4/19 2300)   potassium chloride SA CR tablet 40 mEq (40 mEq Oral Given 11/5/19 1721)       Current Discharge Medication List                  Scribe Attestation:   Scribe #1: I performed the above scribed service and the documentation accurately describes the services I performed. I attest to the accuracy of the note.     Attending:   Physician Attestation Statement for Scribe #1: I, Gardenia Gomes DO, personally performed the services described in this documentation, as scribed by Dandre Allen, in my presence, and it is both accurate and complete.           Clinical Impression       ICD-10-CM ICD-9-CM   1. Anticoagulant long-term use Z79.01 V58.61   2. SOB (shortness of breath) R06.02 786.05   3. Chest pain R07.9 786.50   4. H/O mechanical aortic valve replacement Z95.2 V43.3   5. Symptomatic anemia D64.9 285.9       Disposition:   Disposition: Placed in Observation  Condition: Stable         Gardenia Gomes DO  11/05/19 8495

## 2019-11-04 NOTE — ASSESSMENT & PLAN NOTE
-Initial troponin 0.275  -Patient with chronically elevated troponin   -Dr. Van would like to trend enzymes for now   -Check 2D echo  -Treat anemia and hypotension.  Could be having chest pain from anemia, kassandra given her history of CAD  -Resume Statin  -Recommend holding BB for today and will likely need to cut dose to 12.5mg daily   -Hold Coumadin and start Heparin gtt until troponin is trended out   -No ASA due to allergy to NSAID

## 2019-11-04 NOTE — SUBJECTIVE & OBJECTIVE
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, implants and grafts, initial encounter     Hyperlipidemia     Hypertension     Sleep apnea syndrome 2/8/2019    Stroke        Past Surgical History:   Procedure Laterality Date    AORTIC VALVE REPLACEMENT      mechanical    CARDIAC CATHETERIZATION      CORONARY ANGIOPLASTY      CORONARY ARTERY BYPASS GRAFT      HYSTERECTOMY      RIGHT HEART CATHETERIZATION Right 5/17/2019    Procedure: INSERTION, CATHETER, RIGHT HEART;  Surgeon: Derek Brown MD;  Location: [facility] CATH LAB;  Service: Cardiology;  Laterality: Right;  malur pt/detailed hx       Review of patient's allergies indicates:   Allergen Reactions    Nsaids (non-steroidal anti-inflammatory drug) Other (See Comments)     Mechanical heart valve       No current facility-administered medications on file prior to encounter.      Current Outpatient Medications on File Prior to Encounter   Medication Sig    albuterol (VENTOLIN HFA) 90 mcg/actuation inhaler Inhale 2 puffs into the lungs every 6 (six) hours as needed for Wheezing. Rescue    amitriptyline (ELAVIL) 25 MG tablet Take 25 mg by mouth every evening.     ANORO ELLIPTA 62.5-25 mcg/actuation DsDv INHALE 1 PUFF INTO THE LUNGS ONCE A DAY. CONTROLLER.    busPIRone (BUSPAR) 15 MG tablet Take 15 mg by mouth 2 (two) times daily.     clonazePAM (KLONOPIN) 1 MG tablet Take 1 mg by mouth 3 (three) times daily.     desvenlafaxine succinate (PRISTIQ) 100 MG Tb24 Take 1 tablet (100 mg total) by mouth once daily.    diclofenac sodium (VOLTAREN) 1 % Gel Apply 2 g topically 4 (four) times daily as needed.     furosemide (LASIX) 40 MG tablet Take two 40 mg tablets by mouth twice a day    HYDROcodone-acetaminophen (NORCO)  mg per tablet Take 1 tablet by mouth every 6 (six) hours as  needed for Pain.    metoprolol succinate (TOPROL-XL) 25 MG 24 hr tablet Take 1 tablet (25 mg total) by mouth once daily.    nicotine (NICODERM CQ) 7 mg/24 hr Place 1 patch onto the skin every 24 hours.    nintedanib (OFEV) 150 mg Cap Take 150 mg by mouth 2 (two) times daily.    nitroGLYCERIN (NITROSTAT) 0.4 MG SL tablet     nitroGLYCERIN 0.4 MG/HR TD PT24 (NITRODUR) 0.4 mg/hr Place 1 patch onto the skin once daily.    ondansetron (ZOFRAN) 4 MG tablet Take 1 tablet (4 mg total) by mouth every 8 (eight) hours as needed for Nausea.    pantoprazole (PROTONIX) 40 MG tablet Take 40 mg by mouth once daily.    potassium chloride SA (K-DUR,KLOR-CON) 20 MEQ tablet Take 20 mEq by mouth 2 (two) times daily.    rosuvastatin (CRESTOR) 20 MG tablet Take 20 mg by mouth once daily.    sumatriptan (IMITREX) 100 MG tablet Take 100 mg by mouth every 2 (two) hours as needed.    tiZANidine (ZANAFLEX) 4 MG tablet Take 4 mg by mouth every evening.    topiramate (TOPAMAX) 100 MG tablet Take 100 mg by mouth 2 (two) times daily.    traMADol (ULTRAM) 50 mg tablet Take 50 mg by mouth every 4 (four) hours as needed.     traZODone (DESYREL) 150 MG tablet Take 150 mg by mouth nightly.    warfarin (COUMADIN) 6 MG tablet Take 6 mg by mouth Daily. Except 9mg on .    enoxaparin (LOVENOX) 100 mg/mL Syrg Inject 0.7 mLs (70 mg total) into the skin 2 (two) times daily. (Patient not taking: Reported on 10/16/2019)    fluticasone (FLONASE) 50 mcg/actuation nasal spray 1 spray by Each Nare route once daily.     Family History     Problem Relation (Age of Onset)    Breast cancer Sister    Coronary artery disease Father    Heart disease Mother    Lung cancer Brother        Tobacco Use    Smoking status: Former Smoker     Packs/day: 1.00     Types: Cigarettes     Last attempt to quit: 2018     Years since quittin.4    Smokeless tobacco: Never Used   Substance and Sexual Activity    Alcohol use: Not Currently     Frequency:  2-4 times a month     Drinks per session: 1 or 2     Binge frequency: Never    Drug use: Not Currently    Sexual activity: Not on file     Review of Systems   Constitution: Positive for decreased appetite and malaise/fatigue. Negative for diaphoresis, weight gain and weight loss.   HENT: Negative for congestion and nosebleeds.    Cardiovascular: Positive for chest pain. Negative for claudication, cyanosis, dyspnea on exertion, irregular heartbeat, leg swelling, near-syncope, orthopnea, palpitations, paroxysmal nocturnal dyspnea and syncope.   Respiratory: Negative for cough, hemoptysis, shortness of breath, sleep disturbances due to breathing, snoring, sputum production and wheezing.    Hematologic/Lymphatic: Negative for bleeding problem. Does not bruise/bleed easily.   Skin: Negative for rash.   Musculoskeletal: Positive for back pain. Negative for arthritis, falls, joint pain, muscle cramps and muscle weakness.        Arm and leg weakness    Gastrointestinal: Negative for abdominal pain, constipation, diarrhea, heartburn, hematemesis, hematochezia, melena, nausea and vomiting.   Genitourinary: Negative for dysuria, hematuria and nocturia.   Neurological: Positive for dizziness and light-headedness. Negative for excessive daytime sleepiness, headaches, loss of balance, numbness, vertigo and weakness.     Objective:     Vital Signs (Most Recent):  Temp: 97.2 °F (36.2 °C) (11/04/19 1527)  Pulse: (!) 50 (11/04/19 1527)  Resp: 18 (11/04/19 1527)  BP: (!) 95/44 (11/04/19 1527)  SpO2: 100 % (11/04/19 1527) Vital Signs (24h Range):  Temp:  [97.2 °F (36.2 °C)-98.3 °F (36.8 °C)] 97.2 °F (36.2 °C)  Pulse:  [50-61] 50  Resp:  [16-25] 18  SpO2:  [96 %-100 %] 100 %  BP: ()/(38-56) 95/44     Weight: 75.5 kg (166 lb 6.4 oz)  Body mass index is 32.5 kg/m².    SpO2: 100 %  O2 Device (Oxygen Therapy): nasal cannula(patient states she wears 2L PRN at home and requests O2)      Intake/Output Summary (Last 24 hours) at  11/4/2019 1539  Last data filed at 11/4/2019 1418  Gross per 24 hour   Intake 250 ml   Output --   Net 250 ml       Lines/Drains/Airways     None                 Physical Exam    Significant Labs:   All pertinent lab results from the last 24 hours have been reviewed. and   Recent Lab Results       11/04/19  1232        Albumin 3.8     Alkaline Phosphatase 103     ALT 11     Anion Gap 14     aPTT 31.9  Comment:  aPTT therapeutic range = 39-69 seconds     AST 31     Baso # 0.06     Basophil% 0.8     BILIRUBIN TOTAL 0.4  Comment:  For infants and newborns, interpretation of results should be based  on gestational age, weight and in agreement with clinical  observations.  Premature Infant recommended reference ranges:  Up to 24 hours.............<8.0 mg/dL  Up to 48 hours............<12.0 mg/dL  3-5 days..................<15.0 mg/dL  6-29 days.................<15.0 mg/dL         Comment:  Values of less than 100 pg/ml are consistent with non-CHF populations.     BUN, Bld 18     Calcium 9.2     Chloride 98     CO2 26     Creatinine 1.2     Differential Method Automated     eGFR if  >60     eGFR if non  53  Comment:  Calculation used to obtain the estimated glomerular filtration  rate (eGFR) is the CKD-EPI equation.        Eos # 0.4     Eosinophil% 4.6     Glucose 143     Gran # (ANC) 4.7     Gran% 61.9     Hematocrit 27.9     Hemoglobin 8.4     Immature Grans (Abs) 0.02  Comment:  Mild elevation in immature granulocytes is non specific and   can be seen in a variety of conditions including stress response,   acute inflammation, trauma and pregnancy. Correlation with other   laboratory and clinical findings is essential.       Immature Granulocytes 0.3     Coumadin Monitoring INR 1.7  Comment:  Coumadin Therapy:  2.0 - 3.0 for INR for all indicators except mechanical heart valves  and antiphospholipid syndromes which should use 2.5 - 3.5.       Lymph # 1.9     Lymph% 24.4     MCH  30.1     MCHC 30.1          Mono # 0.6     Mono% 8.0     MPV 10.4     nRBC 0     Platelets 242     Potassium 3.4     PROTEIN TOTAL 7.7     Protime 18.3     RBC 2.79     RDW 15.3     Sodium 138     Troponin I 0.275  Comment:  The reference interval for Troponin I represents the 99th percentile   cutoff   for our facility and is consistent with 3rd generation assay   performance.       WBC 7.65           Significant Imaging: Echocardiogram:   2D echo with color flow doppler:   Results for orders placed or performed in visit on 02/25/19   2D echo with color flow doppler   Result Value Ref Range    QEF 55 55 - 65    Est. PA Systolic Pressure 60.76 (A)     Mitral Valve Mobility NORMAL     Tricuspid Valve Regurgitation MODERATE (A)     Narrative    Date of Procedure: 02/25/2019        TEST DESCRIPTION       Aorta: The aortic root is normal in size, measuring 2.3 cm at sinotubular junction and 1.9 cm at Sinuses of Valsalva.     Left Atrium: The left atrial volume index is mildly enlarged, measuring 36.20 cc/m2.     Left Ventricle: The left ventricle is normal in size, with an end-diastolic diameter of 5.1 cm, and an end-systolic diameter of 3.8 cm. LV wall thickness is normal, with the septum measuring 1.2 cm and the posterior wall measuring 1.1 cm across. Relative   wall thickness was normal at 0.43, and the LV mass index was increased at 159.7 g/m2 consistent with eccentric left ventricular hypertrophy. There are no regional wall motion abnormalities. Left ventricular systolic function appears normal. Visually   estimated ejection fraction is 55-60%. The LV Doppler derived stroke volume equals 83.0 ccs.         Right Atrium: The right atrium is normal in size, measuring 4.4 cm in length and 3.0 cm in width in the apical view.     Right Ventricle: The right ventricle is normal in size measuring 2.6 cm at the base in the apical right ventricle-focused view. Global right ventricular systolic function appears normal.  The estimated PA systolic pressure is 61 mmHg.     Aortic Valve:  There is a mechanical prosthesis present in the aortic position. The aortic valve prosthesis is well seated. The peak velocity obtained across the aortic valve is 2.48 m/s, which translates to a peak gradient of 25 mmHg. The mean gradient   is 15 mmHg. Using a left ventricular outflow tract diameter of 2.0 cm, a left ventricular outflow tract velocity time integral of 27 cm, and a peak instantaneous transvalvular velocity time integral of 51 cm, the effective prosthetic valve area is 1.61   cm2(p AVAi is 0.95 cm2/m2).     Mitral Valve:  The mitral valve is moderately sclerotic with normal leaflet mobility. There is a mitral annular ring present.     Tricuspid Valve:  The tricuspid valve is normal in structure. There is moderate tricuspid regurgitation.     Pulmonary Valve:  The pulmonic valve is normal in structure.     IVC: IVC is normal in size and collapses > 50% with a sniff, suggesting normal right atrial pressure of 3 mmHg.     Intracavitary: There is no evidence of pericardial effusion, intracavity mass, thrombi, or vegetation.         CONCLUSIONS     1 - Mild left atrial enlargement.     2 - Eccentric hypertrophy.     3 - No wall motion abnormalities.     4 - Normal left ventricular systolic function (EF 55-60%).     5 - Normal right ventricular systolic function .     6 - Aortic valve prosthesis, CATINA = 1.61 cm2, AVAi = 0.95 cm2/m2, peak velocity = 2.48 m/s, mean gradient = 15 mmHg.     7 - Moderate tricuspid regurgitation.     8 - Pulmonary hypertension. The estimated PA systolic pressure is 61 mmHg.             This document has been electronically    SIGNED BY: Hermilo Pete MD On: 02/25/2019 14:50    and X-Ray:     FINDINGS:  Coarsening of bronchovascular markings/COPD.  Remote aortic valve replacement surgery.  Cardiomegaly.    The cardiac silhouette is upper limits of normal in size.  The hilar and mediastinal contours are  unremarkable.      Impression       COPD similar to the prior film dated 06/21/2019.  No acute cardiopulmonary disease.      Electronically signed by: Peter Bishop MD  Date: 11/04/2019  Time: 13:04

## 2019-11-04 NOTE — ASSESSMENT & PLAN NOTE
-Due to pulmonary fibrosis and pulmonary hypertension.   -Continue Ofev, inhaled medications and oxygen therapy as needed.

## 2019-11-04 NOTE — ASSESSMENT & PLAN NOTE
-Patient hypotensive in the ED and BP  70's/30's at home PTA  -Recommend holding nitro patch, Lasix and BB today   -Consider gentle hydration with NS at 50/hr overnight  -Restart Toprol at 12.5mg daily when BP allows

## 2019-11-04 NOTE — ED NOTES
Patient complains of shortness of breath and chest pain, Symptoms have been present intermittently and gradually worsening for a year . Patient describes symptoms as pain in the middle of her chest.     Level of Consciousness: The patient is awake, alert, and oriented with appropriate affect and speech; oriented to person, place and time.  Appearance: Sitting up in bed with no acute distress noted. Clothing and hygiene are clean and worn appropriately.  Skin: Skin is warm and dry with good skin turgor; intact; color consistent with ethnicity.  Mucous membranes are moist.   Musculoskeletal: Moves all extremities well in full range of motion. No obvious deformities or swelling noted.  Respiratory: Airway open and patent, respirations spontaneous, even and unlabored. No accessory muscles in use. Breath sounds clear.  Cardiac: Patient states she has central chest pain. Regular rate and rhythm, no peripheral edema noted, good pulses palpated peripherally, capillary refill < 3 seconds.  Abdomen: Soft, non-tender to palpation. No distention noted.   Neurologic: PERRLA, face exhibits symmetrical expression, hand grasps equal and even bilaterally, reports normal sensation to all extremities and face.  Psychosocial: Calm and cooperative.     Patient verbalized understanding of status and plan of care. Patient changed into hospital gown with assistance. Side rails up x 2, call light in reach, bed low and locked. Cardiac monitor applied to patient; alarms on, audible, and set.  at bedside. Will continue to monitor.

## 2019-11-05 LAB
ANION GAP SERPL CALC-SCNC: 10 MMOL/L (ref 8–16)
APTT BLDCRRT: 52.2 SEC (ref 21–32)
APTT BLDCRRT: 58.4 SEC (ref 21–32)
BASOPHILS # BLD AUTO: 0.05 K/UL (ref 0–0.2)
BASOPHILS NFR BLD: 0.9 % (ref 0–1.9)
BUN SERPL-MCNC: 16 MG/DL (ref 6–20)
CALCIUM SERPL-MCNC: 8.6 MG/DL (ref 8.7–10.5)
CHLORIDE SERPL-SCNC: 102 MMOL/L (ref 95–110)
CO2 SERPL-SCNC: 26 MMOL/L (ref 23–29)
CREAT SERPL-MCNC: 0.9 MG/DL (ref 0.5–1.4)
DIFFERENTIAL METHOD: ABNORMAL
EOSINOPHIL # BLD AUTO: 0.3 K/UL (ref 0–0.5)
EOSINOPHIL NFR BLD: 5.5 % (ref 0–8)
ERYTHROCYTE [DISTWIDTH] IN BLOOD BY AUTOMATED COUNT: 16.7 % (ref 11.5–14.5)
EST. GFR  (AFRICAN AMERICAN): >60 ML/MIN/1.73 M^2
EST. GFR  (NON AFRICAN AMERICAN): >60 ML/MIN/1.73 M^2
GLUCOSE SERPL-MCNC: 122 MG/DL (ref 70–110)
HCT VFR BLD AUTO: 26.1 % (ref 37–48.5)
HCT VFR BLD AUTO: 26.6 % (ref 37–48.5)
HCT VFR BLD AUTO: 28.1 % (ref 37–48.5)
HGB BLD-MCNC: 7.9 G/DL (ref 12–16)
HGB BLD-MCNC: 8 G/DL (ref 12–16)
HGB BLD-MCNC: 8.5 G/DL (ref 12–16)
IMM GRANULOCYTES # BLD AUTO: 0.01 K/UL (ref 0–0.04)
IMM GRANULOCYTES NFR BLD AUTO: 0.2 % (ref 0–0.5)
INR PPP: 1.6 (ref 0.8–1.2)
LYMPHOCYTES # BLD AUTO: 2.1 K/UL (ref 1–4.8)
LYMPHOCYTES NFR BLD: 35.9 % (ref 18–48)
MAGNESIUM SERPL-MCNC: 2.2 MG/DL (ref 1.6–2.6)
MCH RBC QN AUTO: 28.9 PG (ref 27–31)
MCHC RBC AUTO-ENTMCNC: 29.7 G/DL (ref 32–36)
MCV RBC AUTO: 97 FL (ref 82–98)
MONOCYTES # BLD AUTO: 0.5 K/UL (ref 0.3–1)
MONOCYTES NFR BLD: 7.9 % (ref 4–15)
NEUTROPHILS # BLD AUTO: 2.9 K/UL (ref 1.8–7.7)
NEUTROPHILS NFR BLD: 49.6 % (ref 38–73)
NRBC BLD-RTO: 0 /100 WBC
PHOSPHATE SERPL-MCNC: 3 MG/DL (ref 2.7–4.5)
PLATELET # BLD AUTO: 167 K/UL (ref 150–350)
PMV BLD AUTO: 9.9 FL (ref 9.2–12.9)
POTASSIUM SERPL-SCNC: 2.8 MMOL/L (ref 3.5–5.1)
POTASSIUM SERPL-SCNC: 3.7 MMOL/L (ref 3.5–5.1)
PROTHROMBIN TIME: 17.4 SEC (ref 9–12.5)
RBC # BLD AUTO: 2.73 M/UL (ref 4–5.4)
SODIUM SERPL-SCNC: 138 MMOL/L (ref 136–145)
WBC # BLD AUTO: 5.82 K/UL (ref 3.9–12.7)

## 2019-11-05 PROCEDURE — 99232 PR SUBSEQUENT HOSPITAL CARE,LEVL II: ICD-10-PCS | Mod: NTX,,, | Performed by: INTERNAL MEDICINE

## 2019-11-05 PROCEDURE — 25000003 PHARM REV CODE 250: Mod: NTX | Performed by: PHYSICIAN ASSISTANT

## 2019-11-05 PROCEDURE — 36415 COLL VENOUS BLD VENIPUNCTURE: CPT | Mod: NTX

## 2019-11-05 PROCEDURE — 99232 SBSQ HOSP IP/OBS MODERATE 35: CPT | Mod: NTX,,, | Performed by: INTERNAL MEDICINE

## 2019-11-05 PROCEDURE — 63600175 PHARM REV CODE 636 W HCPCS: Mod: NTX | Performed by: NURSE PRACTITIONER

## 2019-11-05 PROCEDURE — S4991 NICOTINE PATCH NONLEGEND: HCPCS | Mod: NTX | Performed by: PHYSICIAN ASSISTANT

## 2019-11-05 PROCEDURE — 25000242 PHARM REV CODE 250 ALT 637 W/ HCPCS: Mod: NTX | Performed by: PHYSICIAN ASSISTANT

## 2019-11-05 PROCEDURE — 83735 ASSAY OF MAGNESIUM: CPT | Mod: NTX

## 2019-11-05 PROCEDURE — 94640 AIRWAY INHALATION TREATMENT: CPT | Mod: NTX

## 2019-11-05 PROCEDURE — 85025 COMPLETE CBC W/AUTO DIFF WBC: CPT | Mod: NTX

## 2019-11-05 PROCEDURE — 84132 ASSAY OF SERUM POTASSIUM: CPT | Mod: NTX

## 2019-11-05 PROCEDURE — 80048 BASIC METABOLIC PNL TOTAL CA: CPT | Mod: NTX

## 2019-11-05 PROCEDURE — 84100 ASSAY OF PHOSPHORUS: CPT | Mod: NTX

## 2019-11-05 PROCEDURE — 21400001 HC TELEMETRY ROOM: Mod: NTX

## 2019-11-05 PROCEDURE — 94761 N-INVAS EAR/PLS OXIMETRY MLT: CPT | Mod: NTX

## 2019-11-05 PROCEDURE — 25000003 PHARM REV CODE 250: Mod: NTX | Performed by: NURSE PRACTITIONER

## 2019-11-05 PROCEDURE — 85014 HEMATOCRIT: CPT | Mod: 91,NTX

## 2019-11-05 PROCEDURE — 85610 PROTHROMBIN TIME: CPT | Mod: NTX

## 2019-11-05 PROCEDURE — 85018 HEMOGLOBIN: CPT | Mod: NTX

## 2019-11-05 PROCEDURE — 85730 THROMBOPLASTIN TIME PARTIAL: CPT | Mod: NTX

## 2019-11-05 PROCEDURE — 99900035 HC TECH TIME PER 15 MIN (STAT): Mod: NTX

## 2019-11-05 RX ORDER — SODIUM CHLORIDE 9 MG/ML
INJECTION, SOLUTION INTRAVENOUS CONTINUOUS
Status: DISCONTINUED | OUTPATIENT
Start: 2019-11-05 | End: 2019-11-06

## 2019-11-05 RX ORDER — POTASSIUM CHLORIDE 20 MEQ/1
40 TABLET, EXTENDED RELEASE ORAL EVERY 4 HOURS
Status: COMPLETED | OUTPATIENT
Start: 2019-11-05 | End: 2019-11-05

## 2019-11-05 RX ORDER — HYDROCODONE BITARTRATE AND ACETAMINOPHEN 10; 325 MG/1; MG/1
1 TABLET ORAL EVERY 6 HOURS PRN
Status: DISCONTINUED | OUTPATIENT
Start: 2019-11-05 | End: 2019-11-07 | Stop reason: HOSPADM

## 2019-11-05 RX ADMIN — HYDROCODONE BITARTRATE AND ACETAMINOPHEN 1 TABLET: 10; 325 TABLET ORAL at 05:11

## 2019-11-05 RX ADMIN — BUDESONIDE 0.5 MG: 0.5 INHALANT RESPIRATORY (INHALATION) at 08:11

## 2019-11-05 RX ADMIN — POTASSIUM CHLORIDE 40 MEQ: 20 TABLET, EXTENDED RELEASE ORAL at 05:11

## 2019-11-05 RX ADMIN — BUDESONIDE 0.5 MG: 0.5 INHALANT RESPIRATORY (INHALATION) at 07:11

## 2019-11-05 RX ADMIN — ASPIRIN 81 MG: 81 TABLET, COATED ORAL at 09:11

## 2019-11-05 RX ADMIN — PANTOPRAZOLE SODIUM 40 MG: 40 TABLET, DELAYED RELEASE ORAL at 09:11

## 2019-11-05 RX ADMIN — TOPIRAMATE 100 MG: 100 TABLET, FILM COATED ORAL at 09:11

## 2019-11-05 RX ADMIN — POTASSIUM CHLORIDE 40 MEQ: 20 TABLET, EXTENDED RELEASE ORAL at 09:11

## 2019-11-05 RX ADMIN — NICOTINE 1 PATCH: 7 PATCH, EXTENDED RELEASE TRANSDERMAL at 05:11

## 2019-11-05 RX ADMIN — ARFORMOTEROL TARTRATE 15 MCG: 15 SOLUTION RESPIRATORY (INHALATION) at 08:11

## 2019-11-05 RX ADMIN — DESVENLAFAXINE SUCCINATE 100 MG: 100 TABLET, FILM COATED, EXTENDED RELEASE ORAL at 09:11

## 2019-11-05 RX ADMIN — CLONAZEPAM 0.5 MG: 0.5 TABLET ORAL at 09:11

## 2019-11-05 RX ADMIN — TIZANIDINE 4 MG: 4 TABLET ORAL at 09:11

## 2019-11-05 RX ADMIN — ARFORMOTEROL TARTRATE 15 MCG: 15 SOLUTION RESPIRATORY (INHALATION) at 07:11

## 2019-11-05 RX ADMIN — HYDROCODONE BITARTRATE AND ACETAMINOPHEN 1 TABLET: 10; 325 TABLET ORAL at 09:11

## 2019-11-05 RX ADMIN — BUSPIRONE HYDROCHLORIDE 15 MG: 10 TABLET ORAL at 09:11

## 2019-11-05 RX ADMIN — ROSUVASTATIN CALCIUM 20 MG: 10 TABLET, FILM COATED ORAL at 09:11

## 2019-11-05 RX ADMIN — ONDANSETRON 8 MG: 8 TABLET, ORALLY DISINTEGRATING ORAL at 05:11

## 2019-11-05 RX ADMIN — TRAZODONE HYDROCHLORIDE 150 MG: 100 TABLET ORAL at 09:11

## 2019-11-05 RX ADMIN — SODIUM CHLORIDE: 0.9 INJECTION, SOLUTION INTRAVENOUS at 12:11

## 2019-11-05 RX ADMIN — CLONAZEPAM 0.5 MG: 0.5 TABLET ORAL at 03:11

## 2019-11-05 NOTE — HOSPITAL COURSE
11/5/19- no recurrent chest pain since admit. Still having issues with hypotension. Is being gently hydrated. Repeat H/H stable after 1 unit. Still on Heparin gtt given her mechanical AVR. Primary team monitoring H/H closely. K 3.8 this morning and being corrected. Troponin remains flat. Repeat 2D echo pending.     11/6/19- continues to drop H/H. Orders given to transfuse 2 units today. BP improved from admit. Has no angina or equivalent. No troponin peak. Heparin gtt still infusing due to history of mechanical AVR

## 2019-11-05 NOTE — PLAN OF CARE
Sw met with patient and  at bedside to complete d/c assessment. Patient explained she arrived from home/car due to Feeling of having a heart attack she also states that she felt as if she had fluid on her heart. Patient denied 30 day readmission. Patient Lives with her  and will discharge with him. Patient uses oxygen,bath bench, rollatior, walker, wheelchair and portable oxygen. Patient states she's already out of HH visits for the year. Patient would like to enroll in bedside pharmacy due to wanting to change pharmacies. Patient denies LW/POA. Pt. Denies DME needs at the time. Transitional Care Folder, Discharge Planning Begins on Admission pamphlet, [facility], Advance Directive information given to patient. Sw placed contact information on white board. Sw instructed patient or family to call with any questions or concerns.     D/C plan: TBD.  PCP: Dr. Soliz        11/05/19 2258   Discharge Assessment   Assessment Type Discharge Planning Assessment   Confirmed/corrected address and phone number on facesheet? Yes   Assessment information obtained from? Patient;Caregiver   Prior to hospitilization cognitive status: Alert/Oriented   Prior to hospitalization functional status: Assistive Equipment   Current cognitive status: Alert/Oriented   Current Functional Status: Assistive Equipment   Facility Arrived From: Home/ Car    Lives With spouse   Able to Return to Prior Arrangements yes   Is patient able to care for self after discharge? Yes   Who are your caregiver(s) and their phone number(s)? Derrell Sal-  [phone]   Readmission Within the Last 30 Days no previous admission in last 30 days   Patient currently being followed by outpatient case management? No   Patient currently receives any other outside agency services? No   Equipment Currently Used at Home oxygen;bath bench;rollator;wheelchair   Do you have any problems affording any of your prescribed  medications? No   Is the patient taking medications as prescribed? yes   Does the patient have transportation home? Yes   Transportation Anticipated family or friend will provide   Does the patient receive services at the [facility]? No   Discharge Plan A Home with family   Discharge Plan B Home   DME Needed Upon Discharge  none   Patient/Family in Agreement with Plan yes

## 2019-11-05 NOTE — NURSING
Spoke with pt re: home med nintedanib. Pt advises that her daughter will be bringing it later. Educated pt on need to barcode med so it can be scanned into epic when taken.

## 2019-11-05 NOTE — SUBJECTIVE & OBJECTIVE
Review of Systems   Constitution: Positive for decreased appetite and malaise/fatigue. Negative for diaphoresis, weight gain and weight loss.   HENT: Negative for congestion and nosebleeds.    Cardiovascular: Negative for chest pain, claudication, cyanosis, dyspnea on exertion, irregular heartbeat, leg swelling, near-syncope, orthopnea, palpitations, paroxysmal nocturnal dyspnea and syncope.   Respiratory: Negative for cough, hemoptysis, shortness of breath, sleep disturbances due to breathing, snoring, sputum production and wheezing.    Hematologic/Lymphatic: Negative for bleeding problem. Does not bruise/bleed easily.   Skin: Negative for rash.   Musculoskeletal: Positive for back pain. Negative for arthritis, falls, joint pain, muscle cramps and muscle weakness.        Arm and leg weakness    Gastrointestinal: Negative for abdominal pain, constipation, diarrhea, heartburn, hematemesis, hematochezia, melena, nausea and vomiting.   Genitourinary: Negative for dysuria, hematuria and nocturia.   Neurological: Negative for excessive daytime sleepiness, dizziness, headaches, light-headedness, loss of balance, numbness, vertigo and weakness.     Objective:     Vital Signs (Most Recent):  Temp: 98.2 °F (36.8 °C) (11/05/19 1105)  Pulse: 71 (11/05/19 1122)  Resp: 18 (11/05/19 1105)  BP: (!) 103/51 (11/05/19 1105)  SpO2: 100 % (11/05/19 1105) Vital Signs (24h Range):  Temp:  [97.2 °F (36.2 °C)-98.5 °F (36.9 °C)] 98.2 °F (36.8 °C)  Pulse:  [50-81] 71  Resp:  [16-25] 18  SpO2:  [95 %-100 %] 100 %  BP: ()/(42-58) 103/51     Weight: 76.1 kg (167 lb 12.3 oz)  Body mass index is 32.77 kg/m².     SpO2: 100 %  O2 Device (Oxygen Therapy): nasal cannula      Intake/Output Summary (Last 24 hours) at 11/5/2019 1242  Last data filed at 11/5/2019 0600  Gross per 24 hour   Intake 1460.53 ml   Output 500 ml   Net 960.53 ml       Lines/Drains/Airways     Peripheral Intravenous Line                 Peripheral IV - Single Lumen  11/04/19 1000 22 G Left Antecubital 1 day         Peripheral IV - Single Lumen 11/04/19 1620 20 G Left Forearm less than 1 day                Physical Exam   Constitutional: She is oriented to person, place, and time. She appears well-developed and well-nourished.   Neck: Neck supple. No JVD present.   Cardiovascular: Normal rate, regular rhythm, normal heart sounds and normal pulses. Exam reveals no friction rub.   No murmur heard.  Metallic S2   Pulmonary/Chest: Effort normal and breath sounds normal. No respiratory distress. She has no wheezes. She has no rales.   Sternal scar    Abdominal: Soft. Bowel sounds are normal. She exhibits no distension.   Musculoskeletal: She exhibits no edema or tenderness.   Neurological: She is alert and oriented to person, place, and time.   Skin: Skin is warm and dry. No rash noted.   Psychiatric: She has a normal mood and affect. Her behavior is normal.   Nursing note and vitals reviewed.      Significant Labs:   All pertinent lab results from the last 24 hours have been reviewed. and   Recent Lab Results       11/05/19  0751   11/05/19  0419   11/04/19  2326   11/04/19  2154   11/04/19  1708        Unit Blood Type Code               Unit Expiration               Unit Blood Type               Anion Gap   10           aPTT   58.4  Comment:  aPTT therapeutic range = 39-69 seconds 52.2  Comment:  aPTT therapeutic range = 39-69 seconds   31.6  Comment:  aPTT therapeutic range = 39-69 seconds     Baso #   0.05           Basophil%   0.9           BUN, Bld   16           Calcium   8.6           Chloride   102           Cholesterol         172  Comment:  The National Cholesterol Education Program (NCEP) has set the  following guidelines (reference ranges) for Cholesterol:  Optimal.....................<200 mg/dL  Borderline High.............200-239 mg/dL  High........................> or = 240 mg/dL       CO2   26           CODING SYSTEM               Creatinine   0.9            Differential Method   Automated           DISPENSE STATUS               eGFR if    >60           eGFR if non    >60  Comment:  Calculation used to obtain the estimated glomerular filtration  rate (eGFR) is the CKD-EPI equation.              Eos #   0.3           Eosinophil%   5.5           Glucose   122           Gran # (ANC)   2.9           Gran%   49.6           Group & Rh               HDL         39  Comment:  The National Cholesterol Education Program (NCEP) has set the  following guidelines (reference values) for HDL Cholesterol:  Low...............<40 mg/dL  Optimal...........>60 mg/dL       Hdl/Cholesterol Ratio         22.7     Hematocrit 26.1 26.6 25.5         Hemoglobin 8.0 7.9 8.0         Immature Grans (Abs)   0.01  Comment:  Mild elevation in immature granulocytes is non specific and   can be seen in a variety of conditions including stress response,   acute inflammation, trauma and pregnancy. Correlation with other   laboratory and clinical findings is essential.             Immature Granulocytes   0.2           INDIRECT TORRI               Coumadin Monitoring INR   1.6  Comment:  Coumadin Therapy:  2.0 - 3.0 for INR for all indicators except mechanical heart valves  and antiphospholipid syndromes which should use 2.5 - 3.5.             LDL Cholesterol External         107.2  Comment:  The National Cholesterol Education Program (NCEP) has set the  following guidelines (reference values) for LDL Cholesterol:  Optimal.......................<130 mg/dL  Borderline High...............130-159 mg/dL  High..........................160-189 mg/dL  Very High.....................>190 mg/dL       Lymph #   2.1           Lymph%   35.9           Magnesium   2.2           MCH   28.9           MCHC   29.7           MCV   97           Mono #   0.5           Mono%   7.9           MPV   9.9           Non-HDL Cholesterol         133  Comment:  Risk category and Non-HDL cholesterol  goals:  Coronary heart disease (CHD)or equivalent (10-year risk of CHD >20%):  Non-HDL cholesterol goal     <130 mg/dL  Two or more CHD risk factors and 10-year risk of CHD <= 20%:  Non-HDL cholesterol goal     <160 mg/dL  0 to 1 CHD risk factor:  Non-HDL cholesterol goal     <190 mg/dL       nRBC   0           Phosphorus   3.0           Platelets   167           Potassium   2.8           Product Code               Protime   17.4           RBC   2.73           RDW   16.7           Sodium   138           Total Cholesterol/HDL Ratio         4.4     Triglycerides         129  Comment:  The National Cholesterol Education Program (NCEP) has set the  following guidelines (reference values) for triglycerides:  Normal......................<150 mg/dL  Borderline High.............150-199 mg/dL  High........................200-499 mg/dL       Troponin I       0.180  Comment:  The reference interval for Troponin I represents the 99th percentile   cutoff   for our facility and is consistent with 3rd generation assay   performance.   0.256  Comment:  The reference interval for Troponin I represents the 99th percentile   cutoff   for our facility and is consistent with 3rd generation assay   performance.       UNIT NUMBER               WBC   5.82                            11/04/19  1546        Unit Blood Type Code 9500     Unit Expiration [id number]     Unit Blood Type O NEG     Anion Gap       aPTT       Baso #       Basophil%       BUN, Bld       Calcium       Chloride       Cholesterol       CO2       CODING SYSTEM [id number]     Creatinine       Differential Method       DISPENSE STATUS TRANSFUSED     eGFR if        eGFR if non        Eos #       Eosinophil%       Glucose       Gran # (ANC)       Gran%       Group & Rh O NEG     HDL       Hdl/Cholesterol Ratio       Hematocrit       Hemoglobin       Immature Grans (Abs)       Immature Granulocytes       INDIRECT TORRI NEG     Coumadin  Monitoring INR       LDL Cholesterol External       Lymph #       Lymph%       Magnesium       MCH       MCHC       MCV       Mono #       Mono%       MPV       Non-HDL Cholesterol       nRBC       Phosphorus       Platelets       Potassium       Product Code [id number]     Protime       RBC       RDW       Sodium       Total Cholesterol/HDL Ratio       Triglycerides       Troponin I       UNIT NUMBER [id number]     WBC             Significant Imaging: Echocardiogram:   2D echo with color flow doppler:   Results for orders placed or performed in visit on 02/25/19   2D echo with color flow doppler   Result Value Ref Range    QEF 55 55 - 65    Est. PA Systolic Pressure 60.76 (A)     Mitral Valve Mobility NORMAL     Tricuspid Valve Regurgitation MODERATE (A)     Narrative    Date of Procedure: 02/25/2019        TEST DESCRIPTION       Aorta: The aortic root is normal in size, measuring 2.3 cm at sinotubular junction and 1.9 cm at Sinuses of Valsalva.     Left Atrium: The left atrial volume index is mildly enlarged, measuring 36.20 cc/m2.     Left Ventricle: The left ventricle is normal in size, with an end-diastolic diameter of 5.1 cm, and an end-systolic diameter of 3.8 cm. LV wall thickness is normal, with the septum measuring 1.2 cm and the posterior wall measuring 1.1 cm across. Relative   wall thickness was normal at 0.43, and the LV mass index was increased at 159.7 g/m2 consistent with eccentric left ventricular hypertrophy. There are no regional wall motion abnormalities. Left ventricular systolic function appears normal. Visually   estimated ejection fraction is 55-60%. The LV Doppler derived stroke volume equals 83.0 ccs.         Right Atrium: The right atrium is normal in size, measuring 4.4 cm in length and 3.0 cm in width in the apical view.     Right Ventricle: The right ventricle is normal in size measuring 2.6 cm at the base in the apical right ventricle-focused view. Global right ventricular systolic  function appears normal. The estimated PA systolic pressure is 61 mmHg.     Aortic Valve:  There is a mechanical prosthesis present in the aortic position. The aortic valve prosthesis is well seated. The peak velocity obtained across the aortic valve is 2.48 m/s, which translates to a peak gradient of 25 mmHg. The mean gradient   is 15 mmHg. Using a left ventricular outflow tract diameter of 2.0 cm, a left ventricular outflow tract velocity time integral of 27 cm, and a peak instantaneous transvalvular velocity time integral of 51 cm, the effective prosthetic valve area is 1.61   cm2(p AVAi is 0.95 cm2/m2).     Mitral Valve:  The mitral valve is moderately sclerotic with normal leaflet mobility. There is a mitral annular ring present.     Tricuspid Valve:  The tricuspid valve is normal in structure. There is moderate tricuspid regurgitation.     Pulmonary Valve:  The pulmonic valve is normal in structure.     IVC: IVC is normal in size and collapses > 50% with a sniff, suggesting normal right atrial pressure of 3 mmHg.     Intracavitary: There is no evidence of pericardial effusion, intracavity mass, thrombi, or vegetation.         CONCLUSIONS     1 - Mild left atrial enlargement.     2 - Eccentric hypertrophy.     3 - No wall motion abnormalities.     4 - Normal left ventricular systolic function (EF 55-60%).     5 - Normal right ventricular systolic function .     6 - Aortic valve prosthesis, CATINA = 1.61 cm2, AVAi = 0.95 cm2/m2, peak velocity = 2.48 m/s, mean gradient = 15 mmHg.     7 - Moderate tricuspid regurgitation.     8 - Pulmonary hypertension. The estimated PA systolic pressure is 61 mmHg.             This document has been electronically    SIGNED BY: Hermilo Pete MD On: 02/25/2019 14:50

## 2019-11-05 NOTE — ASSESSMENT & PLAN NOTE
-Continue coumadin, cautiously.  -Monitor daily INR.   11/5 -  INR 1.6 - Coumadin stopped and pt on heparin infusion as her troponins were elevated and she has the mechanical aortic valve. Monitor for bleeding

## 2019-11-05 NOTE — PROGRESS NOTES
[facility] -   Cardiology  Progress Note    Patient Name: Meg Sal  MRN: [id number]  Admission Date: 11/4/2019  Hospital Length of Stay: 1 days  Code Status: Full Code   Attending Physician: Jean Pierre Mcdermott MD   Primary Care Physician: Aquilino Hightower MD  Expected Discharge Date:   Principal Problem:Symptomatic anemia    Subjective:   Brief HPI:  Meg Sal is a 49 y.o. female with CAD s/p 2V CABG - LAD/LCX with occluded, s/p mechanical AVR, s/p mitral annuloplasty ring in 2014, pulm fibrosis, Pulm HTN, HFpEF, CHD, HTN, HLD, CVA.   Pt had first MI at age 31.. Dr. Cortez did LHC, had one stent placed. She also had other blockages which were treated medically. Had repeat unstable angina symptoms which lead to repeat LHC with Dr. Cortez.  Dr. Mcmahon was consulted as she had severe LAD disease. She went to Dr. Heller with LCA, had LHC in May 2018 with chronically occluded RCA with collaterals but patent grafts to her left system    She presented to the ED with complaints of chest pain, arm heaviness and back pain. Patient states that she has been under lots of stress after her father was involved in MVA 2 weeks ago. She and her sister decided to withdraw care of father, so has been emotional. Reports taking at least 2 nitro a day for the last couple of days. Felt weak walking to the bathroom recently and had to sit down on the floor to keep from passing out. Checked her BP at home and was 70's/30's. Has been taking Lasix and metoprolol daily and has had poor PO intake.     Has been intolerant to Ranexa and Imdur.  RHC in June 2019-   Mildly elevated right Filling Pressures, Moderate Pulmonary Hypertension. She was started on nintenanib by Pulmonary.   Echo in Feb 2019 revealed Aortic valve prosthesis, CATINA = 1.61 cm2, AVAi = 0.95 cm2/m2, peak velocity = 2.48 m/s, mean gradient = 15 mmHg, LVF 55%.     INR 1.7, ,Troponin 0.275 chronically elevated, K 3.4, H/H 8.4/27.9      Hospital  Course:   11/5/19- no recurrent chest pain since admit. Still having issues with hypotension. Is being gently hydrated. Repeat H/H stable after 1 unit. Still on Heparin gtt given her mechanical AVR. Primary team monitoring H/H closely. K 23.8 this morning and being corrected. Troponin remains flat. Repeat 2D echo pending.         Review of Systems   Constitution: Positive for decreased appetite and malaise/fatigue. Negative for diaphoresis, weight gain and weight loss.   HENT: Negative for congestion and nosebleeds.    Cardiovascular: Negative for chest pain, claudication, cyanosis, dyspnea on exertion, irregular heartbeat, leg swelling, near-syncope, orthopnea, palpitations, paroxysmal nocturnal dyspnea and syncope.   Respiratory: Negative for cough, hemoptysis, shortness of breath, sleep disturbances due to breathing, snoring, sputum production and wheezing.    Hematologic/Lymphatic: Negative for bleeding problem. Does not bruise/bleed easily.   Skin: Negative for rash.   Musculoskeletal: Positive for back pain. Negative for arthritis, falls, joint pain, muscle cramps and muscle weakness.        Arm and leg weakness    Gastrointestinal: Negative for abdominal pain, constipation, diarrhea, heartburn, hematemesis, hematochezia, melena, nausea and vomiting.   Genitourinary: Negative for dysuria, hematuria and nocturia.   Neurological: Negative for excessive daytime sleepiness, dizziness, headaches, light-headedness, loss of balance, numbness, vertigo and weakness.     Objective:     Vital Signs (Most Recent):  Temp: 98.2 °F (36.8 °C) (11/05/19 1105)  Pulse: 71 (11/05/19 1122)  Resp: 18 (11/05/19 1105)  BP: (!) 103/51 (11/05/19 1105)  SpO2: 100 % (11/05/19 1105) Vital Signs (24h Range):  Temp:  [97.2 °F (36.2 °C)-98.5 °F (36.9 °C)] 98.2 °F (36.8 °C)  Pulse:  [50-81] 71  Resp:  [16-25] 18  SpO2:  [95 %-100 %] 100 %  BP: ()/(42-58) 103/51     Weight: 76.1 kg (167 lb 12.3 oz)  Body mass index is 32.77 kg/m².      SpO2: 100 %  O2 Device (Oxygen Therapy): nasal cannula      Intake/Output Summary (Last 24 hours) at 11/5/2019 1242  Last data filed at 11/5/2019 0600  Gross per 24 hour   Intake 1460.53 ml   Output 500 ml   Net 960.53 ml       Lines/Drains/Airways     Peripheral Intravenous Line                 Peripheral IV - Single Lumen 11/04/19 1000 22 G Left Antecubital 1 day         Peripheral IV - Single Lumen 11/04/19 1620 20 G Left Forearm less than 1 day                Physical Exam   Constitutional: She is oriented to person, place, and time. She appears well-developed and well-nourished.   Neck: Neck supple. No JVD present.   Cardiovascular: Normal rate, regular rhythm, normal heart sounds and normal pulses. Exam reveals no friction rub.   No murmur heard.  Metallic S2   Pulmonary/Chest: Effort normal and breath sounds normal. No respiratory distress. She has no wheezes. She has no rales.   Sternal scar    Abdominal: Soft. Bowel sounds are normal. She exhibits no distension.   Musculoskeletal: She exhibits no edema or tenderness.   Neurological: She is alert and oriented to person, place, and time.   Skin: Skin is warm and dry. No rash noted.   Psychiatric: She has a normal mood and affect. Her behavior is normal.   Nursing note and vitals reviewed.      Significant Labs:   All pertinent lab results from the last 24 hours have been reviewed. and   Recent Lab Results       11/05/19  0751   11/05/19  0419   11/04/19  2326   11/04/19  2154   11/04/19  1708        Unit Blood Type Code               Unit Expiration               Unit Blood Type               Anion Gap   10           aPTT   58.4  Comment:  aPTT therapeutic range = 39-69 seconds 52.2  Comment:  aPTT therapeutic range = 39-69 seconds   31.6  Comment:  aPTT therapeutic range = 39-69 seconds     Baso #   0.05           Basophil%   0.9           BUN, Bld   16           Calcium   8.6           Chloride   102           Cholesterol         172  Comment:  The  National Cholesterol Education Program (NCEP) has set the  following guidelines (reference ranges) for Cholesterol:  Optimal.....................<200 mg/dL  Borderline High.............200-239 mg/dL  High........................> or = 240 mg/dL       CO2   26           CODING SYSTEM               Creatinine   0.9           Differential Method   Automated           DISPENSE STATUS               eGFR if    >60           eGFR if non    >60  Comment:  Calculation used to obtain the estimated glomerular filtration  rate (eGFR) is the CKD-EPI equation.              Eos #   0.3           Eosinophil%   5.5           Glucose   122           Gran # (ANC)   2.9           Gran%   49.6           Group & Rh               HDL         39  Comment:  The National Cholesterol Education Program (NCEP) has set the  following guidelines (reference values) for HDL Cholesterol:  Low...............<40 mg/dL  Optimal...........>60 mg/dL       Hdl/Cholesterol Ratio         22.7     Hematocrit 26.1 26.6 25.5         Hemoglobin 8.0 7.9 8.0         Immature Grans (Abs)   0.01  Comment:  Mild elevation in immature granulocytes is non specific and   can be seen in a variety of conditions including stress response,   acute inflammation, trauma and pregnancy. Correlation with other   laboratory and clinical findings is essential.             Immature Granulocytes   0.2           INDIRECT TORRI               Coumadin Monitoring INR   1.6  Comment:  Coumadin Therapy:  2.0 - 3.0 for INR for all indicators except mechanical heart valves  and antiphospholipid syndromes which should use 2.5 - 3.5.             LDL Cholesterol External         107.2  Comment:  The National Cholesterol Education Program (NCEP) has set the  following guidelines (reference values) for LDL Cholesterol:  Optimal.......................<130 mg/dL  Borderline High...............130-159 mg/dL  High..........................160-189 mg/dL  Very  High.....................>190 mg/dL       Lymph #   2.1           Lymph%   35.9           Magnesium   2.2           MCH   28.9           MCHC   29.7           MCV   97           Mono #   0.5           Mono%   7.9           MPV   9.9           Non-HDL Cholesterol         133  Comment:  Risk category and Non-HDL cholesterol goals:  Coronary heart disease (CHD)or equivalent (10-year risk of CHD >20%):  Non-HDL cholesterol goal     <130 mg/dL  Two or more CHD risk factors and 10-year risk of CHD <= 20%:  Non-HDL cholesterol goal     <160 mg/dL  0 to 1 CHD risk factor:  Non-HDL cholesterol goal     <190 mg/dL       nRBC   0           Phosphorus   3.0           Platelets   167           Potassium   2.8           Product Code               Protime   17.4           RBC   2.73           RDW   16.7           Sodium   138           Total Cholesterol/HDL Ratio         4.4     Triglycerides         129  Comment:  The National Cholesterol Education Program (NCEP) has set the  following guidelines (reference values) for triglycerides:  Normal......................<150 mg/dL  Borderline High.............150-199 mg/dL  High........................200-499 mg/dL       Troponin I       0.180  Comment:  The reference interval for Troponin I represents the 99th percentile   cutoff   for our facility and is consistent with 3rd generation assay   performance.   0.256  Comment:  The reference interval for Troponin I represents the 99th percentile   cutoff   for our facility and is consistent with 3rd generation assay   performance.       UNIT NUMBER               WBC   5.82                            11/04/19  1546        Unit Blood Type Code 9500     Unit Expiration [id number]     Unit Blood Type O NEG     Anion Gap       aPTT       Baso #       Basophil%       BUN, Bld       Calcium       Chloride       Cholesterol       CO2       CODING SYSTEM [id number]     Creatinine       Differential Method       DISPENSE STATUS TRANSFUSED     eGFR if         eGFR if non        Eos #       Eosinophil%       Glucose       Gran # (ANC)       Gran%       Group & Rh O NEG     HDL       Hdl/Cholesterol Ratio       Hematocrit       Hemoglobin       Immature Grans (Abs)       Immature Granulocytes       INDIRECT TORRI NEG     Coumadin Monitoring INR       LDL Cholesterol External       Lymph #       Lymph%       Magnesium       MCH       MCHC       MCV       Mono #       Mono%       MPV       Non-HDL Cholesterol       nRBC       Phosphorus       Platelets       Potassium       Product Code [id number]     Protime       RBC       RDW       Sodium       Total Cholesterol/HDL Ratio       Triglycerides       Troponin I       UNIT NUMBER [id number]     WBC             Significant Imaging: Echocardiogram:   2D echo with color flow doppler:   Results for orders placed or performed in visit on 02/25/19   2D echo with color flow doppler   Result Value Ref Range    QEF 55 55 - 65    Est. PA Systolic Pressure 60.76 (A)     Mitral Valve Mobility NORMAL     Tricuspid Valve Regurgitation MODERATE (A)     Narrative    Date of Procedure: 02/25/2019        TEST DESCRIPTION       Aorta: The aortic root is normal in size, measuring 2.3 cm at sinotubular junction and 1.9 cm at Sinuses of Valsalva.     Left Atrium: The left atrial volume index is mildly enlarged, measuring 36.20 cc/m2.     Left Ventricle: The left ventricle is normal in size, with an end-diastolic diameter of 5.1 cm, and an end-systolic diameter of 3.8 cm. LV wall thickness is normal, with the septum measuring 1.2 cm and the posterior wall measuring 1.1 cm across. Relative   wall thickness was normal at 0.43, and the LV mass index was increased at 159.7 g/m2 consistent with eccentric left ventricular hypertrophy. There are no regional wall motion abnormalities. Left ventricular systolic function appears normal. Visually   estimated ejection fraction is 55-60%. The LV Doppler derived stroke  volume equals 83.0 ccs.         Right Atrium: The right atrium is normal in size, measuring 4.4 cm in length and 3.0 cm in width in the apical view.     Right Ventricle: The right ventricle is normal in size measuring 2.6 cm at the base in the apical right ventricle-focused view. Global right ventricular systolic function appears normal. The estimated PA systolic pressure is 61 mmHg.     Aortic Valve:  There is a mechanical prosthesis present in the aortic position. The aortic valve prosthesis is well seated. The peak velocity obtained across the aortic valve is 2.48 m/s, which translates to a peak gradient of 25 mmHg. The mean gradient   is 15 mmHg. Using a left ventricular outflow tract diameter of 2.0 cm, a left ventricular outflow tract velocity time integral of 27 cm, and a peak instantaneous transvalvular velocity time integral of 51 cm, the effective prosthetic valve area is 1.61   cm2(p AVAi is 0.95 cm2/m2).     Mitral Valve:  The mitral valve is moderately sclerotic with normal leaflet mobility. There is a mitral annular ring present.     Tricuspid Valve:  The tricuspid valve is normal in structure. There is moderate tricuspid regurgitation.     Pulmonary Valve:  The pulmonic valve is normal in structure.     IVC: IVC is normal in size and collapses > 50% with a sniff, suggesting normal right atrial pressure of 3 mmHg.     Intracavitary: There is no evidence of pericardial effusion, intracavity mass, thrombi, or vegetation.         CONCLUSIONS     1 - Mild left atrial enlargement.     2 - Eccentric hypertrophy.     3 - No wall motion abnormalities.     4 - Normal left ventricular systolic function (EF 55-60%).     5 - Normal right ventricular systolic function .     6 - Aortic valve prosthesis, CATINA = 1.61 cm2, AVAi = 0.95 cm2/m2, peak velocity = 2.48 m/s, mean gradient = 15 mmHg.     7 - Moderate tricuspid regurgitation.     8 - Pulmonary hypertension. The estimated PA systolic pressure is 61 mmHg.              This document has been electronically    SIGNED BY: Hermilo Pete MD On: 02/25/2019 14:50     Assessment and Plan:       * Symptomatic anemia  Monitor H/H closely and  Transfuse as needed      Elevated troponin with chest pain  -Initial troponin 0.275  -Patient with chronically elevated troponin   -Dr. Van would like to trend enzymes for now   -Check 2D echo  -Treat anemia and hypotension.  Could be having chest pain from anemia, kassandra given her history of CAD  -Resume Statin  -Recommend holding BB for today and will likely need to cut dose to 12.5mg daily   -Hold Coumadin and start Heparin gtt until troponin is trended out   -No ASA due to allergy to NSAID    11/5/19  -Will continue current medical management for now   -Troponin flat and chronically elevated   -Will need to continue Heparin gtt given her history of mechanical AVR  -Will need to monitor H/H closely and transfuse as needed   -2D echo pending     Hypokalemia  Being corrected by primary team     Essential hypertension  -Patient hypotensive in the ED and BP  70's/30's at home PTA  -Recommend holding nitro patch, Lasix and BB today   -Consider gentle hydration with NS at 50/hr overnight  -Restart Toprol at 12.5mg daily when BP allows       Hx of mechanical aortic valve replacement  -Will check 2D echo   -hold Coumadin and start Heparin gtt until Troponin trended out         VTE Risk Mitigation (From admission, onward)         Ordered     heparin 25,000 units in dextrose 5% 250 mL (100 units/mL) infusion LOW INTENSITY nomogram - [facility]  Continuous      11/04/19 1552     heparin 25,000 units in dextrose 5% (100 units/ml) IV bolus from bag - ADDITIONAL PRN BOLUS - 60 units/kg (max bolus 4000 units)  As needed (PRN)      11/04/19 1552     heparin 25,000 units in dextrose 5% (100 units/ml) IV bolus from bag - ADDITIONAL PRN BOLUS - 30 units/kg (max bolus 4000 units)  As needed (PRN)      11/04/19 1552     Reason for no Mechanical VTE  Prophylaxis  Once      11/04/19 6507              Chart reviewed. Patient examined by Dr. Van and agrees with plan that has been outlined.     MONICA Garcia  Cardiology  [facility] [place]

## 2019-11-05 NOTE — ASSESSMENT & PLAN NOTE
-Due to pulmonary fibrosis and pulmonary hypertension.   -Continue Ofev, inhaled medications and oxygen therapy as needed.  Supplemental oxygen to maintain sats > 92 %

## 2019-11-05 NOTE — NURSING
Pt has BP 79/52 manual in right arm. HR 59. Asymptomatic. Just finished 1 unit PRBC. Dr Walden notified. New order received fro 500cc NS bolus. Will continue to monitor closely.

## 2019-11-05 NOTE — ASSESSMENT & PLAN NOTE
-Appears compensated.   -Hold metoprolol and diuretics due to bradycardia and hypotension, respectively.   -Monitor volume status.   Likely dehydrated due to diarrhea, decreased oral intake and continued diuretics

## 2019-11-05 NOTE — ASSESSMENT & PLAN NOTE
-Transfuse one unit PRBCs.   -Monitor for response as well as signs and symptoms of bleeding.   11/5 - Hgb 8.0, H & H every 8 hours

## 2019-11-05 NOTE — PROGRESS NOTES
"[facility] Hospital Medicine  Progress Note    Patient Name: Meg Sal  MRN: [id number]  Patient Class: IP- Inpatient   Admission Date: 11/4/2019  Length of Stay: 1 days  Attending Physician: Jean Pierre Mcdermott MD  Primary Care Provider: Aquilino Hightower MD        Subjective:     Principal Problem:Symptomatic anemia        HPI:  Meg Sal is a 49-year-old female with a mechanical aortic valve, pulmonary fibrosis, CHF, CAD, HLD and HTN that presented to the ED on 11/04/19 after she collapsed on the way to the bathroom in the middle of the night. She states that she has been experiencing weakness in her arms, back, and legs for 4 days. This progressively worsened until she collapsed while walking to the bathroom and had to be helped off the floor by her . There are no aggravating or alleviating factors. Associated symptoms include chest pain that she describes as a "pressure" in the center of her chest, nausea and diarrhea. She describes the combination of symptoms as similar to her previous heart attacks. She denies worsened shortness of breath and vomiting. Of note, the patient states that she recently had black stools for 4 days after returning from a trip to [place], but resolved 4 days prior to presentation. Labs are remarkable for a hemoglobin of 8.4, hematocrit of 27.9, INR of 1.7 and troponin of 0.275.    Overview/Hospital Course:  Over the last 2 weeks pt has had decreased appetite, poor fluid intake, diarrhea and emotional time and her father had passed. She continued to take her lasix. She presented with weakness and hypotension. Diuretics held and gentle IV fluids given. Also, with chronically elevated troponin and Cardiology felt DEMAND ISCHEMIA. Due to mechanical valve, coumadin on hold and heparin gtt in progress. 2 D ECHO did not show any new changes. Around 3 weeks earlier, pt had 4 days of melena which had resolved. Hgb/Hct has trended down and pt received 1 unit " PrBCs for symptomatic anemia. Cardiology assisting with care.         Review of Systems   Constitutional: Positive for activity change. Negative for appetite change, chills, diaphoresis, fatigue and fever.   HENT: Negative for congestion, ear pain, mouth sores, sore throat and trouble swallowing.    Eyes: Negative for pain and visual disturbance.   Respiratory: Negative for cough, chest tightness and shortness of breath.    Cardiovascular: Positive for chest pain. Negative for palpitations and leg swelling.   Gastrointestinal: Positive for nausea. Negative for abdominal pain, constipation, diarrhea and vomiting.   Endocrine: Negative for cold intolerance, heat intolerance, polydipsia and polyuria.   Genitourinary: Negative for dysuria, frequency and hematuria.        Decreased urination   Musculoskeletal: Negative for arthralgias, back pain, myalgias and neck pain.   Skin: Negative for pallor, rash and wound.   Allergic/Immunologic: Negative for environmental allergies and immunocompromised state.   Neurological: Positive for weakness. Negative for dizziness, seizures, syncope, numbness and headaches.   Hematological: Negative for adenopathy. Does not bruise/bleed easily.   Psychiatric/Behavioral: Negative for agitation, confusion and sleep disturbance.     Objective:     Vital Signs (Most Recent):  Temp: 97.4 °F (36.3 °C) (11/05/19 1519)  Pulse: 80 (11/05/19 1519)  Resp: 18 (11/05/19 1519)  BP: (!) 115/56 (11/05/19 1519)  SpO2: 97 % (11/05/19 1519) Vital Signs (24h Range):  Temp:  [97.4 °F (36.3 °C)-98.5 °F (36.9 °C)] 97.4 °F (36.3 °C)  Pulse:  [55-84] 80  Resp:  [16-20] 18  SpO2:  [95 %-100 %] 97 %  BP: ()/(42-58) 115/56     Weight: 76.1 kg (167 lb 12.3 oz)  Body mass index is 32.77 kg/m².    Intake/Output Summary (Last 24 hours) at 11/5/2019 1602  Last data filed at 11/5/2019 0600  Gross per 24 hour   Intake 1210.53 ml   Output 500 ml   Net 710.53 ml      Physical Exam   Constitutional: She is oriented to  person, place, and time. She appears well-developed and well-nourished. No distress.   HENT:   Head: Normocephalic and atraumatic.   Eyes: Conjunctivae are normal.   PERRL   Neck: Neck supple. No JVD present.   Cardiovascular: Normal rate and regular rhythm.   Murmur heard.  Pulmonary/Chest: Effort normal and breath sounds normal.   Abdominal: Soft. Bowel sounds are normal. She exhibits no distension. There is no tenderness.   Musculoskeletal: Normal range of motion.   Neurological: She is alert and oriented to person, place, and time.   Skin: Skin is warm and dry.   Psychiatric: She has a normal mood and affect. Her behavior is normal. Thought content normal.   Nursing note and vitals reviewed.      Significant Labs:   CBC:   Recent Labs   Lab 11/04/19  1232 11/04/19  2326 11/05/19  0419 11/05/19  0751   WBC 7.65  --  5.82  --    HGB 8.4* 8.0* 7.9* 8.0*   HCT 27.9* 25.5* 26.6* 26.1*     --  167  --      CMP:   Recent Labs   Lab 11/04/19  1232 11/05/19  0419    138   K 3.4* 2.8*   CL 98 102   CO2 26 26   * 122*   BUN 18 16   CREATININE 1.2 0.9   CALCIUM 9.2 8.6*   PROT 7.7  --    ALBUMIN 3.8  --    BILITOT 0.4  --    ALKPHOS 103  --    AST 31  --    ALT 11  --    ANIONGAP 14 10   EGFRNONAA 53* >60     All pertinent labs within the past 24 hours have been reviewed.    Significant Imaging: I have reviewed all pertinent imaging results/findings within the past 24 hours.      Assessment/Plan:      * Symptomatic anemia  -Transfuse one unit PRBCs.   -Monitor for response as well as signs and symptoms of bleeding.   11/5 - Hgb 8.0, H & H every 8 hours      Anticoagulant long-term use  Coumadin on hold  Heparin infusion for now due to mechanical aortic valve      Chronic respiratory failure with hypoxia  -Due to pulmonary fibrosis and pulmonary hypertension.   -Continue Ofev, inhaled medications and oxygen therapy as needed.  Supplemental oxygen to maintain sats > 92 %       Elevated troponin with chest  pain  -Concerning for ACS.   -Trend troponin.   -Continue daily ASA and Crestor.   -Hold metoprolol due to bradycardia.   -Cardiology consult.   11/5 - troponins flat and chronically elevated      Hypokalemia  Monitor and replace as needed.   Repeat potassium level to monitor      Essential hypertension  -Patient is currently hypotensive and bradycardic.   -Hold metoprolol and diuretics.       Anxiety with depression  Continue Elavil, Buspar, Klonopin, Pristiq and Trazodone.       Hx of mechanical aortic valve replacement  -Continue coumadin, cautiously.  -Monitor daily INR.   11/5 -  INR 1.6 - Coumadin stopped and pt on heparin infusion as her troponins were elevated and she has the mechanical aortic valve. Monitor for bleeding    Chronic diastolic heart failure  -Appears compensated.   -Hold metoprolol and diuretics due to bradycardia and hypotension, respectively.   -Monitor volume status.   Likely dehydrated due to diarrhea, decreased oral intake and continued diuretics        VTE Risk Mitigation (From admission, onward)         Ordered     heparin 25,000 units in dextrose 5% 250 mL (100 units/mL) infusion LOW INTENSITY nomogram - [facility]  Continuous      11/04/19 1552     heparin 25,000 units in dextrose 5% (100 units/ml) IV bolus from bag - ADDITIONAL PRN BOLUS - 60 units/kg (max bolus 4000 units)  As needed (PRN)      11/04/19 1552     heparin 25,000 units in dextrose 5% (100 units/ml) IV bolus from bag - ADDITIONAL PRN BOLUS - 30 units/kg (max bolus 4000 units)  As needed (PRN)      11/04/19 1552     Reason for no Mechanical VTE Prophylaxis  Once      11/04/19 1543                      Meena Alarcon NP  Department of Hospital Medicine   [facility]

## 2019-11-05 NOTE — PLAN OF CARE
Patient AAO x4. Hypotensive during shift. Asymptomatic.  Patient remained afebrile throughout the shift.  Patient remained free of falls this shift.  Plan of care reviewed.  Patient verbalized understanding.  Patient moving/turning independently  Heart rate closely monitored   Frequent weight shifting encouraged.  Patient SB-SR on monitor.  Heparin drip and IVF maintained  Bed low, siderails up x2, wheels locked, call light in reach.  Patient instructed to call for assistance.  Hourly rounding completed.  Will continue to monitor.

## 2019-11-05 NOTE — SUBJECTIVE & OBJECTIVE
Review of Systems   Constitutional: Positive for activity change. Negative for appetite change, chills, diaphoresis, fatigue and fever.   HENT: Negative for congestion, ear pain, mouth sores, sore throat and trouble swallowing.    Eyes: Negative for pain and visual disturbance.   Respiratory: Negative for cough, chest tightness and shortness of breath.    Cardiovascular: Positive for chest pain. Negative for palpitations and leg swelling.   Gastrointestinal: Positive for nausea. Negative for abdominal pain, constipation, diarrhea and vomiting.   Endocrine: Negative for cold intolerance, heat intolerance, polydipsia and polyuria.   Genitourinary: Negative for dysuria, frequency and hematuria.        Decreased urination   Musculoskeletal: Negative for arthralgias, back pain, myalgias and neck pain.   Skin: Negative for pallor, rash and wound.   Allergic/Immunologic: Negative for environmental allergies and immunocompromised state.   Neurological: Positive for weakness. Negative for dizziness, seizures, syncope, numbness and headaches.   Hematological: Negative for adenopathy. Does not bruise/bleed easily.   Psychiatric/Behavioral: Negative for agitation, confusion and sleep disturbance.     Objective:     Vital Signs (Most Recent):  Temp: 97.4 °F (36.3 °C) (11/05/19 1519)  Pulse: 80 (11/05/19 1519)  Resp: 18 (11/05/19 1519)  BP: (!) 115/56 (11/05/19 1519)  SpO2: 97 % (11/05/19 1519) Vital Signs (24h Range):  Temp:  [97.4 °F (36.3 °C)-98.5 °F (36.9 °C)] 97.4 °F (36.3 °C)  Pulse:  [55-84] 80  Resp:  [16-20] 18  SpO2:  [95 %-100 %] 97 %  BP: ()/(42-58) 115/56     Weight: 76.1 kg (167 lb 12.3 oz)  Body mass index is 32.77 kg/m².    Intake/Output Summary (Last 24 hours) at 11/5/2019 1602  Last data filed at 11/5/2019 0600  Gross per 24 hour   Intake 1210.53 ml   Output 500 ml   Net 710.53 ml      Physical Exam   Constitutional: She is oriented to person, place, and time. She appears well-developed and  well-nourished. No distress.   HENT:   Head: Normocephalic and atraumatic.   Eyes: Conjunctivae are normal.   PERRL   Neck: Neck supple. No JVD present.   Cardiovascular: Normal rate and regular rhythm.   Murmur heard.  Pulmonary/Chest: Effort normal and breath sounds normal.   Abdominal: Soft. Bowel sounds are normal. She exhibits no distension. There is no tenderness.   Musculoskeletal: Normal range of motion.   Neurological: She is alert and oriented to person, place, and time.   Skin: Skin is warm and dry.   Psychiatric: She has a normal mood and affect. Her behavior is normal. Thought content normal.   Nursing note and vitals reviewed.      Significant Labs:   CBC:   Recent Labs   Lab 11/04/19  1232 11/04/19  2326 11/05/19  0419 11/05/19  0751   WBC 7.65  --  5.82  --    HGB 8.4* 8.0* 7.9* 8.0*   HCT 27.9* 25.5* 26.6* 26.1*     --  167  --      CMP:   Recent Labs   Lab 11/04/19  1232 11/05/19  0419    138   K 3.4* 2.8*   CL 98 102   CO2 26 26   * 122*   BUN 18 16   CREATININE 1.2 0.9   CALCIUM 9.2 8.6*   PROT 7.7  --    ALBUMIN 3.8  --    BILITOT 0.4  --    ALKPHOS 103  --    AST 31  --    ALT 11  --    ANIONGAP 14 10   EGFRNONAA 53* >60     All pertinent labs within the past 24 hours have been reviewed.    Significant Imaging: I have reviewed all pertinent imaging results/findings within the past 24 hours.

## 2019-11-05 NOTE — ASSESSMENT & PLAN NOTE
-Concerning for ACS.   -Trend troponin.   -Continue daily ASA and Crestor.   -Hold metoprolol due to bradycardia.   -Cardiology consult.   11/5 - troponins flat and chronically elevated

## 2019-11-05 NOTE — HOSPITAL COURSE
Over the last 2 weeks pt has had decreased appetite, poor fluid intake, diarrhea and an emotional time and her father had passed. She continued to take her lasix. She presented with weakness and hypotension. Diuretics held and gentle IV fluids given. Also, with chronically elevated troponin and Cardiology felt DEMAND ISCHEMIA. Due to mechanical valve, coumadin on hold and heparin gtt in progress. 2 D ECHO did not show any new changes. Around 3 weeks earlier, pt had 4 days of melena which had resolved. Hgb/Hct has trended down and pt received 1 unit PrBCs for symptomatic anemia. Cardiology assisting with care. On  - Hgb/Hct dropped below 8 and pt transfused 2 units PRBCs. IV fluids discontinued as hypotension resolved and pt received some prn lasix is association with blood transfusion. Cardiology advised resuming Coumadin with heparin bridge however, will order Lovenox bridge as pt is familiar and expressing desire for discharge. Gastroenterology saw the patient due to possible GI bleed and they recommend PPI and conservative management. Requesting risk stratification from Cardiology before any scopes done. Dr. Enciso saw the patient and encouraged the patient to get EGD and colonoscopy. Pt placed on clear liquids with plans for scopes on Friday as patient needs colonoscopy prep.  - Pt's lab work and vital signs stable. She denied further weakness and hematochezia. Pt verbalized desire to discharge as she had to plan her father's . Confirmed with Cardiology that Coumadin bridged with Lovenox was permitted. Pt was seen and examined and determined to be safe and stable for discharge. Pt was discharged on her Coumadin and therapeutic Lovenox. Pt stated she was knowledgeable in Lovenox administration. She was advised to follow up with Cardiology and Coumadin clinic.

## 2019-11-06 LAB
ANION GAP SERPL CALC-SCNC: 8 MMOL/L (ref 8–16)
APTT BLDCRRT: 36.2 SEC (ref 21–32)
APTT BLDCRRT: 47.5 SEC (ref 21–32)
APTT BLDCRRT: 47.5 SEC (ref 21–32)
BASOPHILS # BLD AUTO: 0.03 K/UL (ref 0–0.2)
BASOPHILS NFR BLD: 0.4 % (ref 0–1.9)
BLD PROD TYP BPU: NORMAL
BLD PROD TYP BPU: NORMAL
BLOOD UNIT EXPIRATION DATE: NORMAL
BLOOD UNIT EXPIRATION DATE: NORMAL
BLOOD UNIT TYPE CODE: 9500
BLOOD UNIT TYPE CODE: 9500
BLOOD UNIT TYPE: NORMAL
BLOOD UNIT TYPE: NORMAL
BUN SERPL-MCNC: 9 MG/DL (ref 6–20)
CALCIUM SERPL-MCNC: 8.4 MG/DL (ref 8.7–10.5)
CHLORIDE SERPL-SCNC: 108 MMOL/L (ref 95–110)
CO2 SERPL-SCNC: 24 MMOL/L (ref 23–29)
CODING SYSTEM: NORMAL
CODING SYSTEM: NORMAL
CREAT SERPL-MCNC: 0.7 MG/DL (ref 0.5–1.4)
DIFFERENTIAL METHOD: ABNORMAL
DISPENSE STATUS: NORMAL
DISPENSE STATUS: NORMAL
EOSINOPHIL # BLD AUTO: 0.3 K/UL (ref 0–0.5)
EOSINOPHIL NFR BLD: 3.9 % (ref 0–8)
ERYTHROCYTE [DISTWIDTH] IN BLOOD BY AUTOMATED COUNT: 16.7 % (ref 11.5–14.5)
EST. GFR  (AFRICAN AMERICAN): >60 ML/MIN/1.73 M^2
EST. GFR  (NON AFRICAN AMERICAN): >60 ML/MIN/1.73 M^2
GLUCOSE SERPL-MCNC: 116 MG/DL (ref 70–110)
HCT VFR BLD AUTO: 25 % (ref 37–48.5)
HCT VFR BLD AUTO: 26.8 % (ref 37–48.5)
HCT VFR BLD AUTO: 27.7 % (ref 37–48.5)
HCT VFR BLD AUTO: 35.5 % (ref 37–48.5)
HGB BLD-MCNC: 11.1 G/DL (ref 12–16)
HGB BLD-MCNC: 7.2 G/DL (ref 12–16)
HGB BLD-MCNC: 7.8 G/DL (ref 12–16)
HGB BLD-MCNC: 8 G/DL (ref 12–16)
IMM GRANULOCYTES # BLD AUTO: 0.02 K/UL (ref 0–0.04)
IMM GRANULOCYTES NFR BLD AUTO: 0.3 % (ref 0–0.5)
INR PPP: 1.1 (ref 0.8–1.2)
INR PPP: 1.3 (ref 0.8–1.2)
LYMPHOCYTES # BLD AUTO: 1.8 K/UL (ref 1–4.8)
LYMPHOCYTES NFR BLD: 24.7 % (ref 18–48)
MAGNESIUM SERPL-MCNC: 2.3 MG/DL (ref 1.6–2.6)
MCH RBC QN AUTO: 28.9 PG (ref 27–31)
MCHC RBC AUTO-ENTMCNC: 28.8 G/DL (ref 32–36)
MCV RBC AUTO: 100 FL (ref 82–98)
MONOCYTES # BLD AUTO: 0.4 K/UL (ref 0.3–1)
MONOCYTES NFR BLD: 6.1 % (ref 4–15)
NEUTROPHILS # BLD AUTO: 4.7 K/UL (ref 1.8–7.7)
NEUTROPHILS NFR BLD: 64.6 % (ref 38–73)
NRBC BLD-RTO: 0 /100 WBC
NUM UNITS TRANS PACKED RBC: NORMAL
NUM UNITS TRANS PACKED RBC: NORMAL
PHOSPHATE SERPL-MCNC: 1.7 MG/DL (ref 2.7–4.5)
PLATELET # BLD AUTO: 145 K/UL (ref 150–350)
PMV BLD AUTO: 10.1 FL (ref 9.2–12.9)
POTASSIUM SERPL-SCNC: 3.8 MMOL/L (ref 3.5–5.1)
PROTHROMBIN TIME: 11.9 SEC (ref 9–12.5)
PROTHROMBIN TIME: 13.7 SEC (ref 9–12.5)
RBC # BLD AUTO: 2.49 M/UL (ref 4–5.4)
SODIUM SERPL-SCNC: 140 MMOL/L (ref 136–145)
WBC # BLD AUTO: 7.22 K/UL (ref 3.9–12.7)

## 2019-11-06 PROCEDURE — P9016 RBC LEUKOCYTES REDUCED: HCPCS | Mod: NTX

## 2019-11-06 PROCEDURE — 94640 AIRWAY INHALATION TREATMENT: CPT | Mod: NTX

## 2019-11-06 PROCEDURE — 94761 N-INVAS EAR/PLS OXIMETRY MLT: CPT | Mod: NTX

## 2019-11-06 PROCEDURE — 63600175 PHARM REV CODE 636 W HCPCS: Mod: NTX | Performed by: NURSE PRACTITIONER

## 2019-11-06 PROCEDURE — 25000003 PHARM REV CODE 250: Mod: NTX | Performed by: EMERGENCY MEDICINE

## 2019-11-06 PROCEDURE — 85730 THROMBOPLASTIN TIME PARTIAL: CPT | Mod: NTX

## 2019-11-06 PROCEDURE — 99223 PR INITIAL HOSPITAL CARE,LEVL III: ICD-10-PCS | Mod: NTX,,, | Performed by: INTERNAL MEDICINE

## 2019-11-06 PROCEDURE — 25000003 PHARM REV CODE 250: Mod: NTX | Performed by: PHYSICIAN ASSISTANT

## 2019-11-06 PROCEDURE — 99232 PR SUBSEQUENT HOSPITAL CARE,LEVL II: ICD-10-PCS | Mod: NTX,,, | Performed by: INTERNAL MEDICINE

## 2019-11-06 PROCEDURE — 99900035 HC TECH TIME PER 15 MIN (STAT): Mod: NTX

## 2019-11-06 PROCEDURE — 85730 THROMBOPLASTIN TIME PARTIAL: CPT | Mod: 91,NTX

## 2019-11-06 PROCEDURE — 80048 BASIC METABOLIC PNL TOTAL CA: CPT | Mod: NTX

## 2019-11-06 PROCEDURE — 25000003 PHARM REV CODE 250: Mod: NTX | Performed by: NURSE PRACTITIONER

## 2019-11-06 PROCEDURE — 27000221 HC OXYGEN, UP TO 24 HOURS: Mod: NTX

## 2019-11-06 PROCEDURE — 83735 ASSAY OF MAGNESIUM: CPT | Mod: NTX

## 2019-11-06 PROCEDURE — 25000003 PHARM REV CODE 250: Mod: NTX | Performed by: FAMILY MEDICINE

## 2019-11-06 PROCEDURE — 84100 ASSAY OF PHOSPHORUS: CPT | Mod: NTX

## 2019-11-06 PROCEDURE — 63600175 PHARM REV CODE 636 W HCPCS: Mod: NTX | Performed by: INTERNAL MEDICINE

## 2019-11-06 PROCEDURE — 99232 SBSQ HOSP IP/OBS MODERATE 35: CPT | Mod: NTX,,, | Performed by: INTERNAL MEDICINE

## 2019-11-06 PROCEDURE — 85018 HEMOGLOBIN: CPT | Mod: NTX

## 2019-11-06 PROCEDURE — 85610 PROTHROMBIN TIME: CPT | Mod: NTX

## 2019-11-06 PROCEDURE — 85014 HEMATOCRIT: CPT | Mod: 91,NTX

## 2019-11-06 PROCEDURE — 36430 TRANSFUSION BLD/BLD COMPNT: CPT | Mod: NTX

## 2019-11-06 PROCEDURE — 25000242 PHARM REV CODE 250 ALT 637 W/ HCPCS: Mod: NTX | Performed by: PHYSICIAN ASSISTANT

## 2019-11-06 PROCEDURE — 85610 PROTHROMBIN TIME: CPT | Mod: 91,NTX

## 2019-11-06 PROCEDURE — 63600175 PHARM REV CODE 636 W HCPCS: Mod: NTX | Performed by: PHYSICIAN ASSISTANT

## 2019-11-06 PROCEDURE — 36415 COLL VENOUS BLD VENIPUNCTURE: CPT | Mod: NTX

## 2019-11-06 PROCEDURE — 63600175 PHARM REV CODE 636 W HCPCS: Mod: NTX | Performed by: EMERGENCY MEDICINE

## 2019-11-06 PROCEDURE — 85018 HEMOGLOBIN: CPT | Mod: 91,NTX

## 2019-11-06 PROCEDURE — 85014 HEMATOCRIT: CPT | Mod: NTX

## 2019-11-06 PROCEDURE — 21400001 HC TELEMETRY ROOM: Mod: NTX

## 2019-11-06 PROCEDURE — 99223 1ST HOSP IP/OBS HIGH 75: CPT | Mod: NTX,,, | Performed by: INTERNAL MEDICINE

## 2019-11-06 PROCEDURE — S4991 NICOTINE PATCH NONLEGEND: HCPCS | Mod: NTX | Performed by: PHYSICIAN ASSISTANT

## 2019-11-06 PROCEDURE — 85025 COMPLETE CBC W/AUTO DIFF WBC: CPT | Mod: NTX

## 2019-11-06 RX ORDER — WARFARIN 2 MG/1
6 TABLET ORAL
Status: DISCONTINUED | OUTPATIENT
Start: 2019-11-06 | End: 2019-11-06

## 2019-11-06 RX ORDER — WARFARIN 2 MG/1
6 TABLET ORAL
Status: DISCONTINUED | OUTPATIENT
Start: 2019-11-12 | End: 2019-11-06

## 2019-11-06 RX ORDER — ENOXAPARIN SODIUM 100 MG/ML
1 INJECTION SUBCUTANEOUS
Status: DISCONTINUED | OUTPATIENT
Start: 2019-11-06 | End: 2019-11-06

## 2019-11-06 RX ORDER — FUROSEMIDE 10 MG/ML
40 INJECTION INTRAMUSCULAR; INTRAVENOUS ONCE
Status: COMPLETED | OUTPATIENT
Start: 2019-11-06 | End: 2019-11-06

## 2019-11-06 RX ORDER — LOPERAMIDE HYDROCHLORIDE 2 MG/1
2 CAPSULE ORAL 4 TIMES DAILY PRN
Status: DISCONTINUED | OUTPATIENT
Start: 2019-11-06 | End: 2019-11-07 | Stop reason: HOSPADM

## 2019-11-06 RX ORDER — WARFARIN 1 MG/1
1 TABLET ORAL
Status: DISCONTINUED | OUTPATIENT
Start: 2019-11-12 | End: 2019-11-07 | Stop reason: HOSPADM

## 2019-11-06 RX ORDER — HYDROCODONE BITARTRATE AND ACETAMINOPHEN 500; 5 MG/1; MG/1
TABLET ORAL
Status: DISCONTINUED | OUTPATIENT
Start: 2019-11-06 | End: 2019-11-07 | Stop reason: HOSPADM

## 2019-11-06 RX ORDER — HEPARIN SODIUM,PORCINE/D5W 25000/250
12 INTRAVENOUS SOLUTION INTRAVENOUS CONTINUOUS
Status: DISCONTINUED | OUTPATIENT
Start: 2019-11-06 | End: 2019-11-07

## 2019-11-06 RX ORDER — PANTOPRAZOLE SODIUM 40 MG/1
40 TABLET, DELAYED RELEASE ORAL 2 TIMES DAILY
Status: DISCONTINUED | OUTPATIENT
Start: 2019-11-06 | End: 2019-11-07 | Stop reason: HOSPADM

## 2019-11-06 RX ORDER — METOPROLOL SUCCINATE 25 MG/1
25 TABLET, EXTENDED RELEASE ORAL DAILY
Status: DISCONTINUED | OUTPATIENT
Start: 2019-11-06 | End: 2019-11-07 | Stop reason: HOSPADM

## 2019-11-06 RX ORDER — SODIUM,POTASSIUM PHOSPHATES 280-250MG
2 POWDER IN PACKET (EA) ORAL
Status: DISCONTINUED | OUTPATIENT
Start: 2019-11-06 | End: 2019-11-06

## 2019-11-06 RX ORDER — WARFARIN 2 MG/1
6 TABLET ORAL
Status: DISCONTINUED | OUTPATIENT
Start: 2019-11-09 | End: 2019-11-06

## 2019-11-06 RX ORDER — HEPARIN SODIUM,PORCINE/D5W 25000/250
12 INTRAVENOUS SOLUTION INTRAVENOUS CONTINUOUS
Status: DISCONTINUED | OUTPATIENT
Start: 2019-11-06 | End: 2019-11-06

## 2019-11-06 RX ORDER — SUMATRIPTAN 50 MG/1
100 TABLET, FILM COATED ORAL ONCE
Status: COMPLETED | OUTPATIENT
Start: 2019-11-06 | End: 2019-11-06

## 2019-11-06 RX ADMIN — DEXTROSE MONOHYDRATE 20 MMOL: 5 INJECTION, SOLUTION INTRAVENOUS at 07:11

## 2019-11-06 RX ADMIN — BUDESONIDE 0.5 MG: 0.5 INHALANT RESPIRATORY (INHALATION) at 09:11

## 2019-11-06 RX ADMIN — FUROSEMIDE 40 MG: 10 INJECTION, SOLUTION INTRAMUSCULAR; INTRAVENOUS at 01:11

## 2019-11-06 RX ADMIN — CLONAZEPAM 0.5 MG: 0.5 TABLET ORAL at 02:11

## 2019-11-06 RX ADMIN — WARFARIN SODIUM 6 MG: 2 TABLET ORAL at 02:11

## 2019-11-06 RX ADMIN — BUSPIRONE HYDROCHLORIDE 15 MG: 10 TABLET ORAL at 09:11

## 2019-11-06 RX ADMIN — HYDROCODONE BITARTRATE AND ACETAMINOPHEN 1 TABLET: 10; 325 TABLET ORAL at 02:11

## 2019-11-06 RX ADMIN — ARFORMOTEROL TARTRATE 15 MCG: 15 SOLUTION RESPIRATORY (INHALATION) at 07:11

## 2019-11-06 RX ADMIN — ASPIRIN 81 MG: 81 TABLET, COATED ORAL at 08:11

## 2019-11-06 RX ADMIN — LOPERAMIDE HYDROCHLORIDE 2 MG: 2 CAPSULE ORAL at 06:11

## 2019-11-06 RX ADMIN — DESVENLAFAXINE SUCCINATE 100 MG: 100 TABLET, FILM COATED, EXTENDED RELEASE ORAL at 08:11

## 2019-11-06 RX ADMIN — HYDROCODONE BITARTRATE AND ACETAMINOPHEN 1 TABLET: 10; 325 TABLET ORAL at 09:11

## 2019-11-06 RX ADMIN — TOPIRAMATE 100 MG: 100 TABLET, FILM COATED ORAL at 08:11

## 2019-11-06 RX ADMIN — POTASSIUM & SODIUM PHOSPHATES POWDER PACK 280-160-250 MG 2 PACKET: 280-160-250 PACK at 12:11

## 2019-11-06 RX ADMIN — BUSPIRONE HYDROCHLORIDE 15 MG: 10 TABLET ORAL at 08:11

## 2019-11-06 RX ADMIN — METOPROLOL SUCCINATE 25 MG: 25 TABLET, EXTENDED RELEASE ORAL at 02:11

## 2019-11-06 RX ADMIN — ROSUVASTATIN CALCIUM 20 MG: 10 TABLET, FILM COATED ORAL at 08:11

## 2019-11-06 RX ADMIN — SUMATRIPTAN SUCCINATE 100 MG: 50 TABLET ORAL at 09:11

## 2019-11-06 RX ADMIN — SODIUM CHLORIDE 500 ML: 0.9 INJECTION, SOLUTION INTRAVENOUS at 12:11

## 2019-11-06 RX ADMIN — BUDESONIDE 0.5 MG: 0.5 INHALANT RESPIRATORY (INHALATION) at 07:11

## 2019-11-06 RX ADMIN — HEPARIN SODIUM AND DEXTROSE 12 UNITS/KG/HR: 10000; 5 INJECTION INTRAVENOUS at 09:11

## 2019-11-06 RX ADMIN — HEPARIN SODIUM AND DEXTROSE 12 UNITS/KG/HR: 10000; 5 INJECTION INTRAVENOUS at 01:11

## 2019-11-06 RX ADMIN — TRAZODONE HYDROCHLORIDE 150 MG: 100 TABLET ORAL at 09:11

## 2019-11-06 RX ADMIN — HYDROCODONE BITARTRATE AND ACETAMINOPHEN 1 TABLET: 10; 325 TABLET ORAL at 12:11

## 2019-11-06 RX ADMIN — NICOTINE 1 PATCH: 7 PATCH, EXTENDED RELEASE TRANSDERMAL at 05:11

## 2019-11-06 RX ADMIN — SODIUM CHLORIDE: 0.9 INJECTION, SOLUTION INTRAVENOUS at 12:11

## 2019-11-06 RX ADMIN — PANTOPRAZOLE SODIUM 40 MG: 40 TABLET, DELAYED RELEASE ORAL at 09:11

## 2019-11-06 RX ADMIN — TOPIRAMATE 100 MG: 100 TABLET, FILM COATED ORAL at 09:11

## 2019-11-06 RX ADMIN — HYDROCODONE BITARTRATE AND ACETAMINOPHEN 1 TABLET: 10; 325 TABLET ORAL at 08:11

## 2019-11-06 RX ADMIN — ARFORMOTEROL TARTRATE 15 MCG: 15 SOLUTION RESPIRATORY (INHALATION) at 09:11

## 2019-11-06 RX ADMIN — PANTOPRAZOLE SODIUM 40 MG: 40 TABLET, DELAYED RELEASE ORAL at 08:11

## 2019-11-06 RX ADMIN — CLONAZEPAM 0.5 MG: 0.5 TABLET ORAL at 08:11

## 2019-11-06 RX ADMIN — ENOXAPARIN SODIUM 70 MG: 100 INJECTION SUBCUTANEOUS at 02:11

## 2019-11-06 NOTE — HPI
The patient presented to the ER for SOB. She also reported that she collapsed at home. She was noted to have a Hgb of 8.4. It was 10.4 in June. She received one unit of blood and repeat this morning was 7.2. Two more units have been ordered. She has a history of mechanical valve and takes Coumdin at home. Her INR on admit was 1.7. Cardiology was consulted and she was started on Heparin drip. She was hypotensive on admit, but it has resolved with IV fluids. BUN normal. She reports having maroon stools about three weeks ago which lasted a couple of days. She hasn't had any since. She denies melena, nausea, vomiting or abdominal pain. She has intermittent diarrhea which is chronic. She attributes it to one of her medications. She reports a history of ulcers when she was in her 20s. She currently takes Zantac for reflux. She thinks she had an EGD and colonoscopy when she was in her 20s.

## 2019-11-06 NOTE — PROGRESS NOTES
"[facility] Hospital Medicine  Progress Note    Patient Name: Meg Sal  MRN: [id number]  Patient Class: IP- Inpatient   Admission Date: 11/4/2019  Length of Stay: 2 days  Attending Physician: Jean Pierre Mcdermott MD  Primary Care Provider: Aquilino Hightower MD        Subjective:     Principal Problem:Symptomatic anemia        HPI:  Meg Sal is a 49-year-old female with a mechanical aortic valve, pulmonary fibrosis, CHF, CAD, HLD and HTN that presented to the ED on 11/04/19 after she collapsed on the way to the bathroom in the middle of the night. She states that she has been experiencing weakness in her arms, back, and legs for 4 days. This progressively worsened until she collapsed while walking to the bathroom and had to be helped off the floor by her . There are no aggravating or alleviating factors. Associated symptoms include chest pain that she describes as a "pressure" in the center of her chest, nausea and diarrhea. She describes the combination of symptoms as similar to her previous heart attacks. She denies worsened shortness of breath and vomiting. Of note, the patient states that she recently had black stools for 4 days after returning from a trip to [place], but resolved 4 days prior to presentation. Labs are remarkable for a hemoglobin of 8.4, hematocrit of 27.9, INR of 1.7 and troponin of 0.275.    Overview/Hospital Course:  Over the last 2 weeks pt has had decreased appetite, poor fluid intake, diarrhea and an emotional time and her father had passed. She continued to take her lasix. She presented with weakness and hypotension. Diuretics held and gentle IV fluids given. Also, with chronically elevated troponin and Cardiology felt DEMAND ISCHEMIA. Due to mechanical valve, coumadin on hold and heparin gtt in progress. 2 D ECHO did not show any new changes. Around 3 weeks earlier, pt had 4 days of melena which had resolved. Hgb/Hct has trended down and pt received 1 unit " PrBCs for symptomatic anemia. Cardiology assisting with care. On 11/6 - Hgb/Hct dropped below 8 and pt transfused 2 units PRBCs. IV fluids discontinued as hypotension resolved and pt received some prn lasix is association with blood transfusion. Cardiology advised resuming Coumadin with heparin bridge however, will order Lovenox bridge as pt is familiar and expressing desire for discharge. Gastroenterology saw the patient due to possible GI bleed and they recommend PPI and conservative management. Requesting risk stratification from Cardiology before any scopes done.     Interval History:  Pt feeling better - feels stronger. She denies SOB. Had diarrhea but she says likely related to her Pulmonary HTN meds. Denies any chest pain.     Review of Systems   Constitutional: Positive for activity change. Negative for appetite change, chills, diaphoresis, fatigue and fever.   HENT: Negative for congestion, ear pain, mouth sores, sore throat and trouble swallowing.    Eyes: Negative for pain and visual disturbance.   Respiratory: Negative for cough, chest tightness and shortness of breath.    Cardiovascular: Negative for chest pain, palpitations and leg swelling.   Gastrointestinal: Positive for diarrhea. Negative for abdominal pain, constipation, nausea and vomiting.   Endocrine: Negative for cold intolerance, heat intolerance, polydipsia and polyuria.   Genitourinary: Negative for dysuria, frequency and hematuria.   Musculoskeletal: Negative for arthralgias, back pain, myalgias and neck pain.   Skin: Negative for pallor, rash and wound.   Allergic/Immunologic: Negative for environmental allergies and immunocompromised state.   Neurological: Positive for weakness. Negative for dizziness, seizures, syncope, numbness and headaches.   Hematological: Negative for adenopathy. Does not bruise/bleed easily.   Psychiatric/Behavioral: Negative for agitation, confusion and sleep disturbance.     Objective:     Vital Signs (Most  Recent):  Temp: 98.1 °F (36.7 °C) (11/06/19 1301)  Pulse: 73 (11/06/19 1301)  Resp: (!) 26 (11/06/19 1301)  BP: (!) 107/59 (11/06/19 1301)  SpO2: (!) 94 % (11/06/19 1301) Vital Signs (24h Range):  Temp:  [97.1 °F (36.2 °C)-98.5 °F (36.9 °C)] 98.1 °F (36.7 °C)  Pulse:  [65-92] 73  Resp:  [16-26] 26  SpO2:  [93 %-99 %] 94 %  BP: ()/(46-75) 107/59     Weight: 73.1 kg (161 lb 2.5 oz)  Body mass index is 31.47 kg/m².    Intake/Output Summary (Last 24 hours) at 11/6/2019 1423  Last data filed at 11/6/2019 0400  Gross per 24 hour   Intake 1144 ml   Output --   Net 1144 ml      Physical Exam   Constitutional: She is oriented to person, place, and time. She appears well-developed and well-nourished. No distress.   HENT:   Head: Normocephalic and atraumatic.   Eyes: Conjunctivae are normal.   PERRL   Neck: Neck supple. No JVD present.   Cardiovascular: Normal rate. An irregular rhythm present.   Murmur heard.  Valve clicking   Pulmonary/Chest: Effort normal and breath sounds normal.   Abdominal: Soft. Bowel sounds are normal. She exhibits no distension. There is no tenderness.   Musculoskeletal: Normal range of motion.   Neurological: She is alert and oriented to person, place, and time.   Skin: Skin is warm and dry. There is pallor.   Psychiatric: She has a normal mood and affect. Her behavior is normal. Thought content normal.   Nursing note and vitals reviewed.      Significant Labs:   CBC:   Recent Labs   Lab 11/05/19  0419  11/05/19  2350 11/06/19  0344 11/06/19  0743   WBC 5.82  --   --  7.22  --    HGB 7.9*   < > 7.8* 7.2* 8.0*   HCT 26.6*   < > 26.8* 25.0* 27.7*     --   --  145*  --     < > = values in this interval not displayed.     CMP:   Recent Labs   Lab 11/05/19  0419 11/05/19  1552 11/06/19  0344     --  140   K 2.8* 3.7 3.8     --  108   CO2 26  --  24   *  --  116*   BUN 16  --  9   CREATININE 0.9  --  0.7   CALCIUM 8.6*  --  8.4*   ANIONGAP 10  --  8   EGFRNONAA >60  --  >60      All pertinent labs within the past 24 hours have been reviewed.    Significant Imaging: I have reviewed all pertinent imaging results/findings within the past 24 hours.      Assessment/Plan:      * Symptomatic anemia  -Transfuse one unit PRBCs.   -Monitor for response as well as signs and symptoms of bleeding.   11/5 - Hgb 8.0, H & H every 8 hours  11/6 - Hgb has fallen below 8 - transfuse 2 units PRBCs - Gastroenterology consult      Anticoagulant long-term use  Coumadin on hold  Heparin infusion for now due to mechanical aortic valve  11/6 - Per Cardiology -resume Coumadin with Lovenox bridge. Daily PT/INR      Chronic respiratory failure with hypoxia  -Due to pulmonary fibrosis and pulmonary hypertension.   -Continue Ofev, inhaled medications and oxygen therapy as needed.  Supplemental oxygen to maintain sats > 92 %   stable      Elevated troponin with chest pain  -Concerning for ACS.   -Trend troponin.   -Continue daily ASA and Crestor.   -Hold metoprolol due to bradycardia.   -Cardiology consult.   11/5 - troponins flat and chronically elevated - DEMAND ISCHEMIA      Hypokalemia  RESOLVED  Monitor and replace as needed.   Repeat potassium level to monitor      Essential hypertension  -Patient is currently hypotensive and bradycardic.   -Hold metoprolol and diuretics.   11/6 - will resume BB and bradycardia resolved. Has received lasix intermittently      Anxiety with depression  Continue Elavil, Buspar, Klonopin, Pristiq and Trazodone.       Hx of mechanical aortic valve replacement  -Continue coumadin, cautiously.  -Monitor daily INR.   11/5 -  INR 1.6 - Coumadin stopped and pt on heparin infusion as her troponins were elevated and she has the mechanical aortic valve. Monitor for bleeding  11/6 - INR 1.3 - Cardiology says to restart Coumadin and continue heparin bridge - will start Lovenox bridge as pt desires discharge home soon - therapeutic lovenox prescribed and pharmacy dosing Coumadin    Chronic  diastolic heart failure  -Appears compensated.   -Hold metoprolol and diuretics due to bradycardia and hypotension, respectively.   -Monitor volume status.   Likely dehydrated due to diarrhea, decreased oral intake and continued diuretics        VTE Risk Mitigation (From admission, onward)         Ordered     warfarin (COUMADIN) tablet 6 mg  Every Tuesday 11/06/19 1340     warfarin (COUMADIN) tablet 6 mg  Every Saturday 11/06/19 1340     warfarin tablet 9 mg  Every Thursday 11/06/19 1340     enoxaparin injection 70 mg  Every 12 hours (non-standard times)      11/06/19 1336     warfarin (COUMADIN) tablet 6 mg  Every Mon, Wed, Fri, Sun      11/06/19 1340     Reason for no Mechanical VTE Prophylaxis  Once      11/04/19 1543                      Meena Alarcon NP  Department of Hospital Medicine   [facility]

## 2019-11-06 NOTE — CONSULTS
[facility] -   Gastroenterology  Consult Note    Patient Name: Meg Sal  MRN: [id number]  Admission Date: 11/4/2019  Hospital Length of Stay: 2 days  Code Status: Full Code   Attending Provider: Jean Pierre Mcdermott MD   Consulting Provider: Rico Bautista PA-C  Primary Care Physician: Aquilino Hightower MD  Principal Problem:Symptomatic anemia    Inpatient consult to Gastroenterology  Consult performed by: Rico Bautista PA-C  Consult ordered by: Meena Alarcon NP  Reason for consult: Symptomatic anemia        Subjective:     HPI:  The patient presented to the ER for SOB. She also reported that she collapsed at home. She was noted to have a Hgb of 8.4. It was 10.4 in June. She received one unit of blood and repeat this morning was 7.2. Two more units have been ordered. She has a history of mechanical valve and takes Coumdin at home. Her INR on admit was 1.7. Cardiology was consulted and she was started on Heparin drip. She was hypotensive on admit, but it has resolved with IV fluids. BUN normal. She reports having maroon stools about three weeks ago which lasted a couple of days. She hasn't had any since. She denies melena, nausea, vomiting or abdominal pain. She has intermittent diarrhea which is chronic. She attributes it to one of her medications. She reports a history of ulcers when she was in her 20s. She currently takes Zantac for reflux. She thinks she had an EGD and colonoscopy when she was in her 20s.     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, implants and grafts, initial encounter     Hyperlipidemia     Hypertension     Sleep apnea syndrome 2/8/2019    Stroke        Past Surgical History:   Procedure Laterality Date    AORTIC VALVE REPLACEMENT      mechanical    CARDIAC CATHETERIZATION       CORONARY ANGIOPLASTY      CORONARY ARTERY BYPASS GRAFT      HYSTERECTOMY      RIGHT HEART CATHETERIZATION Right 2019    Procedure: INSERTION, CATHETER, RIGHT HEART;  Surgeon: Derek Brown MD;  Location: [facility] CATH LAB;  Service: Cardiology;  Laterality: Right;  malur pt/detailed hx       Review of patient's allergies indicates:   Allergen Reactions    Nsaids (non-steroidal anti-inflammatory drug) Other (See Comments)     Mechanical heart valve     Family History     Problem Relation (Age of Onset)    Breast cancer Sister    Coronary artery disease Father    Heart disease Mother    Lung cancer Brother        Tobacco Use    Smoking status: Former Smoker     Packs/day: 1.00     Types: Cigarettes     Last attempt to quit: 2018     Years since quittin.4    Smokeless tobacco: Never Used   Substance and Sexual Activity    Alcohol use: Not Currently     Frequency: 2-4 times a month     Drinks per session: 1 or 2     Binge frequency: Never    Drug use: Not Currently    Sexual activity: Not on file     Review of Systems   Constitutional: Positive for fatigue. Negative for fever.   HENT: Negative for hearing loss.    Eyes: Negative for visual disturbance.   Respiratory: Positive for shortness of breath. Negative for cough.    Cardiovascular: Negative for chest pain.   Gastrointestinal:        As per HPI.   Genitourinary: Negative for dysuria, frequency and hematuria.   Musculoskeletal: Negative for arthralgias and back pain.   Skin: Negative for rash.   Neurological: Negative for seizures, syncope, numbness and headaches.   Hematological: Does not bruise/bleed easily.   Psychiatric/Behavioral: The patient is not nervous/anxious.      Objective:     Vital Signs (Most Recent):  Temp: 98.1 °F (36.7 °C) (19 1301)  Pulse: 73 (19 1301)  Resp: (!) 26 (19 1301)  BP: (!) 107/59 (19 1301)  SpO2: (!) 94 % (19 1301) Vital Signs (24h Range):  Temp:  [97.1 °F (36.2 °C)-98.5 °F (36.9  °C)] 98.1 °F (36.7 °C)  Pulse:  [65-92] 73  Resp:  [16-26] 26  SpO2:  [93 %-99 %] 94 %  BP: ()/(46-75) 107/59     Weight: 73.1 kg (161 lb 2.5 oz) (11/06/19 0400)  Body mass index is 31.47 kg/m².      Intake/Output Summary (Last 24 hours) at 11/6/2019 1328  Last data filed at 11/6/2019 0400  Gross per 24 hour   Intake 1144 ml   Output --   Net 1144 ml       Lines/Drains/Airways     Peripheral Intravenous Line                 Peripheral IV - Single Lumen 11/04/19 1620 20 G Left Forearm 1 day         Peripheral IV - Single Lumen 11/06/19 1205 18 G Right Upper Arm less than 1 day                Physical Exam   Constitutional: She is oriented to person, place, and time. She appears well-developed and well-nourished.   HENT:   Head: Normocephalic and atraumatic.   Eyes: EOM are normal.   Neck: Normal range of motion. Neck supple. Carotid bruit is not present.   Cardiovascular: Normal rate. An irregular rhythm present.   Murmur heard.  Pulmonary/Chest: Effort normal and breath sounds normal. No respiratory distress. She has no wheezes.   Abdominal: Soft. Normal appearance and bowel sounds are normal. She exhibits no distension and no mass. There is no tenderness.   Musculoskeletal: She exhibits no edema.   Neurological: She is alert and oriented to person, place, and time. No cranial nerve deficit.   Skin: Skin is warm and dry. No rash noted.   Psychiatric: Her behavior is normal.       Significant Labs:  CBC:   Recent Labs   Lab 11/05/19  0419  11/05/19  2350 11/06/19  0344 11/06/19  0743   WBC 5.82  --   --  7.22  --    HGB 7.9*   < > 7.8* 7.2* 8.0*   HCT 26.6*   < > 26.8* 25.0* 27.7*     --   --  145*  --     < > = values in this interval not displayed.     CMP:   Recent Labs   Lab 11/06/19  0344   *   CALCIUM 8.4*      K 3.8   CO2 24      BUN 9   CREATININE 0.7     Coagulation:   Recent Labs   Lab 11/06/19  0344   INR 1.3*   APTT 47.5*  47.5*       Significant Imaging:  Imaging results  within the past 24 hours have been reviewed.    Assessment/Plan:     * Symptomatic anemia  The patient has macrocytic anemia with reported maroon stools three weeks ago.   Agree with supportive care at this time with PPI and blood transfusion.   Endoscopy discussed but need to weigh risks and benefits. Will follow H/H. Need risk assessment from Cardiology. Need to be off Heparin and on Lovenox.   Discussed with HM team.     Anticoagulant long-term use  See above.     Hx of mechanical aortic valve replacement  Patient is going to transition to Lovenox soon. Anticoagulation per Cardiology and  teams.          Thank you for your consult. I will follow-up with patient. Please contact us if you have any additional questions.    Rico Bautista PA-C  Gastroenterology  [facility] [place]

## 2019-11-06 NOTE — ASSESSMENT & PLAN NOTE
-Concerning for ACS.   -Trend troponin.   -Continue daily ASA and Crestor.   -Hold metoprolol due to bradycardia.   -Cardiology consult.   11/5 - troponins flat and chronically elevated - DEMAND ISCHEMIA

## 2019-11-06 NOTE — PLAN OF CARE
Patient AAOx4.  Heparin and NS infusing.  Pt bp low @ 2354. Nurse notified Darling Anglin. 500 mL bolus ordered. 500 mL bolus administered @015. Patient asked nurse to recheck bp after going to restroom. Bp 145/62 @0022. Patient requested pain medication. Nurse asked Darling Anglin and Dr. Walden about pain medication. Both stated ok to give ordered prn dose.   No falls this shift.   Will continue to monitor.

## 2019-11-06 NOTE — ASSESSMENT & PLAN NOTE
-Will check 2D echo   -hold Coumadin and start Heparin gtt until Troponin trended out     11/6/19  -Recommend resuming Coumadin if GI has no plans for anemia workup. Will need Heparin gtt to bridge

## 2019-11-06 NOTE — PROGRESS NOTES
[facility] -   Cardiology  Progress Note    Patient Name: Meg Sal  MRN: [id number]  Admission Date: 11/4/2019  Hospital Length of Stay: 2 days  Code Status: Full Code   Attending Physician: Jean Pierre Mcdermott MD   Primary Care Physician: Aquilino Hightower MD  Expected Discharge Date:   Principal Problem:Symptomatic anemia    Subjective:   Brief HPI:  Meg Sal is a 49 y.o. female with CAD s/p 2V CABG - LAD/LCX with occluded, s/p mechanical AVR, s/p mitral annuloplasty ring in 2014, pulm fibrosis, Pulm HTN, HFpEF, CHD, HTN, HLD, CVA.   Pt had first MI at age 31.. Dr. Cortez did LHC, had one stent placed. She also had other blockages which were treated medically. Had repeat unstable angina symptoms which lead to repeat LHC with Dr. Cortez.  Dr. Mcmahon was consulted as she had severe LAD disease. She went to Dr. Heller with LCA, had LHC in May 2018 with chronically occluded RCA with collaterals but patent grafts to her left system    She presented to the ED with complaints of chest pain, arm heaviness and back pain. Patient states that she has been under lots of stress after her father was involved in MVA 2 weeks ago. She and her sister decided to withdraw care of father, so has been emotional. Reports taking at least 2 nitro a day for the last couple of days. Felt weak walking to the bathroom recently and had to sit down on the floor to keep from passing out. Checked her BP at home and was 70's/30's. Has been taking Lasix and metoprolol daily and has had poor PO intake.     Has been intolerant to Ranexa and Imdur.  RHC in June 2019-   Mildly elevated right Filling Pressures, Moderate Pulmonary Hypertension. She was started on nintenanib by Pulmonary.   Echo in Feb 2019 revealed Aortic valve prosthesis, CATINA = 1.61 cm2, AVAi = 0.95 cm2/m2, peak velocity = 2.48 m/s, mean gradient = 15 mmHg, LVF 55%.     INR 1.7, ,Troponin 0.275 chronically elevated, K 3.4, H/H 8.4/27.9      Hospital  Course:   11/5/19- no recurrent chest pain since admit. Still having issues with hypotension. Is being gently hydrated. Repeat H/H stable after 1 unit. Still on Heparin gtt given her mechanical AVR. Primary team monitoring H/H closely. K 3.8 this morning and being corrected. Troponin remains flat. Repeat 2D echo pending.     11/6/19- continues to drop H/H. Orders given to transfuse 2 units today. BP improved from admit. Has no angina or equivalent. No troponin peak. Heparin gtt still infusing due to history of mechanical AVR        Review of Systems   Constitution: Negative for diaphoresis, malaise/fatigue, weight gain and weight loss.   HENT: Negative for congestion and nosebleeds.    Cardiovascular: Negative for chest pain, claudication, cyanosis, dyspnea on exertion, irregular heartbeat, leg swelling, near-syncope, orthopnea, palpitations, paroxysmal nocturnal dyspnea and syncope.   Respiratory: Negative for cough, hemoptysis, shortness of breath, sleep disturbances due to breathing, snoring, sputum production and wheezing.    Hematologic/Lymphatic: Negative for bleeding problem. Does not bruise/bleed easily.   Skin: Negative for rash.   Musculoskeletal: Negative for arthritis, back pain, falls, joint pain, muscle cramps and muscle weakness.   Gastrointestinal: Negative for abdominal pain, constipation, diarrhea, heartburn, hematemesis, hematochezia, melena, nausea and vomiting.   Genitourinary: Negative for dysuria, hematuria and nocturia.   Neurological: Negative for excessive daytime sleepiness, dizziness, headaches, light-headedness, loss of balance, numbness, vertigo and weakness.     Objective:     Vital Signs (Most Recent):  Temp: 98.1 °F (36.7 °C) (11/06/19 1101)  Pulse: 82 (11/06/19 1101)  Resp: (!) 22 (11/06/19 1101)  BP: 127/61 (11/06/19 1101)  SpO2: 96 % (11/06/19 1101) Vital Signs (24h Range):  Temp:  [97.1 °F (36.2 °C)-98.5 °F (36.9 °C)] 98.1 °F (36.7 °C)  Pulse:  [65-92] 82  Resp:  [16-24]  22  SpO2:  [93 %-99 %] 96 %  BP: ()/(46-75) 127/61     Weight: 73.1 kg (161 lb 2.5 oz)  Body mass index is 31.47 kg/m².     SpO2: 96 %  O2 Device (Oxygen Therapy): nasal cannula      Intake/Output Summary (Last 24 hours) at 11/6/2019 1130  Last data filed at 11/6/2019 0400  Gross per 24 hour   Intake 1144 ml   Output --   Net 1144 ml       Lines/Drains/Airways     Peripheral Intravenous Line                 Peripheral IV - Single Lumen 11/04/19 1000 22 G Left Antecubital 2 days         Peripheral IV - Single Lumen 11/04/19 1620 20 G Left Forearm 1 day                Physical Exam   Constitutional: She is oriented to person, place, and time. She appears well-developed and well-nourished.   Neck: Neck supple. No JVD present.   Cardiovascular: Normal rate, regular rhythm, normal heart sounds and normal pulses. Exam reveals no friction rub.   No murmur heard.  Metallic S2   Pulmonary/Chest: Effort normal. No respiratory distress. She has no wheezes. She has rales in the right lower field and the left lower field.   Sternal scar    Abdominal: Soft. Bowel sounds are normal. She exhibits no distension.   Musculoskeletal: She exhibits no edema or tenderness.   Neurological: She is alert and oriented to person, place, and time.   Skin: Skin is warm and dry. No rash noted.   Psychiatric: She has a normal mood and affect. Her behavior is normal.   Nursing note and vitals reviewed.      Significant Labs:   All pertinent lab results from the last 24 hours have been reviewed. and   Recent Lab Results       11/06/19  0743   11/06/19  0344   11/05/19  2350   11/05/19  1552        Anion Gap   8         aPTT   47.5  Comment:  aPTT therapeutic range = 39-69 seconds            47.5  Comment:  aPTT therapeutic range = 39-69 seconds         Baso #   0.03         Basophil%   0.4         BUN, Bld   9         Calcium   8.4         Chloride   108         CO2   24         Creatinine   0.7         Differential Method   Automated          eGFR if    >60         eGFR if non    >60  Comment:  Calculation used to obtain the estimated glomerular filtration  rate (eGFR) is the CKD-EPI equation.            Eos #   0.3         Eosinophil%   3.9         Glucose   116         Gran # (ANC)   4.7         Gran%   64.6         Hematocrit 27.7 25.0 26.8 28.1     Hemoglobin 8.0 7.2 7.8 8.5     Immature Grans (Abs)   0.02  Comment:  Mild elevation in immature granulocytes is non specific and   can be seen in a variety of conditions including stress response,   acute inflammation, trauma and pregnancy. Correlation with other   laboratory and clinical findings is essential.           Immature Granulocytes   0.3         Coumadin Monitoring INR   1.3  Comment:  Coumadin Therapy:  2.0 - 3.0 for INR for all indicators except mechanical heart valves  and antiphospholipid syndromes which should use 2.5 - 3.5.           Lymph #   1.8         Lymph%   24.7         Magnesium   2.3         MCH   28.9         MCHC   28.8         MCV   100         Mono #   0.4         Mono%   6.1         MPV   10.1         nRBC   0         Phosphorus   1.7         Platelets   145         Potassium   3.8   3.7     Protime   13.7         RBC   2.49         RDW   16.7         Sodium   140         WBC   7.22               Significant Imaging: Echocardiogram:   2D echo with color flow doppler:   Results for orders placed or performed in visit on 02/25/19   2D echo with color flow doppler   Result Value Ref Range    QEF 55 55 - 65    Est. PA Systolic Pressure 60.76 (A)     Mitral Valve Mobility NORMAL     Tricuspid Valve Regurgitation MODERATE (A)     Narrative    Date of Procedure: 02/25/2019        TEST DESCRIPTION       Aorta: The aortic root is normal in size, measuring 2.3 cm at sinotubular junction and 1.9 cm at Sinuses of Valsalva.     Left Atrium: The left atrial volume index is mildly enlarged, measuring 36.20 cc/m2.     Left Ventricle: The left ventricle is  normal in size, with an end-diastolic diameter of 5.1 cm, and an end-systolic diameter of 3.8 cm. LV wall thickness is normal, with the septum measuring 1.2 cm and the posterior wall measuring 1.1 cm across. Relative   wall thickness was normal at 0.43, and the LV mass index was increased at 159.7 g/m2 consistent with eccentric left ventricular hypertrophy. There are no regional wall motion abnormalities. Left ventricular systolic function appears normal. Visually   estimated ejection fraction is 55-60%. The LV Doppler derived stroke volume equals 83.0 ccs.         Right Atrium: The right atrium is normal in size, measuring 4.4 cm in length and 3.0 cm in width in the apical view.     Right Ventricle: The right ventricle is normal in size measuring 2.6 cm at the base in the apical right ventricle-focused view. Global right ventricular systolic function appears normal. The estimated PA systolic pressure is 61 mmHg.     Aortic Valve:  There is a mechanical prosthesis present in the aortic position. The aortic valve prosthesis is well seated. The peak velocity obtained across the aortic valve is 2.48 m/s, which translates to a peak gradient of 25 mmHg. The mean gradient   is 15 mmHg. Using a left ventricular outflow tract diameter of 2.0 cm, a left ventricular outflow tract velocity time integral of 27 cm, and a peak instantaneous transvalvular velocity time integral of 51 cm, the effective prosthetic valve area is 1.61   cm2(p AVAi is 0.95 cm2/m2).     Mitral Valve:  The mitral valve is moderately sclerotic with normal leaflet mobility. There is a mitral annular ring present.     Tricuspid Valve:  The tricuspid valve is normal in structure. There is moderate tricuspid regurgitation.     Pulmonary Valve:  The pulmonic valve is normal in structure.     IVC: IVC is normal in size and collapses > 50% with a sniff, suggesting normal right atrial pressure of 3 mmHg.     Intracavitary: There is no evidence of pericardial  effusion, intracavity mass, thrombi, or vegetation.         CONCLUSIONS     1 - Mild left atrial enlargement.     2 - Eccentric hypertrophy.     3 - No wall motion abnormalities.     4 - Normal left ventricular systolic function (EF 55-60%).     5 - Normal right ventricular systolic function .     6 - Aortic valve prosthesis, CATINA = 1.61 cm2, AVAi = 0.95 cm2/m2, peak velocity = 2.48 m/s, mean gradient = 15 mmHg.     7 - Moderate tricuspid regurgitation.     8 - Pulmonary hypertension. The estimated PA systolic pressure is 61 mmHg.             This document has been electronically    SIGNED BY: Hermilo Pete MD On: 02/25/2019 14:50     Assessment and Plan:       * Symptomatic anemia  Monitor H/H closely and  Transfuse as needed    11/6/19  -H/H 8.0/27.7  -Orders entered to transfuse 2 units today. Recommend 1 dose of Lasix 40mg between units  -Recommend GI consult.         Elevated troponin with chest pain  -Initial troponin 0.275  -Patient with chronically elevated troponin   -Dr. Van would like to trend enzymes for now   -Check 2D echo  -Treat anemia and hypotension.  Could be having chest pain from anemia, kassandra given her history of CAD  -Resume Statin  -Recommend holding BB for today and will likely need to cut dose to 12.5mg daily   -Hold Coumadin and start Heparin gtt until troponin is trended out   -No ASA due to allergy to NSAID    11/5/19  -Will continue current medical management for now   -Troponin flat and chronically elevated   -Will need to continue Heparin gtt given her history of mechanical AVR  -Will need to monitor H/H closely and transfuse as needed   -2D echo pending     11/6/19  -Continue current medical management   -No troponin peak, chronically elevated       Hypokalemia  Being corrected by primary team     Essential hypertension  -Patient hypotensive in the ED and BP  70's/30's at home PTA  -Recommend holding nitro patch, Lasix and BB today   -Consider gentle hydration with NS at 50/hr  overnight  -Restart Toprol at 12.5mg daily when BP allows     11/6/19  -continue current medical management. BP stable after IV hydration.   -BB and nitro patch still on hold     Hx of mechanical aortic valve replacement  -Will check 2D echo   -hold Coumadin and start Heparin gtt until Troponin trended out     11/6/19  -Recommend resuming Coumadin if GI has no plans for anemia workup. Will need Heparin gtt to bridge         VTE Risk Mitigation (From admission, onward)         Ordered     heparin 25,000 units in dextrose 5% 250 mL (100 units/mL) infusion LOW INTENSITY nomogram - [facility]  Continuous      11/04/19 1552     heparin 25,000 units in dextrose 5% (100 units/ml) IV bolus from bag - ADDITIONAL PRN BOLUS - 60 units/kg (max bolus 4000 units)  As needed (PRN)      11/04/19 1552     heparin 25,000 units in dextrose 5% (100 units/ml) IV bolus from bag - ADDITIONAL PRN BOLUS - 30 units/kg (max bolus 4000 units)  As needed (PRN)      11/04/19 1552     Reason for no Mechanical VTE Prophylaxis  Once      11/04/19 1549              Chart reviewed. Patient examined by Dr. Van and agrees with plan that has been outlined.     Laura Aguilar, MONICA  Cardiology  [facility] [place]

## 2019-11-06 NOTE — ASSESSMENT & PLAN NOTE
-Patient is currently hypotensive and bradycardic.   -Hold metoprolol and diuretics.   11/6 - will resume BB and bradycardia resolved. Has received lasix intermittently

## 2019-11-06 NOTE — SUBJECTIVE & OBJECTIVE
Interval History:  Pt feeling better - feels stronger. She denies SOB. Had diarrhea but she says likely related to her Pulmonary HTN meds. Denies any chest pain.     Review of Systems   Constitutional: Positive for activity change. Negative for appetite change, chills, diaphoresis, fatigue and fever.   HENT: Negative for congestion, ear pain, mouth sores, sore throat and trouble swallowing.    Eyes: Negative for pain and visual disturbance.   Respiratory: Negative for cough, chest tightness and shortness of breath.    Cardiovascular: Negative for chest pain, palpitations and leg swelling.   Gastrointestinal: Positive for diarrhea. Negative for abdominal pain, constipation, nausea and vomiting.   Endocrine: Negative for cold intolerance, heat intolerance, polydipsia and polyuria.   Genitourinary: Negative for dysuria, frequency and hematuria.   Musculoskeletal: Negative for arthralgias, back pain, myalgias and neck pain.   Skin: Negative for pallor, rash and wound.   Allergic/Immunologic: Negative for environmental allergies and immunocompromised state.   Neurological: Positive for weakness. Negative for dizziness, seizures, syncope, numbness and headaches.   Hematological: Negative for adenopathy. Does not bruise/bleed easily.   Psychiatric/Behavioral: Negative for agitation, confusion and sleep disturbance.     Objective:     Vital Signs (Most Recent):  Temp: 98.1 °F (36.7 °C) (11/06/19 1301)  Pulse: 73 (11/06/19 1301)  Resp: (!) 26 (11/06/19 1301)  BP: (!) 107/59 (11/06/19 1301)  SpO2: (!) 94 % (11/06/19 1301) Vital Signs (24h Range):  Temp:  [97.1 °F (36.2 °C)-98.5 °F (36.9 °C)] 98.1 °F (36.7 °C)  Pulse:  [65-92] 73  Resp:  [16-26] 26  SpO2:  [93 %-99 %] 94 %  BP: ()/(46-75) 107/59     Weight: 73.1 kg (161 lb 2.5 oz)  Body mass index is 31.47 kg/m².    Intake/Output Summary (Last 24 hours) at 11/6/2019 1423  Last data filed at 11/6/2019 0400  Gross per 24 hour   Intake 1144 ml   Output --   Net 1144 ml       Physical Exam   Constitutional: She is oriented to person, place, and time. She appears well-developed and well-nourished. No distress.   HENT:   Head: Normocephalic and atraumatic.   Eyes: Conjunctivae are normal.   PERRL   Neck: Neck supple. No JVD present.   Cardiovascular: Normal rate. An irregular rhythm present.   Murmur heard.  Valve clicking   Pulmonary/Chest: Effort normal and breath sounds normal.   Abdominal: Soft. Bowel sounds are normal. She exhibits no distension. There is no tenderness.   Musculoskeletal: Normal range of motion.   Neurological: She is alert and oriented to person, place, and time.   Skin: Skin is warm and dry. There is pallor.   Psychiatric: She has a normal mood and affect. Her behavior is normal. Thought content normal.   Nursing note and vitals reviewed.      Significant Labs:   CBC:   Recent Labs   Lab 11/05/19 0419 11/05/19  2350 11/06/19  0344 11/06/19  0743   WBC 5.82  --   --  7.22  --    HGB 7.9*   < > 7.8* 7.2* 8.0*   HCT 26.6*   < > 26.8* 25.0* 27.7*     --   --  145*  --     < > = values in this interval not displayed.     CMP:   Recent Labs   Lab 11/05/19 0419 11/05/19  1552 11/06/19  0344     --  140   K 2.8* 3.7 3.8     --  108   CO2 26  --  24   *  --  116*   BUN 16  --  9   CREATININE 0.9  --  0.7   CALCIUM 8.6*  --  8.4*   ANIONGAP 10  --  8   EGFRNONAA >60  --  >60     All pertinent labs within the past 24 hours have been reviewed.    Significant Imaging: I have reviewed all pertinent imaging results/findings within the past 24 hours.

## 2019-11-06 NOTE — CONSULTS
Clinical Pharmacy Consult Note: Coumadin Dosing and Monitoring     Meg Sal 's Coumadin will be dosed and monitored by Pharmacy at the request of Meena Alarcon NP.   Indication: Hx of mechanical aortic valve replacement  Target INR is 2-3.   INR   Date Value Ref Range Status   11/06/2019 1.3 (H) 0.8 - 1.2 Final     Comment:     Coumadin Therapy:  2.0 - 3.0 for INR for all indicators except mechanical heart valves  and antiphospholipid syndromes which should use 2.5 - 3.5.     08/22/2019 3.1  Final     Patient will be continued on her most recent [facility] dose of 6 mg per day every day of the week except for 9 mg on Thursdays. Lovenox 1 mg/Kg bridge has been initiated by . PT/INR will be monitored daily. Dose adjustments will be made accordingly.      Thank you for allowing us to participate in this patient's care.     Carolina Goff 11/6/2019 1:42 PM

## 2019-11-06 NOTE — SUBJECTIVE & OBJECTIVE
Review of Systems   Constitution: Negative for diaphoresis, malaise/fatigue, weight gain and weight loss.   HENT: Negative for congestion and nosebleeds.    Cardiovascular: Negative for chest pain, claudication, cyanosis, dyspnea on exertion, irregular heartbeat, leg swelling, near-syncope, orthopnea, palpitations, paroxysmal nocturnal dyspnea and syncope.   Respiratory: Negative for cough, hemoptysis, shortness of breath, sleep disturbances due to breathing, snoring, sputum production and wheezing.    Hematologic/Lymphatic: Negative for bleeding problem. Does not bruise/bleed easily.   Skin: Negative for rash.   Musculoskeletal: Negative for arthritis, back pain, falls, joint pain, muscle cramps and muscle weakness.   Gastrointestinal: Negative for abdominal pain, constipation, diarrhea, heartburn, hematemesis, hematochezia, melena, nausea and vomiting.   Genitourinary: Negative for dysuria, hematuria and nocturia.   Neurological: Negative for excessive daytime sleepiness, dizziness, headaches, light-headedness, loss of balance, numbness, vertigo and weakness.     Objective:     Vital Signs (Most Recent):  Temp: 98.1 °F (36.7 °C) (11/06/19 1101)  Pulse: 82 (11/06/19 1101)  Resp: (!) 22 (11/06/19 1101)  BP: 127/61 (11/06/19 1101)  SpO2: 96 % (11/06/19 1101) Vital Signs (24h Range):  Temp:  [97.1 °F (36.2 °C)-98.5 °F (36.9 °C)] 98.1 °F (36.7 °C)  Pulse:  [65-92] 82  Resp:  [16-24] 22  SpO2:  [93 %-99 %] 96 %  BP: ()/(46-75) 127/61     Weight: 73.1 kg (161 lb 2.5 oz)  Body mass index is 31.47 kg/m².     SpO2: 96 %  O2 Device (Oxygen Therapy): nasal cannula      Intake/Output Summary (Last 24 hours) at 11/6/2019 1130  Last data filed at 11/6/2019 0400  Gross per 24 hour   Intake 1144 ml   Output --   Net 1144 ml       Lines/Drains/Airways     Peripheral Intravenous Line                 Peripheral IV - Single Lumen 11/04/19 1000 22 G Left Antecubital 2 days         Peripheral IV - Single Lumen 11/04/19 1620 20  G Left Forearm 1 day                Physical Exam   Constitutional: She is oriented to person, place, and time. She appears well-developed and well-nourished.   Neck: Neck supple. No JVD present.   Cardiovascular: Normal rate, regular rhythm, normal heart sounds and normal pulses. Exam reveals no friction rub.   No murmur heard.  Metallic S2   Pulmonary/Chest: Effort normal. No respiratory distress. She has no wheezes. She has rales in the right lower field and the left lower field.   Sternal scar    Abdominal: Soft. Bowel sounds are normal. She exhibits no distension.   Musculoskeletal: She exhibits no edema or tenderness.   Neurological: She is alert and oriented to person, place, and time.   Skin: Skin is warm and dry. No rash noted.   Psychiatric: She has a normal mood and affect. Her behavior is normal.   Nursing note and vitals reviewed.      Significant Labs:   All pertinent lab results from the last 24 hours have been reviewed. and   Recent Lab Results       11/06/19  0743   11/06/19  0344   11/05/19  2350   11/05/19  1552        Anion Gap   8         aPTT   47.5  Comment:  aPTT therapeutic range = 39-69 seconds            47.5  Comment:  aPTT therapeutic range = 39-69 seconds         Baso #   0.03         Basophil%   0.4         BUN, Bld   9         Calcium   8.4         Chloride   108         CO2   24         Creatinine   0.7         Differential Method   Automated         eGFR if    >60         eGFR if non    >60  Comment:  Calculation used to obtain the estimated glomerular filtration  rate (eGFR) is the CKD-EPI equation.            Eos #   0.3         Eosinophil%   3.9         Glucose   116         Gran # (ANC)   4.7         Gran%   64.6         Hematocrit 27.7 25.0 26.8 28.1     Hemoglobin 8.0 7.2 7.8 8.5     Immature Grans (Abs)   0.02  Comment:  Mild elevation in immature granulocytes is non specific and   can be seen in a variety of conditions including stress  response,   acute inflammation, trauma and pregnancy. Correlation with other   laboratory and clinical findings is essential.           Immature Granulocytes   0.3         Coumadin Monitoring INR   1.3  Comment:  Coumadin Therapy:  2.0 - 3.0 for INR for all indicators except mechanical heart valves  and antiphospholipid syndromes which should use 2.5 - 3.5.           Lymph #   1.8         Lymph%   24.7         Magnesium   2.3         MCH   28.9         MCHC   28.8         MCV   100         Mono #   0.4         Mono%   6.1         MPV   10.1         nRBC   0         Phosphorus   1.7         Platelets   145         Potassium   3.8   3.7     Protime   13.7         RBC   2.49         RDW   16.7         Sodium   140         WBC   7.22               Significant Imaging: Echocardiogram:   2D echo with color flow doppler:   Results for orders placed or performed in visit on 02/25/19   2D echo with color flow doppler   Result Value Ref Range    QEF 55 55 - 65    Est. PA Systolic Pressure 60.76 (A)     Mitral Valve Mobility NORMAL     Tricuspid Valve Regurgitation MODERATE (A)     Narrative    Date of Procedure: 02/25/2019        TEST DESCRIPTION       Aorta: The aortic root is normal in size, measuring 2.3 cm at sinotubular junction and 1.9 cm at Sinuses of Valsalva.     Left Atrium: The left atrial volume index is mildly enlarged, measuring 36.20 cc/m2.     Left Ventricle: The left ventricle is normal in size, with an end-diastolic diameter of 5.1 cm, and an end-systolic diameter of 3.8 cm. LV wall thickness is normal, with the septum measuring 1.2 cm and the posterior wall measuring 1.1 cm across. Relative   wall thickness was normal at 0.43, and the LV mass index was increased at 159.7 g/m2 consistent with eccentric left ventricular hypertrophy. There are no regional wall motion abnormalities. Left ventricular systolic function appears normal. Visually   estimated ejection fraction is 55-60%. The LV Doppler derived stroke  volume equals 83.0 ccs.         Right Atrium: The right atrium is normal in size, measuring 4.4 cm in length and 3.0 cm in width in the apical view.     Right Ventricle: The right ventricle is normal in size measuring 2.6 cm at the base in the apical right ventricle-focused view. Global right ventricular systolic function appears normal. The estimated PA systolic pressure is 61 mmHg.     Aortic Valve:  There is a mechanical prosthesis present in the aortic position. The aortic valve prosthesis is well seated. The peak velocity obtained across the aortic valve is 2.48 m/s, which translates to a peak gradient of 25 mmHg. The mean gradient   is 15 mmHg. Using a left ventricular outflow tract diameter of 2.0 cm, a left ventricular outflow tract velocity time integral of 27 cm, and a peak instantaneous transvalvular velocity time integral of 51 cm, the effective prosthetic valve area is 1.61   cm2(p AVAi is 0.95 cm2/m2).     Mitral Valve:  The mitral valve is moderately sclerotic with normal leaflet mobility. There is a mitral annular ring present.     Tricuspid Valve:  The tricuspid valve is normal in structure. There is moderate tricuspid regurgitation.     Pulmonary Valve:  The pulmonic valve is normal in structure.     IVC: IVC is normal in size and collapses > 50% with a sniff, suggesting normal right atrial pressure of 3 mmHg.     Intracavitary: There is no evidence of pericardial effusion, intracavity mass, thrombi, or vegetation.         CONCLUSIONS     1 - Mild left atrial enlargement.     2 - Eccentric hypertrophy.     3 - No wall motion abnormalities.     4 - Normal left ventricular systolic function (EF 55-60%).     5 - Normal right ventricular systolic function .     6 - Aortic valve prosthesis, CATINA = 1.61 cm2, AVAi = 0.95 cm2/m2, peak velocity = 2.48 m/s, mean gradient = 15 mmHg.     7 - Moderate tricuspid regurgitation.     8 - Pulmonary hypertension. The estimated PA systolic pressure is 61 mmHg.              This document has been electronically    SIGNED BY: Hermilo Pete MD On: 02/25/2019 14:50

## 2019-11-06 NOTE — ASSESSMENT & PLAN NOTE
-Due to pulmonary fibrosis and pulmonary hypertension.   -Continue Ofev, inhaled medications and oxygen therapy as needed.  Supplemental oxygen to maintain sats > 92 %   stable

## 2019-11-06 NOTE — SUBJECTIVE & OBJECTIVE
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, implants and grafts, initial encounter     Hyperlipidemia     Hypertension     Sleep apnea syndrome 2019    Stroke        Past Surgical History:   Procedure Laterality Date    AORTIC VALVE REPLACEMENT      mechanical    CARDIAC CATHETERIZATION      CORONARY ANGIOPLASTY      CORONARY ARTERY BYPASS GRAFT      HYSTERECTOMY      RIGHT HEART CATHETERIZATION Right 2019    Procedure: INSERTION, CATHETER, RIGHT HEART;  Surgeon: Derek Brown MD;  Location: [facility] CATH LAB;  Service: Cardiology;  Laterality: Right;  malur pt/detailed hx       Review of patient's allergies indicates:   Allergen Reactions    Nsaids (non-steroidal anti-inflammatory drug) Other (See Comments)     Mechanical heart valve     Family History     Problem Relation (Age of Onset)    Breast cancer Sister    Coronary artery disease Father    Heart disease Mother    Lung cancer Brother        Tobacco Use    Smoking status: Former Smoker     Packs/day: 1.00     Types: Cigarettes     Last attempt to quit: 2018     Years since quittin.4    Smokeless tobacco: Never Used   Substance and Sexual Activity    Alcohol use: Not Currently     Frequency: 2-4 times a month     Drinks per session: 1 or 2     Binge frequency: Never    Drug use: Not Currently    Sexual activity: Not on file     Review of Systems   Constitutional: Positive for fatigue. Negative for fever.   HENT: Negative for hearing loss.    Eyes: Negative for visual disturbance.   Respiratory: Positive for shortness of breath. Negative for cough.    Cardiovascular: Negative for chest pain.   Gastrointestinal:        As per HPI.   Genitourinary: Negative for dysuria, frequency and hematuria.   Musculoskeletal: Negative for arthralgias and back pain.    Skin: Negative for rash.   Neurological: Negative for seizures, syncope, numbness and headaches.   Hematological: Does not bruise/bleed easily.   Psychiatric/Behavioral: The patient is not nervous/anxious.      Objective:     Vital Signs (Most Recent):  Temp: 98.1 °F (36.7 °C) (11/06/19 1301)  Pulse: 73 (11/06/19 1301)  Resp: (!) 26 (11/06/19 1301)  BP: (!) 107/59 (11/06/19 1301)  SpO2: (!) 94 % (11/06/19 1301) Vital Signs (24h Range):  Temp:  [97.1 °F (36.2 °C)-98.5 °F (36.9 °C)] 98.1 °F (36.7 °C)  Pulse:  [65-92] 73  Resp:  [16-26] 26  SpO2:  [93 %-99 %] 94 %  BP: ()/(46-75) 107/59     Weight: 73.1 kg (161 lb 2.5 oz) (11/06/19 0400)  Body mass index is 31.47 kg/m².      Intake/Output Summary (Last 24 hours) at 11/6/2019 1328  Last data filed at 11/6/2019 0400  Gross per 24 hour   Intake 1144 ml   Output --   Net 1144 ml       Lines/Drains/Airways     Peripheral Intravenous Line                 Peripheral IV - Single Lumen 11/04/19 1620 20 G Left Forearm 1 day         Peripheral IV - Single Lumen 11/06/19 1205 18 G Right Upper Arm less than 1 day                Physical Exam   Constitutional: She is oriented to person, place, and time. She appears well-developed and well-nourished.   HENT:   Head: Normocephalic and atraumatic.   Eyes: EOM are normal.   Neck: Normal range of motion. Neck supple. Carotid bruit is not present.   Cardiovascular: Normal rate. An irregular rhythm present.   Murmur heard.  Pulmonary/Chest: Effort normal and breath sounds normal. No respiratory distress. She has no wheezes.   Abdominal: Soft. Normal appearance and bowel sounds are normal. She exhibits no distension and no mass. There is no tenderness.   Musculoskeletal: She exhibits no edema.   Neurological: She is alert and oriented to person, place, and time. No cranial nerve deficit.   Skin: Skin is warm and dry. No rash noted.   Psychiatric: Her behavior is normal.       Significant Labs:  CBC:   Recent Labs   Lab  11/05/19  0419  11/05/19  2350 11/06/19 0344 11/06/19  0743   WBC 5.82  --   --  7.22  --    HGB 7.9*   < > 7.8* 7.2* 8.0*   HCT 26.6*   < > 26.8* 25.0* 27.7*     --   --  145*  --     < > = values in this interval not displayed.     CMP:   Recent Labs   Lab 11/06/19 0344   *   CALCIUM 8.4*      K 3.8   CO2 24      BUN 9   CREATININE 0.7     Coagulation:   Recent Labs   Lab 11/06/19 0344   INR 1.3*   APTT 47.5*  47.5*       Significant Imaging:  Imaging results within the past 24 hours have been reviewed.

## 2019-11-06 NOTE — ASSESSMENT & PLAN NOTE
-Transfuse one unit PRBCs.   -Monitor for response as well as signs and symptoms of bleeding.   11/5 - Hgb 8.0, H & H every 8 hours  11/6 - Hgb has fallen below 8 - transfuse 2 units PRBCs - Gastroenterology consult

## 2019-11-06 NOTE — PLAN OF CARE
Pt tolerating inhalation treatments well with no distress noted.  Pt uses inhalers and O2 at home.

## 2019-11-06 NOTE — ASSESSMENT & PLAN NOTE
-Initial troponin 0.275  -Patient with chronically elevated troponin   -Dr. Van would like to trend enzymes for now   -Check 2D echo  -Treat anemia and hypotension.  Could be having chest pain from anemia, kassandra given her history of CAD  -Resume Statin  -Recommend holding BB for today and will likely need to cut dose to 12.5mg daily   -Hold Coumadin and start Heparin gtt until troponin is trended out   -No ASA due to allergy to NSAID    11/5/19  -Will continue current medical management for now   -Troponin flat and chronically elevated   -Will need to continue Heparin gtt given her history of mechanical AVR  -Will need to monitor H/H closely and transfuse as needed   -2D echo pending     11/6/19  -Continue current medical management   -No troponin peak, chronically elevated

## 2019-11-06 NOTE — ASSESSMENT & PLAN NOTE
The patient has macrocytic anemia with reported maroon stools three weeks ago.   Agree with supportive care at this time with PPI and blood transfusion.   Endoscopy discussed but need to weigh risks and benefits. Will follow H/H. Need risk assessment from Cardiology. Need to be off Heparin and on Lovenox.   Discussed with HM team.

## 2019-11-06 NOTE — ASSESSMENT & PLAN NOTE
Coumadin on hold  Heparin infusion for now due to mechanical aortic valve  11/6 - Per Cardiology -resume Coumadin with Lovenox bridge. Daily PT/INR

## 2019-11-06 NOTE — ASSESSMENT & PLAN NOTE
Monitor H/H closely and  Transfuse as needed    11/6/19  -H/H 8.0/27.7  -Orders entered to transfuse 2 units today. Recommend 1 dose of Lasix 40mg between units  -Recommend GI consult.

## 2019-11-06 NOTE — ASSESSMENT & PLAN NOTE
-Continue coumadin, cautiously.  -Monitor daily INR.   11/5 -  INR 1.6 - Coumadin stopped and pt on heparin infusion as her troponins were elevated and she has the mechanical aortic valve. Monitor for bleeding  11/6 - INR 1.3 - Cardiology says to restart Coumadin and continue heparin bridge - will start Lovenox bridge as pt desires discharge home soon - therapeutic lovenox prescribed and pharmacy dosing Coumadin

## 2019-11-07 VITALS
WEIGHT: 161.81 LBS | HEART RATE: 73 BPM | SYSTOLIC BLOOD PRESSURE: 134 MMHG | BODY MASS INDEX: 31.77 KG/M2 | TEMPERATURE: 98 F | OXYGEN SATURATION: 97 % | HEIGHT: 60 IN | DIASTOLIC BLOOD PRESSURE: 69 MMHG | RESPIRATION RATE: 24 BRPM

## 2019-11-07 PROBLEM — R79.89 ELEVATED TROPONIN: Status: RESOLVED | Noted: 2019-11-04 | Resolved: 2019-11-07

## 2019-11-07 PROBLEM — D64.9 SYMPTOMATIC ANEMIA: Status: RESOLVED | Noted: 2019-11-04 | Resolved: 2019-11-07

## 2019-11-07 PROBLEM — E87.6 HYPOKALEMIA: Status: RESOLVED | Noted: 2019-06-22 | Resolved: 2019-11-07

## 2019-11-07 LAB
ANION GAP SERPL CALC-SCNC: 10 MMOL/L (ref 8–16)
ANISOCYTOSIS BLD QL SMEAR: SLIGHT
APTT BLDCRRT: 56.9 SEC (ref 21–32)
BASOPHILS # BLD AUTO: 0.05 K/UL (ref 0–0.2)
BASOPHILS NFR BLD: 0.5 % (ref 0–1.9)
BUN SERPL-MCNC: 9 MG/DL (ref 6–20)
CALCIUM SERPL-MCNC: 8.7 MG/DL (ref 8.7–10.5)
CHLORIDE SERPL-SCNC: 107 MMOL/L (ref 95–110)
CO2 SERPL-SCNC: 23 MMOL/L (ref 23–29)
CREAT SERPL-MCNC: 0.8 MG/DL (ref 0.5–1.4)
DIFFERENTIAL METHOD: ABNORMAL
EOSINOPHIL # BLD AUTO: 0.4 K/UL (ref 0–0.5)
EOSINOPHIL NFR BLD: 3.7 % (ref 0–8)
ERYTHROCYTE [DISTWIDTH] IN BLOOD BY AUTOMATED COUNT: 18.5 % (ref 11.5–14.5)
EST. GFR  (AFRICAN AMERICAN): >60 ML/MIN/1.73 M^2
EST. GFR  (NON AFRICAN AMERICAN): >60 ML/MIN/1.73 M^2
GLUCOSE SERPL-MCNC: 102 MG/DL (ref 70–110)
HCT VFR BLD AUTO: 32.4 % (ref 37–48.5)
HCT VFR BLD AUTO: 35.1 % (ref 37–48.5)
HGB BLD-MCNC: 10.2 G/DL (ref 12–16)
HGB BLD-MCNC: 11.1 G/DL (ref 12–16)
IMM GRANULOCYTES # BLD AUTO: 0.04 K/UL (ref 0–0.04)
IMM GRANULOCYTES NFR BLD AUTO: 0.4 % (ref 0–0.5)
INR PPP: 1.2 (ref 0.8–1.2)
LYMPHOCYTES # BLD AUTO: 2.4 K/UL (ref 1–4.8)
LYMPHOCYTES NFR BLD: 25 % (ref 18–48)
MAGNESIUM SERPL-MCNC: 2 MG/DL (ref 1.6–2.6)
MCH RBC QN AUTO: 29.8 PG (ref 27–31)
MCHC RBC AUTO-ENTMCNC: 31.5 G/DL (ref 32–36)
MCV RBC AUTO: 95 FL (ref 82–98)
MONOCYTES # BLD AUTO: 0.8 K/UL (ref 0.3–1)
MONOCYTES NFR BLD: 8.1 % (ref 4–15)
NEUTROPHILS # BLD AUTO: 5.8 K/UL (ref 1.8–7.7)
NEUTROPHILS NFR BLD: 62.3 % (ref 38–73)
NRBC BLD-RTO: 0 /100 WBC
OVALOCYTES BLD QL SMEAR: ABNORMAL
PHOSPHATE SERPL-MCNC: 3.4 MG/DL (ref 2.7–4.5)
PLATELET # BLD AUTO: 148 K/UL (ref 150–350)
PLATELET BLD QL SMEAR: ABNORMAL
PMV BLD AUTO: 10.7 FL (ref 9.2–12.9)
POIKILOCYTOSIS BLD QL SMEAR: SLIGHT
POTASSIUM SERPL-SCNC: 4.1 MMOL/L (ref 3.5–5.1)
PROTHROMBIN TIME: 12.9 SEC (ref 9–12.5)
RBC # BLD AUTO: 3.42 M/UL (ref 4–5.4)
SODIUM SERPL-SCNC: 140 MMOL/L (ref 136–145)
WBC # BLD AUTO: 9.39 K/UL (ref 3.9–12.7)

## 2019-11-07 PROCEDURE — 94761 N-INVAS EAR/PLS OXIMETRY MLT: CPT | Mod: NTX

## 2019-11-07 PROCEDURE — 90670 PCV13 VACCINE IM: CPT | Mod: NTX | Performed by: EMERGENCY MEDICINE

## 2019-11-07 PROCEDURE — 90471 IMMUNIZATION ADMIN: CPT | Mod: NTX | Performed by: EMERGENCY MEDICINE

## 2019-11-07 PROCEDURE — 83735 ASSAY OF MAGNESIUM: CPT | Mod: NTX

## 2019-11-07 PROCEDURE — 25000003 PHARM REV CODE 250: Mod: NTX | Performed by: NURSE PRACTITIONER

## 2019-11-07 PROCEDURE — 94799 UNLISTED PULMONARY SVC/PX: CPT | Mod: NTX

## 2019-11-07 PROCEDURE — 80048 BASIC METABOLIC PNL TOTAL CA: CPT | Mod: NTX

## 2019-11-07 PROCEDURE — 63600175 PHARM REV CODE 636 W HCPCS: Mod: NTX | Performed by: EMERGENCY MEDICINE

## 2019-11-07 PROCEDURE — 85610 PROTHROMBIN TIME: CPT | Mod: NTX

## 2019-11-07 PROCEDURE — 85018 HEMOGLOBIN: CPT | Mod: NTX

## 2019-11-07 PROCEDURE — 85730 THROMBOPLASTIN TIME PARTIAL: CPT | Mod: NTX

## 2019-11-07 PROCEDURE — 27000221 HC OXYGEN, UP TO 24 HOURS: Mod: NTX

## 2019-11-07 PROCEDURE — 25000003 PHARM REV CODE 250: Mod: NTX | Performed by: PHYSICIAN ASSISTANT

## 2019-11-07 PROCEDURE — 63600175 PHARM REV CODE 636 W HCPCS: Mod: NTX | Performed by: NURSE PRACTITIONER

## 2019-11-07 PROCEDURE — 94640 AIRWAY INHALATION TREATMENT: CPT | Mod: NTX

## 2019-11-07 PROCEDURE — 36415 COLL VENOUS BLD VENIPUNCTURE: CPT | Mod: NTX

## 2019-11-07 PROCEDURE — 85014 HEMATOCRIT: CPT | Mod: NTX

## 2019-11-07 PROCEDURE — 85025 COMPLETE CBC W/AUTO DIFF WBC: CPT | Mod: NTX

## 2019-11-07 PROCEDURE — 84100 ASSAY OF PHOSPHORUS: CPT | Mod: NTX

## 2019-11-07 PROCEDURE — 25000242 PHARM REV CODE 250 ALT 637 W/ HCPCS: Mod: NTX | Performed by: PHYSICIAN ASSISTANT

## 2019-11-07 RX ORDER — PANTOPRAZOLE SODIUM 40 MG/1
40 TABLET, DELAYED RELEASE ORAL 2 TIMES DAILY
Qty: 60 TABLET | Refills: 1 | Status: SHIPPED | OUTPATIENT
Start: 2019-11-07 | End: 2020-02-21 | Stop reason: SDUPTHER

## 2019-11-07 RX ORDER — ENOXAPARIN SODIUM 100 MG/ML
1 INJECTION SUBCUTANEOUS
Status: DISCONTINUED | OUTPATIENT
Start: 2019-11-07 | End: 2019-11-07 | Stop reason: HOSPADM

## 2019-11-07 RX ORDER — ENOXAPARIN SODIUM 100 MG/ML
1 INJECTION SUBCUTANEOUS 2 TIMES DAILY
Qty: 14 SYRINGE | Refills: 1 | Status: ON HOLD | OUTPATIENT
Start: 2019-11-07 | End: 2019-11-19

## 2019-11-07 RX ADMIN — TIZANIDINE 4 MG: 4 TABLET ORAL at 12:11

## 2019-11-07 RX ADMIN — ROSUVASTATIN CALCIUM 20 MG: 10 TABLET, FILM COATED ORAL at 08:11

## 2019-11-07 RX ADMIN — ASPIRIN 81 MG: 81 TABLET, COATED ORAL at 08:11

## 2019-11-07 RX ADMIN — BUSPIRONE HYDROCHLORIDE 15 MG: 10 TABLET ORAL at 08:11

## 2019-11-07 RX ADMIN — PNEUMOCOCCAL 13-VALENT CONJUGATE VACCINE 0.5 ML: 2.2; 2.2; 2.2; 2.2; 2.2; 4.4; 2.2; 2.2; 2.2; 2.2; 2.2; 2.2; 2.2 INJECTION, SUSPENSION INTRAMUSCULAR at 10:11

## 2019-11-07 RX ADMIN — METOPROLOL SUCCINATE 25 MG: 25 TABLET, EXTENDED RELEASE ORAL at 08:11

## 2019-11-07 RX ADMIN — TOPIRAMATE 100 MG: 100 TABLET, FILM COATED ORAL at 08:11

## 2019-11-07 RX ADMIN — PANTOPRAZOLE SODIUM 40 MG: 40 TABLET, DELAYED RELEASE ORAL at 08:11

## 2019-11-07 RX ADMIN — HYDROCODONE BITARTRATE AND ACETAMINOPHEN 1 TABLET: 10; 325 TABLET ORAL at 08:11

## 2019-11-07 RX ADMIN — DESVENLAFAXINE SUCCINATE 100 MG: 100 TABLET, FILM COATED, EXTENDED RELEASE ORAL at 10:11

## 2019-11-07 RX ADMIN — CLONAZEPAM 0.5 MG: 0.5 TABLET ORAL at 08:11

## 2019-11-07 RX ADMIN — ENOXAPARIN SODIUM 70 MG: 100 INJECTION SUBCUTANEOUS at 10:11

## 2019-11-07 RX ADMIN — ARFORMOTEROL TARTRATE 15 MCG: 15 SOLUTION RESPIRATORY (INHALATION) at 07:11

## 2019-11-07 RX ADMIN — WARFARIN SODIUM 9 MG: 5 TABLET ORAL at 10:11

## 2019-11-07 RX ADMIN — BUDESONIDE 0.5 MG: 0.5 INHALANT RESPIRATORY (INHALATION) at 07:11

## 2019-11-07 NOTE — NURSING
Patient only has one IV access. Order for potassium phosphate to run over 4 hours and order for Heparin to be started. Darling Anglin stated okay to pause potassium in order to start heparin. Will continue to monitor.

## 2019-11-07 NOTE — NURSING
Went over discharge instructions with patient.   Stressed importance of making and keeping all follow ups as well as making prescribed medication changes.   Prescriptions sent to pt's requested pharmacy.  Written instructions on administering Lovenox injections provided in AVS packet, pt states she has self administered these before and understands how.   Patient verbalized understanding and has had all questions in regards to discharge answered to satisfaction.  IV removed without complications.  Telemetry box removed and returned to monitor tech.  PCT sent to pt's room to discharge pt via wheelchair to personal transportation.  Primary nurse notified of pt's discharge status.

## 2019-11-07 NOTE — DISCHARGE SUMMARY
"[facility] Hospital Medicine  Discharge Summary      Patient Name: Meg Sal  MRN: [id number]  Admission Date: 11/4/2019  Hospital Length of Stay: 3 days  Discharge Date and Time:  11/07/2019 1:33 PM  Attending Physician:Jean Pierre Mcdermott MD  Discharging Provider: Meena Alarcon NP  Primary Care Provider: Aquilino Hightower MD      HPI:   Meg Sal is a 49-year-old female with a mechanical aortic valve, pulmonary fibrosis, CHF, CAD, HLD and HTN that presented to the ED on 11/04/19 after she collapsed on the way to the bathroom in the middle of the night. She states that she has been experiencing weakness in her arms, back, and legs for 4 days. This progressively worsened until she collapsed while walking to the bathroom and had to be helped off the floor by her . There are no aggravating or alleviating factors. Associated symptoms include chest pain that she describes as a "pressure" in the center of her chest, nausea and diarrhea. She describes the combination of symptoms as similar to her previous heart attacks. She denies worsened shortness of breath and vomiting. Of note, the patient states that she recently had black stools for 4 days after returning from a trip to [place], but resolved 4 days prior to presentation. Labs are remarkable for a hemoglobin of 8.4, hematocrit of 27.9, INR of 1.7 and troponin of 0.275.    * No surgery found *      Hospital Course:   Over the last 2 weeks pt has had decreased appetite, poor fluid intake, diarrhea and an emotional time and her father had passed. She continued to take her lasix. She presented with weakness and hypotension. Diuretics held and gentle IV fluids given. Also, with chronically elevated troponin and Cardiology felt DEMAND ISCHEMIA. Due to mechanical valve, coumadin on hold and heparin gtt in progress. 2 D ECHO did not show any new changes. Around 3 weeks earlier, pt had 4 days of melena which had resolved. Hgb/Hct has trended " down and pt received 1 unit PrBCs for symptomatic anemia. Cardiology assisting with care. On  - Hgb/Hct dropped below 8 and pt transfused 2 units PRBCs. IV fluids discontinued as hypotension resolved and pt received some prn lasix is association with blood transfusion. Cardiology advised resuming Coumadin with heparin bridge however, will order Lovenox bridge as pt is familiar and expressing desire for discharge. Gastroenterology saw the patient due to possible GI bleed and they recommend PPI and conservative management. Requesting risk stratification from Cardiology before any scopes done. Dr. Enciso saw the patient and encouraged the patient to get EGD and colonoscopy. Pt placed on clear liquids with plans for scopes on Friday as patient needs colonoscopy prep.  - Pt's lab work and vital signs stable. She denied further weakness and hematochezia. Pt verbalized desire to discharge as she had to plan her father's . Confirmed with Cardiology that Coumadin bridged with Lovenox was permitted. Pt was seen and examined and determined to be safe and stable for discharge. Pt was discharged on her Coumadin and therapeutic Lovenox. Pt stated she was knowledgeable in Lovenox administration. She was advised to follow up with Cardiology and Coumadin clinic.      Consults:   Consults (From admission, onward)        Status Ordering Provider     Inpatient consult to Cardiology  Once     Provider:  (Not yet assigned)    Completed ROBERT CARMONA     Inpatient consult to Gastroenterology  Once     Provider:  Trupti Enciso MD    Completed YAMEL SERNA          No new Assessment & Plan notes have been filed under this hospital service since the last note was generated.  Service: Hospital Medicine    Final Active Diagnoses:    Diagnosis Date Noted POA    Chronic respiratory failure with hypoxia [J96.11] 2019 Yes    Anticoagulant long-term use [Z79.01] 2019 Not Applicable    Chronic diastolic heart  failure [I50.32] 02/08/2019 Yes    Essential hypertension [I10] 08/03/2017 Yes    Hx of mechanical aortic valve replacement [Z95.2] 11/21/2016 Not Applicable     Chronic    Anxiety with depression [F41.8] 11/21/2016 Yes      Problems Resolved During this Admission:    Diagnosis Date Noted Date Resolved POA    PRINCIPAL PROBLEM:  Symptomatic anemia [D64.9] 11/04/2019 11/07/2019 Yes    Elevated troponin with chest pain [R79.89] 11/04/2019 11/07/2019 Yes    Hypokalemia [E87.6] 06/22/2019 11/07/2019 Yes       Discharged Condition: good    Disposition: Home or Self Care    Follow Up:  Follow-up Information     Gastroenterology In 3 weeks.    Why:  Hospital Follow Up - regarding rectal bleeding and anemia - need for scopes           Ayaan Banda Md, MD In 1 week.    Specialties:  Cardiology, Internal Medicine  Why:  Hospital Follow Up - Weakness, dehydration and lasix dosing  Contact information:  [address]  [place] [address]  [phone]             Aayan Banda Md, MD.    Specialties:  Cardiology, Internal Medicine  Why:  I messaged Dr. Banda for his nurse to schedule you an INR check at [facility] for Monday  Contact information:  [id number]  [place] [address]  [phone]             Blood drawn - PT/INR.    Why:  Monday 11/11 at the [facility] - resume your usual dosing of Coumadin               Patient Instructions:      Notify your health care provider if you experience any of the following:  temperature >100.4     Notify your health care provider if you experience any of the following:  persistent dizziness, light-headedness, or visual disturbances     Notify your health care provider if you experience any of the following:  increased confusion or weakness     Activity as tolerated       Significant Diagnostic Studies: Labs:   CMP   Recent Labs   Lab 11/05/19  1552 11/06/19  0344 11/07/19  0425   NA  --  140 140   K 3.7 3.8 4.1   CL  --  108 107   CO2  --  24 23    GLU  --  116* 102   BUN  --  9 9   CREATININE  --  0.7 0.8   CALCIUM  --  8.4* 8.7   ANIONGAP  --  8 10   ESTGFRAFRICA  --  >60 >60   EGFRNONAA  --  >60 >60    and CBC   Recent Labs   Lab 11/06/19  0344  11/06/19 2007 11/07/19  0425 11/07/19  0752   WBC 7.22  --   --  9.39  --    HGB 7.2*   < > 11.1* 10.2* 11.1*   HCT 25.0*   < > 35.5* 32.4* 35.1*   *  --   --  148*  --     < > = values in this interval not displayed.       Pending Diagnostic Studies:     None         Medications:  Reconciled Home Medications:      Medication List      CHANGE how you take these medications    pantoprazole 40 MG tablet  Commonly known as:  PROTONIX  Take 1 tablet (40 mg total) by mouth 2 (two) times daily.  What changed:  when to take this        CONTINUE taking these medications    albuterol 90 mcg/actuation inhaler  Commonly known as:  VENTOLIN HFA  Inhale 2 puffs into the lungs every 6 (six) hours as needed for Wheezing. Rescue     amitriptyline 25 MG tablet  Commonly known as:  ELAVIL  Take 25 mg by mouth every evening.     ANORO ELLIPTA 62.5-25 mcg/actuation Dsdv  Generic drug:  umeclidinium-vilanterol  INHALE 1 PUFF INTO THE LUNGS ONCE A DAY. CONTROLLER.     busPIRone 15 MG tablet  Commonly known as:  BUSPAR  Take 15 mg by mouth 2 (two) times daily.     desvenlafaxine succinate 100 MG Tb24  Commonly known as:  PRISTIQ  Take 1 tablet (100 mg total) by mouth once daily.     diclofenac sodium 1 % Gel  Commonly known as:  VOLTAREN  Apply 2 g topically 4 (four) times daily as needed.     enoxaparin 100 mg/mL Syrg  Commonly known as:  LOVENOX  Inject 0.7 mLs (70 mg total) into the skin 2 (two) times daily. for 14 doses     fluticasone propionate 50 mcg/actuation nasal spray  Commonly known as:  FLONASE  1 spray by Each Nare route once daily.     furosemide 40 MG tablet  Commonly known as:  LASIX  Take two 40 mg tablets by mouth twice a day     HYDROcodone-acetaminophen  mg per tablet  Commonly known as:  NORCO  Take 1  tablet by mouth every 6 (six) hours as needed for Pain.     KlonoPIN 1 MG tablet  Generic drug:  clonazePAM  Take 1 mg by mouth 3 (three) times daily.     metoprolol succinate 25 MG 24 hr tablet  Commonly known as:  TOPROL-XL  Take 1 tablet (25 mg total) by mouth once daily.     nicotine 7 mg/24 hr  Commonly known as:  NICODERM CQ  Place 1 patch onto the skin every 24 hours.     nintedanib 150 mg Cap  Commonly known as:  OFEV  Take 150 mg by mouth 2 (two) times daily.     * nitroGLYCERIN 0.4 MG SL tablet  Commonly known as:  NITROSTAT     * nitroGLYCERIN 0.4 MG/HR TD PT24 0.4 mg/hr  Commonly known as:  NITRODUR  Place 1 patch onto the skin once daily.     ondansetron 4 MG tablet  Commonly known as:  ZOFRAN  Take 1 tablet (4 mg total) by mouth every 8 (eight) hours as needed for Nausea.     potassium chloride SA 20 MEQ tablet  Commonly known as:  K-DUR,KLOR-CON  Take 20 mEq by mouth 2 (two) times daily.     rosuvastatin 20 MG tablet  Commonly known as:  CRESTOR  Take 20 mg by mouth once daily.     sumatriptan 100 MG tablet  Commonly known as:  IMITREX  Take 100 mg by mouth every 2 (two) hours as needed.     tiZANidine 4 MG tablet  Commonly known as:  ZANAFLEX  Take 4 mg by mouth every evening.     topiramate 100 MG tablet  Commonly known as:  TOPAMAX  Take 100 mg by mouth 2 (two) times daily.     traMADol 50 mg tablet  Commonly known as:  ULTRAM  Take 50 mg by mouth every 4 (four) hours as needed.     traZODone 150 MG tablet  Commonly known as:  DESYREL  Take 150 mg by mouth nightly.     warfarin 6 MG tablet  Commonly known as:  COUMADIN  Take 6 mg by mouth Daily. Except 9mg on Thursdays.         * This list has 2 medication(s) that are the same as other medications prescribed for you. Read the directions carefully, and ask your doctor or other care provider to review them with you.                Indwelling Lines/Drains at time of discharge:   Lines/Drains/Airways     None                 Time spent on the discharge  of patient: > 30 minutes  Patient was seen and examined on the date of discharge and determined to be suitable for discharge.         Meena Alarcon NP  Department of Hospital Medicine  [facility]

## 2019-11-08 ENCOUNTER — TELEPHONE (OUTPATIENT)
Dept: CARDIOLOGY | Facility: CLINIC | Age: 49
End: 2019-11-08

## 2019-11-08 DIAGNOSIS — R11.0 NAUSEA: ICD-10-CM

## 2019-11-08 RX ORDER — ONDANSETRON 4 MG/1
TABLET, FILM COATED ORAL
Qty: 90 TABLET | Refills: 2 | Status: SHIPPED | OUTPATIENT
Start: 2019-11-08

## 2019-11-08 NOTE — TELEPHONE ENCOUNTER
----- Message from Tha Rubi sent at 11/8/2019 11:57 AM CST -----  ..Type:  Patient Returning Call    Who Called:pt   Who Left Message for Patient:  Does the patient know what this is regarding?:ER f/u  Would the patient rather a call back or a response via [facility]? Call back   Best Call Back Number: [phone]  Additional Information:Pt is requesting a call from nurse to discuss her ER visit and f/u with questions and concern regarding her medication from ER

## 2019-11-08 NOTE — TELEPHONE ENCOUNTER
Pt was not given lasix while in the hospital. Pt would like to know if she needs to continue her lasix until the appointment. Pt is also taking lovenox shot and wants to know if she needs to continue her coumadin. Pt states that she is not having any chest pain, SOB, or weakness. Her next appointment is 11/21 for Hospital Follow Up - Weakness, dehydration and lasix dosing .

## 2019-11-08 NOTE — TELEPHONE ENCOUNTER
The patient has been notified of this information and all questions answered. Pt verbalized understanding and will call back with any further questions.

## 2019-11-11 ENCOUNTER — LAB VISIT (OUTPATIENT)
Dept: LAB | Facility: HOSPITAL | Age: 49
End: 2019-11-11
Attending: FAMILY MEDICINE
Payer: COMMERCIAL

## 2019-11-11 ENCOUNTER — PATIENT OUTREACH (OUTPATIENT)
Dept: ADMINISTRATIVE | Facility: CLINIC | Age: 49
End: 2019-11-11

## 2019-11-11 DIAGNOSIS — Z79.01 LONG TERM (CURRENT) USE OF ANTICOAGULANTS: ICD-10-CM

## 2019-11-11 LAB
INR PPP: 1.5 (ref 0.8–1.2)
PROTHROMBIN TIME: 15.9 SEC (ref 9–12.5)

## 2019-11-11 PROCEDURE — 85610 PROTHROMBIN TIME: CPT | Mod: NTX

## 2019-11-11 PROCEDURE — 36415 COLL VENOUS BLD VENIPUNCTURE: CPT | Mod: PO,NTX

## 2019-11-11 NOTE — PATIENT INSTRUCTIONS
Anemia, Type Not Specified (Adult)  Red blood cells carry oxygen to the tissues of your body. Anemia is a condition in which you have too few red blood cells. You need iron to make red blood cells. The most common cause of anemia is not having enough iron. This may be because of:  · Loss of blood. This can be caused by heavy menstrual periods. It can also be caused by bleeding from the stomach or intestines.  · Poor diet. You may not be eating enough foods that contain iron.  Other causes of anemia include certain vitamin deficiencies, chronic kidney disease, and certain other chronic illnesses.  Anemia makes you to feel tired and run down. When anemia becomes severe, your skin becomes pale. You may feel short of breath after physical activity. Other symptoms include:  · Headaches  · Dizziness  · Leg cramps with physical activity  · Drowsiness  Home care  Follow these guidelines when caring for yourself at home:  · Dont overexert yourself.  · Talk with your healthcare provider before traveling by air or traveling to high altitudes.  Follow-up care  Follow up with your healthcare provider, or as advised. You may need other blood tests to find out the exact cause of your anemia. If you had testing done today, it may take several days to get all of the results. You can follow up with your own healthcare provider to get the results.  When to seek medical advice  Call your healthcare provider right away if any of these occur:  · Shortness of breath or chest pain  · Dizziness or fainting gets worse  · Vomiting blood or passing red or black-colored stool  Date Last Reviewed: 2/25/2016  © 1911-4077 [employer]. [address] [place] [address], [place] [address]. All rights reserved. This information is not intended as a substitute for professional medical care. Always follow your healthcare professional's instructions.

## 2019-11-12 ENCOUNTER — ANTI-COAG VISIT (OUTPATIENT)
Dept: CARDIOLOGY | Facility: CLINIC | Age: 49
End: 2019-11-12
Payer: COMMERCIAL

## 2019-11-12 DIAGNOSIS — Z95.2 HX OF MECHANICAL AORTIC VALVE REPLACEMENT: Chronic | ICD-10-CM

## 2019-11-12 DIAGNOSIS — Z79.01 LONG TERM (CURRENT) USE OF ANTICOAGULANTS: ICD-10-CM

## 2019-11-12 PROCEDURE — 93793 PR ANTICOAGULANT MGMT FOR PT TAKING WARFARIN: ICD-10-PCS | Mod: S$GLB,,,

## 2019-11-12 PROCEDURE — 93793 ANTICOAG MGMT PT WARFARIN: CPT | Mod: S$GLB,,,

## 2019-11-12 NOTE — PROGRESS NOTES
Mrs. Sal was admitted to the hospital 11/04-11/07 due to symptomatic anemia, weakness and hypotension. Patient reported decreased appetite, decreased fluid intake and diarrhea x 2 weeks.  Patient also reported episodes of melena, GI added pantoprazole 40mg BID and patient received 2 units of blood.  INR was 1.7 upon admit.  Warfarin was held initially on 11/4 and 11/05; resumed on 11/06.  Dosing calendar updated.  She was placed on a heparin drip while inpatient.  Patient was told to resume home dose of warfarin post discharge and bridge with Lovenox 70mg BID.    Patient's INR is sub-therapeutic at 1.5.  Patient contacted.  Mrs. Sal reports she has not taken any warfarin since discharged, she thought she was only to take Lovenox.  Bridge therapy discussed.   No abnormal pain, swelling or weakness noted.  Instructions given for patient to take warfarin 6mg every evening and continue Lovenox 70mg BID until next INR check.   Patient wrote down instructions, repeated back and voiced understanding.  Follow-up on Thursday, 11/14/19 (importance of keeping f/u appt discussed in detail).

## 2019-11-13 ENCOUNTER — OFFICE VISIT (OUTPATIENT)
Dept: CARDIOLOGY | Facility: CLINIC | Age: 49
End: 2019-11-13
Payer: COMMERCIAL

## 2019-11-13 VITALS
WEIGHT: 160.94 LBS | HEART RATE: 56 BPM | SYSTOLIC BLOOD PRESSURE: 90 MMHG | DIASTOLIC BLOOD PRESSURE: 60 MMHG | BODY MASS INDEX: 31.43 KG/M2

## 2019-11-13 DIAGNOSIS — Z95.1 STATUS POST CORONARY ARTERY BYPASS GRAFT: ICD-10-CM

## 2019-11-13 DIAGNOSIS — E78.49 OTHER HYPERLIPIDEMIA: ICD-10-CM

## 2019-11-13 DIAGNOSIS — I50.32 CHRONIC DIASTOLIC HEART FAILURE: ICD-10-CM

## 2019-11-13 DIAGNOSIS — Z95.2 HX OF MECHANICAL AORTIC VALVE REPLACEMENT: Primary | Chronic | ICD-10-CM

## 2019-11-13 DIAGNOSIS — J96.11 CHRONIC RESPIRATORY FAILURE WITH HYPOXIA: ICD-10-CM

## 2019-11-13 DIAGNOSIS — I27.20 PULMONARY HTN: Chronic | ICD-10-CM

## 2019-11-13 DIAGNOSIS — Z98.890 H/O MITRAL VALVE REPAIR: ICD-10-CM

## 2019-11-13 DIAGNOSIS — J84.10 PULMONARY FIBROSIS: Chronic | ICD-10-CM

## 2019-11-13 DIAGNOSIS — I35.2 NONRHEUMATIC AORTIC INSUFFICIENCY WITH AORTIC STENOSIS: ICD-10-CM

## 2019-11-13 DIAGNOSIS — Z79.01 ANTICOAGULATED ON COUMADIN: ICD-10-CM

## 2019-11-13 DIAGNOSIS — I10 ESSENTIAL HYPERTENSION: ICD-10-CM

## 2019-11-13 DIAGNOSIS — I49.3 PVC (PREMATURE VENTRICULAR CONTRACTION): ICD-10-CM

## 2019-11-13 PROCEDURE — 99999 PR PBB SHADOW E&M-EST. PATIENT-LVL III: CPT | Mod: PBBFAC,TXP,, | Performed by: INTERNAL MEDICINE

## 2019-11-13 PROCEDURE — 3078F DIAST BP <80 MM HG: CPT | Mod: CPTII,NTX,S$GLB, | Performed by: INTERNAL MEDICINE

## 2019-11-13 PROCEDURE — 3074F SYST BP LT 130 MM HG: CPT | Mod: CPTII,NTX,S$GLB, | Performed by: INTERNAL MEDICINE

## 2019-11-13 PROCEDURE — 99215 OFFICE O/P EST HI 40 MIN: CPT | Mod: NTX,S$GLB,, | Performed by: INTERNAL MEDICINE

## 2019-11-13 PROCEDURE — 3008F PR BODY MASS INDEX (BMI) DOCUMENTED: ICD-10-PCS | Mod: CPTII,NTX,S$GLB, | Performed by: INTERNAL MEDICINE

## 2019-11-13 PROCEDURE — 99215 PR OFFICE/OUTPT VISIT, EST, LEVL V, 40-54 MIN: ICD-10-PCS | Mod: NTX,S$GLB,, | Performed by: INTERNAL MEDICINE

## 2019-11-13 PROCEDURE — 3078F PR MOST RECENT DIASTOLIC BLOOD PRESSURE < 80 MM HG: ICD-10-PCS | Mod: CPTII,NTX,S$GLB, | Performed by: INTERNAL MEDICINE

## 2019-11-13 PROCEDURE — 99999 PR PBB SHADOW E&M-EST. PATIENT-LVL III: ICD-10-PCS | Mod: PBBFAC,TXP,, | Performed by: INTERNAL MEDICINE

## 2019-11-13 PROCEDURE — 3008F BODY MASS INDEX DOCD: CPT | Mod: CPTII,NTX,S$GLB, | Performed by: INTERNAL MEDICINE

## 2019-11-13 PROCEDURE — 3074F PR MOST RECENT SYSTOLIC BLOOD PRESSURE < 130 MM HG: ICD-10-PCS | Mod: CPTII,NTX,S$GLB, | Performed by: INTERNAL MEDICINE

## 2019-11-13 NOTE — PROGRESS NOTES
Subjective:   Patient ID:  Meg Sal is a 49 y.o. female who presents for cardiac consult of Congestive Heart Failure and Follow-up      Hyperlipidemia   Associated symptoms include chest pain and shortness of breath.   Shortness of Breath   Associated symptoms include chest pain.   Hypertension   Associated symptoms include chest pain, palpitations and shortness of breath.   Chest Pain    Associated symptoms include dizziness, palpitations and shortness of breath.   Her past medical history is significant for hyperlipidemia.   Dizziness:    Associated symptoms: palpitations and chest pain.    The patient came in today for cardiac consult of Congestive Heart Failure and Follow-up      Meg Sal is a 49 y.o. female with CAD s/p 2V CABG - LAD/LCX with occluded, s/p mechanical AVR, s/p mitral annuloplasty ring, pulm fibrosis, Pulm HTN, HFpEF, CHD, HTN, HLD, CVA here for follow up CV eval.     2/8/19  Pt had first MI at age 31, was taken to [facility], back was hurting. Felt like she had a heavy wet blanket. She was breaking up a dog fight when paramedics came. Dr. Cortez did LHC, had one stent placed. She also had other blockages which were medically treated. She had treadmill nuclear stress tests after, had been off plavix. In 2014, when she went to [place] she couldn't walk and was out of breath. She came back to  and she did another LHC with Dr. Cortez, Dr. Mcmahon was consulted as she had severe LAD disease. She went to Dr. Heller with LCA, had LHC in May 2018 with chronically occluded RCA with collaterals but patent grafts to her left system. She has been having a lot of chest pain lately. She was taking NTG two or three times a week. She was told to take Ranexa and Imdur but had more depression so stopped taking it. She has no car and has difficulty getting to coumadin clinic, discussed will to get home health for labwork.     3/11/19  She had another episode of severe chest pain last week.  Started on Tues and continued till Friday. Had home health nurse that came. She took NTG which didn't help much, took 2 tabs. Discussed she needs ER eval if severe CP again that is not improvd with NTG. Started verapamil for PVCs. Will refer to Ep for PVC ablation vs AAD. Pulm eval this AM, will have sleep study/PFTs/rheum referral. Recent stress and echo negative, mech AVR mean gradient 15 mmhg    4/29/19  She had a recent syncopal episode at home and presented to [facility] ER and was admitted. IN the ED, patient found to be mildly hypoxic with O2 sat of 90% on room air.  ABG showed pH of 7.349, pCO2 43, PO2 55, SA O2 87 on room air.  CT of the head was negative for acute process, CXR unremarkable, EKG unrevealing. BP is better since home Verapamil, Bystolic, Isosorbide, and NTG patch have been held. Home oxygen evaluation completed and patient qualified with an exertional room air O2 saturation of 87% which improved to 96% on 2 L nasal cannula, she was DC home on 2L NC.Pt had eval by Dr. Montero for PVCs, discussed ablation not a good option, may try amio but not a great option due to lung disease. Had pulm eval by Dr. Mondragon and rec RHC to eval PH and lung transplant eval. BP elevated today, had been off NTG/imdur, will restart today.     7/3/19  She presented to the ER last month and admitted to hospital with SOB. 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 Patient with Pulmonary support in place and referral to the transplant service, instructed to follow up as directed for transplant consideration. RHC last month -   Mildly elevated right Filling Pressures, Moderate Pulmonary Hypertension. She was started on nintenanib - OFEV - just received it yesterday. Still considering lung transplant event. BNP was elevated, discussed to increase lasix.  ECG - NSR, PACs, inc RBBB, anterior ischemia - old      8/7/19  She had a recent fall, broke collar bone. She  was trying to put water on night stand and fell. She went to ortho in [place], does not need surgery. She had the fall yesterday morning. She has migraines but discussed if headache occurs needs ER eval to r/o intracranial bleeding. She was out of Imitrex and has been taking Fiorcicet but didn't help much.     10/9/19  Needs to get more teeth pulled out and add to her dentures. She has NTG patch but still has to use PRN NTG pills, had to take 3 for severe palpitations. No CP. Recent INR was 3.8. Will repeat INR today.     11/13/19 - Hosp follow up  DC summary - last 2 weeks pt has had decreased appetite, poor fluid intake, diarrhea and an emotional time and her father had passed. She continued to take her lasix. She presented with weakness and hypotension. Diuretics held and gentle IV fluids given. Also, with chronically elevated troponin and Cardiology felt DEMAND ISCHEMIA. Due to mechanical valve, coumadin on hold and heparin gtt in progress. 2 D ECHO did not show any new changes. Around 3 weeks earlier, pt had 4 days of melena which had resolved. Hgb/Hct has trended down and pt received 1 unit PrBCs for symptomatic anemia. Cardiology assisting with care. On 11/6 - Hgb/Hct dropped below 8 and pt transfused 2 units PRBCs. IV fluids discontinued as hypotension resolved and pt received some prn lasix is association with blood transfusion. Cardiology advised resuming Coumadin with heparin bridge however, will order Lovenox bridge as pt is familiar and expressing desire for discharge. Gastroenterology saw the patient due to possible GI bleed and they recommend PPI and conservative management. Requesting risk stratification from Cardiology before any scopes done. Dr. Enciso saw the patient and encouraged the patient to get EGD and colonoscopy. Pt placed on clear liquids with plans for scopes on Friday as patient needs colonoscopy prep. 11/7 - Pt's lab work and vital signs stable. She denied further weakness and  hematochezia. Pt verbalized desire to discharge as she had to plan her father's . Confirmed with Cardiology that Coumadin bridged with Lovenox was permitted. Pt was seen and examined and determined to be safe and stable for discharge. Pt was discharged on her Coumadin and therapeutic Lovenox. Pt stated she was knowledgeable in Lovenox administration.    From coumadin clinic - . Ms. Sal reports she has not taken any warfarin since discharged, she thought she was only to take Lovenox.  Bridge therapy discussed.   No abnormal pain, swelling or weakness noted.  Instructions given for patient to take warfarin 6mg every evening and continue Lovenox 70mg BID until next INR check.   Patient wrote down instructions, repeated back and voiced understanding.     Patient has dec exercise tolerance.    Patient is compliant with medications.    ECG - NSR, RBBB    RHC 19    AIR REST:  RA: 13/12 (11)  RV: 57  RVEDP: 13     PW: 15/29 (16)  PA: 58/16 (34)      50-59% stenosis within the right internal carotid artery  **Categories of stenosis (0-15%, 16-49%, 50-69% and 70-99% ) are based on published criteria that have been internally validated.  Degree of stenosis is based on NASCET criteria and refers to the degree of luminal diameter narrowing as a percentage of the normal ICA distal to the stenosis and any area of post stenotic dilation.      Nuclear Quantitative Functional Analysis:   LVEF: 65 %    Impression: NORMAL MYOCARDIAL PERFUSION  1. The perfusion scan is free of evidence for myocardial ischemia or injury.   2. Resting wall motion is physiologic.   3. Resting LV function is normal.   4. The ventricular volumes are normal at rest and stress.   5. The extracardiac distribution of radioactivity is normal.       This document has been electronically    SIGNED BY: Hermilo Pete MD On: 2019 16:47    CONCLUSIONS     1 - Mild left atrial enlargement.     2 - Eccentric hypertrophy.     3 - No wall  motion abnormalities.     4 - Normal left ventricular systolic function (EF 55-60%).     5 - Normal right ventricular systolic function .     6 - Aortic valve prosthesis, CATINA = 1.61 cm2, AVAi = 0.95 cm2/m2, peak velocity = 2.48 m/s, mean gradient = 15 mmHg.     7 - Moderate tricuspid regurgitation.     8 - Pulmonary hypertension. The estimated PA systolic pressure is 61 mmHg.     This document has been electronically    SIGNED BY: Hermilo Pete MD On: 2019 14:50    TEST DESCRIPTION   PREDOMINANT RHYTHM  1. Sinus rhythm with heart rates varying between 66 and 105 bpm with an average of 78 bpm.   VENTRICULAR ARRHYTHMIAS  1. There were very frequent PVCs totalling 48563 and averaging 489 per hour.  There were 23 couplets. There were 5 triplets.   2. There were no episodes of ventricular tachycardia.    SUPRA VENTRICULAR ARRHYTHMIAS  1. There were rare PACs totalling 382 and averaging 7 per hour.   2. There were no episodes of sustained supraventricular tachycardia.    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, implants and grafts, initial encounter     Hyperlipidemia     Hypertension     Sleep apnea syndrome 2019    Stroke        Past Surgical History:   Procedure Laterality Date    AORTIC VALVE REPLACEMENT      mechanical    CARDIAC CATHETERIZATION      CORONARY ANGIOPLASTY      CORONARY ARTERY BYPASS GRAFT      HYSTERECTOMY      RIGHT HEART CATHETERIZATION Right 2019    Procedure: INSERTION, CATHETER, RIGHT HEART;  Surgeon: Derek Brown MD;  Location: [facility] CATH LAB;  Service: Cardiology;  Laterality: Right;  malur pt/detailed hx       Social History     Tobacco Use    Smoking status: Former Smoker     Packs/day: 1.00     Types: Cigarettes     Last attempt to quit: 2018     Years since quittin.4     Smokeless tobacco: Never Used   Substance Use Topics    Alcohol use: Not Currently     Frequency: 2-4 times a month     Drinks per session: 1 or 2     Binge frequency: Never    Drug use: Not Currently       Family History   Problem Relation Age of Onset    Heart disease Mother     Breast cancer Sister     Coronary artery disease Father     Lung cancer Brother        Patient's Medications   New Prescriptions    No medications on file   Previous Medications    ALBUTEROL (VENTOLIN HFA) 90 MCG/ACTUATION INHALER    Inhale 2 puffs into the lungs every 6 (six) hours as needed for Wheezing. Rescue    AMITRIPTYLINE (ELAVIL) 25 MG TABLET    Take 25 mg by mouth every evening.     ANORO ELLIPTA 62.5-25 MCG/ACTUATION DSDV    INHALE 1 PUFF INTO THE LUNGS ONCE A DAY. CONTROLLER.    BUSPIRONE (BUSPAR) 15 MG TABLET    Take 15 mg by mouth 2 (two) times daily.     CLONAZEPAM (KLONOPIN) 1 MG TABLET    Take 1 mg by mouth 3 (three) times daily.     DESVENLAFAXINE SUCCINATE (PRISTIQ) 100 MG TB24    Take 1 tablet (100 mg total) by mouth once daily.    DICLOFENAC SODIUM (VOLTAREN) 1 % GEL    Apply 2 g topically 4 (four) times daily as needed.     ENOXAPARIN (LOVENOX) 100 MG/ML SYRG    Inject 0.7 mLs (70 mg total) into the skin 2 (two) times daily. for 14 doses    FLUTICASONE (FLONASE) 50 MCG/ACTUATION NASAL SPRAY    1 spray by Each Nare route once daily.    FUROSEMIDE (LASIX) 40 MG TABLET    Take two 40 mg tablets by mouth twice a day    HYDROCODONE-ACETAMINOPHEN (NORCO)  MG PER TABLET    Take 1 tablet by mouth every 6 (six) hours as needed for Pain.    METOPROLOL SUCCINATE (TOPROL-XL) 25 MG 24 HR TABLET    Take 1 tablet (25 mg total) by mouth once daily.    NICOTINE (NICODERM CQ) 7 MG/24 HR    Place 1 patch onto the skin every 24 hours.    NINTEDANIB (OFEV) 150 MG CAP    Take 150 mg by mouth 2 (two) times daily.    NITROGLYCERIN (NITROSTAT) 0.4 MG SL TABLET        NITROGLYCERIN 0.4 MG/HR TD PT24 (NITRODUR) 0.4 MG/HR    Place 1  patch onto the skin once daily.    ONDANSETRON (ZOFRAN) 4 MG TABLET    TAKE 1 BY MOUTH EVERY 8 HOURS AS NEEDED FOR NAUSEA.    PANTOPRAZOLE (PROTONIX) 40 MG TABLET    Take 1 tablet (40 mg total) by mouth 2 (two) times daily.    POTASSIUM CHLORIDE SA (K-DUR,KLOR-CON) 20 MEQ TABLET    Take 20 mEq by mouth 2 (two) times daily.    ROSUVASTATIN (CRESTOR) 20 MG TABLET    Take 20 mg by mouth once daily.    SUMATRIPTAN (IMITREX) 100 MG TABLET    Take 100 mg by mouth every 2 (two) hours as needed.    TIZANIDINE (ZANAFLEX) 4 MG TABLET    Take 4 mg by mouth every evening.    TOPIRAMATE (TOPAMAX) 100 MG TABLET    Take 100 mg by mouth 2 (two) times daily.    TRAMADOL (ULTRAM) 50 MG TABLET    Take 50 mg by mouth every 4 (four) hours as needed.     TRAZODONE (DESYREL) 150 MG TABLET    Take 150 mg by mouth nightly.    WARFARIN (COUMADIN) 6 MG TABLET    Take 6 mg by mouth Daily. Except 9mg on Thursdays.   Modified Medications    No medications on file   Discontinued Medications    No medications on file       Review of Systems   Constitutional: Negative.    HENT: Negative.    Eyes: Negative.    Respiratory: Positive for shortness of breath.    Cardiovascular: Positive for chest pain and palpitations.   Gastrointestinal: Negative.    Genitourinary: Negative.    Musculoskeletal: Negative.    Skin: Negative.    Neurological: Positive for dizziness.   Endo/Heme/Allergies: Negative.    Psychiatric/Behavioral: The patient is nervous/anxious.    All 12 systems otherwise negative.      Wt Readings from Last 3 Encounters:   11/13/19 73 kg (160 lb 15 oz)   11/07/19 73.4 kg (161 lb 13.1 oz)   10/09/19 72.5 kg (159 lb 13.3 oz)     Temp Readings from Last 3 Encounters:   11/07/19 98.4 °F (36.9 °C) (Oral)   06/22/19 98 °F (36.7 °C) (Oral)   05/17/19 97.6 °F (36.4 °C) (Oral)     BP Readings from Last 3 Encounters:   11/13/19 90/60   11/07/19 134/69   10/09/19 90/70     Pulse Readings from Last 3 Encounters:   11/13/19 (!) 56   11/07/19 73    10/09/19 88       BP 90/60 (BP Location: Right arm, Patient Position: Sitting, BP Method: Small (Manual))   Pulse (!) 56   Wt 73 kg (160 lb 15 oz)   LMP  (LMP Unknown)   BMI 31.43 kg/m²     Objective:   Physical Exam   Constitutional: She is oriented to person, place, and time. She appears well-developed and well-nourished. No distress.   HENT:   Head: Normocephalic and atraumatic.   Nose: Nose normal.   Mouth/Throat: Oropharynx is clear and moist.   Eyes: Conjunctivae and EOM are normal. No scleral icterus.   Neck: Normal range of motion. Neck supple. No JVD present. No thyromegaly present.   Cardiovascular: Normal rate, regular rhythm, S1 normal and S2 normal. Exam reveals no gallop, no S3, no S4 and no friction rub.   Murmur heard.  Pulmonary/Chest: Effort normal and breath sounds normal. No stridor. No respiratory distress. She has no wheezes. She has no rales. She exhibits no tenderness.   Abdominal: Soft. Bowel sounds are normal. She exhibits no distension and no mass. There is no tenderness. There is no rebound.   Genitourinary:   Genitourinary Comments: Deferred   Musculoskeletal: Normal range of motion. She exhibits no edema, tenderness or deformity.   Lymphadenopathy:     She has no cervical adenopathy.   Neurological: She is alert and oriented to person, place, and time. She exhibits normal muscle tone. Coordination normal.   Skin: Skin is warm and dry. No rash noted. She is not diaphoretic. No erythema. No pallor.   Psychiatric: She has a normal mood and affect. Her behavior is normal. Judgment and thought content normal.   Nursing note and vitals reviewed.      Lab Results   Component Value Date     11/07/2019    K 4.1 11/07/2019     11/07/2019    CO2 23 11/07/2019    BUN 9 11/07/2019    CREATININE 0.8 11/07/2019     11/07/2019    HGBA1C 5.2 06/22/2019    MG 2.0 11/07/2019    AST 31 11/04/2019    ALT 11 11/04/2019    ALBUMIN 3.8 11/04/2019    PROT 7.7 11/04/2019    BILITOT 0.4  11/04/2019    WBC 9.39 11/07/2019    HGB 11.1 (L) 11/07/2019    HCT 35.1 (L) 11/07/2019    MCV 95 11/07/2019     (L) 11/07/2019    INR 1.5 (H) 11/11/2019    INR 3.1 08/22/2019    TSH 1.982 06/22/2019    CHOL 172 11/04/2019    HDL 39 (L) 11/04/2019    LDLCALC 107.2 11/04/2019    TRIG 129 11/04/2019     (H) 11/04/2019     Assessment:      1. Hx of mechanical aortic valve replacement    2. Chronic diastolic heart failure    3. Status post coronary artery bypass graft    4. H/O mitral valve repair    5. Nonrheumatic aortic insufficiency with aortic stenosis    6. Other hyperlipidemia    7. PVC (premature ventricular contraction)    8. Essential hypertension    9. Chronic respiratory failure with hypoxia    10. Pulmonary fibrosis    11. Pulmonary HTN    12. Anticoagulated on Coumadin        Plan:   1. CAD s/p CABG  - cont current meds  - exercise nuclear stress to r/o ischemia - negative  - cannot tolerate Ranexa; cont NTG patch and PRN NTG tabs    2. HFpEF  - cont lasix PRN and K   - low salt diet     3. PVCs  - not candidate for ablation  - amiodarone high risk for pulm toxicity    4. PulmHTN/fibrosis  - RHC  With PH and elevated filling pressures  - f/u with pulm as needed - started on new IPF med  - f/u with pulm for BANDAR    5. Mech AVR - subtherapuetic INR   - recent echo gradient WNL  - cont coumadin clinic   - needs Lovenox Bridging prior to any tooth extraction  - ok to have tooth imprint but not extraction off coumadin/Lovenox    - repeat INR today and CBC/CMP    Thank you for allowing me to participate in this patient's care. Please do not hesitate to contact me with any questions or concerns. Consult note has been forwarded to the referral physician.

## 2019-11-15 ENCOUNTER — TELEPHONE (OUTPATIENT)
Dept: CARDIOLOGY | Facility: CLINIC | Age: 49
End: 2019-11-15

## 2019-11-15 ENCOUNTER — ANTI-COAG VISIT (OUTPATIENT)
Dept: CARDIOLOGY | Facility: CLINIC | Age: 49
End: 2019-11-15

## 2019-11-15 ENCOUNTER — HOSPITAL ENCOUNTER (INPATIENT)
Facility: HOSPITAL | Age: 49
LOS: 4 days | Discharge: HOME | DRG: 918 | End: 2019-11-19
Attending: EMERGENCY MEDICINE | Admitting: INTERNAL MEDICINE
Payer: COMMERCIAL

## 2019-11-15 DIAGNOSIS — K75.9 HEPATITIS: ICD-10-CM

## 2019-11-15 DIAGNOSIS — R79.89 ELEVATED LFTS: ICD-10-CM

## 2019-11-15 DIAGNOSIS — D68.9 COAGULOPATHY: Primary | ICD-10-CM

## 2019-11-15 DIAGNOSIS — Z95.2 HX OF MECHANICAL AORTIC VALVE REPLACEMENT: Chronic | ICD-10-CM

## 2019-11-15 DIAGNOSIS — T45.511A WARFARIN TOXICITY: ICD-10-CM

## 2019-11-15 DIAGNOSIS — R19.7 DIARRHEA, UNSPECIFIED TYPE: ICD-10-CM

## 2019-11-15 LAB
ALBUMIN SERPL BCP-MCNC: 3.5 G/DL (ref 3.5–5.2)
ALP SERPL-CCNC: 115 U/L (ref 55–135)
ALT SERPL W/O P-5'-P-CCNC: 646 U/L (ref 10–44)
ANION GAP SERPL CALC-SCNC: 11 MMOL/L (ref 8–16)
APAP SERPL-MCNC: <3 UG/ML (ref 10–20)
APTT BLDCRRT: 49.3 SEC (ref 21–32)
AST SERPL-CCNC: 614 U/L (ref 10–40)
BASOPHILS # BLD AUTO: 0.05 K/UL (ref 0–0.2)
BASOPHILS NFR BLD: 0.6 % (ref 0–1.9)
BILIRUB SERPL-MCNC: 1.1 MG/DL (ref 0.1–1)
BUN SERPL-MCNC: 12 MG/DL (ref 6–20)
CALCIUM SERPL-MCNC: 8.3 MG/DL (ref 8.7–10.5)
CHLORIDE SERPL-SCNC: 100 MMOL/L (ref 95–110)
CO2 SERPL-SCNC: 24 MMOL/L (ref 23–29)
CREAT SERPL-MCNC: 1 MG/DL (ref 0.5–1.4)
DIFFERENTIAL METHOD: ABNORMAL
EOSINOPHIL # BLD AUTO: 0.1 K/UL (ref 0–0.5)
EOSINOPHIL NFR BLD: 1.5 % (ref 0–8)
ERYTHROCYTE [DISTWIDTH] IN BLOOD BY AUTOMATED COUNT: 17.5 % (ref 11.5–14.5)
EST. GFR  (AFRICAN AMERICAN): >60 ML/MIN/1.73 M^2
EST. GFR  (NON AFRICAN AMERICAN): >60 ML/MIN/1.73 M^2
GLUCOSE SERPL-MCNC: 127 MG/DL (ref 70–110)
HCT VFR BLD AUTO: 35.5 % (ref 37–48.5)
HGB BLD-MCNC: 10.5 G/DL (ref 12–16)
IMM GRANULOCYTES # BLD AUTO: 0.04 K/UL (ref 0–0.04)
IMM GRANULOCYTES NFR BLD AUTO: 0.5 % (ref 0–0.5)
INR PPP: 6 (ref 0.8–1.2)
INR PPP: 7 (ref 0.8–1.2)
LYMPHOCYTES # BLD AUTO: 1.5 K/UL (ref 1–4.8)
LYMPHOCYTES NFR BLD: 19.3 % (ref 18–48)
MCH RBC QN AUTO: 29.5 PG (ref 27–31)
MCHC RBC AUTO-ENTMCNC: 29.6 G/DL (ref 32–36)
MCV RBC AUTO: 100 FL (ref 82–98)
MONOCYTES # BLD AUTO: 1.1 K/UL (ref 0.3–1)
MONOCYTES NFR BLD: 14 % (ref 4–15)
NEUTROPHILS # BLD AUTO: 5.1 K/UL (ref 1.8–7.7)
NEUTROPHILS NFR BLD: 64.1 % (ref 38–73)
NRBC BLD-RTO: 0 /100 WBC
PLATELET # BLD AUTO: 212 K/UL (ref 150–350)
PMV BLD AUTO: 11.1 FL (ref 9.2–12.9)
POTASSIUM SERPL-SCNC: 3.6 MMOL/L (ref 3.5–5.1)
PROT SERPL-MCNC: 7 G/DL (ref 6–8.4)
PROTHROMBIN TIME: 61.2 SEC (ref 9–12.5)
PROTHROMBIN TIME: 70.5 SEC (ref 9–12.5)
RBC # BLD AUTO: 3.56 M/UL (ref 4–5.4)
SODIUM SERPL-SCNC: 135 MMOL/L (ref 136–145)
WBC # BLD AUTO: 7.91 K/UL (ref 3.9–12.7)

## 2019-11-15 PROCEDURE — 36415 COLL VENOUS BLD VENIPUNCTURE: CPT | Mod: NTX

## 2019-11-15 PROCEDURE — 85730 THROMBOPLASTIN TIME PARTIAL: CPT | Mod: NTX

## 2019-11-15 PROCEDURE — 99285 EMERGENCY DEPT VISIT HI MDM: CPT | Mod: 25,NTX

## 2019-11-15 PROCEDURE — 80053 COMPREHEN METABOLIC PANEL: CPT | Mod: NTX

## 2019-11-15 PROCEDURE — 80329 ANALGESICS NON-OPIOID 1 OR 2: CPT | Mod: NTX

## 2019-11-15 PROCEDURE — 85025 COMPLETE CBC W/AUTO DIFF WBC: CPT | Mod: NTX

## 2019-11-15 PROCEDURE — 85610 PROTHROMBIN TIME: CPT | Mod: 91,NTX

## 2019-11-15 PROCEDURE — 80074 ACUTE HEPATITIS PANEL: CPT | Mod: NTX

## 2019-11-15 PROCEDURE — 21400001 HC TELEMETRY ROOM: Mod: NTX

## 2019-11-15 PROCEDURE — 25000003 PHARM REV CODE 250: Mod: NTX | Performed by: INTERNAL MEDICINE

## 2019-11-15 PROCEDURE — 36000 PLACE NEEDLE IN VEIN: CPT | Mod: NTX

## 2019-11-15 PROCEDURE — S4991 NICOTINE PATCH NONLEGEND: HCPCS | Mod: NTX | Performed by: INTERNAL MEDICINE

## 2019-11-15 RX ORDER — HYDRALAZINE HYDROCHLORIDE 20 MG/ML
10 INJECTION INTRAMUSCULAR; INTRAVENOUS EVERY 6 HOURS PRN
Status: DISCONTINUED | OUTPATIENT
Start: 2019-11-15 | End: 2019-11-19 | Stop reason: HOSPADM

## 2019-11-15 RX ORDER — CLONAZEPAM 1 MG/1
1 TABLET ORAL 3 TIMES DAILY
Status: DISCONTINUED | OUTPATIENT
Start: 2019-11-15 | End: 2019-11-19 | Stop reason: HOSPADM

## 2019-11-15 RX ORDER — HYDROCODONE BITARTRATE AND ACETAMINOPHEN 10; 325 MG/1; MG/1
1 TABLET ORAL EVERY 6 HOURS PRN
Status: DISCONTINUED | OUTPATIENT
Start: 2019-11-15 | End: 2019-11-19 | Stop reason: HOSPADM

## 2019-11-15 RX ORDER — FLUTICASONE PROPIONATE 50 MCG
1 SPRAY, SUSPENSION (ML) NASAL DAILY
Status: DISCONTINUED | OUTPATIENT
Start: 2019-11-16 | End: 2019-11-19 | Stop reason: HOSPADM

## 2019-11-15 RX ORDER — PANTOPRAZOLE SODIUM 40 MG/1
40 TABLET, DELAYED RELEASE ORAL 2 TIMES DAILY
Status: DISCONTINUED | OUTPATIENT
Start: 2019-11-15 | End: 2019-11-19 | Stop reason: HOSPADM

## 2019-11-15 RX ORDER — GUAIFENESIN 100 MG/5ML
200 SOLUTION ORAL EVERY 4 HOURS PRN
Status: DISCONTINUED | OUTPATIENT
Start: 2019-11-15 | End: 2019-11-19 | Stop reason: HOSPADM

## 2019-11-15 RX ORDER — ACETAMINOPHEN 325 MG/1
650 TABLET ORAL EVERY 6 HOURS PRN
Status: DISCONTINUED | OUTPATIENT
Start: 2019-11-15 | End: 2019-11-19 | Stop reason: HOSPADM

## 2019-11-15 RX ORDER — DESVENLAFAXINE 100 MG/1
100 TABLET, EXTENDED RELEASE ORAL DAILY
Status: DISCONTINUED | OUTPATIENT
Start: 2019-11-16 | End: 2019-11-19 | Stop reason: HOSPADM

## 2019-11-15 RX ORDER — MAG HYDROX/ALUMINUM HYD/SIMETH 200-200-20
30 SUSPENSION, ORAL (FINAL DOSE FORM) ORAL EVERY 6 HOURS PRN
Status: DISCONTINUED | OUTPATIENT
Start: 2019-11-15 | End: 2019-11-19 | Stop reason: HOSPADM

## 2019-11-15 RX ORDER — ONDANSETRON 2 MG/ML
4 INJECTION INTRAMUSCULAR; INTRAVENOUS EVERY 8 HOURS PRN
Status: DISCONTINUED | OUTPATIENT
Start: 2019-11-15 | End: 2019-11-19 | Stop reason: HOSPADM

## 2019-11-15 RX ORDER — IPRATROPIUM BROMIDE AND ALBUTEROL SULFATE 2.5; .5 MG/3ML; MG/3ML
3 SOLUTION RESPIRATORY (INHALATION) EVERY 4 HOURS PRN
Status: DISCONTINUED | OUTPATIENT
Start: 2019-11-15 | End: 2019-11-19 | Stop reason: HOSPADM

## 2019-11-15 RX ORDER — AMITRIPTYLINE HYDROCHLORIDE 25 MG/1
25 TABLET, FILM COATED ORAL NIGHTLY
Status: DISCONTINUED | OUTPATIENT
Start: 2019-11-15 | End: 2019-11-19 | Stop reason: HOSPADM

## 2019-11-15 RX ORDER — DIPHENHYDRAMINE HCL 25 MG
25 CAPSULE ORAL EVERY 6 HOURS PRN
Status: DISCONTINUED | OUTPATIENT
Start: 2019-11-15 | End: 2019-11-19 | Stop reason: HOSPADM

## 2019-11-15 RX ORDER — FUROSEMIDE 40 MG/1
40 TABLET ORAL 2 TIMES DAILY
Status: DISCONTINUED | OUTPATIENT
Start: 2019-11-16 | End: 2019-11-19 | Stop reason: HOSPADM

## 2019-11-15 RX ORDER — NICOTINE 7MG/24HR
1 PATCH, TRANSDERMAL 24 HOURS TRANSDERMAL
Status: DISCONTINUED | OUTPATIENT
Start: 2019-11-15 | End: 2019-11-19 | Stop reason: HOSPADM

## 2019-11-15 RX ADMIN — TRAZODONE HYDROCHLORIDE 150 MG: 100 TABLET ORAL at 09:11

## 2019-11-15 RX ADMIN — CLONAZEPAM 1 MG: 1 TABLET ORAL at 09:11

## 2019-11-15 RX ADMIN — BUSPIRONE HYDROCHLORIDE 15 MG: 10 TABLET ORAL at 09:11

## 2019-11-15 RX ADMIN — AMITRIPTYLINE HYDROCHLORIDE 25 MG: 25 TABLET, FILM COATED ORAL at 09:11

## 2019-11-15 RX ADMIN — NICOTINE 1 PATCH: 7 PATCH, EXTENDED RELEASE TRANSDERMAL at 09:11

## 2019-11-15 RX ADMIN — PANTOPRAZOLE SODIUM 40 MG: 40 TABLET, DELAYED RELEASE ORAL at 09:11

## 2019-11-15 RX ADMIN — HYDROCODONE BITARTRATE AND ACETAMINOPHEN 1 TABLET: 10; 325 TABLET ORAL at 09:11

## 2019-11-15 NOTE — ED PROVIDER NOTES
SCRIBE #1 NOTE: I, Leydi Knapp, am scribing for, and in the presence of, Dalton Choi Jr., MD. I have scribed the entire note.       History     Chief Complaint   Patient presents with    Abnormal Lab     sent by dr banda for elevated liver enzymes      Review of patient's allergies indicates:   Allergen Reactions    Nsaids (non-steroidal anti-inflammatory drug) Other (See Comments)     Mechanical heart valve         History of Present Illness     HPI    11/15/2019, 4:43 PM  History obtained from the patient      History of Present Illness: Meg Sal is a 49 y.o. female patient with a PMHx of CHF, CAD, HLD, HTN, stroke and PSHx of mechanical aortic valve replacement (date unknown) who presents to the Emergency Department for evaluation of abnormal labs due to elevated liver enzymes. Pt was referred to ED by Dr. Ayaan Banda (Cardiologist). No mitigating or exacerbating factors reported. Associated sxs include decreased appetite, generalized abd pain, nausea, bilateral ankle swelling. Symptoms are constant and moderate in severity. Patient denies any CP, SOB, fever/chills, HA, dizziness, congestion, sore throat, back pain, bruises/blds easily, rash, dysuria, hematuria, diarrhea, emesis, and all other sxs at this time. Prior Tx includes Imodium for nausea with improvement in sx. No further complaints or concerns at this time.   Patient denies any acetaminophen use.  She denies any nausea vomiting or diarrhea.  Only new medication is Lovenox which she is stop today while bridging Coumadin.  This was for mechanical heart valve.  She denies any abdominal pain.  There are no risk factors for hepatitis a noted.      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, implants and grafts, initial encounter     Hyperlipidemia     Hypertension     Sleep apnea syndrome 2019    Stroke        Past Surgical History:  Past Surgical History:   Procedure Laterality Date    AORTIC VALVE REPLACEMENT      mechanical    CARDIAC CATHETERIZATION      CORONARY ANGIOPLASTY      CORONARY ARTERY BYPASS GRAFT      HYSTERECTOMY      RIGHT HEART CATHETERIZATION Right 2019    Procedure: INSERTION, CATHETER, RIGHT HEART;  Surgeon: Derek Brown MD;  Location: [facility] CATH LAB;  Service: Cardiology;  Laterality: Right;  malur pt/detailed hx         Family History:  Family History   Problem Relation Age of Onset    Heart disease Mother     Breast cancer Sister     Coronary artery disease Father     Lung cancer Brother        Social History:  Social History     Tobacco Use    Smoking status: Former Smoker     Packs/day: 1.00     Types: Cigarettes     Last attempt to quit: 2018     Years since quittin.4    Smokeless tobacco: Never Used   Substance and Sexual Activity    Alcohol use: Not Currently     Frequency: 2-4 times a month     Drinks per session: 1 or 2     Binge frequency: Never    Drug use: Not Currently    Sexual activity: Not on file        Review of Systems     Review of Systems   Constitutional: Positive for appetite change (decreased). Negative for chills and fever.   HENT: Negative for congestion and sore throat.    Respiratory: Negative for shortness of breath.    Cardiovascular: Negative for chest pain.   Gastrointestinal: Positive for abdominal pain (generalized) and nausea. Negative for diarrhea and vomiting.   Genitourinary: Negative for dysuria and hematuria.   Musculoskeletal: Negative for back pain.        (+) bilateral ankle swelling   Skin: Negative for rash.   Neurological: Negative for dizziness and headaches.   Hematological: Does not bruise/bleed easily.   All other systems reviewed and are negative.     Physical Exam      Initial Vitals [11/15/19 1604]   BP Pulse Resp Temp SpO2   (!) 133/58 68 20 98.9 °F (37.2 °C) 96 %      MAP       --          Physical Exam  Nursing Notes and Vital Signs Reviewed.  Constitutional: Patient is in no apparent distress. Well-developed and well-nourished.  Head: Atraumatic. Normocephalic.  Eyes:  EOM intact. Conjunctivae are not pale. No scleral icterus.  ENT: Mucous membranes are moist. Oropharynx is clear and symmetric.    Neck: Supple. Full ROM. No lymphadenopathy.  Cardiovascular: Regular rate. Regular rhythm. No murmurs, rubs, or gallops. Distal pulses are 2+ and symmetric.  Pulmonary/Chest: No respiratory distress. Clear to auscultation bilaterally. No wheezing or rales.  Abdominal: Soft and non-distended.  There is no tenderness.  No rebound, guarding, or rigidity. Good bowel sounds.  Genitourinary: No CVA tenderness .  No suprapubic tendernes  Musculoskeletal: Moves all extremities. No obvious deformities. No edema. No calf tenderness.  5 x 5 strength in all extremities.  Skin: Warm and dry.  No rash.  Neurological:  Alert, awake, and appropriate.  Normal speech.  No acute focal neurological deficits are appreciated.  Two through 12 intact bilaterally.  Psychiatric: Normal affect. Good eye contact. Appropriate in content.     ED Course   Procedures  ED Vital Signs:  Vitals:    11/15/19 1604 11/15/19 1714 11/15/19 1715 11/15/19 1716   BP: (!) 133/58 (!) 110/55     Pulse: 68 61  61   Resp: 20      Temp: 98.9 °F (37.2 °C)      TempSrc: Oral      SpO2: 96%   95%   Weight:   77.6 kg (171 lb)    Height: 5' (1.524 m)          Abnormal Lab Results:  Labs Reviewed   COMPREHENSIVE METABOLIC PANEL - Abnormal; Notable for the following components:       Result Value    Sodium 135 (*)     Glucose 127 (*)     Calcium 8.3 (*)     Total Bilirubin 1.1 (*)      (*)      (*)     All other components within normal limits   CBC W/ AUTO DIFFERENTIAL - Abnormal; Notable for the following components:     RBC 3.56 (*)     Hemoglobin 10.5 (*)     Hematocrit 35.5 (*)     Mean Corpuscular Volume 100 (*)     Mean Corpuscular Hemoglobin Conc 29.6 (*)     RDW 17.5 (*)     Mono # 1.1 (*)     All other components within normal limits   APTT - Abnormal; Notable for the following components:    aPTT 49.3 (*)     All other components within normal limits   PROTIME-INR - Abnormal; Notable for the following components:    Prothrombin Time 70.5 (*)     INR 7.0 (*)     All other components within normal limits    Narrative:      INR  critical result(s) called and verbal readback obtained from   Tere Chawla RN by LUANNE 11/15/2019 17:56   HEPATITIS PANEL, ACUTE        All Lab Results:  Results for orders placed or performed during the hospital encounter of 11/15/19   Comprehensive metabolic panel   Result Value Ref Range    Sodium 135 (L) 136 - 145 mmol/L    Potassium 3.6 3.5 - 5.1 mmol/L    Chloride 100 95 - 110 mmol/L    CO2 24 23 - 29 mmol/L    Glucose 127 (H) 70 - 110 mg/dL    BUN, Bld 12 6 - 20 mg/dL    Creatinine 1.0 0.5 - 1.4 mg/dL    Calcium 8.3 (L) 8.7 - 10.5 mg/dL    Total Protein 7.0 6.0 - 8.4 g/dL    Albumin 3.5 3.5 - 5.2 g/dL    Total Bilirubin 1.1 (H) 0.1 - 1.0 mg/dL    Alkaline Phosphatase 115 55 - 135 U/L     (H) 10 - 40 U/L     (H) 10 - 44 U/L    Anion Gap 11 8 - 16 mmol/L    eGFR if African American >60 >60 mL/min/1.73 m^2    eGFR if non African American >60 >60 mL/min/1.73 m^2   CBC auto differential   Result Value Ref Range    WBC 7.91 3.90 - 12.70 K/uL    RBC 3.56 (L) 4.00 - 5.40 M/uL    Hemoglobin 10.5 (L) 12.0 - 16.0 g/dL    Hematocrit 35.5 (L) 37.0 - 48.5 %    Mean Corpuscular Volume 100 (H) 82 - 98 fL    Mean Corpuscular Hemoglobin 29.5 27.0 - 31.0 pg    Mean Corpuscular Hemoglobin Conc 29.6 (L) 32.0 - 36.0 g/dL    RDW 17.5 (H) 11.5 - 14.5 %    Platelets 212 150 - 350 K/uL    MPV 11.1 9.2 - 12.9 fL    Immature Granulocytes 0.5 0.0 - 0.5 %    Gran # (ANC) 5.1 1.8 - 7.7 K/uL    Immature Grans  (Abs) 0.04 0.00 - 0.04 K/uL    Lymph # 1.5 1.0 - 4.8 K/uL    Mono # 1.1 (H) 0.3 - 1.0 K/uL    Eos # 0.1 0.0 - 0.5 K/uL    Baso # 0.05 0.00 - 0.20 K/uL    nRBC 0 0 /100 WBC    Gran% 64.1 38.0 - 73.0 %    Lymph% 19.3 18.0 - 48.0 %    Mono% 14.0 4.0 - 15.0 %    Eosinophil% 1.5 0.0 - 8.0 %    Basophil% 0.6 0.0 - 1.9 %    Differential Method Automated    APTT   Result Value Ref Range    aPTT 49.3 (H) 21.0 - 32.0 sec   Protime-INR   Result Value Ref Range    Prothrombin Time 70.5 (H) 9.0 - 12.5 sec    INR 7.0 (HH) 0.8 - 1.2       Imaging Results:  Imaging Results          CT Abdomen Pelvis  Without Contrast (In process)  Result time 11/15/19 18:08:39                      The Emergency Provider reviewed the vital signs and test results, which are outlined above.     ED Discussion     7:19 PM: Discussed pt's case with Dr. Maria Hammonds (Gastroenterology) who recommends waiting on viral hepatitis lab and also investigate whether her LFT's could be due to cardiac/hypoperfusion (given cardiac history). Dr. Hammonds also recomends to make sure there is no acetaminophen toxicity, and get RUQ US with Doppler to exclude thrombosis. Dr. Hammonds states she will follow up with pt in am. She recommends giving pt some vitamin K for that INR.     7:30 PM: Discussed case with Kari Garcia ([facility]). Dr. Anselmo Everett agrees with current care and management of pt and accepts admission.   Admitting Service: Hospital Medicine  Admitting Physician: Dr. Everett  Admit to: Inpatient tele    7:30 PM: Re-evaluated pt. I have discussed test results, shared treatment plan, and the need for admission with patient and family at bedside. Pt and family express understanding at this time and agree with all information. All questions answered. Pt and family have no further questions or concerns at this time. Pt is ready for admit.    7:32 PM   patient is stable nontoxic.  Has no evidence of bleeding.  I have discussed with she and her  family all findings as well as the plan of care.  There where the need for admission order to determine any liver abnormalities as well as any further testing as needed.  They seem reliable and verbalized understanding agreement.     Medical Decision Making:   Clinical Tests:   Lab Tests: Ordered and Reviewed  Radiological Study: Ordered and Reviewed           ED Medication(s):  Medications - No data to display    New Prescriptions    No medications on file               Scribe Attestation:   Scribe #1: I performed the above scribed service and the documentation accurately describes the services I performed. I attest to the accuracy of the note.     Attending:   Physician Attestation Statement for Scribe #1: I, Dalton Choi Jr., MD, personally performed the services described in this documentation, as scribed by Leydi Knapp, in my presence, and it is both accurate and complete.           Clinical Impression     No diagnosis found.    Disposition:   Disposition: Admitted  Condition: Fair       Dalton Choi Jr., MD  11/15/19 1932

## 2019-11-15 NOTE — TELEPHONE ENCOUNTER
----- Message from Ayaan Banda MD sent at 11/15/2019  9:26 AM CST -----  Please call the patient regarding her abnormal result. Labwork reveals acute liver injury, need to go to the ER for evaluation and hospital admission, stop taking any medicines with Tylenol and stop taking Crestor which can damage liver further.

## 2019-11-15 NOTE — PROGRESS NOTES
Patient contacted. Patient currently en route to ER, possible admit due to acute liver injury  per cardiologist. Warfarin will be managed inpatient.

## 2019-11-15 NOTE — TELEPHONE ENCOUNTER
Spoke with patient let her know -Labwork reveals acute liver injury, need to go to the ER for evaluation and hospital admission, stop taking any medicines with Tylenol and stop taking Crestor which can damage liver further. Patient states understanding and will call her  to take her to ER.

## 2019-11-16 LAB
ALBUMIN SERPL BCP-MCNC: 3.3 G/DL (ref 3.5–5.2)
ALP SERPL-CCNC: 112 U/L (ref 55–135)
ALT SERPL W/O P-5'-P-CCNC: 584 U/L (ref 10–44)
ANION GAP SERPL CALC-SCNC: 9 MMOL/L (ref 8–16)
ANISOCYTOSIS BLD QL SMEAR: SLIGHT
AST SERPL-CCNC: 465 U/L (ref 10–40)
BASO STIPL BLD QL SMEAR: ABNORMAL
BASOPHILS # BLD AUTO: 0.06 K/UL (ref 0–0.2)
BASOPHILS NFR BLD: 1 % (ref 0–1.9)
BILIRUB SERPL-MCNC: 0.8 MG/DL (ref 0.1–1)
BUN SERPL-MCNC: 9 MG/DL (ref 6–20)
C DIFF GDH STL QL: NEGATIVE
C DIFF TOX A+B STL QL IA: NEGATIVE
CALCIUM SERPL-MCNC: 8.6 MG/DL (ref 8.7–10.5)
CHLORIDE SERPL-SCNC: 104 MMOL/L (ref 95–110)
CO2 SERPL-SCNC: 25 MMOL/L (ref 23–29)
CREAT SERPL-MCNC: 0.9 MG/DL (ref 0.5–1.4)
DIFFERENTIAL METHOD: ABNORMAL
EOSINOPHIL # BLD AUTO: 0.3 K/UL (ref 0–0.5)
EOSINOPHIL NFR BLD: 4.1 % (ref 0–8)
ERYTHROCYTE [DISTWIDTH] IN BLOOD BY AUTOMATED COUNT: 17.4 % (ref 11.5–14.5)
EST. GFR  (AFRICAN AMERICAN): >60 ML/MIN/1.73 M^2
EST. GFR  (NON AFRICAN AMERICAN): >60 ML/MIN/1.73 M^2
GLUCOSE SERPL-MCNC: 129 MG/DL (ref 70–110)
HCT VFR BLD AUTO: 36.2 % (ref 37–48.5)
HGB BLD-MCNC: 10.3 G/DL (ref 12–16)
HYPOCHROMIA BLD QL SMEAR: ABNORMAL
IMM GRANULOCYTES # BLD AUTO: 0.02 K/UL (ref 0–0.04)
IMM GRANULOCYTES NFR BLD AUTO: 0.3 % (ref 0–0.5)
INR PPP: 4.7 (ref 0.8–1.2)
LYMPHOCYTES # BLD AUTO: 1.8 K/UL (ref 1–4.8)
LYMPHOCYTES NFR BLD: 29.3 % (ref 18–48)
MAGNESIUM SERPL-MCNC: 2.2 MG/DL (ref 1.6–2.6)
MCH RBC QN AUTO: 29.1 PG (ref 27–31)
MCHC RBC AUTO-ENTMCNC: 28.5 G/DL (ref 32–36)
MCV RBC AUTO: 102 FL (ref 82–98)
MONOCYTES # BLD AUTO: 0.8 K/UL (ref 0.3–1)
MONOCYTES NFR BLD: 13.4 % (ref 4–15)
NEUTROPHILS # BLD AUTO: 3.2 K/UL (ref 1.8–7.7)
NEUTROPHILS NFR BLD: 51.9 % (ref 38–73)
NRBC BLD-RTO: 0 /100 WBC
OVALOCYTES BLD QL SMEAR: ABNORMAL
PHOSPHATE SERPL-MCNC: 2 MG/DL (ref 2.7–4.5)
PLATELET # BLD AUTO: 187 K/UL (ref 150–350)
PLATELET BLD QL SMEAR: ABNORMAL
PMV BLD AUTO: 11 FL (ref 9.2–12.9)
POIKILOCYTOSIS BLD QL SMEAR: SLIGHT
POLYCHROMASIA BLD QL SMEAR: ABNORMAL
POTASSIUM SERPL-SCNC: 3.1 MMOL/L (ref 3.5–5.1)
PROT SERPL-MCNC: 6.7 G/DL (ref 6–8.4)
PROTHROMBIN TIME: 48.2 SEC (ref 9–12.5)
RBC # BLD AUTO: 3.54 M/UL (ref 4–5.4)
SODIUM SERPL-SCNC: 138 MMOL/L (ref 136–145)
STOMATOCYTES BLD QL SMEAR: PRESENT
TARGETS BLD QL SMEAR: ABNORMAL
WBC # BLD AUTO: 6.11 K/UL (ref 3.9–12.7)

## 2019-11-16 PROCEDURE — 85025 COMPLETE CBC W/AUTO DIFF WBC: CPT | Mod: NTX

## 2019-11-16 PROCEDURE — 25000242 PHARM REV CODE 250 ALT 637 W/ HCPCS: Mod: NTX | Performed by: NURSE PRACTITIONER

## 2019-11-16 PROCEDURE — 87449 NOS EACH ORGANISM AG IA: CPT | Mod: NTX

## 2019-11-16 PROCEDURE — 21400001 HC TELEMETRY ROOM: Mod: NTX

## 2019-11-16 PROCEDURE — 99251 PR INITIAL INPATIENT CONSULT,LEVL I: CPT | Mod: NTX,,, | Performed by: INTERNAL MEDICINE

## 2019-11-16 PROCEDURE — 94761 N-INVAS EAR/PLS OXIMETRY MLT: CPT | Mod: NTX

## 2019-11-16 PROCEDURE — 80053 COMPREHEN METABOLIC PANEL: CPT | Mod: NTX

## 2019-11-16 PROCEDURE — 85610 PROTHROMBIN TIME: CPT | Mod: NTX

## 2019-11-16 PROCEDURE — 25000003 PHARM REV CODE 250: Mod: NTX | Performed by: NURSE PRACTITIONER

## 2019-11-16 PROCEDURE — 84100 ASSAY OF PHOSPHORUS: CPT | Mod: NTX

## 2019-11-16 PROCEDURE — 99900035 HC TECH TIME PER 15 MIN (STAT): Mod: NTX

## 2019-11-16 PROCEDURE — 25000003 PHARM REV CODE 250: Mod: NTX | Performed by: INTERNAL MEDICINE

## 2019-11-16 PROCEDURE — 94640 AIRWAY INHALATION TREATMENT: CPT | Mod: NTX

## 2019-11-16 PROCEDURE — 36415 COLL VENOUS BLD VENIPUNCTURE: CPT | Mod: NTX

## 2019-11-16 PROCEDURE — 25000242 PHARM REV CODE 250 ALT 637 W/ HCPCS: Mod: NTX | Performed by: INTERNAL MEDICINE

## 2019-11-16 PROCEDURE — 99251 PR INITIAL INPATIENT CONSULT,LEVL I: ICD-10-PCS | Mod: NTX,,, | Performed by: INTERNAL MEDICINE

## 2019-11-16 PROCEDURE — 83735 ASSAY OF MAGNESIUM: CPT | Mod: NTX

## 2019-11-16 PROCEDURE — 27000221 HC OXYGEN, UP TO 24 HOURS: Mod: NTX

## 2019-11-16 RX ORDER — WARFARIN 1 MG/1
1 TABLET ORAL
Status: DISCONTINUED | OUTPATIENT
Start: 2019-11-22 | End: 2019-11-17

## 2019-11-16 RX ORDER — LOPERAMIDE HYDROCHLORIDE 2 MG/1
2 CAPSULE ORAL 4 TIMES DAILY PRN
Status: DISCONTINUED | OUTPATIENT
Start: 2019-11-16 | End: 2019-11-19 | Stop reason: HOSPADM

## 2019-11-16 RX ORDER — ARFORMOTEROL TARTRATE 15 UG/2ML
15 SOLUTION RESPIRATORY (INHALATION) 2 TIMES DAILY
Status: DISCONTINUED | OUTPATIENT
Start: 2019-11-16 | End: 2019-11-19 | Stop reason: HOSPADM

## 2019-11-16 RX ORDER — CHOLESTYRAMINE 4 G/9G
1 POWDER, FOR SUSPENSION ORAL 2 TIMES DAILY
Status: DISCONTINUED | OUTPATIENT
Start: 2019-11-16 | End: 2019-11-19 | Stop reason: HOSPADM

## 2019-11-16 RX ORDER — ALBUTEROL SULFATE 0.83 MG/ML
2.5 SOLUTION RESPIRATORY (INHALATION) EVERY 4 HOURS PRN
Status: DISCONTINUED | OUTPATIENT
Start: 2019-11-16 | End: 2019-11-19 | Stop reason: HOSPADM

## 2019-11-16 RX ORDER — POTASSIUM CHLORIDE 20 MEQ/1
40 TABLET, EXTENDED RELEASE ORAL ONCE
Status: COMPLETED | OUTPATIENT
Start: 2019-11-16 | End: 2019-11-16

## 2019-11-16 RX ADMIN — FLUTICASONE PROPIONATE 50 MCG: 50 SPRAY, METERED NASAL at 08:11

## 2019-11-16 RX ADMIN — BUSPIRONE HYDROCHLORIDE 15 MG: 10 TABLET ORAL at 08:11

## 2019-11-16 RX ADMIN — CLONAZEPAM 1 MG: 1 TABLET ORAL at 03:11

## 2019-11-16 RX ADMIN — FUROSEMIDE 40 MG: 40 TABLET ORAL at 05:11

## 2019-11-16 RX ADMIN — PANTOPRAZOLE SODIUM 40 MG: 40 TABLET, DELAYED RELEASE ORAL at 08:11

## 2019-11-16 RX ADMIN — CHOLESTYRAMINE 4 G: 4 POWDER, FOR SUSPENSION ORAL at 08:11

## 2019-11-16 RX ADMIN — FUROSEMIDE 40 MG: 40 TABLET ORAL at 08:11

## 2019-11-16 RX ADMIN — HYDROCODONE BITARTRATE AND ACETAMINOPHEN 1 TABLET: 10; 325 TABLET ORAL at 08:11

## 2019-11-16 RX ADMIN — CLONAZEPAM 1 MG: 1 TABLET ORAL at 08:11

## 2019-11-16 RX ADMIN — DESVENLAFAXINE SUCCINATE 100 MG: 100 TABLET, FILM COATED, EXTENDED RELEASE ORAL at 08:11

## 2019-11-16 RX ADMIN — POTASSIUM CHLORIDE 40 MEQ: 20 TABLET, EXTENDED RELEASE ORAL at 01:11

## 2019-11-16 RX ADMIN — AMITRIPTYLINE HYDROCHLORIDE 25 MG: 25 TABLET, FILM COATED ORAL at 08:11

## 2019-11-16 RX ADMIN — TRAZODONE HYDROCHLORIDE 150 MG: 100 TABLET ORAL at 08:11

## 2019-11-16 RX ADMIN — LOPERAMIDE HYDROCHLORIDE 2 MG: 2 CAPSULE ORAL at 05:11

## 2019-11-16 RX ADMIN — ARFORMOTEROL TARTRATE 15 MCG: 15 SOLUTION RESPIRATORY (INHALATION) at 08:11

## 2019-11-16 RX ADMIN — HYDROCODONE BITARTRATE AND ACETAMINOPHEN 1 TABLET: 10; 325 TABLET ORAL at 03:11

## 2019-11-16 RX ADMIN — ARFORMOTEROL TARTRATE 15 MCG: 15 SOLUTION RESPIRATORY (INHALATION) at 01:11

## 2019-11-16 NOTE — H&P
[facility] Hospital Medicine  History & Physical    Patient Name: Meg Sal  MRN: [id number]  Admission Date: 11/15/2019  Attending Physician: Anselmo Everett MD  Primary Care Provider: Aquilino Hightower MD         Patient information was obtained from patient, spouse/SO, past medical records and ER records.     Subjective:     Principal Problem:Warfarin toxicity    Chief Complaint:   Chief Complaint   Patient presents with    Abnormal Lab     sent by dr villalba for elevated liver enzymes         HPI: Ms. thumb son is a 49-year-old  female with PMH significant for mechanical aortic valve, CAD, CABG, HTN, hyperlipidemia, was recently discharged from this hospital, was sent home on Lovenox bridge with warfarin anticoagulation for a subtherapeutic INR of 1.2 upon discharge. Patient reports taking warfarin and Lovenox as she was supposed to, she followed up with Dr. Villalba or, cardiologist two days ago for routine post hospital discharge clinic visit.  Routine laboratory workup revealed elevated LFTs with AST and ALT in the 600s, hence she was advised to present to the ED emergently.  Repeat AST/ALT in the 600s, INR 7.0.  Hemoglobin stable at 10.5.  BP stable.  In addition patient reports multiple loose nonbloody bowel movements for the past few weeks.  Denies recent use of antibiotics.    Admitting diagnosis warfarin toxicity, elevated LFTs.    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 CATH LAB;  Service: Cardiology;  Laterality: Right;  malur pt/detailed hx       Review of patient's allergies indicates:   Allergen Reactions    Nsaids (non-steroidal anti-inflammatory drug) Other (See Comments)     Mechanical heart valve       No current facility-administered medications on file prior to encounter.      Current Outpatient Medications on File Prior to Encounter   Medication Sig    amitriptyline (ELAVIL) 25 MG tablet Take 25 mg by mouth every evening.     busPIRone (BUSPAR) 15 MG tablet Take 15 mg by mouth 2 (two) times daily.     clonazePAM (KLONOPIN) 1 MG tablet Take 1 mg by mouth 3 (three) times daily.     desvenlafaxine succinate (PRISTIQ) 100 MG Tb24 Take 1 tablet (100 mg total) by mouth once daily.    enoxaparin (LOVENOX) 100 mg/mL Syrg Inject 0.7 mLs (70 mg total) into the skin 2 (two) times daily. for 14 doses    furosemide (LASIX) 40 MG tablet Take two 40 mg tablets by mouth twice a day    HYDROcodone-acetaminophen (NORCO)  mg per tablet Take 1 tablet by mouth every 6 (six) hours as needed for Pain.    metoprolol succinate (TOPROL-XL) 25 MG 24 hr tablet Take 1 tablet (25 mg total) by mouth once daily.    nicotine (NICODERM CQ) 7 mg/24 hr Place 1 patch onto the skin every 24 hours.    nintedanib (OFEV) 150 mg Cap Take 150 mg by mouth 2 (two) times daily.    ondansetron (ZOFRAN) 4 MG tablet TAKE 1 BY MOUTH EVERY 8 HOURS AS NEEDED FOR NAUSEA.    pantoprazole (PROTONIX) 40 MG tablet Take 1 tablet (40 mg total) by mouth 2 (two) times daily.    potassium chloride SA (K-DUR,KLOR-CON) 20 MEQ tablet Take 20 mEq by mouth 2 (two) times daily.    rosuvastatin (CRESTOR) 20 MG tablet Take 20 mg by mouth once daily.    tiZANidine (ZANAFLEX) 4 MG tablet Take 4 mg by mouth every evening.    topiramate (TOPAMAX) 100 MG tablet Take 100 mg by mouth  2 (two) times daily.    traZODone (DESYREL) 150 MG tablet Take 150 mg by mouth nightly.    warfarin (COUMADIN) 6 MG tablet Take 6 mg by mouth Daily. Except 9mg on .    albuterol (VENTOLIN HFA) 90 mcg/actuation inhaler Inhale 2 puffs into the lungs every 6 (six) hours as needed for Wheezing. Rescue    ANORO ELLIPTA 62.5-25 mcg/actuation DsDv INHALE 1 PUFF INTO THE LUNGS ONCE A DAY. CONTROLLER.    diclofenac sodium (VOLTAREN) 1 % Gel Apply 2 g topically 4 (four) times daily as needed.     fluticasone (FLONASE) 50 mcg/actuation nasal spray 1 spray by Each Nare route once daily.    nitroGLYCERIN (NITROSTAT) 0.4 MG SL tablet     nitroGLYCERIN 0.4 MG/HR TD PT24 (NITRODUR) 0.4 mg/hr Place 1 patch onto the skin once daily.    sumatriptan (IMITREX) 100 MG tablet Take 100 mg by mouth every 2 (two) hours as needed.    traMADol (ULTRAM) 50 mg tablet Take 50 mg by mouth every 4 (four) hours as needed.      Family History     Problem Relation (Age of Onset)    Breast cancer Sister    Coronary artery disease Father    Heart disease Mother    Lung cancer Brother        Tobacco Use    Smoking status: Former Smoker     Packs/day: 1.00     Types: Cigarettes     Last attempt to quit: 2018     Years since quittin.4    Smokeless tobacco: Never Used   Substance and Sexual Activity    Alcohol use: Not Currently     Frequency: 2-4 times a month     Drinks per session: 1 or 2     Binge frequency: Never    Drug use: Not Currently    Sexual activity: Not on file     Review of Systems   Constitutional: Negative.  Negative for chills, diaphoresis, fatigue and fever.   HENT: Negative.  Negative for congestion, nosebleeds and sinus pressure.    Eyes: Negative.  Negative for visual disturbance.   Respiratory: Negative.  Negative for cough, chest tightness, shortness of breath and wheezing.    Cardiovascular: Negative.  Negative for chest pain, palpitations and leg swelling.   Gastrointestinal: Positive for abdominal  pain, diarrhea and nausea. Negative for vomiting.   Endocrine: Negative.  Negative for polyuria.   Genitourinary: Negative.  Negative for dysuria, flank pain, frequency and urgency.   Musculoskeletal: Negative.  Negative for back pain, joint swelling and neck stiffness.   Skin: Negative.  Negative for color change, pallor and rash.   Allergic/Immunologic: Negative.    Neurological: Negative.  Negative for dizziness, syncope, speech difficulty, numbness and headaches.   Hematological: Negative.  Negative for adenopathy. Does not bruise/bleed easily.   Psychiatric/Behavioral: Negative.  Negative for confusion, decreased concentration and hallucinations. The patient is not nervous/anxious.    All other systems reviewed and are negative.    Objective:     Vital Signs (Most Recent):  Temp: 98.9 °F (37.2 °C) (11/15/19 1604)  Pulse: 62 (11/15/19 1855)  Resp: 18 (11/15/19 1855)  BP: 113/78 (11/15/19 1855)  SpO2: (!) 94 % (11/15/19 1855) Vital Signs (24h Range):  Temp:  [98.9 °F (37.2 °C)] 98.9 °F (37.2 °C)  Pulse:  [61-68] 62  Resp:  [18-20] 18  SpO2:  [94 %-96 %] 94 %  BP: (110-133)/(55-78) 113/78     Weight: 77.6 kg (171 lb)  Body mass index is 33.4 kg/m².    Physical Exam   Constitutional: She is oriented to person, place, and time. She appears well-developed and well-nourished. No distress.   HENT:   Head: Normocephalic and atraumatic.   Eyes: Conjunctivae and EOM are normal. No scleral icterus.   Neck: Normal range of motion. Neck supple. No tracheal deviation present. No thyromegaly present.   Cardiovascular: Normal rate, regular rhythm and intact distal pulses.   Murmur heard.  Pulmonary/Chest: Effort normal and breath sounds normal. No respiratory distress. She has no wheezes. She has no rales. She exhibits no tenderness.   Abdominal: Soft. Bowel sounds are normal. She exhibits no distension. There is tenderness (mild lower).   Musculoskeletal: Normal range of motion. She exhibits no edema or tenderness.    Lymphadenopathy:     She has no cervical adenopathy.   Neurological: She is alert and oriented to person, place, and time. No cranial nerve deficit. She exhibits normal muscle tone. Coordination normal.   Skin: Skin is warm and dry. She is not diaphoretic. No erythema.   Chronic bruising noted lower abdominal wall, from recent Lovenox injections.   Psychiatric: She has a normal mood and affect. Her behavior is normal.   Nursing note and vitals reviewed.        CRANIAL NERVES     CN III, IV, VI   Extraocular motions are normal.        Significant Labs:   Bilirubin:   Recent Labs   Lab 11/04/19  1232 11/14/19  1525 11/15/19  1648   BILITOT 0.4 0.9 1.1*     BMP:   Recent Labs   Lab 11/15/19  1648   *   *   K 3.6      CO2 24   BUN 12   CREATININE 1.0   CALCIUM 8.3*     CBC:   Recent Labs   Lab 11/14/19  1525 11/15/19  1648   WBC 9.26 7.91   HGB 10.7* 10.5*   HCT 36.4* 35.5*    212     CMP:   Recent Labs   Lab 11/14/19  1525 11/15/19  1648   * 135*   K 4.3 3.6    100   CO2 26 24   GLU 97 127*   BUN 14 12   CREATININE 1.4 1.0   CALCIUM 8.6* 8.3*   PROT 6.8 7.0   ALBUMIN 3.5 3.5   BILITOT 0.9 1.1*   ALKPHOS 112 115   * 614*   * 646*   ANIONGAP 9 11   EGFRNONAA 43.8* >60     Coagulation:   Recent Labs   Lab 11/15/19  1648   INR 7.0*   APTT 49.3*     TSH:   Recent Labs   Lab 06/22/19  0634   TSH 1.982     All pertinent labs within the past 24 hours have been reviewed.    Significant Imaging: I have reviewed and interpreted all pertinent imaging results/findings within the past 24 hours.     Imaging Results          US Abdomen Limited (Final result)  Result time 11/15/19 20:46:19   Procedure changed from US Abdomen Complete     Final result by José Miguel Gonsalez MD (11/15/19 20:46:19)                 Impression:      Dilated common bile duct up to 11 mm, not unexpected status post cholecystectomy.    Small volume of ascites adjacent to the liver and within the left lower  quadrant.      Electronically signed by: José Miguel Gonsalez  Date:    11/15/2019  Time:    20:46             Narrative:    EXAMINATION:  US ABDOMEN LIMITED    CLINICAL HISTORY:  hepatitis; Inflammatory liver disease, unspecified    TECHNIQUE:  Limited ultrasound of the right upper quadrant of the abdomen (including pancreas, liver, gallbladder, common bile duct, and spleen) was performed.    COMPARISON:  CT abdomen pelvis same date    FINDINGS:  Liver: Normal in size, measuring 16.7 cm. Homogeneous echotexture. No focal hepatic lesions.    Gallbladder: Surgically absent    Biliary system: The common duct is dilated, measuring 11 mm.  Common hepatic duct measures 6 mm.    Spleen: Normal in size and echotexture, measuring 9.6 cm.    Miscellaneous: Trace free fluid adjacent to the liver and within the left lower quadrant.  Partially visualized right kidney appears within normal limits.                               CT Abdomen Pelvis  Without Contrast (Final result)  Result time 11/15/19 18:08:37    Final result by José Miguel Gonsalez MD (11/15/19 18:08:37)                 Impression:      Mild wall thickening and minor inflammatory changes adjacent to the cecum and ascending colon, which may reflect infectious/inflammatory colitis.  Wall thickening also may be reactive to adjacent ascites.    Interval increase in small volume of ascites as compared to the prior examination.    Additional chronic findings as detailed in the body of the report.      Electronically signed by: José Miguel Gonsalez  Date:    11/15/2019  Time:    18:08             Narrative:    EXAMINATION:  CT ABDOMEN PELVIS WITHOUT CONTRAST    CLINICAL HISTORY:  Abd pain, fever, abscess suspected;    TECHNIQUE:  Low dose axial images, sagittal and coronal reformations were obtained from the lung bases to the pubic symphysis, no oral contrast administered.    COMPARISON:  CT abdomen pelvis 06/22/2019    FINDINGS:  Heart: Postsurgical changes of a mitral valve repair.   Heart appears enlarged.    Lung Bases: Peripheral reticulation and interstitial opacity at both lung bases concerning for interstitial lung disease.    Liver: Normal in size and attenuation, with no focal hepatic lesions.    Gallbladder: Surgically absent.    Bile Ducts: Mildly prominent common bile duct, similar to priors.  No intrahepatic biliary ductal dilatation.  Findings non expected status post cholecystectomy.    Pancreas: No mass or peripancreatic fat stranding.    Spleen: Unremarkable.    Adrenals: Unremarkable.    Kidneys/ Ureters: Normal in size and position bilaterally.  No nephrolithiasis or hydronephrosis.  Ureters are nondilated.    Bladder: No evidence of significant bladder wall thickening.    Reproductive organs: Uterus is surgically absent.    GI Tract/Mesentery: There is mild wall thickening and haziness adjacent to the cecum and ascending colon, new from the prior.  Small hiatal hernia, stomach is otherwise within normal limits.  Small bowel remainder of the colon demonstrate no obstructive or inflammatory changes.    Peritoneal Space: There is a small volume of ascites adjacent to the liver and tracking down the bilateral pericolic gutters and collecting within the pelvis.  No free air.    Retroperitoneum: No significant adenopathy.    Abdominal wall: Unremarkable.    Vasculature: Abdominal aorta is nonaneurysmal.  Moderate aortic atherosclerotic calcification.    Bones: Partially visualized postsurgical changes of a sternotomy.  No acute fracture.  There are degenerative changes.                                I have independently reviewed and interpreted the EKG.     I have independently reviewed all pertinent labs within the past 24 hours.    I have independently reviewed, visualized and interpreted all pertinent imaging results within the past 24 hours and discussed the findings with the ED physician, Dr. Choi.            Assessment/Plan:     * Warfarin toxicity  Unintentionally warfarin  toxicity.  INR 7.0.  Patient has been on warfarin, along with Lovenox bridge therapy.  Hold all anticoagulants.  Hemodynamically stable, no evidence of bleeding.  Hemoglobin 10.5.  Will not reverse with vitamin K as she is stable at this time.      Hx of mechanical aortic valve replacement  Patient is mechanical aortic valve, currently on warfarin, with INR 7.0.  Hold anticoagulants.      Diarrhea  Per patient, multiple loose nonbloody bowel movements per day for the past few weeks.  Will check stool for C diff.  Consult GI.      Elevated LFTs  AST and ALT in the 600s.  Bilirubin 1.1.  Unclear etiology of elevated transaminases.  Consult GI.  Patient reports having diarrhea for the past few weeks.        VTE Risk Mitigation (From admission, onward)         Ordered     Place sequential compression device  Until discontinued      11/15/19 1927                   Anselmo Everett MD  Department of Hospital Medicine   [facility]

## 2019-11-16 NOTE — ASSESSMENT & PLAN NOTE
Per patient, multiple loose nonbloody bowel movements per day for the past few weeks.  Will check stool for C diff.  Consult GI.

## 2019-11-16 NOTE — PLAN OF CARE
11/16/19 1043   Discharge Assessment   Assessment Type Discharge Planning Assessment   Confirmed/corrected address and phone number on facesheet? Yes   Assessment information obtained from? Patient   Prior to hospitilization cognitive status: Alert/Oriented   Prior to hospitalization functional status: Independent;Assistive Equipment   Current cognitive status: Alert/Oriented   Current Functional Status: Independent;Assistive Equipment   Lives With spouse   Able to Return to Prior Arrangements yes   Is patient able to care for self after discharge? Yes   Who are your caregiver(s) and their phone number(s)? Derrell Doron () [phone]   Patient's perception of discharge disposition home or selfcare   Readmission Within the Last 30 Days previous discharge plan unsuccessful   Patient currently being followed by outpatient case management? No   Patient currently receives any other outside agency services? No   Equipment Currently Used at Home oxygen;wheelchair;bath bench;rollator   Do you have any problems affording any of your prescribed medications? No   Is the patient taking medications as prescribed? yes   Does the patient have transportation home? Yes   Transportation Anticipated family or friend will provide   Does the patient receive services at the Coumadin Clinic? No   Discharge Plan A Home with family   DME Needed Upon Discharge  none   Patient/Family in Agreement with Plan yes   Readmission Questionnaire   At the time of your discharge, did someone talk to you about what your health problems were? Yes   At the time of discharge, did someone talk to you about what to watch out for regarding worsening of your health problem? Yes   At the time of discharge, did someone talk to you about what to do if you experienced worsening of your health problem? Yes   At the time of discharge, did someone talk to you about which medication to take when you left the hospital and which ones to stop taking? Yes    How often do you need to have someone help you when you read instructions, pamphlets, or other written material from your doctor or pharmacy? Never   Do you have problems taking your medications as prescribed? Yes   Do you have any problems affording any of  your prescribed medications? No   Do you have problems obtaining/receiving your medications? No   Does the patient have transportation to healthcare appointments? Yes   Living Arrangements house   Does the patient have family/friends to help with healtcare needs after discharge? yes   Does your caregiver provide all the help you need? Yes   Are you currently feeling confused? No   Are you currently having problems thinking? No   Are you currently having memory problems? No   Have you felt down, depressed, or hopeless? 2   Have you felt little interest or pleasure in doing things? 2   In the last 7 days, my sleep quality was: poor     Met with pt at bedside for DC assessment. Pt was very recently DCed for similar problem. Pt stated that her father passed away recently which coincided with her GI bleed. Pt stated that she is presently home bound due to GI issues and has used all 60 days of HH that her ins will cover during the year. Pt stated that she would be interested in grief counseling when her GI problem is resolved. Pt also stated that she believes her anti-depressant is causing mood swings and requested a medication adjustment (request forwarded to attending MD). Pt complained that she has been asking for imodium since ~8AM and has not received. She also complained that her nose is bleeding and requested that she have a humidifier attached to her O2. Complaints forwarded to pt's LPN. SWer provided a transitional care folder, information on advanced directives, information on pharmacy bedside delivery, and discharge planning begins on admission with contact information for any needs/questions. Pt opted for bedside medication delivery. Her typical pharmacy  is [facility]. Information below.    [id number], [place] [address]  [phone]  Dick Brito LMSW 11/16/2019 12:19 PM

## 2019-11-16 NOTE — ASSESSMENT & PLAN NOTE
Unintentionally warfarin toxicity.  INR 7.0.  Patient has been on warfarin, along with Lovenox bridge therapy.  Hold all anticoagulants.  Hemodynamically stable, no evidence of bleeding.  Hemoglobin 10.5.  Will not reverse with vitamin K as she is stable at this time.

## 2019-11-16 NOTE — ASSESSMENT & PLAN NOTE
AST and ALT in the 600s.  Bilirubin 1.1.  Unclear etiology of elevated transaminases.  Consult GI.  Patient reports having diarrhea for the past few weeks.

## 2019-11-16 NOTE — ASSESSMENT & PLAN NOTE
Hepatocellular. There is no evidence of chronic liver disease on imaging, labs, or to suspect this from her history. Her LFT's are trending down. Considerations include cardiogenic causes (from transient hypotension)/ischemic hepatopathy, viral hepatitis, thrombosis, and less likely acetaminophen given normal levels. There is no evidence of hepatic dysfunction as albumin levels are normal. INR was elevated on admission but she was recently placed on Coumadin and may have been on too high of a dose. INR has improved.   · Recommend repeat 2D ECHO, last was 11/4/2019 and there may have been an acute change since this time.   · Consider doppler liver to rule out thrombosis.   · Await viral hepatitis panel.   · Get cardiology consultation given high risk cardiac history. Needs to exclude cardiac etiology.

## 2019-11-16 NOTE — NURSING
Patient arrived to unit from ER via stretcher.   Patient in room 228  Transferred into bed independently.   Charge nurse advised of patient arrival.   VS currently stable.   Tele monitor 8589 applied verified with monitor.   Patient oriented to room, white board, CHG wipes  and call bell.   Bed in lowest position, call light in reach.  Encouraged to notify of all needs.   ALEX Foster aware of pt arrival to the floor.

## 2019-11-16 NOTE — HPI
Ms. thumb son is a 49-year-old  female with PMH significant for mechanical aortic valve, CAD, CABG, HTN, hyperlipidemia, was recently discharged from this hospital, was sent home on Lovenox bridge with warfarin anticoagulation for a subtherapeutic INR of 1.2 upon discharge. Patient reports taking warfarin and Lovenox as she was supposed to, she followed up with Dr. Banda or, cardiologist two days ago for routine post hospital discharge clinic visit.  Routine laboratory workup revealed elevated LFTs with AST and ALT in the 600s, hence she was advised to present to the ED emergently.  Repeat AST/ALT in the 600s, INR 7.0.  Hemoglobin stable at 10.5.  BP stable.  In addition patient reports multiple loose nonbloody bowel movements for the past few weeks.  Denies recent use of antibiotics.    Admitting diagnosis warfarin toxicity, elevated LFTs.

## 2019-11-16 NOTE — ASSESSMENT & PLAN NOTE
Patient is mechanical aortic valve, currently on warfarin, with INR 7.0.  Hold anticoagulants.  11/16:  --INR 4.7 today  --pharmacy monitoring

## 2019-11-16 NOTE — CONSULTS
Food & Nutrition  Education    Diet Education: Coumadin education       Nutrition Education provided with handouts:   What to know when taking Warfarin       Comments:  Rd covering remotely. Attached Coumadin education to chart for after discharge, discusses importance of keeping diet steady, with consistent vitamin K intake (dark leafy greens). Onsite RD to follow up Monday with education.

## 2019-11-16 NOTE — PROGRESS NOTES
[facility] Hospital Medicine  Progress Note    Patient Name: Meg Sal  MRN: [id number]  Patient Class: IP- Inpatient   Admission Date: 11/15/2019  Length of Stay: 1 days  Attending Physician: Jean Pierre Mcdermott MD  Primary Care Provider: No primary care provider on file.        Subjective:     Principal Problem:Warfarin toxicity        HPI:  Ms. thumb son is a 49-year-old  female with PMH significant for mechanical aortic valve, CAD, CABG, HTN, hyperlipidemia, was recently discharged from this hospital, was sent home on Lovenox bridge with warfarin anticoagulation for a subtherapeutic INR of 1.2 upon discharge. Patient reports taking warfarin and Lovenox as she was supposed to, she followed up with Dr. Banda or, cardiologist two days ago for routine post hospital discharge clinic visit.  Routine laboratory workup revealed elevated LFTs with AST and ALT in the 600s, hence she was advised to present to the ED emergently.  Repeat AST/ALT in the 600s, INR 7.0.  Hemoglobin stable at 10.5.  BP stable.  In addition patient reports multiple loose nonbloody bowel movements for the past few weeks.  Denies recent use of antibiotics.    Admitting diagnosis warfarin toxicity, elevated LFTs.    Interval History: Patient was kept inpatient for coumadin toxicity under the care of Hospital Medicine. Her coumadin was held, no Vit K at present as pt is not actively bleeding. Pharmacy to manage coumadin.  Pt also  Having diarrhea, imodium added, along with questran and probiotic. C diff negative    Review of Systems   Constitutional: Negative.  Negative for chills, diaphoresis, fatigue and fever.   HENT: Negative.  Negative for congestion, nosebleeds and sinus pressure.    Eyes: Negative.  Negative for visual disturbance.   Respiratory: Negative.  Negative for cough, chest tightness, shortness of breath and wheezing.    Cardiovascular: Negative.  Negative for chest pain, palpitations and leg swelling.    Gastrointestinal: Positive for abdominal pain, diarrhea and nausea. Negative for vomiting.   Endocrine: Negative.  Negative for polyuria.   Genitourinary: Negative.  Negative for dysuria, flank pain, frequency and urgency.   Musculoskeletal: Negative.  Negative for back pain, joint swelling and neck stiffness.   Skin: Negative.  Negative for color change, pallor and rash.   Allergic/Immunologic: Negative.    Neurological: Negative.  Negative for dizziness, syncope, speech difficulty, numbness and headaches.   Hematological: Negative.  Negative for adenopathy. Does not bruise/bleed easily.   Psychiatric/Behavioral: Negative.  Negative for confusion, decreased concentration and hallucinations. The patient is not nervous/anxious.    All other systems reviewed and are negative.    Objective:     Vital Signs (Most Recent):  Temp: 97.9 °F (36.6 °C) (11/16/19 1107)  Pulse: 61 (11/16/19 1107)  Resp: 18 (11/16/19 1107)  BP: (!) 113/55 (11/16/19 1107)  SpO2: 98 % (11/16/19 1107) Vital Signs (24h Range):  Temp:  [97.2 °F (36.2 °C)-98.9 °F (37.2 °C)] 97.9 °F (36.6 °C)  Pulse:  [54-87] 61  Resp:  [18-21] 18  SpO2:  [93 %-98 %] 98 %  BP: (110-154)/(55-81) 113/55     Weight: 78.8 kg (173 lb 11.6 oz)  Body mass index is 33.93 kg/m².    Intake/Output Summary (Last 24 hours) at 11/16/2019 1124  Last data filed at 11/16/2019 0812  Gross per 24 hour   Intake --   Output 800 ml   Net -800 ml      Physical Exam   Constitutional: She is oriented to person, place, and time. She appears well-developed and well-nourished. No distress.   HENT:   Head: Normocephalic and atraumatic.   Eyes: Conjunctivae and EOM are normal. No scleral icterus.   Neck: Normal range of motion. Neck supple. No tracheal deviation present. No thyromegaly present.   Cardiovascular: Normal rate, regular rhythm and intact distal pulses.   Murmur heard.  Pulmonary/Chest: Effort normal and breath sounds normal. No respiratory distress. She has no wheezes. She has no  rales. She exhibits no tenderness.   Abdominal: Soft. Bowel sounds are normal. She exhibits no distension. There is tenderness (mild lower).   Musculoskeletal: Normal range of motion. She exhibits no edema or tenderness.   Lymphadenopathy:     She has no cervical adenopathy.   Neurological: She is alert and oriented to person, place, and time. No cranial nerve deficit. She exhibits normal muscle tone. Coordination normal.   Skin: Skin is warm and dry. She is not diaphoretic. No erythema.   Chronic bruising noted lower abdominal wall, from recent Lovenox injections.   Psychiatric: She has a normal mood and affect. Her behavior is normal.   Nursing note and vitals reviewed.      Significant Labs:   Recent Lab Results       11/16/19  0450   11/15/19  2015   11/15/19  1648        Acetaminophen (Tylenol), Serum   <3.0  Comment:  Toxic Levels:  Adults (4 hr post-ingestion).........>150 ug/mL  Adults (12 hr post-ingestion)........>40 ug/mL  Peds (2 hr post-ingestion, liquid)...>225 ug/mL         Albumin 3.3   3.5     Alkaline Phosphatase 112   115        646     Anion Gap 9   11     Aniso Slight         aPTT     49.3  Comment:  aPTT therapeutic range = 39-69 seconds        614     Baso # 0.06   0.05     Basophilic Stippling Occasional         Basophil% 1.0   0.6     BILIRUBIN TOTAL 0.8  Comment:  For infants and newborns, interpretation of results should be based  on gestational age, weight and in agreement with clinical  observations.  Premature Infant recommended reference ranges:  Up to 24 hours.............<8.0 mg/dL  Up to 48 hours............<12.0 mg/dL  3-5 days..................<15.0 mg/dL  6-29 days.................<15.0 mg/dL     1.1  Comment:  For infants and newborns, interpretation of results should be based  on gestational age, weight and in agreement with clinical  observations.  Premature Infant recommended reference ranges:  Up to 24 hours.............<8.0 mg/dL  Up to 48  hours............<12.0 mg/dL  3-5 days..................<15.0 mg/dL  6-29 days.................<15.0 mg/dL       BUN, Bld 9   12     Calcium 8.6   8.3     Chloride 104   100     CO2 25   24     Creatinine 0.9   1.0     Differential Method Automated   Automated     eGFR if  >60   >60     eGFR if non  >60  Comment:  Calculation used to obtain the estimated glomerular filtration  rate (eGFR) is the CKD-EPI equation.      >60  Comment:  Calculation used to obtain the estimated glomerular filtration  rate (eGFR) is the CKD-EPI equation.        Eos # 0.3   0.1     Eosinophil% 4.1   1.5     Glucose 129   127     Gran # (ANC) 3.2   5.1     Gran% 51.9   64.1     Hematocrit 36.2   35.5     Hemoglobin 10.3   10.5     Hypo Occasional         Immature Grans (Abs) 0.02  Comment:  Mild elevation in immature granulocytes is non specific and   can be seen in a variety of conditions including stress response,   acute inflammation, trauma and pregnancy. Correlation with other   laboratory and clinical findings is essential.     0.04  Comment:  Mild elevation in immature granulocytes is non specific and   can be seen in a variety of conditions including stress response,   acute inflammation, trauma and pregnancy. Correlation with other   laboratory and clinical findings is essential.       Immature Granulocytes 0.3   0.5     Coumadin Monitoring INR 4.7  Comment:  Coumadin Therapy:  2.0 - 3.0 for INR for all indicators except mechanical heart valves  and antiphospholipid syndromes which should use 2.5 - 3.5.   6.0  Comment:  Coumadin Therapy:  2.0 - 3.0 for INR for all indicators except mechanical heart valves  and antiphospholipid syndromes which should use 2.5 - 3.5.  INR critical result(s) called and verbal readback obtained from   KHALIDA ANDRE RN by KB3 11/15/2019 23:58   7.0  Comment:  Coumadin Therapy:  2.0 - 3.0 for INR for all indicators except mechanical heart valves  and antiphospholipid  syndromes which should use 2.5 - 3.5.  INR  critical result(s) called and verbal readback obtained from   Tere Chawla RN by LUANNE 11/15/2019 17:56       Lymph # 1.8   1.5     Lymph% 29.3   19.3     Magnesium 2.2         MCH 29.1   29.5     MCHC 28.5   29.6        100     Mono # 0.8   1.1     Mono% 13.4   14.0     MPV 11.0   11.1     nRBC 0   0     Ovalocytes Occasional         Phosphorus 2.0         Platelet Estimate Appears normal         Platelets 187   212     Poik Slight         Poly Occasional         Potassium 3.1   3.6     PROTEIN TOTAL 6.7   7.0     Protime 48.2 61.2 70.5  Comment:  Results confirmed, test repeated     RBC 3.54   3.56     RDW 17.4   17.5     Sodium 138   135     Stomatocytes Present         Target Cells Occasional         WBC 6.11   7.91         All pertinent labs within the past 24 hours have been reviewed.    Significant Imaging: I have reviewed all pertinent imaging results/findings within the past 24 hours.      Assessment/Plan:      * Warfarin toxicity  Unintentionally warfarin toxicity.  INR 7.0.  Patient has been on warfarin, along with Lovenox bridge therapy.  Hold all anticoagulants.  Hemodynamically stable, no evidence of bleeding.  Hemoglobin 10.5.  Will not reverse with vitamin K as she is stable at this time.  111/16:  --improving  --pharmacy to manage coumadin  --no evidence of bleeding    Diarrhea  Per patient, multiple loose nonbloody bowel movements per day for the past few weeks.  Will check stool for C diff.  Consult GI.  11/16:  --c diff negative  --scheduled questran & probiotic  --prn imodium        Elevated LFTs  AST and ALT in the 600s.  Bilirubin 1.1.  Unclear etiology of elevated transaminases.  Consult GI.  Patient reports having diarrhea for the past few weeks.  11/16:  --possibly r/t OFEV  --pharmacy discussing with pt/family    Hx of mechanical aortic valve replacement  Patient is mechanical aortic valve, currently on warfarin, with INR 7.0.  Hold  anticoagulants.  11/16:  --INR 4.7 today  --pharmacy monitoring      VTE Risk Mitigation (From admission, onward)         Ordered     warfarin (COUMADIN) tablet 1 mg PLACEHOLDER DOSE ONLY  Every Friday 11/16/19 1137     Place sequential compression device  Until discontinued      11/15/19 1927                      MONICA Spears-FLORIAN  Department of Hospital Medicine   [facility]

## 2019-11-16 NOTE — ASSESSMENT & PLAN NOTE
Unintentionally warfarin toxicity.  INR 7.0.  Patient has been on warfarin, along with Lovenox bridge therapy.  Hold all anticoagulants.  Hemodynamically stable, no evidence of bleeding.  Hemoglobin 10.5.  Will not reverse with vitamin K as she is stable at this time.  111/16:  --improving  --pharmacy to manage coumadin  --no evidence of bleeding

## 2019-11-16 NOTE — ASSESSMENT & PLAN NOTE
AST and ALT in the 600s.  Bilirubin 1.1.  Unclear etiology of elevated transaminases.  Consult GI.  Patient reports having diarrhea for the past few weeks.  11/16:  --possibly r/t OFEV  --pharmacy discussing with pt/family

## 2019-11-16 NOTE — SUBJECTIVE & OBJECTIVE
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, implants and grafts, initial encounter     Hyperlipidemia     Hypertension     Sleep apnea syndrome 2019    Stroke        Past Surgical History:   Procedure Laterality Date    AORTIC VALVE REPLACEMENT      mechanical    CARDIAC CATHETERIZATION      CORONARY ANGIOPLASTY      CORONARY ARTERY BYPASS GRAFT      HYSTERECTOMY      RIGHT HEART CATHETERIZATION Right 2019    Procedure: INSERTION, CATHETER, RIGHT HEART;  Surgeon: Derek Brown MD;  Location: [facility] CATH LAB;  Service: Cardiology;  Laterality: Right;  malur pt/detailed hx       Review of patient's allergies indicates:   Allergen Reactions    Nsaids (non-steroidal anti-inflammatory drug) Other (See Comments)     Mechanical heart valve     Family History     Problem Relation (Age of Onset)    Breast cancer Sister    Coronary artery disease Father    Heart disease Mother    Lung cancer Brother        Tobacco Use    Smoking status: Former Smoker     Packs/day: 1.00     Types: Cigarettes     Last attempt to quit: 2018     Years since quittin.4    Smokeless tobacco: Never Used   Substance and Sexual Activity    Alcohol use: Not Currently     Frequency: 2-4 times a month     Drinks per session: 1 or 2     Binge frequency: Never    Drug use: Not Currently    Sexual activity: Not on file     Review of Systems   Constitutional: Negative.    HENT: Negative.    Eyes: Negative.    Respiratory: Negative.    Cardiovascular: Negative.    Gastrointestinal: Positive for diarrhea. Negative for abdominal distention, abdominal pain and blood in stool.   Endocrine: Negative.    Genitourinary: Negative.    Musculoskeletal: Negative.    Allergic/Immunologic: Negative.    Neurological: Negative.    Hematological: Negative.       Objective:     Vital Signs (Most Recent):  Temp: 97.9 °F (36.6 °C) (11/16/19 1107)  Pulse: 61 (11/16/19 1107)  Resp: 18 (11/16/19 1107)  BP: (!) 113/55 (11/16/19 1107)  SpO2: 98 % (11/16/19 1107) Vital Signs (24h Range):  Temp:  [97.2 °F (36.2 °C)-98.9 °F (37.2 °C)] 97.9 °F (36.6 °C)  Pulse:  [54-87] 61  Resp:  [18-21] 18  SpO2:  [93 %-98 %] 98 %  BP: (110-154)/(55-81) 113/55     Weight: 78.8 kg (173 lb 11.6 oz) (11/16/19 0600)  Body mass index is 33.93 kg/m².      Intake/Output Summary (Last 24 hours) at 11/16/2019 1153  Last data filed at 11/16/2019 0812  Gross per 24 hour   Intake --   Output 800 ml   Net -800 ml       Lines/Drains/Airways     Peripheral Intravenous Line                 Peripheral IV - Single Lumen 11/15/19 1649 20 G Left Antecubital less than 1 day                Physical Exam   Constitutional: She is oriented to person, place, and time. She appears well-developed and well-nourished.   HENT:   Head: Normocephalic and atraumatic.   Eyes: Pupils are equal, round, and reactive to light. EOM are normal.   Neck: Normal range of motion. Neck supple.   Cardiovascular: Normal rate and regular rhythm.   Pulmonary/Chest: Effort normal and breath sounds normal.   Abdominal: Soft. Bowel sounds are normal.   Musculoskeletal: Normal range of motion. She exhibits no edema.   Neurological: She is alert and oriented to person, place, and time.   Skin: Skin is warm and dry.       Significant Labs:  CBC:   Recent Labs   Lab 11/14/19  1525 11/15/19  1648 11/16/19  0450   WBC 9.26 7.91 6.11   HGB 10.7* 10.5* 10.3*   HCT 36.4* 35.5* 36.2*    212 187     CMP:   Recent Labs   Lab 11/16/19  0450   *   CALCIUM 8.6*   ALBUMIN 3.3*   PROT 6.7      K 3.1*   CO2 25      BUN 9   CREATININE 0.9   ALKPHOS 112   *   *   BILITOT 0.8     Coagulation:   Recent Labs   Lab 11/15/19  1648  11/16/19  0450   INR 7.0*   < > 4.7*   APTT 49.3*  --   --     < > = values in this interval not  displayed.       Significant Imaging:  Imaging results within the past 24 hours have been reviewed.

## 2019-11-16 NOTE — CONSULTS
Clinical Pharmacy Consult Note: Coumadin Dosing and Monitoring     Meg Sal 's Coumadin will be dosed and monitored by Pharmacy at the request of Kelsie Ascencio NP.   Indication: Hx of mechanical aortic valve replacement   Target INR is 2-3.   INR   Date Value Ref Range Status   11/16/2019 4.7 (H) 0.8 - 1.2 Final     Comment:     Coumadin Therapy:  2.0 - 3.0 for INR for all indicators except mechanical heart valves  and antiphospholipid syndromes which should use 2.5 - 3.5.     08/22/2019 3.1  Final     Patient's Coumadin will be held until INR returns to normal limits; upon reinitiation patient will continued on her most recent Anticoag Clinic dose of 3 mg per day on Fri/Sat/Sun and  6 mg per day all other days of the week.     Patient reinitiated warfarin on 11/13/19 after being on Lovenox only since past admission; patient and family recall mixed messages and misunderstanding as to whether or not they were supposed to take both the Coumadin and the Lovenox or just the Lovenox. They voiced concerns with the AVS wording and misunderstanding directions from cardiologist's office.     D/w Jean Pierre Mcdermott MD and Kelsie Ascencio NP, my concerns re: Nintedanib and 14% instance of liver AEs and possible connection to increased LFTs and supratherapeutic INR.       PT/INR will be monitored daily. Dose adjustments will be made accordingly.      Thank you for allowing us to participate in this patient's care.     Carolina Goff 11/16/2019 5:46 PM

## 2019-11-16 NOTE — PROGRESS NOTES
Pharmacy Brief Progress Note: Coumadin Education     Patient/caregiver educated on warfarin indication, side effects, and drug interactions. Discussed importance of medication compliance and INR monitoring and reviewed signs of abnormal bleeding. Patient/caregiver given warfarin educational handout. Patient/caregiver expressed understanding and had no further questions.    Thank you for allowing us to participate in this patient's care.     Carolina Goff 11/16/2019 2:49 PM

## 2019-11-16 NOTE — SUBJECTIVE & OBJECTIVE
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, implants and grafts, initial encounter     Hyperlipidemia     Hypertension     Sleep apnea syndrome 2/8/2019    Stroke        Past Surgical History:   Procedure Laterality Date    AORTIC VALVE REPLACEMENT      mechanical    CARDIAC CATHETERIZATION      CORONARY ANGIOPLASTY      CORONARY ARTERY BYPASS GRAFT      HYSTERECTOMY      RIGHT HEART CATHETERIZATION Right 5/17/2019    Procedure: INSERTION, CATHETER, RIGHT HEART;  Surgeon: Derek Brown MD;  Location: [facility] CATH [facility];  Service: Cardiology;  Laterality: Right;  malur pt/detailed hx       Review of patient's allergies indicates:   Allergen Reactions    Nsaids (non-steroidal anti-inflammatory drug) Other (See Comments)     Mechanical heart valve       No current facility-administered medications on file prior to encounter.      Current Outpatient Medications on File Prior to Encounter   Medication Sig    amitriptyline (ELAVIL) 25 MG tablet Take 25 mg by mouth every evening.     busPIRone (BUSPAR) 15 MG tablet Take 15 mg by mouth 2 (two) times daily.     clonazePAM (KLONOPIN) 1 MG tablet Take 1 mg by mouth 3 (three) times daily.     desvenlafaxine succinate (PRISTIQ) 100 MG Tb24 Take 1 tablet (100 mg total) by mouth once daily.    enoxaparin (LOVENOX) 100 mg/mL Syrg Inject 0.7 mLs (70 mg total) into the skin 2 (two) times daily. for 14 doses    furosemide (LASIX) 40 MG tablet Take two 40 mg tablets by mouth twice a day    HYDROcodone-acetaminophen (NORCO)  mg per tablet Take 1 tablet by mouth every 6 (six) hours as needed for Pain.    metoprolol succinate (TOPROL-XL) 25 MG 24 hr tablet Take 1 tablet (25 mg total) by mouth once daily.    nicotine (NICODERM CQ) 7 mg/24 hr Place 1 patch onto the skin every 24 hours.     nintedanib (OFEV) 150 mg Cap Take 150 mg by mouth 2 (two) times daily.    ondansetron (ZOFRAN) 4 MG tablet TAKE 1 BY MOUTH EVERY 8 HOURS AS NEEDED FOR NAUSEA.    pantoprazole (PROTONIX) 40 MG tablet Take 1 tablet (40 mg total) by mouth 2 (two) times daily.    potassium chloride SA (K-DUR,KLOR-CON) 20 MEQ tablet Take 20 mEq by mouth 2 (two) times daily.    rosuvastatin (CRESTOR) 20 MG tablet Take 20 mg by mouth once daily.    tiZANidine (ZANAFLEX) 4 MG tablet Take 4 mg by mouth every evening.    topiramate (TOPAMAX) 100 MG tablet Take 100 mg by mouth 2 (two) times daily.    traZODone (DESYREL) 150 MG tablet Take 150 mg by mouth nightly.    warfarin (COUMADIN) 6 MG tablet Take 6 mg by mouth Daily. Except 9mg on .    albuterol (VENTOLIN HFA) 90 mcg/actuation inhaler Inhale 2 puffs into the lungs every 6 (six) hours as needed for Wheezing. Rescue    ANORO ELLIPTA 62.5-25 mcg/actuation DsDv INHALE 1 PUFF INTO THE LUNGS ONCE A DAY. CONTROLLER.    diclofenac sodium (VOLTAREN) 1 % Gel Apply 2 g topically 4 (four) times daily as needed.     fluticasone (FLONASE) 50 mcg/actuation nasal spray 1 spray by Each Nare route once daily.    nitroGLYCERIN (NITROSTAT) 0.4 MG SL tablet     nitroGLYCERIN 0.4 MG/HR TD PT24 (NITRODUR) 0.4 mg/hr Place 1 patch onto the skin once daily.    sumatriptan (IMITREX) 100 MG tablet Take 100 mg by mouth every 2 (two) hours as needed.    traMADol (ULTRAM) 50 mg tablet Take 50 mg by mouth every 4 (four) hours as needed.      Family History     Problem Relation (Age of Onset)    Breast cancer Sister    Coronary artery disease Father    Heart disease Mother    Lung cancer Brother        Tobacco Use    Smoking status: Former Smoker     Packs/day: 1.00     Types: Cigarettes     Last attempt to quit: 2018     Years since quittin.4    Smokeless tobacco: Never Used   Substance and Sexual Activity    Alcohol use: Not Currently     Frequency: 2-4 times a month     Drinks per  session: 1 or 2     Binge frequency: Never    Drug use: Not Currently    Sexual activity: Not on file     Review of Systems   Constitutional: Negative.  Negative for chills, diaphoresis, fatigue and fever.   HENT: Negative.  Negative for congestion, nosebleeds and sinus pressure.    Eyes: Negative.  Negative for visual disturbance.   Respiratory: Negative.  Negative for cough, chest tightness, shortness of breath and wheezing.    Cardiovascular: Negative.  Negative for chest pain, palpitations and leg swelling.   Gastrointestinal: Positive for abdominal pain, diarrhea and nausea. Negative for vomiting.   Endocrine: Negative.  Negative for polyuria.   Genitourinary: Negative.  Negative for dysuria, flank pain, frequency and urgency.   Musculoskeletal: Negative.  Negative for back pain, joint swelling and neck stiffness.   Skin: Negative.  Negative for color change, pallor and rash.   Allergic/Immunologic: Negative.    Neurological: Negative.  Negative for dizziness, syncope, speech difficulty, numbness and headaches.   Hematological: Negative.  Negative for adenopathy. Does not bruise/bleed easily.   Psychiatric/Behavioral: Negative.  Negative for confusion, decreased concentration and hallucinations. The patient is not nervous/anxious.    All other systems reviewed and are negative.    Objective:     Vital Signs (Most Recent):  Temp: 98.9 °F (37.2 °C) (11/15/19 1604)  Pulse: 62 (11/15/19 1855)  Resp: 18 (11/15/19 1855)  BP: 113/78 (11/15/19 1855)  SpO2: (!) 94 % (11/15/19 1855) Vital Signs (24h Range):  Temp:  [98.9 °F (37.2 °C)] 98.9 °F (37.2 °C)  Pulse:  [61-68] 62  Resp:  [18-20] 18  SpO2:  [94 %-96 %] 94 %  BP: (110-133)/(55-78) 113/78     Weight: 77.6 kg (171 lb)  Body mass index is 33.4 kg/m².    Physical Exam   Constitutional: She is oriented to person, place, and time. She appears well-developed and well-nourished. No distress.   HENT:   Head: Normocephalic and atraumatic.   Eyes: Conjunctivae and EOM are  normal. No scleral icterus.   Neck: Normal range of motion. Neck supple. No tracheal deviation present. No thyromegaly present.   Cardiovascular: Normal rate, regular rhythm and intact distal pulses.   Murmur heard.  Pulmonary/Chest: Effort normal and breath sounds normal. No respiratory distress. She has no wheezes. She has no rales. She exhibits no tenderness.   Abdominal: Soft. Bowel sounds are normal. She exhibits no distension. There is tenderness (mild lower).   Musculoskeletal: Normal range of motion. She exhibits no edema or tenderness.   Lymphadenopathy:     She has no cervical adenopathy.   Neurological: She is alert and oriented to person, place, and time. No cranial nerve deficit. She exhibits normal muscle tone. Coordination normal.   Skin: Skin is warm and dry. She is not diaphoretic. No erythema.   Chronic bruising noted lower abdominal wall, from recent Lovenox injections.   Psychiatric: She has a normal mood and affect. Her behavior is normal.   Nursing note and vitals reviewed.        CRANIAL NERVES     CN III, IV, VI   Extraocular motions are normal.        Significant Labs:   Bilirubin:   Recent Labs   Lab 11/04/19  1232 11/14/19  1525 11/15/19  1648   BILITOT 0.4 0.9 1.1*     BMP:   Recent Labs   Lab 11/15/19  1648   *   *   K 3.6      CO2 24   BUN 12   CREATININE 1.0   CALCIUM 8.3*     CBC:   Recent Labs   Lab 11/14/19  1525 11/15/19  1648   WBC 9.26 7.91   HGB 10.7* 10.5*   HCT 36.4* 35.5*    212     CMP:   Recent Labs   Lab 11/14/19  1525 11/15/19  1648   * 135*   K 4.3 3.6    100   CO2 26 24   GLU 97 127*   BUN 14 12   CREATININE 1.4 1.0   CALCIUM 8.6* 8.3*   PROT 6.8 7.0   ALBUMIN 3.5 3.5   BILITOT 0.9 1.1*   ALKPHOS 112 115   * 614*   * 646*   ANIONGAP 9 11   EGFRNONAA 43.8* >60     Coagulation:   Recent Labs   Lab 11/15/19  1648   INR 7.0*   APTT 49.3*     TSH:   Recent Labs   Lab 06/22/19  0634   TSH 1.982     All pertinent labs within  the past 24 hours have been reviewed.    Significant Imaging: I have reviewed and interpreted all pertinent imaging results/findings within the past 24 hours.     Imaging Results          US Abdomen Limited (Final result)  Result time 11/15/19 20:46:19   Procedure changed from US Abdomen Complete     Final result by José Miguel Gonsalez MD (11/15/19 20:46:19)                 Impression:      Dilated common bile duct up to 11 mm, not unexpected status post cholecystectomy.    Small volume of ascites adjacent to the liver and within the left lower quadrant.      Electronically signed by: José Miguel Gonsalez  Date:    11/15/2019  Time:    20:46             Narrative:    EXAMINATION:  US ABDOMEN LIMITED    CLINICAL HISTORY:  hepatitis; Inflammatory liver disease, unspecified    TECHNIQUE:  Limited ultrasound of the right upper quadrant of the abdomen (including pancreas, liver, gallbladder, common bile duct, and spleen) was performed.    COMPARISON:  CT abdomen pelvis same date    FINDINGS:  Liver: Normal in size, measuring 16.7 cm. Homogeneous echotexture. No focal hepatic lesions.    Gallbladder: Surgically absent    Biliary system: The common duct is dilated, measuring 11 mm.  Common hepatic duct measures 6 mm.    Spleen: Normal in size and echotexture, measuring 9.6 cm.    Miscellaneous: Trace free fluid adjacent to the liver and within the left lower quadrant.  Partially visualized right kidney appears within normal limits.                               CT Abdomen Pelvis  Without Contrast (Final result)  Result time 11/15/19 18:08:37    Final result by José Miguel Gonsalez MD (11/15/19 18:08:37)                 Impression:      Mild wall thickening and minor inflammatory changes adjacent to the cecum and ascending colon, which may reflect infectious/inflammatory colitis.  Wall thickening also may be reactive to adjacent ascites.    Interval increase in small volume of ascites as compared to the prior examination.    Additional  chronic findings as detailed in the body of the report.      Electronically signed by: José Miguel Gonsalez  Date:    11/15/2019  Time:    18:08             Narrative:    EXAMINATION:  CT ABDOMEN PELVIS WITHOUT CONTRAST    CLINICAL HISTORY:  Abd pain, fever, abscess suspected;    TECHNIQUE:  Low dose axial images, sagittal and coronal reformations were obtained from the lung bases to the pubic symphysis, no oral contrast administered.    COMPARISON:  CT abdomen pelvis 06/22/2019    FINDINGS:  Heart: Postsurgical changes of a mitral valve repair.  Heart appears enlarged.    Lung Bases: Peripheral reticulation and interstitial opacity at both lung bases concerning for interstitial lung disease.    Liver: Normal in size and attenuation, with no focal hepatic lesions.    Gallbladder: Surgically absent.    Bile Ducts: Mildly prominent common bile duct, similar to priors.  No intrahepatic biliary ductal dilatation.  Findings non expected status post cholecystectomy.    Pancreas: No mass or peripancreatic fat stranding.    Spleen: Unremarkable.    Adrenals: Unremarkable.    Kidneys/ Ureters: Normal in size and position bilaterally.  No nephrolithiasis or hydronephrosis.  Ureters are nondilated.    Bladder: No evidence of significant bladder wall thickening.    Reproductive organs: Uterus is surgically absent.    GI Tract/Mesentery: There is mild wall thickening and haziness adjacent to the cecum and ascending colon, new from the prior.  Small hiatal hernia, stomach is otherwise within normal limits.  Small bowel remainder of the colon demonstrate no obstructive or inflammatory changes.    Peritoneal Space: There is a small volume of ascites adjacent to the liver and tracking down the bilateral pericolic gutters and collecting within the pelvis.  No free air.    Retroperitoneum: No significant adenopathy.    Abdominal wall: Unremarkable.    Vasculature: Abdominal aorta is nonaneurysmal.  Moderate aortic atherosclerotic  calcification.    Bones: Partially visualized postsurgical changes of a sternotomy.  No acute fracture.  There are degenerative changes.                                I have independently reviewed and interpreted the EKG.     I have independently reviewed all pertinent labs within the past 24 hours.    I have independently reviewed, visualized and interpreted all pertinent imaging results within the past 24 hours and discussed the findings with the ED physician, Dr. Choi.

## 2019-11-16 NOTE — HPI
This is a 49 year old  female with a significant cardiac history (since a young age) including CABG and mechanical valve on Coumadin who was admitted overnight at the request of her PCP for abnormal LFT's. On admission ,  with normal AP, Tbili, Albumin, and platelets. There is no known history of personal or family history of chronic liver disease. No high risk history including IV drug use. Viral hepatitis panel is pending at this time. Imaging does not suggest any changes of chronic liver disease.     She was discharged from the hospital almost two weeks ago. Upon discharge, she started Coumadin with Lovenox bridge. Upon returning home, she developed watery stools daily, producing 5-8 water non-bloody BM's per day. She has been controlling with Imodium. She reports being normotensive at home without syncope. Acetaminophen level on admission is normal. US performed but no doppler.

## 2019-11-16 NOTE — ASSESSMENT & PLAN NOTE
Per patient, multiple loose nonbloody bowel movements per day for the past few weeks.  Will check stool for C diff.  Consult GI.  11/16:  --c diff negative  --scheduled questran & probiotic  --prn imodium

## 2019-11-16 NOTE — PLAN OF CARE
Pt AAOx4. VSS. Pt remained free of falls this shift. C/O constant back pain. PRN pain medication administered per MAR. Pt is NSR on monitor. PIV intact. Oxygen therapy continued. Hourly rounding completed. Pt instructed to call for assistance. POC reviewed. Pt verbalized understanding. Will continue to monitor.

## 2019-11-16 NOTE — SUBJECTIVE & OBJECTIVE
Interval History: Patient was kept inpatient for coumadin toxicity under the care of Hospital Medicine. Her coumadin was held, no Vit K at present as pt is not actively bleeding. Pharmacy to manage coumadin.  Pt also  Having diarrhea, imodium added, along with questran and probiotic. C diff negative    Review of Systems   Constitutional: Negative.  Negative for chills, diaphoresis, fatigue and fever.   HENT: Negative.  Negative for congestion, nosebleeds and sinus pressure.    Eyes: Negative.  Negative for visual disturbance.   Respiratory: Negative.  Negative for cough, chest tightness, shortness of breath and wheezing.    Cardiovascular: Negative.  Negative for chest pain, palpitations and leg swelling.   Gastrointestinal: Positive for abdominal pain, diarrhea and nausea. Negative for vomiting.   Endocrine: Negative.  Negative for polyuria.   Genitourinary: Negative.  Negative for dysuria, flank pain, frequency and urgency.   Musculoskeletal: Negative.  Negative for back pain, joint swelling and neck stiffness.   Skin: Negative.  Negative for color change, pallor and rash.   Allergic/Immunologic: Negative.    Neurological: Negative.  Negative for dizziness, syncope, speech difficulty, numbness and headaches.   Hematological: Negative.  Negative for adenopathy. Does not bruise/bleed easily.   Psychiatric/Behavioral: Negative.  Negative for confusion, decreased concentration and hallucinations. The patient is not nervous/anxious.    All other systems reviewed and are negative.    Objective:     Vital Signs (Most Recent):  Temp: 97.9 °F (36.6 °C) (11/16/19 1107)  Pulse: 61 (11/16/19 1107)  Resp: 18 (11/16/19 1107)  BP: (!) 113/55 (11/16/19 1107)  SpO2: 98 % (11/16/19 1107) Vital Signs (24h Range):  Temp:  [97.2 °F (36.2 °C)-98.9 °F (37.2 °C)] 97.9 °F (36.6 °C)  Pulse:  [54-87] 61  Resp:  [18-21] 18  SpO2:  [93 %-98 %] 98 %  BP: (110-154)/(55-81) 113/55     Weight: 78.8 kg (173 lb 11.6 oz)  Body mass index is 33.93  kg/m².    Intake/Output Summary (Last 24 hours) at 11/16/2019 1124  Last data filed at 11/16/2019 0812  Gross per 24 hour   Intake --   Output 800 ml   Net -800 ml      Physical Exam   Constitutional: She is oriented to person, place, and time. She appears well-developed and well-nourished. No distress.   HENT:   Head: Normocephalic and atraumatic.   Eyes: Conjunctivae and EOM are normal. No scleral icterus.   Neck: Normal range of motion. Neck supple. No tracheal deviation present. No thyromegaly present.   Cardiovascular: Normal rate, regular rhythm and intact distal pulses.   Murmur heard.  Pulmonary/Chest: Effort normal and breath sounds normal. No respiratory distress. She has no wheezes. She has no rales. She exhibits no tenderness.   Abdominal: Soft. Bowel sounds are normal. She exhibits no distension. There is tenderness (mild lower).   Musculoskeletal: Normal range of motion. She exhibits no edema or tenderness.   Lymphadenopathy:     She has no cervical adenopathy.   Neurological: She is alert and oriented to person, place, and time. No cranial nerve deficit. She exhibits normal muscle tone. Coordination normal.   Skin: Skin is warm and dry. She is not diaphoretic. No erythema.   Chronic bruising noted lower abdominal wall, from recent Lovenox injections.   Psychiatric: She has a normal mood and affect. Her behavior is normal.   Nursing note and vitals reviewed.      Significant Labs:   Recent Lab Results       11/16/19  0450   11/15/19  2015   11/15/19  1648        Acetaminophen (Tylenol), Serum   <3.0  Comment:  Toxic Levels:  Adults (4 hr post-ingestion).........>150 ug/mL  Adults (12 hr post-ingestion)........>40 ug/mL  Peds (2 hr post-ingestion, liquid)...>225 ug/mL         Albumin 3.3   3.5     Alkaline Phosphatase 112   115        646     Anion Gap 9   11     Aniso Slight         aPTT     49.3  Comment:  aPTT therapeutic range = 39-69 seconds        614     Baso # 0.06   0.05      Basophilic Stippling Occasional         Basophil% 1.0   0.6     BILIRUBIN TOTAL 0.8  Comment:  For infants and newborns, interpretation of results should be based  on gestational age, weight and in agreement with clinical  observations.  Premature Infant recommended reference ranges:  Up to 24 hours.............<8.0 mg/dL  Up to 48 hours............<12.0 mg/dL  3-5 days..................<15.0 mg/dL  6-29 days.................<15.0 mg/dL     1.1  Comment:  For infants and newborns, interpretation of results should be based  on gestational age, weight and in agreement with clinical  observations.  Premature Infant recommended reference ranges:  Up to 24 hours.............<8.0 mg/dL  Up to 48 hours............<12.0 mg/dL  3-5 days..................<15.0 mg/dL  6-29 days.................<15.0 mg/dL       BUN, Bld 9   12     Calcium 8.6   8.3     Chloride 104   100     CO2 25   24     Creatinine 0.9   1.0     Differential Method Automated   Automated     eGFR if  >60   >60     eGFR if non  >60  Comment:  Calculation used to obtain the estimated glomerular filtration  rate (eGFR) is the CKD-EPI equation.      >60  Comment:  Calculation used to obtain the estimated glomerular filtration  rate (eGFR) is the CKD-EPI equation.        Eos # 0.3   0.1     Eosinophil% 4.1   1.5     Glucose 129   127     Gran # (ANC) 3.2   5.1     Gran% 51.9   64.1     Hematocrit 36.2   35.5     Hemoglobin 10.3   10.5     Hypo Occasional         Immature Grans (Abs) 0.02  Comment:  Mild elevation in immature granulocytes is non specific and   can be seen in a variety of conditions including stress response,   acute inflammation, trauma and pregnancy. Correlation with other   laboratory and clinical findings is essential.     0.04  Comment:  Mild elevation in immature granulocytes is non specific and   can be seen in a variety of conditions including stress response,   acute inflammation, trauma and pregnancy.  Correlation with other   laboratory and clinical findings is essential.       Immature Granulocytes 0.3   0.5     Coumadin Monitoring INR 4.7  Comment:  Coumadin Therapy:  2.0 - 3.0 for INR for all indicators except mechanical heart valves  and antiphospholipid syndromes which should use 2.5 - 3.5.   6.0  Comment:  Coumadin Therapy:  2.0 - 3.0 for INR for all indicators except mechanical heart valves  and antiphospholipid syndromes which should use 2.5 - 3.5.  INR critical result(s) called and verbal readback obtained from   KHALIDA ANDRE RN by KB3 11/15/2019 23:58   7.0  Comment:  Coumadin Therapy:  2.0 - 3.0 for INR for all indicators except mechanical heart valves  and antiphospholipid syndromes which should use 2.5 - 3.5.  INR  critical result(s) called and verbal readback obtained from   Tere Chawla RN by RDJ 11/15/2019 17:56       Lymph # 1.8   1.5     Lymph% 29.3   19.3     Magnesium 2.2         MCH 29.1   29.5     MCHC 28.5   29.6        100     Mono # 0.8   1.1     Mono% 13.4   14.0     MPV 11.0   11.1     nRBC 0   0     Ovalocytes Occasional         Phosphorus 2.0         Platelet Estimate Appears normal         Platelets 187   212     Poik Slight         Poly Occasional         Potassium 3.1   3.6     PROTEIN TOTAL 6.7   7.0     Protime 48.2 61.2 70.5  Comment:  Results confirmed, test repeated     RBC 3.54   3.56     RDW 17.4   17.5     Sodium 138   135     Stomatocytes Present         Target Cells Occasional         WBC 6.11   7.91         All pertinent labs within the past 24 hours have been reviewed.    Significant Imaging: I have reviewed all pertinent imaging results/findings within the past 24 hours.

## 2019-11-17 LAB
ALBUMIN SERPL BCP-MCNC: 3.2 G/DL (ref 3.5–5.2)
ALP SERPL-CCNC: 101 U/L (ref 55–135)
ALT SERPL W/O P-5'-P-CCNC: 409 U/L (ref 10–44)
ANION GAP SERPL CALC-SCNC: 9 MMOL/L (ref 8–16)
AST SERPL-CCNC: 206 U/L (ref 10–40)
BASOPHILS # BLD AUTO: 0.04 K/UL (ref 0–0.2)
BASOPHILS NFR BLD: 0.7 % (ref 0–1.9)
BILIRUB DIRECT SERPL-MCNC: 0.5 MG/DL (ref 0.1–0.3)
BILIRUB SERPL-MCNC: 0.8 MG/DL (ref 0.1–1)
BUN SERPL-MCNC: 6 MG/DL (ref 6–20)
CALCIUM SERPL-MCNC: 8.4 MG/DL (ref 8.7–10.5)
CHLORIDE SERPL-SCNC: 104 MMOL/L (ref 95–110)
CO2 SERPL-SCNC: 30 MMOL/L (ref 23–29)
CREAT SERPL-MCNC: 0.8 MG/DL (ref 0.5–1.4)
DIFFERENTIAL METHOD: ABNORMAL
EOSINOPHIL # BLD AUTO: 0.3 K/UL (ref 0–0.5)
EOSINOPHIL NFR BLD: 5 % (ref 0–8)
ERYTHROCYTE [DISTWIDTH] IN BLOOD BY AUTOMATED COUNT: 17.7 % (ref 11.5–14.5)
EST. GFR  (AFRICAN AMERICAN): >60 ML/MIN/1.73 M^2
EST. GFR  (NON AFRICAN AMERICAN): >60 ML/MIN/1.73 M^2
GLUCOSE SERPL-MCNC: 120 MG/DL (ref 70–110)
HCT VFR BLD AUTO: 36 % (ref 37–48.5)
HGB BLD-MCNC: 10 G/DL (ref 12–16)
IMM GRANULOCYTES # BLD AUTO: 0.01 K/UL (ref 0–0.04)
IMM GRANULOCYTES NFR BLD AUTO: 0.2 % (ref 0–0.5)
INR PPP: 2.1 (ref 0.8–1.2)
LYMPHOCYTES # BLD AUTO: 1.7 K/UL (ref 1–4.8)
LYMPHOCYTES NFR BLD: 29.7 % (ref 18–48)
MCH RBC QN AUTO: 28.7 PG (ref 27–31)
MCHC RBC AUTO-ENTMCNC: 27.8 G/DL (ref 32–36)
MCV RBC AUTO: 103 FL (ref 82–98)
MONOCYTES # BLD AUTO: 0.7 K/UL (ref 0.3–1)
MONOCYTES NFR BLD: 12.4 % (ref 4–15)
NEUTROPHILS # BLD AUTO: 2.9 K/UL (ref 1.8–7.7)
NEUTROPHILS NFR BLD: 52 % (ref 38–73)
NRBC BLD-RTO: 0 /100 WBC
PLATELET # BLD AUTO: 145 K/UL (ref 150–350)
PMV BLD AUTO: 10.9 FL (ref 9.2–12.9)
POTASSIUM SERPL-SCNC: 3.3 MMOL/L (ref 3.5–5.1)
PROT SERPL-MCNC: 6.3 G/DL (ref 6–8.4)
PROTHROMBIN TIME: 21.9 SEC (ref 9–12.5)
RBC # BLD AUTO: 3.49 M/UL (ref 4–5.4)
SODIUM SERPL-SCNC: 143 MMOL/L (ref 136–145)
WBC # BLD AUTO: 5.58 K/UL (ref 3.9–12.7)

## 2019-11-17 PROCEDURE — S4991 NICOTINE PATCH NONLEGEND: HCPCS | Mod: NTX | Performed by: INTERNAL MEDICINE

## 2019-11-17 PROCEDURE — 25000242 PHARM REV CODE 250 ALT 637 W/ HCPCS: Mod: NTX | Performed by: INTERNAL MEDICINE

## 2019-11-17 PROCEDURE — 94640 AIRWAY INHALATION TREATMENT: CPT | Mod: NTX

## 2019-11-17 PROCEDURE — 25000003 PHARM REV CODE 250: Mod: NTX | Performed by: EMERGENCY MEDICINE

## 2019-11-17 PROCEDURE — 80048 BASIC METABOLIC PNL TOTAL CA: CPT | Mod: NTX

## 2019-11-17 PROCEDURE — 94761 N-INVAS EAR/PLS OXIMETRY MLT: CPT | Mod: NTX

## 2019-11-17 PROCEDURE — 27000221 HC OXYGEN, UP TO 24 HOURS: Mod: NTX

## 2019-11-17 PROCEDURE — 21400001 HC TELEMETRY ROOM: Mod: NTX

## 2019-11-17 PROCEDURE — 25000003 PHARM REV CODE 250: Mod: NTX | Performed by: NURSE PRACTITIONER

## 2019-11-17 PROCEDURE — 99232 SBSQ HOSP IP/OBS MODERATE 35: CPT | Mod: NTX,,, | Performed by: INTERNAL MEDICINE

## 2019-11-17 PROCEDURE — 99232 PR SUBSEQUENT HOSPITAL CARE,LEVL II: ICD-10-PCS | Mod: NTX,,, | Performed by: INTERNAL MEDICINE

## 2019-11-17 PROCEDURE — 25000242 PHARM REV CODE 250 ALT 637 W/ HCPCS: Mod: NTX | Performed by: NURSE PRACTITIONER

## 2019-11-17 PROCEDURE — 80076 HEPATIC FUNCTION PANEL: CPT | Mod: NTX

## 2019-11-17 PROCEDURE — 36415 COLL VENOUS BLD VENIPUNCTURE: CPT | Mod: NTX

## 2019-11-17 PROCEDURE — 99900035 HC TECH TIME PER 15 MIN (STAT): Mod: NTX

## 2019-11-17 PROCEDURE — 85610 PROTHROMBIN TIME: CPT | Mod: NTX

## 2019-11-17 PROCEDURE — 25000003 PHARM REV CODE 250: Mod: NTX | Performed by: INTERNAL MEDICINE

## 2019-11-17 PROCEDURE — 85025 COMPLETE CBC W/AUTO DIFF WBC: CPT | Mod: NTX

## 2019-11-17 RX ORDER — WARFARIN 3 MG/1
6 TABLET ORAL
Status: DISCONTINUED | OUTPATIENT
Start: 2019-11-19 | End: 2019-11-19 | Stop reason: HOSPADM

## 2019-11-17 RX ORDER — POTASSIUM CHLORIDE 20 MEQ/1
40 TABLET, EXTENDED RELEASE ORAL ONCE
Status: COMPLETED | OUTPATIENT
Start: 2019-11-17 | End: 2019-11-17

## 2019-11-17 RX ORDER — WARFARIN 3 MG/1
6 TABLET ORAL
Status: DISCONTINUED | OUTPATIENT
Start: 2019-11-18 | End: 2019-11-19 | Stop reason: HOSPADM

## 2019-11-17 RX ORDER — WARFARIN 3 MG/1
6 TABLET ORAL
Status: DISCONTINUED | OUTPATIENT
Start: 2019-11-20 | End: 2019-11-19 | Stop reason: HOSPADM

## 2019-11-17 RX ORDER — WARFARIN 3 MG/1
3 TABLET ORAL
Status: DISCONTINUED | OUTPATIENT
Start: 2019-11-17 | End: 2019-11-19 | Stop reason: HOSPADM

## 2019-11-17 RX ORDER — WARFARIN 3 MG/1
3 TABLET ORAL
Status: DISCONTINUED | OUTPATIENT
Start: 2019-11-22 | End: 2019-11-19 | Stop reason: HOSPADM

## 2019-11-17 RX ADMIN — HYDROCODONE BITARTRATE AND ACETAMINOPHEN 1 TABLET: 10; 325 TABLET ORAL at 07:11

## 2019-11-17 RX ADMIN — BUSPIRONE HYDROCHLORIDE 15 MG: 10 TABLET ORAL at 08:11

## 2019-11-17 RX ADMIN — LACTOBACILLUS TAB 4 TABLET: TAB at 05:11

## 2019-11-17 RX ADMIN — HYDROCODONE BITARTRATE AND ACETAMINOPHEN 1 TABLET: 10; 325 TABLET ORAL at 12:11

## 2019-11-17 RX ADMIN — FUROSEMIDE 40 MG: 40 TABLET ORAL at 05:11

## 2019-11-17 RX ADMIN — BUSPIRONE HYDROCHLORIDE 15 MG: 10 TABLET ORAL at 09:11

## 2019-11-17 RX ADMIN — CLONAZEPAM 1 MG: 1 TABLET ORAL at 03:11

## 2019-11-17 RX ADMIN — AMITRIPTYLINE HYDROCHLORIDE 25 MG: 25 TABLET, FILM COATED ORAL at 09:11

## 2019-11-17 RX ADMIN — LACTOBACILLUS TAB 4 TABLET: TAB at 12:11

## 2019-11-17 RX ADMIN — CLONAZEPAM 1 MG: 1 TABLET ORAL at 08:11

## 2019-11-17 RX ADMIN — TRAZODONE HYDROCHLORIDE 150 MG: 100 TABLET ORAL at 09:11

## 2019-11-17 RX ADMIN — FUROSEMIDE 40 MG: 40 TABLET ORAL at 08:11

## 2019-11-17 RX ADMIN — HYDROCODONE BITARTRATE AND ACETAMINOPHEN 1 TABLET: 10; 325 TABLET ORAL at 06:11

## 2019-11-17 RX ADMIN — ARFORMOTEROL TARTRATE 15 MCG: 15 SOLUTION RESPIRATORY (INHALATION) at 08:11

## 2019-11-17 RX ADMIN — DESVENLAFAXINE SUCCINATE 100 MG: 100 TABLET, FILM COATED, EXTENDED RELEASE ORAL at 08:11

## 2019-11-17 RX ADMIN — CLONAZEPAM 1 MG: 1 TABLET ORAL at 09:11

## 2019-11-17 RX ADMIN — PANTOPRAZOLE SODIUM 40 MG: 40 TABLET, DELAYED RELEASE ORAL at 09:11

## 2019-11-17 RX ADMIN — PANTOPRAZOLE SODIUM 40 MG: 40 TABLET, DELAYED RELEASE ORAL at 08:11

## 2019-11-17 RX ADMIN — CHOLESTYRAMINE 4 G: 4 POWDER, FOR SUSPENSION ORAL at 08:11

## 2019-11-17 RX ADMIN — LACTOBACILLUS TAB 4 TABLET: TAB at 08:11

## 2019-11-17 RX ADMIN — ARFORMOTEROL TARTRATE 15 MCG: 15 SOLUTION RESPIRATORY (INHALATION) at 07:11

## 2019-11-17 RX ADMIN — CHOLESTYRAMINE 4 G: 4 POWDER, FOR SUSPENSION ORAL at 09:11

## 2019-11-17 RX ADMIN — ALBUTEROL SULFATE 2.5 MG: 2.5 SOLUTION RESPIRATORY (INHALATION) at 08:11

## 2019-11-17 RX ADMIN — NICOTINE 1 PATCH: 7 PATCH, EXTENDED RELEASE TRANSDERMAL at 09:11

## 2019-11-17 RX ADMIN — POTASSIUM CHLORIDE 40 MEQ: 1500 TABLET, EXTENDED RELEASE ORAL at 03:11

## 2019-11-17 RX ADMIN — WARFARIN SODIUM 3 MG: 3 TABLET ORAL at 05:11

## 2019-11-17 RX ADMIN — FLUTICASONE PROPIONATE 50 MCG: 50 SPRAY, METERED NASAL at 08:11

## 2019-11-17 NOTE — ASSESSMENT & PLAN NOTE
Per patient, multiple loose nonbloody bowel movements per day for the past few weeks.  Will check stool for C diff.  Consult GI.  11/16:  --c diff negative  --scheduled questran & probiotic  --prn imodium  11/17:  --a common side effect of OFEV  --continue questran, probiotic, and imodium

## 2019-11-17 NOTE — PROGRESS NOTES
[facility] Hospital Medicine  Progress Note    Patient Name: Meg Sal  MRN: [id number]  Patient Class: IP- Inpatient   Admission Date: 11/15/2019  Length of Stay: 2 days  Attending Physician: Jean Pierre Mcdermott MD  Primary Care Provider: No primary care provider on file.        Subjective:     Principal Problem:Warfarin toxicity        HPI:  Ms. thumb son is a 49-year-old  female with PMH significant for mechanical aortic valve, CAD, CABG, HTN, hyperlipidemia, was recently discharged from this hospital, was sent home on Lovenox bridge with warfarin anticoagulation for a subtherapeutic INR of 1.2 upon discharge. Patient reports taking warfarin and Lovenox as she was supposed to, she followed up with Dr. Banda or, cardiologist two days ago for routine post hospital discharge clinic visit.  Routine laboratory workup revealed elevated LFTs with AST and ALT in the 600s, hence she was advised to present to the ED emergently.  Repeat AST/ALT in the 600s, INR 7.0.  Hemoglobin stable at 10.5.  BP stable.  In addition patient reports multiple loose nonbloody bowel movements for the past few weeks.  Denies recent use of antibiotics.    Admitting diagnosis warfarin toxicity, elevated LFTs.    Overview/Hospital Course:  Patient was kept inpatient for coumadin toxicity under the care of Hospital Medicine. Her coumadin was held, no Vit K at present as pt is not actively bleeding. Pharmacy to manage coumadin.  Pt also  Having diarrhea, imodium added, along with questran and probiotic. C diff negative    Interval History: Patient's INR low today at 2.1. Pharmacy managing coumadin. Stopped OFEV which is likely causing elevated liver enzymes and diarrhea. Liver enzymes trending down - discussed with GI and pulmonology.     Review of Systems   Constitutional: Negative for activity change and chills.   HENT: Negative for rhinorrhea and sore throat.    Eyes: Negative for pain and discharge.   Respiratory:  Negative for cough and shortness of breath.    Cardiovascular: Negative for chest pain, palpitations and leg swelling.   Gastrointestinal: Positive for diarrhea. Negative for abdominal distention, abdominal pain and blood in stool.   Endocrine: Negative for cold intolerance and heat intolerance.   Genitourinary: Negative for difficulty urinating, dysuria and hematuria.   Musculoskeletal: Negative for arthralgias, gait problem and myalgias.   Skin: Negative for color change and wound.   Allergic/Immunologic: Negative.    Neurological: Negative for dizziness, tremors, seizures, syncope, facial asymmetry, speech difficulty, weakness, light-headedness, numbness and headaches.   Hematological: Negative.    Psychiatric/Behavioral: Negative for agitation, behavioral problems and confusion.     Objective:     Vital Signs (Most Recent):  Temp: 96.9 °F (36.1 °C) (11/17/19 0719)  Pulse: 62 (11/17/19 0732)  Resp: 18 (11/17/19 0732)  BP: (!) 117/57 (11/17/19 0719)  SpO2: 96 % (11/17/19 0732) Vital Signs (24h Range):  Temp:  [96.7 °F (35.9 °C)-97.9 °F (36.6 °C)] 96.9 °F (36.1 °C)  Pulse:  [61-80] 62  Resp:  [16-18] 18  SpO2:  [93 %-98 %] 96 %  BP: (100-117)/(49-57) 117/57     Weight: 77.8 kg (171 lb 8.3 oz)  Body mass index is 33.5 kg/m².    Intake/Output Summary (Last 24 hours) at 11/17/2019 0954  Last data filed at 11/17/2019 0500  Gross per 24 hour   Intake 1226 ml   Output 2700 ml   Net -1474 ml      Physical Exam   Constitutional: She is oriented to person, place, and time. She appears well-developed and well-nourished. No distress.   HENT:   Head: Normocephalic and atraumatic.   Nose: Nose normal.   Eyes: Conjunctivae and EOM are normal. No scleral icterus.   Neck: Normal range of motion. Neck supple. No tracheal deviation present. No thyromegaly present.   Cardiovascular: Normal rate, regular rhythm and intact distal pulses.   Murmur heard.  Pulmonary/Chest: Effort normal and breath sounds normal. No respiratory distress.  She has no wheezes. She has no rales. She exhibits no tenderness.   Abdominal: Soft. Bowel sounds are normal. She exhibits no distension. There is tenderness (mild lower).   Genitourinary:   Genitourinary Comments: deferred   Musculoskeletal: Normal range of motion. She exhibits no edema or tenderness.   Lymphadenopathy:     She has no cervical adenopathy.   Neurological: She is alert and oriented to person, place, and time. No cranial nerve deficit. She exhibits normal muscle tone. Coordination normal.   Skin: Skin is warm and dry. She is not diaphoretic. No erythema.   Chronic bruising noted lower abdominal wall, from recent Lovenox injections.   Psychiatric: She has a normal mood and affect. Her behavior is normal.   Nursing note and vitals reviewed.      Significant Labs:   Recent Lab Results       11/17/19  0431   11/16/19  1121        Anion Gap 9       Baso # 0.04       Basophil% 0.7       BUN, Bld 6       C difficile Toxins A+B, EIA   Negative  Comment:  Testing not recommended for children <24 months old.     C. diff Antigen   Negative     Calcium 8.4       Chloride 104       CO2 30       Creatinine 0.8       Differential Method Automated       eGFR if  >60       eGFR if non  >60  Comment:  Calculation used to obtain the estimated glomerular filtration  rate (eGFR) is the CKD-EPI equation.          Eos # 0.3       Eosinophil% 5.0       Glucose 120       Gran # (ANC) 2.9       Gran% 52.0       Hematocrit 36.0       Hemoglobin 10.0       Immature Grans (Abs) 0.01  Comment:  Mild elevation in immature granulocytes is non specific and   can be seen in a variety of conditions including stress response,   acute inflammation, trauma and pregnancy. Correlation with other   laboratory and clinical findings is essential.         Immature Granulocytes 0.2       Coumadin Monitoring INR 2.1  Comment:  Coumadin Therapy:  2.0 - 3.0 for INR for all indicators except mechanical heart  valves  and antiphospholipid syndromes which should use 2.5 - 3.5.         Lymph # 1.7       Lymph% 29.7       MCH 28.7       MCHC 27.8              Mono # 0.7       Mono% 12.4       MPV 10.9       nRBC 0       Platelets 145       Potassium 3.3       Protime 21.9       RBC 3.49       RDW 17.7       Sodium 143       WBC 5.58           All pertinent labs within the past 24 hours have been reviewed.    Significant Imaging: I have reviewed all pertinent imaging results/findings within the past 24 hours.      Assessment/Plan:      * Warfarin toxicity  Unintentionally warfarin toxicity.  INR 7.0.  Patient has been on warfarin, along with Lovenox bridge therapy.  Hold all anticoagulants.  Hemodynamically stable, no evidence of bleeding.  Hemoglobin 10.5.  Will not reverse with vitamin K as she is stable at this time.  111/16:  --improving  --pharmacy to manage coumadin  --no evidence of bleeding  11/17:  --sub therapeutic today - pt with mechanical valve  --pharmacy managing    Diarrhea  Per patient, multiple loose nonbloody bowel movements per day for the past few weeks.  Will check stool for C diff.  Consult GI.  11/16:  --c diff negative  --scheduled questran & probiotic  --prn imodium  11/17:  --a common side effect of OFEV  --continue questran, probiotic, and imodium      Elevated LFTs  AST and ALT in the 600s.  Bilirubin 1.1.  Unclear etiology of elevated transaminases.  Consult GI.  Patient reports having diarrhea for the past few weeks.  11/16:  --possibly r/t OFEV  --pharmacy discussing with pt/family  11/17:  --trending down  --discussed with GI  --will dc OFEV as this is likely source  --continue to monitor   --hepatitis panel pending    Hx of mechanical aortic valve replacement  Patient is mechanical aortic valve, currently on warfarin, with INR 7.0.  Hold anticoagulants.  11/16:  --INR 4.7 today  --pharmacy monitoring  11/17:  --INR 2.1 today        VTE Risk Mitigation (From admission, onward)          Ordered     warfarin (COUMADIN) tablet 3 mg  Every Friday 11/17/19 0943     warfarin (COUMADIN) tablet 6 mg  Every Wednesday 11/17/19 0943     warfarin (COUMADIN) tablet 6 mg  Every Tues, Thurs 11/17/19 0943     warfarin (COUMADIN) tablet 6 mg  Every Monday 11/17/19 0943     warfarin (COUMADIN) tablet 3 mg  Every Sat, Sun      11/17/19 0943     Place sequential compression device  Until discontinued      11/15/19 1927                      SANDRA Spears  Department of Hospital Medicine   [facility]

## 2019-11-17 NOTE — SUBJECTIVE & OBJECTIVE
Subjective:     Interval History: No problems overnight. LFT's trending down.    Review of Systems   Constitutional: Negative.    HENT: Negative.    Eyes: Negative.    Respiratory: Negative.    Cardiovascular: Negative.    Gastrointestinal: Positive for diarrhea. Negative for abdominal distention, abdominal pain and blood in stool.   Endocrine: Negative.    Genitourinary: Negative.    Musculoskeletal: Negative.    Allergic/Immunologic: Negative.    Neurological: Negative.    Hematological: Negative.      Objective:     Vital Signs (Most Recent):  Temp: 96.9 °F (36.1 °C) (11/17/19 0719)  Pulse: 62 (11/17/19 0732)  Resp: 18 (11/17/19 0732)  BP: (!) 117/57 (11/17/19 0719)  SpO2: 96 % (11/17/19 0732) Vital Signs (24h Range):  Temp:  [96.7 °F (35.9 °C)-97.9 °F (36.6 °C)] 96.9 °F (36.1 °C)  Pulse:  [61-80] 62  Resp:  [16-18] 18  SpO2:  [93 %-98 %] 96 %  BP: (100-117)/(49-57) 117/57     Weight: 77.8 kg (171 lb 8.3 oz) (11/17/19 0500)  Body mass index is 33.5 kg/m².      Intake/Output Summary (Last 24 hours) at 11/17/2019 1028  Last data filed at 11/17/2019 0500  Gross per 24 hour   Intake 1226 ml   Output 2100 ml   Net -874 ml       Lines/Drains/Airways     Peripheral Intravenous Line                 Peripheral IV - Single Lumen 11/15/19 1649 20 G Left Antecubital 1 day                Physical Exam   Constitutional: She is oriented to person, place, and time. She appears well-developed and well-nourished.   HENT:   Head: Normocephalic and atraumatic.   Eyes: Pupils are equal, round, and reactive to light. EOM are normal.   Neck: Normal range of motion. Neck supple.   Cardiovascular: Normal rate and regular rhythm.   Pulmonary/Chest: Effort normal and breath sounds normal.   Abdominal: Soft. Bowel sounds are normal.   Musculoskeletal: Normal range of motion. She exhibits no edema.   Neurological: She is alert and oriented to person, place, and time.   Skin: Skin is warm and dry.       Significant Labs:  CBC:   Recent Labs    Lab 11/15/19  1648 11/16/19  0450 11/17/19  0431   WBC 7.91 6.11 5.58   HGB 10.5* 10.3* 10.0*   HCT 35.5* 36.2* 36.0*    187 145*     CMP:   Recent Labs   Lab 11/17/19  0431   *   CALCIUM 8.4*   ALBUMIN 3.2*   PROT 6.3      K 3.3*   CO2 30*      BUN 6   CREATININE 0.8   ALKPHOS 101   *   *   BILITOT 0.8     Coagulation:   Recent Labs   Lab 11/15/19  1648  11/17/19  0431   INR 7.0*   < > 2.1*   APTT 49.3*  --   --     < > = values in this interval not displayed.         Significant Imaging:  Imaging results within the past 24 hours have been reviewed.

## 2019-11-17 NOTE — PLAN OF CARE
AAOX4; Plan of care discussed with patient and spouse; Med administration including PRNs administered; Pt remains free of falls this shift, fall precautions in place. NSR on telemonitor. Resting in bed where she positions independently. All personal items within reach including call light. Will continue to monitor.

## 2019-11-17 NOTE — ASSESSMENT & PLAN NOTE
Hepatocellular and improving. Most likely related to Nintedanib for lung disease. Her enzymes are now improving. There is no evidence of chronic liver disease on imaging, labs, or to suspect this from her history.   · Agree with holding Nintedanib. If this medication is required, may need dose reduction. Appreciate pharmacy input on this matter.   · Await viral hepatitis panel.   · Continue to trend LFT's.

## 2019-11-17 NOTE — HOSPITAL COURSE
Patient was kept inpatient for coumadin toxicity under the care of Hospital Medicine. Her coumadin was held, no Vit K at present as pt is not actively bleeding. Pharmacy to manage coumadin.  Pt also  Having diarrhea, imodium added, along with questran and probiotic. C diff negative. 19 The patients INR was 1.5 this morning. Will start heparin gtt to bridge until patient becomes therapeutic with coumadin. Vitamin K was given today. Liver enzymes continue to trend down. The patient reports that her diarrhea is now controlled. Patient insisting on leaving to attend her father's  in [place]. Cardiology consulted as INR is not therapeutic and pt requesting to be dc'd with lovenox bridge - which is not standard for mechanical valve. Discussed with pt that we would prefer to keep her on heparin GTT until INR is therapeutic. She is anxious with high stress level in dealing with the death of her father and she will leave with lovenox bridge. Cardiology and pharmacy went over lovenox bridge in detail. Pt will see PCP for f/u on liver enzymes in 2 days and will go to [facility] for coumadin check on Thursday. Patient was seen and examined today and deemed stable for discharge home.

## 2019-11-17 NOTE — PROGRESS NOTES
Clinical Pharmacy Progress Note: Coumadin Dosing and Monitoring     Indication: Hx of mechanical aortic valve replacement   Target INR is 2-3.   Lab Results   Component Value Date    INR 2.1 (H) 11/17/2019    INR 4.7 (H) 11/16/2019    INR 6.0 (HH) 11/15/2019   Patient has been educated by pharmacist this admission.      Current dose: 3 mg Fri/Sat/Sun and 6 mg all other days of the week.     Plan: Resume Coumadin dosing today at lower adjusted dose. Daily LFTs ordered to monitor acute liver injury progress/resolution.   PT/INR will be monitored daily. Dose adjustments will be made accordingly.      Thank you for allowing us to participate in this patient's care.    Carolina Goff, PharmD 11/17/2019 9:25 AM

## 2019-11-17 NOTE — ASSESSMENT & PLAN NOTE
Unintentionally warfarin toxicity.  INR 7.0.  Patient has been on warfarin, along with Lovenox bridge therapy.  Hold all anticoagulants.  Hemodynamically stable, no evidence of bleeding.  Hemoglobin 10.5.  Will not reverse with vitamin K as she is stable at this time.  111/16:  --improving  --pharmacy to manage coumadin  --no evidence of bleeding  11/17:  --sub therapeutic today - pt with mechanical valve  --pharmacy managing

## 2019-11-17 NOTE — ASSESSMENT & PLAN NOTE
Patient is mechanical aortic valve, currently on warfarin, with INR 7.0.  Hold anticoagulants.  11/16:  --INR 4.7 today  --pharmacy monitoring  11/17:  --INR 2.1 today

## 2019-11-17 NOTE — PLAN OF CARE
Care plan reviewed with patient and spouse. Complained of clavicle/shoulder pain once--managed by PRN med. Been awake and sitting up in the bed most of the day. Looking forward to go home today. No bleeding or signs of bleeding noted. Potassium to be replaced. No other complaint made.

## 2019-11-17 NOTE — PROGRESS NOTES
[facility]  Gastroenterology  Progress Note    Patient Name: Meg Sal  MRN: [id number]  Admission Date: 11/15/2019  Hospital Length of Stay: 2 days  Code Status: Full Code   Attending Provider: Jean Pierre Mcdermott MD  Consulting Provider: Maria Hammonds MD  Primary Care Physician: No primary care provider on file.  Principal Problem: Warfarin toxicity      Subjective:     Interval History: No problems overnight. LFT's trending down.    Review of Systems   Constitutional: Negative.    HENT: Negative.    Eyes: Negative.    Respiratory: Negative.    Cardiovascular: Negative.    Gastrointestinal: Positive for diarrhea. Negative for abdominal distention, abdominal pain and blood in stool.   Endocrine: Negative.    Genitourinary: Negative.    Musculoskeletal: Negative.    Allergic/Immunologic: Negative.    Neurological: Negative.    Hematological: Negative.      Objective:     Vital Signs (Most Recent):  Temp: 96.9 °F (36.1 °C) (11/17/19 0719)  Pulse: 62 (11/17/19 0732)  Resp: 18 (11/17/19 0732)  BP: (!) 117/57 (11/17/19 0719)  SpO2: 96 % (11/17/19 0732) Vital Signs (24h Range):  Temp:  [96.7 °F (35.9 °C)-97.9 °F (36.6 °C)] 96.9 °F (36.1 °C)  Pulse:  [61-80] 62  Resp:  [16-18] 18  SpO2:  [93 %-98 %] 96 %  BP: (100-117)/(49-57) 117/57     Weight: 77.8 kg (171 lb 8.3 oz) (11/17/19 0500)  Body mass index is 33.5 kg/m².      Intake/Output Summary (Last 24 hours) at 11/17/2019 1028  Last data filed at 11/17/2019 0500  Gross per 24 hour   Intake 1226 ml   Output 2100 ml   Net -874 ml       Lines/Drains/Airways     Peripheral Intravenous Line                 Peripheral IV - Single Lumen 11/15/19 1649 20 G Left Antecubital 1 day                Physical Exam   Constitutional: She is oriented to person, place, and time. She appears well-developed and well-nourished.   HENT:   Head: Normocephalic and atraumatic.   Eyes: Pupils are equal, round, and reactive to light. EOM are normal.   Neck: Normal range of  motion. Neck supple.   Cardiovascular: Normal rate and regular rhythm.   Pulmonary/Chest: Effort normal and breath sounds normal.   Abdominal: Soft. Bowel sounds are normal.   Musculoskeletal: Normal range of motion. She exhibits no edema.   Neurological: She is alert and oriented to person, place, and time.   Skin: Skin is warm and dry.       Significant Labs:  CBC:   Recent Labs   Lab 11/15/19  1648 11/16/19  0450 11/17/19  0431   WBC 7.91 6.11 5.58   HGB 10.5* 10.3* 10.0*   HCT 35.5* 36.2* 36.0*    187 145*     CMP:   Recent Labs   Lab 11/17/19  0431   *   CALCIUM 8.4*   ALBUMIN 3.2*   PROT 6.3      K 3.3*   CO2 30*      BUN 6   CREATININE 0.8   ALKPHOS 101   *   *   BILITOT 0.8     Coagulation:   Recent Labs   Lab 11/15/19  1648  11/17/19  0431   INR 7.0*   < > 2.1*   APTT 49.3*  --   --     < > = values in this interval not displayed.         Significant Imaging:  Imaging results within the past 24 hours have been reviewed.    Assessment/Plan:     Elevated LFTs  Hepatocellular and improving. Most likely related to Nintedanib for lung disease. Her enzymes are now improving. There is no evidence of chronic liver disease on imaging, labs, or to suspect this from her history.   · Agree with holding Nintedanib. If this medication is required, may need dose reduction. Appreciate pharmacy input on this matter.   · Await viral hepatitis panel.   · Continue to trend LFT's.         Thank you for your consult. I will follow-up with patient. Please contact us if you have any additional questions.    Maria Hammonds MD  Gastroenterology  [facility]

## 2019-11-17 NOTE — ASSESSMENT & PLAN NOTE
AST and ALT in the 600s.  Bilirubin 1.1.  Unclear etiology of elevated transaminases.  Consult GI.  Patient reports having diarrhea for the past few weeks.  11/16:  --possibly r/t OFEV  --pharmacy discussing with pt/family  11/17:  --trending down  --discussed with GI  --will dc OFEV as this is likely source  --continue to monitor   --hepatitis panel pending

## 2019-11-18 LAB
ALBUMIN SERPL BCP-MCNC: 3.2 G/DL (ref 3.5–5.2)
ALP SERPL-CCNC: 102 U/L (ref 55–135)
ALT SERPL W/O P-5'-P-CCNC: 303 U/L (ref 10–44)
ANION GAP SERPL CALC-SCNC: 10 MMOL/L (ref 8–16)
APTT BLDCRRT: 86.6 SEC (ref 21–32)
AST SERPL-CCNC: 108 U/L (ref 10–40)
BASOPHILS # BLD AUTO: 0.04 K/UL (ref 0–0.2)
BASOPHILS # BLD AUTO: 0.07 K/UL (ref 0–0.2)
BASOPHILS NFR BLD: 0.7 % (ref 0–1.9)
BASOPHILS NFR BLD: 1.1 % (ref 0–1.9)
BILIRUB DIRECT SERPL-MCNC: 0.4 MG/DL (ref 0.1–0.3)
BILIRUB SERPL-MCNC: 0.8 MG/DL (ref 0.1–1)
BUN SERPL-MCNC: 6 MG/DL (ref 6–20)
CALCIUM SERPL-MCNC: 8.8 MG/DL (ref 8.7–10.5)
CHLORIDE SERPL-SCNC: 108 MMOL/L (ref 95–110)
CO2 SERPL-SCNC: 23 MMOL/L (ref 23–29)
CREAT SERPL-MCNC: 0.8 MG/DL (ref 0.5–1.4)
DIFFERENTIAL METHOD: ABNORMAL
DIFFERENTIAL METHOD: ABNORMAL
EOSINOPHIL # BLD AUTO: 0.3 K/UL (ref 0–0.5)
EOSINOPHIL # BLD AUTO: 0.3 K/UL (ref 0–0.5)
EOSINOPHIL NFR BLD: 4.4 % (ref 0–8)
EOSINOPHIL NFR BLD: 4.7 % (ref 0–8)
ERYTHROCYTE [DISTWIDTH] IN BLOOD BY AUTOMATED COUNT: 17.6 % (ref 11.5–14.5)
ERYTHROCYTE [DISTWIDTH] IN BLOOD BY AUTOMATED COUNT: 17.8 % (ref 11.5–14.5)
EST. GFR  (AFRICAN AMERICAN): >60 ML/MIN/1.73 M^2
EST. GFR  (NON AFRICAN AMERICAN): >60 ML/MIN/1.73 M^2
GLUCOSE SERPL-MCNC: 104 MG/DL (ref 70–110)
HAV IGM SERPL QL IA: NEGATIVE
HBV CORE IGM SERPL QL IA: NEGATIVE
HBV SURFACE AG SERPL QL IA: NEGATIVE
HCT VFR BLD AUTO: 34.4 % (ref 37–48.5)
HCT VFR BLD AUTO: 41.4 % (ref 37–48.5)
HCV AB SERPL QL IA: NEGATIVE
HGB BLD-MCNC: 10.5 G/DL (ref 12–16)
HGB BLD-MCNC: 9.9 G/DL (ref 12–16)
IMM GRANULOCYTES # BLD AUTO: 0.02 K/UL (ref 0–0.04)
IMM GRANULOCYTES # BLD AUTO: 0.05 K/UL (ref 0–0.04)
IMM GRANULOCYTES NFR BLD AUTO: 0.3 % (ref 0–0.5)
IMM GRANULOCYTES NFR BLD AUTO: 0.8 % (ref 0–0.5)
INR PPP: 1.5 (ref 0.8–1.2)
LYMPHOCYTES # BLD AUTO: 1.3 K/UL (ref 1–4.8)
LYMPHOCYTES # BLD AUTO: 1.3 K/UL (ref 1–4.8)
LYMPHOCYTES NFR BLD: 19.3 % (ref 18–48)
LYMPHOCYTES NFR BLD: 21.5 % (ref 18–48)
MCH RBC QN AUTO: 29.3 PG (ref 27–31)
MCH RBC QN AUTO: 29.3 PG (ref 27–31)
MCHC RBC AUTO-ENTMCNC: 25.4 G/DL (ref 32–36)
MCHC RBC AUTO-ENTMCNC: 28.8 G/DL (ref 32–36)
MCV RBC AUTO: 102 FL (ref 82–98)
MCV RBC AUTO: 116 FL (ref 82–98)
MONOCYTES # BLD AUTO: 0.5 K/UL (ref 0.3–1)
MONOCYTES # BLD AUTO: 0.6 K/UL (ref 0.3–1)
MONOCYTES NFR BLD: 8.6 % (ref 4–15)
MONOCYTES NFR BLD: 9.1 % (ref 4–15)
NEUTROPHILS # BLD AUTO: 3.9 K/UL (ref 1.8–7.7)
NEUTROPHILS # BLD AUTO: 4.3 K/UL (ref 1.8–7.7)
NEUTROPHILS NFR BLD: 64.5 % (ref 38–73)
NEUTROPHILS NFR BLD: 65 % (ref 38–73)
NRBC BLD-RTO: 0 /100 WBC
NRBC BLD-RTO: 0 /100 WBC
PLATELET # BLD AUTO: 155 K/UL (ref 150–350)
PLATELET # BLD AUTO: 160 K/UL (ref 150–350)
PMV BLD AUTO: 10.6 FL (ref 9.2–12.9)
PMV BLD AUTO: 10.9 FL (ref 9.2–12.9)
POTASSIUM SERPL-SCNC: 4 MMOL/L (ref 3.5–5.1)
PROT SERPL-MCNC: 6.5 G/DL (ref 6–8.4)
PROTHROMBIN TIME: 15.7 SEC (ref 9–12.5)
RBC # BLD AUTO: 3.38 M/UL (ref 4–5.4)
RBC # BLD AUTO: 3.58 M/UL (ref 4–5.4)
SODIUM SERPL-SCNC: 141 MMOL/L (ref 136–145)
WBC # BLD AUTO: 6.08 K/UL (ref 3.9–12.7)
WBC # BLD AUTO: 6.57 K/UL (ref 3.9–12.7)

## 2019-11-18 PROCEDURE — 21400001 HC TELEMETRY ROOM: Mod: NTX

## 2019-11-18 PROCEDURE — 97802 MEDICAL NUTRITION INDIV IN: CPT | Mod: NTX

## 2019-11-18 PROCEDURE — 94640 AIRWAY INHALATION TREATMENT: CPT | Mod: NTX

## 2019-11-18 PROCEDURE — 25000003 PHARM REV CODE 250: Mod: NTX | Performed by: EMERGENCY MEDICINE

## 2019-11-18 PROCEDURE — 85025 COMPLETE CBC W/AUTO DIFF WBC: CPT | Mod: NTX

## 2019-11-18 PROCEDURE — 25000003 PHARM REV CODE 250: Mod: NTX | Performed by: NURSE PRACTITIONER

## 2019-11-18 PROCEDURE — 63600175 PHARM REV CODE 636 W HCPCS: Mod: NTX | Performed by: NURSE PRACTITIONER

## 2019-11-18 PROCEDURE — 27000221 HC OXYGEN, UP TO 24 HOURS: Mod: NTX

## 2019-11-18 PROCEDURE — 25000003 PHARM REV CODE 250: Mod: NTX | Performed by: INTERNAL MEDICINE

## 2019-11-18 PROCEDURE — 25000242 PHARM REV CODE 250 ALT 637 W/ HCPCS: Mod: NTX | Performed by: NURSE PRACTITIONER

## 2019-11-18 PROCEDURE — S4991 NICOTINE PATCH NONLEGEND: HCPCS | Mod: NTX | Performed by: INTERNAL MEDICINE

## 2019-11-18 PROCEDURE — 85610 PROTHROMBIN TIME: CPT | Mod: NTX

## 2019-11-18 PROCEDURE — 94761 N-INVAS EAR/PLS OXIMETRY MLT: CPT | Mod: NTX

## 2019-11-18 PROCEDURE — 80048 BASIC METABOLIC PNL TOTAL CA: CPT | Mod: NTX

## 2019-11-18 PROCEDURE — 80076 HEPATIC FUNCTION PANEL: CPT | Mod: NTX

## 2019-11-18 PROCEDURE — 85730 THROMBOPLASTIN TIME PARTIAL: CPT | Mod: NTX

## 2019-11-18 PROCEDURE — 36415 COLL VENOUS BLD VENIPUNCTURE: CPT | Mod: NTX

## 2019-11-18 RX ORDER — SIMETHICONE 80 MG
1 TABLET,CHEWABLE ORAL 3 TIMES DAILY PRN
Status: DISCONTINUED | OUTPATIENT
Start: 2019-11-18 | End: 2019-11-19 | Stop reason: HOSPADM

## 2019-11-18 RX ORDER — HEPARIN SODIUM,PORCINE/D5W 25000/250
12 INTRAVENOUS SOLUTION INTRAVENOUS CONTINUOUS
Status: DISCONTINUED | OUTPATIENT
Start: 2019-11-18 | End: 2019-11-19 | Stop reason: HOSPADM

## 2019-11-18 RX ORDER — ENOXAPARIN SODIUM 100 MG/ML
1 INJECTION SUBCUTANEOUS
Status: DISCONTINUED | OUTPATIENT
Start: 2019-11-18 | End: 2019-11-18

## 2019-11-18 RX ADMIN — NICOTINE 1 PATCH: 7 PATCH, EXTENDED RELEASE TRANSDERMAL at 09:11

## 2019-11-18 RX ADMIN — HYDROCODONE BITARTRATE AND ACETAMINOPHEN 1 TABLET: 10; 325 TABLET ORAL at 02:11

## 2019-11-18 RX ADMIN — FUROSEMIDE 40 MG: 40 TABLET ORAL at 05:11

## 2019-11-18 RX ADMIN — SIMETHICONE CHEW TAB 80 MG 80 MG: 80 TABLET ORAL at 03:11

## 2019-11-18 RX ADMIN — ARFORMOTEROL TARTRATE 15 MCG: 15 SOLUTION RESPIRATORY (INHALATION) at 08:11

## 2019-11-18 RX ADMIN — LACTOBACILLUS TAB 4 TABLET: TAB at 08:11

## 2019-11-18 RX ADMIN — TRAZODONE HYDROCHLORIDE 150 MG: 100 TABLET ORAL at 09:11

## 2019-11-18 RX ADMIN — FLUTICASONE PROPIONATE 50 MCG: 50 SPRAY, METERED NASAL at 08:11

## 2019-11-18 RX ADMIN — PANTOPRAZOLE SODIUM 40 MG: 40 TABLET, DELAYED RELEASE ORAL at 08:11

## 2019-11-18 RX ADMIN — HEPARIN SODIUM 12 UNITS/KG/HR: 10000 INJECTION, SOLUTION INTRAVENOUS at 10:11

## 2019-11-18 RX ADMIN — ARFORMOTEROL TARTRATE 15 MCG: 15 SOLUTION RESPIRATORY (INHALATION) at 07:11

## 2019-11-18 RX ADMIN — CHOLESTYRAMINE 4 G: 4 POWDER, FOR SUSPENSION ORAL at 08:11

## 2019-11-18 RX ADMIN — BUSPIRONE HYDROCHLORIDE 15 MG: 10 TABLET ORAL at 08:11

## 2019-11-18 RX ADMIN — LACTOBACILLUS TAB 4 TABLET: TAB at 12:11

## 2019-11-18 RX ADMIN — CLONAZEPAM 1 MG: 1 TABLET ORAL at 02:11

## 2019-11-18 RX ADMIN — FUROSEMIDE 40 MG: 40 TABLET ORAL at 08:11

## 2019-11-18 RX ADMIN — WARFARIN SODIUM 6 MG: 3 TABLET ORAL at 05:11

## 2019-11-18 RX ADMIN — HYDROCODONE BITARTRATE AND ACETAMINOPHEN 1 TABLET: 10; 325 TABLET ORAL at 08:11

## 2019-11-18 RX ADMIN — DESVENLAFAXINE SUCCINATE 100 MG: 100 TABLET, FILM COATED, EXTENDED RELEASE ORAL at 08:11

## 2019-11-18 RX ADMIN — LACTOBACILLUS TAB 4 TABLET: TAB at 05:11

## 2019-11-18 RX ADMIN — BUSPIRONE HYDROCHLORIDE 15 MG: 10 TABLET ORAL at 09:11

## 2019-11-18 RX ADMIN — CLONAZEPAM 1 MG: 1 TABLET ORAL at 09:11

## 2019-11-18 RX ADMIN — HYDROCODONE BITARTRATE AND ACETAMINOPHEN 1 TABLET: 10; 325 TABLET ORAL at 09:11

## 2019-11-18 RX ADMIN — AMITRIPTYLINE HYDROCHLORIDE 25 MG: 25 TABLET, FILM COATED ORAL at 09:11

## 2019-11-18 RX ADMIN — HEPARIN SODIUM 9 UNITS/KG/HR: 10000 INJECTION, SOLUTION INTRAVENOUS at 07:11

## 2019-11-18 RX ADMIN — CLONAZEPAM 1 MG: 1 TABLET ORAL at 08:11

## 2019-11-18 RX ADMIN — PANTOPRAZOLE SODIUM 40 MG: 40 TABLET, DELAYED RELEASE ORAL at 09:11

## 2019-11-18 NOTE — ASSESSMENT & PLAN NOTE
Per patient, multiple loose nonbloody bowel movements per day for the past few weeks.  Will check stool for C diff.  Consult GI.  11/16:  --c diff negative  --scheduled questran & probiotic  --prn imodium  11/17:  --a common side effect of OFEV  --continue questran, probiotic, and imodium  11/18/19 The patient reports that her diarrhea is now controlled.

## 2019-11-18 NOTE — PROGRESS NOTES
Pharmacy Consult Note: Warfarin    Indication: mechanical aortic valve  INR goal: 2-3    Today's INR: 1.5    Since her INR is below the target therapeutic range, a heparin bridge was initiated by Ad Pruitt NP.     Current dosing regimen:  3 mg Fri/Sat/Sun  6 mg all other days of the week.     A dose of 6 mg will be given today.    Pharmacy will continue to monitor daily PT/INR and make dosing adjustments as necessary. LFTs will also be evaluated to monitor acute liver injury resolution.    Thank you for allowing us to participate in this patient's care.    [place], PharmCARLOS ALBERTO 11/18/2019 9:53 AM

## 2019-11-18 NOTE — PLAN OF CARE
Discharge re-assessment done.  Patient has exhausted home health benefits.  Plan is to discharge home, no needs.       11/18/19 7970   Discharge Reassessment   Assessment Type Discharge Planning Reassessment   Provided patient/caregiver education on the expected discharge date and the discharge plan Yes   Do you have any problems affording any of your prescribed medications? No   Discharge Plan A Home   DME Needed Upon Discharge  none   Patient choice form signed by patient/caregiver N/A   Anticipated Discharge Disposition Home   Can the patient answer the patient profile reliably? Yes, cognitively intact

## 2019-11-18 NOTE — SUBJECTIVE & OBJECTIVE
Interval History: The patients INR was 1.5 this morning. Will start heparin gtt to bridge until patient becomes therapeutic with coumadin. Vitamin K was given today. Liver enzymes continue to trend down. The patient reports that her diarrhea is now controlled.       Review of Systems   Constitutional: Negative for activity change, appetite change, chills, diaphoresis, fatigue, fever and unexpected weight change.   HENT: Negative for congestion, drooling, facial swelling, rhinorrhea, sinus pressure, sneezing and sore throat.    Eyes: Negative for pain, discharge, redness, itching and visual disturbance.   Respiratory: Negative for apnea, cough, choking, chest tightness, shortness of breath, wheezing and stridor.    Cardiovascular: Negative for chest pain, palpitations and leg swelling.   Gastrointestinal: Positive for diarrhea. Negative for abdominal distention, abdominal pain, anal bleeding, blood in stool, constipation, nausea and vomiting.   Endocrine: Negative for cold intolerance and heat intolerance.   Genitourinary: Negative for decreased urine volume, difficulty urinating, dysuria, frequency, hematuria, pelvic pain, urgency, vaginal bleeding and vaginal discharge.   Musculoskeletal: Negative for arthralgias, back pain, gait problem, joint swelling, myalgias, neck pain and neck stiffness.   Skin: Negative for color change, pallor, rash and wound.   Allergic/Immunologic: Negative.    Neurological: Negative for dizziness, tremors, seizures, syncope, facial asymmetry, speech difficulty, weakness, light-headedness, numbness and headaches.   Hematological: Negative.    Psychiatric/Behavioral: Negative for agitation, behavioral problems, confusion, hallucinations and suicidal ideas.   All other systems reviewed and are negative.    Objective:     Vital Signs (Most Recent):  Temp: 97.1 °F (36.2 °C) (11/18/19 1141)  Pulse: 84 (11/18/19 1141)  Resp: 18 (11/18/19 1141)  BP: 123/71 (11/18/19 1141)  SpO2: 96 % (11/18/19  1141) Vital Signs (24h Range):  Temp:  [97.1 °F (36.2 °C)-98.3 °F (36.8 °C)] 97.1 °F (36.2 °C)  Pulse:  [75-97] 84  Resp:  [16-20] 18  SpO2:  [96 %-100 %] 96 %  BP: (109-132)/(55-71) 123/71     Weight: 78.1 kg (172 lb 2.9 oz)  Body mass index is 33.63 kg/m².    Intake/Output Summary (Last 24 hours) at 11/18/2019 1310  Last data filed at 11/18/2019 0600  Gross per 24 hour   Intake 1196 ml   Output 2000 ml   Net -804 ml      Physical Exam   Constitutional: She is oriented to person, place, and time. She appears well-developed and well-nourished. No distress.   HENT:   Head: Normocephalic and atraumatic.   Nose: Nose normal.   Eyes: Pupils are equal, round, and reactive to light. Conjunctivae and EOM are normal. No scleral icterus.   Neck: Normal range of motion. Neck supple. No tracheal deviation present. No thyromegaly present.   Cardiovascular: Normal rate, regular rhythm and intact distal pulses.   Murmur heard.  Pulmonary/Chest: Effort normal and breath sounds normal. No respiratory distress. She has no wheezes. She has no rales. She exhibits no tenderness.   Abdominal: Soft. Bowel sounds are normal. She exhibits no distension. There is tenderness (mild lower).   Genitourinary:   Genitourinary Comments: deferred   Musculoskeletal: Normal range of motion. She exhibits no edema, tenderness or deformity.   Lymphadenopathy:     She has no cervical adenopathy.   Neurological: She is alert and oriented to person, place, and time. She has normal reflexes. No cranial nerve deficit. She exhibits normal muscle tone. Coordination normal.   Skin: Skin is warm and dry. She is not diaphoretic. No erythema.   Chronic bruising noted lower abdominal wall, from recent Lovenox injections.   Psychiatric: She has a normal mood and affect. Her behavior is normal.   Nursing note and vitals reviewed.      Significant Labs: All pertinent labs within the past 24 hours have been reviewed.    Significant Imaging:   Imaging Results           US Abdomen Limited (Final result)  Result time 11/15/19 20:46:19   Procedure changed from US Abdomen Complete     Final result by José Miguel Gonsalez MD (11/15/19 20:46:19)                 Impression:      Dilated common bile duct up to 11 mm, not unexpected status post cholecystectomy.    Small volume of ascites adjacent to the liver and within the left lower quadrant.      Electronically signed by: José Miguel Gonsalez  Date:    11/15/2019  Time:    20:46             Narrative:    EXAMINATION:  US ABDOMEN LIMITED    CLINICAL HISTORY:  hepatitis; Inflammatory liver disease, unspecified    TECHNIQUE:  Limited ultrasound of the right upper quadrant of the abdomen (including pancreas, liver, gallbladder, common bile duct, and spleen) was performed.    COMPARISON:  CT abdomen pelvis same date    FINDINGS:  Liver: Normal in size, measuring 16.7 cm. Homogeneous echotexture. No focal hepatic lesions.    Gallbladder: Surgically absent    Biliary system: The common duct is dilated, measuring 11 mm.  Common hepatic duct measures 6 mm.    Spleen: Normal in size and echotexture, measuring 9.6 cm.    Miscellaneous: Trace free fluid adjacent to the liver and within the left lower quadrant.  Partially visualized right kidney appears within normal limits.                               CT Abdomen Pelvis  Without Contrast (Final result)  Result time 11/15/19 18:08:37    Final result by José Miguel Gonsalez MD (11/15/19 18:08:37)                 Impression:      Mild wall thickening and minor inflammatory changes adjacent to the cecum and ascending colon, which may reflect infectious/inflammatory colitis.  Wall thickening also may be reactive to adjacent ascites.    Interval increase in small volume of ascites as compared to the prior examination.    Additional chronic findings as detailed in the body of the report.      Electronically signed by: José Miguel Gonsalez  Date:    11/15/2019  Time:    18:08             Narrative:    EXAMINATION:  CT  ABDOMEN PELVIS WITHOUT CONTRAST    CLINICAL HISTORY:  Abd pain, fever, abscess suspected;    TECHNIQUE:  Low dose axial images, sagittal and coronal reformations were obtained from the lung bases to the pubic symphysis, no oral contrast administered.    COMPARISON:  CT abdomen pelvis 06/22/2019    FINDINGS:  Heart: Postsurgical changes of a mitral valve repair.  Heart appears enlarged.    Lung Bases: Peripheral reticulation and interstitial opacity at both lung bases concerning for interstitial lung disease.    Liver: Normal in size and attenuation, with no focal hepatic lesions.    Gallbladder: Surgically absent.    Bile Ducts: Mildly prominent common bile duct, similar to priors.  No intrahepatic biliary ductal dilatation.  Findings non expected status post cholecystectomy.    Pancreas: No mass or peripancreatic fat stranding.    Spleen: Unremarkable.    Adrenals: Unremarkable.    Kidneys/ Ureters: Normal in size and position bilaterally.  No nephrolithiasis or hydronephrosis.  Ureters are nondilated.    Bladder: No evidence of significant bladder wall thickening.    Reproductive organs: Uterus is surgically absent.    GI Tract/Mesentery: There is mild wall thickening and haziness adjacent to the cecum and ascending colon, new from the prior.  Small hiatal hernia, stomach is otherwise within normal limits.  Small bowel remainder of the colon demonstrate no obstructive or inflammatory changes.    Peritoneal Space: There is a small volume of ascites adjacent to the liver and tracking down the bilateral pericolic gutters and collecting within the pelvis.  No free air.    Retroperitoneum: No significant adenopathy.    Abdominal wall: Unremarkable.    Vasculature: Abdominal aorta is nonaneurysmal.  Moderate aortic atherosclerotic calcification.    Bones: Partially visualized postsurgical changes of a sternotomy.  No acute fracture.  There are degenerative changes.

## 2019-11-18 NOTE — ASSESSMENT & PLAN NOTE
AST and ALT in the 600s.  Bilirubin 1.1.  Unclear etiology of elevated transaminases.  Consult GI.  Patient reports having diarrhea for the past few weeks.  11/16:  --possibly r/t OFEV  --pharmacy discussing with pt/family  11/17:  --trending down  --discussed with GI  --will dc OFEV as this is likely source  --continue to monitor   --hepatitis panel pending  11/18/19 Liver enzymes continue to trend down.

## 2019-11-18 NOTE — NURSING
Heparin bolus given, infusion started. Checked with Adele. Closely monitored for signs of bleeding. APTT ordered after 6 hours.

## 2019-11-18 NOTE — PLAN OF CARE
AAOX4; Rated pain and discomfort at 9 at beginning of shift; Norco 10 p.o. administered.  Pt was restarted on her anticoagulants therapy of warfarin during evening shift. Pt expressed hope that she'd be discharged today. Moves independently positioning herself in bed. Ambulates to bathroom without assistance. Will continue to monitor.

## 2019-11-18 NOTE — CONSULTS
Food & Nutrition  Education    Diet Education: coumadin diet  Time Spent: 15 min   Learners: Pt      Nutrition Education provided with handouts: yes      Comments:RD provided coumadin diet education w/ handouts from the Nutrition Care Manual. Pt verbalized understanding. All questions and concerns answered. Dietitian's contact information provided.     Please Re-consult as needed        Thanks!

## 2019-11-18 NOTE — PROGRESS NOTES
LFTs continue to trend down and hepatitis serologies are negative. No further GI recommendations. GI signing off. Reconsult as needed.   Rico Bautista PA-C

## 2019-11-18 NOTE — ASSESSMENT & PLAN NOTE
Unintentionally warfarin toxicity.  INR 7.0.  Patient has been on warfarin, along with Lovenox bridge therapy.  Hold all anticoagulants.  Hemodynamically stable, no evidence of bleeding.  Hemoglobin 10.5.  Will not reverse with vitamin K as she is stable at this time.  111/16:  --improving  --pharmacy to manage coumadin  --no evidence of bleeding  11/17:  --sub therapeutic today - pt with mechanical valve  --pharmacy managing  11/18/19 The patients INR was 1.5 this morning. Will start heparin gtt to bridge until patient becomes therapeutic with coumadin. Vitamin K was given today.

## 2019-11-18 NOTE — PROGRESS NOTES
[facility] Hospital Medicine  Progress Note    Patient Name: Meg Sal  MRN: [id number]  Patient Class: IP- Inpatient   Admission Date: 11/15/2019  Length of Stay: 3 days  Attending Physician: Jean Pierre Mcdermott MD  Primary Care Provider: Primary Doctor No        Subjective:     Principal Problem:Warfarin toxicity        HPI:  Ms. thumb son is a 49-year-old  female with PMH significant for mechanical aortic valve, CAD, CABG, HTN, hyperlipidemia, was recently discharged from this hospital, was sent home on Lovenox bridge with warfarin anticoagulation for a subtherapeutic INR of 1.2 upon discharge. Patient reports taking warfarin and Lovenox as she was supposed to, she followed up with Dr. Banda or, cardiologist two days ago for routine post hospital discharge clinic visit.  Routine laboratory workup revealed elevated LFTs with AST and ALT in the 600s, hence she was advised to present to the ED emergently.  Repeat AST/ALT in the 600s, INR 7.0.  Hemoglobin stable at 10.5.  BP stable.  In addition patient reports multiple loose nonbloody bowel movements for the past few weeks.  Denies recent use of antibiotics.    Admitting diagnosis warfarin toxicity, elevated LFTs.    Overview/Hospital Course:  Patient was kept inpatient for coumadin toxicity under the care of Hospital Medicine. Her coumadin was held, no Vit K at present as pt is not actively bleeding. Pharmacy to manage coumadin.  Pt also  Having diarrhea, imodium added, along with questran and probiotic. C diff negative. 11/18/19 The patients INR was 1.5 this morning. Will start heparin gtt to bridge until patient becomes therapeutic with coumadin. Vitamin K was given today. Liver enzymes continue to trend down. The patient reports that her diarrhea is now controlled.     Interval History: The patients INR was 1.5 this morning. Will start heparin gtt to bridge until patient becomes therapeutic with coumadin. Vitamin K was given today.  Liver enzymes continue to trend down. The patient reports that her diarrhea is now controlled.       Review of Systems   Constitutional: Negative for activity change, appetite change, chills, diaphoresis, fatigue, fever and unexpected weight change.   HENT: Negative for congestion, drooling, facial swelling, rhinorrhea, sinus pressure, sneezing and sore throat.    Eyes: Negative for pain, discharge, redness, itching and visual disturbance.   Respiratory: Negative for apnea, cough, choking, chest tightness, shortness of breath, wheezing and stridor.    Cardiovascular: Negative for chest pain, palpitations and leg swelling.   Gastrointestinal: Positive for diarrhea. Negative for abdominal distention, abdominal pain, anal bleeding, blood in stool, constipation, nausea and vomiting.   Endocrine: Negative for cold intolerance and heat intolerance.   Genitourinary: Negative for decreased urine volume, difficulty urinating, dysuria, frequency, hematuria, pelvic pain, urgency, vaginal bleeding and vaginal discharge.   Musculoskeletal: Negative for arthralgias, back pain, gait problem, joint swelling, myalgias, neck pain and neck stiffness.   Skin: Negative for color change, pallor, rash and wound.   Allergic/Immunologic: Negative.    Neurological: Negative for dizziness, tremors, seizures, syncope, facial asymmetry, speech difficulty, weakness, light-headedness, numbness and headaches.   Hematological: Negative.    Psychiatric/Behavioral: Negative for agitation, behavioral problems, confusion, hallucinations and suicidal ideas.   All other systems reviewed and are negative.    Objective:     Vital Signs (Most Recent):  Temp: 97.1 °F (36.2 °C) (11/18/19 1141)  Pulse: 84 (11/18/19 1141)  Resp: 18 (11/18/19 1141)  BP: 123/71 (11/18/19 1141)  SpO2: 96 % (11/18/19 1141) Vital Signs (24h Range):  Temp:  [97.1 °F (36.2 °C)-98.3 °F (36.8 °C)] 97.1 °F (36.2 °C)  Pulse:  [75-97] 84  Resp:  [16-20] 18  SpO2:  [96 %-100 %] 96 %  BP:  (109-132)/(55-71) 123/71     Weight: 78.1 kg (172 lb 2.9 oz)  Body mass index is 33.63 kg/m².    Intake/Output Summary (Last 24 hours) at 11/18/2019 1310  Last data filed at 11/18/2019 0600  Gross per 24 hour   Intake 1196 ml   Output 2000 ml   Net -804 ml      Physical Exam   Constitutional: She is oriented to person, place, and time. She appears well-developed and well-nourished. No distress.   HENT:   Head: Normocephalic and atraumatic.   Nose: Nose normal.   Eyes: Pupils are equal, round, and reactive to light. Conjunctivae and EOM are normal. No scleral icterus.   Neck: Normal range of motion. Neck supple. No tracheal deviation present. No thyromegaly present.   Cardiovascular: Normal rate, regular rhythm and intact distal pulses.   Murmur heard.  Pulmonary/Chest: Effort normal and breath sounds normal. No respiratory distress. She has no wheezes. She has no rales. She exhibits no tenderness.   Abdominal: Soft. Bowel sounds are normal. She exhibits no distension. There is tenderness (mild lower).   Genitourinary:   Genitourinary Comments: deferred   Musculoskeletal: Normal range of motion. She exhibits no edema, tenderness or deformity.   Lymphadenopathy:     She has no cervical adenopathy.   Neurological: She is alert and oriented to person, place, and time. She has normal reflexes. No cranial nerve deficit. She exhibits normal muscle tone. Coordination normal.   Skin: Skin is warm and dry. She is not diaphoretic. No erythema.   Chronic bruising noted lower abdominal wall, from recent Lovenox injections.   Psychiatric: She has a normal mood and affect. Her behavior is normal.   Nursing note and vitals reviewed.      Significant Labs: All pertinent labs within the past 24 hours have been reviewed.    Significant Imaging:   Imaging Results          US Abdomen Limited (Final result)  Result time 11/15/19 20:46:19   Procedure changed from US Abdomen Complete     Final result by oJsé Miguel Gonsalez MD (11/15/19  20:46:19)                 Impression:      Dilated common bile duct up to 11 mm, not unexpected status post cholecystectomy.    Small volume of ascites adjacent to the liver and within the left lower quadrant.      Electronically signed by: José Miguel Gonsalez  Date:    11/15/2019  Time:    20:46             Narrative:    EXAMINATION:  US ABDOMEN LIMITED    CLINICAL HISTORY:  hepatitis; Inflammatory liver disease, unspecified    TECHNIQUE:  Limited ultrasound of the right upper quadrant of the abdomen (including pancreas, liver, gallbladder, common bile duct, and spleen) was performed.    COMPARISON:  CT abdomen pelvis same date    FINDINGS:  Liver: Normal in size, measuring 16.7 cm. Homogeneous echotexture. No focal hepatic lesions.    Gallbladder: Surgically absent    Biliary system: The common duct is dilated, measuring 11 mm.  Common hepatic duct measures 6 mm.    Spleen: Normal in size and echotexture, measuring 9.6 cm.    Miscellaneous: Trace free fluid adjacent to the liver and within the left lower quadrant.  Partially visualized right kidney appears within normal limits.                               CT Abdomen Pelvis  Without Contrast (Final result)  Result time 11/15/19 18:08:37    Final result by José Miguel Gonsalez MD (11/15/19 18:08:37)                 Impression:      Mild wall thickening and minor inflammatory changes adjacent to the cecum and ascending colon, which may reflect infectious/inflammatory colitis.  Wall thickening also may be reactive to adjacent ascites.    Interval increase in small volume of ascites as compared to the prior examination.    Additional chronic findings as detailed in the body of the report.      Electronically signed by: José Miguel Gonsalez  Date:    11/15/2019  Time:    18:08             Narrative:    EXAMINATION:  CT ABDOMEN PELVIS WITHOUT CONTRAST    CLINICAL HISTORY:  Abd pain, fever, abscess suspected;    TECHNIQUE:  Low dose axial images, sagittal and coronal reformations  were obtained from the lung bases to the pubic symphysis, no oral contrast administered.    COMPARISON:  CT abdomen pelvis 06/22/2019    FINDINGS:  Heart: Postsurgical changes of a mitral valve repair.  Heart appears enlarged.    Lung Bases: Peripheral reticulation and interstitial opacity at both lung bases concerning for interstitial lung disease.    Liver: Normal in size and attenuation, with no focal hepatic lesions.    Gallbladder: Surgically absent.    Bile Ducts: Mildly prominent common bile duct, similar to priors.  No intrahepatic biliary ductal dilatation.  Findings non expected status post cholecystectomy.    Pancreas: No mass or peripancreatic fat stranding.    Spleen: Unremarkable.    Adrenals: Unremarkable.    Kidneys/ Ureters: Normal in size and position bilaterally.  No nephrolithiasis or hydronephrosis.  Ureters are nondilated.    Bladder: No evidence of significant bladder wall thickening.    Reproductive organs: Uterus is surgically absent.    GI Tract/Mesentery: There is mild wall thickening and haziness adjacent to the cecum and ascending colon, new from the prior.  Small hiatal hernia, stomach is otherwise within normal limits.  Small bowel remainder of the colon demonstrate no obstructive or inflammatory changes.    Peritoneal Space: There is a small volume of ascites adjacent to the liver and tracking down the bilateral pericolic gutters and collecting within the pelvis.  No free air.    Retroperitoneum: No significant adenopathy.    Abdominal wall: Unremarkable.    Vasculature: Abdominal aorta is nonaneurysmal.  Moderate aortic atherosclerotic calcification.    Bones: Partially visualized postsurgical changes of a sternotomy.  No acute fracture.  There are degenerative changes.                                    Assessment/Plan:      * Warfarin toxicity  Unintentionally warfarin toxicity.  INR 7.0.  Patient has been on warfarin, along with Lovenox bridge therapy.  Hold all  anticoagulants.  Hemodynamically stable, no evidence of bleeding.  Hemoglobin 10.5.  Will not reverse with vitamin K as she is stable at this time.  111/16:  --improving  --pharmacy to manage coumadin  --no evidence of bleeding  11/17:  --sub therapeutic today - pt with mechanical valve  --pharmacy managing  11/18/19 The patients INR was 1.5 this morning. Will start heparin gtt to bridge until patient becomes therapeutic with coumadin. Vitamin K was given today.     Diarrhea  Per patient, multiple loose nonbloody bowel movements per day for the past few weeks.  Will check stool for C diff.  Consult GI.  11/16:  --c diff negative  --scheduled questran & probiotic  --prn imodium  11/17:  --a common side effect of OFEV  --continue questran, probiotic, and imodium  11/18/19 The patient reports that her diarrhea is now controlled.       Elevated LFTs  AST and ALT in the 600s.  Bilirubin 1.1.  Unclear etiology of elevated transaminases.  Consult GI.  Patient reports having diarrhea for the past few weeks.  11/16:  --possibly r/t OFEV  --pharmacy discussing with pt/family  11/17:  --trending down  --discussed with GI  --will dc OFEV as this is likely source  --continue to monitor   --hepatitis panel pending  11/18/19 Liver enzymes continue to trend down.    Hx of mechanical aortic valve replacement  Patient is mechanical aortic valve, currently on warfarin, with INR 7.0.  Hold anticoagulants.  11/16:  --INR 4.7 today  --pharmacy monitoring  11/17:  --INR 2.1 today  11/18/19 The patients INR was 1.5 this morning. Will start heparin gtt to bridge until patient becomes therapeutic with coumadin. Vitamin K was given today.       VTE Risk Mitigation (From admission, onward)         Ordered     warfarin (COUMADIN) tablet 3 mg  Every Friday 11/17/19 0943     warfarin (COUMADIN) tablet 6 mg  Every Wednesday 11/17/19 0943     warfarin (COUMADIN) tablet 6 mg  Every Tues, Thurs 11/17/19 0943     warfarin (COUMADIN)  tablet 6 mg  Every Monday 11/17/19 0943     heparin 25,000 units in dextrose 5% 250 mL (100 units/mL) infusion LOW INTENSITY nomogram - [facility]  Continuous     Question:  Heparin Infusion Adjustment (DO NOT MODIFY ANSWER)  Answer:  [email or web address]\Images\Pharmacy\HeparinInfusions\heparin LOW INTENSITY nomogram for [facility] [id number]    11/18/19 0903     heparin 25,000 units in dextrose 5% (100 units/ml) IV bolus from bag - ADDITIONAL PRN BOLUS - 60 units/kg  As needed (PRN)     Question:  Heparin Infusion Adjustment (DO NOT MODIFY ANSWER)  Answer:  [email or web address]\Images\Pharmacy\HeparinInfusions\heparin LOW INTENSITY nomogram for [facility] [id number]    11/18/19 0903     heparin 25,000 units in dextrose 5% (100 units/ml) IV bolus from bag - ADDITIONAL PRN BOLUS - 30 units/kg  As needed (PRN)     Question:  Heparin Infusion Adjustment (DO NOT MODIFY ANSWER)  Answer:  [email or web address]\Images\Pharmacy\HeparinInfusions\heparin LOW INTENSITY nomogram for [facility] [id number].pdf    11/18/19 0903     warfarin (COUMADIN) tablet 3 mg  Every Sat, Sun      11/17/19 0943     Place sequential compression device  Until discontinued      11/15/19 1927                      Ad Pruitt NP  Department of Hospital Medicine   [facility]

## 2019-11-19 ENCOUNTER — TELEPHONE (OUTPATIENT)
Dept: CARDIOLOGY | Facility: HOSPITAL | Age: 49
End: 2019-11-19

## 2019-11-19 VITALS
HEART RATE: 91 BPM | OXYGEN SATURATION: 95 % | TEMPERATURE: 98 F | HEIGHT: 60 IN | WEIGHT: 166.44 LBS | BODY MASS INDEX: 32.68 KG/M2 | RESPIRATION RATE: 16 BRPM | SYSTOLIC BLOOD PRESSURE: 105 MMHG | DIASTOLIC BLOOD PRESSURE: 58 MMHG

## 2019-11-19 DIAGNOSIS — Z95.2 HX OF MECHANICAL AORTIC VALVE REPLACEMENT: Primary | Chronic | ICD-10-CM

## 2019-11-19 PROBLEM — T45.511A WARFARIN TOXICITY: Status: RESOLVED | Noted: 2019-11-15 | Resolved: 2019-11-19

## 2019-11-19 PROBLEM — R19.7 DIARRHEA: Status: RESOLVED | Noted: 2019-11-15 | Resolved: 2019-11-19

## 2019-11-19 LAB
ALBUMIN SERPL BCP-MCNC: 3.1 G/DL (ref 3.5–5.2)
ALP SERPL-CCNC: 106 U/L (ref 55–135)
ALT SERPL W/O P-5'-P-CCNC: 220 U/L (ref 10–44)
ANION GAP SERPL CALC-SCNC: 7 MMOL/L (ref 8–16)
APTT BLDCRRT: 46.9 SEC (ref 21–32)
APTT BLDCRRT: 52 SEC (ref 21–32)
APTT BLDCRRT: 61.2 SEC (ref 21–32)
AST SERPL-CCNC: 63 U/L (ref 10–40)
BASOPHILS # BLD AUTO: 0.04 K/UL (ref 0–0.2)
BASOPHILS NFR BLD: 0.6 % (ref 0–1.9)
BILIRUB DIRECT SERPL-MCNC: 0.5 MG/DL (ref 0.1–0.3)
BILIRUB SERPL-MCNC: 0.8 MG/DL (ref 0.1–1)
BUN SERPL-MCNC: 7 MG/DL (ref 6–20)
CALCIUM SERPL-MCNC: 9 MG/DL (ref 8.7–10.5)
CHLORIDE SERPL-SCNC: 106 MMOL/L (ref 95–110)
CO2 SERPL-SCNC: 27 MMOL/L (ref 23–29)
CREAT SERPL-MCNC: 0.8 MG/DL (ref 0.5–1.4)
DIFFERENTIAL METHOD: ABNORMAL
EOSINOPHIL # BLD AUTO: 0.5 K/UL (ref 0–0.5)
EOSINOPHIL NFR BLD: 7.1 % (ref 0–8)
ERYTHROCYTE [DISTWIDTH] IN BLOOD BY AUTOMATED COUNT: 17.7 % (ref 11.5–14.5)
EST. GFR  (AFRICAN AMERICAN): >60 ML/MIN/1.73 M^2
EST. GFR  (NON AFRICAN AMERICAN): >60 ML/MIN/1.73 M^2
GLUCOSE SERPL-MCNC: 138 MG/DL (ref 70–110)
HCT VFR BLD AUTO: 33.2 % (ref 37–48.5)
HGB BLD-MCNC: 9.7 G/DL (ref 12–16)
IMM GRANULOCYTES # BLD AUTO: 0.03 K/UL (ref 0–0.04)
IMM GRANULOCYTES NFR BLD AUTO: 0.4 % (ref 0–0.5)
INR PPP: 1.4 (ref 0.8–1.2)
LYMPHOCYTES # BLD AUTO: 1.6 K/UL (ref 1–4.8)
LYMPHOCYTES NFR BLD: 22.9 % (ref 18–48)
MCH RBC QN AUTO: 29.7 PG (ref 27–31)
MCHC RBC AUTO-ENTMCNC: 29.2 G/DL (ref 32–36)
MCV RBC AUTO: 102 FL (ref 82–98)
MONOCYTES # BLD AUTO: 0.5 K/UL (ref 0.3–1)
MONOCYTES NFR BLD: 7 % (ref 4–15)
NEUTROPHILS # BLD AUTO: 4.3 K/UL (ref 1.8–7.7)
NEUTROPHILS NFR BLD: 62 % (ref 38–73)
NRBC BLD-RTO: 0 /100 WBC
PLATELET # BLD AUTO: 155 K/UL (ref 150–350)
PMV BLD AUTO: 10.4 FL (ref 9.2–12.9)
POTASSIUM SERPL-SCNC: 3.5 MMOL/L (ref 3.5–5.1)
PROT SERPL-MCNC: 6.5 G/DL (ref 6–8.4)
PROTHROMBIN TIME: 14.8 SEC (ref 9–12.5)
RBC # BLD AUTO: 3.27 M/UL (ref 4–5.4)
SODIUM SERPL-SCNC: 140 MMOL/L (ref 136–145)
WBC # BLD AUTO: 6.89 K/UL (ref 3.9–12.7)

## 2019-11-19 PROCEDURE — 85610 PROTHROMBIN TIME: CPT | Mod: NTX

## 2019-11-19 PROCEDURE — 25000003 PHARM REV CODE 250: Mod: NTX | Performed by: INTERNAL MEDICINE

## 2019-11-19 PROCEDURE — 85730 THROMBOPLASTIN TIME PARTIAL: CPT | Mod: NTX

## 2019-11-19 PROCEDURE — 36415 COLL VENOUS BLD VENIPUNCTURE: CPT | Mod: NTX

## 2019-11-19 PROCEDURE — 99222 1ST HOSP IP/OBS MODERATE 55: CPT | Mod: NTX,,, | Performed by: INTERNAL MEDICINE

## 2019-11-19 PROCEDURE — 25000242 PHARM REV CODE 250 ALT 637 W/ HCPCS: Mod: NTX | Performed by: NURSE PRACTITIONER

## 2019-11-19 PROCEDURE — 85025 COMPLETE CBC W/AUTO DIFF WBC: CPT | Mod: NTX

## 2019-11-19 PROCEDURE — 25000003 PHARM REV CODE 250: Mod: NTX | Performed by: NURSE PRACTITIONER

## 2019-11-19 PROCEDURE — 94640 AIRWAY INHALATION TREATMENT: CPT | Mod: NTX

## 2019-11-19 PROCEDURE — 80076 HEPATIC FUNCTION PANEL: CPT | Mod: NTX

## 2019-11-19 PROCEDURE — 99222 PR INITIAL HOSPITAL CARE,LEVL II: ICD-10-PCS | Mod: NTX,,, | Performed by: INTERNAL MEDICINE

## 2019-11-19 PROCEDURE — 94761 N-INVAS EAR/PLS OXIMETRY MLT: CPT | Mod: NTX

## 2019-11-19 PROCEDURE — 80048 BASIC METABOLIC PNL TOTAL CA: CPT | Mod: NTX

## 2019-11-19 PROCEDURE — 27000221 HC OXYGEN, UP TO 24 HOURS: Mod: NTX

## 2019-11-19 RX ORDER — ENOXAPARIN SODIUM 100 MG/ML
1 INJECTION SUBCUTANEOUS 2 TIMES DAILY
Qty: 19.6 ML | Refills: 0 | Status: SHIPPED | OUTPATIENT
Start: 2019-11-19 | End: 2019-11-26

## 2019-11-19 RX ORDER — OXYMETAZOLINE HCL 0.05 %
2 SPRAY, NON-AEROSOL (ML) NASAL 2 TIMES DAILY
Status: DISCONTINUED | OUTPATIENT
Start: 2019-11-19 | End: 2019-11-19 | Stop reason: HOSPADM

## 2019-11-19 RX ORDER — CHOLESTYRAMINE 4 G/9G
1 POWDER, FOR SUSPENSION ORAL 2 TIMES DAILY
Qty: 180 PACKET | Refills: 0 | Status: SHIPPED | OUTPATIENT
Start: 2019-11-19 | End: 2019-11-19

## 2019-11-19 RX ORDER — WARFARIN 3 MG/1
3 TABLET ORAL
Qty: 30 TABLET | Refills: 0 | Status: SHIPPED | OUTPATIENT
Start: 2019-11-19 | End: 2019-11-19

## 2019-11-19 RX ORDER — WARFARIN 3 MG/1
3 TABLET ORAL
Qty: 30 TABLET | Refills: 0 | Status: ON HOLD | OUTPATIENT
Start: 2019-11-19 | End: 2020-01-28 | Stop reason: HOSPADM

## 2019-11-19 RX ORDER — WARFARIN 6 MG/1
6 TABLET ORAL
Qty: 30 TABLET | Refills: 0 | Status: ON HOLD | OUTPATIENT
Start: 2019-11-19 | End: 2020-01-28 | Stop reason: HOSPADM

## 2019-11-19 RX ORDER — ENOXAPARIN SODIUM 100 MG/ML
1 INJECTION SUBCUTANEOUS 2 TIMES DAILY
Qty: 19.6 ML | Refills: 0 | Status: SHIPPED | OUTPATIENT
Start: 2019-11-19 | End: 2019-11-19

## 2019-11-19 RX ORDER — WARFARIN 6 MG/1
6 TABLET ORAL
Qty: 30 TABLET | Refills: 0 | Status: SHIPPED | OUTPATIENT
Start: 2019-11-19 | End: 2019-11-19 | Stop reason: SDUPTHER

## 2019-11-19 RX ORDER — CETIRIZINE HYDROCHLORIDE 5 MG/1
5 TABLET ORAL DAILY
Status: DISCONTINUED | OUTPATIENT
Start: 2019-11-19 | End: 2019-11-19 | Stop reason: HOSPADM

## 2019-11-19 RX ORDER — CHOLESTYRAMINE 4 G/9G
1 POWDER, FOR SUSPENSION ORAL 2 TIMES DAILY
Qty: 180 PACKET | Refills: 0 | Status: SHIPPED | OUTPATIENT
Start: 2019-11-19 | End: 2020-11-18

## 2019-11-19 RX ADMIN — Medication 2 SPRAY: at 01:11

## 2019-11-19 RX ADMIN — FUROSEMIDE 40 MG: 40 TABLET ORAL at 08:11

## 2019-11-19 RX ADMIN — HYDROCODONE BITARTRATE AND ACETAMINOPHEN 1 TABLET: 10; 325 TABLET ORAL at 09:11

## 2019-11-19 RX ADMIN — BUSPIRONE HYDROCHLORIDE 15 MG: 10 TABLET ORAL at 08:11

## 2019-11-19 RX ADMIN — ARFORMOTEROL TARTRATE 15 MCG: 15 SOLUTION RESPIRATORY (INHALATION) at 07:11

## 2019-11-19 RX ADMIN — FLUTICASONE PROPIONATE 50 MCG: 50 SPRAY, METERED NASAL at 08:11

## 2019-11-19 RX ADMIN — HYDROCODONE BITARTRATE AND ACETAMINOPHEN 1 TABLET: 10; 325 TABLET ORAL at 03:11

## 2019-11-19 RX ADMIN — LACTOBACILLUS TAB 4 TABLET: TAB at 08:11

## 2019-11-19 RX ADMIN — CLONAZEPAM 1 MG: 1 TABLET ORAL at 08:11

## 2019-11-19 RX ADMIN — LACTOBACILLUS TAB 4 TABLET: TAB at 12:11

## 2019-11-19 RX ADMIN — CLONAZEPAM 1 MG: 1 TABLET ORAL at 03:11

## 2019-11-19 RX ADMIN — DESVENLAFAXINE SUCCINATE 100 MG: 100 TABLET, FILM COATED, EXTENDED RELEASE ORAL at 08:11

## 2019-11-19 RX ADMIN — CETIRIZINE HYDROCHLORIDE 5 MG: 5 TABLET ORAL at 12:11

## 2019-11-19 RX ADMIN — PANTOPRAZOLE SODIUM 40 MG: 40 TABLET, DELAYED RELEASE ORAL at 08:11

## 2019-11-19 NOTE — PLAN OF CARE
"Care plan reviewed with patient. Tearful saying "I can't make it to my father's  on Wednesday." Emotional support provided. Complained of shoulder pain-managed by PRN med. Educated about Heparin infusion. Been worried of what to eat--selections provided. Able to have a nap periodically. Prn med for gas available. No other complaints made.  "

## 2019-11-19 NOTE — CONSULTS
[facility]  Cardiology  Consult Note    Patient Name: Meg Sal  MRN: [id number]  Admission Date: 11/15/2019  Hospital Length of Stay: 4 days  Code Status: Full Code   Attending Provider: Jean Pierre Mcdermott MD   Consulting Provider: Kari Posada PA-C  Primary Care Physician: Primary Doctor No  Principal Problem:Warfarin toxicity    Patient information was obtained from patient, past medical records and ER records.     Inpatient consult to Cardiology  Consult performed by: Kari Posada PA-C  Consult ordered by: SANDRA Castillo        Subjective:     Chief Complaint:  Elevated LFT's and INR    HPI:   Ms. Sal is a 49 year old female patient whose current medical conditions include mechanical aortic valve, CAD s/p CABG, HTN, hyperlipidemia, pulmonary HTN, pulmonary fibrosis, and history of mitral valve repair who presented to [facility] ED on 11/15/19 due to elevated liver enzymes and Coumadin toxicity. Initial workup revealed AST/ALT in the 600's and INR of 7.0 and patient was subsequently admitted for further evaluation and treatment. Cardiology consulted to to assist with management/for AC rec's. Patient seen and examined today, resting in bed. Reports she has been under a lot of stress/dealing with the recent loss of her father. Requesting to be discharged home so she can attend his wake and . Denies any cardiac complaints. No chest pain. No worsening SOB. She reports compliance with her medications, was recently discharged home with Lovenox bridge but was confused about regimen and stopped taking Coumadin by mistake. Chart reviewed.  AST/ALT 63/220. INR 1.4. Elevated INR/elevated LFT's attributed to recent initiation of new PH medication.         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,  implants and grafts, initial encounter     Hyperlipidemia     Hypertension     Sleep apnea syndrome 2/8/2019    Stroke        Past Surgical History:   Procedure Laterality Date    AORTIC VALVE REPLACEMENT      mechanical    CARDIAC CATHETERIZATION      CORONARY ANGIOPLASTY      CORONARY ARTERY BYPASS GRAFT      HYSTERECTOMY      RIGHT HEART CATHETERIZATION Right 5/17/2019    Procedure: INSERTION, CATHETER, RIGHT HEART;  Surgeon: Derek Brown MD;  Location: [facility] CATH LAB;  Service: Cardiology;  Laterality: Right;  malur pt/detailed hx       Review of patient's allergies indicates:   Allergen Reactions    Nsaids (non-steroidal anti-inflammatory drug) Other (See Comments)     Mechanical heart valve       No current facility-administered medications on file prior to encounter.      Current Outpatient Medications on File Prior to Encounter   Medication Sig    amitriptyline (ELAVIL) 25 MG tablet Take 25 mg by mouth every evening.     busPIRone (BUSPAR) 15 MG tablet Take 15 mg by mouth 2 (two) times daily.     clonazePAM (KLONOPIN) 1 MG tablet Take 1 mg by mouth 3 (three) times daily.     desvenlafaxine succinate (PRISTIQ) 100 MG Tb24 Take 1 tablet (100 mg total) by mouth once daily.    furosemide (LASIX) 40 MG tablet Take two 40 mg tablets by mouth twice a day    HYDROcodone-acetaminophen (NORCO)  mg per tablet Take 1 tablet by mouth every 6 (six) hours as needed for Pain.    metoprolol succinate (TOPROL-XL) 25 MG 24 hr tablet Take 1 tablet (25 mg total) by mouth once daily.    nicotine (NICODERM CQ) 7 mg/24 hr Place 1 patch onto the skin every 24 hours.    nintedanib (OFEV) 150 mg Cap Take 150 mg by mouth 2 (two) times daily.    ondansetron (ZOFRAN) 4 MG tablet TAKE 1 BY MOUTH EVERY 8 HOURS AS NEEDED FOR NAUSEA.    pantoprazole (PROTONIX) 40 MG tablet Take 1 tablet (40 mg total) by mouth 2 (two) times daily.    potassium chloride SA (K-DUR,KLOR-CON) 20 MEQ tablet Take 20 mEq by mouth 2  (two) times daily.    rosuvastatin (CRESTOR) 20 MG tablet Take 20 mg by mouth once daily.    tiZANidine (ZANAFLEX) 4 MG tablet Take 4 mg by mouth every evening.    topiramate (TOPAMAX) 100 MG tablet Take 100 mg by mouth 2 (two) times daily.    traZODone (DESYREL) 150 MG tablet Take 150 mg by mouth nightly.    warfarin (COUMADIN) 6 MG tablet Take 6 mg by mouth Daily. Except 9mg on .    albuterol (VENTOLIN HFA) 90 mcg/actuation inhaler Inhale 2 puffs into the lungs every 6 (six) hours as needed for Wheezing. Rescue    ANORO ELLIPTA 62.5-25 mcg/actuation DsDv INHALE 1 PUFF INTO THE LUNGS ONCE A DAY. CONTROLLER.    diclofenac sodium (VOLTAREN) 1 % Gel Apply 2 g topically 4 (four) times daily as needed.     fluticasone (FLONASE) 50 mcg/actuation nasal spray 1 spray by Each Nare route once daily.    nitroGLYCERIN (NITROSTAT) 0.4 MG SL tablet     nitroGLYCERIN 0.4 MG/HR TD PT24 (NITRODUR) 0.4 mg/hr Place 1 patch onto the skin once daily.    sumatriptan (IMITREX) 100 MG tablet Take 100 mg by mouth every 2 (two) hours as needed.    traMADol (ULTRAM) 50 mg tablet Take 50 mg by mouth every 4 (four) hours as needed.      Family History     Problem Relation (Age of Onset)    Breast cancer Sister    Coronary artery disease Father    Heart disease Mother    Lung cancer Brother        Tobacco Use    Smoking status: Former Smoker     Packs/day: 1.00     Types: Cigarettes     Last attempt to quit: 2018     Years since quittin.5    Smokeless tobacco: Never Used   Substance and Sexual Activity    Alcohol use: Not Currently     Frequency: 2-4 times a month     Drinks per session: 1 or 2     Binge frequency: Never    Drug use: Not Currently    Sexual activity: Not on file     Review of Systems   Constitution: Negative.   HENT: Negative.    Eyes: Negative.    Cardiovascular: Positive for dyspnea on exertion (chronic).   Respiratory: Positive for shortness of breath (chronic).    Endocrine: Negative.     Hematologic/Lymphatic: Bruises/bleeds easily.   Skin: Negative.    Musculoskeletal: Positive for arthritis and joint pain.   Gastrointestinal: Negative.    Genitourinary: Negative.    Neurological: Negative.    Psychiatric/Behavioral: Negative.    Allergic/Immunologic: Negative.      Objective:     Vital Signs (Most Recent):  Temp: 98.2 °F (36.8 °C) (11/19/19 1157)  Pulse: 90 (11/19/19 1157)  Resp: 16 (11/19/19 1157)  BP: (!) 105/58 (11/19/19 1157)  SpO2: 95 % (11/19/19 1157) Vital Signs (24h Range):  Temp:  [96.2 °F (35.7 °C)-98.3 °F (36.8 °C)] 98.2 °F (36.8 °C)  Pulse:  [] 90  Resp:  [16-20] 16  SpO2:  [94 %-98 %] 95 %  BP: (103-155)/(52-80) 105/58     Weight: 75.5 kg (166 lb 7.2 oz)  Body mass index is 32.51 kg/m².    SpO2: 95 %  O2 Device (Oxygen Therapy): nasal cannula      Intake/Output Summary (Last 24 hours) at 11/19/2019 1226  Last data filed at 11/19/2019 0700  Gross per 24 hour   Intake 1208.38 ml   Output 3500 ml   Net -2291.62 ml       Lines/Drains/Airways     Peripheral Intravenous Line                 Peripheral IV - Single Lumen 11/19/19 0204 Anterior;Left;Proximal Forearm less than 1 day                Physical Exam   Constitutional: She is oriented to person, place, and time. She appears well-developed and well-nourished. No distress.   On supplemental O2   HENT:   Head: Normocephalic and atraumatic.   Eyes: Pupils are equal, round, and reactive to light. Right eye exhibits no discharge. Left eye exhibits no discharge.   Neck: Neck supple. No JVD present.   Cardiovascular: Normal rate, regular rhythm, S1 normal and S2 normal.   Murmur heard.  Metallic S2   Pulmonary/Chest: Effort normal and breath sounds normal. No respiratory distress. She has no wheezes. She has no rales.   Abdominal: Soft. She exhibits no distension.   Musculoskeletal: She exhibits no edema.   Neurological: She is alert and oriented to person, place, and time.   Skin: Skin is warm and dry. She is not diaphoretic. No  erythema.   Psychiatric: She has a normal mood and affect. Her behavior is normal. Thought content normal.   Nursing note and vitals reviewed.      Significant Labs:   CMP   Recent Labs   Lab 11/18/19  0518 11/19/19 0443    140   K 4.0 3.5    106   CO2 23 27    138*   BUN 6 7   CREATININE 0.8 0.8   CALCIUM 8.8 9.0   PROT 6.5 6.5   ALBUMIN 3.2* 3.1*   BILITOT 0.8 0.8   ALKPHOS 102 106   * 63*   * 220*   ANIONGAP 10 7*   ESTGFRAFRICA >60 >60   EGFRNONAA >60 >60   , CBC   Recent Labs   Lab 11/18/19  0518 11/18/19  0919 11/19/19 0443   WBC 6.08 6.57 6.89   HGB 9.9* 10.5* 9.7*   HCT 34.4* 41.4 33.2*    155 155   , INR   Recent Labs   Lab 11/18/19 0518 11/19/19 0443   INR 1.5* 1.4*   , All pertinent lab results from the last 24 hours have been reviewed. and   Recent Lab Results       11/19/19  0948   11/19/19  0443   11/19/19  0112   11/18/19  1655        Albumin   3.1         Alkaline Phosphatase   106         ALT   220         Anion Gap   7         aPTT 61.2  Comment:  aPTT therapeutic range = 39-69 seconds 52.0  Comment:  aPTT therapeutic range = 39-69 seconds 46.9  Comment:  aPTT therapeutic range = 39-69 seconds 86.6  Comment:  aPTT therapeutic range = 39-69 seconds     AST   63         Baso #   0.04         Basophil%   0.6         Bilirubin, Direct   0.5         BILIRUBIN TOTAL   0.8  Comment:  For infants and newborns, interpretation of results should be based  on gestational age, weight and in agreement with clinical  observations.  Premature Infant recommended reference ranges:  Up to 24 hours.............<8.0 mg/dL  Up to 48 hours............<12.0 mg/dL  3-5 days..................<15.0 mg/dL  6-29 days.................<15.0 mg/dL           BUN, Bld   7         Calcium   9.0         Chloride   106         CO2   27         Creatinine   0.8         Differential Method   Automated         eGFR if    >60         eGFR if non     >60  Comment:  Calculation used to obtain the estimated glomerular filtration  rate (eGFR) is the CKD-EPI equation.            Eos #   0.5         Eosinophil%   7.1         Glucose   138         Gran # (ANC)   4.3         Gran%   62.0         Hematocrit   33.2         Hemoglobin   9.7         Immature Grans (Abs)   0.03  Comment:  Mild elevation in immature granulocytes is non specific and   can be seen in a variety of conditions including stress response,   acute inflammation, trauma and pregnancy. Correlation with other   laboratory and clinical findings is essential.           Immature Granulocytes   0.4         Coumadin Monitoring INR   1.4  Comment:  Coumadin Therapy:  2.0 - 3.0 for INR for all indicators except mechanical heart valves  and antiphospholipid syndromes which should use 2.5 - 3.5.           Lymph #   1.6         Lymph%   22.9         MCH   29.7         MCHC   29.2  Comment:  Results confirmed, test repeated         MCV   102  Comment:  Results confirmed, test repeated         Mono #   0.5         Mono%   7.0         MPV   10.4         nRBC   0         Platelets   155         Potassium   3.5         PROTEIN TOTAL   6.5         Protime   14.8         RBC   3.27         RDW   17.7         Sodium   140         WBC   6.89               Significant Imaging: Echocardiogram:   2D echo with color flow doppler:   Results for orders placed or performed in visit on 02/25/19   2D echo with color flow doppler   Result Value Ref Range    QEF 55 55 - 65    Est. PA Systolic Pressure 60.76 (A)     Mitral Valve Mobility NORMAL     Tricuspid Valve Regurgitation MODERATE (A)     Narrative    Date of Procedure: 02/25/2019        TEST DESCRIPTION       Aorta: The aortic root is normal in size, measuring 2.3 cm at sinotubular junction and 1.9 cm at Sinuses of Valsalva.     Left Atrium: The left atrial volume index is mildly enlarged, measuring 36.20 cc/m2.     Left Ventricle: The left ventricle is normal in size, with an  end-diastolic diameter of 5.1 cm, and an end-systolic diameter of 3.8 cm. LV wall thickness is normal, with the septum measuring 1.2 cm and the posterior wall measuring 1.1 cm across. Relative   wall thickness was normal at 0.43, and the LV mass index was increased at 159.7 g/m2 consistent with eccentric left ventricular hypertrophy. There are no regional wall motion abnormalities. Left ventricular systolic function appears normal. Visually   estimated ejection fraction is 55-60%. The LV Doppler derived stroke volume equals 83.0 ccs.         Right Atrium: The right atrium is normal in size, measuring 4.4 cm in length and 3.0 cm in width in the apical view.     Right Ventricle: The right ventricle is normal in size measuring 2.6 cm at the base in the apical right ventricle-focused view. Global right ventricular systolic function appears normal. The estimated PA systolic pressure is 61 mmHg.     Aortic Valve:  There is a mechanical prosthesis present in the aortic position. The aortic valve prosthesis is well seated. The peak velocity obtained across the aortic valve is 2.48 m/s, which translates to a peak gradient of 25 mmHg. The mean gradient   is 15 mmHg. Using a left ventricular outflow tract diameter of 2.0 cm, a left ventricular outflow tract velocity time integral of 27 cm, and a peak instantaneous transvalvular velocity time integral of 51 cm, the effective prosthetic valve area is 1.61   cm2(p AVAi is 0.95 cm2/m2).     Mitral Valve:  The mitral valve is moderately sclerotic with normal leaflet mobility. There is a mitral annular ring present.     Tricuspid Valve:  The tricuspid valve is normal in structure. There is moderate tricuspid regurgitation.     Pulmonary Valve:  The pulmonic valve is normal in structure.     IVC: IVC is normal in size and collapses > 50% with a sniff, suggesting normal right atrial pressure of 3 mmHg.     Intracavitary: There is no evidence of pericardial effusion, intracavity mass,  thrombi, or vegetation.         CONCLUSIONS     1 - Mild left atrial enlargement.     2 - Eccentric hypertrophy.     3 - No wall motion abnormalities.     4 - Normal left ventricular systolic function (EF 55-60%).     5 - Normal right ventricular systolic function .     6 - Aortic valve prosthesis, CATINA = 1.61 cm2, AVAi = 0.95 cm2/m2, peak velocity = 2.48 m/s, mean gradient = 15 mmHg.     7 - Moderate tricuspid regurgitation.     8 - Pulmonary hypertension. The estimated PA systolic pressure is 61 mmHg.             This document has been electronically    SIGNED BY: Hermilo Pete MD On: 2019 14:50   , EKG: Reviewed and X-Ray: CXR: X-Ray Chest 1 View (CXR): No results found for this visit on 11/15/19. and X-Ray Chest PA and Lateral (CXR): No results found for this visit on 11/15/19.    Assessment and Plan:     * Warfarin toxicity  -Improved  -INR 1.4 this AM    Elevated LFTs  -Resolving  -Will continue to f/u closely as OP    Hx of mechanical aortic valve replacement  -Patient with history of mechanical AVR who presented with elevated LFT's and Coumadin toxicity in setting of recent initiation of new PH medication  -LFT's have improved with discontinuation  -INR 1.4 this AM  -Discussed AC options in detail with patient. In light of her recent supra-therapeutic and bumped LFT's, ideally would like to keep her on heparin gtt as IP until INR is therapeutic once again. She is dealing with a lot of stress and grieving the loss of her father and is insistent she must leave in order to make it to his wake and . I discussed with roman Lobo to discharge home on Lovenox bridge with close f/u and repeat INR. I personally counseled patient on Lovenox bridge usage. She had no questions and verbalized understanding. Will reach out to Coumadin clinic and our staff to ensure timely follow-up.         VTE Risk Mitigation (From admission, onward)         Ordered     warfarin (COUMADIN) tablet 3 mg  Every Friday       11/17/19 0943     warfarin (COUMADIN) tablet 6 mg  Every Wednesday 11/17/19 0943     warfarin (COUMADIN) tablet 6 mg  Every Tues, Thurs 11/17/19 0943     warfarin (COUMADIN) tablet 6 mg  Every Monday 11/17/19 0943     heparin 25,000 units in dextrose 5% 250 mL (100 units/mL) infusion LOW INTENSITY nomogram - [facility]  Continuous     Question:  Heparin Infusion Adjustment (DO NOT MODIFY ANSWER)  Answer:  \\[email or web address]\epic\Images\Pharmacy\HeparinInfusions\heparin LOW INTENSITY nomogram for [facility] [id number].pdf    11/18/19 0903     heparin 25,000 units in dextrose 5% (100 units/ml) IV bolus from bag - ADDITIONAL PRN BOLUS - 60 units/kg  As needed (PRN)     Question:  Heparin Infusion Adjustment (DO NOT MODIFY ANSWER)  Answer:  \[email or web address]\epic\Images\Pharmacy\HeparinInfusions\heparin LOW INTENSITY nomogram for [facility] [id number].pdf    11/18/19 0903     heparin 25,000 units in dextrose 5% (100 units/ml) IV bolus from bag - ADDITIONAL PRN BOLUS - 30 units/kg  As needed (PRN)     Question:  Heparin Infusion Adjustment (DO NOT MODIFY ANSWER)  Answer:  \\[email or web address]\epic\Images\Pharmacy\HeparinInfusions\heparin LOW INTENSITY nomogram for [facility] [id number].pdf    11/18/19 0903     warfarin (COUMADIN) tablet 3 mg  Every Sat, Sun      11/17/19 0943     Place sequential compression device  Until discontinued      11/15/19 1927                Thank you for your consult. I will sign off. Please contact us if you have any additional questions.    Kari Posada PA-C  Cardiology   [facility]

## 2019-11-19 NOTE — HPI
Ms. Sal is a 49 year old female patient whose current medical conditions include mechanical aortic valve, CAD s/p CABG, HTN, hyperlipidemia, pulmonary HTN, pulmonary fibrosis, and history of mitral valve repair who presented to [facility] ED on 11/15/19 due to elevated liver enzymes and Coumadin toxicity. Initial workup revealed AST/ALT in the 600's and INR of 7.0 and patient was subsequently admitted for further evaluation and treatment. Cardiology consulted to to assist with management/for AC rec's. Patient seen and examined today, resting in bed. Reports she has been under a lot of stress/dealing with the recent loss of her father. Requesting to be discharged home so she can attend his wake and . Denies any cardiac complaints. No chest pain. No worsening SOB. She reports compliance with her medications, was recently discharged home with Lovenox bridge but was confused about regimen and stopped taking Coumadin by mistake. Chart reviewed.  AST/ALT 63/220. INR 1.4. Elevated INR/acute liver failure attributed to recent initiation of new PH medication.

## 2019-11-19 NOTE — NURSING
IV cath removed-no complications noted. Telepack removed, disinfected and returned to the monitor room.

## 2019-11-19 NOTE — NURSING
Discharged order received and reviewed with patient and family. Patient instructed when to take each medication next dose. IV removed, tele removed. Patient transported to front lobby via wheelchair by staff for family to transport home.

## 2019-11-19 NOTE — PROGRESS NOTES
Coumadin Progress Note    Indication: AVR  Goal INR: 2.0-3.0  Today's INR: 1.4 (trending down after holding Coumadin x 2 days d/t supratherapeutic INR on admit)  Continue home regimen: 3 mg every Fri/Sat/Sun & 6 mg all other days   Pt will receive 6 mg dose today  Also on heparin bridge inpatient but pt to be discharged on Lovenox bridge & will follow up with coumadin clinic ASAP (VANESSA Bull, already notified [facility] coumadin clinic pharmacist)  Pt has been educated by pharmacist this admission & VANESSA Posada also counseled pt on Lovenox bridge & made it clear that pt should take Coumadin plus do Lovenox shots when discharged     Thank you for allowing us to participate in this patient's care.   Katherine McArdle, Pharm.D. 11/19/2019 2:38 PM

## 2019-11-19 NOTE — ASSESSMENT & PLAN NOTE
-Patient with history of mechanical AVR who presented with elevated LFT's and Coumadin toxicity in setting of recent initiation of new PH medication  -LFT's have improved with discontinuation  -INR 1.4 this AM  -Discussed AC options in detail with patient. In light of her recent supra-therapeutic and bumped LFT's, ideally would like to keep her on heparin gtt as IP until INR is therapeutic once again. She is dealing with a lot of stress and grieving the loss of her father and is insistent she must leave in order to make it to his wake and . I discussed with roman Lobo to discharge home on Lovenox bridge with close f/u and repeat INR. I personally counseled patient on Lovenox bridge usage. She had no questions and verbalized understanding. Will reach out to Coumadin clinic and our staff to ensure timely follow-up.

## 2019-11-19 NOTE — DISCHARGE SUMMARY
[facility] Hospital Medicine  Discharge Summary      Patient Name: Meg Sal  MRN: [id number]  Admission Date: 11/15/2019  Hospital Length of Stay: 4 days  Discharge Date and Time:  2019 3:30 PM  Attending Physician: Jean Pierre Mcdermott MD   Discharging Provider: SANDRA Speras  Primary Care Provider: Lucero Banda MD      HPI:   Ms. thumb son is a 49-year-old  female with PMH significant for mechanical aortic valve, CAD, CABG, HTN, hyperlipidemia, was recently discharged from this hospital, was sent home on Lovenox bridge with warfarin anticoagulation for a subtherapeutic INR of 1.2 upon discharge. Patient reports taking warfarin and Lovenox as she was supposed to, she followed up with Dr. Banda or, cardiologist two days ago for routine post hospital discharge clinic visit.  Routine laboratory workup revealed elevated LFTs with AST and ALT in the 600s, hence she was advised to present to the ED emergently.  Repeat AST/ALT in the 600s, INR 7.0.  Hemoglobin stable at 10.5.  BP stable.  In addition patient reports multiple loose nonbloody bowel movements for the past few weeks.  Denies recent use of antibiotics.    Admitting diagnosis warfarin toxicity, elevated LFTs.    * No surgery found *      Hospital Course:   Patient was kept inpatient for coumadin toxicity under the care of Hospital Medicine. Her coumadin was held, no Vit K at present as pt is not actively bleeding. Pharmacy to manage coumadin.  Pt also  Having diarrhea, imodium added, along with questran and probiotic. C diff negative. 19 The patients INR was 1.5 this morning. Will start heparin gtt to bridge until patient becomes therapeutic with coumadin. Vitamin K was given today. Liver enzymes continue to trend down. The patient reports that her diarrhea is now controlled. Patient insisting on leaving to attend her father's  in [place]. Cardiology consulted as INR is not therapeutic and pt  requesting to be dc'd with lovenox bridge - which is not standard for mechanical valve. Discussed with pt that we would prefer to keep her on heparin GTT until INR is therapeutic. She is anxious with high stress level in dealing with the death of her father and she will leave with lovenox bridge. Cardiology and pharmacy went over lovenox bridge in detail. Pt will see PCP for f/u on liver enzymes in 2 days and will go to [facility] for coumadin check on Thursday. Patient was seen and examined today and deemed stable for discharge home.     Consults:   Consults (From admission, onward)        Status Ordering Provider     Inpatient consult to Cardiology  Once     Provider:  Rohit Sen MD    Completed ANI NAGEL     Inpatient consult to Gastroenterology  Once     Provider:  Maria Hammonds MD    Completed KRISTI RAMON JR     IP consult to case management  Once     Provider:  (Not yet assigned)    Completed MELYSSA NUGENT     Nutrition Services Referral  Once     Provider:  (Not yet assigned)    Completed ANI NAGEL     Pharmacy to dose Warfarin consult  Once     Provider:  (Not yet assigned)    Acknowledged ANI NAGEL          No new Assessment & Plan notes have been filed under this hospital service since the last note was generated.  Service: Hospital Medicine    Final Active Diagnoses:    Diagnosis Date Noted POA    Elevated LFTs [R94.5] 11/15/2019 Yes    Hx of mechanical aortic valve replacement [Z95.2] 11/21/2016 Not Applicable     Chronic      Problems Resolved During this Admission:    Diagnosis Date Noted Date Resolved POA    PRINCIPAL PROBLEM:  Warfarin toxicity [T45.511A] 11/15/2019 11/19/2019 Yes    Diarrhea [R19.7] 11/15/2019 11/19/2019 Yes       Discharged Condition: stable    Disposition: Home or Self Care    Follow Up:  Follow-up Information     [facility] In 2 days.    Why:  coumadin clinic follow up  Contact information:  [id number]  [facility] [address]           Primary Doctor No In 2 days.           Lucero Banda MD In 2 days.    Specialty:  Internal Medicine  Why:  hospital follow up - especially check liver enzymes  Contact information:  [address]  [facility]  [place] [id number]  [phone]                 Patient Instructions:      Diet Cardiac     Notify your health care provider if you experience any of the following:  severe persistent headache     Notify your health care provider if you experience any of the following:  persistent dizziness, light-headedness, or visual disturbances     Notify your health care provider if you experience any of the following:  increased confusion or weakness     Activity as tolerated       Significant Diagnostic Studies: Labs:   BMP:   Recent Labs   Lab 11/18/19 0518 11/19/19 0443    138*    140   K 4.0 3.5    106   CO2 23 27   BUN 6 7   CREATININE 0.8 0.8   CALCIUM 8.8 9.0   , CMP   Recent Labs   Lab 11/18/19 0518 11/19/19 0443    140   K 4.0 3.5    106   CO2 23 27    138*   BUN 6 7   CREATININE 0.8 0.8   CALCIUM 8.8 9.0   PROT 6.5 6.5   ALBUMIN 3.2* 3.1*   BILITOT 0.8 0.8   ALKPHOS 102 106   * 63*   * 220*   ANIONGAP 10 7*   ESTGFRAFRICA >60 >60   EGFRNONAA >60 >60   , CBC   Recent Labs   Lab 11/18/19 0518 11/18/19 0919 11/19/19 0443   WBC 6.08 6.57 6.89   HGB 9.9* 10.5* 9.7*   HCT 34.4* 41.4 33.2*    155 155    and All labs within the past 24 hours have been reviewed    Pending Diagnostic Studies:     Procedure Component Value Units Date/Time    APTT [[id number]] Collected:  11/18/19 0919    Order Status:  Sent Lab Status:  In process Updated:  11/18/19 0919    Specimen:  Blood     Protime-INR [[id number]] Collected:  11/18/19 0919    Order Status:  Sent Lab Status:  In process Updated:  11/18/19 0919    Specimen:  Blood          Medications:  Reconciled Home Medications:       Medication List      START taking these medications    cholestyramine 4 gram packet  Commonly known as:  QUESTRAN  Take 1 packet (4 g total) by mouth 2 (two) times daily.        CHANGE how you take these medications    * warfarin 3 MG tablet  Commonly known as:  COUMADIN  Take 1 tablet (3 mg total) by mouth every Tue, Wed, Fri, Sat, Sun.  What changed:  You were already taking a medication with the same name, and this prescription was added. Make sure you understand how and when to take each.     * warfarin 6 MG tablet  Commonly known as:  COUMADIN  Take 1 tablet (6 mg total) by mouth every Mon, Tues, Wed, Thurs, Fri. Except 9mg on Thursdays.  What changed:  when to take this         * This list has 2 medication(s) that are the same as other medications prescribed for you. Read the directions carefully, and ask your doctor or other care provider to review them with you.            CONTINUE taking these medications    albuterol 90 mcg/actuation inhaler  Commonly known as:  Ventolin HFA  Inhale 2 puffs into the lungs every 6 (six) hours as needed for Wheezing. Rescue     amitriptyline 25 MG tablet  Commonly known as:  ELAVIL  Take 25 mg by mouth every evening.     Anoro Ellipta 62.5-25 mcg/actuation Dsdv  Generic drug:  umeclidinium-vilanterol  INHALE 1 PUFF INTO THE LUNGS ONCE A DAY. CONTROLLER.     busPIRone 15 MG tablet  Commonly known as:  BUSPAR  Take 15 mg by mouth 2 (two) times daily.     desvenlafaxine succinate 100 MG Tb24  Commonly known as:  PRISTIQ  Take 1 tablet (100 mg total) by mouth once daily.     diclofenac sodium 1 % Gel  Commonly known as:  VOLTAREN  Apply 2 g topically 4 (four) times daily as needed.     enoxaparin 100 mg/mL Syrg  Commonly known as:  LOVENOX  Inject 0.7 mLs (70 mg total) into the skin 2 (two) times daily. for 14 doses     fluticasone propionate 50 mcg/actuation nasal spray  Commonly known as:  FLONASE  1 spray by Each Nare route once daily.     furosemide 40 MG tablet  Commonly  known as:  LASIX  Take two 40 mg tablets by mouth twice a day     HYDROcodone-acetaminophen  mg per tablet  Commonly known as:  NORCO  Take 1 tablet by mouth every 6 (six) hours as needed for Pain.     KlonoPIN 1 MG tablet  Generic drug:  clonazePAM  Take 1 mg by mouth 3 (three) times daily.     metoprolol succinate 25 MG 24 hr tablet  Commonly known as:  TOPROL-XL  Take 1 tablet (25 mg total) by mouth once daily.     nicotine 7 mg/24 hr  Commonly known as:  NICODERM CQ  Place 1 patch onto the skin every 24 hours.     * nitroGLYCERIN 0.4 MG SL tablet  Commonly known as:  NITROSTAT     * nitroGLYCERIN 0.4 MG/HR TD PT24 0.4 mg/hr  Commonly known as:  NITRODUR  Place 1 patch onto the skin once daily.     ondansetron 4 MG tablet  Commonly known as:  ZOFRAN  TAKE 1 BY MOUTH EVERY 8 HOURS AS NEEDED FOR NAUSEA.     pantoprazole 40 MG tablet  Commonly known as:  PROTONIX  Take 1 tablet (40 mg total) by mouth 2 (two) times daily.     potassium chloride SA 20 MEQ tablet  Commonly known as:  K-DUR,KLOR-CON  Take 20 mEq by mouth 2 (two) times daily.     sumatriptan 100 MG tablet  Commonly known as:  IMITREX  Take 100 mg by mouth every 2 (two) hours as needed.     tiZANidine 4 MG tablet  Commonly known as:  ZANAFLEX  Take 4 mg by mouth every evening.     topiramate 100 MG tablet  Commonly known as:  TOPAMAX  Take 100 mg by mouth 2 (two) times daily.     traMADol 50 mg tablet  Commonly known as:  ULTRAM  Take 50 mg by mouth every 4 (four) hours as needed.         * This list has 2 medication(s) that are the same as other medications prescribed for you. Read the directions carefully, and ask your doctor or other care provider to review them with you.            STOP taking these medications    nintedanib 150 mg Cap  Commonly known as:  Ofev     rosuvastatin 20 MG tablet  Commonly known as:  CRESTOR     traZODone 150 MG tablet  Commonly known as:  DESYREL            Indwelling Lines/Drains at time of discharge:    Lines/Drains/Airways     None                 Time spent on the discharge of patient: 60 minutes  Patient was seen and examined on the date of discharge and determined to be suitable for discharge.         MONICA Spears-FLORIAN  Department of Hospital Medicine  [facility]

## 2019-11-19 NOTE — PLAN OF CARE
AAOX4; Pt despondent at shift change because she thought she'd discharged, but INR was still elevated. Heparin gtt initiated during the a.m shift. Allowed pt to share her feelings and concerns. Remains  free of skin breakdown; afebrile at this time. NS on telemonitor. Resting comfortably in bed with side rails up X 2. Call light within reach. Appears calmer after we talked. Will continue to monitor.

## 2019-11-19 NOTE — TELEPHONE ENCOUNTER
Patient is currently in room 228. She needs appt at [facility] for stat INR check and CMP. Orders placed. Please schedule. I will cc Coumadin clinic.    She also needs f/u appt with Dr. Banda early next week. I have spoken with him and it is ok to double book. Please get this scheduled now    Thanks

## 2019-11-19 NOTE — NURSING
Low intensity Heparin Infusion  APTT @ 0112 Results: 46.9  First therapeutic range; No changes made to drip.

## 2019-11-19 NOTE — SUBJECTIVE & OBJECTIVE
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, implants and grafts, initial encounter     Hyperlipidemia     Hypertension     Sleep apnea syndrome 2/8/2019    Stroke        Past Surgical History:   Procedure Laterality Date    AORTIC VALVE REPLACEMENT      mechanical    CARDIAC CATHETERIZATION      CORONARY ANGIOPLASTY      CORONARY ARTERY BYPASS GRAFT      HYSTERECTOMY      RIGHT HEART CATHETERIZATION Right 5/17/2019    Procedure: INSERTION, CATHETER, RIGHT HEART;  Surgeon: Derek Brown MD;  Location: [facility] CATH LAB;  Service: Cardiology;  Laterality: Right;  malur pt/detailed hx       Review of patient's allergies indicates:   Allergen Reactions    Nsaids (non-steroidal anti-inflammatory drug) Other (See Comments)     Mechanical heart valve       No current facility-administered medications on file prior to encounter.      Current Outpatient Medications on File Prior to Encounter   Medication Sig    amitriptyline (ELAVIL) 25 MG tablet Take 25 mg by mouth every evening.     busPIRone (BUSPAR) 15 MG tablet Take 15 mg by mouth 2 (two) times daily.     clonazePAM (KLONOPIN) 1 MG tablet Take 1 mg by mouth 3 (three) times daily.     desvenlafaxine succinate (PRISTIQ) 100 MG Tb24 Take 1 tablet (100 mg total) by mouth once daily.    furosemide (LASIX) 40 MG tablet Take two 40 mg tablets by mouth twice a day    HYDROcodone-acetaminophen (NORCO)  mg per tablet Take 1 tablet by mouth every 6 (six) hours as needed for Pain.    metoprolol succinate (TOPROL-XL) 25 MG 24 hr tablet Take 1 tablet (25 mg total) by mouth once daily.    nicotine (NICODERM CQ) 7 mg/24 hr Place 1 patch onto the skin every 24 hours.    nintedanib (OFEV) 150 mg Cap Take 150 mg by mouth 2 (two) times daily.    ondansetron (ZOFRAN) 4 MG tablet TAKE 1 BY  MOUTH EVERY 8 HOURS AS NEEDED FOR NAUSEA.    pantoprazole (PROTONIX) 40 MG tablet Take 1 tablet (40 mg total) by mouth 2 (two) times daily.    potassium chloride SA (K-DUR,KLOR-CON) 20 MEQ tablet Take 20 mEq by mouth 2 (two) times daily.    rosuvastatin (CRESTOR) 20 MG tablet Take 20 mg by mouth once daily.    tiZANidine (ZANAFLEX) 4 MG tablet Take 4 mg by mouth every evening.    topiramate (TOPAMAX) 100 MG tablet Take 100 mg by mouth 2 (two) times daily.    traZODone (DESYREL) 150 MG tablet Take 150 mg by mouth nightly.    warfarin (COUMADIN) 6 MG tablet Take 6 mg by mouth Daily. Except 9mg on .    albuterol (VENTOLIN HFA) 90 mcg/actuation inhaler Inhale 2 puffs into the lungs every 6 (six) hours as needed for Wheezing. Rescue    ANORO ELLIPTA 62.5-25 mcg/actuation DsDv INHALE 1 PUFF INTO THE LUNGS ONCE A DAY. CONTROLLER.    diclofenac sodium (VOLTAREN) 1 % Gel Apply 2 g topically 4 (four) times daily as needed.     fluticasone (FLONASE) 50 mcg/actuation nasal spray 1 spray by Each Nare route once daily.    nitroGLYCERIN (NITROSTAT) 0.4 MG SL tablet     nitroGLYCERIN 0.4 MG/HR TD PT24 (NITRODUR) 0.4 mg/hr Place 1 patch onto the skin once daily.    sumatriptan (IMITREX) 100 MG tablet Take 100 mg by mouth every 2 (two) hours as needed.    traMADol (ULTRAM) 50 mg tablet Take 50 mg by mouth every 4 (four) hours as needed.      Family History     Problem Relation (Age of Onset)    Breast cancer Sister    Coronary artery disease Father    Heart disease Mother    Lung cancer Brother        Tobacco Use    Smoking status: Former Smoker     Packs/day: 1.00     Types: Cigarettes     Last attempt to quit: 2018     Years since quittin.5    Smokeless tobacco: Never Used   Substance and Sexual Activity    Alcohol use: Not Currently     Frequency: 2-4 times a month     Drinks per session: 1 or 2     Binge frequency: Never    Drug use: Not Currently    Sexual activity: Not on file     Review of  Systems   Constitution: Negative.   HENT: Negative.    Eyes: Negative.    Cardiovascular: Positive for dyspnea on exertion (chronic).   Respiratory: Positive for shortness of breath (chronic).    Endocrine: Negative.    Hematologic/Lymphatic: Bruises/bleeds easily.   Skin: Negative.    Musculoskeletal: Positive for arthritis and joint pain.   Gastrointestinal: Negative.    Genitourinary: Negative.    Neurological: Negative.    Psychiatric/Behavioral: Negative.    Allergic/Immunologic: Negative.      Objective:     Vital Signs (Most Recent):  Temp: 98.2 °F (36.8 °C) (11/19/19 1157)  Pulse: 90 (11/19/19 1157)  Resp: 16 (11/19/19 1157)  BP: (!) 105/58 (11/19/19 1157)  SpO2: 95 % (11/19/19 1157) Vital Signs (24h Range):  Temp:  [96.2 °F (35.7 °C)-98.3 °F (36.8 °C)] 98.2 °F (36.8 °C)  Pulse:  [] 90  Resp:  [16-20] 16  SpO2:  [94 %-98 %] 95 %  BP: (103-155)/(52-80) 105/58     Weight: 75.5 kg (166 lb 7.2 oz)  Body mass index is 32.51 kg/m².    SpO2: 95 %  O2 Device (Oxygen Therapy): nasal cannula      Intake/Output Summary (Last 24 hours) at 11/19/2019 1226  Last data filed at 11/19/2019 0700  Gross per 24 hour   Intake 1208.38 ml   Output 3500 ml   Net -2291.62 ml       Lines/Drains/Airways     Peripheral Intravenous Line                 Peripheral IV - Single Lumen 11/19/19 0204 Anterior;Left;Proximal Forearm less than 1 day                Physical Exam   Constitutional: She is oriented to person, place, and time. She appears well-developed and well-nourished. No distress.   On supplemental O2   HENT:   Head: Normocephalic and atraumatic.   Eyes: Pupils are equal, round, and reactive to light. Right eye exhibits no discharge. Left eye exhibits no discharge.   Neck: Neck supple. No JVD present.   Cardiovascular: Normal rate, regular rhythm, S1 normal and S2 normal.   Murmur heard.  Metallic S2   Pulmonary/Chest: Effort normal and breath sounds normal. No respiratory distress. She has no wheezes. She has no rales.    Abdominal: Soft. She exhibits no distension.   Musculoskeletal: She exhibits no edema.   Neurological: She is alert and oriented to person, place, and time.   Skin: Skin is warm and dry. She is not diaphoretic. No erythema.   Psychiatric: She has a normal mood and affect. Her behavior is normal. Thought content normal.   Nursing note and vitals reviewed.      Significant Labs:   CMP   Recent Labs   Lab 11/18/19  0518 11/19/19  0443    140   K 4.0 3.5    106   CO2 23 27    138*   BUN 6 7   CREATININE 0.8 0.8   CALCIUM 8.8 9.0   PROT 6.5 6.5   ALBUMIN 3.2* 3.1*   BILITOT 0.8 0.8   ALKPHOS 102 106   * 63*   * 220*   ANIONGAP 10 7*   ESTGFRAFRICA >60 >60   EGFRNONAA >60 >60   , CBC   Recent Labs   Lab 11/18/19  0518 11/18/19  0919 11/19/19  0443   WBC 6.08 6.57 6.89   HGB 9.9* 10.5* 9.7*   HCT 34.4* 41.4 33.2*    155 155   , INR   Recent Labs   Lab 11/18/19  0518 11/19/19  0443   INR 1.5* 1.4*   , All pertinent lab results from the last 24 hours have been reviewed. and   Recent Lab Results       11/19/19  0948   11/19/19  0443   11/19/19  0112   11/18/19  1655        Albumin   3.1         Alkaline Phosphatase   106         ALT   220         Anion Gap   7         aPTT 61.2  Comment:  aPTT therapeutic range = 39-69 seconds 52.0  Comment:  aPTT therapeutic range = 39-69 seconds 46.9  Comment:  aPTT therapeutic range = 39-69 seconds 86.6  Comment:  aPTT therapeutic range = 39-69 seconds     AST   63         Baso #   0.04         Basophil%   0.6         Bilirubin, Direct   0.5         BILIRUBIN TOTAL   0.8  Comment:  For infants and newborns, interpretation of results should be based  on gestational age, weight and in agreement with clinical  observations.  Premature Infant recommended reference ranges:  Up to 24 hours.............<8.0 mg/dL  Up to 48 hours............<12.0 mg/dL  3-5 days..................<15.0 mg/dL  6-29 days.................<15.0 mg/dL           BUN, Bld   7          Calcium   9.0         Chloride   106         CO2   27         Creatinine   0.8         Differential Method   Automated         eGFR if    >60         eGFR if non    >60  Comment:  Calculation used to obtain the estimated glomerular filtration  rate (eGFR) is the CKD-EPI equation.            Eos #   0.5         Eosinophil%   7.1         Glucose   138         Gran # (ANC)   4.3         Gran%   62.0         Hematocrit   33.2         Hemoglobin   9.7         Immature Grans (Abs)   0.03  Comment:  Mild elevation in immature granulocytes is non specific and   can be seen in a variety of conditions including stress response,   acute inflammation, trauma and pregnancy. Correlation with other   laboratory and clinical findings is essential.           Immature Granulocytes   0.4         Coumadin Monitoring INR   1.4  Comment:  Coumadin Therapy:  2.0 - 3.0 for INR for all indicators except mechanical heart valves  and antiphospholipid syndromes which should use 2.5 - 3.5.           Lymph #   1.6         Lymph%   22.9         MCH   29.7         MCHC   29.2  Comment:  Results confirmed, test repeated         MCV   102  Comment:  Results confirmed, test repeated         Mono #   0.5         Mono%   7.0         MPV   10.4         nRBC   0         Platelets   155         Potassium   3.5         PROTEIN TOTAL   6.5         Protime   14.8         RBC   3.27         RDW   17.7         Sodium   140         WBC   6.89               Significant Imaging: Echocardiogram:   2D echo with color flow doppler:   Results for orders placed or performed in visit on 02/25/19   2D echo with color flow doppler   Result Value Ref Range    QEF 55 55 - 65    Est. PA Systolic Pressure 60.76 (A)     Mitral Valve Mobility NORMAL     Tricuspid Valve Regurgitation MODERATE (A)     Narrative    Date of Procedure: 02/25/2019        TEST DESCRIPTION       Aorta: The aortic root is normal in size, measuring 2.3 cm at  sinotubular junction and 1.9 cm at Sinuses of Valsalva.     Left Atrium: The left atrial volume index is mildly enlarged, measuring 36.20 cc/m2.     Left Ventricle: The left ventricle is normal in size, with an end-diastolic diameter of 5.1 cm, and an end-systolic diameter of 3.8 cm. LV wall thickness is normal, with the septum measuring 1.2 cm and the posterior wall measuring 1.1 cm across. Relative   wall thickness was normal at 0.43, and the LV mass index was increased at 159.7 g/m2 consistent with eccentric left ventricular hypertrophy. There are no regional wall motion abnormalities. Left ventricular systolic function appears normal. Visually   estimated ejection fraction is 55-60%. The LV Doppler derived stroke volume equals 83.0 ccs.         Right Atrium: The right atrium is normal in size, measuring 4.4 cm in length and 3.0 cm in width in the apical view.     Right Ventricle: The right ventricle is normal in size measuring 2.6 cm at the base in the apical right ventricle-focused view. Global right ventricular systolic function appears normal. The estimated PA systolic pressure is 61 mmHg.     Aortic Valve:  There is a mechanical prosthesis present in the aortic position. The aortic valve prosthesis is well seated. The peak velocity obtained across the aortic valve is 2.48 m/s, which translates to a peak gradient of 25 mmHg. The mean gradient   is 15 mmHg. Using a left ventricular outflow tract diameter of 2.0 cm, a left ventricular outflow tract velocity time integral of 27 cm, and a peak instantaneous transvalvular velocity time integral of 51 cm, the effective prosthetic valve area is 1.61   cm2(p AVAi is 0.95 cm2/m2).     Mitral Valve:  The mitral valve is moderately sclerotic with normal leaflet mobility. There is a mitral annular ring present.     Tricuspid Valve:  The tricuspid valve is normal in structure. There is moderate tricuspid regurgitation.     Pulmonary Valve:  The pulmonic valve is normal in  structure.     IVC: IVC is normal in size and collapses > 50% with a sniff, suggesting normal right atrial pressure of 3 mmHg.     Intracavitary: There is no evidence of pericardial effusion, intracavity mass, thrombi, or vegetation.         CONCLUSIONS     1 - Mild left atrial enlargement.     2 - Eccentric hypertrophy.     3 - No wall motion abnormalities.     4 - Normal left ventricular systolic function (EF 55-60%).     5 - Normal right ventricular systolic function .     6 - Aortic valve prosthesis, CATINA = 1.61 cm2, AVAi = 0.95 cm2/m2, peak velocity = 2.48 m/s, mean gradient = 15 mmHg.     7 - Moderate tricuspid regurgitation.     8 - Pulmonary hypertension. The estimated PA systolic pressure is 61 mmHg.             This document has been electronically    SIGNED BY: Hermilo Pete MD On: 02/25/2019 14:50   , EKG: Reviewed and X-Ray: CXR: X-Ray Chest 1 View (CXR): No results found for this visit on 11/15/19. and X-Ray Chest PA and Lateral (CXR): No results found for this visit on 11/15/19.

## 2019-11-20 NOTE — PLAN OF CARE
11/20/19 0756   Final Note   Assessment Type Final Discharge Note   Anticipated Discharge Disposition Home   Right Care Referral Info   Post Acute Recommendation No Care

## 2019-11-20 NOTE — TELEPHONE ENCOUNTER
Returned call back to patient--states per hospital discharge papers on 11/19 it states ti discontinue taking Trazadone--patient states needs to know if that is accurate--please advise

## 2019-11-21 ENCOUNTER — TELEPHONE (OUTPATIENT)
Dept: CARDIOLOGY | Facility: CLINIC | Age: 49
End: 2019-11-21

## 2019-11-21 ENCOUNTER — LAB VISIT (OUTPATIENT)
Dept: LAB | Facility: HOSPITAL | Age: 49
End: 2019-11-21
Attending: PHYSICIAN ASSISTANT
Payer: COMMERCIAL

## 2019-11-21 DIAGNOSIS — Z95.2 HX OF MECHANICAL AORTIC VALVE REPLACEMENT: Chronic | ICD-10-CM

## 2019-11-21 LAB
ALBUMIN SERPL BCP-MCNC: 3.8 G/DL (ref 3.5–5.2)
ALP SERPL-CCNC: 140 U/L (ref 55–135)
ALT SERPL W/O P-5'-P-CCNC: 131 U/L (ref 10–44)
ANION GAP SERPL CALC-SCNC: 8 MMOL/L (ref 8–16)
AST SERPL-CCNC: 40 U/L (ref 10–40)
BILIRUB SERPL-MCNC: 1.1 MG/DL (ref 0.1–1)
BUN SERPL-MCNC: 12 MG/DL (ref 6–20)
CALCIUM SERPL-MCNC: 9.4 MG/DL (ref 8.7–10.5)
CHLORIDE SERPL-SCNC: 102 MMOL/L (ref 95–110)
CO2 SERPL-SCNC: 28 MMOL/L (ref 23–29)
CREAT SERPL-MCNC: 0.9 MG/DL (ref 0.5–1.4)
EST. GFR  (AFRICAN AMERICAN): >60 ML/MIN/1.73 M^2
EST. GFR  (NON AFRICAN AMERICAN): >60 ML/MIN/1.73 M^2
GLUCOSE SERPL-MCNC: 135 MG/DL (ref 70–110)
INR PPP: 2 (ref 0.8–1.2)
POTASSIUM SERPL-SCNC: 3.9 MMOL/L (ref 3.5–5.1)
PROT SERPL-MCNC: 7.7 G/DL (ref 6–8.4)
PROTHROMBIN TIME: 19.5 SEC (ref 9–12.5)
SODIUM SERPL-SCNC: 138 MMOL/L (ref 136–145)

## 2019-11-21 PROCEDURE — 80053 COMPREHEN METABOLIC PANEL: CPT | Mod: NTX

## 2019-11-21 PROCEDURE — 85610 PROTHROMBIN TIME: CPT | Mod: TXP

## 2019-11-21 PROCEDURE — 36415 COLL VENOUS BLD VENIPUNCTURE: CPT | Mod: PO,TXP

## 2019-11-21 NOTE — TELEPHONE ENCOUNTER
Spoke with patient let he know that she would have to call her PCP to verify if she should be taking trazdone Dr. Banda did not prescribe this medication.

## 2019-11-22 ENCOUNTER — ANTI-COAG VISIT (OUTPATIENT)
Dept: CARDIOLOGY | Facility: CLINIC | Age: 49
End: 2019-11-22
Payer: COMMERCIAL

## 2019-11-22 ENCOUNTER — TELEPHONE (OUTPATIENT)
Dept: CARDIOLOGY | Facility: HOSPITAL | Age: 49
End: 2019-11-22

## 2019-11-22 DIAGNOSIS — Z95.2 HX OF MECHANICAL AORTIC VALVE REPLACEMENT: Chronic | ICD-10-CM

## 2019-11-22 DIAGNOSIS — Z79.01 LONG TERM (CURRENT) USE OF ANTICOAGULANTS: ICD-10-CM

## 2019-11-22 PROCEDURE — 93793 PR ANTICOAGULANT MGMT FOR PT TAKING WARFARIN: ICD-10-PCS | Mod: S$GLB,,,

## 2019-11-22 PROCEDURE — 93793 ANTICOAG MGMT PT WARFARIN: CPT | Mod: S$GLB,,,

## 2019-11-22 NOTE — PROGRESS NOTES
"Patient's INR is therapeutic at 2.0.  Bridged on Lovenox 70 mg BID.  Lovenox injections will be discontinued.  Instructions given to maintain current dose of Warfarin 3 mg every Sunday and Saturday; and 6 mg on all other days per week.  Would like to recheck patient on Monday, 11/25/2019, but patient requested Wednesday, 11/27/2019 along with Dr. Banda's (Cardiologist) appointment.  Follow up has been scheduled for 11/27/2019.  Advised patient to contact the Coumadin Clinic at [phone], if she should have any further questions or concerns.  Patient repeated back instructions; stated, "she is writing instructions down" and voiced understanding.  "

## 2019-11-22 NOTE — TELEPHONE ENCOUNTER
Please review labs. CMP shows improved liver function. Electrolytes and kidney function are stable.    INR 2.0. I have forwarded to Coumadin clinic.    Unfortunately, I cannot address the trazadone discontinuation. She would need to discuss with her PCP.

## 2019-11-25 ENCOUNTER — TELEPHONE (OUTPATIENT)
Dept: INTERNAL MEDICINE | Facility: CLINIC | Age: 49
End: 2019-11-25

## 2019-11-25 DIAGNOSIS — Z12.39 BREAST CANCER SCREENING: Primary | ICD-10-CM

## 2019-11-25 DIAGNOSIS — N63.20 LEFT BREAST MASS: ICD-10-CM

## 2019-11-25 NOTE — TELEPHONE ENCOUNTER
----- Message from Lindsay Del Rosario, RT sent at 11/25/2019 10:06 AM CST -----  Contact: RADIOLOGY  Patient called to schedule Mammo screening, and she explained that she was feeling a mass under her breast. Based on what patient has said we need the provider to put in new orders of a Digital Diagnostic Mammo and  US Breast Limited (the side the mass is on), so patient can come in and get her test done, any question please call radiology or if you need to speak with patient first please reach out to her. Please notify radiology when order is in system and I will call patient and get her scheduled, thanks

## 2019-11-27 ENCOUNTER — ANTI-COAG VISIT (OUTPATIENT)
Dept: CARDIOLOGY | Facility: CLINIC | Age: 49
End: 2019-11-27
Payer: COMMERCIAL

## 2019-11-27 ENCOUNTER — OFFICE VISIT (OUTPATIENT)
Dept: CARDIOLOGY | Facility: CLINIC | Age: 49
End: 2019-11-27
Payer: COMMERCIAL

## 2019-11-27 VITALS
WEIGHT: 158.31 LBS | BODY MASS INDEX: 30.91 KG/M2 | HEART RATE: 85 BPM | SYSTOLIC BLOOD PRESSURE: 112 MMHG | DIASTOLIC BLOOD PRESSURE: 70 MMHG

## 2019-11-27 DIAGNOSIS — G47.39 OTHER SLEEP APNEA: ICD-10-CM

## 2019-11-27 DIAGNOSIS — Z95.2 HX OF MECHANICAL AORTIC VALVE REPLACEMENT: Chronic | ICD-10-CM

## 2019-11-27 DIAGNOSIS — Z79.01 LONG TERM (CURRENT) USE OF ANTICOAGULANTS: Primary | ICD-10-CM

## 2019-11-27 DIAGNOSIS — J84.10 PULMONARY FIBROSIS: Chronic | ICD-10-CM

## 2019-11-27 DIAGNOSIS — I35.2 NONRHEUMATIC AORTIC INSUFFICIENCY WITH AORTIC STENOSIS: ICD-10-CM

## 2019-11-27 DIAGNOSIS — Z98.890 H/O MITRAL VALVE REPAIR: ICD-10-CM

## 2019-11-27 DIAGNOSIS — I10 ESSENTIAL HYPERTENSION: ICD-10-CM

## 2019-11-27 DIAGNOSIS — I27.20 PULMONARY HTN: Chronic | ICD-10-CM

## 2019-11-27 DIAGNOSIS — R79.89 ELEVATED LFTS: ICD-10-CM

## 2019-11-27 DIAGNOSIS — J96.11 CHRONIC RESPIRATORY FAILURE WITH HYPOXIA: ICD-10-CM

## 2019-11-27 DIAGNOSIS — I50.32 CHRONIC DIASTOLIC HEART FAILURE: Primary | ICD-10-CM

## 2019-11-27 DIAGNOSIS — Z95.1 STATUS POST CORONARY ARTERY BYPASS GRAFT: ICD-10-CM

## 2019-11-27 DIAGNOSIS — I49.3 PVC (PREMATURE VENTRICULAR CONTRACTION): ICD-10-CM

## 2019-11-27 LAB — INR PPP: 1.9 (ref 2–3)

## 2019-11-27 PROCEDURE — 99999 PR PBB SHADOW E&M-EST. PATIENT-LVL III: CPT | Mod: PBBFAC,TXP,, | Performed by: INTERNAL MEDICINE

## 2019-11-27 PROCEDURE — 3078F PR MOST RECENT DIASTOLIC BLOOD PRESSURE < 80 MM HG: ICD-10-PCS | Mod: CPTII,NTX,S$GLB, | Performed by: INTERNAL MEDICINE

## 2019-11-27 PROCEDURE — 99215 OFFICE O/P EST HI 40 MIN: CPT | Mod: NTX,S$GLB,, | Performed by: INTERNAL MEDICINE

## 2019-11-27 PROCEDURE — 3078F DIAST BP <80 MM HG: CPT | Mod: CPTII,NTX,S$GLB, | Performed by: INTERNAL MEDICINE

## 2019-11-27 PROCEDURE — 99215 PR OFFICE/OUTPT VISIT, EST, LEVL V, 40-54 MIN: ICD-10-PCS | Mod: NTX,S$GLB,, | Performed by: INTERNAL MEDICINE

## 2019-11-27 PROCEDURE — 85610 POCT INR: ICD-10-PCS | Mod: QW,S$GLB,, | Performed by: INTERNAL MEDICINE

## 2019-11-27 PROCEDURE — 3074F SYST BP LT 130 MM HG: CPT | Mod: CPTII,NTX,S$GLB, | Performed by: INTERNAL MEDICINE

## 2019-11-27 PROCEDURE — 3008F BODY MASS INDEX DOCD: CPT | Mod: CPTII,NTX,S$GLB, | Performed by: INTERNAL MEDICINE

## 2019-11-27 PROCEDURE — 3074F PR MOST RECENT SYSTOLIC BLOOD PRESSURE < 130 MM HG: ICD-10-PCS | Mod: CPTII,NTX,S$GLB, | Performed by: INTERNAL MEDICINE

## 2019-11-27 PROCEDURE — 99999 PR PBB SHADOW E&M-EST. PATIENT-LVL III: ICD-10-PCS | Mod: PBBFAC,TXP,, | Performed by: INTERNAL MEDICINE

## 2019-11-27 PROCEDURE — 3008F PR BODY MASS INDEX (BMI) DOCUMENTED: ICD-10-PCS | Mod: CPTII,NTX,S$GLB, | Performed by: INTERNAL MEDICINE

## 2019-11-27 PROCEDURE — 85610 PROTHROMBIN TIME: CPT | Mod: QW,S$GLB,, | Performed by: INTERNAL MEDICINE

## 2019-11-27 RX ORDER — ROSUVASTATIN CALCIUM 20 MG/1
20 TABLET, COATED ORAL
COMMUNITY
Start: 2019-10-10 | End: 2020-10-09

## 2019-11-27 RX ORDER — TRAZODONE HYDROCHLORIDE 150 MG/1
150 TABLET ORAL
COMMUNITY
Start: 2019-10-10

## 2019-11-27 NOTE — PROGRESS NOTES
Subjective:   Patient ID:  Meg Sal is a 49 y.o. female who presents for cardiac consult of Follow-up      Hyperlipidemia   Associated symptoms include chest pain and shortness of breath.   Shortness of Breath   Associated symptoms include chest pain.   Hypertension   Associated symptoms include chest pain, palpitations and shortness of breath.   Chest Pain    Associated symptoms include dizziness, palpitations and shortness of breath.   Her past medical history is significant for hyperlipidemia.   Dizziness:    Associated symptoms: palpitations and chest pain.    The patient came in today for cardiac consult of Follow-up      Meg Sal is a 49 y.o. female with CAD s/p 2V CABG - LAD/LCX with occluded, s/p mechanical AVR, s/p mitral annuloplasty ring, pulm fibrosis, Pulm HTN, HFpEF, CHD, HTN, HLD, CVA here for follow up CV eval.     2/8/19  Pt had first MI at age 31, was taken to [facility], back was hurting. Felt like she had a heavy wet blanket. She was breaking up a dog fight when paramedics came. Dr. Cortez did LHC, had one stent placed. She also had other blockages which were medically treated. She had treadmill nuclear stress tests after, had been off plavix. In 2014, when she went to [place] she couldn't walk and was out of breath. She came back to  and she did another LHC with Dr. Cortez, Dr. Mcmahon was consulted as she had severe LAD disease. She went to Dr. Heller with [facility], had LHC in May 2018 with chronically occluded RCA with collaterals but patent grafts to her left system. She has been having a lot of chest pain lately. She was taking NTG two or three times a week. She was told to take Ranexa and Imdur but had more depression so stopped taking it. She has no car and has difficulty getting to coumadin clinic, discussed will to get home health for labwork.     3/11/19  She had another episode of severe chest pain last week. Started on Tues and continued till Friday. Had home health  nurse that came. She took NTG which didn't help much, took 2 tabs. Discussed she needs ER eval if severe CP again that is not improvd with NTG. Started verapamil for PVCs. Will refer to Ep for PVC ablation vs AAD. Pulm eval this AM, will have sleep study/PFTs/rheum referral. Recent stress and echo negative, mech AVR mean gradient 15 mmhg    4/29/19  She had a recent syncopal episode at home and presented to [facility] ER and was admitted. IN the ED, patient found to be mildly hypoxic with O2 sat of 90% on room air.  ABG showed pH of 7.349, pCO2 43, PO2 55, SA O2 87 on room air.  CT of the head was negative for acute process, CXR unremarkable, EKG unrevealing. BP is better since home Verapamil, Bystolic, Isosorbide, and NTG patch have been held. Home oxygen evaluation completed and patient qualified with an exertional room air O2 saturation of 87% which improved to 96% on 2 L nasal cannula, she was DC home on 2L NC.Pt had eval by Dr. Montero for PVCs, discussed ablation not a good option, may try amio but not a great option due to lung disease. Had pulm eval by Dr. Mondragon and rec RHC to eval PH and lung transplant eval. BP elevated today, had been off NTG/imdur, will restart today.     7/3/19  She presented to the ER last month and admitted to hospital with SOB. 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 Patient with Pulmonary support in place and referral to the transplant service, instructed to follow up as directed for transplant consideration. RHC last month -   Mildly elevated right Filling Pressures, Moderate Pulmonary Hypertension. She was started on nintenanib - OFEV - just received it yesterday. Still considering lung transplant event. BNP was elevated, discussed to increase lasix.  ECG - NSR, PACs, inc RBBB, anterior ischemia - old      8/7/19  She had a recent fall, broke collar bone. She was trying to put water on night stand and fell. She went  to ortho in [place], does not need surgery. She had the fall yesterday morning. She has migraines but discussed if headache occurs needs ER eval to r/o intracranial bleeding. She was out of Imitrex and has been taking Fiorcicet but didn't help much.     10/9/19  Needs to get more teeth pulled out and add to her dentures. She has NTG patch but still has to use PRN NTG pills, had to take 3 for severe palpitations. No CP. Recent INR was 3.8. Will repeat INR today.     11/13/19 - Hosp follow up  DC summary - last 2 weeks pt has had decreased appetite, poor fluid intake, diarrhea and an emotional time and her father had passed. She continued to take her lasix. She presented with weakness and hypotension. Diuretics held and gentle IV fluids given. Also, with chronically elevated troponin and Cardiology felt DEMAND ISCHEMIA. Due to mechanical valve, coumadin on hold and heparin gtt in progress. 2 D ECHO did not show any new changes. Around 3 weeks earlier, pt had 4 days of melena which had resolved. Hgb/Hct has trended down and pt received 1 unit PrBCs for symptomatic anemia. Cardiology assisting with care. On 11/6 - Hgb/Hct dropped below 8 and pt transfused 2 units PRBCs. IV fluids discontinued as hypotension resolved and pt received some prn lasix is association with blood transfusion. Cardiology advised resuming Coumadin with heparin bridge however, will order Lovenox bridge as pt is familiar and expressing desire for discharge. Gastroenterology saw the patient due to possible GI bleed and they recommend PPI and conservative management. Requesting risk stratification from Cardiology before any scopes done. Dr. Enciso saw the patient and encouraged the patient to get EGD and colonoscopy. Pt placed on clear liquids with plans for scopes on Friday as patient needs colonoscopy prep. 11/7 - Pt's lab work and vital signs stable. She denied further weakness and hematochezia. Pt verbalized desire to discharge as she had to plan  her father's . Confirmed with Cardiology that Coumadin bridged with Lovenox was permitted. Pt was seen and examined and determined to be safe and stable for discharge. Pt was discharged on her Coumadin and therapeutic Lovenox. Pt stated she was knowledgeable in Lovenox administration.    From coumadin clinic - . Ms. Sal reports she has not taken any warfarin since discharged, she thought she was only to take Lovenox.  Bridge therapy discussed.   No abnormal pain, swelling or weakness noted.  Instructions given for patient to take warfarin 6mg every evening and continue Lovenox 70mg BID until next INR check.   Patient wrote down instructions, repeated back and voiced understanding.     19 - hosp follow up  She was recently discharged from this hospital, was sent home on Lovenox bridge with warfarin anticoagulation for a subtherapeutic INR of 1.2 upon discharge. Patient reports taking warfarin and Lovenox as she was supposed to, she followed up with Dr. Banda or, cardiologist two days ago for routine post hospital discharge clinic visit.  Routine laboratory workup revealed elevated LFTs with AST and ALT in the 600s, hence she was advised to present to the ED emergently.  Repeat AST/ALT in the 600s, INR 7.0.  Hemoglobin stable at 10.5.  BP stable.  In addition patient reports multiple loose nonbloody bowel movements for the past few weeks.  Denies recent use of antibiotics.     Admitting diagnosis warfarin toxicity, elevated LFTs.     Patient was kept inpatient for coumadin toxicity under the care of Hospital Medicine. Her coumadin was held, no Vit K at present as pt is not actively bleeding. Pharmacy to manage coumadin.  Pt also  Having diarrhea, imodium added, along with questran and probiotic. C diff negative. 19 The patients INR was 1.5 this morning. Will start heparin gtt to bridge until patient becomes therapeutic with coumadin. Vitamin K was given today. Liver enzymes continue to trend  down. The patient reports that her diarrhea is now controlled. Patient insisting on leaving to attend her father's  in [place]. Cardiology consulted as INR is not therapeutic and pt requesting to be dc'd with lovenox bridge - which is not standard for mechanical valve. Discussed with pt that we would prefer to keep her on heparin GTT until INR is therapeutic. She is anxious with high stress level in dealing with the death of her father and she will leave with lovenox bridge. Cardiology and pharmacy went over lovenox bridge in detail. Pt will see PCP for f/u on liver enzymes in 2 days and will go to [facility] for coumadin check on Thursday.    Has been wearing NTG patches. She stopped the pulm fibrosis meds. She had a beast mass/lump and will have mammogram concerned for malignancy.   She has stopped Crestor. Discussed will restart Crestor once LFTs improve.     Patient has dec exercise tolerance.    Patient is compliant with medications.    ECG - NSR, RBBB    RHC 19    AIR REST:  RA: 13/12 (11)  RV: 57  RVEDP: 13     PW: 15/ (16)  PA: 58/16 (34)      50-59% stenosis within the right internal carotid artery  **Categories of stenosis (0-15%, 16-49%, 50-69% and 70-99% ) are based on published criteria that have been internally validated.  Degree of stenosis is based on NASCET criteria and refers to the degree of luminal diameter narrowing as a percentage of the normal ICA distal to the stenosis and any area of post stenotic dilation.      Nuclear Quantitative Functional Analysis:   LVEF: 65 %    Impression: NORMAL MYOCARDIAL PERFUSION  1. The perfusion scan is free of evidence for myocardial ischemia or injury.   2. Resting wall motion is physiologic.   3. Resting LV function is normal.   4. The ventricular volumes are normal at rest and stress.   5. The extracardiac distribution of radioactivity is normal.       This document has been electronically    SIGNED BY: Hermilo Pete MD On:  02/25/2019 16:47    CONCLUSIONS     1 - Mild left atrial enlargement.     2 - Eccentric hypertrophy.     3 - No wall motion abnormalities.     4 - Normal left ventricular systolic function (EF 55-60%).     5 - Normal right ventricular systolic function .     6 - Aortic valve prosthesis, CATINA = 1.61 cm2, AVAi = 0.95 cm2/m2, peak velocity = 2.48 m/s, mean gradient = 15 mmHg.     7 - Moderate tricuspid regurgitation.     8 - Pulmonary hypertension. The estimated PA systolic pressure is 61 mmHg.     This document has been electronically    SIGNED BY: Hermilo Pete MD On: 02/25/2019 14:50    TEST DESCRIPTION   PREDOMINANT RHYTHM  1. Sinus rhythm with heart rates varying between 66 and 105 bpm with an average of 78 bpm.   VENTRICULAR ARRHYTHMIAS  1. There were very frequent PVCs totalling 55538 and averaging 489 per hour.  There were 23 couplets. There were 5 triplets.   2. There were no episodes of ventricular tachycardia.    SUPRA VENTRICULAR ARRHYTHMIAS  1. There were rare PACs totalling 382 and averaging 7 per hour.   2. There were no episodes of sustained supraventricular tachycardia.    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, implants and grafts, initial encounter     Hyperlipidemia     Hypertension     Sleep apnea syndrome 2/8/2019    Stroke        Past Surgical History:   Procedure Laterality Date    AORTIC VALVE REPLACEMENT      mechanical    CARDIAC CATHETERIZATION      CORONARY ANGIOPLASTY      CORONARY ARTERY BYPASS GRAFT      HYSTERECTOMY      RIGHT HEART CATHETERIZATION Right 5/17/2019    Procedure: INSERTION, CATHETER, RIGHT HEART;  Surgeon: Derek Brown MD;  Location[facility] CATH LAB;  Service: Cardiology;  Laterality: Right;  malur pt/detailed hx       Social History     Tobacco Use    Smoking status: Former  Smoker     Packs/day: 1.00     Types: Cigarettes     Last attempt to quit: 2018     Years since quittin.5    Smokeless tobacco: Never Used   Substance Use Topics    Alcohol use: Not Currently     Frequency: 2-4 times a month     Drinks per session: 1 or 2     Binge frequency: Never    Drug use: Not Currently       Family History   Problem Relation Age of Onset    Heart disease Mother     Breast cancer Sister     Coronary artery disease Father     Lung cancer Brother        Patient's Medications   New Prescriptions    No medications on file   Previous Medications    ALBUTEROL (VENTOLIN HFA) 90 MCG/ACTUATION INHALER    Inhale 2 puffs into the lungs every 6 (six) hours as needed for Wheezing. Rescue    AMITRIPTYLINE (ELAVIL) 25 MG TABLET    Take 25 mg by mouth every evening.     ANORO ELLIPTA 62.5-25 MCG/ACTUATION DSDV    INHALE 1 PUFF INTO THE LUNGS ONCE A DAY. CONTROLLER.    BUSPIRONE (BUSPAR) 15 MG TABLET    Take 15 mg by mouth 2 (two) times daily.     CHOLESTYRAMINE (QUESTRAN) 4 GRAM PACKET    Take 1 packet (4 g total) by mouth 2 (two) times daily.    CLONAZEPAM (KLONOPIN) 1 MG TABLET    Take 1 mg by mouth 3 (three) times daily.     DESVENLAFAXINE SUCCINATE (PRISTIQ) 100 MG TB24    Take 1 tablet (100 mg total) by mouth once daily.    DICLOFENAC SODIUM (VOLTAREN) 1 % GEL    Apply 2 g topically 4 (four) times daily as needed.     FLUTICASONE (FLONASE) 50 MCG/ACTUATION NASAL SPRAY    1 spray by Each Nare route once daily.    FUROSEMIDE (LASIX) 40 MG TABLET    Take two 40 mg tablets by mouth twice a day    HYDROCODONE-ACETAMINOPHEN (NORCO)  MG PER TABLET    Take 1 tablet by mouth every 6 (six) hours as needed for Pain.    METOPROLOL SUCCINATE (TOPROL-XL) 25 MG 24 HR TABLET    Take 1 tablet (25 mg total) by mouth once daily.    NICOTINE (NICODERM CQ) 7 MG/24 HR    Place 1 patch onto the skin every 24 hours.    NITROGLYCERIN (NITROSTAT) 0.4 MG SL TABLET        NITROGLYCERIN 0.4 MG/HR TD PT24  (NITRODUR) 0.4 MG/HR    Place 1 patch onto the skin once daily.    ONDANSETRON (ZOFRAN) 4 MG TABLET    TAKE 1 BY MOUTH EVERY 8 HOURS AS NEEDED FOR NAUSEA.    PANTOPRAZOLE (PROTONIX) 40 MG TABLET    Take 1 tablet (40 mg total) by mouth 2 (two) times daily.    POTASSIUM CHLORIDE SA (K-DUR,KLOR-CON) 20 MEQ TABLET    Take 20 mEq by mouth 2 (two) times daily.    ROSUVASTATIN (CRESTOR) 20 MG TABLET    Take 20 mg by mouth.    SUMATRIPTAN (IMITREX) 100 MG TABLET    Take 100 mg by mouth every 2 (two) hours as needed.    TIZANIDINE (ZANAFLEX) 4 MG TABLET    Take 4 mg by mouth every evening.    TOPIRAMATE (TOPAMAX) 100 MG TABLET    Take 100 mg by mouth 2 (two) times daily.    TRAMADOL (ULTRAM) 50 MG TABLET    Take 50 mg by mouth every 4 (four) hours as needed.     TRAZODONE (DESYREL) 150 MG TABLET    Take 150 mg by mouth.    WARFARIN (COUMADIN) 3 MG TABLET    Take 1 tablet (3 mg total) by mouth every Tue, Wed, Fri, Sat, Sun.    WARFARIN (COUMADIN) 6 MG TABLET    Take 1 tablet (6 mg total) by mouth every Mon, Tues, Wed, Thurs, Fri. Except 9mg on Thursdays.   Modified Medications    No medications on file   Discontinued Medications    No medications on file       Review of Systems   Constitutional: Negative.    HENT: Negative.    Eyes: Negative.    Respiratory: Positive for shortness of breath.    Cardiovascular: Positive for chest pain and palpitations.   Gastrointestinal: Negative.    Genitourinary: Negative.    Musculoskeletal: Negative.    Skin: Negative.    Neurological: Positive for dizziness.   Endo/Heme/Allergies: Negative.    Psychiatric/Behavioral: The patient is nervous/anxious.    All 12 systems otherwise negative.      Wt Readings from Last 3 Encounters:   11/27/19 71.8 kg (158 lb 4.6 oz)   11/19/19 75.5 kg (166 lb 7.2 oz)   11/13/19 73 kg (160 lb 15 oz)     Temp Readings from Last 3 Encounters:   11/19/19 98.2 °F (36.8 °C) (Oral)   11/07/19 98.4 °F (36.9 °C) (Oral)   06/22/19 98 °F (36.7 °C) (Oral)     BP Readings  from Last 3 Encounters:   11/27/19 112/70   11/19/19 (!) 105/58   11/13/19 90/60     Pulse Readings from Last 3 Encounters:   11/27/19 85   11/19/19 91   11/13/19 (!) 56       /70 (BP Location: Right arm, Patient Position: Sitting, BP Method: Small (Manual))   Pulse 85   Wt 71.8 kg (158 lb 4.6 oz)   LMP  (LMP Unknown)   BMI 30.91 kg/m²     Objective:   Physical Exam   Constitutional: She is oriented to person, place, and time. She appears well-developed and well-nourished. No distress.   HENT:   Head: Normocephalic and atraumatic.   Nose: Nose normal.   Mouth/Throat: Oropharynx is clear and moist.   Eyes: Conjunctivae and EOM are normal. No scleral icterus.   Neck: Normal range of motion. Neck supple. No JVD present. No thyromegaly present.   Cardiovascular: Normal rate, regular rhythm, S1 normal and S2 normal. Exam reveals no gallop, no S3, no S4 and no friction rub.   Murmur heard.  Pulmonary/Chest: Effort normal and breath sounds normal. No stridor. No respiratory distress. She has no wheezes. She has no rales. She exhibits no tenderness.   Abdominal: Soft. Bowel sounds are normal. She exhibits no distension and no mass. There is no tenderness. There is no rebound.   Genitourinary:   Genitourinary Comments: Deferred   Musculoskeletal: Normal range of motion. She exhibits no edema, tenderness or deformity.   Lymphadenopathy:     She has no cervical adenopathy.   Neurological: She is alert and oriented to person, place, and time. She exhibits normal muscle tone. Coordination normal.   Skin: Skin is warm and dry. No rash noted. She is not diaphoretic. No erythema. No pallor.   Psychiatric: She has a normal mood and affect. Her behavior is normal. Judgment and thought content normal.   Nursing note and vitals reviewed.      Lab Results   Component Value Date     11/21/2019    K 3.9 11/21/2019     11/21/2019    CO2 28 11/21/2019    BUN 12 11/21/2019    CREATININE 0.9 11/21/2019     (H)  11/21/2019    HGBA1C 5.2 06/22/2019    MG 2.2 11/16/2019    AST 40 11/21/2019     (H) 11/21/2019    ALBUMIN 3.8 11/21/2019    PROT 7.7 11/21/2019    BILITOT 1.1 (H) 11/21/2019    WBC 6.89 11/19/2019    HGB 9.7 (L) 11/19/2019    HCT 33.2 (L) 11/19/2019     (H) 11/19/2019     11/19/2019    INR 1.9 (A) 11/27/2019    INR 2.0 (H) 11/21/2019    INR 3.1 08/22/2019    TSH 1.982 06/22/2019    CHOL 172 11/04/2019    HDL 39 (L) 11/04/2019    LDLCALC 107.2 11/04/2019    TRIG 129 11/04/2019     (H) 11/04/2019     Assessment:      1. Chronic diastolic heart failure    2. Status post coronary artery bypass graft    3. PVC (premature ventricular contraction)    4. Essential hypertension    5. H/O mitral valve repair    6. Nonrheumatic aortic insufficiency with aortic stenosis    7. Hx of mechanical aortic valve replacement    8. Chronic respiratory failure with hypoxia    9. Pulmonary HTN    10. Pulmonary fibrosis    11. Other sleep apnea    12. Elevated LFTs        Plan:   1. CAD s/p CABG  - cont current meds  - exercise nuclear stress to r/o ischemia - negative  - cannot tolerate Ranexa; cont NTG patch and PRN NTG tabs    2. HFpEF  - cont lasix PRN and K   - low salt diet     3. PVCs  - not candidate for ablation  - amiodarone high risk for pulm toxicity    4. PulmHTN/fibrosis  - RHC  With PH and elevated filling pressures  - f/u with pulm as needed - started on new IPF med  - f/u with pulm for BANDAR    5. Mech AVR - subtherapuetic INR   - recent echo gradient WNL  - cont coumadin clinic     6. HLD  - repeat CMP to eval LFTs before starting statin     A total of 45 minutes was spent in face to face time, of which 50 % was spent in counseling patient on disease process, complications, treatment, and side effects of medications.     The patient was explained the above plan and given opportunity to ask questions.  She understands, chooses and consents to this plan and accepts all the risks, which include  but are not limited to the risks mentioned above.   She understands the alternative of having no testing, interventions or treatments at this time. She left content and without further questions.     Thank you for allowing me to participate in this patient's care. Please do not hesitate to contact me with any questions or concerns. Consult note has been forwarded to the referral physician.

## 2019-11-27 NOTE — PROGRESS NOTES
Patient's INR is sub-therapeutic at 1.9.  Mrs. Sal's nintedanib, rosuvastatin and trazodone were discontinued post hospital discharge - patient is concerned about medication changes and will be discussing changes with her cardiologist today. She has also started taking cholestyramine, which she is aware of the need of  it from other medications by a couple of hours.    No abnormal pain, swelling or weakness noted.  Instructions given for patient to take warfarin 9mg on today; then increase dose to 3mg on Saturdays and 6mg on all other days of the week.  Patient was reminded to contact coumadin clinic if she has diarrhea or a decreased appetite in the future.  Patient voiced understanding.  Follow-up on Tuesday, 12/3/19 with labs.

## 2019-12-02 ENCOUNTER — TELEPHONE (OUTPATIENT)
Dept: RADIOLOGY | Facility: HOSPITAL | Age: 49
End: 2019-12-02

## 2019-12-03 ENCOUNTER — ANTI-COAG VISIT (OUTPATIENT)
Dept: CARDIOLOGY | Facility: CLINIC | Age: 49
End: 2019-12-03
Payer: COMMERCIAL

## 2019-12-03 ENCOUNTER — HOSPITAL ENCOUNTER (OUTPATIENT)
Dept: RADIOLOGY | Facility: HOSPITAL | Age: 49
Discharge: HOME | End: 2019-12-03
Attending: FAMILY MEDICINE
Payer: COMMERCIAL

## 2019-12-03 ENCOUNTER — LAB VISIT (OUTPATIENT)
Dept: LAB | Facility: HOSPITAL | Age: 49
End: 2019-12-03
Attending: INTERNAL MEDICINE
Payer: COMMERCIAL

## 2019-12-03 ENCOUNTER — TELEPHONE (OUTPATIENT)
Dept: CARDIOLOGY | Facility: CLINIC | Age: 49
End: 2019-12-03

## 2019-12-03 VITALS — WEIGHT: 158.31 LBS | BODY MASS INDEX: 31.08 KG/M2 | HEIGHT: 60 IN

## 2019-12-03 DIAGNOSIS — Z79.01 LONG TERM (CURRENT) USE OF ANTICOAGULANTS: ICD-10-CM

## 2019-12-03 DIAGNOSIS — N63.20 LEFT BREAST MASS: ICD-10-CM

## 2019-12-03 DIAGNOSIS — Z95.2 HX OF MECHANICAL AORTIC VALVE REPLACEMENT: Chronic | ICD-10-CM

## 2019-12-03 LAB
INR PPP: 2.1 (ref 0.8–1.2)
PROTHROMBIN TIME: 21.7 SEC (ref 9–12.5)

## 2019-12-03 PROCEDURE — 93793 ANTICOAG MGMT PT WARFARIN: CPT | Mod: S$GLB,,,

## 2019-12-03 PROCEDURE — 77062 MAMMO DIGITAL DIAGNOSTIC BILAT WITH TOMOSYNTHESIS_CAD: ICD-10-PCS | Mod: 26,NTX,, | Performed by: RADIOLOGY

## 2019-12-03 PROCEDURE — 76642 ULTRASOUND BREAST LIMITED: CPT | Mod: TC,50,NTX

## 2019-12-03 PROCEDURE — 77066 DX MAMMO INCL CAD BI: CPT | Mod: 26,NTX,, | Performed by: RADIOLOGY

## 2019-12-03 PROCEDURE — 77062 BREAST TOMOSYNTHESIS BI: CPT | Mod: TC,NTX

## 2019-12-03 PROCEDURE — 77066 MAMMO DIGITAL DIAGNOSTIC BILAT WITH TOMOSYNTHESIS_CAD: ICD-10-PCS | Mod: 26,NTX,, | Performed by: RADIOLOGY

## 2019-12-03 PROCEDURE — 77062 BREAST TOMOSYNTHESIS BI: CPT | Mod: 26,NTX,, | Performed by: RADIOLOGY

## 2019-12-03 PROCEDURE — 85610 PROTHROMBIN TIME: CPT | Mod: NTX

## 2019-12-03 PROCEDURE — 93793 PR ANTICOAGULANT MGMT FOR PT TAKING WARFARIN: ICD-10-PCS | Mod: S$GLB,,,

## 2019-12-03 PROCEDURE — 76642 ULTRASOUND BREAST LIMITED: CPT | Mod: 26,NTX,, | Performed by: RADIOLOGY

## 2019-12-03 PROCEDURE — 76642 US BREAST BILATERAL LIMITED: ICD-10-PCS | Mod: 26,NTX,, | Performed by: RADIOLOGY

## 2019-12-03 PROCEDURE — 36415 COLL VENOUS BLD VENIPUNCTURE: CPT | Mod: NTX

## 2019-12-03 NOTE — TELEPHONE ENCOUNTER
"Mrs. Sal contacted coumadin clinic.  She has been experiencing diarrhea since yesterday and was nauseous on this morning.  PT/INR will be drawn on today.    Patient also states her daughter noticed "purple spider webs" on her back on yesterday.     Advised patient to contact physician to discuss symptoms.  Patient agreed and voiced understanding.    Currently awaiting lab results.   "

## 2019-12-03 NOTE — TELEPHONE ENCOUNTER
Spoke with patient she states she is having nausea and diarrhea and bruises on her back advise her to go PCP to get checked out. Patient states can will see him next week.

## 2019-12-03 NOTE — PROGRESS NOTES
Patient contacted:  INR is therapeutic at 2.1.  Previous instructions have been followed.  Reports symptoms of diarrhea has resolved.  Instructions given to maintain Warfarin 6 mg every evening.  Recheck on 12/16/2019 ([facility] Lab).  Patient repeated back instructions and voiced understanding.

## 2019-12-03 NOTE — TELEPHONE ENCOUNTER
----- Message from Carlota Murguia sent at 12/3/2019 11:24 AM CST -----  Please call pt back at [phone] in regards to pt is bruised up and nausea.

## 2019-12-10 ENCOUNTER — TELEPHONE (OUTPATIENT)
Dept: CARDIOLOGY | Facility: CLINIC | Age: 49
End: 2019-12-10

## 2019-12-10 ENCOUNTER — TELEPHONE (OUTPATIENT)
Dept: PULMONOLOGY | Facility: CLINIC | Age: 49
End: 2019-12-10

## 2019-12-10 NOTE — TELEPHONE ENCOUNTER
----- Message from Rayne Delgado sent at 12/10/2019  3:12 PM CST -----  Contact: [facility] DAUGHTER  CALLING CONCERNING NEEDING TO SPEAK WITH SOMEONE URGENT TO GET A BROKEN TOOTH EXTRACTION AND NEED COUMADIN LEVELS. PLEASE CALL [facility] ASAP URGENT!! @ [phone]. THANKS, CHUCHO

## 2019-12-10 NOTE — TELEPHONE ENCOUNTER
Pts daughter called about mom coumadin level because she has an infected tooth that has to be extracted sent message to  and DR. Salazar.  gave pt instructions to go to comaudin clinic to get levels drawn as well as Dr. Salazar with same instructions.

## 2019-12-10 NOTE — TELEPHONE ENCOUNTER
----- Message from Nettie Neely sent at 12/10/2019  3:37 PM CST -----  Contact: daughter  She's calling in regards to broken tooth; being pulled     Daughter has a Few questions       pls call pt at [phone] (home)

## 2019-12-10 NOTE — TELEPHONE ENCOUNTER
pts daughter spoke to pts PCP and is going to get pt-inr drawn tomorrow. Pt has been trying to reach coumadin clinic, advised that their last pt was at 3:15 so they may not be in office. Daughter will try again tomorrow.

## 2019-12-11 ENCOUNTER — TELEPHONE (OUTPATIENT)
Dept: CARDIOLOGY | Facility: CLINIC | Age: 49
End: 2019-12-11

## 2019-12-11 NOTE — TELEPHONE ENCOUNTER
Spoke to patient's daughter, Imani.  Patient needs to have 1 tooth pulled. Patient currently sleeping.  Message left for patient to call coumadin clinic later today, [phone].

## 2019-12-14 ENCOUNTER — HOSPITAL ENCOUNTER (INPATIENT)
Facility: HOSPITAL | Age: 49
LOS: 2 days | Discharge: HOME | DRG: 947 | End: 2019-12-16
Attending: EMERGENCY MEDICINE | Admitting: INTERNAL MEDICINE
Payer: COMMERCIAL

## 2019-12-14 DIAGNOSIS — R79.1 SUPRATHERAPEUTIC INR: Primary | ICD-10-CM

## 2019-12-14 DIAGNOSIS — R06.02 SHORTNESS OF BREATH: ICD-10-CM

## 2019-12-14 DIAGNOSIS — I50.9 ACUTE ON CHRONIC HEART FAILURE, UNSPECIFIED HEART FAILURE TYPE: ICD-10-CM

## 2019-12-14 DIAGNOSIS — N17.9 AKI (ACUTE KIDNEY INJURY): ICD-10-CM

## 2019-12-14 LAB
ALBUMIN SERPL BCP-MCNC: 3.4 G/DL (ref 3.5–5.2)
ALP SERPL-CCNC: 129 U/L (ref 55–135)
ALT SERPL W/O P-5'-P-CCNC: 129 U/L (ref 10–44)
ANION GAP SERPL CALC-SCNC: 10 MMOL/L (ref 8–16)
APTT BLDCRRT: 61.7 SEC (ref 21–32)
AST SERPL-CCNC: 110 U/L (ref 10–40)
BASOPHILS # BLD AUTO: 0.04 K/UL (ref 0–0.2)
BASOPHILS NFR BLD: 0.6 % (ref 0–1.9)
BILIRUB SERPL-MCNC: 1.4 MG/DL (ref 0.1–1)
BILIRUB UR QL STRIP: NEGATIVE
BNP SERPL-MCNC: 764 PG/ML (ref 0–99)
BUN SERPL-MCNC: 19 MG/DL (ref 6–20)
CALCIUM SERPL-MCNC: 8.3 MG/DL (ref 8.7–10.5)
CHLORIDE SERPL-SCNC: 101 MMOL/L (ref 95–110)
CLARITY UR: CLEAR
CO2 SERPL-SCNC: 23 MMOL/L (ref 23–29)
COLOR UR: YELLOW
CREAT SERPL-MCNC: 1.2 MG/DL (ref 0.5–1.4)
DIFFERENTIAL METHOD: ABNORMAL
EOSINOPHIL # BLD AUTO: 0.2 K/UL (ref 0–0.5)
EOSINOPHIL NFR BLD: 3.1 % (ref 0–8)
ERYTHROCYTE [DISTWIDTH] IN BLOOD BY AUTOMATED COUNT: 19.1 % (ref 11.5–14.5)
EST. GFR  (AFRICAN AMERICAN): >60 ML/MIN/1.73 M^2
EST. GFR  (NON AFRICAN AMERICAN): 53 ML/MIN/1.73 M^2
GLUCOSE SERPL-MCNC: 92 MG/DL (ref 70–110)
GLUCOSE UR QL STRIP: NEGATIVE
HCT VFR BLD AUTO: 32.1 % (ref 37–48.5)
HGB BLD-MCNC: 9 G/DL (ref 12–16)
HGB UR QL STRIP: ABNORMAL
IMM GRANULOCYTES # BLD AUTO: 0.03 K/UL (ref 0–0.04)
IMM GRANULOCYTES NFR BLD AUTO: 0.4 % (ref 0–0.5)
INFLUENZA A, MOLECULAR: NEGATIVE
INFLUENZA B, MOLECULAR: NEGATIVE
INR PPP: 7.7 (ref 0.8–1.2)
KETONES UR QL STRIP: NEGATIVE
LEUKOCYTE ESTERASE UR QL STRIP: NEGATIVE
LYMPHOCYTES # BLD AUTO: 1.4 K/UL (ref 1–4.8)
LYMPHOCYTES NFR BLD: 19.1 % (ref 18–48)
MCH RBC QN AUTO: 29.1 PG (ref 27–31)
MCHC RBC AUTO-ENTMCNC: 28 G/DL (ref 32–36)
MCV RBC AUTO: 104 FL (ref 82–98)
MICROSCOPIC COMMENT: ABNORMAL
MONOCYTES # BLD AUTO: 0.7 K/UL (ref 0.3–1)
MONOCYTES NFR BLD: 9.7 % (ref 4–15)
NEUTROPHILS # BLD AUTO: 4.8 K/UL (ref 1.8–7.7)
NEUTROPHILS NFR BLD: 67.1 % (ref 38–73)
NITRITE UR QL STRIP: NEGATIVE
NRBC BLD-RTO: 0 /100 WBC
PH UR STRIP: 6 [PH] (ref 5–8)
PLATELET # BLD AUTO: 154 K/UL (ref 150–350)
PMV BLD AUTO: 10.9 FL (ref 9.2–12.9)
POTASSIUM SERPL-SCNC: 4.3 MMOL/L (ref 3.5–5.1)
PROT SERPL-MCNC: 7.2 G/DL (ref 6–8.4)
PROT UR QL STRIP: NEGATIVE
PROTHROMBIN TIME: 77.8 SEC (ref 9–12.5)
RBC # BLD AUTO: 3.09 M/UL (ref 4–5.4)
RBC #/AREA URNS HPF: 10 /HPF (ref 0–4)
SODIUM SERPL-SCNC: 134 MMOL/L (ref 136–145)
SP GR UR STRIP: 1.01 (ref 1–1.03)
SPECIMEN SOURCE: NORMAL
TROPONIN I SERPL DL<=0.01 NG/ML-MCNC: 0.04 NG/ML (ref 0–0.03)
URN SPEC COLLECT METH UR: ABNORMAL
UROBILINOGEN UR STRIP-ACNC: NEGATIVE EU/DL
WBC # BLD AUTO: 7.11 K/UL (ref 3.9–12.7)

## 2019-12-14 PROCEDURE — 85730 THROMBOPLASTIN TIME PARTIAL: CPT | Mod: NTX

## 2019-12-14 PROCEDURE — 93005 ELECTROCARDIOGRAM TRACING: CPT | Mod: NTX

## 2019-12-14 PROCEDURE — 80053 COMPREHEN METABOLIC PANEL: CPT | Mod: NTX

## 2019-12-14 PROCEDURE — 87502 INFLUENZA DNA AMP PROBE: CPT | Mod: NTX

## 2019-12-14 PROCEDURE — 99285 EMERGENCY DEPT VISIT HI MDM: CPT | Mod: 25,NTX

## 2019-12-14 PROCEDURE — 96374 THER/PROPH/DIAG INJ IV PUSH: CPT | Mod: NTX

## 2019-12-14 PROCEDURE — 85025 COMPLETE CBC W/AUTO DIFF WBC: CPT | Mod: NTX

## 2019-12-14 PROCEDURE — 84484 ASSAY OF TROPONIN QUANT: CPT | Mod: NTX

## 2019-12-14 PROCEDURE — 83880 ASSAY OF NATRIURETIC PEPTIDE: CPT | Mod: NTX

## 2019-12-14 PROCEDURE — 81000 URINALYSIS NONAUTO W/SCOPE: CPT | Mod: NTX

## 2019-12-14 PROCEDURE — 93010 EKG 12-LEAD: ICD-10-PCS | Mod: NTX,,, | Performed by: INTERNAL MEDICINE

## 2019-12-14 PROCEDURE — 21400001 HC TELEMETRY ROOM: Mod: NTX

## 2019-12-14 PROCEDURE — 85610 PROTHROMBIN TIME: CPT | Mod: NTX

## 2019-12-14 PROCEDURE — 63600175 PHARM REV CODE 636 W HCPCS: Mod: NTX | Performed by: EMERGENCY MEDICINE

## 2019-12-14 PROCEDURE — 93010 ELECTROCARDIOGRAM REPORT: CPT | Mod: NTX,,, | Performed by: INTERNAL MEDICINE

## 2019-12-14 RX ORDER — TIZANIDINE 4 MG/1
4 TABLET ORAL NIGHTLY PRN
Status: DISCONTINUED | OUTPATIENT
Start: 2019-12-15 | End: 2019-12-16 | Stop reason: HOSPADM

## 2019-12-14 RX ORDER — CLONAZEPAM 1 MG/1
1 TABLET ORAL 3 TIMES DAILY
Status: DISCONTINUED | OUTPATIENT
Start: 2019-12-15 | End: 2019-12-15

## 2019-12-14 RX ORDER — NITROGLYCERIN 0.4 MG/1
0.4 TABLET SUBLINGUAL EVERY 5 MIN PRN
Status: DISCONTINUED | OUTPATIENT
Start: 2019-12-15 | End: 2019-12-16 | Stop reason: HOSPADM

## 2019-12-14 RX ORDER — DESVENLAFAXINE 100 MG/1
100 TABLET, EXTENDED RELEASE ORAL DAILY
Status: DISCONTINUED | OUTPATIENT
Start: 2019-12-15 | End: 2019-12-16 | Stop reason: HOSPADM

## 2019-12-14 RX ORDER — FUROSEMIDE 10 MG/ML
80 INJECTION INTRAMUSCULAR; INTRAVENOUS
Status: COMPLETED | OUTPATIENT
Start: 2019-12-14 | End: 2019-12-14

## 2019-12-14 RX ORDER — ONDANSETRON 2 MG/ML
4 INJECTION INTRAMUSCULAR; INTRAVENOUS EVERY 6 HOURS PRN
Status: DISCONTINUED | OUTPATIENT
Start: 2019-12-15 | End: 2019-12-16 | Stop reason: HOSPADM

## 2019-12-14 RX ORDER — AMITRIPTYLINE HYDROCHLORIDE 25 MG/1
25 TABLET, FILM COATED ORAL NIGHTLY
Status: DISCONTINUED | OUTPATIENT
Start: 2019-12-15 | End: 2019-12-16 | Stop reason: HOSPADM

## 2019-12-14 RX ORDER — POTASSIUM CHLORIDE 20 MEQ/1
20 TABLET, EXTENDED RELEASE ORAL 2 TIMES DAILY
Status: DISCONTINUED | OUTPATIENT
Start: 2019-12-15 | End: 2019-12-16 | Stop reason: HOSPADM

## 2019-12-14 RX ORDER — FUROSEMIDE 10 MG/ML
40 INJECTION INTRAMUSCULAR; INTRAVENOUS 2 TIMES DAILY
Status: DISCONTINUED | OUTPATIENT
Start: 2019-12-15 | End: 2019-12-16

## 2019-12-14 RX ORDER — NITROGLYCERIN 80 MG/1
1 PATCH TRANSDERMAL DAILY
Status: DISCONTINUED | OUTPATIENT
Start: 2019-12-15 | End: 2019-12-16 | Stop reason: HOSPADM

## 2019-12-14 RX ORDER — METOPROLOL SUCCINATE 25 MG/1
25 TABLET, EXTENDED RELEASE ORAL DAILY
Status: DISCONTINUED | OUTPATIENT
Start: 2019-12-15 | End: 2019-12-16 | Stop reason: HOSPADM

## 2019-12-14 RX ORDER — BUDESONIDE 0.5 MG/2ML
0.5 INHALANT ORAL EVERY 12 HOURS
Status: DISCONTINUED | OUTPATIENT
Start: 2019-12-15 | End: 2019-12-16 | Stop reason: HOSPADM

## 2019-12-14 RX ORDER — TRAMADOL HYDROCHLORIDE 50 MG/1
50 TABLET ORAL EVERY 4 HOURS PRN
Status: DISCONTINUED | OUTPATIENT
Start: 2019-12-15 | End: 2019-12-16 | Stop reason: HOSPADM

## 2019-12-14 RX ORDER — PANTOPRAZOLE SODIUM 40 MG/1
40 TABLET, DELAYED RELEASE ORAL 2 TIMES DAILY
Status: DISCONTINUED | OUTPATIENT
Start: 2019-12-15 | End: 2019-12-16 | Stop reason: HOSPADM

## 2019-12-14 RX ORDER — ARFORMOTEROL TARTRATE 15 UG/2ML
15 SOLUTION RESPIRATORY (INHALATION) 2 TIMES DAILY
Status: DISCONTINUED | OUTPATIENT
Start: 2019-12-15 | End: 2019-12-16 | Stop reason: HOSPADM

## 2019-12-14 RX ADMIN — FUROSEMIDE 80 MG: 10 INJECTION, SOLUTION INTRAMUSCULAR; INTRAVENOUS at 08:12

## 2019-12-15 PROBLEM — D64.9 ANEMIA: Status: ACTIVE | Noted: 2019-12-15

## 2019-12-15 PROBLEM — I10 ESSENTIAL HYPERTENSION: Chronic | Status: ACTIVE | Noted: 2017-08-03

## 2019-12-15 PROBLEM — R79.89 ELEVATED TROPONIN: Status: ACTIVE | Noted: 2019-12-15

## 2019-12-15 PROBLEM — I50.33 ACUTE ON CHRONIC DIASTOLIC HEART FAILURE: Status: ACTIVE | Noted: 2019-02-08

## 2019-12-15 LAB
ALBUMIN SERPL BCP-MCNC: 3.2 G/DL (ref 3.5–5.2)
ALP SERPL-CCNC: 116 U/L (ref 55–135)
ALT SERPL W/O P-5'-P-CCNC: 114 U/L (ref 10–44)
ANION GAP SERPL CALC-SCNC: 10 MMOL/L (ref 8–16)
AST SERPL-CCNC: 93 U/L (ref 10–40)
BASOPHILS # BLD AUTO: 0.05 K/UL (ref 0–0.2)
BASOPHILS NFR BLD: 0.8 % (ref 0–1.9)
BILIRUB SERPL-MCNC: 1.3 MG/DL (ref 0.1–1)
BUN SERPL-MCNC: 18 MG/DL (ref 6–20)
CALCIUM SERPL-MCNC: 8.3 MG/DL (ref 8.7–10.5)
CHLORIDE SERPL-SCNC: 99 MMOL/L (ref 95–110)
CO2 SERPL-SCNC: 28 MMOL/L (ref 23–29)
CREAT SERPL-MCNC: 1 MG/DL (ref 0.5–1.4)
DIFFERENTIAL METHOD: ABNORMAL
EOSINOPHIL # BLD AUTO: 0.3 K/UL (ref 0–0.5)
EOSINOPHIL NFR BLD: 4.9 % (ref 0–8)
ERYTHROCYTE [DISTWIDTH] IN BLOOD BY AUTOMATED COUNT: 18.9 % (ref 11.5–14.5)
EST. GFR  (AFRICAN AMERICAN): >60 ML/MIN/1.73 M^2
EST. GFR  (NON AFRICAN AMERICAN): >60 ML/MIN/1.73 M^2
GLUCOSE SERPL-MCNC: 106 MG/DL (ref 70–110)
HCT VFR BLD AUTO: 28.8 % (ref 37–48.5)
HGB BLD-MCNC: 8.2 G/DL (ref 12–16)
IMM GRANULOCYTES # BLD AUTO: 0.02 K/UL (ref 0–0.04)
IMM GRANULOCYTES NFR BLD AUTO: 0.3 % (ref 0–0.5)
INR PPP: 4.1 (ref 0.8–1.2)
LYMPHOCYTES # BLD AUTO: 1.6 K/UL (ref 1–4.8)
LYMPHOCYTES NFR BLD: 24.8 % (ref 18–48)
MCH RBC QN AUTO: 29.1 PG (ref 27–31)
MCHC RBC AUTO-ENTMCNC: 28.5 G/DL (ref 32–36)
MCV RBC AUTO: 102 FL (ref 82–98)
MONOCYTES # BLD AUTO: 0.8 K/UL (ref 0.3–1)
MONOCYTES NFR BLD: 12.6 % (ref 4–15)
NEUTROPHILS # BLD AUTO: 3.6 K/UL (ref 1.8–7.7)
NEUTROPHILS NFR BLD: 56.6 % (ref 38–73)
NRBC BLD-RTO: 0 /100 WBC
PLATELET # BLD AUTO: 127 K/UL (ref 150–350)
PMV BLD AUTO: 11.3 FL (ref 9.2–12.9)
POTASSIUM SERPL-SCNC: 3.7 MMOL/L (ref 3.5–5.1)
PROT SERPL-MCNC: 6.6 G/DL (ref 6–8.4)
PROTHROMBIN TIME: 41.8 SEC (ref 9–12.5)
RBC # BLD AUTO: 2.82 M/UL (ref 4–5.4)
SODIUM SERPL-SCNC: 137 MMOL/L (ref 136–145)
TROPONIN I SERPL DL<=0.01 NG/ML-MCNC: 0.04 NG/ML (ref 0–0.03)
WBC # BLD AUTO: 6.36 K/UL (ref 3.9–12.7)

## 2019-12-15 PROCEDURE — 36415 COLL VENOUS BLD VENIPUNCTURE: CPT | Mod: NTX

## 2019-12-15 PROCEDURE — 25000003 PHARM REV CODE 250: Mod: NTX | Performed by: NURSE PRACTITIONER

## 2019-12-15 PROCEDURE — 27000221 HC OXYGEN, UP TO 24 HOURS: Mod: NTX

## 2019-12-15 PROCEDURE — 21400001 HC TELEMETRY ROOM: Mod: NTX

## 2019-12-15 PROCEDURE — 85025 COMPLETE CBC W/AUTO DIFF WBC: CPT | Mod: NTX

## 2019-12-15 PROCEDURE — 84484 ASSAY OF TROPONIN QUANT: CPT | Mod: NTX

## 2019-12-15 PROCEDURE — 25000003 PHARM REV CODE 250: Mod: NTX | Performed by: INTERNAL MEDICINE

## 2019-12-15 PROCEDURE — 94761 N-INVAS EAR/PLS OXIMETRY MLT: CPT | Mod: NTX

## 2019-12-15 PROCEDURE — 25000242 PHARM REV CODE 250 ALT 637 W/ HCPCS: Mod: NTX | Performed by: FAMILY MEDICINE

## 2019-12-15 PROCEDURE — 94640 AIRWAY INHALATION TREATMENT: CPT | Mod: NTX

## 2019-12-15 PROCEDURE — 63600175 PHARM REV CODE 636 W HCPCS: Mod: NTX | Performed by: NURSE PRACTITIONER

## 2019-12-15 PROCEDURE — 85610 PROTHROMBIN TIME: CPT | Mod: NTX

## 2019-12-15 PROCEDURE — 80053 COMPREHEN METABOLIC PANEL: CPT | Mod: NTX

## 2019-12-15 PROCEDURE — 99900035 HC TECH TIME PER 15 MIN (STAT): Mod: NTX

## 2019-12-15 RX ORDER — HYDROCODONE BITARTRATE AND ACETAMINOPHEN 5; 325 MG/1; MG/1
1 TABLET ORAL EVERY 8 HOURS PRN
Status: DISCONTINUED | OUTPATIENT
Start: 2019-12-15 | End: 2019-12-16 | Stop reason: HOSPADM

## 2019-12-15 RX ORDER — CLONAZEPAM 1 MG/1
1 TABLET ORAL 2 TIMES DAILY PRN
Status: DISCONTINUED | OUTPATIENT
Start: 2019-12-15 | End: 2019-12-16 | Stop reason: HOSPADM

## 2019-12-15 RX ORDER — WARFARIN 3 MG/1
3 TABLET ORAL
Status: DISCONTINUED | OUTPATIENT
Start: 2019-12-21 | End: 2019-12-16 | Stop reason: HOSPADM

## 2019-12-15 RX ORDER — IPRATROPIUM BROMIDE AND ALBUTEROL SULFATE 2.5; .5 MG/3ML; MG/3ML
3 SOLUTION RESPIRATORY (INHALATION) EVERY 4 HOURS PRN
Status: DISCONTINUED | OUTPATIENT
Start: 2019-12-15 | End: 2019-12-16 | Stop reason: HOSPADM

## 2019-12-15 RX ADMIN — FUROSEMIDE 40 MG: 10 INJECTION, SOLUTION INTRAMUSCULAR; INTRAVENOUS at 09:12

## 2019-12-15 RX ADMIN — BUDESONIDE 0.5 MG: 0.5 SUSPENSION RESPIRATORY (INHALATION) at 07:12

## 2019-12-15 RX ADMIN — ARFORMOTEROL TARTRATE 15 MCG: 15 SOLUTION RESPIRATORY (INHALATION) at 07:12

## 2019-12-15 RX ADMIN — ARFORMOTEROL TARTRATE 15 MCG: 15 SOLUTION RESPIRATORY (INHALATION) at 08:12

## 2019-12-15 RX ADMIN — METOPROLOL SUCCINATE 25 MG: 25 TABLET, EXTENDED RELEASE ORAL at 09:12

## 2019-12-15 RX ADMIN — DESVENLAFAXINE SUCCINATE 100 MG: 100 TABLET, FILM COATED, EXTENDED RELEASE ORAL at 09:12

## 2019-12-15 RX ADMIN — NITROGLYCERIN 1 PATCH: 0.4 PATCH TRANSDERMAL at 09:12

## 2019-12-15 RX ADMIN — BUSPIRONE HYDROCHLORIDE 15 MG: 10 TABLET ORAL at 08:12

## 2019-12-15 RX ADMIN — TRAMADOL HYDROCHLORIDE 50 MG: 50 TABLET, FILM COATED ORAL at 12:12

## 2019-12-15 RX ADMIN — AMITRIPTYLINE HYDROCHLORIDE 25 MG: 25 TABLET, FILM COATED ORAL at 08:12

## 2019-12-15 RX ADMIN — POTASSIUM CHLORIDE 20 MEQ: 20 TABLET, EXTENDED RELEASE ORAL at 09:12

## 2019-12-15 RX ADMIN — POTASSIUM CHLORIDE 20 MEQ: 20 TABLET, EXTENDED RELEASE ORAL at 08:12

## 2019-12-15 RX ADMIN — PANTOPRAZOLE SODIUM 40 MG: 40 TABLET, DELAYED RELEASE ORAL at 09:12

## 2019-12-15 RX ADMIN — FUROSEMIDE 40 MG: 10 INJECTION, SOLUTION INTRAMUSCULAR; INTRAVENOUS at 05:12

## 2019-12-15 RX ADMIN — TIZANIDINE 4 MG: 4 TABLET ORAL at 12:12

## 2019-12-15 RX ADMIN — BUDESONIDE 0.5 MG: 0.5 SUSPENSION RESPIRATORY (INHALATION) at 08:12

## 2019-12-15 RX ADMIN — HYDROCODONE BITARTRATE AND ACETAMINOPHEN 1 TABLET: 5; 325 TABLET ORAL at 03:12

## 2019-12-15 RX ADMIN — BUSPIRONE HYDROCHLORIDE 15 MG: 10 TABLET ORAL at 09:12

## 2019-12-15 RX ADMIN — PANTOPRAZOLE SODIUM 40 MG: 40 TABLET, DELAYED RELEASE ORAL at 08:12

## 2019-12-15 RX ADMIN — CLONAZEPAM 1 MG: 1 TABLET ORAL at 09:12

## 2019-12-15 NOTE — PLAN OF CARE
Pt arrived on the floor c/o shoulder pain. Medication was given. She has rested well. Her heart rate has gotten as low as 46. No other new events or changes in her condition

## 2019-12-15 NOTE — PROGRESS NOTES
[facility] Hospital Medicine  Progress Note    Patient Name: Meg Sal  MRN: [id number]  Patient Class: IP- Inpatient   Admission Date: 12/14/2019  Length of Stay: 1 days  Attending Physician: Saleem Walden MD  Primary Care Provider: Lucero Banda MD    Subjective:     Principal Problem:Supratherapeutic INR        HPI:   Ms. Sal is a 50 yo female with a past medical history of CAD with remote CABG, valvular heart disease with remote mechanical AVR on Coumadin and mitral valve repair, severe pulmonary hypertension, interstitial lung disease with chronic hypoxic respiratory failure on 2 L home O2, hypertension, hyperlipidemia, GERD, anxiety, and chronic back pain.  Patient has had 2 admissions within the past 1 month for dehydration and Coumadin toxicity.  She presented to the ED tonight with complaints of fatigue that has progressively worsened over the past few days.  No aggravating or alleviating factors.  Associated generalized weakness, generalized myalgias, cough producing yellow sputum, chest pain, bilateral lower extremity edema, weight gain (12-15 # over past 5 days), nausea and diarrhea.  Patient reports taking 40mg PO Lasix x 4 tabs daily over the past 2 days with no improvement in symptoms.  Denies any shortness of breath, orthopnea, PND, palpitations, abdominal pain or distention, vomiting, hematemesis, melena, hematochezia, dysuria, hematuria, decreased urinary output, back pain, headache, lightheadedness or dizziness, syncope, rhinorrhea, sore throat, epistaxis, diaphoresis, fever or chills.  Initial workup in the ED resulted INR of 7.7, H&H 9/32.1, platelets 154, creatinine 1.2, BUN 19, , , T bili 1.4, alk-phos 129, troponin 0.039, , influenza negative, UA and CXR unremarkable, EKG unrevealing.  Patient received 80 mg IV Lasix in the ED.  Hospital Medicine was called for admission.      Overview/Hospital Course:  Meg Sal was admitted to [facility] for coumadin toxicity and decompensated diastolic heart failure. She was diuresed. Spouse admits to higher sodium intake over the last week. Mild troponin leak noted, likely related heart failure. Will trend. Initial INR 7.7. She admits to taking all medications (including Coumadin) at once. Has done this years, and had no problems with INR levels in the past. Last admission INR was also subtherapeutic. Will consult Coumadin educator. No evidence of active bleeding, so Vitamin K held. On 12/15, INR down to 4.4. Will continue to hold Coumadin. Liver Enzymes are chronically fluctuating. CT Abdomen 11/2019 , liver was unremarkable. STATIN is temporarily being held. transaminitis trending down.       Interval History:asleep, but easily aroused. Swelling has improved. States that she takes her Coumadin with all medications together for years. Is followed in Coumadin clinic. Husbands admits to eating more salt over the last week.    Review of Systems   Constitutional: Positive for fatigue and unexpected weight change. Negative for activity change, appetite change, chills, diaphoresis and fever.        (+) Malaise.   HENT: Negative for congestion, postnasal drip, rhinorrhea, sinus pressure and sore throat.    Respiratory: Positive for cough. Negative for chest tightness, shortness of breath and wheezing.    Cardiovascular: Positive for leg swelling. Negative for chest pain and palpitations.   Gastrointestinal: Negative for abdominal distention, abdominal pain, blood in stool, constipation, nausea and vomiting.   Genitourinary: Negative for decreased urine volume, difficulty urinating, dysuria, frequency, hematuria and urgency.   Musculoskeletal: Positive for myalgias (generalized). Negative for arthralgias, back pain and neck pain.   Skin: Negative for pallor, rash and wound.   Neurological: Positive for weakness (generalized). Negative for dizziness, syncope, light-headedness, numbness and headaches.    Psychiatric/Behavioral: Negative for confusion. The patient is not nervous/anxious.    All other systems reviewed and are negative.     Objective:     Vital Signs (Most Recent):  Temp: 96.3 °F (35.7 °C) (12/15/19 0740)  Pulse: (!) 47 (12/15/19 0916)  Resp: 18 (12/15/19 0815)  BP: 106/60 (12/15/19 0740)  SpO2: 97 % (12/15/19 0815) Vital Signs (24h Range):  Temp:  [96.3 °F (35.7 °C)-98.9 °F (37.2 °C)] 96.3 °F (35.7 °C)  Pulse:  [46-63] 47  Resp:  [16-23] 18  SpO2:  [91 %-99 %] 97 %  BP: (100-123)/(57-70) 106/60     Weight: 78.8 kg (173 lb 11.6 oz)  Body mass index is 33.93 kg/m².    Intake/Output Summary (Last 24 hours) at 12/15/2019 1105  Last data filed at 12/15/2019 0500  Gross per 24 hour   Intake 360 ml   Output 1100 ml   Net -740 ml      Physical Exam   Constitutional: She is oriented to person, place, and time. She appears well-developed and well-nourished. No distress.   HENT:   Head: Normocephalic and atraumatic.   Eyes: Conjunctivae are normal.   PERRL; EOM intact.   Neck: Normal range of motion. Neck supple. No JVD present.   Cardiovascular: Normal rate, regular rhythm, S1 normal, S2 normal and intact distal pulses.  No extrasystoles are present. Exam reveals no gallop and no friction rub.   Murmur heard.  Pulses:       Radial pulses are 2+ on the right side, and 2+ on the left side.        Dorsalis pedis pulses are 2+ on the right side, and 2+ on the left side.        Posterior tibial pulses are 2+ on the right side, and 2+ on the left side.   Pulmonary/Chest: Effort normal and breath sounds normal. No accessory muscle usage. No tachypnea. No respiratory distress. She has no wheezes. She has no rhonchi. She has no rales.   Abdominal: Soft. Bowel sounds are normal. She exhibits no distension. There is no hepatosplenomegaly. There is no tenderness. There is no rigidity, no rebound, no guarding and no CVA tenderness.   Musculoskeletal: Normal range of motion. She exhibits edema (2+ BLE). She exhibits no  tenderness or deformity.   Neurological: She is alert and oriented to person, place, and time. No cranial nerve deficit or sensory deficit.   Skin: Skin is warm, dry and intact. Capillary refill takes less than 2 seconds. No rash noted. She is not diaphoretic. No cyanosis or erythema.   Psychiatric: She has a normal mood and affect. Her speech is normal and behavior is normal. Cognition and memory are normal.   Nursing note and vitals reviewed.    Significant Labs:   CBC:   Recent Labs   Lab 12/14/19  1939 12/15/19  0442   WBC 7.11 6.36   HGB 9.0* 8.2*   HCT 32.1* 28.8*    127*     CMP:   Recent Labs   Lab 12/14/19  1939 12/15/19  0442   * 137   K 4.3 3.7    99   CO2 23 28   GLU 92 106   BUN 19 18   CREATININE 1.2 1.0   CALCIUM 8.3* 8.3*   PROT 7.2 6.6   ALBUMIN 3.4* 3.2*   BILITOT 1.4* 1.3*   ALKPHOS 129 116   * 93*   * 114*   ANIONGAP 10 10   EGFRNONAA 53* >60       Significant Imaging: I have reviewed all pertinent imaging results/findings within the past 24 hours.      Assessment/Plan:      * Supratherapeutic INR  - Initial INR of 7.7.  No evidence of active bleeding, H&H stable.  Hemodynamically stable.  - Hold Coumadin.  No UFH/LMWH.  Will hold off on vitamin K as patient is stable with no active bleeding.  - Daily INR.  - Patient with recent admission for Coumadin toxicity on 11/15.  Patient reports taking Coumadin as prescribed.  Will consult pharmacy for Coumadin education.  Patient will need close INR monitoring upon discharge.    12/15/19  INR 4.4. Is followed at Coumadin clinic.   Continue to hold Coumadin  Will need Coumadin teaching    Acute on chronic diastolic HFpEF of 55-60%  - Diurese with IV Lasix 40 mg BID.  - Strict I&O's, daily weights, Na/fluid restriction.  - Recent 2D echo reviewed.    12/15/19  Clinically improving; still has some swelling  Output -740 ml    Elevated troponin  - Troponin minimally elevated at 0.039, likely secondary to above.  EKG  unrevealing.  Chest pain-free on admission.  - Trend serial cardiac enzymes.  - No ASA due to #1.  Continue beta-blocker and nitrate.  Statin on hold due to elevated LFTs.  - Further recs pending clinical course.    Elevated LFTs  - LFTs chronically elevated over the past 1 month.  AST & ALT stable on admission.  - Continue holding statin.  - Follow daily labs.    12/15/19  Trending down  CT Abdomen 11/2019 unremarkable liver    Anemia  - H&H stable at 9/32.1.  No evidence of active bleeding.  - Follow daily CBC and transfuse as needed.    Essential hypertension  - BP borderline low on admission.  Will continue home Toprol XL with BP parameters.    Hx of mechanical aortic valve replacement  - Coumadin on hold as above.  Pharmacy consult for Coumadin dosing once INR stable.      VTE Risk Mitigation (From admission, onward)         Ordered     IP VTE HIGH RISK PATIENT  Once      12/14/19 2336     Reason for No Pharmacological VTE Prophylaxis  Once     Question:  Reasons:  Answer:  Already adequately anticoagulated on oral Anticoagulants    12/14/19 2336     Place sequential compression device  Until discontinued      12/14/19 2336                Alphonso Siddiqui NP  Department of Hospital Medicine   [facility]

## 2019-12-15 NOTE — ASSESSMENT & PLAN NOTE
- Troponin minimally elevated at 0.039, likely secondary to above.  EKG unrevealing.  Chest pain-free on admission.  - Trend serial cardiac enzymes.  - No ASA due to #1.  Continue beta-blocker and nitrate.  Statin on hold due to elevated LFTs.  - Further recs pending clinical course.

## 2019-12-15 NOTE — ASSESSMENT & PLAN NOTE
- LFTs chronically elevated over the past 1 month.  AST & ALT stable on admission.  - Continue holding statin.  - Follow daily labs.

## 2019-12-15 NOTE — ED PROVIDER NOTES
SCRIBE #1 NOTE: I, Rich Langley, am scribing for, and in the presence of, Marilee Yadav MD. I have scribed the HPI, ROS, and PEx.    SCRIBE #2 NOTE: I, Leydi Knapp, am scribing for, and in the presence of,  Sawyer Esparza MD. I have scribed the remaining portions of the note not scribed by Scribe #1.     History      Chief Complaint   Patient presents with    Fatigue     generalized weakness worsening over the past few days, BLE edema, body aches, cough with yellow sputum       Review of patient's allergies indicates:   Allergen Reactions    Nsaids (non-steroidal anti-inflammatory drug) Other (See Comments)     Mechanical heart valve        HPI   HPI    12/14/2019, 7:15 PM   History obtained from the patient      History of Present Illness: Meg Sal is a 49 y.o. female patient with a PMHx of aortic valve replace, CHF, CAD, fibrosis, hyperlipidemia, and HTNwho presents to the Emergency Department for fatigue which onset gradually past couple days. Pt states she has gained 12-13 lbs in 5 days. Pt states she is still urinating. Symptoms are constant and moderate in severity. No mitigating or exacerbating factors reported. Associated sxs include generalized weakness, body aches, BLE edema, CP, diarrhea, nausea, cough, and diarrhea. Patient denies any diaphoresis, palpitations, extremity weakness/numbness, leg pain, dizziness, vomiting, fever, and all other sxs at this time. Prior Tx includes 4 lasix a day with no relief. Pt states she has a heart valve and lung problems. Pt wears 2 L of O2. No further complaints or concerns at this time.       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, implants and grafts, initial encounter      Hyperlipidemia     Hypertension     Sleep apnea syndrome 2019    Stroke        Past Surgical History:  Past Surgical History:   Procedure Laterality Date    AORTIC VALVE REPLACEMENT      mechanical    CARDIAC CATHETERIZATION      CORONARY ANGIOPLASTY      CORONARY ARTERY BYPASS GRAFT      HYSTERECTOMY      RIGHT HEART CATHETERIZATION Right 2019    Procedure: INSERTION, CATHETER, RIGHT HEART;  Surgeon: Derek Brown MD;  Location: [facility];  Service: Cardiology;  Laterality: Right;  deejay pt/detailed hx         Family History:  Family History   Problem Relation Age of Onset    Heart disease Mother     Breast cancer Sister     Coronary artery disease Father     Lung cancer Brother        Social History:  Social History     Tobacco Use    Smoking status: Former Smoker     Packs/day: 1.00     Types: Cigarettes     Last attempt to quit: 2018     Years since quittin.5    Smokeless tobacco: Never Used   Substance and Sexual Activity    Alcohol use: Not Currently     Frequency: 2-4 times a month     Drinks per session: 1 or 2     Binge frequency: Never    Drug use: Not Currently    Sexual activity: Unknown       ROS   Review of Systems   Constitutional: Positive for fatigue. Negative for diaphoresis and fever.        (+) body aches and generalized weakness     HENT: Negative for sore throat.    Respiratory: Positive for cough. Negative for shortness of breath.    Cardiovascular: Positive for chest pain and leg swelling (BLE). Negative for palpitations.   Gastrointestinal: Positive for diarrhea and nausea. Negative for vomiting.   Genitourinary: Negative for dysuria.   Musculoskeletal: Negative for back pain.        (-) leg pain     Skin: Negative for rash.   Neurological: Negative for dizziness, weakness and numbness.   Hematological: Does not bruise/bleed easily.     Physical Exam      Initial Vitals [19 1905]   BP Pulse Resp Temp SpO2   (!) 102/59 61 16 98.2 °F (36.8 °C)  (!) 91 %      MAP       --          Physical Exam  Nursing Notes and Vital Signs Reviewed.  Constitutional: Patient is in no acute distress. Chronically ill appearing.  Head: Atraumatic. Normocephalic.  Eyes: PERRL. EOM intact. Conjunctivae are not pale. No scleral icterus.  ENT: Mucous membranes are moist. Oropharynx is clear and symmetric.    Neck: Supple. Full ROM. No lymphadenopathy.  Cardiovascular: Regular rate. Regular rhythm. No murmurs, rubs, or gallops. Distal pulses are 2+ and symmetric.  Pulmonary/Chest: No respiratory distress. Clear to auscultation bilaterally. No wheezing or rales.  Abdominal: Soft and non-distended.  There is no tenderness.  No rebound, guarding, or rigidity. Good bowel sounds.  Genitourinary: No CVA tenderness  Musculoskeletal: Moves all extremities. No obvious deformities. No edema. No calf tenderness.  Skin: Warm and dry. Pale.   Neurological:  Alert, awake, and appropriate.  Normal speech.  No acute focal neurological deficits are appreciated.  BLE: no evident deformity. 3+ bilateral edema up to knee. Negative for tenderness. ROM is normal. Cap refill distally is <2 seconds. DP and PT pulses are equal. No motor deficit. No distal sensory deficit  Psychiatric: Normal affect. Good eye contact. Appropriate in content.    ED Course    Procedures  ED Vital Signs:  Vitals:    12/14/19 2331 12/15/19 0100 12/15/19 0300 12/15/19 0411   BP: (!) 121/59   (!) 100/58   Pulse: (!) 58 (!) 51 (!) 46 (!) 46   Resp: 18   20   Temp: 98 °F (36.7 °C)   98 °F (36.7 °C)   TempSrc:       SpO2: 98%   99%   Weight:       Height:        12/15/19 0500 12/15/19 0728 12/15/19 0740 12/15/19 0810   BP:   106/60    Pulse: (!) 52 (!) 46 (!) 46 (!) 52   Resp:   20 18   Temp:   96.3 °F (35.7 °C)    TempSrc:   Oral    SpO2:   99% 97%   Weight: 78.8 kg (173 lb 11.6 oz)      Height:        12/15/19 0815 12/15/19 0916 12/15/19 1117 12/15/19 1118   BP:   (!) 94/53    Pulse: (!) 54 (!) 47 (!) 51 (!) 51   Resp: 18       Temp:   97.8 °F (36.6 °C)    TempSrc:       SpO2: 97%  98%    Weight:       Height:        12/15/19 1523 12/15/19 1912 12/15/19 1928   BP: (!) 107/55 (!) 88/53    Pulse: (!) 49 (!) 51 (!) 50   Resp: 20 18 18   Temp: 97.8 °F (36.6 °C) 98 °F (36.7 °C)    TempSrc:      SpO2: 97%  96%   Weight:      Height:          Abnormal Lab Results:  Labs Reviewed   CBC W/ AUTO DIFFERENTIAL - Abnormal; Notable for the following components:       Result Value    RBC 3.09 (*)     Hemoglobin 9.0 (*)     Hematocrit 32.1 (*)     Mean Corpuscular Volume 104 (*)     Mean Corpuscular Hemoglobin Conc 28.0 (*)     RDW 19.1 (*)     All other components within normal limits   COMPREHENSIVE METABOLIC PANEL - Abnormal; Notable for the following components:    Sodium 134 (*)     Calcium 8.3 (*)     Albumin 3.4 (*)     Total Bilirubin 1.4 (*)      (*)      (*)     eGFR if non  53 (*)     All other components within normal limits   TROPONIN I - Abnormal; Notable for the following components:    Troponin I 0.039 (*)     All other components within normal limits   B-TYPE NATRIURETIC PEPTIDE - Abnormal; Notable for the following components:     (*)     All other components within normal limits   PROTIME-INR - Abnormal; Notable for the following components:    Prothrombin Time 77.8 (*)     INR 7.7 (*)     All other components within normal limits    Narrative:     INR critical result(s) called and verbal readback obtained from CJ BAIRD RN. by ARNOLDO 12/14/2019 20:15   APTT - Abnormal; Notable for the following components:    aPTT 61.7 (*)     All other components within normal limits   URINALYSIS, REFLEX TO URINE CULTURE - Abnormal; Notable for the following components:    Occult Blood UA 2+ (*)     All other components within normal limits    Narrative:     Preferred Collection Type->Urine, Clean Catch   URINALYSIS MICROSCOPIC - Abnormal; Notable for the following components:    RBC, UA 10 (*)     All  other components within normal limits    Narrative:     Preferred Collection Type->Urine, Clean Catch        All Lab Results:  Results for orders placed or performed during the hospital encounter of 12/14/19   Influenza A & B by Molecular   Result Value Ref Range    Influenza A, Molecular Negative Negative    Influenza B, Molecular Negative Negative    Flu A & B Source Nasal swab    CBC auto differential   Result Value Ref Range    WBC 7.11 3.90 - 12.70 K/uL    RBC 3.09 (L) 4.00 - 5.40 M/uL    Hemoglobin 9.0 (L) 12.0 - 16.0 g/dL    Hematocrit 32.1 (L) 37.0 - 48.5 %    Mean Corpuscular Volume 104 (H) 82 - 98 fL    Mean Corpuscular Hemoglobin 29.1 27.0 - 31.0 pg    Mean Corpuscular Hemoglobin Conc 28.0 (L) 32.0 - 36.0 g/dL    RDW 19.1 (H) 11.5 - 14.5 %    Platelets 154 150 - 350 K/uL    MPV 10.9 9.2 - 12.9 fL    Immature Granulocytes 0.4 0.0 - 0.5 %    Gran # (ANC) 4.8 1.8 - 7.7 K/uL    Immature Grans (Abs) 0.03 0.00 - 0.04 K/uL    Lymph # 1.4 1.0 - 4.8 K/uL    Mono # 0.7 0.3 - 1.0 K/uL    Eos # 0.2 0.0 - 0.5 K/uL    Baso # 0.04 0.00 - 0.20 K/uL    nRBC 0 0 /100 WBC    Gran% 67.1 38.0 - 73.0 %    Lymph% 19.1 18.0 - 48.0 %    Mono% 9.7 4.0 - 15.0 %    Eosinophil% 3.1 0.0 - 8.0 %    Basophil% 0.6 0.0 - 1.9 %    Differential Method Automated    Comprehensive metabolic panel   Result Value Ref Range    Sodium 134 (L) 136 - 145 mmol/L    Potassium 4.3 3.5 - 5.1 mmol/L    Chloride 101 95 - 110 mmol/L    CO2 23 23 - 29 mmol/L    Glucose 92 70 - 110 mg/dL    BUN, Bld 19 6 - 20 mg/dL    Creatinine 1.2 0.5 - 1.4 mg/dL    Calcium 8.3 (L) 8.7 - 10.5 mg/dL    Total Protein 7.2 6.0 - 8.4 g/dL    Albumin 3.4 (L) 3.5 - 5.2 g/dL    Total Bilirubin 1.4 (H) 0.1 - 1.0 mg/dL    Alkaline Phosphatase 129 55 - 135 U/L     (H) 10 - 40 U/L     (H) 10 - 44 U/L    Anion Gap 10 8 - 16 mmol/L    eGFR if African American >60 >60 mL/min/1.73 m^2    eGFR if non African American 53 (A) >60 mL/min/1.73 m^2   Troponin I #1   Result Value  Ref Range    Troponin I 0.039 (H) 0.000 - 0.026 ng/mL   B-Type natriuretic peptide (BNP)   Result Value Ref Range     (H) 0 - 99 pg/mL   Protime-INR   Result Value Ref Range    Prothrombin Time 77.8 (H) 9.0 - 12.5 sec    INR 7.7 (HH) 0.8 - 1.2   APTT   Result Value Ref Range    aPTT 61.7 (H) 21.0 - 32.0 sec   Urinalysis, Reflex to Urine Culture Urine, Clean Catch   Result Value Ref Range    Specimen UA Urine, Clean Catch     Color, UA Yellow Yellow, Straw, Celena    Appearance, UA Clear Clear    pH, UA 6.0 5.0 - 8.0    Specific Gravity, UA 1.015 1.005 - 1.030    Protein, UA Negative Negative    Glucose, UA Negative Negative    Ketones, UA Negative Negative    Bilirubin (UA) Negative Negative    Occult Blood UA 2+ (A) Negative    Nitrite, UA Negative Negative    Urobilinogen, UA Negative <2.0 EU/dL    Leukocytes, UA Negative Negative   Urinalysis Microscopic   Result Value Ref Range    RBC, UA 10 (H) 0 - 4 /hpf    Microscopic Comment SEE COMMENT    CBC auto differential   Result Value Ref Range    WBC 6.36 3.90 - 12.70 K/uL    RBC 2.82 (L) 4.00 - 5.40 M/uL    Hemoglobin 8.2 (L) 12.0 - 16.0 g/dL    Hematocrit 28.8 (L) 37.0 - 48.5 %    Mean Corpuscular Volume 102 (H) 82 - 98 fL    Mean Corpuscular Hemoglobin 29.1 27.0 - 31.0 pg    Mean Corpuscular Hemoglobin Conc 28.5 (L) 32.0 - 36.0 g/dL    RDW 18.9 (H) 11.5 - 14.5 %    Platelets 127 (L) 150 - 350 K/uL    MPV 11.3 9.2 - 12.9 fL    Immature Granulocytes 0.3 0.0 - 0.5 %    Gran # (ANC) 3.6 1.8 - 7.7 K/uL    Immature Grans (Abs) 0.02 0.00 - 0.04 K/uL    Lymph # 1.6 1.0 - 4.8 K/uL    Mono # 0.8 0.3 - 1.0 K/uL    Eos # 0.3 0.0 - 0.5 K/uL    Baso # 0.05 0.00 - 0.20 K/uL    nRBC 0 0 /100 WBC    Gran% 56.6 38.0 - 73.0 %    Lymph% 24.8 18.0 - 48.0 %    Mono% 12.6 4.0 - 15.0 %    Eosinophil% 4.9 0.0 - 8.0 %    Basophil% 0.8 0.0 - 1.9 %    Differential Method Automated    Comprehensive metabolic panel   Result Value Ref Range    Sodium 137 136 - 145 mmol/L    Potassium 3.7  3.5 - 5.1 mmol/L    Chloride 99 95 - 110 mmol/L    CO2 28 23 - 29 mmol/L    Glucose 106 70 - 110 mg/dL    BUN, Bld 18 6 - 20 mg/dL    Creatinine 1.0 0.5 - 1.4 mg/dL    Calcium 8.3 (L) 8.7 - 10.5 mg/dL    Total Protein 6.6 6.0 - 8.4 g/dL    Albumin 3.2 (L) 3.5 - 5.2 g/dL    Total Bilirubin 1.3 (H) 0.1 - 1.0 mg/dL    Alkaline Phosphatase 116 55 - 135 U/L    AST 93 (H) 10 - 40 U/L     (H) 10 - 44 U/L    Anion Gap 10 8 - 16 mmol/L    eGFR if African American >60 >60 mL/min/1.73 m^2    eGFR if non African American >60 >60 mL/min/1.73 m^2   Protime-INR   Result Value Ref Range    Prothrombin Time 41.8 (H) 9.0 - 12.5 sec    INR 4.1 (H) 0.8 - 1.2   Troponin I   Result Value Ref Range    Troponin I 0.042 (H) 0.000 - 0.026 ng/mL       Imaging Results:  Imaging Results          X-Ray Chest AP Portable (Final result)  Result time 12/14/19 19:45:10    Final result by Kayode Vasquez MD (12/14/19 19:45:10)                 Impression:      Stable exam      Electronically signed by: Kayode Vasquez MD  Date:    12/14/2019  Time:    19:45             Narrative:    EXAMINATION:  XR CHEST AP PORTABLE    CLINICAL HISTORY:  Chest Pain;    TECHNIQUE:  Single frontal view of the chest was performed.    COMPARISON:  11/04/2019    FINDINGS:  Postop changes mediastinum with previous valvular replacement.  Mild cardiomegaly.    Stable diffuse interstitial thickening throughout the lungs without focal infiltrate.  Slightly shallower inspiration.    No effusions.                               The EKG was ordered, reviewed, and independently interpreted by the ED provider.  Interpretation time: 19:30  Rate: 58 BPM  Rhythm: sinus bradycardia  Interpretation: RBBB. Possible inferior infarct. T wave abnormality, consider lateral ischemia. Abnormal ECG. No STEMI.         The Emergency Provider reviewed the vital signs and test results, which are outlined above.    ED Discussion     8:00 PM: Dr. Yadav transfers care of pt to Dr. Esparza pending  lab results.    8:56 PM: Re-evaluated pt. I have discussed test results, shared treatment plan, and the need for admission with patient and family at bedside. Pt and family express understanding at this time and agree with all information. All questions answered. Pt and family have no further questions or concerns at this time. Pt is ready for admit.    8:56 PM: Discussed case with Laurie Antonio NP ([facility]). Dr. Everett agrees with current care and management of pt and accepts admission.   Admitting Service: [facility]  Admitting Physician: Dr. Everett  Admit to: In-patient Tele    ED Medication(s):  Medications   amitriptyline tablet 25 mg (has no administration in time range)   busPIRone tablet 15 mg (15 mg Oral Given 12/15/19 0927)   desvenlafaxine succinate 100 MG 24 hr tablet 100 mg (100 mg Oral Given 12/15/19 0927)   metoprolol succinate (TOPROL-XL) 24 hr tablet 25 mg (25 mg Oral Given 12/15/19 0924)   nitroGLYCERIN SL tablet 0.4 mg (has no administration in time range)   pantoprazole EC tablet 40 mg (40 mg Oral Given 12/15/19 0924)   tiZANidine tablet 4 mg (4 mg Oral Given 12/15/19 0045)   traMADol tablet 50 mg (50 mg Oral Given 12/15/19 0046)   traZODone tablet 150 mg (has no administration in time range)   nitroGLYCERIN 0.4 MG/HR TD PT24 1 patch (1 patch Transdermal Patch Applied 12/15/19 0926)   ondansetron injection 4 mg (has no administration in time range)   furosemide injection 40 mg (40 mg Intravenous Given 12/15/19 1751)   potassium chloride SA CR tablet 20 mEq (20 mEq Oral Given 12/15/19 0925)   arformoterol nebulizer solution 15 mcg (15 mcg Nebulization Given 12/15/19 1928)     And   budesonide nebulizer solution 0.5 mg (0.5 mg Nebulization Given 12/15/19 1928)   albuterol-ipratropium 2.5 mg-0.5 mg/3 mL nebulizer solution 3 mL (has no administration in time range)   clonazePAM tablet 1 mg (has no administration in time range)   HYDROcodone-acetaminophen 5-325 mg per tablet 1  tablet (1 tablet Oral Given 12/15/19 1549)   warfarin (PLACE CARDOZO ONLY) tablet 3 mg (has no administration in time range)   furosemide injection 80 mg (80 mg Intravenous Given 12/14/19 2008)             Medical Decision Making    Medical Decision Making:   Clinical Tests:   Lab Tests: Reviewed and Ordered  Radiological Study: Reviewed and Ordered  Medical Tests: Ordered and Reviewed           Scribe Attestation:   Scribe #1: I performed the above scribed service and the documentation accurately describes the services I performed. I attest to the accuracy of the note.    Attending:   Physician Attestation Statement for Scribe #1: I, Marilee Yadav MD, personally performed the services described in this documentation, as scribed by Rich Langley, in my presence, and it is both accurate and complete.       Scribe Attestation:   Scribe #2: I performed the above scribed service and the documentation accurately describes the services I performed. I attest to the accuracy of the note.    Attending Attestation:           Physician Attestation for Scribe:    Physician Attestation Statement for Scribe #2: I, Sawyer Esparza MD, reviewed documentation, as scribed by Leydi Knapp in my presence, and it is both accurate and complete. I also acknowledge and confirm the content of the note done by Scribe #1.          Clinical Impression       ICD-10-CM ICD-9-CM   1. Supratherapeutic INR R79.1 790.92   2. Shortness of breath R06.02 786.05   3. MICHELLE (acute kidney injury) N17.9 584.9   4. Acute on chronic heart failure, unspecified heart failure type I50.9 428.0       Disposition:   Disposition: Admitted  Condition: Fair         Sawyer Esparza MD  12/15/19 1955

## 2019-12-15 NOTE — ASSESSMENT & PLAN NOTE
- Diurese with IV Lasix 40 mg BID.  - Strict I&O's, daily weights, Na/fluid restriction.  - Recent 2D echo reviewed.    12/15/19  Clinically improving; still has some swelling  Output -740 ml

## 2019-12-15 NOTE — SUBJECTIVE & OBJECTIVE
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, implants and grafts, initial encounter     Hyperlipidemia     Hypertension     Sleep apnea syndrome 2/8/2019    Stroke        Past Surgical History:   Procedure Laterality Date    AORTIC VALVE REPLACEMENT      mechanical    CARDIAC CATHETERIZATION      CORONARY ANGIOPLASTY      CORONARY ARTERY BYPASS GRAFT      HYSTERECTOMY      RIGHT HEART CATHETERIZATION Right 5/17/2019    Procedure: INSERTION, CATHETER, RIGHT HEART;  Surgeon: Derek Brown MD;  Location: [facility] CATH LAB;  Service: Cardiology;  Laterality: Right;  malur pt/detailed hx       Review of patient's allergies indicates:   Allergen Reactions    Nsaids (non-steroidal anti-inflammatory drug) Other (See Comments)     Mechanical heart valve       No current facility-administered medications on file prior to encounter.      Current Outpatient Medications on File Prior to Encounter   Medication Sig    albuterol (VENTOLIN HFA) 90 mcg/actuation inhaler Inhale 2 puffs into the lungs every 6 (six) hours as needed for Wheezing. Rescue    amitriptyline (ELAVIL) 25 MG tablet Take 25 mg by mouth every evening.     ANORO ELLIPTA 62.5-25 mcg/actuation DsDv INHALE 1 PUFF INTO THE LUNGS ONCE A DAY. CONTROLLER.    busPIRone (BUSPAR) 15 MG tablet Take 15 mg by mouth 2 (two) times daily.     cholestyramine (QUESTRAN) 4 gram packet Take 1 packet (4 g total) by mouth 2 (two) times daily.    clonazePAM (KLONOPIN) 1 MG tablet Take 1 mg by mouth 3 (three) times daily.     desvenlafaxine succinate (PRISTIQ) 100 MG Tb24 Take 1 tablet (100 mg total) by mouth once daily.    diclofenac sodium (VOLTAREN) 1 % Gel Apply 2 g topically 4 (four) times daily as needed.     fluticasone (FLONASE) 50 mcg/actuation nasal spray 1 spray by Each Nare route once  daily.    furosemide (LASIX) 40 MG tablet Take two 40 mg tablets by mouth twice a day    HYDROcodone-acetaminophen (NORCO)  mg per tablet Take 1 tablet by mouth every 6 (six) hours as needed for Pain.    metoprolol succinate (TOPROL-XL) 25 MG 24 hr tablet Take 1 tablet (25 mg total) by mouth once daily.    nicotine (NICODERM CQ) 7 mg/24 hr Place 1 patch onto the skin every 24 hours.    nitroGLYCERIN (NITROSTAT) 0.4 MG SL tablet     nitroGLYCERIN 0.4 MG/HR TD PT24 (NITRODUR) 0.4 mg/hr Place 1 patch onto the skin once daily.    ondansetron (ZOFRAN) 4 MG tablet TAKE 1 BY MOUTH EVERY 8 HOURS AS NEEDED FOR NAUSEA.    pantoprazole (PROTONIX) 40 MG tablet Take 1 tablet (40 mg total) by mouth 2 (two) times daily.    potassium chloride SA (K-DUR,KLOR-CON) 20 MEQ tablet Take 20 mEq by mouth 2 (two) times daily.    rosuvastatin (CRESTOR) 20 MG tablet Take 20 mg by mouth.    sumatriptan (IMITREX) 100 MG tablet Take 100 mg by mouth every 2 (two) hours as needed.    tiZANidine (ZANAFLEX) 4 MG tablet Take 4 mg by mouth every evening.    topiramate (TOPAMAX) 100 MG tablet Take 100 mg by mouth 2 (two) times daily.    traMADol (ULTRAM) 50 mg tablet Take 50 mg by mouth every 4 (four) hours as needed.     traZODone (DESYREL) 150 MG tablet Take 150 mg by mouth.    warfarin (COUMADIN) 3 MG tablet Take 1 tablet (3 mg total) by mouth every Tue, Wed, Fri, Sat, Sun. (Patient taking differently: Take 3 mg by mouth. Every Saturday)    warfarin (COUMADIN) 6 MG tablet Take 1 tablet (6 mg total) by mouth every Mon, Tues, Wed, Thurs, Fri. Except 9mg on Thursdays. (Patient taking differently: Take 6 mg by mouth Daily. Take 1 tablet every evening as directed by the Coumadin Clinic.)     Family History     Problem Relation (Age of Onset)    Breast cancer Sister    Coronary artery disease Father    Heart disease Mother    Lung cancer Brother        Tobacco Use    Smoking status: Former Smoker     Packs/day: 1.00     Types:  Cigarettes     Last attempt to quit: 2018     Years since quittin.5    Smokeless tobacco: Never Used   Substance and Sexual Activity    Alcohol use: Not Currently     Frequency: 2-4 times a month     Drinks per session: 1 or 2     Binge frequency: Never    Drug use: Not Currently    Sexual activity: Not on file     Review of Systems   Constitutional: Positive for fatigue and unexpected weight change. Negative for activity change, appetite change, chills, diaphoresis and fever.        (+) Malaise.   HENT: Negative for congestion, postnasal drip, rhinorrhea, sinus pressure and sore throat.    Respiratory: Positive for cough. Negative for chest tightness, shortness of breath and wheezing.    Cardiovascular: Positive for chest pain and leg swelling. Negative for palpitations.   Gastrointestinal: Positive for diarrhea and nausea. Negative for abdominal distention, abdominal pain, blood in stool, constipation and vomiting.   Genitourinary: Negative for decreased urine volume, difficulty urinating, dysuria, frequency, hematuria and urgency.   Musculoskeletal: Positive for myalgias (generalized). Negative for arthralgias, back pain and neck pain.   Skin: Negative for pallor, rash and wound.   Neurological: Positive for weakness (generalized). Negative for dizziness, syncope, light-headedness, numbness and headaches.   Psychiatric/Behavioral: Negative for confusion. The patient is not nervous/anxious.    All other systems reviewed and are negative.    Objective:     Vital Signs (Most Recent):  Temp: 98 °F (36.7 °C) (19)  Pulse: (!) 58 (19)  Resp: 18 (19)  BP: (!) 121/59 (19)  SpO2: 98 % (19) Vital Signs (24h Range):  Temp:  [98 °F (36.7 °C)-98.9 °F (37.2 °C)] 98 °F (36.7 °C)  Pulse:  [58-63] 58  Resp:  [16-23] 18  SpO2:  [91 %-98 %] 98 %  BP: (102-123)/(57-70) 121/59     Weight: 79.5 kg (175 lb 3.2 oz)  Body mass index is 34.22 kg/m².    Physical Exam    Constitutional: She is oriented to person, place, and time. She appears well-developed and well-nourished. No distress.   HENT:   Head: Normocephalic and atraumatic.   Eyes: Conjunctivae are normal.   PERRL; EOM intact.   Neck: Normal range of motion. Neck supple. No JVD present.   Cardiovascular: Normal rate, regular rhythm, S1 normal, S2 normal and intact distal pulses.  No extrasystoles are present. Exam reveals no gallop and no friction rub.   Murmur heard.  Pulses:       Radial pulses are 2+ on the right side, and 2+ on the left side.        Dorsalis pedis pulses are 2+ on the right side, and 2+ on the left side.        Posterior tibial pulses are 2+ on the right side, and 2+ on the left side.   Pulmonary/Chest: Effort normal and breath sounds normal. No accessory muscle usage. No tachypnea. No respiratory distress. She has no wheezes. She has no rhonchi. She has no rales.   Abdominal: Soft. Bowel sounds are normal. She exhibits no distension. There is no hepatosplenomegaly. There is no tenderness. There is no rigidity, no rebound, no guarding and no CVA tenderness.   Musculoskeletal: Normal range of motion. She exhibits edema (3+ BLE). She exhibits no tenderness or deformity.   Neurological: She is alert and oriented to person, place, and time. No cranial nerve deficit or sensory deficit.   Skin: Skin is warm, dry and intact. Capillary refill takes less than 2 seconds. No rash noted. She is not diaphoretic. No cyanosis or erythema.   Psychiatric: She has a normal mood and affect. Her speech is normal and behavior is normal. Cognition and memory are normal.   Nursing note and vitals reviewed.          Significant Labs:  Results for orders placed or performed during the hospital encounter of 12/14/19   Influenza A & B by Molecular   Result Value Ref Range    Influenza A, Molecular Negative Negative    Influenza B, Molecular Negative Negative    Flu A & B Source Nasal swab    CBC auto differential   Result  Value Ref Range    WBC 7.11 3.90 - 12.70 K/uL    RBC 3.09 (L) 4.00 - 5.40 M/uL    Hemoglobin 9.0 (L) 12.0 - 16.0 g/dL    Hematocrit 32.1 (L) 37.0 - 48.5 %    Mean Corpuscular Volume 104 (H) 82 - 98 fL    Mean Corpuscular Hemoglobin 29.1 27.0 - 31.0 pg    Mean Corpuscular Hemoglobin Conc 28.0 (L) 32.0 - 36.0 g/dL    RDW 19.1 (H) 11.5 - 14.5 %    Platelets 154 150 - 350 K/uL    MPV 10.9 9.2 - 12.9 fL    Immature Granulocytes 0.4 0.0 - 0.5 %    Gran # (ANC) 4.8 1.8 - 7.7 K/uL    Immature Grans (Abs) 0.03 0.00 - 0.04 K/uL    Lymph # 1.4 1.0 - 4.8 K/uL    Mono # 0.7 0.3 - 1.0 K/uL    Eos # 0.2 0.0 - 0.5 K/uL    Baso # 0.04 0.00 - 0.20 K/uL    nRBC 0 0 /100 WBC    Gran% 67.1 38.0 - 73.0 %    Lymph% 19.1 18.0 - 48.0 %    Mono% 9.7 4.0 - 15.0 %    Eosinophil% 3.1 0.0 - 8.0 %    Basophil% 0.6 0.0 - 1.9 %    Differential Method Automated    Comprehensive metabolic panel   Result Value Ref Range    Sodium 134 (L) 136 - 145 mmol/L    Potassium 4.3 3.5 - 5.1 mmol/L    Chloride 101 95 - 110 mmol/L    CO2 23 23 - 29 mmol/L    Glucose 92 70 - 110 mg/dL    BUN, Bld 19 6 - 20 mg/dL    Creatinine 1.2 0.5 - 1.4 mg/dL    Calcium 8.3 (L) 8.7 - 10.5 mg/dL    Total Protein 7.2 6.0 - 8.4 g/dL    Albumin 3.4 (L) 3.5 - 5.2 g/dL    Total Bilirubin 1.4 (H) 0.1 - 1.0 mg/dL    Alkaline Phosphatase 129 55 - 135 U/L     (H) 10 - 40 U/L     (H) 10 - 44 U/L    Anion Gap 10 8 - 16 mmol/L    eGFR if African American >60 >60 mL/min/1.73 m^2    eGFR if non African American 53 (A) >60 mL/min/1.73 m^2   Troponin I #1   Result Value Ref Range    Troponin I 0.039 (H) 0.000 - 0.026 ng/mL   B-Type natriuretic peptide (BNP)   Result Value Ref Range     (H) 0 - 99 pg/mL   Protime-INR   Result Value Ref Range    Prothrombin Time 77.8 (H) 9.0 - 12.5 sec    INR 7.7 (HH) 0.8 - 1.2   APTT   Result Value Ref Range    aPTT 61.7 (H) 21.0 - 32.0 sec   Urinalysis, Reflex to Urine Culture Urine, Clean Catch   Result Value Ref Range    Specimen UA  Urine, Clean Catch     Color, UA Yellow Yellow, Straw, Celena    Appearance, UA Clear Clear    pH, UA 6.0 5.0 - 8.0    Specific Gravity, UA 1.015 1.005 - 1.030    Protein, UA Negative Negative    Glucose, UA Negative Negative    Ketones, UA Negative Negative    Bilirubin (UA) Negative Negative    Occult Blood UA 2+ (A) Negative    Nitrite, UA Negative Negative    Urobilinogen, UA Negative <2.0 EU/dL    Leukocytes, UA Negative Negative   Urinalysis Microscopic   Result Value Ref Range    RBC, UA 10 (H) 0 - 4 /hpf    Microscopic Comment SEE COMMENT       All pertinent labs within the past 24 hours have been reviewed.    Significant Imaging:  Imaging Results          X-Ray Chest AP Portable (Final result)  Result time 12/14/19 19:45:10    Final result by Kayode Vasquez MD (12/14/19 19:45:10)                 Impression:      Stable exam      Electronically signed by: Kayode Vasquez MD  Date:    12/14/2019  Time:    19:45             Narrative:    EXAMINATION:  XR CHEST AP PORTABLE    CLINICAL HISTORY:  Chest Pain;    TECHNIQUE:  Single frontal view of the chest was performed.    COMPARISON:  11/04/2019    FINDINGS:  Postop changes mediastinum with previous valvular replacement.  Mild cardiomegaly.    Stable diffuse interstitial thickening throughout the lungs without focal infiltrate.  Slightly shallower inspiration.    No effusions.                               I have reviewed all pertinent imaging results/findings within the past 24 hours.     EKG: (personally reviewed)  Sinus bradycardia, RBBB (chronic), unchanged inferior and lateral ST depression.  No acute ischemic ST-T changes.  No significant change from previous tracings.

## 2019-12-15 NOTE — PLAN OF CARE
CM spoke with patient who is sitting up in bed, awake, alert, and able to make needs known. Patient states that she feels better on today and had just completed Neb treatment. Patient reports that before she came to the hospital she was not using any assistive equipment, but did require the use of home oxygen at 2 liters. Patient reports that [facility] provides her oxygen at home. Patient reports that when her discharge is appropriate she will go home with no needs from CM. Patient declines any services be set up at home to manage her care. Patient reports that she takes coumadin and reports to the [facility] to have her labs done.  CM provided a transitional care folder, information on advanced directives, information on pharmacy bedside delivery, and discharge planning begins on admission with contact information for any needs/questions.    D/C Plan: Home   PCP: Dr. Banda   Preferred Pharmacy: [facility]   Discharge transportation: family   [facility]   Pharmacy Bedside Delivery:  Declined         12/15/19 0852   Discharge Assessment   Assessment Type Discharge Planning Assessment   Confirmed/corrected address and phone number on facesheet? Yes   Assessment information obtained from? Patient   Expected Length of Stay (days)   (TBD )   Communicated expected length of stay with patient/caregiver yes   Prior to hospitilization cognitive status: Alert/Oriented   Prior to hospitalization functional status: Independent   Current cognitive status: Alert/Oriented   Current Functional Status: Independent   Facility Arrived From: Home    Lives With spouse   Able to Return to Prior Arrangements yes   Is patient able to care for self after discharge? Yes   Who are your caregiver(s) and their phone number(s)? Derrell (spouse) [phone]   Patient's perception of discharge disposition home or selfcare   Patient currently being followed by outpatient case management? No   Patient currently receives any other outside agency  services? No   Equipment Currently Used at Home oxygen   Do you have any problems affording any of your prescribed medications? No   Is the patient taking medications as prescribed? yes   Does the patient have transportation home? Yes   Transportation Anticipated family or friend will provide   Does the patient receive services at the Coumadin Clinic? Yes   Discharge Plan A Home with family   DME Needed Upon Discharge  none   Patient/Family in Agreement with Plan yes

## 2019-12-15 NOTE — NURSING
Pt arrived to room 209. Pt placed on telemetry monitor 8602. Verified monitor with monitor tech. Pt oriented to room and call light. Admission complete. Comfort measures and safety measures addressed.

## 2019-12-15 NOTE — SUBJECTIVE & OBJECTIVE
Interval History:asleep, but easily aroused. Swelling has improved. States that she takes her Coumadin with all medications together for years. Is followed in Coumadin clinic. Husbands admits to eating more salt over the last week.    Review of Systems   Constitutional: Positive for fatigue and unexpected weight change. Negative for activity change, appetite change, chills, diaphoresis and fever.        (+) Malaise.   HENT: Negative for congestion, postnasal drip, rhinorrhea, sinus pressure and sore throat.    Respiratory: Positive for cough. Negative for chest tightness, shortness of breath and wheezing.    Cardiovascular: Positive for leg swelling. Negative for chest pain and palpitations.   Gastrointestinal: Negative for abdominal distention, abdominal pain, blood in stool, constipation, nausea and vomiting.   Genitourinary: Negative for decreased urine volume, difficulty urinating, dysuria, frequency, hematuria and urgency.   Musculoskeletal: Positive for myalgias (generalized). Negative for arthralgias, back pain and neck pain.   Skin: Negative for pallor, rash and wound.   Neurological: Positive for weakness (generalized). Negative for dizziness, syncope, light-headedness, numbness and headaches.   Psychiatric/Behavioral: Negative for confusion. The patient is not nervous/anxious.    All other systems reviewed and are negative.     Objective:     Vital Signs (Most Recent):  Temp: 96.3 °F (35.7 °C) (12/15/19 0740)  Pulse: (!) 47 (12/15/19 0916)  Resp: 18 (12/15/19 0815)  BP: 106/60 (12/15/19 0740)  SpO2: 97 % (12/15/19 0815) Vital Signs (24h Range):  Temp:  [96.3 °F (35.7 °C)-98.9 °F (37.2 °C)] 96.3 °F (35.7 °C)  Pulse:  [46-63] 47  Resp:  [16-23] 18  SpO2:  [91 %-99 %] 97 %  BP: (100-123)/(57-70) 106/60     Weight: 78.8 kg (173 lb 11.6 oz)  Body mass index is 33.93 kg/m².    Intake/Output Summary (Last 24 hours) at 12/15/2019 1105  Last data filed at 12/15/2019 0500  Gross per 24 hour   Intake 360 ml   Output  1100 ml   Net -740 ml      Physical Exam   Constitutional: She is oriented to person, place, and time. She appears well-developed and well-nourished. No distress.   HENT:   Head: Normocephalic and atraumatic.   Eyes: Conjunctivae are normal.   PERRL; EOM intact.   Neck: Normal range of motion. Neck supple. No JVD present.   Cardiovascular: Normal rate, regular rhythm, S1 normal, S2 normal and intact distal pulses.  No extrasystoles are present. Exam reveals no gallop and no friction rub.   Murmur heard.  Pulses:       Radial pulses are 2+ on the right side, and 2+ on the left side.        Dorsalis pedis pulses are 2+ on the right side, and 2+ on the left side.        Posterior tibial pulses are 2+ on the right side, and 2+ on the left side.   Pulmonary/Chest: Effort normal and breath sounds normal. No accessory muscle usage. No tachypnea. No respiratory distress. She has no wheezes. She has no rhonchi. She has no rales.   Abdominal: Soft. Bowel sounds are normal. She exhibits no distension. There is no hepatosplenomegaly. There is no tenderness. There is no rigidity, no rebound, no guarding and no CVA tenderness.   Musculoskeletal: Normal range of motion. She exhibits edema (2+ BLE). She exhibits no tenderness or deformity.   Neurological: She is alert and oriented to person, place, and time. No cranial nerve deficit or sensory deficit.   Skin: Skin is warm, dry and intact. Capillary refill takes less than 2 seconds. No rash noted. She is not diaphoretic. No cyanosis or erythema.   Psychiatric: She has a normal mood and affect. Her speech is normal and behavior is normal. Cognition and memory are normal.   Nursing note and vitals reviewed.    Significant Labs:   CBC:   Recent Labs   Lab 12/14/19  1939 12/15/19  0442   WBC 7.11 6.36   HGB 9.0* 8.2*   HCT 32.1* 28.8*    127*     CMP:   Recent Labs   Lab 12/14/19  1939 12/15/19  0442   * 137   K 4.3 3.7    99   CO2 23 28   GLU 92 106   BUN 19 18    CREATININE 1.2 1.0   CALCIUM 8.3* 8.3*   PROT 7.2 6.6   ALBUMIN 3.4* 3.2*   BILITOT 1.4* 1.3*   ALKPHOS 129 116   * 93*   * 114*   ANIONGAP 10 10   EGFRNONAA 53* >60       Significant Imaging: I have reviewed all pertinent imaging results/findings within the past 24 hours.

## 2019-12-15 NOTE — ASSESSMENT & PLAN NOTE
- Initial INR of 7.7.  No evidence of active bleeding, H&H stable.  Hemodynamically stable.  - Hold Coumadin.  No UFH/LMWH.  Will hold off on vitamin K as patient is stable with no active bleeding.  - Daily INR.  - Patient with recent admission for Coumadin toxicity on 11/15.  Patient reports taking Coumadin as prescribed.  Will consult pharmacy for Coumadin education.  Patient will need close INR monitoring upon discharge.

## 2019-12-15 NOTE — PROGRESS NOTES
Clinical Pharmacy Consult Note: Coumadin Dosing and Monitoring     Meg Sal 's Coumadin will be dosed and monitored by Pharmacy at the request of Laurie Antonio NP .    Indication: Hx of mechanical AVR  Target INR: 2-3  Lab Results   Component Value Date    INR 4.1 (H) 12/15/2019    INR 7.7 (HH) 12/14/2019    INR 2.1 (H) 12/03/2019     - Due to supra-therapeutic INR, continue to HOLD warfarin at this time.   - Patient will need to be educated.   - PT/INR will be monitored daily. Dose adjustments will be made accordingly.      Thank you for allowing us to participate in this patient's care.     Kamryn Martell PharmD 12/15/2019 5:13 PM

## 2019-12-15 NOTE — ED NOTES
The pt was given a specimen cup and wipe with clean catch instructions. Waiting for urine specimen

## 2019-12-15 NOTE — ASSESSMENT & PLAN NOTE
- LFTs chronically elevated over the past 1 month.  AST & ALT stable on admission.  - Continue holding statin.  - Follow daily labs.    12/15/19  Trending down  CT Abdomen 11/2019 unremarkable liver

## 2019-12-15 NOTE — HPI
Ms. Sal is a 50 yo female with a past medical history of CAD with remote CABG, valvular heart disease with remote mechanical AVR on Coumadin and mitral valve repair, severe pulmonary hypertension, interstitial lung disease with chronic hypoxic respiratory failure on 2 L home O2, hypertension, hyperlipidemia, GERD, anxiety, and chronic back pain.  Patient has had 2 admissions within the past 1 month for dehydration and Coumadin toxicity.  She presented to the ED tonight with complaints of fatigue that has progressively worsened over the past few days.  No aggravating or alleviating factors.  Associated generalized weakness, generalized myalgias, cough producing yellow sputum, chest pain, bilateral lower extremity edema, weight gain (12-15 # over past 5 days), nausea and diarrhea.  Patient reports taking 40mg PO Lasix x 4 tabs daily over the past 2 days with no improvement in symptoms.  Denies any shortness of breath, orthopnea, PND, palpitations, abdominal pain or distention, vomiting, hematemesis, melena, hematochezia, dysuria, hematuria, decreased urinary output, back pain, headache, lightheadedness or dizziness, syncope, rhinorrhea, sore throat, epistaxis, diaphoresis, fever or chills.  Initial workup in the ED resulted INR of 7.7, H&H 9/32.1, platelets 154, creatinine 1.2, BUN 19, , , T bili 1.4, alk-phos 129, troponin 0.039, , influenza negative, UA and CXR unremarkable, EKG unrevealing.  Patient received 80 mg IV Lasix in the ED.  Hospital Medicine was called for admission.

## 2019-12-15 NOTE — HOSPITAL COURSE
Meg Sal was admitted to [facility] for coumadin toxicity and decompensated diastolic heart failure. She was diuresed. Spouse admits to higher sodium intake over the last week. Mild troponin leak noted, likely related heart failure. Will trend. Initial INR 7.7. She admits to taking all medications (including Coumadin) at once. Has done this years, and had no problems with INR levels in the past. Last admission INR was also subtherapeutic. Will consult Coumadin educator. No evidence of active bleeding, so Vitamin K held. On 12/15, INR down to 4.4. Will continue to hold Coumadin. Liver Enzymes are chronically fluctuating. CT Abdomen 11/2019 , liver was unremarkable. STATIN is temporarily being held. transaminitis trending down. As of 12/16 INR is down to 2.2. She has remained stable overnight. She complains of intermittent constipation and diarrhea. Was asked to follow up with GI for further work up outpatient. She was counseled on importance of sodium restriction. Verbalized understanding. Will need to follow up with Cardiology in one week as well. Patient seen and examined on date of discharge and deemed suitable.

## 2019-12-15 NOTE — ASSESSMENT & PLAN NOTE
- Initial INR of 7.7.  No evidence of active bleeding, H&H stable.  Hemodynamically stable.  - Hold Coumadin.  No UFH/LMWH.  Will hold off on vitamin K as patient is stable with no active bleeding.  - Daily INR.  - Patient with recent admission for Coumadin toxicity on 11/15.  Patient reports taking Coumadin as prescribed.  Will consult pharmacy for Coumadin education.  Patient will need close INR monitoring upon discharge.    12/15/19  INR 4.4. Is followed at Coumadin clinic.   Continue to hold Coumadin  Will need Coumadin teaching

## 2019-12-15 NOTE — ASSESSMENT & PLAN NOTE
- Diurese with IV Lasix 40 mg BID.  - Strict I&O's, daily weights, Na/fluid restriction.  - Recent 2D echo reviewed.

## 2019-12-15 NOTE — ASSESSMENT & PLAN NOTE
- H&H stable at 9/32.1.  No evidence of active bleeding.  - Follow daily CBC and transfuse as needed.

## 2019-12-16 ENCOUNTER — TELEPHONE (OUTPATIENT)
Dept: CARDIOLOGY | Facility: CLINIC | Age: 49
End: 2019-12-16

## 2019-12-16 VITALS
SYSTOLIC BLOOD PRESSURE: 98 MMHG | RESPIRATION RATE: 18 BRPM | DIASTOLIC BLOOD PRESSURE: 60 MMHG | HEIGHT: 60 IN | BODY MASS INDEX: 35.28 KG/M2 | OXYGEN SATURATION: 96 % | TEMPERATURE: 98 F | WEIGHT: 179.69 LBS | HEART RATE: 62 BPM

## 2019-12-16 LAB
ALBUMIN SERPL BCP-MCNC: 3.1 G/DL (ref 3.5–5.2)
ALP SERPL-CCNC: 110 U/L (ref 55–135)
ALT SERPL W/O P-5'-P-CCNC: 90 U/L (ref 10–44)
ANION GAP SERPL CALC-SCNC: 9 MMOL/L (ref 8–16)
AST SERPL-CCNC: 67 U/L (ref 10–40)
BASOPHILS # BLD AUTO: 0.04 K/UL (ref 0–0.2)
BASOPHILS NFR BLD: 0.7 % (ref 0–1.9)
BILIRUB SERPL-MCNC: 1.1 MG/DL (ref 0.1–1)
BUN SERPL-MCNC: 17 MG/DL (ref 6–20)
CALCIUM SERPL-MCNC: 8.5 MG/DL (ref 8.7–10.5)
CHLORIDE SERPL-SCNC: 100 MMOL/L (ref 95–110)
CO2 SERPL-SCNC: 28 MMOL/L (ref 23–29)
CREAT SERPL-MCNC: 1.1 MG/DL (ref 0.5–1.4)
DIFFERENTIAL METHOD: ABNORMAL
EOSINOPHIL # BLD AUTO: 0.2 K/UL (ref 0–0.5)
EOSINOPHIL NFR BLD: 3.9 % (ref 0–8)
ERYTHROCYTE [DISTWIDTH] IN BLOOD BY AUTOMATED COUNT: 18.6 % (ref 11.5–14.5)
EST. GFR  (AFRICAN AMERICAN): >60 ML/MIN/1.73 M^2
EST. GFR  (NON AFRICAN AMERICAN): 59 ML/MIN/1.73 M^2
GLUCOSE SERPL-MCNC: 106 MG/DL (ref 70–110)
HCT VFR BLD AUTO: 30.8 % (ref 37–48.5)
HGB BLD-MCNC: 8.7 G/DL (ref 12–16)
IMM GRANULOCYTES # BLD AUTO: 0.02 K/UL (ref 0–0.04)
IMM GRANULOCYTES NFR BLD AUTO: 0.3 % (ref 0–0.5)
INR PPP: 2.2 (ref 0.8–1.2)
LYMPHOCYTES # BLD AUTO: 1.5 K/UL (ref 1–4.8)
LYMPHOCYTES NFR BLD: 25.6 % (ref 18–48)
MCH RBC QN AUTO: 28.9 PG (ref 27–31)
MCHC RBC AUTO-ENTMCNC: 28.2 G/DL (ref 32–36)
MCV RBC AUTO: 102 FL (ref 82–98)
MONOCYTES # BLD AUTO: 0.7 K/UL (ref 0.3–1)
MONOCYTES NFR BLD: 11.4 % (ref 4–15)
NEUTROPHILS # BLD AUTO: 3.4 K/UL (ref 1.8–7.7)
NEUTROPHILS NFR BLD: 58.1 % (ref 38–73)
NRBC BLD-RTO: 0 /100 WBC
PLATELET # BLD AUTO: 113 K/UL (ref 150–350)
PLATELET BLD QL SMEAR: ABNORMAL
PMV BLD AUTO: 10.8 FL (ref 9.2–12.9)
POTASSIUM SERPL-SCNC: 4.4 MMOL/L (ref 3.5–5.1)
PROT SERPL-MCNC: 6.6 G/DL (ref 6–8.4)
PROTHROMBIN TIME: 23.3 SEC (ref 9–12.5)
RBC # BLD AUTO: 3.01 M/UL (ref 4–5.4)
SODIUM SERPL-SCNC: 137 MMOL/L (ref 136–145)
TROPONIN I SERPL DL<=0.01 NG/ML-MCNC: 0.04 NG/ML (ref 0–0.03)
WBC # BLD AUTO: 5.87 K/UL (ref 3.9–12.7)

## 2019-12-16 PROCEDURE — 85610 PROTHROMBIN TIME: CPT | Mod: NTX

## 2019-12-16 PROCEDURE — 25000242 PHARM REV CODE 250 ALT 637 W/ HCPCS: Mod: NTX | Performed by: FAMILY MEDICINE

## 2019-12-16 PROCEDURE — 94640 AIRWAY INHALATION TREATMENT: CPT | Mod: NTX

## 2019-12-16 PROCEDURE — 94761 N-INVAS EAR/PLS OXIMETRY MLT: CPT | Mod: NTX

## 2019-12-16 PROCEDURE — 85025 COMPLETE CBC W/AUTO DIFF WBC: CPT | Mod: NTX

## 2019-12-16 PROCEDURE — 84484 ASSAY OF TROPONIN QUANT: CPT | Mod: NTX

## 2019-12-16 PROCEDURE — 36415 COLL VENOUS BLD VENIPUNCTURE: CPT | Mod: NTX

## 2019-12-16 PROCEDURE — 25000003 PHARM REV CODE 250: Mod: NTX | Performed by: NURSE PRACTITIONER

## 2019-12-16 PROCEDURE — 80053 COMPREHEN METABOLIC PANEL: CPT | Mod: NTX

## 2019-12-16 PROCEDURE — 25000003 PHARM REV CODE 250: Mod: NTX | Performed by: INTERNAL MEDICINE

## 2019-12-16 PROCEDURE — 27000221 HC OXYGEN, UP TO 24 HOURS: Mod: NTX

## 2019-12-16 RX ORDER — FUROSEMIDE 40 MG/1
40 TABLET ORAL DAILY
Status: DISCONTINUED | OUTPATIENT
Start: 2019-12-16 | End: 2019-12-16 | Stop reason: HOSPADM

## 2019-12-16 RX ADMIN — BUSPIRONE HYDROCHLORIDE 15 MG: 10 TABLET ORAL at 09:12

## 2019-12-16 RX ADMIN — BUDESONIDE 0.5 MG: 0.5 SUSPENSION RESPIRATORY (INHALATION) at 07:12

## 2019-12-16 RX ADMIN — DESVENLAFAXINE SUCCINATE 100 MG: 100 TABLET, FILM COATED, EXTENDED RELEASE ORAL at 09:12

## 2019-12-16 RX ADMIN — HYDROCODONE BITARTRATE AND ACETAMINOPHEN 1 TABLET: 5; 325 TABLET ORAL at 04:12

## 2019-12-16 RX ADMIN — FUROSEMIDE 40 MG: 40 TABLET ORAL at 09:12

## 2019-12-16 RX ADMIN — ARFORMOTEROL TARTRATE 15 MCG: 15 SOLUTION RESPIRATORY (INHALATION) at 07:12

## 2019-12-16 RX ADMIN — PANTOPRAZOLE SODIUM 40 MG: 40 TABLET, DELAYED RELEASE ORAL at 09:12

## 2019-12-16 RX ADMIN — METOPROLOL SUCCINATE 25 MG: 25 TABLET, EXTENDED RELEASE ORAL at 09:12

## 2019-12-16 RX ADMIN — POTASSIUM CHLORIDE 20 MEQ: 20 TABLET, EXTENDED RELEASE ORAL at 09:12

## 2019-12-16 NOTE — PLAN OF CARE
Dec27 Established Patient Visit with Ayaan Banda Md, MD   Friday Dec 27, 2019 10:40 AM   Arrive at check-in approximately 15 minutes before your scheduled appointment time. Bring all outside medical records and imaging, along with a list of your current medications and insurance card.  3rd Floor - From [address], take the [facility] exit ([address]). Enter the facility from the [address]  [facility]   [address] [facility] [address]  [place] [address]   [phone]        12/16/19 1442   Final Note   Assessment Type Final Discharge Note   Anticipated Discharge Disposition Home   Hospital Follow Up  Appt(s) scheduled? Yes   Right Care Referral Info   Post Acute Recommendation No Care

## 2019-12-16 NOTE — PLAN OF CARE
CM met with patient at the bedside to discuss the discharge plan. Patient states that she has oxygen and a rollator at home.  She also informed CM that she has used all of her home health visits for the year.  She does not anticipate any discharge needs at this time.    Met with patient reviewed CM Education Sheet for Heart Failure. Allowed time for questions. Patient verbalized understanding.   12/16/19 1026   Discharge Reassessment   Assessment Type Discharge Planning Reassessment   Provided patient/caregiver education on the expected discharge date and the discharge plan Yes   Do you have any problems affording any of your prescribed medications? No   Discharge Plan A Home with family   DME Needed Upon Discharge  none   Patient choice form signed by patient/caregiver N/A   Anticipated Discharge Disposition Home   Can the patient answer the patient profile reliably? Yes, cognitively intact

## 2019-12-16 NOTE — PROGRESS NOTES
Pharmacy Brief Progress Note: Coumadin Education     Patient/daughter educated on warfarin indication, side effects, and drug interactions. Discussed importance of medication compliance and INR monitoring and reviewed signs of abnormal bleeding. Patient did not need warfarin educational handouts because she has several copies at home. Patient/daughter expressed understanding and had no further questions.     Thank you for allowing us to participate in this patient's care.   Katherine McArdle, Pharm.D. 12/16/2019 12:57 PM

## 2019-12-16 NOTE — TELEPHONE ENCOUNTER
Patient contacted.  Mrs. Sal was discharged from hospital on today.  She was admitted for fatigue, weakness, N/V and supra-therapeutic INR.  Currently completing a dose of amoxicillin prescribed by her dentist.  Advised patient to restart warfarin at a lower dose of 3mg on today. PT/INR scheduled on 12/18.

## 2019-12-16 NOTE — PLAN OF CARE
Pt remains hypotensive and bradycardic. Still c/o Lt shoulder pain. No other new events  or changes in her condition

## 2019-12-16 NOTE — PLAN OF CARE
Pt tolerating inhalation treatments well with no distress noted.  Pt states uses inhalers and O2 at home.

## 2019-12-16 NOTE — PLAN OF CARE
Discussed Plan of Care with patient and verbalized understanding - Patient remains AAOx4 - remains free of falls, accidents and trauma during the day shift. Bed is in the low position and the call light is within reach. Will continue to monitor

## 2019-12-16 NOTE — DISCHARGE SUMMARY
[facility] Hospital Medicine  Discharge Summary      Patient Name: Meg Sal  MRN: [id number]  Admission Date: 12/14/2019  Hospital Length of Stay: 2 days  Discharge Date and Time:  12/16/2019 12:16 PM  Attending Physician: José Miguel Richardson MD   Discharging Provider: Alphonso Siddiqui NP  Primary Care Provider: Lucero Banda MD      HPI:    Ms. Sal is a 50 yo female with a past medical history of CAD with remote CABG, valvular heart disease with remote mechanical AVR on Coumadin and mitral valve repair, severe pulmonary hypertension, interstitial lung disease with chronic hypoxic respiratory failure on 2 L home O2, hypertension, hyperlipidemia, GERD, anxiety, and chronic back pain.  Patient has had 2 admissions within the past 1 month for dehydration and Coumadin toxicity.  She presented to the ED tonight with complaints of fatigue that has progressively worsened over the past few days.  No aggravating or alleviating factors.  Associated generalized weakness, generalized myalgias, cough producing yellow sputum, chest pain, bilateral lower extremity edema, weight gain (12-15 # over past 5 days), nausea and diarrhea.  Patient reports taking 40mg PO Lasix x 4 tabs daily over the past 2 days with no improvement in symptoms.  Denies any shortness of breath, orthopnea, PND, palpitations, abdominal pain or distention, vomiting, hematemesis, melena, hematochezia, dysuria, hematuria, decreased urinary output, back pain, headache, lightheadedness or dizziness, syncope, rhinorrhea, sore throat, epistaxis, diaphoresis, fever or chills.  Initial workup in the ED resulted INR of 7.7, H&H 9/32.1, platelets 154, creatinine 1.2, BUN 19, , , T bili 1.4, alk-phos 129, troponin 0.039, , influenza negative, UA and CXR unremarkable, EKG unrevealing.  Patient received 80 mg IV Lasix in the ED.  Hospital Medicine was called for admission.      * No surgery found *      Hospital  Course:   Meg Sal was admitted to [facility] for coumadin toxicity and decompensated diastolic heart failure. She was diuresed. Spouse admits to higher sodium intake over the last week. Mild troponin leak noted, likely related heart failure. Will trend. Initial INR 7.7. She admits to taking all medications (including Coumadin) at once. Has done this years, and had no problems with INR levels in the past. Last admission INR was also subtherapeutic. Will consult Coumadin educator. No evidence of active bleeding, so Vitamin K held. On 12/15, INR down to 4.4. Will continue to hold Coumadin. Liver Enzymes are chronically fluctuating. CT Abdomen 11/2019 , liver was unremarkable. STATIN is temporarily being held. transaminitis trending down. As of 12/16 INR is down to 2.2. She has remained stable overnight. She complains of intermittent constipation and diarrhea. Was asked to follow up with GI for further work up outpatient. She was counseled on importance of sodium restriction. Verbalized understanding. Will need to follow up with Cardiology in one week as well. Patient seen and examined on date of discharge and deemed suitable.        Consults:   Consults (From admission, onward)        Status Ordering Provider     Pharmacy to dose Warfarin consult  Once     Provider:  (Not yet assigned)    Acknowledged MELYSSA NUGENT          No new Assessment & Plan notes have been filed under this hospital service since the last note was generated.  Service: Hospital Medicine    Final Active Diagnoses:    Diagnosis Date Noted POA    PRINCIPAL PROBLEM:  Supratherapeutic INR [R79.1] 12/14/2019 Yes    Acute on chronic diastolic HFpEF of 55-60% [I50.33] 02/08/2019 Yes    Elevated troponin [R79.89] 12/15/2019 Yes    Elevated LFTs [R94.5] 11/15/2019 Yes    Anemia [D64.9] 12/15/2019 Yes    Essential hypertension [I10] 08/03/2017 Yes     Chronic    Hx of mechanical aortic valve replacement [Z95.2] 11/21/2016 Not  Applicable     Chronic      Problems Resolved During this Admission:       Discharged Condition: stable    Disposition: Home or Self Care    Follow Up:  Follow-up Information     MONICA Jefferson In 1 week.    Specialty:  Gastroenterology  Contact information:  [address]  [place] [address] [phone]             Kari Posada PA-C In 1 week.    Specialty:  Cardiology  Contact information:  [address] [facility] [address] [place] [id number] [phone]                 Patient Instructions:      Activity as tolerated       Significant Diagnostic Studies: Labs:   CMP   Recent Labs   Lab 12/14/19  1939 12/15/19  0442 12/16/19  0408   * 137 137   K 4.3 3.7 4.4    99 100   CO2 23 28 28   GLU 92 106 106   BUN 19 18 17   CREATININE 1.2 1.0 1.1   CALCIUM 8.3* 8.3* 8.5*   PROT 7.2 6.6 6.6   ALBUMIN 3.4* 3.2* 3.1*   BILITOT 1.4* 1.3* 1.1*   ALKPHOS 129 116 110   * 93* 67*   * 114* 90*   ANIONGAP 10 10 9   ESTGFRAFRICA >60 >60 >60   EGFRNONAA 53* >60 59*    and CBC   Recent Labs   Lab 12/14/19  1939 12/15/19  0442 12/16/19  0408   WBC 7.11 6.36 5.87   HGB 9.0* 8.2* 8.7*   HCT 32.1* 28.8* 30.8*    127* 113*       Pending Diagnostic Studies:     None         Medications:  Reconciled Home Medications:      Medication List      CHANGE how you take these medications    * warfarin 3 MG tablet  Commonly known as:  COUMADIN  Take 1 tablet (3 mg total) by mouth every Tue, Wed, Fri, Sat, Sun.  What changed:    · when to take this  · additional instructions     * warfarin 6 MG tablet  Commonly known as:  COUMADIN  Take 1 tablet (6 mg total) by mouth every Mon, Tues, Wed, Thurs, Fri. Except 9mg on Thursdays.  What changed:    · when to take this  · additional instructions         * This list has 2 medication(s) that are the same as other medications prescribed for you. Read the directions carefully, and ask your doctor or other care provider to review them with you.            CONTINUE  taking these medications    albuterol 90 mcg/actuation inhaler  Commonly known as:  Ventolin HFA  Inhale 2 puffs into the lungs every 6 (six) hours as needed for Wheezing. Rescue     amitriptyline 25 MG tablet  Commonly known as:  ELAVIL  Take 25 mg by mouth every evening.     Anoro Ellipta 62.5-25 mcg/actuation Dsdv  Generic drug:  umeclidinium-vilanterol  INHALE 1 PUFF INTO THE LUNGS ONCE A DAY. CONTROLLER.     busPIRone 15 MG tablet  Commonly known as:  BUSPAR  Take 15 mg by mouth 2 (two) times daily.     cholestyramine 4 gram packet  Commonly known as:  QUESTRAN  Take 1 packet (4 g total) by mouth 2 (two) times daily.     desvenlafaxine succinate 100 MG Tb24  Commonly known as:  PRISTIQ  Take 1 tablet (100 mg total) by mouth once daily.     diclofenac sodium 1 % Gel  Commonly known as:  VOLTAREN  Apply 2 g topically 4 (four) times daily as needed.     fluticasone propionate 50 mcg/actuation nasal spray  Commonly known as:  FLONASE  1 spray by Each Nare route once daily.     furosemide 40 MG tablet  Commonly known as:  LASIX  Take two 40 mg tablets by mouth twice a day     HYDROcodone-acetaminophen  mg per tablet  Commonly known as:  NORCO  Take 1 tablet by mouth every 6 (six) hours as needed for Pain.     KlonoPIN 1 MG tablet  Generic drug:  clonazePAM  Take 1 mg by mouth 3 (three) times daily.     metoprolol succinate 25 MG 24 hr tablet  Commonly known as:  TOPROL-XL  Take 1 tablet (25 mg total) by mouth once daily.     nicotine 7 mg/24 hr  Commonly known as:  NICODERM CQ  Place 1 patch onto the skin every 24 hours.     * nitroGLYCERIN 0.4 MG SL tablet  Commonly known as:  NITROSTAT     * nitroGLYCERIN 0.4 MG/HR TD PT24 0.4 mg/hr  Commonly known as:  NITRODUR  Place 1 patch onto the skin once daily.     ondansetron 4 MG tablet  Commonly known as:  ZOFRAN  TAKE 1 BY MOUTH EVERY 8 HOURS AS NEEDED FOR NAUSEA.     pantoprazole 40 MG tablet  Commonly known as:  PROTONIX  Take 1 tablet (40 mg total) by mouth 2  (two) times daily.     potassium chloride SA 20 MEQ tablet  Commonly known as:  K-DUR,KLOR-CON  Take 20 mEq by mouth 2 (two) times daily.     rosuvastatin 20 MG tablet  Commonly known as:  CRESTOR  Take 20 mg by mouth.     sumatriptan 100 MG tablet  Commonly known as:  IMITREX  Take 100 mg by mouth every 2 (two) hours as needed.     tiZANidine 4 MG tablet  Commonly known as:  ZANAFLEX  Take 4 mg by mouth every evening.     topiramate 100 MG tablet  Commonly known as:  TOPAMAX  Take 100 mg by mouth 2 (two) times daily.     traMADol 50 mg tablet  Commonly known as:  ULTRAM  Take 50 mg by mouth every 4 (four) hours as needed.     traZODone 150 MG tablet  Commonly known as:  DESYREL  Take 150 mg by mouth.         * This list has 2 medication(s) that are the same as other medications prescribed for you. Read the directions carefully, and ask your doctor or other care provider to review them with you.                Indwelling Lines/Drains at time of discharge:   Lines/Drains/Airways     None                 Time spent on the discharge of patient: >35 minutes  Patient was seen and examined on the date of discharge and determined to be suitable for discharge.         Alphonso Siddiqui NP  Department of Hospital Medicine  [facility]

## 2019-12-18 ENCOUNTER — PATIENT OUTREACH (OUTPATIENT)
Dept: ADMINISTRATIVE | Facility: CLINIC | Age: 49
End: 2019-12-18

## 2019-12-18 ENCOUNTER — TELEPHONE (OUTPATIENT)
Dept: PULMONOLOGY | Facility: CLINIC | Age: 49
End: 2019-12-18

## 2019-12-18 NOTE — TELEPHONE ENCOUNTER
----- Message from Gerri Sutton sent at 12/18/2019  4:03 PM CST -----  Contact: Pt   Pt is calling .Type:  Sooner Apoointment Request    Pt  is requesting a sooner appointment.  Pt declined first available appointment listed below.  Caller will not accept being placed on the waitlist and is requesting a message be sent to doctor.  Name of Caller Pt   When is the first available appointment? 01.07.2019   Symptoms: Hospital Follow Up and Medication update   Would the patient rather a call back or a response via [facility]? Call Back   Best Call Back Number: [phone]         .Thank You  Gerri Sutton

## 2019-12-18 NOTE — PATIENT INSTRUCTIONS
Warfarin tablets  What is this medicine?  WARFARIN (WAR far in) is an anticoagulant. It is used to treat or prevent clots in the veins, arteries, lungs, or heart.  How should I use this medicine?  Take this medicine by mouth with a glass of water. Follow the directions on the prescription label. You can take this medicine with or without food. Take your medicine at the same time each day. Do not take it more often than directed. Do not stop taking except on your doctor's advice. Stopping this medicine may increase your risk of a blood clot. Be sure to refill your prescription before you run out of medicine.  If your doctor or healthcare professional calls to change your dose, write down the dose and any other instructions. Always read the dose and instructions back to him or her to make sure you understand them. Tell your doctor or healthcare professional what strength of tablets you have on hand. Ask how many tablets you should take to equal your new dose. Write the date on the new instructions and keep them near your medicine. If you are told to stop taking your medicine until your next blood test, call your doctor or healthcare professional if you do not hear anything within 24 hours of the test to find out your new dose or when to restart your prior dose.  A special MedGuide will be given to you by the pharmacist with each prescription and refill. Be sure to read this information carefully each time.  Talk to your pediatrician regarding the use of this medicine in children. Special care may be needed.  What side effects may I notice from receiving this medicine?  Side effects that you should report to your doctor or health care professional as soon as possible:  · allergic reactions like skin rash, itching or hives, swelling of the face, lips, or tongue  · breathing problems  · chest pain  · dizziness  · headache  · heavy menstrual bleeding or vaginal bleeding  · pain in the lower back or side  · painful, blue  or purple toes  · painful skin ulcers that do not go away  · signs and symptoms of bleeding such as bloody or black, tarry stools; red or dark-brown urine; spitting up blood or brown material that looks like coffee grounds; red spots on the skin; unusual bruising or bleeding from the eye, gums, or nose  · stomach pain  · unusually weak or tired  Side effects that usually do not require medical attention (report to your doctor or health care professional if they continue or are bothersome):  · diarrhea  · hair loss  What may interact with this medicine?  Do not take this medicine with any of the following medications:  · agents that prevent or dissolve blood clots  · aspirin or other salicylates  · danshen  · dextrothyroxine  · mifepristone  · Beth's Wort  · red yeast rice  This medicine may also interact with the following medications:  · acetaminophen  · agents that lower cholesterol  · alcohol  · allopurinol  · amiodarone  · antibiotics or medicines for treating bacterial, fungal or viral infections  · azathioprine  · barbiturate medicines for inducing sleep or treating seizures  · certain medicines for diabetes  · certain medicines for heart rhythm problems  · certain medicines for high blood pressure  · chloral hydrate  · cisapride  · disulfiram  · female hormones, including contraceptive or birth control pills  · general anesthetics  · herbal or dietary products like garlic, ginkgo, ginseng, green tea, or kava kava  · influenza virus vaccine  · male hormones  · medicines for mental depression or psychosis  · medicines for some types of cancer  · medicines for stomach problems  · methylphenidate  · NSAIDs, medicines for pain and inflammation, like ibuprofen or naproxen  · propoxyphene  · quinidine, quinine  · raloxifene  · seizure or epilepsy medicine like carbamazepine, phenytoin, and valproic acid  · steroids like cortisone and prednisone  · tamoxifen  · thyroid medicine  · tramadol  · vitamin c, vitamin  e, and vitamin K  · zafirlukast  · zileuton  What if I miss a dose?  It is important not to miss a dose. If you miss a dose, call your healthcare provider. Take the dose as soon as possible on the same day. If it is almost time for your next dose, take only that dose. Do not take double or extra doses to make up for a missed dose.  Where should I keep my medicine?  Keep out of the reach of children.  Store at room temperature between 15 and 30 degrees C (59 and 86 degrees F). Protect from light. Throw away any unused medicine after the expiration date. Do not flush down the toilet.  What should I tell my health care provider before I take this medicine?  They need to know if you have any of these conditions:  · alcoholism  · anemia  · bleeding disorders  · cancer  · diabetes  · heart disease  · high blood pressure  · history of bleeding in the gastrointestinal tract  · history of stroke or other brain injury or disease  · kidney or liver disease  · protein C deficiency  · protein S deficiency  · psychosis or dementia  · recent injury, recent or planned surgery or procedure  · an unusual or allergic reaction to warfarin, other medicines, foods, dyes, or preservatives  · pregnant or trying to get pregnant  · breast-feeding  What should I watch for while using this medicine?  Visit your doctor or health care professional for regular checks on your progress. You will need to have a blood test called a PT/INR regularly. The PT/INR blood test is done to make sure you are getting the right dose of this medicine. It is important to not miss your appointment for the blood tests. When you first start taking this medicine, these tests are done often. Once the correct dose is determined and you take your medicine properly, these tests can be done less often.  Notify your doctor or health care professional and seek emergency treatment if you develop breathing problems; changes in vision; chest pain; severe, sudden headache;  pain, swelling, warmth in the leg; trouble speaking; sudden numbness or weakness of the face, arm or leg. These can be signs that your condition has gotten worse.  While you are taking this medicine, carry an identification card with your name, the name and dose of medicine(s) being used, and the name and phone number of your doctor or health care professional or person to contact in an emergency.  Do not start taking or stop taking any medicines or over-the-counter medicines except on the advice of your doctor or health care professional.  You should discuss your diet with your doctor or health care professional. Do not make major changes in your diet. Vitamin K can affect how well this medicine works. Many foods contain vitamin K. It is important to eat a consistent amount of foods with vitamin K. Other foods with vitamin K that you should eat in consistent amounts are asparagus, basil, beef or pork liver, black eyed peas, broccoli, brussel sprouts, cabbage, chickpeas, cucumber with peel, green onions, green tea, okra, parsley, peas, thyme, and green leafy vegetables like beet greens, dharmesh greens, endive, kale, mustard greens, spinach, turnip greens, watercress, or certain lettuces like green leaf or warren.  This medicine can cause birth defects or bleeding in an unborn child. Women of childbearing age should use effective birth control while taking this medicine. If a woman becomes pregnant while taking this medicine, she should discuss the potential risks and her options with her health care professional.  Avoid sports and activities that might cause injury while you are using this medicine. Severe falls or injuries can cause unseen bleeding. Be careful when using sharp tools or knives. Consider using an electric razor. Take special care brushing or flossing your teeth. Report any injuries, bruising, or red spots on the skin to your doctor or health care professional.  If you have an illness that causes  vomiting, diarrhea, or fever for more than a few days, contact your doctor. Also check with your doctor if you are unable to eat for several days. These problems can change the effect of this medicine.  Even after you stop taking this medicine, it takes several days before your body recovers its normal ability to clot blood. Ask your doctor or health care professional how long you need to be careful. If you are going to have surgery or dental work, tell your doctor or health care professional that you have been taking this medicine.  NOTE:This sheet is a summary. It may not cover all possible information. If you have questions about this medicine, talk to your doctor, pharmacist, or health care provider. Copyright© 2017 [employer]

## 2019-12-19 ENCOUNTER — LAB VISIT (OUTPATIENT)
Dept: LAB | Facility: HOSPITAL | Age: 49
End: 2019-12-19
Attending: INTERNAL MEDICINE
Payer: COMMERCIAL

## 2019-12-19 ENCOUNTER — TELEPHONE (OUTPATIENT)
Dept: CARDIOLOGY | Facility: CLINIC | Age: 49
End: 2019-12-19

## 2019-12-19 DIAGNOSIS — Z79.01 LONG TERM (CURRENT) USE OF ANTICOAGULANTS: ICD-10-CM

## 2019-12-19 LAB
INR PPP: 1.6 (ref 0.8–1.2)
PROTHROMBIN TIME: 15.3 SEC (ref 9–12.5)

## 2019-12-19 PROCEDURE — 85610 PROTHROMBIN TIME: CPT | Mod: TXP

## 2019-12-19 PROCEDURE — 36415 COLL VENOUS BLD VENIPUNCTURE: CPT | Mod: PO,NTX

## 2019-12-19 NOTE — TELEPHONE ENCOUNTER
Patient contacted regarding overdue INR results.  Patient states she did not have transportation on yesterday but agreed to go to lab on today.

## 2019-12-20 ENCOUNTER — ANTI-COAG VISIT (OUTPATIENT)
Dept: CARDIOLOGY | Facility: CLINIC | Age: 49
End: 2019-12-20
Payer: COMMERCIAL

## 2019-12-20 DIAGNOSIS — Z95.2 HX OF MECHANICAL AORTIC VALVE REPLACEMENT: Chronic | ICD-10-CM

## 2019-12-20 DIAGNOSIS — Z79.01 LONG TERM (CURRENT) USE OF ANTICOAGULANTS: ICD-10-CM

## 2019-12-20 PROCEDURE — 93793 ANTICOAG MGMT PT WARFARIN: CPT | Mod: S$GLB,,,

## 2019-12-20 PROCEDURE — 93793 PR ANTICOAGULANT MGMT FOR PT TAKING WARFARIN: ICD-10-PCS | Mod: S$GLB,,,

## 2019-12-20 RX ORDER — AMOXICILLIN 500 MG/1
CAPSULE ORAL
Refills: 0 | Status: ON HOLD | COMMUNITY
Start: 2019-12-07 | End: 2020-01-28 | Stop reason: HOSPADM

## 2019-12-20 NOTE — PROGRESS NOTES
Patient's INR is sub-therapeutic at 1.6.  Patient contacted.  Mrs. Sal was recently admitted to the hospital 12/14-12/16 with fatigue, nausea, diarrhea, and an elevated INR.  Warfarin dose held inpatient.  Patient also completing course of amoxicillin 500mg TID - interactions discussed.  She is unsure when antibiotic will be completed or when dental procedure will be scheduled.   Advised patient to contact coumadin clinic with all medication changes and with all upcoming procedure dates.  Warfarin has been restarted at a lower dose and patient will be monitored closely.     No abnormal pain, swelling or weakness noted.  Instructions given for patient to take warfarin 6mg on today; then take warfarin 3mg every evening.  Patient repeated directions and voiced understanding.  Follow-up on Monday, 12/23/19.

## 2019-12-23 ENCOUNTER — LAB VISIT (OUTPATIENT)
Dept: LAB | Facility: HOSPITAL | Age: 49
End: 2019-12-23
Attending: INTERNAL MEDICINE
Payer: COMMERCIAL

## 2019-12-23 DIAGNOSIS — Z79.01 LONG TERM (CURRENT) USE OF ANTICOAGULANTS: ICD-10-CM

## 2019-12-23 LAB
INR PPP: 1.4 (ref 0.8–1.2)
PROTHROMBIN TIME: 13.9 SEC (ref 9–12.5)

## 2019-12-23 PROCEDURE — 36415 COLL VENOUS BLD VENIPUNCTURE: CPT | Mod: PO,TXP

## 2019-12-23 PROCEDURE — 85610 PROTHROMBIN TIME: CPT | Mod: TXP

## 2019-12-24 ENCOUNTER — TELEPHONE (OUTPATIENT)
Dept: CARDIOLOGY | Facility: CLINIC | Age: 49
End: 2019-12-24

## 2019-12-24 ENCOUNTER — ANTI-COAG VISIT (OUTPATIENT)
Dept: CARDIOLOGY | Facility: CLINIC | Age: 49
End: 2019-12-24
Payer: COMMERCIAL

## 2019-12-24 DIAGNOSIS — Z95.2 HX OF MECHANICAL AORTIC VALVE REPLACEMENT: Chronic | ICD-10-CM

## 2019-12-24 DIAGNOSIS — Z79.01 LONG TERM (CURRENT) USE OF ANTICOAGULANTS: ICD-10-CM

## 2019-12-24 PROCEDURE — 93793 ANTICOAG MGMT PT WARFARIN: CPT | Mod: S$GLB,,,

## 2019-12-24 PROCEDURE — 93793 PR ANTICOAGULANT MGMT FOR PT TAKING WARFARIN: ICD-10-PCS | Mod: S$GLB,,,

## 2019-12-24 NOTE — PROGRESS NOTES
Patient contacted:  INR is subtherapeutic at 1.4.  Previous instructions were followed.  No other changes reported.  No signs or symptoms reported.  Instructions were given to take 6 mg today, 12/24, 3 mg on 12/25, and 6 mg on 12/26.  Recheck on Friday, 12/27/2019 ([facility]).  Advised to contact the Coumadin Clinic at [phone], if she should have any questions or concerns.  Patient repeated back instructions and voiced understanding.

## 2019-12-26 ENCOUNTER — TELEPHONE (OUTPATIENT)
Dept: CARDIOLOGY | Facility: CLINIC | Age: 49
End: 2019-12-26

## 2019-12-26 NOTE — TELEPHONE ENCOUNTER
Patient contacted and rescheduled her appointment due to transportation issues. Patient rescheduled for 01/07/2020     Previous message;  ----- Message from Oanh Cordero sent at 12/26/2019  1:39 PM CST -----  Contact: patient   Pt requesting a call back regarding 4 wk fu. She does not have transportation to come to appointment on 12/27. Next available is not until 1/15. Would like to know if she can get an appointment next week. Please call back at [phone]

## 2019-12-30 ENCOUNTER — LAB VISIT (OUTPATIENT)
Dept: LAB | Facility: HOSPITAL | Age: 49
End: 2019-12-30
Attending: INTERNAL MEDICINE
Payer: COMMERCIAL

## 2019-12-30 ENCOUNTER — TELEPHONE (OUTPATIENT)
Dept: CARDIOLOGY | Facility: CLINIC | Age: 49
End: 2019-12-30

## 2019-12-30 DIAGNOSIS — Z79.01 LONG TERM (CURRENT) USE OF ANTICOAGULANTS: ICD-10-CM

## 2019-12-30 LAB
INR PPP: 1.3 (ref 0.8–1.2)
PROTHROMBIN TIME: 12.6 SEC (ref 9–12.5)

## 2019-12-30 PROCEDURE — 36415 COLL VENOUS BLD VENIPUNCTURE: CPT | Mod: PO,NTX

## 2019-12-30 PROCEDURE — 85610 PROTHROMBIN TIME: CPT | Mod: TXP

## 2019-12-30 NOTE — TELEPHONE ENCOUNTER
Returned patient call.  Ms. Sal reports taking warfarin 6mg on Saturday and 3mg on Sunday, as she was unsure of what dose to take.  She also ate several servings of warren salad over the weekend.  Advised patient to take current dose of warfarin 3mg on tonight.  Patient will be contacted on tomorrow once lab results are received.

## 2019-12-31 ENCOUNTER — ANTI-COAG VISIT (OUTPATIENT)
Dept: CARDIOLOGY | Facility: CLINIC | Age: 49
End: 2019-12-31
Payer: COMMERCIAL

## 2019-12-31 DIAGNOSIS — Z95.2 HX OF MECHANICAL AORTIC VALVE REPLACEMENT: Chronic | ICD-10-CM

## 2019-12-31 DIAGNOSIS — Z79.01 LONG TERM (CURRENT) USE OF ANTICOAGULANTS: ICD-10-CM

## 2019-12-31 PROCEDURE — 93793 PR ANTICOAGULANT MGMT FOR PT TAKING WARFARIN: ICD-10-PCS | Mod: S$GLB,,,

## 2019-12-31 PROCEDURE — 93793 ANTICOAG MGMT PT WARFARIN: CPT | Mod: S$GLB,,,

## 2019-12-31 NOTE — PROGRESS NOTES
Patient's INR is sub-therapeutic at 1.3.  Patient contacted.  Mrs. Sal was unsure of what dose of warfarin to take over the weekend due to missing her appt on Friday.  She took 6mg on Saturday, 12/28.  INR low despite extra dose, as patient also reports eating several warren salads over the weekend.  Consistent vitamin K intake discussed in detail.  No abnormal pain, swelling or weakness noted.  Instructions given for patient to take warfarin 9mg on today; then increase dose of warfarin to 6mg on Tuesdays, Thursdays, Saturdays and 3mg on all other days of the week.   Patient wrote down instructions and repeated back.  Follow-up on 1/7/20, as patient states she is unable to make it any sooner.

## 2020-01-06 ENCOUNTER — TELEPHONE (OUTPATIENT)
Dept: PULMONOLOGY | Facility: CLINIC | Age: 50
End: 2020-01-06

## 2020-01-06 NOTE — TELEPHONE ENCOUNTER
----- Message from Lucy Moore sent at 1/6/2020 12:09 PM CST -----  Contact: [facility]  Please call Pharmacy @ [phone] regarding pt, states they can not refill medication, states pt is not answer, states meds can not be deliver until pharmacy speak with her, medication/Ofev .

## 2020-01-06 NOTE — TELEPHONE ENCOUNTER
Spoke to pt about a call from the [facility] they are trying to fill her Ofev but she was taken off of it while in the hospital. Pt requested something for strep informed pt she would have to get in contact with primary care  Asked pt was she alone pt says she is suffering she has no one to bring her to primary instructed pt to go to ER if symptoms worse pt stated she is in [facility] with no way to get there. I will call her back once I have spoke with the MD.

## 2020-01-09 ENCOUNTER — LAB VISIT (OUTPATIENT)
Dept: LAB | Facility: HOSPITAL | Age: 50
End: 2020-01-09
Attending: INTERNAL MEDICINE
Payer: COMMERCIAL

## 2020-01-09 DIAGNOSIS — Z79.01 LONG TERM (CURRENT) USE OF ANTICOAGULANTS: ICD-10-CM

## 2020-01-09 LAB
INR PPP: 1.9 (ref 0.8–1.2)
PROTHROMBIN TIME: 18 SEC (ref 9–12.5)

## 2020-01-09 PROCEDURE — 36415 COLL VENOUS BLD VENIPUNCTURE: CPT | Mod: PO,NTX

## 2020-01-09 PROCEDURE — 85610 PROTHROMBIN TIME: CPT | Mod: TXP

## 2020-01-10 ENCOUNTER — ANTI-COAG VISIT (OUTPATIENT)
Dept: CARDIOLOGY | Facility: CLINIC | Age: 50
End: 2020-01-10
Payer: COMMERCIAL

## 2020-01-10 DIAGNOSIS — Z95.2 HX OF MECHANICAL AORTIC VALVE REPLACEMENT: Chronic | ICD-10-CM

## 2020-01-10 DIAGNOSIS — Z79.01 LONG TERM (CURRENT) USE OF ANTICOAGULANTS: ICD-10-CM

## 2020-01-10 PROCEDURE — 93793 ANTICOAG MGMT PT WARFARIN: CPT | Mod: S$GLB,,,

## 2020-01-10 PROCEDURE — 93793 PR ANTICOAGULANT MGMT FOR PT TAKING WARFARIN: ICD-10-PCS | Mod: S$GLB,,,

## 2020-01-10 NOTE — PROGRESS NOTES
Patient's INR is sub-therapeutic at 1.9.   Patient contacted.  No missed doses or significant changes reported.  Mrs. Sal states she has a few more amoxicillin tablets remaining - agrees to contact coumadin clinic if restarted.  Instructions given for patient to increase dose of warfarin to 3mg on Mondays, Wednesdays and Fridays; and 6mg on all other days of the week.  Patient wrote instructions down and repeated back.  Recheck on 1/15/20 at [facility].  Please call should you have any questions or concerns at [phone] or [phone].

## 2020-01-14 ENCOUNTER — OFFICE VISIT (OUTPATIENT)
Dept: PULMONOLOGY | Facility: CLINIC | Age: 50
End: 2020-01-14
Payer: COMMERCIAL

## 2020-01-14 ENCOUNTER — ANTI-COAG VISIT (OUTPATIENT)
Dept: CARDIOLOGY | Facility: CLINIC | Age: 50
End: 2020-01-14
Payer: COMMERCIAL

## 2020-01-14 ENCOUNTER — LAB VISIT (OUTPATIENT)
Dept: LAB | Facility: HOSPITAL | Age: 50
End: 2020-01-14
Payer: COMMERCIAL

## 2020-01-14 VITALS
HEART RATE: 82 BPM | RESPIRATION RATE: 15 BRPM | WEIGHT: 154.13 LBS | BODY MASS INDEX: 30.26 KG/M2 | HEIGHT: 60 IN | OXYGEN SATURATION: 91 % | SYSTOLIC BLOOD PRESSURE: 88 MMHG | DIASTOLIC BLOOD PRESSURE: 64 MMHG

## 2020-01-14 DIAGNOSIS — I50.32 CHRONIC HEART FAILURE WITH PRESERVED EJECTION FRACTION: ICD-10-CM

## 2020-01-14 DIAGNOSIS — F17.210 TOBACCO DEPENDENCE DUE TO CIGARETTES: ICD-10-CM

## 2020-01-14 DIAGNOSIS — Z79.01 LONG TERM (CURRENT) USE OF ANTICOAGULANTS: ICD-10-CM

## 2020-01-14 DIAGNOSIS — Z95.4 HISTORY OF AORTIC VALVE REPLACEMENT WITH METALLIC VALVE: ICD-10-CM

## 2020-01-14 DIAGNOSIS — Z95.2 HX OF MECHANICAL AORTIC VALVE REPLACEMENT: Chronic | ICD-10-CM

## 2020-01-14 DIAGNOSIS — J84.10 PULMONARY FIBROSIS: Primary | ICD-10-CM

## 2020-01-14 DIAGNOSIS — I27.20 PULMONARY HYPERTENSION: ICD-10-CM

## 2020-01-14 DIAGNOSIS — J43.2 CENTRILOBULAR EMPHYSEMA: ICD-10-CM

## 2020-01-14 LAB
INR PPP: 2.2 (ref 0.8–1.2)
PROTHROMBIN TIME: 22.7 SEC (ref 9–12.5)

## 2020-01-14 PROCEDURE — 3074F PR MOST RECENT SYSTOLIC BLOOD PRESSURE < 130 MM HG: ICD-10-PCS | Mod: CPTII,NTX,S$GLB, | Performed by: INTERNAL MEDICINE

## 2020-01-14 PROCEDURE — 3078F DIAST BP <80 MM HG: CPT | Mod: CPTII,NTX,S$GLB, | Performed by: INTERNAL MEDICINE

## 2020-01-14 PROCEDURE — 93793 ANTICOAG MGMT PT WARFARIN: CPT | Mod: S$GLB,,,

## 2020-01-14 PROCEDURE — 36415 COLL VENOUS BLD VENIPUNCTURE: CPT | Mod: NTX

## 2020-01-14 PROCEDURE — 3008F BODY MASS INDEX DOCD: CPT | Mod: CPTII,NTX,S$GLB, | Performed by: INTERNAL MEDICINE

## 2020-01-14 PROCEDURE — 99215 PR OFFICE/OUTPT VISIT, EST, LEVL V, 40-54 MIN: ICD-10-PCS | Mod: NTX,S$GLB,, | Performed by: INTERNAL MEDICINE

## 2020-01-14 PROCEDURE — 3074F SYST BP LT 130 MM HG: CPT | Mod: CPTII,NTX,S$GLB, | Performed by: INTERNAL MEDICINE

## 2020-01-14 PROCEDURE — 99215 OFFICE O/P EST HI 40 MIN: CPT | Mod: NTX,S$GLB,, | Performed by: INTERNAL MEDICINE

## 2020-01-14 PROCEDURE — 3008F PR BODY MASS INDEX (BMI) DOCUMENTED: ICD-10-PCS | Mod: CPTII,NTX,S$GLB, | Performed by: INTERNAL MEDICINE

## 2020-01-14 PROCEDURE — 85610 PROTHROMBIN TIME: CPT | Mod: NTX

## 2020-01-14 PROCEDURE — 3078F PR MOST RECENT DIASTOLIC BLOOD PRESSURE < 80 MM HG: ICD-10-PCS | Mod: CPTII,NTX,S$GLB, | Performed by: INTERNAL MEDICINE

## 2020-01-14 PROCEDURE — 99999 PR PBB SHADOW E&M-EST. PATIENT-LVL IV: ICD-10-PCS | Mod: PBBFAC,TXP,, | Performed by: INTERNAL MEDICINE

## 2020-01-14 PROCEDURE — 99999 PR PBB SHADOW E&M-EST. PATIENT-LVL IV: CPT | Mod: PBBFAC,TXP,, | Performed by: INTERNAL MEDICINE

## 2020-01-14 PROCEDURE — 93793 PR ANTICOAGULANT MGMT FOR PT TAKING WARFARIN: ICD-10-PCS | Mod: S$GLB,,,

## 2020-01-14 RX ORDER — ALBUTEROL SULFATE 0.83 MG/ML
2.5 SOLUTION RESPIRATORY (INHALATION) EVERY 4 HOURS PRN
Qty: 1 BOX | Refills: 11 | Status: SHIPPED | OUTPATIENT
Start: 2020-01-14 | End: 2021-01-13

## 2020-01-14 NOTE — PROGRESS NOTES
Pulmonary Outpatient Follow Up Visit     Subjective:       Patient ID: Meg Sal is a 49 y.o. female.    Chief Complaint: Pulmonary Fibrosis and COPD      HPI         49-year-old female patient presenting for 3 months follow-up.  Last seen in our clinic by  NP October 2019.    Known with COPD/emphysema, pulmonary fibrosis and honeycombing on CT scan of the chest.    She does have pulmonary hypertension status post right heart catheterization normal wedge pressure and elevated mean pulmonary artery pressure to 35 mm Hg.    Metallic valve replacement on Coumadin.      On oxygen on exertion and during sleep.      Albuterol p.r.n. Anoro 1 puff daily.    Lost her father and her sister recently.  Resume smoking.      Admitted in November 2019 to the hospital found to have elevated liver enzymes AST ALT more than 600 U/L.    She was on nintedanib for about 4 months and nintedanib was discontinued since November 2019.    Review of Systems   Constitutional: Negative for fever and chills.   HENT: Negative for nosebleeds.    Eyes: Negative for redness.   Respiratory: Positive for snoring, cough, shortness of breath, dyspnea on extertion and somnolence. Negative for apnea and choking.    Cardiovascular: Positive for leg swelling.        AVR on Coumadin metallic   Genitourinary: Negative for hematuria.   Endocrine: Negative for cold intolerance.    Musculoskeletal: Positive for arthralgias and back pain.   Skin: Negative for rash.   Gastrointestinal: Negative for vomiting.   Neurological: Negative for syncope.        History of stroke   Hematological: Negative for adenopathy.   Psychiatric/Behavioral: Positive for sleep disturbance. Negative for confusion.       Outpatient Encounter Medications as of 1/14/2020   Medication Sig Dispense Refill    albuterol (VENTOLIN HFA) 90 mcg/actuation inhaler Inhale 2 puffs into the lungs every 6 (six) hours as needed for Wheezing. Rescue 18 g  11    amitriptyline (ELAVIL) 25 MG tablet Take 25 mg by mouth every evening.       amoxicillin (AMOXIL) 500 MG capsule TAKE 2 CAPSULES BY MOUTH now and then 1 CAPSULES 3 TIMES A DAY UNTIL GONE  0    ANORO ELLIPTA 62.5-25 mcg/actuation DsDv INHALE 1 PUFF INTO THE LUNGS ONCE A DAY. CONTROLLER. 60 each 5    busPIRone (BUSPAR) 15 MG tablet Take 15 mg by mouth 2 (two) times daily.       cholestyramine (QUESTRAN) 4 gram packet Take 1 packet (4 g total) by mouth 2 (two) times daily. 180 packet 0    clonazePAM (KLONOPIN) 1 MG tablet Take 1 mg by mouth 3 (three) times daily.       desvenlafaxine succinate (PRISTIQ) 100 MG Tb24 Take 1 tablet (100 mg total) by mouth once daily. 30 tablet 11    diclofenac sodium (VOLTAREN) 1 % Gel Apply 2 g topically 4 (four) times daily as needed.       fluticasone (FLONASE) 50 mcg/actuation nasal spray 1 spray by Each Nare route once daily.      furosemide (LASIX) 40 MG tablet Take two 40 mg tablets by mouth twice a day 120 tablet 6    HYDROcodone-acetaminophen (NORCO)  mg per tablet Take 1 tablet by mouth every 6 (six) hours as needed for Pain.  0    metoprolol succinate (TOPROL-XL) 25 MG 24 hr tablet Take 1 tablet (25 mg total) by mouth once daily. 30 tablet 6    nicotine (NICODERM CQ) 7 mg/24 hr Place 1 patch onto the skin every 24 hours.      nitroGLYCERIN (NITROSTAT) 0.4 MG SL tablet       nitroGLYCERIN 0.4 MG/HR TD PT24 (NITRODUR) 0.4 mg/hr Place 1 patch onto the skin once daily. 30 patch 1    ondansetron (ZOFRAN) 4 MG tablet TAKE 1 BY MOUTH EVERY 8 HOURS AS NEEDED FOR NAUSEA. 90 tablet 2    pantoprazole (PROTONIX) 40 MG tablet Take 1 tablet (40 mg total) by mouth 2 (two) times daily. 60 tablet 1    potassium chloride SA (K-DUR,KLOR-CON) 20 MEQ tablet Take 20 mEq by mouth 2 (two) times daily.      rosuvastatin (CRESTOR) 20 MG tablet Take 20 mg by mouth.      sumatriptan (IMITREX) 100 MG tablet Take 100 mg by mouth every 2 (two) hours as needed.      tiZANidine  (ZANAFLEX) 4 MG tablet Take 4 mg by mouth every evening.      topiramate (TOPAMAX) 100 MG tablet Take 100 mg by mouth 2 (two) times daily.      traMADol (ULTRAM) 50 mg tablet Take 50 mg by mouth every 4 (four) hours as needed.       traZODone (DESYREL) 150 MG tablet Take 150 mg by mouth.      warfarin (COUMADIN) 3 MG tablet Take 1 tablet (3 mg total) by mouth every Tue, Wed, Fri, Sat, Sun. (Patient taking differently: Take 3 mg by mouth every Mon, Wed, Fri. ) 30 tablet 0    warfarin (COUMADIN) 6 MG tablet Take 1 tablet (6 mg total) by mouth every Mon, Tues, Wed, Thurs, Fri. Except 9mg on Thursdays. (Patient taking differently: Take 6 mg by mouth. Sunday, Tuesday, Thursday and Saturday) 30 tablet 0    albuterol (PROVENTIL) 2.5 mg /3 mL (0.083 %) nebulizer solution Take 3 mLs (2.5 mg total) by nebulization every 4 (four) hours as needed for Wheezing or Shortness of Breath (Cough). Rescue 1 Box 11     No facility-administered encounter medications on file as of 1/14/2020.        Objective:     Vital Signs (Most Recent)  Vital Signs  Pulse: 82  Resp: 15  SpO2: (!) 91 %  BP: (!) 88/64  Height and Weight  Height: 5' (152.4 cm)  Weight: 69.9 kg (154 lb 1.6 oz)  BSA (Calculated - sq m): 1.72 sq meters  BMI (Calculated): 30.1  Weight in (lb) to have BMI = 25: 127.7]  Wt Readings from Last 2 Encounters:   01/14/20 69.9 kg (154 lb 1.6 oz)   12/16/19 81.5 kg (179 lb 10.8 oz)       Physical Exam   Constitutional: She is oriented to person, place, and time. She appears well-developed and well-nourished. She appears not cachectic.   HENT:   Head: Normocephalic.   Neck: Neck supple.   Cardiovascular: Normal rate and regular rhythm.   Murmur heard.  Pulmonary/Chest: Normal expansion and effort normal. No stridor. No respiratory distress. She has rales. She exhibits no tenderness.   Abdominal: Soft.   Musculoskeletal: She exhibits edema. She exhibits no tenderness.   Lymphadenopathy:     She has no cervical adenopathy.    Neurological: She is alert and oriented to person, place, and time. Gait normal.   Skin: Skin is warm. There is cyanosis. Nails show clubbing.   Psychiatric: She has a normal mood and affect. Her behavior is normal. Judgment and thought content normal.   Nursing note and vitals reviewed.      Laboratory  Lab Results   Component Value Date    WBC 5.87 12/16/2019    RBC 3.01 (L) 12/16/2019    HGB 8.7 (L) 12/16/2019    HCT 30.8 (L) 12/16/2019     (H) 12/16/2019    MCH 28.9 12/16/2019    MCHC 28.2 (L) 12/16/2019    RDW 18.6 (H) 12/16/2019     (L) 12/16/2019    MPV 10.8 12/16/2019    GRAN 3.4 12/16/2019    GRAN 58.1 12/16/2019    LYMPH 1.5 12/16/2019    LYMPH 25.6 12/16/2019    MONO 0.7 12/16/2019    MONO 11.4 12/16/2019    EOS 0.2 12/16/2019    BASO 0.04 12/16/2019    EOSINOPHIL 3.9 12/16/2019    BASOPHIL 0.7 12/16/2019       BMP  Lab Results   Component Value Date     12/16/2019    K 4.4 12/16/2019     12/16/2019    CO2 28 12/16/2019    BUN 17 12/16/2019    CREATININE 1.1 12/16/2019    CALCIUM 8.5 (L) 12/16/2019    ANIONGAP 9 12/16/2019    ESTGFRAFRICA >60 12/16/2019    EGFRNONAA 59 (A) 12/16/2019    AST 67 (H) 12/16/2019    ALT 90 (H) 12/16/2019    PROT 6.6 12/16/2019       Lab Results   Component Value Date     (H) 12/14/2019     (H) 11/04/2019    BNP 62 07/05/2019     (H) 06/21/2019     (H) 04/19/2019     (H) 03/11/2019       Lab Results   Component Value Date    TSH 1.982 06/22/2019       No results found for: SEDRATE    No results found for: CRP  No results found for: IGE     No results found for: ASPERGILLUS  No results found for: AFUMIGATUSCL     Lab Results   Component Value Date    ACE 43 03/11/2019        Patient had extensive rheumatology workup which was none relevant.  Diagnostic Results:    I have personally reviewed today the following studies:    Home sleep study April 27, 2019 shows AHI less than 5 events per hour.  O2 sat less than 90% for  128 min.  Snoring 100% of the time.     May 2019 right heart catheterization Hemodynamic Results       AIR REST:  RA: 13/12 (11)  RV: 57  RVEDP: 13     PW: 15/29 (16)  PA: 58/16 (34)    AIR REST (5/17/2019 10:57:34):  THERMALCO: 3.7200     Calculated pulmonary vascular resistance is 389 dynes sec/ cm     PFT done 04/12/2019 shows isolated severe reduction of DLCO.  Small airways disease.     CT chest [facility] January 2016   There are small cystic changes within the lungs consistent with COPD. Interstitial scarring noted at size is at the upper limits of normal. Mitral and aortic valve prosthesis are seen. Sternotomy sutures are in place with no alignment of the sternum. No sternal erosive changes or parasternal fluid noted. Within the lungs with no focal pneumonia, mass or nodularity. There is no pleural or pericardial effusion. Aorta is normal in caliber. There are small lymph nodes in the middle mediastinum.     CT scan of the chest March 28, 2019 personally reviewed:  The lungs are grossly abnormal.  There is rather extensive thickening of the interlobular septa throughout with rather pronounced centrilobular emphysematous change.  There are extensive areas of scarring bilaterally.  These are more pronounced along the periphery of the lungs.  There is definite evidence of honeycombing specially involving the posterior aspect of the lungs in the mid to lower lung fields there is no consolidation or effusion.  No neil pneumothorax.     Echo 2/2019:       1 - Mild left atrial enlargement.     2 - Eccentric hypertrophy.     3 - No wall motion abnormalities.     4 - Normal left ventricular systolic function (EF 55-60%).     5 - Normal right ventricular systolic function .     6 - Aortic valve prosthesis, CATINA = 1.61 cm2, AVAi = 0.95 cm2/m2, peak velocity = 2.48 m/s, mean gradient = 15 mmHg.     7 - Moderate tricuspid regurgitation.     8 - Pulmonary hypertension. The estimated PA systolic pressure is 61  mmHg        6 min walking test 04/30/2019  showed O2 sat dropped to 88% on room air at minute 5.  Predicted walk distance 68%.    Overnight oximetry October 3, 2019 lowest O2 sat 91%.  On 2 L nasal cannula.      Chest x-ray 12/14/2019 stable interstitial lung disease.    Assessment/Plan:   Pulmonary fibrosis  -     Spirometry with/without bronchodilator & DLCO; Future    Centrilobular emphysema  -     Spirometry with/without bronchodilator & DLCO; Future  -     albuterol (PROVENTIL) 2.5 mg /3 mL (0.083 %) nebulizer solution; Take 3 mLs (2.5 mg total) by nebulization every 4 (four) hours as needed for Wheezing or Shortness of Breath (Cough). Rescue  Dispense: 1 Box; Refill: 11    Pulmonary hypertension    Chronic heart failure with preserved ejection fraction    History of aortic valve replacement with metallic valve    Tobacco dependence due to cigarettes  -     Ambulatory referral to Smoking Cessation Program        O2 on exertion and during sleep.     Albuterol p.r.n. and Anoro 1 puff daily.     Continue Lasix 40 mgTwo tabs twice a day.   Continue Coumadin.    Encourage patient to continue smoking cessation.Refer again smoking cessation.       Patient pulmonary hypertension most likely related to hypoxemic respiratory disorder, rheumatology workup negative, no alternative diagnosis to IPF as a cause of her honeycombing evident on high-resolution CT scan.     I will keep patient off anti fibrotic.    Advised her to follow-up with lung transplant.  They have reached her before.       Patient to follow with GI regarding chronic GERD.  Continue PPI  .      Up-to-date on influenza pneumococcal vaccines.  MEDICAL DECISION MAKING: Moderate to high complexity.  Overall, the multiple problems listed are of moderate to high severity that may impact quality of life and activities of daily living. Side effects of medications, treatment plan as well as options and alternatives reviewed and discussed with patient. There was  counseling of patient concerning these issues.     Total time spent in face to face counseling and coordination of care - 40  minutes over 50% of time was used in discussion of prognosis, risks, benefits of treatment, instructions and compliance with regimen . Discussion with other physicians or health care providers (DME, NP, pharmacy, respiratory therapy) occurred.          Follow up in about 3 months (around 4/14/2020).    This note was prepared using voice recognition system and is likely to have sound alike errors that may have been overlooked even after proof reading.  Please call me with any questions    Discussed diagnosis, its evaluation, treatment and usual course. All questions answered.      Miguel Salazar MD

## 2020-01-14 NOTE — PROGRESS NOTES
Patient's INR is therapeutic at 2.2.  Patient contacted.  Previous instructions followed.   No upcoming procedures or changes reported.  No changes in dose.  Continue current dose of warfarin 3mg on Mondays, Wednesdays and Fridays; and 6mg on all other days of the week.  Patient repeated directions and voiced understanding.  Recheck INR on 1/30/2020.  Please call should you have any questions or concerns at [phone] or [phone].

## 2020-01-26 ENCOUNTER — HOSPITAL ENCOUNTER (INPATIENT)
Facility: HOSPITAL | Age: 50
LOS: 1 days | Discharge: HOME | DRG: 641 | End: 2020-01-28
Attending: EMERGENCY MEDICINE | Admitting: INTERNAL MEDICINE
Payer: COMMERCIAL

## 2020-01-26 DIAGNOSIS — I95.9 HYPOTENSION, UNSPECIFIED HYPOTENSION TYPE: ICD-10-CM

## 2020-01-26 DIAGNOSIS — R53.83 FATIGUE: ICD-10-CM

## 2020-01-26 DIAGNOSIS — R79.1 SUPRATHERAPEUTIC INTERNATIONAL NORMALIZED RATIO (INR): ICD-10-CM

## 2020-01-26 DIAGNOSIS — R00.1 SYMPTOMATIC SINUS BRADYCARDIA: Primary | ICD-10-CM

## 2020-01-26 DIAGNOSIS — R07.9 CHEST PAIN: ICD-10-CM

## 2020-01-26 DIAGNOSIS — R00.8 TRIGEMINY: ICD-10-CM

## 2020-01-26 DIAGNOSIS — I50.9 CONGESTIVE HEART FAILURE, UNSPECIFIED HF CHRONICITY, UNSPECIFIED HEART FAILURE TYPE: ICD-10-CM

## 2020-01-26 DIAGNOSIS — E86.0 DEHYDRATION: ICD-10-CM

## 2020-01-26 DIAGNOSIS — R00.1 BRADYCARDIA: ICD-10-CM

## 2020-01-26 PROBLEM — R53.1 GENERALIZED WEAKNESS: Status: ACTIVE | Noted: 2020-01-26

## 2020-01-26 LAB
ALBUMIN SERPL BCP-MCNC: 3.5 G/DL (ref 3.5–5.2)
ALP SERPL-CCNC: 122 U/L (ref 55–135)
ALT SERPL W/O P-5'-P-CCNC: 40 U/L (ref 10–44)
ANION GAP SERPL CALC-SCNC: 13 MMOL/L (ref 8–16)
APTT BLDCRRT: 42.7 SEC (ref 21–32)
AST SERPL-CCNC: 62 U/L (ref 10–40)
BASOPHILS # BLD AUTO: 0.04 K/UL (ref 0–0.2)
BASOPHILS NFR BLD: 0.6 % (ref 0–1.9)
BILIRUB SERPL-MCNC: 1.5 MG/DL (ref 0.1–1)
BILIRUB UR QL STRIP: NEGATIVE
BNP SERPL-MCNC: 483 PG/ML (ref 0–99)
BUN SERPL-MCNC: 19 MG/DL (ref 6–20)
CALCIUM SERPL-MCNC: 9.1 MG/DL (ref 8.7–10.5)
CHLORIDE SERPL-SCNC: 96 MMOL/L (ref 95–110)
CLARITY UR: CLEAR
CO2 SERPL-SCNC: 23 MMOL/L (ref 23–29)
COLOR UR: YELLOW
CREAT SERPL-MCNC: 1.2 MG/DL (ref 0.5–1.4)
DIFFERENTIAL METHOD: ABNORMAL
EOSINOPHIL # BLD AUTO: 0.1 K/UL (ref 0–0.5)
EOSINOPHIL NFR BLD: 1.8 % (ref 0–8)
ERYTHROCYTE [DISTWIDTH] IN BLOOD BY AUTOMATED COUNT: 19.6 % (ref 11.5–14.5)
EST. GFR  (AFRICAN AMERICAN): >60 ML/MIN/1.73 M^2
EST. GFR  (NON AFRICAN AMERICAN): 53 ML/MIN/1.73 M^2
GLUCOSE SERPL-MCNC: 108 MG/DL (ref 70–110)
GLUCOSE UR QL STRIP: NEGATIVE
HCT VFR BLD AUTO: 34.8 % (ref 37–48.5)
HGB BLD-MCNC: 10.2 G/DL (ref 12–16)
HGB UR QL STRIP: ABNORMAL
HYALINE CASTS #/AREA URNS LPF: 4 /LPF
IMM GRANULOCYTES # BLD AUTO: 0.04 K/UL (ref 0–0.04)
IMM GRANULOCYTES NFR BLD AUTO: 0.6 % (ref 0–0.5)
INFLUENZA A, MOLECULAR: NEGATIVE
INFLUENZA B, MOLECULAR: NEGATIVE
INR PPP: 5.9 (ref 0.8–1.2)
KETONES UR QL STRIP: NEGATIVE
LACTATE SERPL-SCNC: 2.6 MMOL/L (ref 0.5–2.2)
LACTATE SERPL-SCNC: 2.6 MMOL/L (ref 0.5–2.2)
LACTATE SERPL-SCNC: 3 MMOL/L (ref 0.5–2.2)
LEUKOCYTE ESTERASE UR QL STRIP: NEGATIVE
LYMPHOCYTES # BLD AUTO: 1.3 K/UL (ref 1–4.8)
LYMPHOCYTES NFR BLD: 18.5 % (ref 18–48)
MCH RBC QN AUTO: 29.9 PG (ref 27–31)
MCHC RBC AUTO-ENTMCNC: 29.3 G/DL (ref 32–36)
MCV RBC AUTO: 102 FL (ref 82–98)
MICROSCOPIC COMMENT: ABNORMAL
MONOCYTES # BLD AUTO: 0.6 K/UL (ref 0.3–1)
MONOCYTES NFR BLD: 8.5 % (ref 4–15)
NEUTROPHILS # BLD AUTO: 5 K/UL (ref 1.8–7.7)
NEUTROPHILS NFR BLD: 70 % (ref 38–73)
NITRITE UR QL STRIP: NEGATIVE
NRBC BLD-RTO: 0 /100 WBC
PH UR STRIP: 6 [PH] (ref 5–8)
PLATELET # BLD AUTO: 168 K/UL (ref 150–350)
PMV BLD AUTO: 10.8 FL (ref 9.2–12.9)
POTASSIUM SERPL-SCNC: 4.2 MMOL/L (ref 3.5–5.1)
PROT SERPL-MCNC: 7.5 G/DL (ref 6–8.4)
PROT UR QL STRIP: NEGATIVE
PROTHROMBIN TIME: 58.4 SEC (ref 9–12.5)
RBC # BLD AUTO: 3.41 M/UL (ref 4–5.4)
RBC #/AREA URNS HPF: 25 /HPF (ref 0–4)
SODIUM SERPL-SCNC: 132 MMOL/L (ref 136–145)
SP GR UR STRIP: 1.02 (ref 1–1.03)
SPECIMEN SOURCE: NORMAL
TROPONIN I SERPL DL<=0.01 NG/ML-MCNC: 0.02 NG/ML (ref 0–0.03)
TROPONIN I SERPL DL<=0.01 NG/ML-MCNC: 0.03 NG/ML (ref 0–0.03)
URN SPEC COLLECT METH UR: ABNORMAL
UROBILINOGEN UR STRIP-ACNC: 1 EU/DL
WBC # BLD AUTO: 7.08 K/UL (ref 3.9–12.7)

## 2020-01-26 PROCEDURE — 63600175 PHARM REV CODE 636 W HCPCS: Mod: NTX | Performed by: EMERGENCY MEDICINE

## 2020-01-26 PROCEDURE — 85025 COMPLETE CBC W/AUTO DIFF WBC: CPT | Mod: NTX

## 2020-01-26 PROCEDURE — 93010 EKG 12-LEAD: ICD-10-PCS | Mod: NTX,,, | Performed by: INTERNAL MEDICINE

## 2020-01-26 PROCEDURE — 85610 PROTHROMBIN TIME: CPT | Mod: NTX

## 2020-01-26 PROCEDURE — 99291 CRITICAL CARE FIRST HOUR: CPT | Mod: 25,NTX

## 2020-01-26 PROCEDURE — 25000003 PHARM REV CODE 250: Mod: NTX | Performed by: INTERNAL MEDICINE

## 2020-01-26 PROCEDURE — G0378 HOSPITAL OBSERVATION PER HR: HCPCS | Mod: NTX

## 2020-01-26 PROCEDURE — 96360 HYDRATION IV INFUSION INIT: CPT | Mod: NTX

## 2020-01-26 PROCEDURE — 93005 ELECTROCARDIOGRAM TRACING: CPT | Mod: NTX

## 2020-01-26 PROCEDURE — 80053 COMPREHEN METABOLIC PANEL: CPT | Mod: NTX

## 2020-01-26 PROCEDURE — 84484 ASSAY OF TROPONIN QUANT: CPT | Mod: NTX

## 2020-01-26 PROCEDURE — 83605 ASSAY OF LACTIC ACID: CPT | Mod: NTX

## 2020-01-26 PROCEDURE — 93010 ELECTROCARDIOGRAM REPORT: CPT | Mod: NTX,,, | Performed by: INTERNAL MEDICINE

## 2020-01-26 PROCEDURE — 81000 URINALYSIS NONAUTO W/SCOPE: CPT | Mod: NTX

## 2020-01-26 PROCEDURE — 63600175 PHARM REV CODE 636 W HCPCS: Mod: NTX | Performed by: INTERNAL MEDICINE

## 2020-01-26 PROCEDURE — 87040 BLOOD CULTURE FOR BACTERIA: CPT | Mod: NTX

## 2020-01-26 PROCEDURE — 87502 INFLUENZA DNA AMP PROBE: CPT | Mod: NTX

## 2020-01-26 PROCEDURE — 85730 THROMBOPLASTIN TIME PARTIAL: CPT | Mod: NTX

## 2020-01-26 PROCEDURE — 36415 COLL VENOUS BLD VENIPUNCTURE: CPT | Mod: NTX

## 2020-01-26 PROCEDURE — 83880 ASSAY OF NATRIURETIC PEPTIDE: CPT | Mod: NTX

## 2020-01-26 PROCEDURE — 83605 ASSAY OF LACTIC ACID: CPT | Mod: 91,NTX

## 2020-01-26 PROCEDURE — 99900035 HC TECH TIME PER 15 MIN (STAT): Mod: NTX

## 2020-01-26 PROCEDURE — 96361 HYDRATE IV INFUSION ADD-ON: CPT | Mod: NTX

## 2020-01-26 PROCEDURE — 96361 HYDRATE IV INFUSION ADD-ON: CPT

## 2020-01-26 RX ORDER — IPRATROPIUM BROMIDE AND ALBUTEROL SULFATE 2.5; .5 MG/3ML; MG/3ML
3 SOLUTION RESPIRATORY (INHALATION) EVERY 4 HOURS PRN
Status: DISCONTINUED | OUTPATIENT
Start: 2020-01-26 | End: 2020-01-28 | Stop reason: HOSPADM

## 2020-01-26 RX ORDER — CLONAZEPAM 1 MG/1
1 TABLET ORAL 3 TIMES DAILY
Status: DISCONTINUED | OUTPATIENT
Start: 2020-01-26 | End: 2020-01-28 | Stop reason: HOSPADM

## 2020-01-26 RX ORDER — FAMOTIDINE 20 MG/1
20 TABLET, FILM COATED ORAL 2 TIMES DAILY
Status: DISCONTINUED | OUTPATIENT
Start: 2020-01-26 | End: 2020-01-26

## 2020-01-26 RX ORDER — DESVENLAFAXINE 100 MG/1
100 TABLET, EXTENDED RELEASE ORAL DAILY
Status: DISCONTINUED | OUTPATIENT
Start: 2020-01-27 | End: 2020-01-28 | Stop reason: HOSPADM

## 2020-01-26 RX ORDER — ONDANSETRON 2 MG/ML
4 INJECTION INTRAMUSCULAR; INTRAVENOUS EVERY 8 HOURS PRN
Status: DISCONTINUED | OUTPATIENT
Start: 2020-01-26 | End: 2020-01-28 | Stop reason: HOSPADM

## 2020-01-26 RX ORDER — PANTOPRAZOLE SODIUM 40 MG/1
40 TABLET, DELAYED RELEASE ORAL 2 TIMES DAILY
Status: DISCONTINUED | OUTPATIENT
Start: 2020-01-26 | End: 2020-01-28 | Stop reason: HOSPADM

## 2020-01-26 RX ORDER — SODIUM CHLORIDE 9 MG/ML
INJECTION, SOLUTION INTRAVENOUS ONCE
Status: COMPLETED | OUTPATIENT
Start: 2020-01-26 | End: 2020-01-27

## 2020-01-26 RX ORDER — DIPHENHYDRAMINE HCL 25 MG
25 CAPSULE ORAL EVERY 6 HOURS PRN
Status: DISCONTINUED | OUTPATIENT
Start: 2020-01-26 | End: 2020-01-28 | Stop reason: HOSPADM

## 2020-01-26 RX ORDER — ROSUVASTATIN CALCIUM 10 MG/1
20 TABLET, COATED ORAL NIGHTLY
Status: DISCONTINUED | OUTPATIENT
Start: 2020-01-26 | End: 2020-01-28 | Stop reason: HOSPADM

## 2020-01-26 RX ORDER — ACETAMINOPHEN 325 MG/1
650 TABLET ORAL EVERY 6 HOURS PRN
Status: DISCONTINUED | OUTPATIENT
Start: 2020-01-26 | End: 2020-01-28 | Stop reason: HOSPADM

## 2020-01-26 RX ORDER — HYDROCODONE BITARTRATE AND ACETAMINOPHEN 10; 325 MG/1; MG/1
1 TABLET ORAL EVERY 6 HOURS PRN
Status: DISCONTINUED | OUTPATIENT
Start: 2020-01-26 | End: 2020-01-28 | Stop reason: HOSPADM

## 2020-01-26 RX ORDER — MAG HYDROX/ALUMINUM HYD/SIMETH 200-200-20
30 SUSPENSION, ORAL (FINAL DOSE FORM) ORAL EVERY 6 HOURS PRN
Status: DISCONTINUED | OUTPATIENT
Start: 2020-01-26 | End: 2020-01-28 | Stop reason: HOSPADM

## 2020-01-26 RX ORDER — IBUPROFEN 200 MG
1 TABLET ORAL DAILY
Status: DISCONTINUED | OUTPATIENT
Start: 2020-01-27 | End: 2020-01-28 | Stop reason: HOSPADM

## 2020-01-26 RX ORDER — AMITRIPTYLINE HYDROCHLORIDE 25 MG/1
25 TABLET, FILM COATED ORAL NIGHTLY
Status: DISCONTINUED | OUTPATIENT
Start: 2020-01-26 | End: 2020-01-28 | Stop reason: HOSPADM

## 2020-01-26 RX ADMIN — PANTOPRAZOLE SODIUM 40 MG: 40 TABLET, DELAYED RELEASE ORAL at 08:01

## 2020-01-26 RX ADMIN — SODIUM CHLORIDE: 0.9 INJECTION, SOLUTION INTRAVENOUS at 08:01

## 2020-01-26 RX ADMIN — ROSUVASTATIN CALCIUM 20 MG: 10 TABLET, COATED ORAL at 08:01

## 2020-01-26 RX ADMIN — BUSPIRONE HYDROCHLORIDE 15 MG: 10 TABLET ORAL at 11:01

## 2020-01-26 RX ADMIN — CLONAZEPAM 1 MG: 1 TABLET ORAL at 08:01

## 2020-01-26 RX ADMIN — SODIUM CHLORIDE 1000 ML: 0.9 INJECTION, SOLUTION INTRAVENOUS at 04:01

## 2020-01-26 RX ADMIN — AMITRIPTYLINE HYDROCHLORIDE 25 MG: 25 TABLET, FILM COATED ORAL at 08:01

## 2020-01-26 RX ADMIN — HYDROCODONE BITARTRATE AND ACETAMINOPHEN 1 TABLET: 10; 325 TABLET ORAL at 08:01

## 2020-01-26 NOTE — ED PROVIDER NOTES
"SCRIBE #1 NOTE: I, Ad Pruitt, am scribing for, and in the presence of, Magnolia Morris Do, MD. I have scribed the entire note.       History     Chief Complaint   Patient presents with    Fatigue     Weakness, fatigue, dehydrated for a few days. +nausea/gas.      Review of patient's allergies indicates:   Allergen Reactions    Nsaids (non-steroidal anti-inflammatory drug) Other (See Comments)     Mechanical heart valve         History of Present Illness     HPI    1/26/2020, 3:02 PM  History obtained from the patient      History of Present Illness: Meg Sal is a 49 y.o. female patient with a PMHx of CHF, CAD, HTN, HLD, stroke, who presents to the Emergency Department for evaluation of fatigue which onset gradually 2 days PTA. Pt states that she "can't stay awake". Symptoms are constant and moderate in severity. No mitigating or exacerbating factors reported. Associated sxs include SOB, nausea. Patient denies any fever, chills, sore throat, cough, v/d, CP, HA, syncope, weakness, and all other sxs at this time. Pt reports that she is on 2 L O2 at home, has not taken her prescribed Lasix in 2 days, and has a mechanical valve in place. No further complaints or concerns at this time.         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, implants and grafts, initial encounter     Hyperlipidemia     Hypertension     Sleep apnea syndrome 2/8/2019    Stroke        Past Surgical History:  Past Surgical History:   Procedure Laterality Date    AORTIC VALVE REPLACEMENT      mechanical    CARDIAC CATHETERIZATION      CORONARY ANGIOPLASTY      CORONARY ARTERY BYPASS GRAFT      HYSTERECTOMY      RIGHT HEART CATHETERIZATION Right 5/17/2019    " Procedure: INSERTION, CATHETER, RIGHT HEART;  Surgeon: Derek Brown MD;  Location: [facility] CATH LAB;  Service: Cardiology;  Laterality: Right;  malur pt/detailed hx         Family History:  Family History   Problem Relation Age of Onset    Heart disease Mother     Breast cancer Sister     Coronary artery disease Father     Lung cancer Brother        Social History:  Social History     Tobacco Use    Smoking status: Former Smoker     Packs/day: 1.00     Years: 30.00     Pack years: 30.00     Types: Cigarettes     Last attempt to quit: 2018     Years since quittin.6    Smokeless tobacco: Never Used   Substance and Sexual Activity    Alcohol use: Not Currently     Frequency: 2-4 times a month     Drinks per session: 1 or 2     Binge frequency: Never    Drug use: Not Currently    Sexual activity: Unknown        Review of Systems     Review of Systems   Constitutional: Positive for fatigue. Negative for activity change, appetite change, chills, diaphoresis and fever.   HENT: Negative for congestion, drooling, ear pain, mouth sores, rhinorrhea, sinus pain, sore throat and trouble swallowing.    Eyes: Negative for pain and discharge.   Respiratory: Positive for shortness of breath. Negative for cough, chest tightness, wheezing and stridor.    Cardiovascular: Negative for chest pain, palpitations and leg swelling.   Gastrointestinal: Positive for nausea. Negative for abdominal distention, abdominal pain, blood in stool, constipation, diarrhea and vomiting.   Genitourinary: Negative for difficulty urinating, dysuria, flank pain, frequency, hematuria and urgency.   Musculoskeletal: Negative for arthralgias, back pain and myalgias.   Skin: Negative for pallor, rash and wound.   Neurological: Negative for dizziness, seizures, syncope, weakness, light-headedness, numbness and headaches.   All other systems reviewed and are negative.       Physical Exam     Initial Vitals [20 1432]   BP Pulse Resp Temp  SpO2   (!) 104/53 (!) 54 17 98.3 °F (36.8 °C) (!) 90 %      MAP       --          Physical Exam  Nursing Notes and Vital Signs Reviewed.  Constitutional: Patient is in no acute distress. Chronically ill appearing. Tired and very weak appearing.   Head: Atraumatic. Normocephalic.  Eyes: PERRL. EOM intact. Conjunctivae are not pale. No scleral icterus.  ENT: Mucous membranes are dry. Oropharynx is clear and symmetric.    Neck: Supple. Full ROM. No lymphadenopathy.  Cardiovascular: Regular rate. Regular rhythm. No murmurs, rubs, or gallops. Distal pulses are 2+ and symmetric.  Pulmonary/Chest: No respiratory distress. Clear to auscultation bilaterally. No wheezing or rales.  Abdominal: Soft and non-distended.  There is no tenderness.  No rebound, guarding, or rigidity. Good bowel sounds.  Genitourinary: No CVA tenderness  Musculoskeletal: Moves all extremities. No obvious deformities. No edema. No calf tenderness.  Skin: Warm and dry.  Neurological:  Alert, awake, and appropriate.  Normal speech.  No acute focal neurological deficits are appreciated.  Psychiatric: Normal affect. Good eye contact. Appropriate in content.     ED Course   Critical Care  Date/Time: 1/26/2020 6:30 PM  Performed by: Magnolia Morris Do, MD  Authorized by: Magnolia Morris Do, MD   Direct patient critical care time: 10 minutes  Additional history critical care time: 10 minutes  Ordering / reviewing critical care time: 10 minutes  Documentation critical care time: 5 minutes  Consulting other physicians critical care time: 5 minutes  Total critical care time (exclusive of procedural time) : 40 minutes  Critical care time was exclusive of separately billable procedures and treating other patients.  Critical care was necessary to treat or prevent imminent or life-threatening deterioration of the following conditions: hypotension, weakness.  Critical care was time spent personally by me on the following activities: blood draw for specimens,  discussions with consultants, evaluation of patient's response to treatment, obtaining history from patient or surrogate, ordering and review of laboratory studies, pulse oximetry, review of old charts, development of treatment plan with patient or surrogate, interpretation of cardiac output measurements, examination of patient, ordering and performing treatments and interventions and re-evaluation of patient's condition.        ED Vital Signs:  Vitals:    01/26/20 1503 01/26/20 1504 01/26/20 1520 01/26/20 1528   BP: (!) 98/53      Pulse: (!) 58 65 (!) 53 72   Resp:   20 (!) 22   Temp:       TempSrc:       SpO2: 95%  98% 96%   Weight:        01/26/20 1606 01/26/20 1609 01/26/20 1610 01/26/20 1620   BP: (!) 93/53 (!) 86/39 (!) 92/38 (!) 83/46   Pulse: (!) 51 (!) 52 (!) 52 (!) 51   Resp: 20 20 (!) 28 (!) 27   Temp:       TempSrc:       SpO2: 97% 97% 97% 97%   Weight:        01/26/20 1642 01/26/20 1649 01/26/20 1651 01/26/20 1705   BP:  (!) 97/50  (!) 87/61   Pulse:  (!) 51 (!) 51 (!) 53   Resp:   (!) 24 (!) 22   Temp:       TempSrc:       SpO2:  100% 100% 99%   Weight: 76 kg (167 lb 8 oz)       01/26/20 1723 01/26/20 1732 01/26/20 1802   BP: (!) 99/56 (!) 107/53 (!) 100/49   Pulse: (!) 50 (!) 53 (!) 51   Resp:   (!) 25   Temp:      TempSrc:      SpO2: 100% 99% 99%   Weight:          Abnormal Lab Results:  Labs Reviewed   CBC W/ AUTO DIFFERENTIAL - Abnormal; Notable for the following components:       Result Value    RBC 3.41 (*)     Hemoglobin 10.2 (*)     Hematocrit 34.8 (*)     Mean Corpuscular Volume 102 (*)     Mean Corpuscular Hemoglobin Conc 29.3 (*)     RDW 19.6 (*)     Immature Granulocytes 0.6 (*)     All other components within normal limits   COMPREHENSIVE METABOLIC PANEL - Abnormal; Notable for the following components:    Sodium 132 (*)     Total Bilirubin 1.5 (*)     AST 62 (*)     eGFR if non  53 (*)     All other components within normal limits   TROPONIN I - Abnormal; Notable for the  following components:    Troponin I 0.029 (*)     All other components within normal limits   B-TYPE NATRIURETIC PEPTIDE - Abnormal; Notable for the following components:     (*)     All other components within normal limits   PROTIME-INR - Abnormal; Notable for the following components:    Prothrombin Time 58.4 (*)     INR 5.9 (*)     All other components within normal limits    Narrative:     INR critical result(s) called and verbal readback obtained from   Baylee King RN by LUANNE 01/26/2020 16:02   APTT - Abnormal; Notable for the following components:    aPTT 42.7 (*)     All other components within normal limits   LACTIC ACID, PLASMA - Abnormal; Notable for the following components:    Lactate (Lactic Acid) 2.6 (*)     All other components within normal limits   ISTAT PROCEDURE - Abnormal; Notable for the following components:    POC PO2 29 (*)     POC HCO3 23.7 (*)     POC SATURATED O2 55 (*)     All other components within normal limits   INFLUENZA A & B BY MOLECULAR   CULTURE, BLOOD   CULTURE, BLOOD   APTT   PROTIME-INR   URINALYSIS, REFLEX TO URINE CULTURE   TROPONIN I        All Lab Results:  Results for orders placed or performed during the hospital encounter of 01/26/20   Influenza A & B by Molecular   Result Value Ref Range    Influenza A, Molecular Negative Negative    Influenza B, Molecular Negative Negative    Flu A & B Source Nasal swab    CBC auto differential   Result Value Ref Range    WBC 7.08 3.90 - 12.70 K/uL    RBC 3.41 (L) 4.00 - 5.40 M/uL    Hemoglobin 10.2 (L) 12.0 - 16.0 g/dL    Hematocrit 34.8 (L) 37.0 - 48.5 %    Mean Corpuscular Volume 102 (H) 82 - 98 fL    Mean Corpuscular Hemoglobin 29.9 27.0 - 31.0 pg    Mean Corpuscular Hemoglobin Conc 29.3 (L) 32.0 - 36.0 g/dL    RDW 19.6 (H) 11.5 - 14.5 %    Platelets 168 150 - 350 K/uL    MPV 10.8 9.2 - 12.9 fL    Immature Granulocytes 0.6 (H) 0.0 - 0.5 %    Gran # (ANC) 5.0 1.8 - 7.7 K/uL    Immature Grans (Abs) 0.04 0.00 - 0.04 K/uL     Lymph # 1.3 1.0 - 4.8 K/uL    Mono # 0.6 0.3 - 1.0 K/uL    Eos # 0.1 0.0 - 0.5 K/uL    Baso # 0.04 0.00 - 0.20 K/uL    nRBC 0 0 /100 WBC    Gran% 70.0 38.0 - 73.0 %    Lymph% 18.5 18.0 - 48.0 %    Mono% 8.5 4.0 - 15.0 %    Eosinophil% 1.8 0.0 - 8.0 %    Basophil% 0.6 0.0 - 1.9 %    Differential Method Automated    Comprehensive metabolic panel   Result Value Ref Range    Sodium 132 (L) 136 - 145 mmol/L    Potassium 4.2 3.5 - 5.1 mmol/L    Chloride 96 95 - 110 mmol/L    CO2 23 23 - 29 mmol/L    Glucose 108 70 - 110 mg/dL    BUN, Bld 19 6 - 20 mg/dL    Creatinine 1.2 0.5 - 1.4 mg/dL    Calcium 9.1 8.7 - 10.5 mg/dL    Total Protein 7.5 6.0 - 8.4 g/dL    Albumin 3.5 3.5 - 5.2 g/dL    Total Bilirubin 1.5 (H) 0.1 - 1.0 mg/dL    Alkaline Phosphatase 122 55 - 135 U/L    AST 62 (H) 10 - 40 U/L    ALT 40 10 - 44 U/L    Anion Gap 13 8 - 16 mmol/L    eGFR if African American >60 >60 mL/min/1.73 m^2    eGFR if non African American 53 (A) >60 mL/min/1.73 m^2   Troponin I #1   Result Value Ref Range    Troponin I 0.029 (H) 0.000 - 0.026 ng/mL   B-Type natriuretic peptide (BNP)   Result Value Ref Range     (H) 0 - 99 pg/mL   Protime-INR   Result Value Ref Range    Prothrombin Time 58.4 (H) 9.0 - 12.5 sec    INR 5.9 (HH) 0.8 - 1.2   APTT   Result Value Ref Range    aPTT 42.7 (H) 21.0 - 32.0 sec   Lactic acid, plasma   Result Value Ref Range    Lactate (Lactic Acid) 2.6 (H) 0.5 - 2.2 mmol/L   ISTAT PROCEDURE   Result Value Ref Range    POC PH 7.382 7.35 - 7.45    POC PCO2 39.9 35 - 45 mmHg    POC PO2 29 (LL) 80 - 100 mmHg    POC HCO3 23.7 (L) 24 - 28 mmol/L    POC BE -1 -2 to 2 mmol/L    POC SATURATED O2 55 (L) 95 - 100 %    Sample ARTERIAL     Site RR     Allens Test Pass     DelSys Nasal Can     Mode SPONT     Flow 2     FiO2 28        Imaging Results:  Imaging Results          X-Ray Chest AP Portable (Final result)  Result time 01/26/20 15:49:16    Final result by José Miguel Gonsalez MD (01/26/20 15:49:16)                  Impression:      No acute pulmonary infiltrate or consolidation.  Chronic interstitial reticulation, similar to priors.      Electronically signed by: José Miguel Gonsalez  Date:    01/26/2020  Time:    15:49             Narrative:    EXAMINATION:  XR CHEST AP PORTABLE    CLINICAL HISTORY:  chest pain;    TECHNIQUE:  Single frontal view of the chest was performed.    COMPARISON:  Chest radiograph 12/14/2019 with priors    FINDINGS:  Cardiac leads project over the chest.  Cardiomediastinal silhouette is enlarged, similar to priors.  Postsurgical changes of a sternotomy and valve replacement.  Chronic appearing interstitial reticular it is identified throughout the lung in a similar pattern distribution.  No acute or new airspace infiltrate.  No large effusion.  No pneumothorax.  Osseous structures appear intact.                                 The EKG was ordered, reviewed, and independently interpreted by the ED provider.  Interpretation time: 1456  Rate: 63 BPM  Rhythm: marked sinus bradycardia with occasional premature ventricular complexes and possible atrial complexes with aberrant conduction  Interpretation: RBBB. Cannot rule out inferior infarct, age undetermined. T wave abnormality, consider lateral ischemia. No STEMI.           The Emergency Provider reviewed the vital signs and test results, which are outlined above.     ED Discussion       6:29 PM: Discussed case with Dr. Everett ([facility]). Dr. Everett agrees with current care and management of pt and accepts admission.  INR elevated but pt reports no bleeding anywhere.   Admitting Service: Hospital Medicine  Admitting Physician: Dr. Everett  Admit to: obs med tele    6:29 PM: Re-evaluated pt. I have discussed test results, shared treatment plan, and the need for admission with patient and family at bedside. Pt and family express understanding at this time and agree with all information. All questions answered. Pt and family have no further questions or  concerns at this time. Pt is ready for admit.         Medical Decision Making:   Clinical Tests:   Lab Tests: Ordered and Reviewed  Radiological Study: Ordered and Reviewed  Medical Tests: Ordered and Reviewed           ED Medication(s):  Medications   sodium chloride 0.9% bolus 1,000 mL (1,000 mLs Intravenous New Bag 1/26/20 1640)       New Prescriptions    No medications on file               Scribe Attestation:   Scribe #1: I performed the above scribed service and the documentation accurately describes the services I performed. I attest to the accuracy of the note.     Attending:   Physician Attestation Statement for Scribe #1: I, Magnolia Morris Do, MD, personally performed the services described in this documentation, as scribed by Ad Pruitt, in my presence, and it is both accurate and complete.           Clinical Impression       ICD-10-CM ICD-9-CM   1. Dehydration E86.0 276.51   2. Fatigue R53.83 780.79   3. Chest pain R07.9 786.50   4. Supratherapeutic international normalized ratio (INR) R79.1 790.92   5. Hypotension, unspecified hypotension type I95.9 458.9       Disposition:   Disposition: Placed in Observation  Condition: Fair         Magnolia Morris Do, MD  01/26/20 1914

## 2020-01-26 NOTE — PROGRESS NOTES
ABG done on 2lpm . Mixed venous several attempts. Notified DR. Brown. Pt wears oxygen at home around the clock  No resp distress obs.

## 2020-01-27 LAB
ALBUMIN SERPL BCP-MCNC: 3.1 G/DL (ref 3.5–5.2)
ALLENS TEST: ABNORMAL
ALP SERPL-CCNC: 114 U/L (ref 55–135)
ALT SERPL W/O P-5'-P-CCNC: 34 U/L (ref 10–44)
ANION GAP SERPL CALC-SCNC: 9 MMOL/L (ref 8–16)
AST SERPL-CCNC: 49 U/L (ref 10–40)
BASOPHILS # BLD AUTO: 0.04 K/UL (ref 0–0.2)
BASOPHILS NFR BLD: 0.6 % (ref 0–1.9)
BILIRUB SERPL-MCNC: 1.1 MG/DL (ref 0.1–1)
BUN SERPL-MCNC: 17 MG/DL (ref 6–20)
CALCIUM SERPL-MCNC: 8.2 MG/DL (ref 8.7–10.5)
CHLORIDE SERPL-SCNC: 102 MMOL/L (ref 95–110)
CO2 SERPL-SCNC: 23 MMOL/L (ref 23–29)
CREAT SERPL-MCNC: 1 MG/DL (ref 0.5–1.4)
DELSYS: ABNORMAL
DIFFERENTIAL METHOD: ABNORMAL
EOSINOPHIL # BLD AUTO: 0.2 K/UL (ref 0–0.5)
EOSINOPHIL NFR BLD: 2.9 % (ref 0–8)
ERYTHROCYTE [DISTWIDTH] IN BLOOD BY AUTOMATED COUNT: 19.7 % (ref 11.5–14.5)
EST. GFR  (AFRICAN AMERICAN): >60 ML/MIN/1.73 M^2
EST. GFR  (NON AFRICAN AMERICAN): >60 ML/MIN/1.73 M^2
FIO2: 28
FLOW: 2
GLUCOSE SERPL-MCNC: 113 MG/DL (ref 70–110)
HCO3 UR-SCNC: 23.7 MMOL/L (ref 24–28)
HCT VFR BLD AUTO: 31.5 % (ref 37–48.5)
HGB BLD-MCNC: 9 G/DL (ref 12–16)
IMM GRANULOCYTES # BLD AUTO: 0.02 K/UL (ref 0–0.04)
IMM GRANULOCYTES NFR BLD AUTO: 0.3 % (ref 0–0.5)
INR PPP: 5.7 (ref 0.8–1.2)
LYMPHOCYTES # BLD AUTO: 1.7 K/UL (ref 1–4.8)
LYMPHOCYTES NFR BLD: 25.9 % (ref 18–48)
MAGNESIUM SERPL-MCNC: 2 MG/DL (ref 1.6–2.6)
MCH RBC QN AUTO: 29.7 PG (ref 27–31)
MCHC RBC AUTO-ENTMCNC: 28.6 G/DL (ref 32–36)
MCV RBC AUTO: 104 FL (ref 82–98)
MODE: ABNORMAL
MONOCYTES # BLD AUTO: 0.7 K/UL (ref 0.3–1)
MONOCYTES NFR BLD: 10.1 % (ref 4–15)
NEUTROPHILS # BLD AUTO: 4 K/UL (ref 1.8–7.7)
NEUTROPHILS NFR BLD: 60.2 % (ref 38–73)
NRBC BLD-RTO: 0 /100 WBC
PCO2 BLDA: 39.9 MMHG (ref 35–45)
PH SMN: 7.38 [PH] (ref 7.35–7.45)
PHOSPHATE SERPL-MCNC: 2.9 MG/DL (ref 2.7–4.5)
PLATELET # BLD AUTO: 139 K/UL (ref 150–350)
PMV BLD AUTO: 10.9 FL (ref 9.2–12.9)
PO2 BLDA: 29 MMHG (ref 40–60)
POC BE: -1 MMOL/L
POC SATURATED O2: 55 % (ref 95–100)
POTASSIUM SERPL-SCNC: 3.4 MMOL/L (ref 3.5–5.1)
PROT SERPL-MCNC: 6.5 G/DL (ref 6–8.4)
PROTHROMBIN TIME: 56.4 SEC (ref 9–12.5)
RBC # BLD AUTO: 3.03 M/UL (ref 4–5.4)
SAMPLE: ABNORMAL
SITE: ABNORMAL
SODIUM SERPL-SCNC: 134 MMOL/L (ref 136–145)
WBC # BLD AUTO: 6.61 K/UL (ref 3.9–12.7)

## 2020-01-27 PROCEDURE — 99222 1ST HOSP IP/OBS MODERATE 55: CPT | Mod: NTX,,, | Performed by: INTERNAL MEDICINE

## 2020-01-27 PROCEDURE — 25000242 PHARM REV CODE 250 ALT 637 W/ HCPCS: Mod: NTX | Performed by: INTERNAL MEDICINE

## 2020-01-27 PROCEDURE — 27000221 HC OXYGEN, UP TO 24 HOURS: Mod: NTX

## 2020-01-27 PROCEDURE — 96361 HYDRATE IV INFUSION ADD-ON: CPT

## 2020-01-27 PROCEDURE — 21400001 HC TELEMETRY ROOM: Mod: NTX

## 2020-01-27 PROCEDURE — 93010 ELECTROCARDIOGRAM REPORT: CPT | Mod: NTX,,, | Performed by: INTERNAL MEDICINE

## 2020-01-27 PROCEDURE — 93005 ELECTROCARDIOGRAM TRACING: CPT | Mod: NTX

## 2020-01-27 PROCEDURE — 94640 AIRWAY INHALATION TREATMENT: CPT | Mod: NTX

## 2020-01-27 PROCEDURE — 25000003 PHARM REV CODE 250: Mod: NTX | Performed by: INTERNAL MEDICINE

## 2020-01-27 PROCEDURE — 84100 ASSAY OF PHOSPHORUS: CPT | Mod: NTX

## 2020-01-27 PROCEDURE — 99222 PR INITIAL HOSPITAL CARE,LEVL II: ICD-10-PCS | Mod: NTX,,, | Performed by: INTERNAL MEDICINE

## 2020-01-27 PROCEDURE — 85610 PROTHROMBIN TIME: CPT | Mod: NTX

## 2020-01-27 PROCEDURE — 80053 COMPREHEN METABOLIC PANEL: CPT | Mod: NTX

## 2020-01-27 PROCEDURE — 85025 COMPLETE CBC W/AUTO DIFF WBC: CPT | Mod: NTX

## 2020-01-27 PROCEDURE — 94761 N-INVAS EAR/PLS OXIMETRY MLT: CPT | Mod: NTX

## 2020-01-27 PROCEDURE — 36415 COLL VENOUS BLD VENIPUNCTURE: CPT | Mod: NTX

## 2020-01-27 PROCEDURE — S4991 NICOTINE PATCH NONLEGEND: HCPCS | Mod: NTX | Performed by: INTERNAL MEDICINE

## 2020-01-27 PROCEDURE — 83735 ASSAY OF MAGNESIUM: CPT | Mod: NTX

## 2020-01-27 PROCEDURE — 93010 EKG 12-LEAD: ICD-10-PCS | Mod: NTX,,, | Performed by: INTERNAL MEDICINE

## 2020-01-27 RX ORDER — WARFARIN 1 MG/1
1 TABLET ORAL DAILY
Status: DISCONTINUED | OUTPATIENT
Start: 2020-02-03 | End: 2020-01-28

## 2020-01-27 RX ADMIN — HYDROCODONE BITARTRATE AND ACETAMINOPHEN 1 TABLET: 10; 325 TABLET ORAL at 09:01

## 2020-01-27 RX ADMIN — ROSUVASTATIN CALCIUM 20 MG: 10 TABLET, COATED ORAL at 09:01

## 2020-01-27 RX ADMIN — PANTOPRAZOLE SODIUM 40 MG: 40 TABLET, DELAYED RELEASE ORAL at 09:01

## 2020-01-27 RX ADMIN — BUSPIRONE HYDROCHLORIDE 15 MG: 10 TABLET ORAL at 09:01

## 2020-01-27 RX ADMIN — AMITRIPTYLINE HYDROCHLORIDE 25 MG: 25 TABLET, FILM COATED ORAL at 09:01

## 2020-01-27 RX ADMIN — IPRATROPIUM BROMIDE AND ALBUTEROL SULFATE 3 ML: .5; 3 SOLUTION RESPIRATORY (INHALATION) at 03:01

## 2020-01-27 RX ADMIN — CLONAZEPAM 1 MG: 1 TABLET ORAL at 09:01

## 2020-01-27 RX ADMIN — PANTOPRAZOLE SODIUM 40 MG: 40 TABLET, DELAYED RELEASE ORAL at 08:01

## 2020-01-27 RX ADMIN — BUSPIRONE HYDROCHLORIDE 15 MG: 10 TABLET ORAL at 08:01

## 2020-01-27 RX ADMIN — CLONAZEPAM 1 MG: 1 TABLET ORAL at 04:01

## 2020-01-27 RX ADMIN — NICOTINE 1 PATCH: 21 PATCH TRANSDERMAL at 08:01

## 2020-01-27 RX ADMIN — HYDROCODONE BITARTRATE AND ACETAMINOPHEN 1 TABLET: 10; 325 TABLET ORAL at 04:01

## 2020-01-27 RX ADMIN — CLONAZEPAM 1 MG: 1 TABLET ORAL at 08:01

## 2020-01-27 RX ADMIN — DESVENLAFAXINE SUCCINATE 100 MG: 100 TABLET, FILM COATED, EXTENDED RELEASE ORAL at 08:01

## 2020-01-27 NOTE — SUBJECTIVE & OBJECTIVE
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, implants and grafts, initial encounter     Hyperlipidemia     Hypertension     Sleep apnea syndrome 2/8/2019    Stroke        Past Surgical History:   Procedure Laterality Date    AORTIC VALVE REPLACEMENT      mechanical    CARDIAC CATHETERIZATION      CORONARY ANGIOPLASTY      CORONARY ARTERY BYPASS GRAFT      HYSTERECTOMY      RIGHT HEART CATHETERIZATION Right 5/17/2019    Procedure: INSERTION, CATHETER, RIGHT HEART;  Surgeon: Derek Brown MD;  Location: [facility] CATH LAB;  Service: Cardiology;  Laterality: Right;  malur pt/detailed hx       Review of patient's allergies indicates:   Allergen Reactions    Nsaids (non-steroidal anti-inflammatory drug) Other (See Comments)     Mechanical heart valve       No current facility-administered medications on file prior to encounter.      Current Outpatient Medications on File Prior to Encounter   Medication Sig    albuterol (PROVENTIL) 2.5 mg /3 mL (0.083 %) nebulizer solution Take 3 mLs (2.5 mg total) by nebulization every 4 (four) hours as needed for Wheezing or Shortness of Breath (Cough). Rescue    albuterol (VENTOLIN HFA) 90 mcg/actuation inhaler Inhale 2 puffs into the lungs every 6 (six) hours as needed for Wheezing. Rescue    amitriptyline (ELAVIL) 25 MG tablet Take 25 mg by mouth every evening.     ANORO ELLIPTA 62.5-25 mcg/actuation DsDv INHALE 1 PUFF INTO THE LUNGS ONCE A DAY. CONTROLLER.    busPIRone (BUSPAR) 15 MG tablet Take 15 mg by mouth 2 (two) times daily.     cholestyramine (QUESTRAN) 4 gram packet Take 1 packet (4 g total) by mouth 2 (two) times daily.    clonazePAM (KLONOPIN) 1 MG tablet Take 1 mg by mouth 3 (three) times daily.     desvenlafaxine succinate (PRISTIQ) 100 MG Tb24 Take 1 tablet (100 mg total) by mouth  once daily.    diclofenac sodium (VOLTAREN) 1 % Gel Apply 2 g topically 4 (four) times daily as needed.     fluticasone (FLONASE) 50 mcg/actuation nasal spray 1 spray by Each Nare route once daily.    furosemide (LASIX) 40 MG tablet Take two 40 mg tablets by mouth twice a day    HYDROcodone-acetaminophen (NORCO)  mg per tablet Take 1 tablet by mouth every 6 (six) hours as needed for Pain.    metoprolol succinate (TOPROL-XL) 25 MG 24 hr tablet Take 1 tablet (25 mg total) by mouth once daily.    nicotine (NICODERM CQ) 7 mg/24 hr Place 1 patch onto the skin every 24 hours.    nitroGLYCERIN (NITROSTAT) 0.4 MG SL tablet     nitroGLYCERIN 0.4 MG/HR TD PT24 (NITRODUR) 0.4 mg/hr Place 1 patch onto the skin once daily.    ondansetron (ZOFRAN) 4 MG tablet TAKE 1 BY MOUTH EVERY 8 HOURS AS NEEDED FOR NAUSEA.    pantoprazole (PROTONIX) 40 MG tablet Take 1 tablet (40 mg total) by mouth 2 (two) times daily.    potassium chloride SA (K-DUR,KLOR-CON) 20 MEQ tablet Take 20 mEq by mouth 2 (two) times daily.    topiramate (TOPAMAX) 100 MG tablet Take 100 mg by mouth 2 (two) times daily.    traZODone (DESYREL) 150 MG tablet Take 150 mg by mouth.    warfarin (COUMADIN) 3 MG tablet Take 1 tablet (3 mg total) by mouth every Tue, Wed, Fri, Sat, Sun. (Patient taking differently: Take 3 mg by mouth every Mon, Wed, Fri. )    warfarin (COUMADIN) 6 MG tablet Take 1 tablet (6 mg total) by mouth every Mon, Tues, Wed, Thurs, Fri. Except 9mg on Thursdays. (Patient taking differently: Take 6 mg by mouth. Sunday, Tuesday, Thursday and Saturday)    amoxicillin (AMOXIL) 500 MG capsule TAKE 2 CAPSULES BY MOUTH now and then 1 CAPSULES 3 TIMES A DAY UNTIL GONE    rosuvastatin (CRESTOR) 20 MG tablet Take 20 mg by mouth.    sumatriptan (IMITREX) 100 MG tablet Take 100 mg by mouth every 2 (two) hours as needed.    tiZANidine (ZANAFLEX) 4 MG tablet Take 4 mg by mouth every evening.    traMADol (ULTRAM) 50 mg tablet Take 50 mg by mouth  every 4 (four) hours as needed.      Family History     Problem Relation (Age of Onset)    Breast cancer Sister    Coronary artery disease Father    Heart disease Mother    Lung cancer Brother        Tobacco Use    Smoking status: Former Smoker     Packs/day: 1.00     Years: 30.00     Pack years: 30.00     Types: Cigarettes     Last attempt to quit: 2018     Years since quittin.6    Smokeless tobacco: Never Used   Substance and Sexual Activity    Alcohol use: Not Currently     Frequency: 2-4 times a month     Drinks per session: 1 or 2     Binge frequency: Never    Drug use: Not Currently    Sexual activity: Not on file     Review of Systems   Constitutional: Positive for activity change and fatigue. Negative for chills, diaphoresis and fever.   HENT: Negative.  Negative for congestion, nosebleeds and sinus pressure.    Eyes: Negative.  Negative for visual disturbance.   Respiratory: Positive for shortness of breath (chronic). Negative for cough, chest tightness and wheezing.    Cardiovascular: Negative.  Negative for chest pain, palpitations and leg swelling.   Gastrointestinal: Negative.  Negative for abdominal pain, diarrhea, nausea and vomiting.   Endocrine: Negative.  Negative for polyuria.   Genitourinary: Negative.  Negative for dysuria, flank pain, frequency and urgency.   Musculoskeletal: Negative.  Negative for back pain, joint swelling and neck stiffness.   Skin: Negative.  Negative for color change, pallor and rash.   Allergic/Immunologic: Negative.  Negative for immunocompromised state.   Neurological: Positive for dizziness and weakness (Generalized). Negative for syncope, speech difficulty, numbness and headaches.   Hematological: Negative.  Negative for adenopathy. Does not bruise/bleed easily.   Psychiatric/Behavioral: Positive for decreased concentration. Negative for confusion and hallucinations. The patient is not nervous/anxious.    All other systems reviewed and are  negative.    Objective:     Vital Signs (Most Recent):  Temp: 98 °F (36.7 °C) (01/26/20 2132)  Pulse: 62 (01/26/20 2132)  Resp: (!) 24 (01/26/20 2132)  BP: 106/76 (01/26/20 2132)  SpO2: 96 % (01/26/20 2132) Vital Signs (24h Range):  Temp:  [98 °F (36.7 °C)-98.3 °F (36.8 °C)] 98 °F (36.7 °C)  Pulse:  [50-73] 62  Resp:  [17-28] 24  SpO2:  [90 %-100 %] 96 %  BP: ()/(38-76) 106/76     Weight: 76 kg (167 lb 8 oz)  Body mass index is 32.71 kg/m².    Physical Exam   Constitutional: She is oriented to person, place, and time. She appears ill. No distress.   Chronically ill-appearing  male, in no respiratory distress.  Currently on 2 L oxygen nasal cannula.  Spouse at the bedside.   HENT:   Head: Normocephalic and atraumatic.   Eyes: Conjunctivae and EOM are normal. No scleral icterus.   Neck: Normal range of motion. Neck supple. No tracheal deviation present. No thyromegaly present.   Cardiovascular: Regular rhythm and intact distal pulses. Bradycardia present.   Murmur heard.  Pulmonary/Chest: Effort normal. No respiratory distress. She exhibits no tenderness.   Course breath sounds bilaterally, all lung fields.  Currently on 2 L oxygen nasal cannula   Abdominal: Soft. Bowel sounds are normal. She exhibits no distension. There is no tenderness.   Musculoskeletal: Normal range of motion. She exhibits no edema or tenderness.   Neurological: She is alert and oriented to person, place, and time. No cranial nerve deficit. She exhibits normal muscle tone. Coordination normal.   Skin: Skin is warm and dry. She is not diaphoretic. No erythema.   Psychiatric: She has a normal mood and affect. Her behavior is normal.   Nursing note and vitals reviewed.        CRANIAL NERVES     CN III, IV, VI   Extraocular motions are normal.        Significant Labs:   ABGs:   Recent Labs   Lab 01/26/20  1528   PH 7.382   PCO2 39.9   HCO3 23.7*   POCSATURATED 55*   BE -1     BMP:   Recent Labs   Lab 01/26/20  1451      *    K 4.2   CL 96   CO2 23   BUN 19   CREATININE 1.2   CALCIUM 9.1     CBC:   Recent Labs   Lab 01/26/20  1451   WBC 7.08   HGB 10.2*   HCT 34.8*        CMP:   Recent Labs   Lab 01/26/20  1451   *   K 4.2   CL 96   CO2 23      BUN 19   CREATININE 1.2   CALCIUM 9.1   PROT 7.5   ALBUMIN 3.5   BILITOT 1.5*   ALKPHOS 122   AST 62*   ALT 40   ANIONGAP 13   EGFRNONAA 53*     Cardiac Markers:   Recent Labs   Lab 01/26/20  1451   *     Lactic Acid:   Recent Labs   Lab 01/26/20  1638   LACTATE 2.6*     Lipase: No results for input(s): LIPASE in the last 48 hours.  Troponin:   Recent Labs   Lab 01/26/20  1451 01/26/20  1842   TROPONINI 0.029* 0.022     TSH: No results for input(s): TSH in the last 4320 hours.  Urine Studies:   Recent Labs   Lab 01/26/20  1900   COLORU Yellow   APPEARANCEUA Clear   PHUR 6.0   SPECGRAV 1.020   PROTEINUA Negative   GLUCUA Negative   KETONESU Negative   BILIRUBINUA Negative   OCCULTUA 3+*   NITRITE Negative   UROBILINOGEN 1.0   LEUKOCYTESUR Negative   RBCUA 25*   HYALINECASTS 4*     All pertinent labs within the past 24 hours have been reviewed.    Significant Imaging: I have reviewed and interpreted all pertinent imaging results/findings within the past 24 hours.     Imaging Results          X-Ray Chest AP Portable (Final result)  Result time 01/26/20 15:49:16    Final result by José Miguel Gonsalez MD (01/26/20 15:49:16)                 Impression:      No acute pulmonary infiltrate or consolidation.  Chronic interstitial reticulation, similar to priors.      Electronically signed by: José Miguel Gonsalez  Date:    01/26/2020  Time:    15:49             Narrative:    EXAMINATION:  XR CHEST AP PORTABLE    CLINICAL HISTORY:  chest pain;    TECHNIQUE:  Single frontal view of the chest was performed.    COMPARISON:  Chest radiograph 12/14/2019 with priors    FINDINGS:  Cardiac leads project over the chest.  Cardiomediastinal silhouette is enlarged, similar to priors.  Postsurgical  changes of a sternotomy and valve replacement.  Chronic appearing interstitial reticular it is identified throughout the lung in a similar pattern distribution.  No acute or new airspace infiltrate.  No large effusion.  No pneumothorax.  Osseous structures appear intact.                                I have independently reviewed and interpreted the EKG.     I have independently reviewed all pertinent labs within the past 24 hours.    I have independently reviewed, visualized and interpreted all pertinent imaging results within the past 24 hours and discussed the findings with the ED physician, Dr. Brown

## 2020-01-27 NOTE — PROGRESS NOTES
Pharmacy Consult Note: Warfarin    Pharmacy is consulted to dose warfarin by request of Anselmo Everett MD.    Indication: Hx of AVR with mechanical valve  INR goal: 2 - 3    Today's INR: 5.7    Home dose:   3 mg every Mon, Wed, Fri  6 mg on all other days    Today's labs:  Hgb = 9.0 g/dL  Plt = 139 K/uL    INR is above the target range of 2-3 -- HOLD WARFARIN  No dose will given today.    Pharmacy will continue to monitor daily PT/INR and make dosing adjustments as necessary.     Thank you for allowing us to participate in this patient's care.    Gonzalo Park, PharmD 1/27/2020 1:12 PM

## 2020-01-27 NOTE — ASSESSMENT & PLAN NOTE
Chronically home oxygen dependent at 2 liters/minute.  Not in acute exacerbation.  Continue bronchodilators and supplemental oxygen as current.

## 2020-01-27 NOTE — ASSESSMENT & PLAN NOTE
Sinus bradycardia (HR 50s), associated with hypotension.  Hold beta-blockers.  Received normal saline 1 L bolus in the ED with improvement in hypotension.  Monitor closely on telemetry.  Cardiology consult in a.m..

## 2020-01-27 NOTE — CONSULTS
[facility] -   Cardiology  Consult Note    Patient Name: Meg Sal  MRN: [id number]  Admission Date: 1/26/2020  Hospital Length of Stay: 0 days  Code Status: Prior   Attending Provider: José Miguel Richardson MD   Consulting Provider: MONICA Garcia  Primary Care Physician: Lucero Banda MD  Principal Problem:Symptomatic sinus bradycardia    Patient information was obtained from patient and ER records.     Inpatient consult to Cardiology  Consult performed by: MONICA Rollins  Consult ordered by: Anselmo Everett MD  Reason for consult: bradycardia         Subjective:     Chief Complaint:  Fatigue      HPI:    Ms. Sal is a 48 yo female with a past medical history of CAD with remote CABG, valvular heart disease with remote mechanical AVR on Coumadin and mitral valve replacement in 2014, severe pulmonary hypertension, interstitial lung disease with chronic hypoxic respiratory failure on 2 L home O2, hypertension, hyperlipidemia, GERD, anxiety, and chronic back pain.  Patient has had 2 admissions within the past 1 month for dehydration and Coumadin toxicity.     She presented to the ED complaining of worsening generalized weakness, fatigue, malaise, lack of energy for the past 2-3 days.  She stopped taking Lasix three days ago due to perceived dehydration.  Denies chest pain or any worsening shortness of breath.  In the ED she was found to be bradycardic with heart rate in the 50s, initial blood pressure 89/47.  Lactic acid 2.6.  She received normal saline 1 L bolus with improvement in her blood pressure to 121/57.  EKG revealed sinus bradycardia with frequent PVCs.  Patient on metoprolol at home.  In addition laboratory workup revealed elevated INR of 5.9.  Patient on warfarin for mechanical aortic valve.  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, implants and grafts, initial encounter     Hyperlipidemia     Hypertension     Sleep apnea syndrome 2/8/2019    Stroke        Past Surgical History:   Procedure Laterality Date    AORTIC VALVE REPLACEMENT      mechanical    CARDIAC CATHETERIZATION      CORONARY ANGIOPLASTY      CORONARY ARTERY BYPASS GRAFT      HYSTERECTOMY      RIGHT HEART CATHETERIZATION Right 5/17/2019    Procedure: INSERTION, CATHETER, RIGHT HEART;  Surgeon: Derek Brown MD;  Location: [facility], [place] CATH LAB;  Service: Cardiology;  Laterality: Right;  malur pt/detailed hx       Review of patient's allergies indicates:   Allergen Reactions    Nsaids (non-steroidal anti-inflammatory drug) Other (See Comments)     Mechanical heart valve       No current facility-administered medications on file prior to encounter.      Current Outpatient Medications on File Prior to Encounter   Medication Sig    albuterol (PROVENTIL) 2.5 mg /3 mL (0.083 %) nebulizer solution Take 3 mLs (2.5 mg total) by nebulization every 4 (four) hours as needed for Wheezing or Shortness of Breath (Cough). Rescue    albuterol (VENTOLIN HFA) 90 mcg/actuation inhaler Inhale 2 puffs into the lungs every 6 (six) hours as needed for Wheezing. Rescue    amitriptyline (ELAVIL) 25 MG tablet Take 25 mg by mouth every evening.     ANORO ELLIPTA 62.5-25 mcg/actuation DsDv INHALE 1 PUFF INTO THE LUNGS ONCE A DAY. CONTROLLER.    busPIRone (BUSPAR) 15 MG tablet Take 15 mg by mouth 2 (two) times daily.     cholestyramine (QUESTRAN) 4 gram packet Take 1 packet (4 g total) by mouth 2 (two) times daily.    clonazePAM (KLONOPIN) 1 MG tablet Take 1 mg by mouth 3 (three) times daily.     desvenlafaxine succinate (PRISTIQ) 100 MG Tb24 Take 1 tablet (100 mg total) by mouth once daily.    diclofenac sodium (VOLTAREN) 1 % Gel Apply 2 g topically 4 (four) times daily as needed.     fluticasone (FLONASE) 50  mcg/actuation nasal spray 1 spray by Each Nare route once daily.    furosemide (LASIX) 40 MG tablet Take two 40 mg tablets by mouth twice a day    HYDROcodone-acetaminophen (NORCO)  mg per tablet Take 1 tablet by mouth every 6 (six) hours as needed for Pain.    metoprolol succinate (TOPROL-XL) 25 MG 24 hr tablet Take 1 tablet (25 mg total) by mouth once daily.    nicotine (NICODERM CQ) 7 mg/24 hr Place 1 patch onto the skin every 24 hours.    nitroGLYCERIN (NITROSTAT) 0.4 MG SL tablet     nitroGLYCERIN 0.4 MG/HR TD PT24 (NITRODUR) 0.4 mg/hr Place 1 patch onto the skin once daily.    ondansetron (ZOFRAN) 4 MG tablet TAKE 1 BY MOUTH EVERY 8 HOURS AS NEEDED FOR NAUSEA.    pantoprazole (PROTONIX) 40 MG tablet Take 1 tablet (40 mg total) by mouth 2 (two) times daily.    potassium chloride SA (K-DUR,KLOR-CON) 20 MEQ tablet Take 20 mEq by mouth 2 (two) times daily.    topiramate (TOPAMAX) 100 MG tablet Take 100 mg by mouth 2 (two) times daily.    traZODone (DESYREL) 150 MG tablet Take 150 mg by mouth.    warfarin (COUMADIN) 3 MG tablet Take 1 tablet (3 mg total) by mouth every Tue, Wed, Fri, Sat, Sun. (Patient taking differently: Take 3 mg by mouth every Mon, Wed, Fri. )    warfarin (COUMADIN) 6 MG tablet Take 1 tablet (6 mg total) by mouth every Mon, Tues, Wed, Thurs, Fri. Except 9mg on Thursdays. (Patient taking differently: Take 6 mg by mouth. Sunday, Tuesday, Thursday and Saturday)    amoxicillin (AMOXIL) 500 MG capsule TAKE 2 CAPSULES BY MOUTH now and then 1 CAPSULES 3 TIMES A DAY UNTIL GONE    rosuvastatin (CRESTOR) 20 MG tablet Take 20 mg by mouth.    sumatriptan (IMITREX) 100 MG tablet Take 100 mg by mouth every 2 (two) hours as needed.    tiZANidine (ZANAFLEX) 4 MG tablet Take 4 mg by mouth every evening.    traMADol (ULTRAM) 50 mg tablet Take 50 mg by mouth every 4 (four) hours as needed.      Family History     Problem Relation (Age of Onset)    Breast cancer Sister    Coronary artery  disease Father    Heart disease Mother    Lung cancer Brother        Tobacco Use    Smoking status: Former Smoker     Packs/day: 1.00     Years: 30.00     Pack years: 30.00     Types: Cigarettes     Last attempt to quit: 2018     Years since quittin.6    Smokeless tobacco: Never Used   Substance and Sexual Activity    Alcohol use: Not Currently     Frequency: 2-4 times a month     Drinks per session: 1 or 2     Binge frequency: Never    Drug use: Not Currently    Sexual activity: Not on file     Review of Systems   Constitution: Positive for malaise/fatigue. Negative for diaphoresis, weight gain and weight loss.   HENT: Negative for congestion and nosebleeds.    Cardiovascular: Negative for chest pain, claudication, cyanosis, dyspnea on exertion, irregular heartbeat, leg swelling, near-syncope, orthopnea, palpitations, paroxysmal nocturnal dyspnea and syncope.   Respiratory: Positive for shortness of breath ( chronic). Negative for cough, hemoptysis, sleep disturbances due to breathing, snoring, sputum production and wheezing.         Wears home oxygen    Hematologic/Lymphatic: Negative for bleeding problem. Bruises/bleeds easily.   Skin: Negative for rash.   Musculoskeletal: Positive for arthritis and joint pain. Negative for back pain, falls, muscle cramps and muscle weakness.   Gastrointestinal: Negative for abdominal pain, constipation, diarrhea, heartburn, hematemesis, hematochezia, melena, nausea and vomiting.   Genitourinary: Negative for dysuria, hematuria and nocturia.   Neurological: Negative for excessive daytime sleepiness, dizziness, headaches, light-headedness, loss of balance, numbness, vertigo and weakness.     Objective:     Vital Signs (Most Recent):  Temp: 97.6 °F (36.4 °C) (20 1124)  Pulse: (!) 55 (20)  Resp: 20 (20)  BP: 111/69 (20)  SpO2: 97 % (20) Vital Signs (24h Range):  Temp:  [97.4 °F (36.3 °C)-98.3 °F (36.8 °C)] 97.6 °F (36.4  °C)  Pulse:  [49-73] 55  Resp:  [17-28] 20  SpO2:  [90 %-100 %] 97 %  BP: ()/(38-76) 111/69     Weight: 76 kg (167 lb 8 oz)  Body mass index is 32.71 kg/m².    SpO2: 97 %  O2 Device (Oxygen Therapy): nasal cannula      Intake/Output Summary (Last 24 hours) at 1/27/2020 1125  Last data filed at 1/27/2020 0605  Gross per 24 hour   Intake 1060 ml   Output 700 ml   Net 360 ml       Lines/Drains/Airways     Peripheral Intravenous Line                 Peripheral IV - Single Lumen 01/26/20 1451 20 G Left Antecubital less than 1 day         Peripheral IV - Single Lumen 01/26/20 1650 22 G Right Hand less than 1 day                Physical Exam   Constitutional: She is oriented to person, place, and time. She appears well-developed and well-nourished. No distress.   On supplemental O2   HENT:   Head: Normocephalic and atraumatic.   Eyes: Pupils are equal, round, and reactive to light. Right eye exhibits no discharge. Left eye exhibits no discharge.   Neck: Neck supple. No JVD present.   Cardiovascular: Normal rate, regular rhythm, S1 normal and S2 normal.   Murmur heard.  Metallic S2   Pulmonary/Chest: Effort normal and breath sounds normal. No respiratory distress. She has no wheezes. She has no rales.   Abdominal: Soft. She exhibits no distension.   Musculoskeletal: She exhibits no edema.   Neurological: She is alert and oriented to person, place, and time.   Skin: Skin is warm and dry. She is not diaphoretic. No erythema.   Psychiatric: She has a normal mood and affect. Her behavior is normal. Thought content normal.   Nursing note and vitals reviewed.      Significant Labs:   All pertinent lab results from the last 24 hours have been reviewed. and   Recent Lab Results       01/27/20  0539   01/26/20  2319   01/26/20  2157   01/26/20  1900   01/26/20  1842        Influenza A, Molecular               Influenza B, Molecular               Albumin 3.1             Alkaline Phosphatase 114             Allens Test                ALT 34             Anion Gap 9             Appearance, UA       Clear       aPTT               AST 49             Baso # 0.04             Basophil% 0.6             Bilirubin (UA)       Negative       BILIRUBIN TOTAL 1.1  Comment:  For infants and newborns, interpretation of results should be based  on gestational age, weight and in agreement with clinical  observations.  Premature Infant recommended reference ranges:  Up to 24 hours.............<8.0 mg/dL  Up to 48 hours............<12.0 mg/dL  3-5 days..................<15.0 mg/dL  6-29 days.................<15.0 mg/dL               Blood Culture, Routine               BNP               Site               BUN, Bld 17             Calcium 8.2             Chloride 102             CO2 23             Color, UA       Yellow       Creatinine 1.0             DelSys               Differential Method Automated             eGFR if  >60             eGFR if non  >60  Comment:  Calculation used to obtain the estimated glomerular filtration  rate (eGFR) is the CKD-EPI equation.                Eos # 0.2             Eosinophil% 2.9             FiO2               Flow               Flu A & B Source               Glucose 113             Glucose, UA       Negative       Gran # (ANC) 4.0             Gran% 60.2             Hematocrit 31.5             Hemoglobin 9.0             Hyaline Casts, UA       4       Immature Grans (Abs) 0.02  Comment:  Mild elevation in immature granulocytes is non specific and   can be seen in a variety of conditions including stress response,   acute inflammation, trauma and pregnancy. Correlation with other   laboratory and clinical findings is essential.               Immature Granulocytes 0.3             INR 5.7  Comment:  Coumadin Therapy:  2.0 - 3.0 for INR for all indicators except mechanical heart valves  and antiphospholipid syndromes which should use 2.5 - 3.5.  inr critical result(s) called and verbal readback  obtained from   sruthi vega rn by ELIZABETH 01/27/2020 07:17               Ketones, UA       Negative       Lactate, Derick   2.6  Comment:  Falsely low lactic acid results can be found in samples   containing >=13.0 mg/dL total bilirubin and/or >=3.5 mg/dL   direct bilirubin.   3.0  Comment:  Falsely low lactic acid results can be found in samples   containing >=13.0 mg/dL total bilirubin and/or >=3.5 mg/dL   direct bilirubin.           Leukocytes, UA       Negative       Lymph # 1.7             Lymph% 25.9             Magnesium 2.0             MCH 29.7             MCHC 28.6                          Microscopic Comment       SEE COMMENT  Comment:  Other formed elements not mentioned in the report are not   present in the microscopic examination.          Mode               Mono # 0.7             Mono% 10.1             MPV 10.9             NITRITE UA       Negative       nRBC 0             Occult Blood UA       3+       pH, UA       6.0       Phosphorus 2.9             Platelets 139             POC BE               POC HCO3               POC PCO2               POC PH               POC PO2               POC SATURATED O2               Potassium 3.4             PROTEIN TOTAL 6.5             Protein, UA       Negative  Comment:  Recommend a 24 hour urine protein or a urine   protein/creatinine ratio if globulin induced proteinuria is  clinically suspected.         Protime 56.4             RBC 3.03             RBC, UA       25       RDW 19.7             Sample               Sodium 134             Specific Gravity, UA       1.020       Specimen UA       Urine, Clean Catch       Troponin I         0.022  Comment:  The reference interval for Troponin I represents the 99th percentile   cutoff   for our facility and is consistent with 3rd generation assay   performance.       UROBILINOGEN UA       1.0       WBC 6.61                              01/26/20  1655   01/26/20  1638   01/26/20  1529   01/26/20  1528    01/26/20  1451        Influenza A, Molecular     Negative         Influenza B, Molecular     Negative         Albumin         3.5     Alkaline Phosphatase         122     Allens Test       Pass       ALT         40     Anion Gap         13     Appearance, UA               aPTT         42.7  Comment:  aPTT therapeutic range = 39-69 seconds     AST         62     Baso #         0.04     Basophil%         0.6     Bilirubin (UA)               BILIRUBIN TOTAL         1.5  Comment:  For infants and newborns, interpretation of results should be based  on gestational age, weight and in agreement with clinical  observations.  Premature Infant recommended reference ranges:  Up to 24 hours.............<8.0 mg/dL  Up to 48 hours............<12.0 mg/dL  3-5 days..................<15.0 mg/dL  6-29 days.................<15.0 mg/dL       Blood Culture, Routine No Growth to date[P] No Growth to date[P]           BNP         483  Comment:  Values of less than 100 pg/ml are consistent with non-CHF populations.     Site       RR       BUN, Bld         19     Calcium         9.1     Chloride         96     CO2         23     Color, UA               Creatinine         1.2     DelSys       Nasal Can       Differential Method         Automated     eGFR if          >60     eGFR if non          53  Comment:  Calculation used to obtain the estimated glomerular filtration  rate (eGFR) is the CKD-EPI equation.        Eos #         0.1     Eosinophil%         1.8     FiO2       28       Flow       2       Flu A & B Source     Nasal swab         Glucose         108     Glucose, UA               Gran # (ANC)         5.0     Gran%         70.0     Hematocrit         34.8     Hemoglobin         10.2     Hyaline Casts, UA               Immature Grans (Abs)         0.04  Comment:  Mild elevation in immature granulocytes is non specific and   can be seen in a variety of conditions including stress response,   acute  inflammation, trauma and pregnancy. Correlation with other   laboratory and clinical findings is essential.       Immature Granulocytes         0.6     INR         5.9  Comment:  Coumadin Therapy:  2.0 - 3.0 for INR for all indicators except mechanical heart valves  and antiphospholipid syndromes which should use 2.5 - 3.5.  INR critical result(s) called and verbal readback obtained from   Baylee King RN by LUANNE 01/26/2020 16:02       Ketones, UA               Lactate, Derick   2.6  Comment:  Falsely low lactic acid results can be found in samples   containing >=13.0 mg/dL total bilirubin and/or >=3.5 mg/dL   direct bilirubin.             Leukocytes, UA               Lymph #         1.3     Lymph%         18.5     Magnesium               MCH         29.9     MCHC         29.3     MCV         102     Microscopic Comment               Mode       SPONT       Mono #         0.6     Mono%         8.5     MPV         10.8     NITRITE UA               nRBC         0     Occult Blood UA               pH, UA               Phosphorus               Platelets         168     POC BE       -1       POC HCO3       23.7       POC PCO2       39.9       POC PH       7.382       POC PO2       29       POC SATURATED O2       55       Potassium         4.2     PROTEIN TOTAL         7.5     Protein, UA               Protime         58.4  Comment:  Results confirmed, test repeated     RBC         3.41     RBC, UA               RDW         19.6     Sample       ARTERIAL       Sodium         132     Specific [place], UA               Specimen UA               Troponin I         0.029  Comment:  The reference interval for Troponin I represents the 99th percentile   cutoff   for our facility and is consistent with 3rd generation assay   performance.       UROBILINOGEN UA               WBC         7.08                          Significant Imaging: Echocardiogram:   2D echo with color flow doppler:   Results for orders placed or performed in  visit on 02/25/19   2D echo with color flow doppler   Result Value Ref Range    QEF 55 55 - 65    Est. PA Systolic Pressure 60.76 (A)     Mitral Valve Mobility NORMAL     Tricuspid Valve Regurgitation MODERATE (A)     Narrative    Date of Procedure: 02/25/2019        TEST DESCRIPTION       Aorta: The aortic root is normal in size, measuring 2.3 cm at sinotubular junction and 1.9 cm at Sinuses of Valsalva.     Left Atrium: The left atrial volume index is mildly enlarged, measuring 36.20 cc/m2.     Left Ventricle: The left ventricle is normal in size, with an end-diastolic diameter of 5.1 cm, and an end-systolic diameter of 3.8 cm. LV wall thickness is normal, with the septum measuring 1.2 cm and the posterior wall measuring 1.1 cm across. Relative   wall thickness was normal at 0.43, and the LV mass index was increased at 159.7 g/m2 consistent with eccentric left ventricular hypertrophy. There are no regional wall motion abnormalities. Left ventricular systolic function appears normal. Visually   estimated ejection fraction is 55-60%. The LV Doppler derived stroke volume equals 83.0 ccs.         Right Atrium: The right atrium is normal in size, measuring 4.4 cm in length and 3.0 cm in width in the apical view.     Right Ventricle: The right ventricle is normal in size measuring 2.6 cm at the base in the apical right ventricle-focused view. Global right ventricular systolic function appears normal. The estimated PA systolic pressure is 61 mmHg.     Aortic Valve:  There is a mechanical prosthesis present in the aortic position. The aortic valve prosthesis is well seated. The peak velocity obtained across the aortic valve is 2.48 m/s, which translates to a peak gradient of 25 mmHg. The mean gradient   is 15 mmHg. Using a left ventricular outflow tract diameter of 2.0 cm, a left ventricular outflow tract velocity time integral of 27 cm, and a peak instantaneous transvalvular velocity time integral of 51 cm, the effective  prosthetic valve area is 1.61   cm2(p AVAi is 0.95 cm2/m2).     Mitral Valve:  The mitral valve is moderately sclerotic with normal leaflet mobility. There is a mitral annular ring present.     Tricuspid Valve:  The tricuspid valve is normal in structure. There is moderate tricuspid regurgitation.     Pulmonary Valve:  The pulmonic valve is normal in structure.     IVC: IVC is normal in size and collapses > 50% with a sniff, suggesting normal right atrial pressure of 3 mmHg.     Intracavitary: There is no evidence of pericardial effusion, intracavity mass, thrombi, or vegetation.         CONCLUSIONS     1 - Mild left atrial enlargement.     2 - Eccentric hypertrophy.     3 - No wall motion abnormalities.     4 - Normal left ventricular systolic function (EF 55-60%).     5 - Normal right ventricular systolic function .     6 - Aortic valve prosthesis, CATINA = 1.61 cm2, AVAi = 0.95 cm2/m2, peak velocity = 2.48 m/s, mean gradient = 15 mmHg.     7 - Moderate tricuspid regurgitation.     8 - Pulmonary hypertension. The estimated PA systolic pressure is 61 mmHg.             This document has been electronically    SIGNED BY: Hermilo Pete MD On: 02/25/2019 14:50     Assessment and Plan:     * Symptomatic sinus bradycardia  EKG shows sinus bradycardia with HR 49 with PVCs  Recommend cutting metoprolol to 12.5mg daily     Generalized weakness  Likely from dehydration, hypotension and bradycardia     Hypotension  Likely secondary to dehydration   Has improved with IVF  May need to cut lasix to once daily and BID as needed or 3 times a week     Anticoagulant long-term use  Coumadin on hold at this time due to supra therapeutic INR of 5.9 on admit     Hx of mechanical aortic valve replacement  Coumadin on hold         VTE Risk Mitigation (From admission, onward)         Ordered     Place sequential compression device  Until discontinued      01/26/20 1939              Chart reviewed. Patient examined by Dr. Sen and  agrees with plan that has been outlined.     Thank you for your consult. I will sign off. Please contact us if you have any additional questions.    MONICA Garcia  Cardiology   [facility] [place]

## 2020-01-27 NOTE — ASSESSMENT & PLAN NOTE
Initial blood pressure 89/47 with bradycardia HR 58.  Hold beta-blockers.  Received normal saline 1 L bolus in the ED.  Blood pressure improved to 121/57.  Continue normal saline at 75 cc/hour.

## 2020-01-27 NOTE — HPI
Ms. Sal is a 50 yo female with a past medical history of CAD with remote CABG, valvular heart disease with remote mechanical AVR on Coumadin and mitral valve replacement in 2014, severe pulmonary hypertension, interstitial lung disease with chronic hypoxic respiratory failure on 2 L home O2, hypertension, hyperlipidemia, GERD, anxiety, and chronic back pain.  Patient has had 2 admissions within the past 1 month for dehydration and Coumadin toxicity.     She presented to the ED complaining of worsening generalized weakness, fatigue, malaise, lack of energy for the past 2-3 days.  She stopped taking Lasix three days ago due to perceived dehydration.  Denies chest pain or any worsening shortness of breath.  In the ED she was found to be bradycardic with heart rate in the 50s, initial blood pressure 89/47.  Lactic acid 2.6.  She received normal saline 1 L bolus with improvement in her blood pressure to 121/57.  EKG revealed sinus bradycardia with frequent PVCs.  Patient on metoprolol at home.  In addition laboratory workup revealed elevated INR of 5.9.  Patient on warfarin for mechanical aortic valve.  Hemoglobin stable at 10.2

## 2020-01-27 NOTE — HPI
Mr. Sal is a 49-year-old  female with PMH significant for CAD/CABG, mechanical aortic valve with mitral valve repair in 2014, chronically anticoagulated on warfarin, pulmonary fibrosis, chronic hypoxemic respiratory failure, home oxygen dependent at 2 liters/minute, severe pulmonary hypertension, anxiety and chronic back pain, presented to the ED complaining of worsening generalized weakness, fatigue, malaise, lack of energy for the past 2-3 days.  She stopped taking Lasix three days ago due to perceived dehydration.  Denies chest pain or any worsening shortness of breath.  In the ED she was found to be bradycardic with heart rate in the 50s, initial blood pressure 89/47.  Lactic acid 2.6.  She received normal saline 1 L bolus with improvement in her blood pressure to 121/57.  EKG revealed sinus bradycardia with frequent PVCs.  Patient on metoprolol at home.  In addition laboratory workup revealed elevated INR of 5.9.  Patient on warfarin for mechanical aortic valve.  Hemoglobin stable at 10.2.  Rest of the laboratory workup is rather unremarkable.    Admitting diagnosis symptomatic bradycardia, hypotension, mild lactic acidosis, generalized weakness, warfarin toxicity (INR 5.9).

## 2020-01-27 NOTE — PLAN OF CARE
Pt is awake and oriented. Vital Signs stable. No acute events noted. Care plan reviewed and pt verbalized understanding. Call light is in reach. Will continue to monitor. PIV CDI.  Pt is on normal saline. Blood pressure has been stable. She has verbalized no complaints. Her INR upon admission was 5.9. She has shown no signs of bleeding. Plan of care reviewed. Will continue to monitor.

## 2020-01-27 NOTE — PROGRESS NOTES
Pharmacy Brief Progress Note:    Patient educated on warfarin indication, side effects, and drug interactions. Discussed importance of medication compliance and INR monitoring and reviewed signs of abnormal bleeding. Patient refused warfarin educational handout /previously received last admission.    Patient expressed understanding and had no further questions.    Thank you for allowing us to participate in this patient's care.     Tammy Boyd [facility] 1/27/2020 3:13 PM

## 2020-01-27 NOTE — ASSESSMENT & PLAN NOTE
Complaining of generalized weakness, dizziness, fatigue likely secondary to hypotension.  Continue normal saline at 75 cc/hour for the next 12 hr.  Monitor respiratory status due to chronic home oxygen dependent state/pulmonary fibrosis.  Consider PT/OT evaluation in a.m. if needed.

## 2020-01-27 NOTE — ASSESSMENT & PLAN NOTE
INR elevated at 5.9.  Hold warfarin for tonight.  Consult pharmacy for warfarin dosing.  Goal INR 2.5-3.5.

## 2020-01-27 NOTE — SUBJECTIVE & OBJECTIVE
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, implants and grafts, initial encounter     Hyperlipidemia     Hypertension     Sleep apnea syndrome 2/8/2019    Stroke        Past Surgical History:   Procedure Laterality Date    AORTIC VALVE REPLACEMENT      mechanical    CARDIAC CATHETERIZATION      CORONARY ANGIOPLASTY      CORONARY ARTERY BYPASS GRAFT      HYSTERECTOMY      RIGHT HEART CATHETERIZATION Right 5/17/2019    Procedure: INSERTION, CATHETER, RIGHT HEART;  Surgeon: Derek Brown MD;  Location: [facility] CATH LAB;  Service: Cardiology;  Laterality: Right;  malur pt/detailed hx       Review of patient's allergies indicates:   Allergen Reactions    Nsaids (non-steroidal anti-inflammatory drug) Other (See Comments)     Mechanical heart valve       No current facility-administered medications on file prior to encounter.      Current Outpatient Medications on File Prior to Encounter   Medication Sig    albuterol (PROVENTIL) 2.5 mg /3 mL (0.083 %) nebulizer solution Take 3 mLs (2.5 mg total) by nebulization every 4 (four) hours as needed for Wheezing or Shortness of Breath (Cough). Rescue    albuterol (VENTOLIN HFA) 90 mcg/actuation inhaler Inhale 2 puffs into the lungs every 6 (six) hours as needed for Wheezing. Rescue    amitriptyline (ELAVIL) 25 MG tablet Take 25 mg by mouth every evening.     ANORO ELLIPTA 62.5-25 mcg/actuation DsDv INHALE 1 PUFF INTO THE LUNGS ONCE A DAY. CONTROLLER.    busPIRone (BUSPAR) 15 MG tablet Take 15 mg by mouth 2 (two) times daily.     cholestyramine (QUESTRAN) 4 gram packet Take 1 packet (4 g total) by mouth 2 (two) times daily.    clonazePAM (KLONOPIN) 1 MG tablet Take 1 mg by mouth 3 (three) times daily.     desvenlafaxine succinate (PRISTIQ) 100 MG Tb24 Take 1 tablet (100 mg total) by mouth  once daily.    diclofenac sodium (VOLTAREN) 1 % Gel Apply 2 g topically 4 (four) times daily as needed.     fluticasone (FLONASE) 50 mcg/actuation nasal spray 1 spray by Each Nare route once daily.    furosemide (LASIX) 40 MG tablet Take two 40 mg tablets by mouth twice a day    HYDROcodone-acetaminophen (NORCO)  mg per tablet Take 1 tablet by mouth every 6 (six) hours as needed for Pain.    metoprolol succinate (TOPROL-XL) 25 MG 24 hr tablet Take 1 tablet (25 mg total) by mouth once daily.    nicotine (NICODERM CQ) 7 mg/24 hr Place 1 patch onto the skin every 24 hours.    nitroGLYCERIN (NITROSTAT) 0.4 MG SL tablet     nitroGLYCERIN 0.4 MG/HR TD PT24 (NITRODUR) 0.4 mg/hr Place 1 patch onto the skin once daily.    ondansetron (ZOFRAN) 4 MG tablet TAKE 1 BY MOUTH EVERY 8 HOURS AS NEEDED FOR NAUSEA.    pantoprazole (PROTONIX) 40 MG tablet Take 1 tablet (40 mg total) by mouth 2 (two) times daily.    potassium chloride SA (K-DUR,KLOR-CON) 20 MEQ tablet Take 20 mEq by mouth 2 (two) times daily.    topiramate (TOPAMAX) 100 MG tablet Take 100 mg by mouth 2 (two) times daily.    traZODone (DESYREL) 150 MG tablet Take 150 mg by mouth.    warfarin (COUMADIN) 3 MG tablet Take 1 tablet (3 mg total) by mouth every Tue, Wed, Fri, Sat, Sun. (Patient taking differently: Take 3 mg by mouth every Mon, Wed, Fri. )    warfarin (COUMADIN) 6 MG tablet Take 1 tablet (6 mg total) by mouth every Mon, Tues, Wed, Thurs, Fri. Except 9mg on Thursdays. (Patient taking differently: Take 6 mg by mouth. Sunday, Tuesday, Thursday and Saturday)    amoxicillin (AMOXIL) 500 MG capsule TAKE 2 CAPSULES BY MOUTH now and then 1 CAPSULES 3 TIMES A DAY UNTIL GONE    rosuvastatin (CRESTOR) 20 MG tablet Take 20 mg by mouth.    sumatriptan (IMITREX) 100 MG tablet Take 100 mg by mouth every 2 (two) hours as needed.    tiZANidine (ZANAFLEX) 4 MG tablet Take 4 mg by mouth every evening.    traMADol (ULTRAM) 50 mg tablet Take 50 mg by mouth  every 4 (four) hours as needed.      Family History     Problem Relation (Age of Onset)    Breast cancer Sister    Coronary artery disease Father    Heart disease Mother    Lung cancer Brother        Tobacco Use    Smoking status: Former Smoker     Packs/day: 1.00     Years: 30.00     Pack years: 30.00     Types: Cigarettes     Last attempt to quit: 2018     Years since quittin.6    Smokeless tobacco: Never Used   Substance and Sexual Activity    Alcohol use: Not Currently     Frequency: 2-4 times a month     Drinks per session: 1 or 2     Binge frequency: Never    Drug use: Not Currently    Sexual activity: Not on file     Review of Systems   Constitution: Positive for malaise/fatigue. Negative for diaphoresis, weight gain and weight loss.   HENT: Negative for congestion and nosebleeds.    Cardiovascular: Negative for chest pain, claudication, cyanosis, dyspnea on exertion, irregular heartbeat, leg swelling, near-syncope, orthopnea, palpitations, paroxysmal nocturnal dyspnea and syncope.   Respiratory: Positive for shortness of breath ( chronic). Negative for cough, hemoptysis, sleep disturbances due to breathing, snoring, sputum production and wheezing.         Wears home oxygen    Hematologic/Lymphatic: Negative for bleeding problem. Bruises/bleeds easily.   Skin: Negative for rash.   Musculoskeletal: Positive for arthritis and joint pain. Negative for back pain, falls, muscle cramps and muscle weakness.   Gastrointestinal: Negative for abdominal pain, constipation, diarrhea, heartburn, hematemesis, hematochezia, melena, nausea and vomiting.   Genitourinary: Negative for dysuria, hematuria and nocturia.   Neurological: Negative for excessive daytime sleepiness, dizziness, headaches, light-headedness, loss of balance, numbness, vertigo and weakness.     Objective:     Vital Signs (Most Recent):  Temp: 97.6 °F (36.4 °C) (20)  Pulse: (!) 55 (20 112)  Resp: 20 (20)  BP:  111/69 (01/27/20 1124)  SpO2: 97 % (01/27/20 1124) Vital Signs (24h Range):  Temp:  [97.4 °F (36.3 °C)-98.3 °F (36.8 °C)] 97.6 °F (36.4 °C)  Pulse:  [49-73] 55  Resp:  [17-28] 20  SpO2:  [90 %-100 %] 97 %  BP: ()/(38-76) 111/69     Weight: 76 kg (167 lb 8 oz)  Body mass index is 32.71 kg/m².    SpO2: 97 %  O2 Device (Oxygen Therapy): nasal cannula      Intake/Output Summary (Last 24 hours) at 1/27/2020 1125  Last data filed at 1/27/2020 0605  Gross per 24 hour   Intake 1060 ml   Output 700 ml   Net 360 ml       Lines/Drains/Airways     Peripheral Intravenous Line                 Peripheral IV - Single Lumen 01/26/20 1451 20 G Left Antecubital less than 1 day         Peripheral IV - Single Lumen 01/26/20 1650 22 G Right Hand less than 1 day                Physical Exam   Constitutional: She is oriented to person, place, and time. She appears well-developed and well-nourished. No distress.   On supplemental O2   HENT:   Head: Normocephalic and atraumatic.   Eyes: Pupils are equal, round, and reactive to light. Right eye exhibits no discharge. Left eye exhibits no discharge.   Neck: Neck supple. No JVD present.   Cardiovascular: Normal rate, regular rhythm, S1 normal and S2 normal.   Murmur heard.  Metallic S2   Pulmonary/Chest: Effort normal and breath sounds normal. No respiratory distress. She has no wheezes. She has no rales.   Abdominal: Soft. She exhibits no distension.   Musculoskeletal: She exhibits no edema.   Neurological: She is alert and oriented to person, place, and time.   Skin: Skin is warm and dry. She is not diaphoretic. No erythema.   Psychiatric: She has a normal mood and affect. Her behavior is normal. Thought content normal.   Nursing note and vitals reviewed.      Significant Labs:   All pertinent lab results from the last 24 hours have been reviewed. and   Recent Lab Results       01/27/20  0539   01/26/20  2319   01/26/20  2157   01/26/20  1900   01/26/20  1842        Influenza A,  Molecular               Influenza B, Molecular               Albumin 3.1             Alkaline Phosphatase 114             Allens Test               ALT 34             Anion Gap 9             Appearance, UA       Clear       aPTT               AST 49             Baso # 0.04             Basophil% 0.6             Bilirubin (UA)       Negative       BILIRUBIN TOTAL 1.1  Comment:  For infants and newborns, interpretation of results should be based  on gestational age, weight and in agreement with clinical  observations.  Premature Infant recommended reference ranges:  Up to 24 hours.............<8.0 mg/dL  Up to 48 hours............<12.0 mg/dL  3-5 days..................<15.0 mg/dL  6-29 days.................<15.0 mg/dL               Blood Culture, Routine               BNP               Site               BUN, Bld 17             Calcium 8.2             Chloride 102             CO2 23             Color, UA       Yellow       Creatinine 1.0             DelSys               Differential Method Automated             eGFR if  >60             eGFR if non  >60  Comment:  Calculation used to obtain the estimated glomerular filtration  rate (eGFR) is the CKD-EPI equation.                Eos # 0.2             Eosinophil% 2.9             FiO2               Flow               Flu A & B Source               Glucose 113             Glucose, UA       Negative       Gran # (ANC) 4.0             Gran% 60.2             Hematocrit 31.5             Hemoglobin 9.0             Hyaline Casts, UA       4       Immature Grans (Abs) 0.02  Comment:  Mild elevation in immature granulocytes is non specific and   can be seen in a variety of conditions including stress response,   acute inflammation, trauma and pregnancy. Correlation with other   laboratory and clinical findings is essential.               Immature Granulocytes 0.3             INR 5.7  Comment:  Coumadin Therapy:  2.0 - 3.0 for INR for all indicators  except mechanical heart valves  and antiphospholipid syndromes which should use 2.5 - 3.5.  inr critical result(s) called and verbal readback obtained from   sruthi vega rn by ELIZABETH 01/27/2020 07:17               Ketones, UA       Negative       Lactate, Derick   2.6  Comment:  Falsely low lactic acid results can be found in samples   containing >=13.0 mg/dL total bilirubin and/or >=3.5 mg/dL   direct bilirubin.   3.0  Comment:  Falsely low lactic acid results can be found in samples   containing >=13.0 mg/dL total bilirubin and/or >=3.5 mg/dL   direct bilirubin.           Leukocytes, UA       Negative       Lymph # 1.7             Lymph% 25.9             Magnesium 2.0             MCH 29.7             MCHC 28.6                          Microscopic Comment       SEE COMMENT  Comment:  Other formed elements not mentioned in the report are not   present in the microscopic examination.          Mode               Mono # 0.7             Mono% 10.1             MPV 10.9             NITRITE UA       Negative       nRBC 0             Occult Blood UA       3+       pH, UA       6.0       Phosphorus 2.9             Platelets 139             POC BE               POC HCO3               POC PCO2               POC PH               POC PO2               POC SATURATED O2               Potassium 3.4             PROTEIN TOTAL 6.5             Protein, UA       Negative  Comment:  Recommend a 24 hour urine protein or a urine   protein/creatinine ratio if globulin induced proteinuria is  clinically suspected.         Protime 56.4             RBC 3.03             RBC, UA       25       RDW 19.7             Sample               Sodium 134             Specific Gravity, UA       1.020       Specimen UA       Urine, Clean Catch       Troponin I         0.022  Comment:  The reference interval for Troponin I represents the 99th percentile   cutoff   for our facility and is consistent with 3rd generation assay   performance.        UROBILINOGEN UA       1.0       WBC 6.61                              01/26/20  1655   01/26/20  1638   01/26/20  1529   01/26/20  1528   01/26/20  1451        Influenza A, Molecular     Negative         Influenza B, Molecular     Negative         Albumin         3.5     Alkaline Phosphatase         122     Allens Test       Pass       ALT         40     Anion Gap         13     Appearance, UA               aPTT         42.7  Comment:  aPTT therapeutic range = 39-69 seconds     AST         62     Baso #         0.04     Basophil%         0.6     Bilirubin (UA)               BILIRUBIN TOTAL         1.5  Comment:  For infants and newborns, interpretation of results should be based  on gestational age, weight and in agreement with clinical  observations.  Premature Infant recommended reference ranges:  Up to 24 hours.............<8.0 mg/dL  Up to 48 hours............<12.0 mg/dL  3-5 days..................<15.0 mg/dL  6-29 days.................<15.0 mg/dL       Blood Culture, Routine No Growth to date[P] No Growth to date[P]           BNP         483  Comment:  Values of less than 100 pg/ml are consistent with non-CHF populations.     Site       RR       BUN, Bld         19     Calcium         9.1     Chloride         96     CO2         23     Color, UA               Creatinine         1.2     DelSys       Nasal Can       Differential Method         Automated     eGFR if          >60     eGFR if non          53  Comment:  Calculation used to obtain the estimated glomerular filtration  rate (eGFR) is the CKD-EPI equation.        Eos #         0.1     Eosinophil%         1.8     FiO2       28       Flow       2       Flu A & B Source     Nasal swab         Glucose         108     Glucose, UA               Gran # (ANC)         5.0     Gran%         70.0     Hematocrit         34.8     Hemoglobin         10.2     Hyaline Casts, UA               Immature Grans (Abs)          0.04  Comment:  Mild elevation in immature granulocytes is non specific and   can be seen in a variety of conditions including stress response,   acute inflammation, trauma and pregnancy. Correlation with other   laboratory and clinical findings is essential.       Immature Granulocytes         0.6     INR         5.9  Comment:  Coumadin Therapy:  2.0 - 3.0 for INR for all indicators except mechanical heart valves  and antiphospholipid syndromes which should use 2.5 - 3.5.  INR critical result(s) called and verbal readback obtained from   Baylee King RN by LUANNE 01/26/2020 16:02       Ketones, UA               Lactate, Derick   2.6  Comment:  Falsely low lactic acid results can be found in samples   containing >=13.0 mg/dL total bilirubin and/or >=3.5 mg/dL   direct bilirubin.             Leukocytes, UA               Lymph #         1.3     Lymph%         18.5     Magnesium               MCH         29.9     MCHC         29.3     MCV         102     Microscopic Comment               Mode       SPONT       Mono #         0.6     Mono%         8.5     MPV         10.8     NITRITE UA               nRBC         0     Occult Blood UA               pH, UA               Phosphorus               Platelets         168     POC BE       -1       POC HCO3       23.7       POC PCO2       39.9       POC PH       7.382       POC PO2       29       POC SATURATED O2       55       Potassium         4.2     PROTEIN TOTAL         7.5     Protein, UA               Protime         58.4  Comment:  Results confirmed, test repeated     RBC         3.41     RBC, UA               RDW         19.6     Sample       ARTERIAL       Sodium         132     Specific [place], UA               Specimen UA               Troponin I         0.029  Comment:  The reference interval for Troponin I represents the 99th percentile   cutoff   for our facility and is consistent with 3rd generation assay   performance.       UROBILINOGEN UA               WBC          7.08                          Significant Imaging: Echocardiogram:   2D echo with color flow doppler:   Results for orders placed or performed in visit on 02/25/19   2D echo with color flow doppler   Result Value Ref Range    QEF 55 55 - 65    Est. PA Systolic Pressure 60.76 (A)     Mitral Valve Mobility NORMAL     Tricuspid Valve Regurgitation MODERATE (A)     Narrative    Date of Procedure: 02/25/2019        TEST DESCRIPTION       Aorta: The aortic root is normal in size, measuring 2.3 cm at sinotubular junction and 1.9 cm at Sinuses of Valsalva.     Left Atrium: The left atrial volume index is mildly enlarged, measuring 36.20 cc/m2.     Left Ventricle: The left ventricle is normal in size, with an end-diastolic diameter of 5.1 cm, and an end-systolic diameter of 3.8 cm. LV wall thickness is normal, with the septum measuring 1.2 cm and the posterior wall measuring 1.1 cm across. Relative   wall thickness was normal at 0.43, and the LV mass index was increased at 159.7 g/m2 consistent with eccentric left ventricular hypertrophy. There are no regional wall motion abnormalities. Left ventricular systolic function appears normal. Visually   estimated ejection fraction is 55-60%. The LV Doppler derived stroke volume equals 83.0 ccs.         Right Atrium: The right atrium is normal in size, measuring 4.4 cm in length and 3.0 cm in width in the apical view.     Right Ventricle: The right ventricle is normal in size measuring 2.6 cm at the base in the apical right ventricle-focused view. Global right ventricular systolic function appears normal. The estimated PA systolic pressure is 61 mmHg.     Aortic Valve:  There is a mechanical prosthesis present in the aortic position. The aortic valve prosthesis is well seated. The peak velocity obtained across the aortic valve is 2.48 m/s, which translates to a peak gradient of 25 mmHg. The mean gradient   is 15 mmHg. Using a left ventricular outflow tract diameter of 2.0 cm, a  left ventricular outflow tract velocity time integral of 27 cm, and a peak instantaneous transvalvular velocity time integral of 51 cm, the effective prosthetic valve area is 1.61   cm2(p AVAi is 0.95 cm2/m2).     Mitral Valve:  The mitral valve is moderately sclerotic with normal leaflet mobility. There is a mitral annular ring present.     Tricuspid Valve:  The tricuspid valve is normal in structure. There is moderate tricuspid regurgitation.     Pulmonary Valve:  The pulmonic valve is normal in structure.     IVC: IVC is normal in size and collapses > 50% with a sniff, suggesting normal right atrial pressure of 3 mmHg.     Intracavitary: There is no evidence of pericardial effusion, intracavity mass, thrombi, or vegetation.         CONCLUSIONS     1 - Mild left atrial enlargement.     2 - Eccentric hypertrophy.     3 - No wall motion abnormalities.     4 - Normal left ventricular systolic function (EF 55-60%).     5 - Normal right ventricular systolic function .     6 - Aortic valve prosthesis, CATINA = 1.61 cm2, AVAi = 0.95 cm2/m2, peak velocity = 2.48 m/s, mean gradient = 15 mmHg.     7 - Moderate tricuspid regurgitation.     8 - Pulmonary hypertension. The estimated PA systolic pressure is 61 mmHg.             This document has been electronically    SIGNED BY: Hermilo Pete MD On: 02/25/2019 14:50

## 2020-01-27 NOTE — ASSESSMENT & PLAN NOTE
History of mechanical aortic valve replacement in 2014, also had a mitral valve repair at the same time.  Currently maintained on warfarin, supratherapeutic INR today 5.9.

## 2020-01-27 NOTE — H&P
[facility] Medicine  History & Physical    Patient Name: Meg Sal  MRN: [id number]  Admission Date: 1/26/2020  Attending Physician: Anselmo Everett MD  Primary Care Provider: Lucero Banda MD         Patient information was obtained from patient, spouse/SO, past medical records and ER records.     Subjective:     Principal Problem:Symptomatic sinus bradycardia    Chief Complaint:   Chief Complaint   Patient presents with    Fatigue     Weakness, fatigue, dehydrated for a few days. +nausea/gas.         HPI: Mr. Sal is a 49-year-old  female with PMH significant for CAD/CABG, mechanical aortic valve with mitral valve repair in 2014, chronically anticoagulated on warfarin, pulmonary fibrosis, chronic hypoxemic respiratory failure, home oxygen dependent at 2 liters/minute, severe pulmonary hypertension, anxiety and chronic back pain, presented to the ED complaining of worsening generalized weakness, fatigue, malaise, lack of energy for the past 2-3 days.  She stopped taking Lasix three days ago due to perceived dehydration.  Denies chest pain or any worsening shortness of breath.  In the ED she was found to be bradycardic with heart rate in the 50s, initial blood pressure 89/47.  Lactic acid 2.6.  She received normal saline 1 L bolus with improvement in her blood pressure to 121/57.  EKG revealed sinus bradycardia with frequent PVCs.  Patient on metoprolol at home.  In addition laboratory workup revealed elevated INR of 5.9.  Patient on warfarin for mechanical aortic valve.  Hemoglobin stable at 10.2.  Rest of the laboratory workup is rather unremarkable.    Admitting diagnosis symptomatic bradycardia, hypotension, mild lactic acidosis, generalized weakness, warfarin toxicity (INR 5.9).      Past Medical History:   Diagnosis Date    Anxiety and depression     Aortic valve replaced     mechanical    CHF (congestive heart failure)     Coronary artery disease      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, implants and grafts, initial encounter     Hyperlipidemia     Hypertension     Sleep apnea syndrome 2/8/2019    Stroke        Past Surgical History:   Procedure Laterality Date    AORTIC VALVE REPLACEMENT      mechanical    CARDIAC CATHETERIZATION      CORONARY ANGIOPLASTY      CORONARY ARTERY BYPASS GRAFT      HYSTERECTOMY      RIGHT HEART CATHETERIZATION Right 5/17/2019    Procedure: INSERTION, CATHETER, RIGHT HEART;  Surgeon: Derek Brown MD;  Location: [facility] CATH LAB;  Service: Cardiology;  Laterality: Right;  malur pt/detailed hx       Review of patient's allergies indicates:   Allergen Reactions    Nsaids (non-steroidal anti-inflammatory drug) Other (See Comments)     Mechanical heart valve       No current facility-administered medications on file prior to encounter.      Current Outpatient Medications on File Prior to Encounter   Medication Sig    albuterol (PROVENTIL) 2.5 mg /3 mL (0.083 %) nebulizer solution Take 3 mLs (2.5 mg total) by nebulization every 4 (four) hours as needed for Wheezing or Shortness of Breath (Cough). Rescue    albuterol (VENTOLIN HFA) 90 mcg/actuation inhaler Inhale 2 puffs into the lungs every 6 (six) hours as needed for Wheezing. Rescue    amitriptyline (ELAVIL) 25 MG tablet Take 25 mg by mouth every evening.     ANORO ELLIPTA 62.5-25 mcg/actuation DsDv INHALE 1 PUFF INTO THE LUNGS ONCE A DAY. CONTROLLER.    busPIRone (BUSPAR) 15 MG tablet Take 15 mg by mouth 2 (two) times daily.     cholestyramine (QUESTRAN) 4 gram packet Take 1 packet (4 g total) by mouth 2 (two) times daily.    clonazePAM (KLONOPIN) 1 MG tablet Take 1 mg by mouth 3 (three) times daily.     desvenlafaxine succinate (PRISTIQ) 100 MG Tb24 Take 1 tablet (100 mg total) by mouth once daily.    diclofenac sodium (VOLTAREN) 1 % Gel Apply 2 g topically 4 (four) times daily as needed.     fluticasone (FLONASE) 50 mcg/actuation nasal spray 1 spray by Each  Nare route once daily.    furosemide (LASIX) 40 MG tablet Take two 40 mg tablets by mouth twice a day    HYDROcodone-acetaminophen (NORCO)  mg per tablet Take 1 tablet by mouth every 6 (six) hours as needed for Pain.    metoprolol succinate (TOPROL-XL) 25 MG 24 hr tablet Take 1 tablet (25 mg total) by mouth once daily.    nicotine (NICODERM CQ) 7 mg/24 hr Place 1 patch onto the skin every 24 hours.    nitroGLYCERIN (NITROSTAT) 0.4 MG SL tablet     nitroGLYCERIN 0.4 MG/HR TD PT24 (NITRODUR) 0.4 mg/hr Place 1 patch onto the skin once daily.    ondansetron (ZOFRAN) 4 MG tablet TAKE 1 BY MOUTH EVERY 8 HOURS AS NEEDED FOR NAUSEA.    pantoprazole (PROTONIX) 40 MG tablet Take 1 tablet (40 mg total) by mouth 2 (two) times daily.    potassium chloride SA (K-DUR,KLOR-CON) 20 MEQ tablet Take 20 mEq by mouth 2 (two) times daily.    topiramate (TOPAMAX) 100 MG tablet Take 100 mg by mouth 2 (two) times daily.    traZODone (DESYREL) 150 MG tablet Take 150 mg by mouth.    warfarin (COUMADIN) 3 MG tablet Take 1 tablet (3 mg total) by mouth every Tue, Wed, Fri, Sat, Sun. (Patient taking differently: Take 3 mg by mouth every Mon, Wed, Fri. )    warfarin (COUMADIN) 6 MG tablet Take 1 tablet (6 mg total) by mouth every Mon, Tues, Wed, Thurs, Fri. Except 9mg on Thursdays. (Patient taking differently: Take 6 mg by mouth. Sunday, Tuesday, Thursday and Saturday)    amoxicillin (AMOXIL) 500 MG capsule TAKE 2 CAPSULES BY MOUTH now and then 1 CAPSULES 3 TIMES A DAY UNTIL GONE    rosuvastatin (CRESTOR) 20 MG tablet Take 20 mg by mouth.    sumatriptan (IMITREX) 100 MG tablet Take 100 mg by mouth every 2 (two) hours as needed.    tiZANidine (ZANAFLEX) 4 MG tablet Take 4 mg by mouth every evening.    traMADol (ULTRAM) 50 mg tablet Take 50 mg by mouth every 4 (four) hours as needed.      Family History     Problem Relation (Age of Onset)    Breast cancer Sister    Coronary artery disease Father    Heart disease Mother     Lung cancer Brother        Tobacco Use    Smoking status: Former Smoker     Packs/day: 1.00     Years: 30.00     Pack years: 30.00     Types: Cigarettes     Last attempt to quit: 2018     Years since quittin.6    Smokeless tobacco: Never Used   Substance and Sexual Activity    Alcohol use: Not Currently     Frequency: 2-4 times a month     Drinks per session: 1 or 2     Binge frequency: Never    Drug use: Not Currently    Sexual activity: Not on file     Review of Systems   Constitutional: Positive for activity change and fatigue. Negative for chills, diaphoresis and fever.   HENT: Negative.  Negative for congestion, nosebleeds and sinus pressure.    Eyes: Negative.  Negative for visual disturbance.   Respiratory: Positive for shortness of breath (chronic). Negative for cough, chest tightness and wheezing.    Cardiovascular: Negative.  Negative for chest pain, palpitations and leg swelling.   Gastrointestinal: Negative.  Negative for abdominal pain, diarrhea, nausea and vomiting.   Endocrine: Negative.  Negative for polyuria.   Genitourinary: Negative.  Negative for dysuria, flank pain, frequency and urgency.   Musculoskeletal: Negative.  Negative for back pain, joint swelling and neck stiffness.   Skin: Negative.  Negative for color change, pallor and rash.   Allergic/Immunologic: Negative.  Negative for immunocompromised state.   Neurological: Positive for dizziness and weakness (Generalized). Negative for syncope, speech difficulty, numbness and headaches.   Hematological: Negative.  Negative for adenopathy. Does not bruise/bleed easily.   Psychiatric/Behavioral: Positive for decreased concentration. Negative for confusion and hallucinations. The patient is not nervous/anxious.    All other systems reviewed and are negative.    Objective:     Vital Signs (Most Recent):  Temp: 98 °F (36.7 °C) (20)  Pulse: 62 (20)  Resp: (!) 24 (20)  BP: 106/76 (20)  SpO2:  96 % (01/26/20 2132) Vital Signs (24h Range):  Temp:  [98 °F (36.7 °C)-98.3 °F (36.8 °C)] 98 °F (36.7 °C)  Pulse:  [50-73] 62  Resp:  [17-28] 24  SpO2:  [90 %-100 %] 96 %  BP: ()/(38-76) 106/76     Weight: 76 kg (167 lb 8 oz)  Body mass index is 32.71 kg/m².    Physical Exam   Constitutional: She is oriented to person, place, and time. She appears ill. No distress.   Chronically ill-appearing  male, in no respiratory distress.  Currently on 2 L oxygen nasal cannula.  Spouse at the bedside.   HENT:   Head: Normocephalic and atraumatic.   Eyes: Conjunctivae and EOM are normal. No scleral icterus.   Neck: Normal range of motion. Neck supple. No tracheal deviation present. No thyromegaly present.   Cardiovascular: Regular rhythm and intact distal pulses. Bradycardia present.   Murmur heard.  Pulmonary/Chest: Effort normal. No respiratory distress. She exhibits no tenderness.   Course breath sounds bilaterally, all lung fields.  Currently on 2 L oxygen nasal cannula   Abdominal: Soft. Bowel sounds are normal. She exhibits no distension. There is no tenderness.   Musculoskeletal: Normal range of motion. She exhibits no edema or tenderness.   Neurological: She is alert and oriented to person, place, and time. No cranial nerve deficit. She exhibits normal muscle tone. Coordination normal.   Skin: Skin is warm and dry. She is not diaphoretic. No erythema.   Psychiatric: She has a normal mood and affect. Her behavior is normal.   Nursing note and vitals reviewed.        CRANIAL NERVES     CN III, IV, VI   Extraocular motions are normal.        Significant Labs:   ABGs:   Recent Labs   Lab 01/26/20  1528   PH 7.382   PCO2 39.9   HCO3 23.7*   POCSATURATED 55*   BE -1     BMP:   Recent Labs   Lab 01/26/20  1451      *   K 4.2   CL 96   CO2 23   BUN 19   CREATININE 1.2   CALCIUM 9.1     CBC:   Recent Labs   Lab 01/26/20  1451   WBC 7.08   HGB 10.2*   HCT 34.8*        CMP:   Recent Labs   Lab  01/26/20  1451   *   K 4.2   CL 96   CO2 23      BUN 19   CREATININE 1.2   CALCIUM 9.1   PROT 7.5   ALBUMIN 3.5   BILITOT 1.5*   ALKPHOS 122   AST 62*   ALT 40   ANIONGAP 13   EGFRNONAA 53*     Cardiac Markers:   Recent Labs   Lab 01/26/20  1451   *     Lactic Acid:   Recent Labs   Lab 01/26/20  1638   LACTATE 2.6*     Lipase: No results for input(s): LIPASE in the last 48 hours.  Troponin:   Recent Labs   Lab 01/26/20  1451 01/26/20  1842   TROPONINI 0.029* 0.022     TSH: No results for input(s): TSH in the last 4320 hours.  Urine Studies:   Recent Labs   Lab 01/26/20  1900   COLORU Yellow   APPEARANCEUA Clear   PHUR 6.0   SPECGRAV 1.020   PROTEINUA Negative   GLUCUA Negative   KETONESU Negative   BILIRUBINUA Negative   OCCULTUA 3+*   NITRITE Negative   UROBILINOGEN 1.0   LEUKOCYTESUR Negative   RBCUA 25*   HYALINECASTS 4*     All pertinent labs within the past 24 hours have been reviewed.    Significant Imaging: I have reviewed and interpreted all pertinent imaging results/findings within the past 24 hours.     Imaging Results          X-Ray Chest AP Portable (Final result)  Result time 01/26/20 15:49:16    Final result by José Miguel Gonsalez MD (01/26/20 15:49:16)                 Impression:      No acute pulmonary infiltrate or consolidation.  Chronic interstitial reticulation, similar to priors.      Electronically signed by: José Miguel Gonsalez  Date:    01/26/2020  Time:    15:49             Narrative:    EXAMINATION:  XR CHEST AP PORTABLE    CLINICAL HISTORY:  chest pain;    TECHNIQUE:  Single frontal view of the chest was performed.    COMPARISON:  Chest radiograph 12/14/2019 with priors    FINDINGS:  Cardiac leads project over the chest.  Cardiomediastinal silhouette is enlarged, similar to priors.  Postsurgical changes of a sternotomy and valve replacement.  Chronic appearing interstitial reticular it is identified throughout the lung in a similar pattern distribution.  No acute or new  airspace infiltrate.  No large effusion.  No pneumothorax.  Osseous structures appear intact.                                I have independently reviewed and interpreted the EKG.     I have independently reviewed all pertinent labs within the past 24 hours.    I have independently reviewed, visualized and interpreted all pertinent imaging results within the past 24 hours and discussed the findings with the ED physician, Dr. Brown            Assessment/Plan:     * Symptomatic sinus bradycardia  Sinus bradycardia (HR 50s), associated with hypotension.  Hold beta-blockers.  Received normal saline 1 L bolus in the ED with improvement in hypotension.  Monitor closely on telemetry.  Cardiology consult in a.m..      Hypotension  Initial blood pressure 89/47 with bradycardia HR 58.  Hold beta-blockers.  Received normal saline 1 L bolus in the ED.  Blood pressure improved to 121/57.  Continue normal saline at 75 cc/hour.      Generalized weakness  Complaining of generalized weakness, dizziness, fatigue likely secondary to hypotension.  Continue normal saline at 75 cc/hour for the next 12 hr.  Monitor respiratory status due to chronic home oxygen dependent state/pulmonary fibrosis.  Consider PT/OT evaluation in a.m. if needed.      Supratherapeutic INR  INR elevated at 5.9.  Hold warfarin for tonight.  Consult pharmacy for warfarin dosing.  Goal INR 2.5-3.5.      Hx of mechanical aortic valve replacement  History of mechanical aortic valve replacement in 2014, also had a mitral valve repair at the same time.  Currently maintained on warfarin, supratherapeutic INR today 5.9.        Chronic respiratory failure with hypoxia  Chronically home oxygen dependent at 2 liters/minute.  Not in acute exacerbation.  Continue bronchodilators and supplemental oxygen as current.      Pulmonary fibrosis  Follow up with Pulmonary as outpatient.  Chronically home oxygen dependent, 2 liters/minute.      Anxiety with depression  Continue home dose  amitriptyline, buspirone, Klonopin.      Anticoagulant long-term use  History of mechanical aortic valve replacement in 2014.  Currently anticoagulated on warfarin.  Pharmacy consult for warfarin dosing.        VTE Risk Mitigation (From admission, onward)         Ordered     Place sequential compression device  Until discontinued      01/26/20 1939                   Anselmo Everett MD  Department of Hospital Medicine   [facility]

## 2020-01-27 NOTE — ASSESSMENT & PLAN NOTE
Likely secondary to dehydration   Has improved with IVF  May need to cut lasix to once daily and BID as needed or 3 times a week

## 2020-01-27 NOTE — ASSESSMENT & PLAN NOTE
History of mechanical aortic valve replacement in 2014.  Currently anticoagulated on warfarin.  Pharmacy consult for warfarin dosing.

## 2020-01-28 VITALS
OXYGEN SATURATION: 95 % | BODY MASS INDEX: 32.89 KG/M2 | HEART RATE: 71 BPM | RESPIRATION RATE: 20 BRPM | TEMPERATURE: 98 F | WEIGHT: 167.5 LBS | DIASTOLIC BLOOD PRESSURE: 65 MMHG | SYSTOLIC BLOOD PRESSURE: 131 MMHG | HEIGHT: 60 IN

## 2020-01-28 PROBLEM — R79.1 SUPRATHERAPEUTIC INR: Status: RESOLVED | Noted: 2019-12-14 | Resolved: 2020-01-28

## 2020-01-28 PROBLEM — I95.9 HYPOTENSION: Status: RESOLVED | Noted: 2020-01-26 | Resolved: 2020-01-28

## 2020-01-28 PROBLEM — R00.1 SYMPTOMATIC SINUS BRADYCARDIA: Status: RESOLVED | Noted: 2020-01-26 | Resolved: 2020-01-28

## 2020-01-28 LAB
BASOPHILS # BLD AUTO: 0.03 K/UL (ref 0–0.2)
BASOPHILS NFR BLD: 0.5 % (ref 0–1.9)
DIFFERENTIAL METHOD: ABNORMAL
EOSINOPHIL # BLD AUTO: 0.2 K/UL (ref 0–0.5)
EOSINOPHIL NFR BLD: 3.3 % (ref 0–8)
ERYTHROCYTE [DISTWIDTH] IN BLOOD BY AUTOMATED COUNT: 19.6 % (ref 11.5–14.5)
HCT VFR BLD AUTO: 34.7 % (ref 37–48.5)
HGB BLD-MCNC: 10.1 G/DL (ref 12–16)
IMM GRANULOCYTES # BLD AUTO: 0.03 K/UL (ref 0–0.04)
IMM GRANULOCYTES NFR BLD AUTO: 0.5 % (ref 0–0.5)
INR PPP: 1.9 (ref 0.8–1.2)
LYMPHOCYTES # BLD AUTO: 1.7 K/UL (ref 1–4.8)
LYMPHOCYTES NFR BLD: 27.3 % (ref 18–48)
MCH RBC QN AUTO: 30.1 PG (ref 27–31)
MCHC RBC AUTO-ENTMCNC: 29.1 G/DL (ref 32–36)
MCV RBC AUTO: 103 FL (ref 82–98)
MONOCYTES # BLD AUTO: 0.6 K/UL (ref 0.3–1)
MONOCYTES NFR BLD: 9.4 % (ref 4–15)
NEUTROPHILS # BLD AUTO: 3.8 K/UL (ref 1.8–7.7)
NEUTROPHILS NFR BLD: 59 % (ref 38–73)
NRBC BLD-RTO: 0 /100 WBC
PLATELET # BLD AUTO: 131 K/UL (ref 150–350)
PMV BLD AUTO: 11 FL (ref 9.2–12.9)
PROTHROMBIN TIME: 19.7 SEC (ref 9–12.5)
RBC # BLD AUTO: 3.36 M/UL (ref 4–5.4)
WBC # BLD AUTO: 6.37 K/UL (ref 3.9–12.7)

## 2020-01-28 PROCEDURE — 25000003 PHARM REV CODE 250: Mod: NTX | Performed by: INTERNAL MEDICINE

## 2020-01-28 PROCEDURE — 94761 N-INVAS EAR/PLS OXIMETRY MLT: CPT | Mod: NTX

## 2020-01-28 PROCEDURE — 85610 PROTHROMBIN TIME: CPT | Mod: NTX

## 2020-01-28 PROCEDURE — 36415 COLL VENOUS BLD VENIPUNCTURE: CPT | Mod: NTX

## 2020-01-28 PROCEDURE — 85025 COMPLETE CBC W/AUTO DIFF WBC: CPT | Mod: NTX

## 2020-01-28 PROCEDURE — 27000221 HC OXYGEN, UP TO 24 HOURS: Mod: NTX

## 2020-01-28 PROCEDURE — S4991 NICOTINE PATCH NONLEGEND: HCPCS | Mod: NTX | Performed by: INTERNAL MEDICINE

## 2020-01-28 RX ORDER — WARFARIN 3 MG/1
3 TABLET ORAL
Qty: 12 TABLET | Refills: 1 | Status: SHIPPED | OUTPATIENT
Start: 2020-01-29 | End: 2020-01-31 | Stop reason: SDUPTHER

## 2020-01-28 RX ORDER — POLYETHYLENE GLYCOL 3350 17 G/17G
17 POWDER, FOR SOLUTION ORAL 2 TIMES DAILY PRN
Status: DISCONTINUED | OUTPATIENT
Start: 2020-01-28 | End: 2020-01-28 | Stop reason: HOSPADM

## 2020-01-28 RX ORDER — WARFARIN 6 MG/1
6 TABLET ORAL
Qty: 16 TABLET | Refills: 0 | Status: SHIPPED | OUTPATIENT
Start: 2020-01-28 | End: 2020-01-31 | Stop reason: SDUPTHER

## 2020-01-28 RX ORDER — DOCUSATE SODIUM 100 MG/1
100 CAPSULE, LIQUID FILLED ORAL 2 TIMES DAILY PRN
Status: DISCONTINUED | OUTPATIENT
Start: 2020-01-28 | End: 2020-01-28 | Stop reason: HOSPADM

## 2020-01-28 RX ORDER — FUROSEMIDE 40 MG/1
40 TABLET ORAL DAILY
Qty: 120 TABLET | Refills: 6 | Status: SHIPPED | OUTPATIENT
Start: 2020-01-28 | End: 2020-02-21 | Stop reason: SDUPTHER

## 2020-01-28 RX ORDER — WARFARIN 2 MG/1
6 TABLET ORAL
Status: DISCONTINUED | OUTPATIENT
Start: 2020-01-28 | End: 2020-01-28 | Stop reason: HOSPADM

## 2020-01-28 RX ORDER — WARFARIN 3 MG/1
3 TABLET ORAL
Status: DISCONTINUED | OUTPATIENT
Start: 2020-01-29 | End: 2020-01-28 | Stop reason: HOSPADM

## 2020-01-28 RX ORDER — METOPROLOL SUCCINATE 25 MG/1
12.5 TABLET, EXTENDED RELEASE ORAL DAILY
Qty: 30 TABLET | Refills: 6 | Status: SHIPPED | OUTPATIENT
Start: 2020-01-28 | End: 2020-02-18 | Stop reason: SDUPTHER

## 2020-01-28 RX ADMIN — NICOTINE 1 PATCH: 21 PATCH TRANSDERMAL at 08:01

## 2020-01-28 RX ADMIN — POLYETHYLENE GLYCOL (3350) 17 G: 17 POWDER, FOR SOLUTION ORAL at 01:01

## 2020-01-28 RX ADMIN — HYDROCODONE BITARTRATE AND ACETAMINOPHEN 1 TABLET: 10; 325 TABLET ORAL at 06:01

## 2020-01-28 RX ADMIN — DOCUSATE SODIUM 100 MG: 100 CAPSULE, LIQUID FILLED ORAL at 01:01

## 2020-01-28 RX ADMIN — HYDROCODONE BITARTRATE AND ACETAMINOPHEN 1 TABLET: 10; 325 TABLET ORAL at 01:01

## 2020-01-28 RX ADMIN — PANTOPRAZOLE SODIUM 40 MG: 40 TABLET, DELAYED RELEASE ORAL at 08:01

## 2020-01-28 RX ADMIN — HYDROCODONE BITARTRATE AND ACETAMINOPHEN 1 TABLET: 10; 325 TABLET ORAL at 12:01

## 2020-01-28 RX ADMIN — BUSPIRONE HYDROCHLORIDE 15 MG: 10 TABLET ORAL at 08:01

## 2020-01-28 RX ADMIN — CLONAZEPAM 1 MG: 1 TABLET ORAL at 08:01

## 2020-01-28 RX ADMIN — DESVENLAFAXINE SUCCINATE 100 MG: 100 TABLET, FILM COATED, EXTENDED RELEASE ORAL at 08:01

## 2020-01-28 NOTE — DISCHARGE SUMMARY
"[facility] Hospital Medicine  Discharge Summary      Patient Name: Meg Sal  MRN: [id number]  Admission Date: 1/26/2020  Hospital Length of Stay: 1 days  Discharge Date and Time:  01/28/2020 2:26 PM  Attending Physician: José Miguel Richardson MD   Discharging Provider: Linnette Colin NP  Primary Care Provider: Lucero Banda MD      HPI:   Mr. Sal is a 49-year-old  female with PMH significant for CAD/CABG, mechanical aortic valve with mitral valve repair in 2014, chronically anticoagulated on warfarin, pulmonary fibrosis, chronic hypoxemic respiratory failure, home oxygen dependent at 2 liters/minute, severe pulmonary hypertension, anxiety and chronic back pain, presented to the ED complaining of worsening generalized weakness, fatigue, malaise, lack of energy for the past 2-3 days.  She stopped taking Lasix three days ago due to perceived dehydration.  Denies chest pain or any worsening shortness of breath.  In the ED she was found to be bradycardic with heart rate in the 50s, initial blood pressure 89/47.  Lactic acid 2.6.  She received normal saline 1 L bolus with improvement in her blood pressure to 121/57.  EKG revealed sinus bradycardia with frequent PVCs.  Patient on metoprolol at home.  In addition laboratory workup revealed elevated INR of 5.9.  Patient on warfarin for mechanical aortic valve.  Hemoglobin stable at 10.2.  Rest of the laboratory workup is rather unremarkable.    Admitting diagnosis symptomatic bradycardia, hypotension, mild lactic acidosis, generalized weakness, warfarin toxicity (INR 5.9).      * No surgery found *      Hospital Course:   Overnight, several episodes of Bradycardia in upper 40's. Patient complaining of "sinking" feeling. B/P has been 100's. BB held. Cardiology consulted and recommended holding BB and keeping overnight. Negative orthostatic B/P. Reevaluate in am. As of 1/28, pt. Feeling better today, symptoms improved. BP " stable in the 120's. Heart rate 60-70s. Case discussed with cardiology who recommends decreasing home dose of metoprolol to 12.5 mg daily and lasix to 40 mg daily. Ok to discharge from their standpoint. Patient to follow-up with PCP in 3 days for hospital follow. Patient also to follow-up with cardiology outpatient. Patient seen and examined on the date of discharge and found suitable for discharge.      Consults:   Consults (From admission, onward)        Status Ordering Provider     Inpatient consult to Cardiology  Once     Provider:  Rohit Sen MD    Completed MELYSSA NUGENT     Inpatient consult to Social Work  Once     Provider:  (Not yet assigned)    Acknowledged SHIRLEY MELGAR     Pharmacy to dose Warfarin consult  Once     Provider:  (Not yet assigned)    Acknowledged MELYSSA NUGENT          No new Assessment & Plan notes have been filed under this hospital service since the last note was generated.  Service: Hospital Medicine    Final Active Diagnoses:    Diagnosis Date Noted POA    Generalized weakness [R53.1] 01/26/2020 Yes    Anticoagulant long-term use [Z79.01] 11/04/2019 Not Applicable    Chronic respiratory failure with hypoxia [J96.11] 11/04/2019 Yes    Pulmonary fibrosis [J84.10] 04/16/2019 Yes     Chronic    Hx of mechanical aortic valve replacement [Z95.2] 11/21/2016 Not Applicable     Chronic    Anxiety with depression [F41.8] 11/21/2016 Yes      Problems Resolved During this Admission:    Diagnosis Date Noted Date Resolved POA    PRINCIPAL PROBLEM:  Symptomatic sinus bradycardia [R00.1] 01/26/2020 01/28/2020 Yes    Hypotension [I95.9] 01/26/2020 01/28/2020 Yes    Supratherapeutic INR [R79.1] 12/14/2019 01/28/2020 Yes       Discharged Condition: stable    Disposition: Home or Self Care    Follow Up:  Follow-up Information     Lucero Banda MD In 3 days.    Specialty:  Internal Medicine  Why:  for hospital follow up of sinus bradycardia   Contact information:  [address]  [facility]  [place] [address]  [phone]             Ayaan Banda Md, MD In 1 week.    Specialties:  Cardiology, Internal Medicine  Why:  for hospital follow-up of sinus bradycardia   Contact information:  [id number] [address] [facility]  [place] [id number] [phone]                 Patient Instructions:      Notify your health care provider if you experience any of the following:  difficulty breathing or increased cough     Notify your health care provider if you experience any of the following:  persistent dizziness, light-headedness, or visual disturbances     Notify your health care provider if you experience any of the following:  increased confusion or weakness     Activity as tolerated       Significant Diagnostic Studies: Labs:   BMP:   Recent Labs   Lab 01/26/20  1451 01/27/20  0539    113*   * 134*   K 4.2 3.4*   CL 96 102   CO2 23 23   BUN 19 17   CREATININE 1.2 1.0   CALCIUM 9.1 8.2*   MG  --  2.0   , CMP   Recent Labs   Lab 01/26/20  1451 01/27/20  0539   * 134*   K 4.2 3.4*   CL 96 102   CO2 23 23    113*   BUN 19 17   CREATININE 1.2 1.0   CALCIUM 9.1 8.2*   PROT 7.5 6.5   ALBUMIN 3.5 3.1*   BILITOT 1.5* 1.1*   ALKPHOS 122 114   AST 62* 49*   ALT 40 34   ANIONGAP 13 9   ESTGFRAFRICA >60 >60   EGFRNONAA 53* >60    and CBC   Recent Labs   Lab 01/26/20  1451 01/27/20  0539 01/28/20  0504   WBC 7.08 6.61 6.37   HGB 10.2* 9.0* 10.1*   HCT 34.8* 31.5* 34.7*    139* 131*       Pending Diagnostic Studies:     None         Medications:  Reconciled Home Medications:      Medication List      CHANGE how you take these medications    furosemide 40 MG tablet  Commonly known as:  LASIX  Take 1 tablet (40 mg total) by mouth once daily. Take two 40 mg tablets by mouth twice a day  What changed:    · how much to take  · how to take this  · when to take this     metoprolol succinate 25 MG 24 hr tablet  Commonly known as:  TOPROL-XL  Take 0.5 tablets (12.5 mg total) by  mouth once daily.  What changed:  how much to take     * warfarin 6 MG tablet  Commonly known as:  COUMADIN  Take 1 tablet (6 mg total) by mouth every Tuesday, Thursday, Saturday, Sunday.  What changed:    · medication strength  · how much to take  · when to take this     * warfarin 3 MG tablet  Commonly known as:  COUMADIN  Take 1 tablet (3 mg total) by mouth every Mon, Wed, Fri.  Start taking on:  January 29, 2020  What changed:    · medication strength  · how much to take  · when to take this  · additional instructions         * This list has 2 medication(s) that are the same as other medications prescribed for you. Read the directions carefully, and ask your doctor or other care provider to review them with you.            CONTINUE taking these medications    * albuterol 90 mcg/actuation inhaler  Commonly known as:  Ventolin HFA  Inhale 2 puffs into the lungs every 6 (six) hours as needed for Wheezing. Rescue     * albuterol 2.5 mg /3 mL (0.083 %) nebulizer solution  Commonly known as:  PROVENTIL  Take 3 mLs (2.5 mg total) by nebulization every 4 (four) hours as needed for Wheezing or Shortness of Breath (Cough). Rescue     amitriptyline 25 MG tablet  Commonly known as:  ELAVIL  Take 25 mg by mouth every evening.     Anoro Ellipta 62.5-25 mcg/actuation Dsdv  Generic drug:  umeclidinium-vilanterol  INHALE 1 PUFF INTO THE LUNGS ONCE A DAY. CONTROLLER.     busPIRone 15 MG tablet  Commonly known as:  BUSPAR  Take 15 mg by mouth 2 (two) times daily.     cholestyramine 4 gram packet  Commonly known as:  QUESTRAN  Take 1 packet (4 g total) by mouth 2 (two) times daily.     desvenlafaxine succinate 100 MG Tb24  Commonly known as:  PRISTIQ  Take 1 tablet (100 mg total) by mouth once daily.     diclofenac sodium 1 % Gel  Commonly known as:  VOLTAREN  Apply 2 g topically 4 (four) times daily as needed.     fluticasone propionate 50 mcg/actuation nasal spray  Commonly known as:  FLONASE  1 spray by Each Nare route once  daily.     HYDROcodone-acetaminophen  mg per tablet  Commonly known as:  NORCO  Take 1 tablet by mouth every 6 (six) hours as needed for Pain.     KlonoPIN 1 MG tablet  Generic drug:  clonazePAM  Take 1 mg by mouth 3 (three) times daily.     nicotine 7 mg/24 hr  Commonly known as:  NICODERM CQ  Place 1 patch onto the skin every 24 hours.     * nitroGLYCERIN 0.4 MG SL tablet  Commonly known as:  NITROSTAT     * nitroGLYCERIN 0.4 MG/HR TD PT24 0.4 mg/hr  Commonly known as:  NITRODUR  Place 1 patch onto the skin once daily.     ondansetron 4 MG tablet  Commonly known as:  ZOFRAN  TAKE 1 BY MOUTH EVERY 8 HOURS AS NEEDED FOR NAUSEA.     pantoprazole 40 MG tablet  Commonly known as:  PROTONIX  Take 1 tablet (40 mg total) by mouth 2 (two) times daily.     potassium chloride SA 20 MEQ tablet  Commonly known as:  K-DUR,KLOR-CON  Take 20 mEq by mouth 2 (two) times daily.     rosuvastatin 20 MG tablet  Commonly known as:  CRESTOR  Take 20 mg by mouth.     sumatriptan 100 MG tablet  Commonly known as:  IMITREX  Take 100 mg by mouth every 2 (two) hours as needed.     topiramate 100 MG tablet  Commonly known as:  TOPAMAX  Take 100 mg by mouth 2 (two) times daily.     traMADol 50 mg tablet  Commonly known as:  ULTRAM  Take 50 mg by mouth every 4 (four) hours as needed.     traZODone 150 MG tablet  Commonly known as:  DESYREL  Take 150 mg by mouth.         * This list has 4 medication(s) that are the same as other medications prescribed for you. Read the directions carefully, and ask your doctor or other care provider to review them with you.            STOP taking these medications    amoxicillin 500 MG capsule  Commonly known as:  AMOXIL     tiZANidine 4 MG tablet  Commonly known as:  ZANAFLEX            Indwelling Lines/Drains at time of discharge:   Lines/Drains/Airways     None                 Time spent on the discharge of patient: 48 minutes  Patient was seen and examined on the date of discharge and determined to be  suitable for discharge.         Linnette Colin NP  Department of Hospital Medicine  [facility]

## 2020-01-28 NOTE — PROGRESS NOTES
Clinical Pharmacy Progress Note: Coumadin Dosing and Monitoring     Indication: Hx of AVR with mechanical valve  INR goal: 2 - 3  Lab Results   Component Value Date    INR 1.9 (H) 01/28/2020    INR 5.7 (HH) 01/27/2020    INR 5.9 (HH) 01/26/2020     Patient has been educated by pharmacist this admission.     Current dose: Patient's dose was held yesterday due to INR of 5.9 upon admission.     Plan: Resume home dosing of 3mg Mon/Wed/Fri and 6 mg all other days of the week.   PT/INR will be monitored daily. Dose adjustments will be made accordingly.      Thank you for allowing us to participate in this patient's care.    Carolina Goff, PharmD 1/28/2020 12:55 PM

## 2020-01-28 NOTE — PLAN OF CARE
CM met with patient at bedside to assess for discharge needs. Pt states that she lives at home with her spouse and daughter and is dependent with some needs. She uses home oxygen, as well as a walker/wheelchair combo for assistance. She is not current with home health but is requesting to have [facility] again as she has had it in the past. Choice form signed and placed in chart. CM provided a transitional care folder, information on advanced directives, information on pharmacy bedside delivery, and discharge planning begins on admission with contact information for any needs/questions.    D/C Plan: Home Health  PCP: CARLOS A Banda MD  Preferred Pharmacy: [facility]  Discharge transportation: Family  [facility]: Active  Pharmacy Bedside Delivery: Declines       01/28/20 1000   Discharge Assessment   Assessment Type Discharge Planning Assessment   Confirmed/corrected address and phone number on facesheet? Yes   Assessment information obtained from? Patient   Expected Length of Stay (days)   (TBD)   Communicated expected length of stay with patient/caregiver yes   Prior to hospitalization functional status: Independent;Assistive Equipment   Current cognitive status: Alert/Oriented   Current Functional Status: Independent;Assistive Equipment   Facility Arrived From: Home   Lives With spouse   Is patient able to care for self after discharge? Yes   Who are your caregiver(s) and their phone number(s)? Derrell Sal, spouse- [phone]   Patient's perception of discharge disposition home health   Readmission Within the Last 30 Days no previous admission in last 30 days   Patient currently being followed by outpatient case management? No   Patient currently receives any other outside agency services? No   Equipment Currently Used at Home oxygen;walker, standard;wheelchair   Do you have any problems affording any of your prescribed medications? No   Is the patient taking medications as prescribed? yes   Does  the patient have transportation home? Yes   Transportation Anticipated family or friend will provide   Does the patient receive services at the [facility]? Yes   Discharge Plan A Home Health   DME Needed Upon Discharge  none   Patient/Family in Agreement with Plan yes

## 2020-01-28 NOTE — PLAN OF CARE
Patient is Aox4, resting quietly. VSS. Pain controlled with PRN pain meds. SR with PVCs on the monitor. No other concerns voiced at this time. POC Reviewed with pt, verbalized understanding. Will continue to monitor until report is given to the next nurse.

## 2020-01-28 NOTE — PROGRESS NOTES
"[facility] Hospital Medicine  Progress Note    Patient Name: Meg Sal  MRN: [id number]  Patient Class: IP- Inpatient   Admission Date: 1/26/2020  Length of Stay: 0 days  Attending Physician: José Miguel Richardson MD  Primary Care Provider: Lucero Banda MD        Subjective:     Principal Problem:Symptomatic sinus bradycardia        HPI:  Mr. Sal is a 49-year-old  female with PMH significant for CAD/CABG, mechanical aortic valve with mitral valve repair in 2014, chronically anticoagulated on warfarin, pulmonary fibrosis, chronic hypoxemic respiratory failure, home oxygen dependent at 2 liters/minute, severe pulmonary hypertension, anxiety and chronic back pain, presented to the ED complaining of worsening generalized weakness, fatigue, malaise, lack of energy for the past 2-3 days.  She stopped taking Lasix three days ago due to perceived dehydration.  Denies chest pain or any worsening shortness of breath.  In the ED she was found to be bradycardic with heart rate in the 50s, initial blood pressure 89/47.  Lactic acid 2.6.  She received normal saline 1 L bolus with improvement in her blood pressure to 121/57.  EKG revealed sinus bradycardia with frequent PVCs.  Patient on metoprolol at home.  In addition laboratory workup revealed elevated INR of 5.9.  Patient on warfarin for mechanical aortic valve.  Hemoglobin stable at 10.2.  Rest of the laboratory workup is rather unremarkable.    Admitting diagnosis symptomatic bradycardia, hypotension, mild lactic acidosis, generalized weakness, warfarin toxicity (INR 5.9).      Overview/Hospital Course:  Overnight, several episodes of Bradycardia in upper 40's. Patient complaining of "sinking" feeling. B/P has been 100's. BB held. Cardiology consulted and recommended holding BB and keeping overnight. Negative orthostatic B/P. Reevaluate in am.     Interval History: BB held for hypotension. Cardiology recommended watching " overnight.     Review of Systems   Constitutional: Positive for activity change and fatigue. Negative for appetite change, chills, diaphoresis and fever.   HENT: Negative.  Negative for congestion, ear discharge, facial swelling, nosebleeds, sinus pressure, sore throat and trouble swallowing.    Eyes: Negative.  Negative for photophobia, pain, discharge, redness and visual disturbance.   Respiratory: Positive for shortness of breath (chronic). Negative for cough, chest tightness, wheezing and stridor.    Cardiovascular: Negative.  Negative for chest pain, palpitations and leg swelling.   Gastrointestinal: Negative.  Negative for abdominal distention, abdominal pain, anal bleeding, blood in stool, constipation, diarrhea, nausea, rectal pain and vomiting.   Endocrine: Negative.  Negative for cold intolerance, heat intolerance, polydipsia, polyphagia and polyuria.   Genitourinary: Negative.  Negative for difficulty urinating, dysuria, flank pain, frequency, hematuria, pelvic pain, urgency, vaginal bleeding and vaginal discharge.   Musculoskeletal: Negative.  Negative for arthralgias, back pain, gait problem, joint swelling, myalgias, neck pain and neck stiffness.   Skin: Negative.  Negative for color change, pallor, rash and wound.   Allergic/Immunologic: Negative.  Negative for food allergies and immunocompromised state.   Neurological: Positive for dizziness and weakness (Generalized). Negative for tremors, seizures, syncope, facial asymmetry, speech difficulty, light-headedness, numbness and headaches.   Hematological: Negative.  Negative for adenopathy. Does not bruise/bleed easily.   Psychiatric/Behavioral: Positive for decreased concentration. Negative for agitation, confusion, hallucinations, sleep disturbance and suicidal ideas. The patient is not nervous/anxious.    All other systems reviewed and are negative.    Objective:     Vital Signs (Most Recent):  Temp: 98.4 °F (36.9 °C) (01/27/20 1617)  Pulse: 67  (01/27/20 1617)  Resp: 20 (01/27/20 1617)  BP: 135/66 (01/27/20 1617)  SpO2: 95 % (01/27/20 1617) Vital Signs (24h Range):  Temp:  [97.4 °F (36.3 °C)-98.4 °F (36.9 °C)] 98.4 °F (36.9 °C)  Pulse:  [45-69] 67  Resp:  [18-24] 20  SpO2:  [92 %-98 %] 95 %  BP: ()/(48-76) 135/66     Weight: 76 kg (167 lb 8 oz)  Body mass index is 32.71 kg/m².    Intake/Output Summary (Last 24 hours) at 1/27/2020 2024  Last data filed at 1/27/2020 1913  Gross per 24 hour   Intake 540 ml   Output 1200 ml   Net -660 ml      Physical Exam   Constitutional: She is oriented to person, place, and time. She appears well-developed and well-nourished. She appears ill. No distress.   Chronically ill-appearing  male, in no respiratory distress.  Currently on 2 L oxygen nasal cannula.  Spouse at the bedside.   HENT:   Head: Normocephalic and atraumatic.   Nose: Nose normal.   Eyes: Pupils are equal, round, and reactive to light. Conjunctivae and EOM are normal. Right eye exhibits no discharge. Left eye exhibits no discharge. No scleral icterus.   Neck: Normal range of motion. Neck supple. No JVD present. No tracheal deviation present. No thyromegaly present.   Cardiovascular: Regular rhythm and intact distal pulses. Bradycardia present. Exam reveals no gallop and no friction rub.   Murmur heard.  Pulmonary/Chest: Effort normal and breath sounds normal. No stridor. No respiratory distress. She has no wheezes. She has no rales. She exhibits no tenderness.   Course breath sounds bilaterally, all lung fields.  Currently on 2 L oxygen nasal cannula   Abdominal: Soft. Bowel sounds are normal. She exhibits no distension and no mass. There is no tenderness. There is no guarding.   Musculoskeletal: Normal range of motion. She exhibits no edema, tenderness or deformity.   Lymphadenopathy:     She has no cervical adenopathy.   Neurological: She is alert and oriented to person, place, and time. She has normal reflexes. No cranial nerve deficit. She  exhibits normal muscle tone. Coordination normal.   Skin: Skin is warm and dry. No rash noted. She is not diaphoretic. No erythema. No pallor.   Psychiatric: She has a normal mood and affect. Her behavior is normal. Judgment and thought content normal.   Nursing note and vitals reviewed.      Significant Labs: All pertinent labs within the past 24 hours have been reviewed.  Results for orders placed or performed during the hospital encounter of 01/26/20   Influenza A & B by Molecular   Result Value Ref Range    Influenza A, Molecular Negative Negative    Influenza B, Molecular Negative Negative    Flu A & B Source Nasal swab    Blood culture #1 **CANNOT BE ORDERED STAT**   Result Value Ref Range    Blood Culture, Routine No Growth to date    Blood culture #2 **CANNOT BE ORDERED STAT**   Result Value Ref Range    Blood Culture, Routine No Growth to date     Blood Culture, Routine No Growth to date    CBC auto differential   Result Value Ref Range    WBC 7.08 3.90 - 12.70 K/uL    RBC 3.41 (L) 4.00 - 5.40 M/uL    Hemoglobin 10.2 (L) 12.0 - 16.0 g/dL    Hematocrit 34.8 (L) 37.0 - 48.5 %    Mean Corpuscular Volume 102 (H) 82 - 98 fL    Mean Corpuscular Hemoglobin 29.9 27.0 - 31.0 pg    Mean Corpuscular Hemoglobin Conc 29.3 (L) 32.0 - 36.0 g/dL    RDW 19.6 (H) 11.5 - 14.5 %    Platelets 168 150 - 350 K/uL    MPV 10.8 9.2 - 12.9 fL    Immature Granulocytes 0.6 (H) 0.0 - 0.5 %    Gran # (ANC) 5.0 1.8 - 7.7 K/uL    Immature Grans (Abs) 0.04 0.00 - 0.04 K/uL    Lymph # 1.3 1.0 - 4.8 K/uL    Mono # 0.6 0.3 - 1.0 K/uL    Eos # 0.1 0.0 - 0.5 K/uL    Baso # 0.04 0.00 - 0.20 K/uL    nRBC 0 0 /100 WBC    Gran% 70.0 38.0 - 73.0 %    Lymph% 18.5 18.0 - 48.0 %    Mono% 8.5 4.0 - 15.0 %    Eosinophil% 1.8 0.0 - 8.0 %    Basophil% 0.6 0.0 - 1.9 %    Differential Method Automated    Comprehensive metabolic panel   Result Value Ref Range    Sodium 132 (L) 136 - 145 mmol/L    Potassium 4.2 3.5 - 5.1 mmol/L    Chloride 96 95 - 110 mmol/L     CO2 23 23 - 29 mmol/L    Glucose 108 70 - 110 mg/dL    BUN, Bld 19 6 - 20 mg/dL    Creatinine 1.2 0.5 - 1.4 mg/dL    Calcium 9.1 8.7 - 10.5 mg/dL    Total Protein 7.5 6.0 - 8.4 g/dL    Albumin 3.5 3.5 - 5.2 g/dL    Total Bilirubin 1.5 (H) 0.1 - 1.0 mg/dL    Alkaline Phosphatase 122 55 - 135 U/L    AST 62 (H) 10 - 40 U/L    ALT 40 10 - 44 U/L    Anion Gap 13 8 - 16 mmol/L    eGFR if African American >60 >60 mL/min/1.73 m^2    eGFR if non African American 53 (A) >60 mL/min/1.73 m^2   Troponin I #1   Result Value Ref Range    Troponin I 0.029 (H) 0.000 - 0.026 ng/mL   B-Type natriuretic peptide (BNP)   Result Value Ref Range     (H) 0 - 99 pg/mL   Urinalysis, Reflex to Urine Culture Urine, Clean Catch   Result Value Ref Range    Specimen UA Urine, Clean Catch     Color, UA Yellow Yellow, Straw, Celena    Appearance, UA Clear Clear    pH, UA 6.0 5.0 - 8.0    Specific Gravity, UA 1.020 1.005 - 1.030    Protein, UA Negative Negative    Glucose, UA Negative Negative    Ketones, UA Negative Negative    Bilirubin (UA) Negative Negative    Occult Blood UA 3+ (A) Negative    Nitrite, UA Negative Negative    Urobilinogen, UA 1.0 <2.0 EU/dL    Leukocytes, UA Negative Negative   Protime-INR   Result Value Ref Range    Prothrombin Time 58.4 (H) 9.0 - 12.5 sec    INR 5.9 (HH) 0.8 - 1.2   APTT   Result Value Ref Range    aPTT 42.7 (H) 21.0 - 32.0 sec   Troponin I #2   Result Value Ref Range    Troponin I 0.022 0.000 - 0.026 ng/mL   Lactic acid, plasma   Result Value Ref Range    Lactate (Lactic Acid) 2.6 (H) 0.5 - 2.2 mmol/L   Lactic acid, plasma   Result Value Ref Range    Lactate (Lactic Acid) 3.0 (H) 0.5 - 2.2 mmol/L   Urinalysis Microscopic   Result Value Ref Range    RBC, UA 25 (H) 0 - 4 /hpf    Hyaline Casts, UA 4 (A) 0-1/lpf /lpf    Microscopic Comment SEE COMMENT    Lactic acid, plasma   Result Value Ref Range    Lactate (Lactic Acid) 2.6 (H) 0.5 - 2.2 mmol/L   CBC auto differential   Result Value Ref Range    WBC 6.61  3.90 - 12.70 K/uL    RBC 3.03 (L) 4.00 - 5.40 M/uL    Hemoglobin 9.0 (L) 12.0 - 16.0 g/dL    Hematocrit 31.5 (L) 37.0 - 48.5 %    Mean Corpuscular Volume 104 (H) 82 - 98 fL    Mean Corpuscular Hemoglobin 29.7 27.0 - 31.0 pg    Mean Corpuscular Hemoglobin Conc 28.6 (L) 32.0 - 36.0 g/dL    RDW 19.7 (H) 11.5 - 14.5 %    Platelets 139 (L) 150 - 350 K/uL    MPV 10.9 9.2 - 12.9 fL    Immature Granulocytes 0.3 0.0 - 0.5 %    Gran # (ANC) 4.0 1.8 - 7.7 K/uL    Immature Grans (Abs) 0.02 0.00 - 0.04 K/uL    Lymph # 1.7 1.0 - 4.8 K/uL    Mono # 0.7 0.3 - 1.0 K/uL    Eos # 0.2 0.0 - 0.5 K/uL    Baso # 0.04 0.00 - 0.20 K/uL    nRBC 0 0 /100 WBC    Gran% 60.2 38.0 - 73.0 %    Lymph% 25.9 18.0 - 48.0 %    Mono% 10.1 4.0 - 15.0 %    Eosinophil% 2.9 0.0 - 8.0 %    Basophil% 0.6 0.0 - 1.9 %    Differential Method Automated    Magnesium   Result Value Ref Range    Magnesium 2.0 1.6 - 2.6 mg/dL   Phosphorus   Result Value Ref Range    Phosphorus 2.9 2.7 - 4.5 mg/dL   Comprehensive metabolic panel   Result Value Ref Range    Sodium 134 (L) 136 - 145 mmol/L    Potassium 3.4 (L) 3.5 - 5.1 mmol/L    Chloride 102 95 - 110 mmol/L    CO2 23 23 - 29 mmol/L    Glucose 113 (H) 70 - 110 mg/dL    BUN, Bld 17 6 - 20 mg/dL    Creatinine 1.0 0.5 - 1.4 mg/dL    Calcium 8.2 (L) 8.7 - 10.5 mg/dL    Total Protein 6.5 6.0 - 8.4 g/dL    Albumin 3.1 (L) 3.5 - 5.2 g/dL    Total Bilirubin 1.1 (H) 0.1 - 1.0 mg/dL    Alkaline Phosphatase 114 55 - 135 U/L    AST 49 (H) 10 - 40 U/L    ALT 34 10 - 44 U/L    Anion Gap 9 8 - 16 mmol/L    eGFR if African American >60 >60 mL/min/1.73 m^2    eGFR if non African American >60 >60 mL/min/1.73 m^2   Protime-INR   Result Value Ref Range    Prothrombin Time 56.4 (H) 9.0 - 12.5 sec    INR 5.7 (HH) 0.8 - 1.2   ISTAT PROCEDURE   Result Value Ref Range    POC PH 7.382 7.35 - 7.45    POC PCO2 39.9 35 - 45 mmHg    POC PO2 29 (LL) 80 - 100 mmHg    POC HCO3 23.7 (L) 24 - 28 mmol/L    POC BE -1 -2 to 2 mmol/L    POC SATURATED O2 55  (L) 95 - 100 %    Sample ARTERIAL     Site RR     Allens Test Pass     DelSys Nasal Can     Mode SPONT     Flow 2     FiO2 28    ISTAT PROCEDURE   Result Value Ref Range    POC PH 7.382 7.35 - 7.45    POC PCO2 39.9 35 - 45 mmHg    POC PO2 29 (LL) 40 - 60 mmHg    POC HCO3 23.7 (L) 24 - 28 mmol/L    POC BE -1 -2 to 2 mmol/L    POC SATURATED O2 55 (L) 95 - 100 %    Sample VENOUS     Site RR     Allens Test Pass     DelSys Nasal Can     Mode SPONT     Flow 2     FiO2 28      Significant Imaging: I have reviewed all pertinent imaging results/findings within the past 24 hours.   Imaging Results          X-Ray Chest AP Portable (Final result)  Result time 01/26/20 15:49:16    Final result by José Miguel Gonsalez MD (01/26/20 15:49:16)                 Impression:      No acute pulmonary infiltrate or consolidation.  Chronic interstitial reticulation, similar to priors.      Electronically signed by: José Miguel Gonsalez  Date:    01/26/2020  Time:    15:49             Narrative:    EXAMINATION:  XR CHEST AP PORTABLE    CLINICAL HISTORY:  chest pain;    TECHNIQUE:  Single frontal view of the chest was performed.    COMPARISON:  Chest radiograph 12/14/2019 with priors    FINDINGS:  Cardiac leads project over the chest.  Cardiomediastinal silhouette is enlarged, similar to priors.  Postsurgical changes of a sternotomy and valve replacement.  Chronic appearing interstitial reticular it is identified throughout the lung in a similar pattern distribution.  No acute or new airspace infiltrate.  No large effusion.  No pneumothorax.  Osseous structures appear intact.                                    Assessment/Plan:      * Symptomatic sinus bradycardia  Sinus bradycardia (HR 50s), associated with hypotension.  Hold beta-blockers.  Received normal saline 1 L bolus in the ED with improvement in hypotension.  Monitor closely on telemetry.  Cardiology consult in a.m..      Generalized weakness  Complaining of generalized weakness, dizziness,  fatigue likely secondary to hypotension.  Continue normal saline at 75 cc/hour for the next 12 hr.  Monitor respiratory status due to chronic home oxygen dependent state/pulmonary fibrosis.  Consider PT/OT evaluation in a.m. if needed.      Hypotension  Initial blood pressure 89/47 with bradycardia HR 58.  Hold beta-blockers.  Received normal saline 1 L bolus in the ED.  Blood pressure improved to 121/57.  Continue normal saline at 75 cc/hour.      Supratherapeutic INR  INR elevated at 5.7.  Hold warfarin for tonight.  Consult pharmacy for warfarin dosing.  Goal INR 2.5-3.5.      Anticoagulant long-term use  History of mechanical aortic valve replacement in 2014.  Currently anticoagulated on warfarin.  Pharmacy consult for warfarin dosing.      Chronic respiratory failure with hypoxia  Chronically home oxygen dependent at 2 liters/minute.  Not in acute exacerbation.  Continue bronchodilators and supplemental oxygen as current.      Pulmonary fibrosis  Follow up with Pulmonary as outpatient.  Chronically home oxygen dependent, 2 liters/minute.      Anxiety with depression  Continue home dose amitriptyline, buspirone, Klonopin.      Hx of mechanical aortic valve replacement  History of mechanical aortic valve replacement in 2014, also had a mitral valve repair at the same time.  Currently maintained on warfarin, supratherapeutic INR today 5.7.          VTE Risk Mitigation (From admission, onward)         Ordered     warfarin (COUMADIN) PLACEHOLDER DOSE ONLY  Daily      01/27/20 1309     Place sequential compression device  Until discontinued      01/26/20 1939              Vashti Reyes NP  Department of Hospital Medicine   [facility] -

## 2020-01-28 NOTE — ASSESSMENT & PLAN NOTE
History of mechanical aortic valve replacement in 2014, also had a mitral valve repair at the same time.  Currently maintained on warfarin, supratherapeutic INR today 5.7.

## 2020-01-28 NOTE — HOSPITAL COURSE
"Overnight, several episodes of Bradycardia in upper 40's. Patient complaining of "sinking" feeling. B/P has been 100's. BB held. Cardiology consulted and recommended holding BB and keeping overnight. Negative orthostatic B/P. Reevaluate in am. As of 1/28, pt. Feeling better today, symptoms improved. BP stable in the 120's. Heart rate 60-70s. Case discussed with cardiology who recommends decreasing home dose of metoprolol to 12.5 mg daily and lasix to 40 mg daily. Ok to discharge from their standpoint. Patient to follow-up with PCP in 3 days for hospital follow. Patient also to follow-up with cardiology outpatient. Patient seen and examined on the date of discharge and found suitable for discharge.   "

## 2020-01-28 NOTE — PLAN OF CARE
No acute changes during shift. Patient continues to be hypotensive and bradycardic, MD aware. No complaints at this time. Will continue care plan as indicated. Pain level monitored all shift. All needs met. Safety measures in place. Will continue to monitor.

## 2020-01-28 NOTE — ASSESSMENT & PLAN NOTE
INR elevated at 5.7.  Hold warfarin for tonight.  Consult pharmacy for warfarin dosing.  Goal INR 2.5-3.5.

## 2020-01-28 NOTE — SUBJECTIVE & OBJECTIVE
Interval History: BB held for hypotension. Cardiology recommended watching overnight.     Review of Systems   Constitutional: Positive for activity change and fatigue. Negative for appetite change, chills, diaphoresis and fever.   HENT: Negative.  Negative for congestion, ear discharge, facial swelling, nosebleeds, sinus pressure, sore throat and trouble swallowing.    Eyes: Negative.  Negative for photophobia, pain, discharge, redness and visual disturbance.   Respiratory: Positive for shortness of breath (chronic). Negative for cough, chest tightness, wheezing and stridor.    Cardiovascular: Negative.  Negative for chest pain, palpitations and leg swelling.   Gastrointestinal: Negative.  Negative for abdominal distention, abdominal pain, anal bleeding, blood in stool, constipation, diarrhea, nausea, rectal pain and vomiting.   Endocrine: Negative.  Negative for cold intolerance, heat intolerance, polydipsia, polyphagia and polyuria.   Genitourinary: Negative.  Negative for difficulty urinating, dysuria, flank pain, frequency, hematuria, pelvic pain, urgency, vaginal bleeding and vaginal discharge.   Musculoskeletal: Negative.  Negative for arthralgias, back pain, gait problem, joint swelling, myalgias, neck pain and neck stiffness.   Skin: Negative.  Negative for color change, pallor, rash and wound.   Allergic/Immunologic: Negative.  Negative for food allergies and immunocompromised state.   Neurological: Positive for dizziness and weakness (Generalized). Negative for tremors, seizures, syncope, facial asymmetry, speech difficulty, light-headedness, numbness and headaches.   Hematological: Negative.  Negative for adenopathy. Does not bruise/bleed easily.   Psychiatric/Behavioral: Positive for decreased concentration. Negative for agitation, confusion, hallucinations, sleep disturbance and suicidal ideas. The patient is not nervous/anxious.    All other systems reviewed and are negative.    Objective:     Vital  Signs (Most Recent):  Temp: 98.4 °F (36.9 °C) (01/27/20 1617)  Pulse: 67 (01/27/20 1617)  Resp: 20 (01/27/20 1617)  BP: 135/66 (01/27/20 1617)  SpO2: 95 % (01/27/20 1617) Vital Signs (24h Range):  Temp:  [97.4 °F (36.3 °C)-98.4 °F (36.9 °C)] 98.4 °F (36.9 °C)  Pulse:  [45-69] 67  Resp:  [18-24] 20  SpO2:  [92 %-98 %] 95 %  BP: ()/(48-76) 135/66     Weight: 76 kg (167 lb 8 oz)  Body mass index is 32.71 kg/m².    Intake/Output Summary (Last 24 hours) at 1/27/2020 2024  Last data filed at 1/27/2020 1913  Gross per 24 hour   Intake 540 ml   Output 1200 ml   Net -660 ml      Physical Exam   Constitutional: She is oriented to person, place, and time. She appears well-developed and well-nourished. She appears ill. No distress.   Chronically ill-appearing  male, in no respiratory distress.  Currently on 2 L oxygen nasal cannula.  Spouse at the bedside.   HENT:   Head: Normocephalic and atraumatic.   Nose: Nose normal.   Eyes: Pupils are equal, round, and reactive to light. Conjunctivae and EOM are normal. Right eye exhibits no discharge. Left eye exhibits no discharge. No scleral icterus.   Neck: Normal range of motion. Neck supple. No JVD present. No tracheal deviation present. No thyromegaly present.   Cardiovascular: Regular rhythm and intact distal pulses. Bradycardia present. Exam reveals no gallop and no friction rub.   Murmur heard.  Pulmonary/Chest: Effort normal and breath sounds normal. No stridor. No respiratory distress. She has no wheezes. She has no rales. She exhibits no tenderness.   Course breath sounds bilaterally, all lung fields.  Currently on 2 L oxygen nasal cannula   Abdominal: Soft. Bowel sounds are normal. She exhibits no distension and no mass. There is no tenderness. There is no guarding.   Musculoskeletal: Normal range of motion. She exhibits no edema, tenderness or deformity.   Lymphadenopathy:     She has no cervical adenopathy.   Neurological: She is alert and oriented to  person, place, and time. She has normal reflexes. No cranial nerve deficit. She exhibits normal muscle tone. Coordination normal.   Skin: Skin is warm and dry. No rash noted. She is not diaphoretic. No erythema. No pallor.   Psychiatric: She has a normal mood and affect. Her behavior is normal. Judgment and thought content normal.   Nursing note and vitals reviewed.      Significant Labs: All pertinent labs within the past 24 hours have been reviewed.  Results for orders placed or performed during the hospital encounter of 01/26/20   Influenza A & B by Molecular   Result Value Ref Range    Influenza A, Molecular Negative Negative    Influenza B, Molecular Negative Negative    Flu A & B Source Nasal swab    Blood culture #1 **CANNOT BE ORDERED STAT**   Result Value Ref Range    Blood Culture, Routine No Growth to date    Blood culture #2 **CANNOT BE ORDERED STAT**   Result Value Ref Range    Blood Culture, Routine No Growth to date     Blood Culture, Routine No Growth to date    CBC auto differential   Result Value Ref Range    WBC 7.08 3.90 - 12.70 K/uL    RBC 3.41 (L) 4.00 - 5.40 M/uL    Hemoglobin 10.2 (L) 12.0 - 16.0 g/dL    Hematocrit 34.8 (L) 37.0 - 48.5 %    Mean Corpuscular Volume 102 (H) 82 - 98 fL    Mean Corpuscular Hemoglobin 29.9 27.0 - 31.0 pg    Mean Corpuscular Hemoglobin Conc 29.3 (L) 32.0 - 36.0 g/dL    RDW 19.6 (H) 11.5 - 14.5 %    Platelets 168 150 - 350 K/uL    MPV 10.8 9.2 - 12.9 fL    Immature Granulocytes 0.6 (H) 0.0 - 0.5 %    Gran # (ANC) 5.0 1.8 - 7.7 K/uL    Immature Grans (Abs) 0.04 0.00 - 0.04 K/uL    Lymph # 1.3 1.0 - 4.8 K/uL    Mono # 0.6 0.3 - 1.0 K/uL    Eos # 0.1 0.0 - 0.5 K/uL    Baso # 0.04 0.00 - 0.20 K/uL    nRBC 0 0 /100 WBC    Gran% 70.0 38.0 - 73.0 %    Lymph% 18.5 18.0 - 48.0 %    Mono% 8.5 4.0 - 15.0 %    Eosinophil% 1.8 0.0 - 8.0 %    Basophil% 0.6 0.0 - 1.9 %    Differential Method Automated    Comprehensive metabolic panel   Result Value Ref Range    Sodium 132 (L) 136  - 145 mmol/L    Potassium 4.2 3.5 - 5.1 mmol/L    Chloride 96 95 - 110 mmol/L    CO2 23 23 - 29 mmol/L    Glucose 108 70 - 110 mg/dL    BUN, Bld 19 6 - 20 mg/dL    Creatinine 1.2 0.5 - 1.4 mg/dL    Calcium 9.1 8.7 - 10.5 mg/dL    Total Protein 7.5 6.0 - 8.4 g/dL    Albumin 3.5 3.5 - 5.2 g/dL    Total Bilirubin 1.5 (H) 0.1 - 1.0 mg/dL    Alkaline Phosphatase 122 55 - 135 U/L    AST 62 (H) 10 - 40 U/L    ALT 40 10 - 44 U/L    Anion Gap 13 8 - 16 mmol/L    eGFR if African American >60 >60 mL/min/1.73 m^2    eGFR if non African American 53 (A) >60 mL/min/1.73 m^2   Troponin I #1   Result Value Ref Range    Troponin I 0.029 (H) 0.000 - 0.026 ng/mL   B-Type natriuretic peptide (BNP)   Result Value Ref Range     (H) 0 - 99 pg/mL   Urinalysis, Reflex to Urine Culture Urine, Clean Catch   Result Value Ref Range    Specimen UA Urine, Clean Catch     Color, UA Yellow Yellow, Straw, Celena    Appearance, UA Clear Clear    pH, UA 6.0 5.0 - 8.0    Specific Gravity, UA 1.020 1.005 - 1.030    Protein, UA Negative Negative    Glucose, UA Negative Negative    Ketones, UA Negative Negative    Bilirubin (UA) Negative Negative    Occult Blood UA 3+ (A) Negative    Nitrite, UA Negative Negative    Urobilinogen, UA 1.0 <2.0 EU/dL    Leukocytes, UA Negative Negative   Protime-INR   Result Value Ref Range    Prothrombin Time 58.4 (H) 9.0 - 12.5 sec    INR 5.9 (HH) 0.8 - 1.2   APTT   Result Value Ref Range    aPTT 42.7 (H) 21.0 - 32.0 sec   Troponin I #2   Result Value Ref Range    Troponin I 0.022 0.000 - 0.026 ng/mL   Lactic acid, plasma   Result Value Ref Range    Lactate (Lactic Acid) 2.6 (H) 0.5 - 2.2 mmol/L   Lactic acid, plasma   Result Value Ref Range    Lactate (Lactic Acid) 3.0 (H) 0.5 - 2.2 mmol/L   Urinalysis Microscopic   Result Value Ref Range    RBC, UA 25 (H) 0 - 4 /hpf    Hyaline Casts, UA 4 (A) 0-1/lpf /lpf    Microscopic Comment SEE COMMENT    Lactic acid, plasma   Result Value Ref Range    Lactate (Lactic Acid) 2.6  (H) 0.5 - 2.2 mmol/L   CBC auto differential   Result Value Ref Range    WBC 6.61 3.90 - 12.70 K/uL    RBC 3.03 (L) 4.00 - 5.40 M/uL    Hemoglobin 9.0 (L) 12.0 - 16.0 g/dL    Hematocrit 31.5 (L) 37.0 - 48.5 %    Mean Corpuscular Volume 104 (H) 82 - 98 fL    Mean Corpuscular Hemoglobin 29.7 27.0 - 31.0 pg    Mean Corpuscular Hemoglobin Conc 28.6 (L) 32.0 - 36.0 g/dL    RDW 19.7 (H) 11.5 - 14.5 %    Platelets 139 (L) 150 - 350 K/uL    MPV 10.9 9.2 - 12.9 fL    Immature Granulocytes 0.3 0.0 - 0.5 %    Gran # (ANC) 4.0 1.8 - 7.7 K/uL    Immature Grans (Abs) 0.02 0.00 - 0.04 K/uL    Lymph # 1.7 1.0 - 4.8 K/uL    Mono # 0.7 0.3 - 1.0 K/uL    Eos # 0.2 0.0 - 0.5 K/uL    Baso # 0.04 0.00 - 0.20 K/uL    nRBC 0 0 /100 WBC    Gran% 60.2 38.0 - 73.0 %    Lymph% 25.9 18.0 - 48.0 %    Mono% 10.1 4.0 - 15.0 %    Eosinophil% 2.9 0.0 - 8.0 %    Basophil% 0.6 0.0 - 1.9 %    Differential Method Automated    Magnesium   Result Value Ref Range    Magnesium 2.0 1.6 - 2.6 mg/dL   Phosphorus   Result Value Ref Range    Phosphorus 2.9 2.7 - 4.5 mg/dL   Comprehensive metabolic panel   Result Value Ref Range    Sodium 134 (L) 136 - 145 mmol/L    Potassium 3.4 (L) 3.5 - 5.1 mmol/L    Chloride 102 95 - 110 mmol/L    CO2 23 23 - 29 mmol/L    Glucose 113 (H) 70 - 110 mg/dL    BUN, Bld 17 6 - 20 mg/dL    Creatinine 1.0 0.5 - 1.4 mg/dL    Calcium 8.2 (L) 8.7 - 10.5 mg/dL    Total Protein 6.5 6.0 - 8.4 g/dL    Albumin 3.1 (L) 3.5 - 5.2 g/dL    Total Bilirubin 1.1 (H) 0.1 - 1.0 mg/dL    Alkaline Phosphatase 114 55 - 135 U/L    AST 49 (H) 10 - 40 U/L    ALT 34 10 - 44 U/L    Anion Gap 9 8 - 16 mmol/L    eGFR if African American >60 >60 mL/min/1.73 m^2    eGFR if non African American >60 >60 mL/min/1.73 m^2   Protime-INR   Result Value Ref Range    Prothrombin Time 56.4 (H) 9.0 - 12.5 sec    INR 5.7 (HH) 0.8 - 1.2   ISTAT PROCEDURE   Result Value Ref Range    POC PH 7.382 7.35 - 7.45    POC PCO2 39.9 35 - 45 mmHg    POC PO2 29 (LL) 80 - 100 mmHg    POC  HCO3 23.7 (L) 24 - 28 mmol/L    POC BE -1 -2 to 2 mmol/L    POC SATURATED O2 55 (L) 95 - 100 %    Sample ARTERIAL     Site RR     Allens Test Pass     DelSys Nasal Can     Mode SPONT     Flow 2     FiO2 28    ISTAT PROCEDURE   Result Value Ref Range    POC PH 7.382 7.35 - 7.45    POC PCO2 39.9 35 - 45 mmHg    POC PO2 29 (LL) 40 - 60 mmHg    POC HCO3 23.7 (L) 24 - 28 mmol/L    POC BE -1 -2 to 2 mmol/L    POC SATURATED O2 55 (L) 95 - 100 %    Sample VENOUS     Site RR     Allens Test Pass     DelSys Nasal Can     Mode SPONT     Flow 2     FiO2 28      Significant Imaging: I have reviewed all pertinent imaging results/findings within the past 24 hours.   Imaging Results          X-Ray Chest AP Portable (Final result)  Result time 01/26/20 15:49:16    Final result by José Miguel Gonsalez MD (01/26/20 15:49:16)                 Impression:      No acute pulmonary infiltrate or consolidation.  Chronic interstitial reticulation, similar to priors.      Electronically signed by: José Miguel Gonsalez  Date:    01/26/2020  Time:    15:49             Narrative:    EXAMINATION:  XR CHEST AP PORTABLE    CLINICAL HISTORY:  chest pain;    TECHNIQUE:  Single frontal view of the chest was performed.    COMPARISON:  Chest radiograph 12/14/2019 with priors    FINDINGS:  Cardiac leads project over the chest.  Cardiomediastinal silhouette is enlarged, similar to priors.  Postsurgical changes of a sternotomy and valve replacement.  Chronic appearing interstitial reticular it is identified throughout the lung in a similar pattern distribution.  No acute or new airspace infiltrate.  No large effusion.  No pneumothorax.  Osseous structures appear intact.

## 2020-01-29 NOTE — PLAN OF CARE
01/28/20 1821   Final Note   Assessment Type Final Discharge Note   Anticipated Discharge Disposition Home   Right Care Referral Info   Post Acute Recommendation No Care

## 2020-01-30 ENCOUNTER — PATIENT OUTREACH (OUTPATIENT)
Dept: ADMINISTRATIVE | Facility: CLINIC | Age: 50
End: 2020-01-30

## 2020-01-30 ENCOUNTER — LAB VISIT (OUTPATIENT)
Dept: LAB | Facility: HOSPITAL | Age: 50
End: 2020-01-30
Attending: INTERNAL MEDICINE
Payer: COMMERCIAL

## 2020-01-30 DIAGNOSIS — Z79.01 LONG TERM (CURRENT) USE OF ANTICOAGULANTS: ICD-10-CM

## 2020-01-30 DIAGNOSIS — R00.1 BRADYCARDIA: Primary | ICD-10-CM

## 2020-01-30 LAB
INR PPP: 1.6 (ref 0.8–1.2)
PROTHROMBIN TIME: 15.5 SEC (ref 9–12.5)

## 2020-01-30 PROCEDURE — 85610 PROTHROMBIN TIME: CPT | Mod: NTX

## 2020-01-30 PROCEDURE — 36415 COLL VENOUS BLD VENIPUNCTURE: CPT | Mod: PO,NTX

## 2020-01-30 NOTE — PATIENT INSTRUCTIONS
Bradycardia    When your heart rate is slow, less than 60 beats per minute, it is called bradycardia. Bradycardia can be normal, caused by medicines, or a sign of a disease. The slow heart rate may not be constant; it can come and go. It is a concern when it is very low, or you have symptoms.  Signs and symptoms  The following are signs and symptoms of bradycardia:  · Heart rate less than 60 per minute  · Dizziness or feeling lightheaded  · Weakness  · Trouble breathing  · Fainting  · Sleepiness  · More trouble exercising than usual because of fatigue  · Confusion or trouble concentrating  Causes  There are many causes of bradycardia. Some can be related to your heart, but some may be related to other factors.  Non-heart-related causes:  · Advanced age  · Side effect of certain medicines (such as beta-blockers, calcium channel blockers, digitalis, antiarrhythmic medicines like amiodarone, clonidine, lithium)  · Medical conditions such as hypoglycemia (low blood sugar), hypothyroidism (low thyroid), electrolyte disorder,  hypothermia, sleep apnea  · Athletes, especially long-distance runners, may have a slow heart rate. This can be normal.  · Sleep apnea  · Brain injury such as stroke or bleeding inside the brain  Heart-related causes:  · Coronary artery disease (angina or prior heart attack, also known as acute myocardial infarction, or AMI)  · Heart valve disease  · Heart muscle disease (cardiomyopathy)  · Congestive heart failure  · Sick sinus syndrome, which is when your heart's natural pacemaker is no longer working properly  · Diseases that infiltrate the heart such as sarcoid  · Heart infections  Sometimes the cause for the arrhythmia cannot be found.  Bradycardia that causes symptoms is sometimes reversible, and can be treated with medicines. When more severe bradycardia persists, a pacemaker is generally recommended. When the bradycardia does not cause symptoms, your doctor may decide to evaluate it in his  or her office.  Home care  The following will help you care for yourself at home:  · Resume your usual activities when you are feeling back to normal.  · If you develop any of the symptoms below during exertion, then you should not exert yourself until evaluated further by your doctor.  · Work with your doctor on any needed lifestyle changes, such as changing your diet, stopping smoking if you are a smoker, and a planned exercise program.  Follow-up care  Follow up with your doctor, or as advised.  Call 911  Call 911 if any of the following occur:  · Chest pain  · Trouble breathing  · Slow heart rate with dizziness or lightheadedness  · Fainting or loss of consciousness  · Chest, shoulder, arm, neck, or back pain  · Slow heart rate (under 50 beats per minute) if associated with symptoms  When to seek medical advice  Get prompt medical attention if any of the following occur:  · Occasional weakness, dizziness, or lightheadedness  Date Last Reviewed: 4/25/2016  © 3264-2833 [employer]. [address] [place] [address], [place] [address]. All rights reserved. This information is not intended as a substitute for professional medical care. Always follow your healthcare professional's instructions.

## 2020-01-30 NOTE — PHYSICIAN QUERY
"PT Name: Meg Sal  MR #: [id number]    Physician Query Form - Heart  Condition Clarification     CDS/: Lyn Moran               Contact information:[email or web address]  This form is a permanent document in the medical record.     Query Date: January 30, 2020    By submitting this query, we are merely seeking further clarification of documentation. Please utilize your independent clinical judgment when addressing the question(s) below.    The medical record contains the following   Indicators     Supporting Clinical Findings Location in Medical Record   x BNP CWR=088   Lab 1-26   x EF EF=55-60% Cardiology note 1-27    Radiology findings     x Echo Results CONCLUSIONS     1 - Mild left atrial enlargement.     2 - Eccentric hypertrophy.     3 - No wall motion abnormalities.     4 - Normal left ventricular systolic function (EF 55-60%).     5 - Normal right ventricular systolic function .     6 - Aortic valve prosthesis, CATINA = 1.61 cm2, AVAi = 0.95 cm2/m2, peak velocity = 2.48 m/s, mean gradient = 15 mmHg.     7 - Moderate tricuspid regurgitation.     8 - Pulmonary hypertension. The estimated PA systolic pressure is 61 mmHg.  Cardiology note 1-27    "Ascites" documented      "SOB" or "ANDRES" documented      "Hypoxia" documented     x Heart Failure documented Hx of CHF H&P    "Edema" documented     x Diuretics/Meds Case discussed with cardiology who recommends decreasing home dose of metoprolol to 12.5 mg daily and lasix to 40 mg daily Discharge summary    Treatment:      Other:      Heart failure (HF) can be acute, chronic or both. It is generally further specificed as systolic, diastolic, or combined. Lastly, it is important to identify an underlying etiology if known or suspected.     Common clues to acute exacerbation:  Rapidly progressive symptoms (w/in 2 weeks of presentation), using IV diuretics to treat, using supplemental O2, pulmonary edema on Xray, MI w/in 4 weeks, and/or BNP " >500    Systolic Heart Failure: is defined as chart documentation of a left ventricular ejection fraction (LVEF) less than 40%     Diastolic Heart Failure: is defined as a left ventricular ejection fraction (LVEF) greater than 40%   +      Evidence of diastolic dysfunction on echocardiography OR    Right heart catheterization wedge pressure above 12 mm Hg OR    Left heart catheterization left ventricular end diastolic pressure 18 mm Hg or above.    References: *American Heart Association    The clinical guidelines noted below are only system guidelines, and do not replace the providers clinical judgment.     Provider, please specify the diagnosis associated with above clinical findings  [  X] Chronic Diastolic Heart Failure - Pre-existing diastolic HF diagnosis.  EF > 40%  without  acute HF symptoms documented   [   ] Other Type of Heart Failure (please specify type):   [   ] Other (please specify):   [  ] Clinically Undetermined                           Please document in your progress notes daily for the duration of treatment until resolved and include in your discharge summary.

## 2020-01-30 NOTE — PROGRESS NOTES
C3 nurse attempted to contact patient. No answer. The following message was left for the patient to return the call:  Good morning, I am a nurse calling on behalf of [facility] from the [facility].  This is a Transitional Care Call for Meg Sal. When you have a moment please contact us at [phone] or [phone] Monday through Friday, between the hours of 8 am to 4 pm. We look forward to speaking with you. On behalf of [facility] have a nice day.    The patient does not have a scheduled HOSFU appointment within 7-14 days post hospital discharge date 1/28.

## 2020-01-31 ENCOUNTER — ANTI-COAG VISIT (OUTPATIENT)
Dept: CARDIOLOGY | Facility: CLINIC | Age: 50
End: 2020-01-31
Payer: COMMERCIAL

## 2020-01-31 DIAGNOSIS — Z95.2 HX OF MECHANICAL AORTIC VALVE REPLACEMENT: Chronic | ICD-10-CM

## 2020-01-31 DIAGNOSIS — Z79.01 LONG TERM (CURRENT) USE OF ANTICOAGULANTS: Primary | ICD-10-CM

## 2020-01-31 PROCEDURE — 93793 ANTICOAG MGMT PT WARFARIN: CPT | Mod: S$GLB,,,

## 2020-01-31 PROCEDURE — 93793 PR ANTICOAGULANT MGMT FOR PT TAKING WARFARIN: ICD-10-PCS | Mod: S$GLB,,,

## 2020-01-31 RX ORDER — WARFARIN 3 MG/1
3 TABLET ORAL
Qty: 48 TABLET | Refills: 3 | Status: SHIPPED | OUTPATIENT
Start: 2020-01-31 | End: 2020-02-21 | Stop reason: SDUPTHER

## 2020-01-31 RX ORDER — WARFARIN 6 MG/1
6 TABLET ORAL
Qty: 36 TABLET | Refills: 2 | Status: SHIPPED | OUTPATIENT
Start: 2020-02-01 | End: 2020-02-21 | Stop reason: SDUPTHER

## 2020-01-31 NOTE — PROGRESS NOTES
Mrs. Sal was admitted to the hospital 1/26-1/28 with bradycardia, fatigue and dehydration.  INR elevated upon admit, dose of warfarin held x 2.  Warfarin inpatient dose updated to dosing calendar.  Furosemide and metoprolol dose decreased.       Patient's INR is sub-therapeutic at 1.6.  Maintenance dose resumed post discharge.   No abnormal pain, swelling or weakness noted.  Mrs. Sal states she is still fatigue and has noticed a decrease in appetite.  Due to current symptoms, dose of warfarin will be lowered and patient will be monitored closely.  Instructions given for patient to take warfarin 6mg on Tuesdays, Thursdays and Saturdays; and 3mg on all other days of the week.  Patient repeated directions and voiced understanding.  Follow-up on Tuesday, 2/4/2020.

## 2020-01-31 NOTE — NURSING
Dr Richardson notified of +BC results reported from [place] at [facility] [place].

## 2020-02-01 LAB — BACTERIA BLD CULT: NORMAL

## 2020-02-03 LAB
BACTERIA BLD CULT: ABNORMAL

## 2020-02-05 ENCOUNTER — CARE AT HOME (OUTPATIENT)
Dept: HOME HEALTH SERVICES | Facility: CLINIC | Age: 50
End: 2020-02-05
Payer: COMMERCIAL

## 2020-02-05 DIAGNOSIS — J84.10 PULMONARY FIBROSIS: Primary | ICD-10-CM

## 2020-02-05 DIAGNOSIS — Z79.01 ANTICOAGULANT LONG-TERM USE: ICD-10-CM

## 2020-02-05 DIAGNOSIS — Z95.2 PERSONAL HISTORY OF HEART VALVE REPLACEMENT: ICD-10-CM

## 2020-02-05 DIAGNOSIS — J96.11 CHRONIC RESPIRATORY FAILURE WITH HYPOXIA: ICD-10-CM

## 2020-02-05 DIAGNOSIS — F32.A ANXIETY AND DEPRESSION: ICD-10-CM

## 2020-02-05 DIAGNOSIS — F41.9 ANXIETY AND DEPRESSION: ICD-10-CM

## 2020-02-05 PROCEDURE — 99495 TCM SERVICES (MODERATE COMPLEXITY): ICD-10-PCS | Mod: NTX,S$GLB,, | Performed by: NURSE PRACTITIONER

## 2020-02-05 PROCEDURE — 99495 TRANSJ CARE MGMT MOD F2F 14D: CPT | Mod: NTX,S$GLB,, | Performed by: NURSE PRACTITIONER

## 2020-02-06 VITALS
TEMPERATURE: 98 F | RESPIRATION RATE: 22 BRPM | SYSTOLIC BLOOD PRESSURE: 138 MMHG | HEART RATE: 91 BPM | OXYGEN SATURATION: 91 % | DIASTOLIC BLOOD PRESSURE: 86 MMHG

## 2020-02-06 NOTE — PATIENT INSTRUCTIONS
International Normalized Ratio  Does this test have other names?  INR, standardized prothrombin time  What is this test?  This blood test looks to see how well your blood clots.  The international normalized ratio (INR) is a standardized number that's figured out in the lab. If you take blood thinners, also called anti-clotting medicines or anticoagulants, it's especially important to check your INR. The INR is figured out using the results of the prothrombin time (PT) test. This measures the time it takes for your blood to clot. The INR is an international standard for the PT.  Why do I need this test?  You might need this test if you take drugs that change the way your blood clots, such as warfarin for heart problems or heparin to prevent a blood clot. Anti-clotting medicines are helpful if you are at risk for a stroke. But if you're in an accident and your blood doesn't clot quickly, you may be at risk of dangerous bleeding. Your healthcare provider uses the INR to know whether your anti-clotting drugs are on target or whether you need a change in your dosage. Newer anti-clotting medicines do not need INR monitoring and dose changes like warfarin, so this lab test may not be used for those medicines.  You may also need this test to help your healthcare provider diagnose and manage certain health problems, such as liver disease and bleeding disorders.  What other tests might I have along with this test?  Your healthcare provider will order the PT test first, so that the results can be used to calculate the INR. In a PT test, chemicals are added to your blood sample, and the lab measures the time in seconds that it takes to clot.  Your provider may also order these tests if he or she is concerned about your liver function or risk of bleeding:  · Platelet count  · Prothrombin time  · Activated partial thromboplastin time  · Fibrin D-dimer  · Fibrinogen level  · Thrombin time  What do my test results mean?  Many  things may affect your lab test results. These include the method each lab uses to do the test. Even if your test results are different from the normal value, you may not have a problem. To learn what the results mean for you, talk with your healthcare provider.  The INR is a ratio, so it's just a number, not a number tied to time or another value. Your results will vary according to your age, the medicines you take, and any health problems you have. In general, the higher the number, the longer it takes for your blood to clot.  Your INR number should be between 2 and 3 if you are taking a blood thinner, but it could be different, depending on your condition. For instance, your INR goal if you are being treated for a mechanical heart valve will be different than your INR for a clot in your leg. The INR goal is usually higher for mechanical heart valves, especially the mitral valve. That goal is 2.5 to 3.5. Talk with your doctor about what your INR goal is and what it means for you.  How is this test done?  The test requires a blood sample, which is drawn through a needle from a vein in your arm.  Does this test pose any risks?  Taking a blood sample with a needle carries risks that include bleeding, infection, bruising, or feeling dizzy. When the needle pricks your arm, you may feel a slight stinging sensation or pain. Afterward, the site may be slightly sore.  What might affect my test results?  Certain foods and any medicines you take may affect your results, especially if you take warfarin, heparin, or other anti-clotting drugs or are being treated for liver disease with vitamin K. It is very important to follow your healthcare provider's instructions for having this test.   How do I get ready for this test?  You may be asked to stop eating foods that are high in vitamin K before this test. In addition, be sure your healthcare provider knows about all medicines, herbs, vitamins, and supplements you are taking.  This includes medicines that don't need a prescription and any illicit drugs you may use.      © 2098-5458 [employer]. [address] [place] [address], [place] [address]. All rights reserved. This information is not intended as a substitute for professional medical care. Always follow your healthcare professional's instructions.

## 2020-02-06 NOTE — PROGRESS NOTES
[facility]  Transition of Care Home Visit    Visit Date: 02/05/20  Encounter Provider: Jorge Mart NP  PCP:  Lucero Banda MD    PRESENTING HISTORY      Patient ID: Meg Sal 49 y.o. female.    Consult Requested By:  Jorge Mart  Reason for Consult:  Transitional Care Coordination    Chief Complaint: Transitional Care      History of Present Illness:     Admit Date: 01/26/20  Discharge Date: 01/28/20  [facility] Referral Date: 01/30/20  ___________________________________________________________________    Today:  Ms. Meg Sal is a 49 y.o. female is being seen at home today for transitional care visit to the home environment post-discharge from inpatient hospitalization encounter described above.    Review of Systems    Assessments:  · Environmental: Single story home, lives with  and adult daughter, 2 dogs, adequate lighting and temperature control  · Functional Status: Ambulates without assistance, oxygen dependent, continent  · Safety: oxygen dependent, home bound due to  who travels for work and daughter who is not home much  · Nutritional: adequate  · Home Health/DME/Supplies: oxygen tank/concentrator, pulse oximeter    PAST HISTORY:     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, implants and grafts, initial encounter     Hyperlipidemia     Hypertension     Sleep apnea syndrome 2/8/2019    Stroke        Past Surgical History:   Procedure Laterality Date    AORTIC VALVE REPLACEMENT      mechanical    CARDIAC CATHETERIZATION      CORONARY ANGIOPLASTY      CORONARY ARTERY BYPASS GRAFT      HYSTERECTOMY      RIGHT HEART CATHETERIZATION Right 5/17/2019    Procedure: INSERTION, CATHETER, RIGHT HEART;  Surgeon: Derek Brown MD;  Location[facility] CATH LAB;  Service:  Cardiology;  Laterality: Right;  deejay pt/detailed hx       Family History   Problem Relation Age of Onset    Heart disease Mother     Breast cancer Sister     Coronary artery disease Father     Lung cancer Brother        Social History     Socioeconomic History    Marital status:      Spouse name: Not on file    Number of children: 1    Years of education: Not on file    Highest education level: Not on file   Occupational History    Not on file   Social Needs    Financial resource strain: Somewhat hard    Food insecurity:     Worry: Sometimes true     Inability: Sometimes true    Transportation needs:     Medical: Yes     Non-medical: Yes   Tobacco Use    Smoking status: Former Smoker     Packs/day: 1.00     Years: 30.00     Pack years: 30.00     Types: Cigarettes     Last attempt to quit: 2018     Years since quittin.7    Smokeless tobacco: Never Used   Substance and Sexual Activity    Alcohol use: Not Currently     Frequency: 2-4 times a month     Drinks per session: 1 or 2     Binge frequency: Never    Drug use: Not Currently    Sexual activity: Yes     Partners: Male   Lifestyle    Physical activity:     Days per week: 1 day     Minutes per session: 30 min    Stress: Very much   Relationships    Social connections:     Talks on phone: Once a week     Gets together: Never     Attends [employer] service: Not on file     Active member of club or organization: No     Attends meetings of clubs or organizations: Never     Relationship status:    Other Topics Concern    Not on file   Social History Narrative     travels for work. He is gone for a few days at a time. Patient's 24 y/o daughter lives in the home but is of essentially no assistance to the patient. Patient has difficulty getting to her medical appointments and requests HH to be resumed since her 2020 benefit period has renewed. She is allowed 60 visits a year and once weekly INR checks will be covered at  this frequency.       MEDICATIONS & ALLERGIES:     Current Outpatient Medications on File Prior to Visit   Medication Sig Dispense Refill    albuterol (PROVENTIL) 2.5 mg /3 mL (0.083 %) nebulizer solution Take 3 mLs (2.5 mg total) by nebulization every 4 (four) hours as needed for Wheezing or Shortness of Breath (Cough). Rescue 1 Box 11    albuterol (VENTOLIN HFA) 90 mcg/actuation inhaler Inhale 2 puffs into the lungs every 6 (six) hours as needed for Wheezing. Rescue 18 g 11    amitriptyline (ELAVIL) 25 MG tablet Take 25 mg by mouth every evening.       ANORO ELLIPTA 62.5-25 mcg/actuation DsDv INHALE 1 PUFF INTO THE LUNGS ONCE A DAY. CONTROLLER. 60 each 5    busPIRone (BUSPAR) 15 MG tablet Take 15 mg by mouth 2 (two) times daily.       cholestyramine (QUESTRAN) 4 gram packet Take 1 packet (4 g total) by mouth 2 (two) times daily. 180 packet 0    clonazePAM (KLONOPIN) 1 MG tablet Take 1 mg by mouth 3 (three) times daily.       desvenlafaxine succinate (PRISTIQ) 100 MG Tb24 Take 1 tablet (100 mg total) by mouth once daily. 30 tablet 11    diclofenac sodium (VOLTAREN) 1 % Gel Apply 2 g topically 4 (four) times daily as needed.       fluticasone (FLONASE) 50 mcg/actuation nasal spray 1 spray by Each Nare route once daily.      furosemide (LASIX) 40 MG tablet Take 1 tablet (40 mg total) by mouth once daily. Take two 40 mg tablets by mouth twice a day 120 tablet 6    HYDROcodone-acetaminophen (NORCO)  mg per tablet Take 1 tablet by mouth every 6 (six) hours as needed for Pain.  0    metoprolol succinate (TOPROL-XL) 25 MG 24 hr tablet Take 0.5 tablets (12.5 mg total) by mouth once daily. 30 tablet 6    nicotine (NICODERM CQ) 7 mg/24 hr Place 1 patch onto the skin every 24 hours.      nitroGLYCERIN (NITROSTAT) 0.4 MG SL tablet       nitroGLYCERIN 0.4 MG/HR TD PT24 (NITRODUR) 0.4 mg/hr Place 1 patch onto the skin once daily. 30 patch 1    ondansetron (ZOFRAN) 4 MG tablet TAKE 1 BY MOUTH EVERY 8 HOURS  AS NEEDED FOR NAUSEA. 90 tablet 2    pantoprazole (PROTONIX) 40 MG tablet Take 1 tablet (40 mg total) by mouth 2 (two) times daily. 60 tablet 1    potassium chloride SA (K-DUR,KLOR-CON) 20 MEQ tablet Take 20 mEq by mouth 2 (two) times daily.      rosuvastatin (CRESTOR) 20 MG tablet Take 20 mg by mouth.      sumatriptan (IMITREX) 100 MG tablet Take 100 mg by mouth every 2 (two) hours as needed.      topiramate (TOPAMAX) 100 MG tablet Take 100 mg by mouth 2 (two) times daily.      traMADol (ULTRAM) 50 mg tablet Take 50 mg by mouth every 4 (four) hours as needed.       traZODone (DESYREL) 150 MG tablet Take 150 mg by mouth.      warfarin (COUMADIN) 3 MG tablet Take 1 tablet (3 mg total) by mouth every Mon, Wed, Fri, Sun. 48 tablet 3    warfarin (COUMADIN) 6 MG tablet Take 1 tablet (6 mg total) by mouth every Tues, Thurs, Sat. 36 tablet 2     No current facility-administered medications on file prior to visit.         Review of patient's allergies indicates:   Allergen Reactions    Nsaids (non-steroidal anti-inflammatory drug) Other (See Comments)     Mechanical heart valve       OBJECTIVE:     Vital Signs:  Vitals:    02/05/20 1300   BP: 138/86   Pulse: 91   Resp: (!) 22   Temp: 97.9 °F (36.6 °C)     There is no height or weight on file to calculate BMI.     Physical Exam:  Physical Exam    Laboratory  Lab Results   Component Value Date    WBC 6.37 01/28/2020    HGB 10.1 (L) 01/28/2020    HCT 34.7 (L) 01/28/2020     (H) 01/28/2020     (L) 01/28/2020     Lab Results   Component Value Date    INR 1.6 (H) 01/30/2020    INR 1.9 (H) 01/28/2020    INR 5.7 (HH) 01/27/2020     Lab Results   Component Value Date    HGBA1C 5.2 06/22/2019     No results for input(s): POCTGLUCOSE in the last 72 hours.      TRANSITION OF CARE:     Family and/or Caretaker present at visit?  no.  Diagnostic tests reviewed/disposition: Weekly INR to ttrate anticoagulants  Disease/illness education: Compliance with INR testing  to maintain therapeutic levels of warfarin  Home health/community services discussion/referrals: Patient does not have home health established from hospital visit.  They d need home health  We will set up resumption of home health for the patient.   Establishment or re-establishment of referral orders for community resources: Home Health to be resumed since new benefit period has started (2020)..   Discussion with other health care providers: No discussion with other health care providers necessary.     Transition of Care Visit:  I have reviewed and updated the history and problem list. I have reconciled the medication list. I have discussed the hospitalization and current medical issues, prognosis and plans with the patient/family. I spent more than 50% of time discussing the care with the patient/family. Total Face-to-Face Encounter: 60 minutes.    Medications Reconciliation:   I have reconciled the patient's home medications and discharge medications with the patient/family. I have updated all changes. Refer to After-Visit Medication List.    ASSESSMENT & PLAN:     HIGH RISK CONDITION(S):  Patient is currently on drug therapy requiring intensive monitoring for toxicity: Warfarin    Meg was seen today for transitional care.    Diagnoses and all orders for this visit:    Pulmonary fibrosis  Chronic respiratory failure with hypoxia  Continue nebulizers, continuous oxygen, fluticasone, ellipta, lasix, as prescribed    Anticoagulant long-term use  Personal history of heart valve replacement  Continue warfarin as prescribed  Continue weekly INR checks     Anxiety and depression  Continue trazodone, buspirone, desvenlafaxine, klonopin, elavil as prescribed      Discharge plans, follow-up instructions, future appointments, provider contact information, indicators to seek emergency treatment and encouragement to call for any questions, concerns or clarification of the patient's plan of care explained to patient and/or  caregiver(s), whom confirm understanding of provided information and endorse willingness to comply.       Were controlled substances prescribed?  No    Instructions for the patient:    Scheduled Follow-up :  Future Appointments   Date Time Provider Department Center   2/11/2020  3:30 PM MONICA Rollins [facility] CARDIO [facility]   4/22/2020 12:15 PM [facility]   4/22/2020  1:00 PM [facility]       After Visit Medication List :     Medication List           Accurate as of February 5, 2020 11:59 PM. If you have any questions, ask your nurse or doctor.               CONTINUE taking these medications    * albuterol 90 mcg/actuation inhaler  Commonly known as:  Ventolin HFA  Inhale 2 puffs into the lungs every 6 (six) hours as needed for Wheezing. Rescue     * albuterol 2.5 mg /3 mL (0.083 %) nebulizer solution  Commonly known as:  PROVENTIL  Take 3 mLs (2.5 mg total) by nebulization every 4 (four) hours as needed for Wheezing or Shortness of Breath (Cough). Rescue     amitriptyline 25 MG tablet  Commonly known as:  ELAVIL     Anoro Ellipta 62.5-25 mcg/actuation Dsdv  Generic drug:  umeclidinium-vilanterol  INHALE 1 PUFF INTO THE LUNGS ONCE A DAY. CONTROLLER.     busPIRone 15 MG tablet  Commonly known as:  BUSPAR     cholestyramine 4 gram packet  Commonly known as:  QUESTRAN  Take 1 packet (4 g total) by mouth 2 (two) times daily.     desvenlafaxine succinate 100 MG Tb24  Commonly known as:  PRISTIQ  Take 1 tablet (100 mg total) by mouth once daily.     diclofenac sodium 1 % Gel  Commonly known as:  VOLTAREN     fluticasone propionate 50 mcg/actuation nasal spray  Commonly known as:  FLONASE     furosemide 40 MG tablet  Commonly known as:  LASIX  Take 1 tablet (40 mg total) by mouth once daily. Take two 40 mg tablets by mouth twice a day     HYDROcodone-acetaminophen  mg per tablet  Commonly known as:  NORCO     KlonoPIN 1 MG tablet  Generic  drug:  clonazePAM     metoprolol succinate 25 MG 24 hr tablet  Commonly known as:  TOPROL-XL  Take 0.5 tablets (12.5 mg total) by mouth once daily.     nicotine 7 mg/24 hr  Commonly known as:  NICODERM CQ     * nitroGLYCERIN 0.4 MG SL tablet  Commonly known as:  NITROSTAT     * nitroGLYCERIN 0.4 MG/HR TD PT24 0.4 mg/hr  Commonly known as:  NITRODUR  Place 1 patch onto the skin once daily.     ondansetron 4 MG tablet  Commonly known as:  ZOFRAN  TAKE 1 BY MOUTH EVERY 8 HOURS AS NEEDED FOR NAUSEA.     pantoprazole 40 MG tablet  Commonly known as:  PROTONIX  Take 1 tablet (40 mg total) by mouth 2 (two) times daily.     potassium chloride SA 20 MEQ tablet  Commonly known as:  K-DUR,KLOR-CON     rosuvastatin 20 MG tablet  Commonly known as:  CRESTOR     sumatriptan 100 MG tablet  Commonly known as:  IMITREX     topiramate 100 MG tablet  Commonly known as:  TOPAMAX     traMADol 50 mg tablet  Commonly known as:  ULTRAM     traZODone 150 MG tablet  Commonly known as:  DESYREL     * warfarin 3 MG tablet  Commonly known as:  COUMADIN  Take 1 tablet (3 mg total) by mouth every Mon, Wed, Fri, Sun.     * warfarin 6 MG tablet  Commonly known as:  COUMADIN  Take 1 tablet (6 mg total) by mouth every Tues, Thurs, Sat.         * This list has 6 medication(s) that are the same as other medications prescribed for you. Read the directions carefully, and ask your doctor or other care provider to review them with you.                Patient consent was obtained prior to treatment on this visit.    Signature:      Jorge Banuelos, MSN, APRN, FNP-C  [facility]

## 2020-02-07 ENCOUNTER — ANTI-COAG VISIT (OUTPATIENT)
Dept: CARDIOLOGY | Facility: CLINIC | Age: 50
End: 2020-02-07
Payer: COMMERCIAL

## 2020-02-07 DIAGNOSIS — Z98.890 H/O MITRAL VALVE REPAIR: ICD-10-CM

## 2020-02-07 DIAGNOSIS — Z79.01 LONG TERM (CURRENT) USE OF ANTICOAGULANTS: ICD-10-CM

## 2020-02-07 DIAGNOSIS — Z95.2 HX OF MECHANICAL AORTIC VALVE REPLACEMENT: Chronic | ICD-10-CM

## 2020-02-07 LAB — INR PPP: 1.6

## 2020-02-07 PROCEDURE — G0180 PR HOME HEALTH MD CERTIFICATION: ICD-10-PCS | Mod: NTX,,, | Performed by: FAMILY MEDICINE

## 2020-02-07 PROCEDURE — G0180 MD CERTIFICATION HHA PATIENT: HCPCS | Mod: NTX,,, | Performed by: FAMILY MEDICINE

## 2020-02-07 PROCEDURE — 93793 PR ANTICOAGULANT MGMT FOR PT TAKING WARFARIN: ICD-10-PCS | Mod: S$GLB,,,

## 2020-02-07 PROCEDURE — 93793 ANTICOAG MGMT PT WARFARIN: CPT | Mod: S$GLB,,,

## 2020-02-07 NOTE — PROGRESS NOTES
Verbal results taken from Jazmín with [facility].  PT: 19.1 / INR 1.6.  Date drawn 2/7/2020.  Mrs. Sal has been reenrolled into [facility].  No missed doses or significant changes reported.  Orders given: Take warfarin 6mg on today; then resume current dose of warfarin 6mg on Tuesdays, Thursdays, Saturdays and 3mg on all other days of the week.  Recheck INR on 2/13/2020.

## 2020-02-13 ENCOUNTER — EXTERNAL HOME HEALTH (OUTPATIENT)
Dept: HOME HEALTH SERVICES | Facility: HOSPITAL | Age: 50
End: 2020-02-13
Payer: COMMERCIAL

## 2020-02-13 ENCOUNTER — TELEPHONE (OUTPATIENT)
Dept: PULMONOLOGY | Facility: CLINIC | Age: 50
End: 2020-02-13

## 2020-02-13 ENCOUNTER — ANTI-COAG VISIT (OUTPATIENT)
Dept: CARDIOLOGY | Facility: CLINIC | Age: 50
End: 2020-02-13
Payer: COMMERCIAL

## 2020-02-13 DIAGNOSIS — Z95.2 HX OF MECHANICAL AORTIC VALVE REPLACEMENT: Chronic | ICD-10-CM

## 2020-02-13 DIAGNOSIS — Z79.01 LONG TERM (CURRENT) USE OF ANTICOAGULANTS: ICD-10-CM

## 2020-02-13 LAB — INR PPP: 1.7

## 2020-02-13 PROCEDURE — 93793 ANTICOAG MGMT PT WARFARIN: CPT | Mod: S$GLB,,,

## 2020-02-13 PROCEDURE — 93793 PR ANTICOAGULANT MGMT FOR PT TAKING WARFARIN: ICD-10-PCS | Mod: S$GLB,,,

## 2020-02-13 NOTE — PROGRESS NOTES
Verbal results taken from Jazmín with [facility].  PT: 20.4 / INR 1.7.  Date drawn 2/13/2020.    No missed doses or significant changes reported.  Orders given: Take warfarin 9mg on today; then increase dose to warfarin 3mg on Mondays, Wednesdays and Fridays; and 6mg on all other days of the week.  Recheck INR on 2/21/2020 with labs, per patient request.  Patient agrees to go to lab before her existing appt.

## 2020-02-13 NOTE — TELEPHONE ENCOUNTER
----- Message from Ariadne Sal sent at 2/13/2020  4:09 PM CST -----  Contact: [facility] [phone]  Calling to get new PA on medication however was unable to understand the name of medication. Fax # is [phone].      Thanks,  Ariadne Sal

## 2020-02-13 NOTE — TELEPHONE ENCOUNTER
Called [facility] pharmacy to clarify ofev prescription no answer no one came to the phone will try to call again tomorrow.

## 2020-02-18 DIAGNOSIS — I50.9 CONGESTIVE HEART FAILURE, UNSPECIFIED HF CHRONICITY, UNSPECIFIED HEART FAILURE TYPE: ICD-10-CM

## 2020-02-18 RX ORDER — METOPROLOL SUCCINATE 25 MG/1
12.5 TABLET, EXTENDED RELEASE ORAL DAILY
Qty: 30 TABLET | Refills: 6 | Status: SHIPPED | OUTPATIENT
Start: 2020-02-18 | End: 2020-02-27 | Stop reason: SDUPTHER

## 2020-02-21 ENCOUNTER — CLINICAL SUPPORT (OUTPATIENT)
Dept: SMOKING CESSATION | Facility: CLINIC | Age: 50
End: 2020-02-21
Payer: COMMERCIAL

## 2020-02-21 ENCOUNTER — LAB VISIT (OUTPATIENT)
Dept: LAB | Facility: HOSPITAL | Age: 50
End: 2020-02-21
Attending: INTERNAL MEDICINE
Payer: COMMERCIAL

## 2020-02-21 ENCOUNTER — OFFICE VISIT (OUTPATIENT)
Dept: CARDIOLOGY | Facility: CLINIC | Age: 50
End: 2020-02-21
Payer: COMMERCIAL

## 2020-02-21 ENCOUNTER — ANTI-COAG VISIT (OUTPATIENT)
Dept: CARDIOLOGY | Facility: CLINIC | Age: 50
End: 2020-02-21
Payer: COMMERCIAL

## 2020-02-21 VITALS
SYSTOLIC BLOOD PRESSURE: 130 MMHG | DIASTOLIC BLOOD PRESSURE: 86 MMHG | HEART RATE: 71 BPM | WEIGHT: 162.25 LBS | BODY MASS INDEX: 31.86 KG/M2 | HEIGHT: 60 IN | OXYGEN SATURATION: 95 %

## 2020-02-21 DIAGNOSIS — Z79.01 LONG TERM (CURRENT) USE OF ANTICOAGULANTS: ICD-10-CM

## 2020-02-21 DIAGNOSIS — I25.118 CORONARY ARTERY DISEASE OF NATIVE ARTERY OF NATIVE HEART WITH STABLE ANGINA PECTORIS: Primary | ICD-10-CM

## 2020-02-21 DIAGNOSIS — F17.200 NICOTINE DEPENDENCE: Primary | ICD-10-CM

## 2020-02-21 DIAGNOSIS — J84.10 PULMONARY FIBROSIS: Chronic | ICD-10-CM

## 2020-02-21 DIAGNOSIS — I27.20 PULMONARY HTN: Chronic | ICD-10-CM

## 2020-02-21 DIAGNOSIS — Z95.1 STATUS POST CORONARY ARTERY BYPASS GRAFT: ICD-10-CM

## 2020-02-21 DIAGNOSIS — I10 ESSENTIAL HYPERTENSION: Chronic | ICD-10-CM

## 2020-02-21 DIAGNOSIS — I50.9 CONGESTIVE HEART FAILURE, UNSPECIFIED HF CHRONICITY, UNSPECIFIED HEART FAILURE TYPE: ICD-10-CM

## 2020-02-21 DIAGNOSIS — Z95.2 HX OF MECHANICAL AORTIC VALVE REPLACEMENT: Chronic | ICD-10-CM

## 2020-02-21 DIAGNOSIS — I49.3 PVC (PREMATURE VENTRICULAR CONTRACTION): ICD-10-CM

## 2020-02-21 DIAGNOSIS — I50.32 CHRONIC DIASTOLIC HEART FAILURE: ICD-10-CM

## 2020-02-21 DIAGNOSIS — I35.2 NONRHEUMATIC AORTIC INSUFFICIENCY WITH AORTIC STENOSIS: ICD-10-CM

## 2020-02-21 DIAGNOSIS — Z98.890 H/O MITRAL VALVE REPAIR: ICD-10-CM

## 2020-02-21 DIAGNOSIS — E78.49 OTHER HYPERLIPIDEMIA: ICD-10-CM

## 2020-02-21 LAB
INR PPP: 2.2 (ref 0.8–1.2)
PROTHROMBIN TIME: 23 SEC (ref 9–12.5)

## 2020-02-21 PROCEDURE — 99999 PR PBB SHADOW E&M-EST. PATIENT-LVL III: CPT | Mod: PBBFAC,TXP,, | Performed by: INTERNAL MEDICINE

## 2020-02-21 PROCEDURE — 99999 PR PBB SHADOW E&M-EST. PATIENT-LVL I: ICD-10-PCS | Mod: PBBFAC,TXP,,

## 2020-02-21 PROCEDURE — 99404 PREV MED CNSL INDIV APPRX 60: CPT | Mod: NTX,S$GLB,, | Performed by: GENERAL PRACTICE

## 2020-02-21 PROCEDURE — 3008F PR BODY MASS INDEX (BMI) DOCUMENTED: ICD-10-PCS | Mod: CPTII,NTX,S$GLB, | Performed by: INTERNAL MEDICINE

## 2020-02-21 PROCEDURE — 85610 PROTHROMBIN TIME: CPT | Mod: TXP

## 2020-02-21 PROCEDURE — 3079F DIAST BP 80-89 MM HG: CPT | Mod: CPTII,NTX,S$GLB, | Performed by: INTERNAL MEDICINE

## 2020-02-21 PROCEDURE — 99999 PR PBB SHADOW E&M-EST. PATIENT-LVL III: ICD-10-PCS | Mod: PBBFAC,TXP,, | Performed by: INTERNAL MEDICINE

## 2020-02-21 PROCEDURE — 99215 PR OFFICE/OUTPT VISIT, EST, LEVL V, 40-54 MIN: ICD-10-PCS | Mod: NTX,S$GLB,, | Performed by: INTERNAL MEDICINE

## 2020-02-21 PROCEDURE — 99215 OFFICE O/P EST HI 40 MIN: CPT | Mod: NTX,S$GLB,, | Performed by: INTERNAL MEDICINE

## 2020-02-21 PROCEDURE — 36415 COLL VENOUS BLD VENIPUNCTURE: CPT | Mod: NTX

## 2020-02-21 PROCEDURE — 3075F SYST BP GE 130 - 139MM HG: CPT | Mod: CPTII,NTX,S$GLB, | Performed by: INTERNAL MEDICINE

## 2020-02-21 PROCEDURE — 99999 PR PBB SHADOW E&M-EST. PATIENT-LVL I: CPT | Mod: PBBFAC,TXP,,

## 2020-02-21 PROCEDURE — 3008F BODY MASS INDEX DOCD: CPT | Mod: CPTII,NTX,S$GLB, | Performed by: INTERNAL MEDICINE

## 2020-02-21 PROCEDURE — 3079F PR MOST RECENT DIASTOLIC BLOOD PRESSURE 80-89 MM HG: ICD-10-PCS | Mod: CPTII,NTX,S$GLB, | Performed by: INTERNAL MEDICINE

## 2020-02-21 PROCEDURE — 99404 PR PREVENT COUNSEL,INDIV,60 MIN: ICD-10-PCS | Mod: NTX,S$GLB,, | Performed by: GENERAL PRACTICE

## 2020-02-21 PROCEDURE — 3075F PR MOST RECENT SYSTOLIC BLOOD PRESS GE 130-139MM HG: ICD-10-PCS | Mod: CPTII,NTX,S$GLB, | Performed by: INTERNAL MEDICINE

## 2020-02-21 RX ORDER — POTASSIUM CHLORIDE 20 MEQ/1
40 TABLET, EXTENDED RELEASE ORAL 2 TIMES DAILY
Qty: 120 TABLET | Refills: 3 | Status: SHIPPED | OUTPATIENT
Start: 2020-02-21

## 2020-02-21 RX ORDER — IBUPROFEN 200 MG
1 TABLET ORAL DAILY
Qty: 14 PATCH | Refills: 0 | Status: SHIPPED | OUTPATIENT
Start: 2020-02-21

## 2020-02-21 RX ORDER — ENOXAPARIN SODIUM 100 MG/ML
1 INJECTION SUBCUTANEOUS 2 TIMES DAILY
Qty: 10 ML | Refills: 0 | Status: SHIPPED | OUTPATIENT
Start: 2020-02-21

## 2020-02-21 RX ORDER — WARFARIN 6 MG/1
6 TABLET ORAL
Qty: 60 TABLET | Refills: 3 | Status: SHIPPED | OUTPATIENT
Start: 2020-02-22 | End: 2021-02-21

## 2020-02-21 RX ORDER — PANTOPRAZOLE SODIUM 40 MG/1
40 TABLET, DELAYED RELEASE ORAL 2 TIMES DAILY
Qty: 60 TABLET | Refills: 3 | Status: SHIPPED | OUTPATIENT
Start: 2020-02-21

## 2020-02-21 RX ORDER — FUROSEMIDE 40 MG/1
80 TABLET ORAL 2 TIMES DAILY
Qty: 120 TABLET | Refills: 3 | Status: SHIPPED | OUTPATIENT
Start: 2020-02-21 | End: 2020-03-13 | Stop reason: SDUPTHER

## 2020-02-21 RX ORDER — WARFARIN 3 MG/1
3 TABLET ORAL
Qty: 60 TABLET | Refills: 6 | Status: SHIPPED | OUTPATIENT
Start: 2020-02-21 | End: 2021-02-20

## 2020-02-21 RX ORDER — TIZANIDINE 4 MG/1
TABLET ORAL
COMMUNITY
Start: 2020-02-20

## 2020-02-21 NOTE — PROGRESS NOTES
Subjective:   Patient ID:  Meg Sal is a 50 y.o. female who presents for cardiac consult of Congestive Heart Failure; Shortness of Breath (2 weeks); and Leg Swelling (2 weeks)      Hyperlipidemia   Associated symptoms include chest pain and shortness of breath.   Shortness of Breath   Associated symptoms include chest pain.   Hypertension   Associated symptoms include chest pain, palpitations and shortness of breath.   Chest Pain    Associated symptoms include dizziness, palpitations and shortness of breath.   Her past medical history is significant for hyperlipidemia.   Dizziness:    Associated symptoms: palpitations and chest pain.    The patient came in today for cardiac consult of Congestive Heart Failure; Shortness of Breath (2 weeks); and Leg Swelling (2 weeks)      Meg Sal is a 50 y.o. female with CAD s/p 2V CABG - LAD/LCX with occluded, s/p mechanical AVR, s/p mitral annuloplasty ring, pulm fibrosis, Pulm HTN, HFpEF, CHD, HTN, HLD, CVA here for follow up CV eval.     2/8/19  Pt had first MI at age 31, was taken to [facility], back was hurting. Felt like she had a heavy wet blanket. She was breaking up a dog fight when paramedics came. Dr. Cortez did C, had one stent placed. She also had other blockages which were medically treated. She had treadmill nuclear stress tests after, had been off plavix. In 2014, when she went to [place] she couldn't walk and was out of breath. She came back to  and she did another LHC with Dr. Cortez, Dr. Mcmahon was consulted as she had severe LAD disease. She went to Dr. Heller with [facility], had LHC in May 2018 with chronically occluded RCA with collaterals but patent grafts to her left system. She has been having a lot of chest pain lately. She was taking NTG two or three times a week. She was told to take Ranexa and Imdur but had more depression so stopped taking it. She has no car and has difficulty getting to coumadin clinic, discussed will to get home  health for labwork.     3/11/19  She had another episode of severe chest pain last week. Started on Tues and continued till Friday. Had home health nurse that came. She took NTG which didn't help much, took 2 tabs. Discussed she needs ER eval if severe CP again that is not improvd with NTG. Started verapamil for PVCs. Will refer to Ep for PVC ablation vs AAD. Pulm eval this AM, will have sleep study/PFTs/rheum referral. Recent stress and echo negative, mech AVR mean gradient 15 mmhg    4/29/19  She had a recent syncopal episode at home and presented to [facility] ER and was admitted. IN the ED, patient found to be mildly hypoxic with O2 sat of 90% on room air.  ABG showed pH of 7.349, pCO2 43, PO2 55, SA O2 87 on room air.  CT of the head was negative for acute process, CXR unremarkable, EKG unrevealing. BP is better since home Verapamil, Bystolic, Isosorbide, and NTG patch have been held. Home oxygen evaluation completed and patient qualified with an exertional room air O2 saturation of 87% which improved to 96% on 2 L nasal cannula, she was DC home on 2L NC.Pt had eval by Dr. Montero for PVCs, discussed ablation not a good option, may try amio but not a great option due to lung disease. Had pulm eval by Dr. Mondragon and rec RHC to eval PH and lung transplant eval. BP elevated today, had been off NTG/imdur, will restart today.     7/3/19  She presented to the ER last month and admitted to hospital with SOB. 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 Patient with Pulmonary support in place and referral to the transplant service, instructed to follow up as directed for transplant consideration. RHC last month -   Mildly elevated right Filling Pressures, Moderate Pulmonary Hypertension. She was started on nintenanib - OFEV - just received it yesterday. Still considering lung transplant event. BNP was elevated, discussed to increase lasix.  ECG - NSR, PACs, inc  RBBB, anterior ischemia - old      8/7/19  She had a recent fall, broke collar bone. She was trying to put water on night stand and fell. She went to ortho in [place], does not need surgery. She had the fall yesterday morning. She has migraines but discussed if headache occurs needs ER eval to r/o intracranial bleeding. She was out of Imitrex and has been taking Fiorcicet but didn't help much.     10/9/19  Needs to get more teeth pulled out and add to her dentures. She has NTG patch but still has to use PRN NTG pills, had to take 3 for severe palpitations. No CP. Recent INR was 3.8. Will repeat INR today.     11/13/19 - Hosp follow up  DC summary - last 2 weeks pt has had decreased appetite, poor fluid intake, diarrhea and an emotional time and her father had passed. She continued to take her lasix. She presented with weakness and hypotension. Diuretics held and gentle IV fluids given. Also, with chronically elevated troponin and Cardiology felt DEMAND ISCHEMIA. Due to mechanical valve, coumadin on hold and heparin gtt in progress. 2 D ECHO did not show any new changes. Around 3 weeks earlier, pt had 4 days of melena which had resolved. Hgb/Hct has trended down and pt received 1 unit PrBCs for symptomatic anemia. Cardiology assisting with care. On 11/6 - Hgb/Hct dropped below 8 and pt transfused 2 units PRBCs. IV fluids discontinued as hypotension resolved and pt received some prn lasix is association with blood transfusion. Cardiology advised resuming Coumadin with heparin bridge however, will order Lovenox bridge as pt is familiar and expressing desire for discharge. Gastroenterology saw the patient due to possible GI bleed and they recommend PPI and conservative management. Requesting risk stratification from Cardiology before any scopes done. Dr. Enciso saw the patient and encouraged the patient to get EGD and colonoscopy. Pt placed on clear liquids with plans for scopes on Friday as patient needs colonoscopy  prep.  - Pt's lab work and vital signs stable. She denied further weakness and hematochezia. Pt verbalized desire to discharge as she had to plan her father's . Confirmed with Cardiology that Coumadin bridged with Lovenox was permitted. Pt was seen and examined and determined to be safe and stable for discharge. Pt was discharged on her Coumadin and therapeutic Lovenox. Pt stated she was knowledgeable in Lovenox administration.    From coumadin clinic - . Ms. Sal reports she has not taken any warfarin since discharged, she thought she was only to take Lovenox.  Bridge therapy discussed.   No abnormal pain, swelling or weakness noted.  Instructions given for patient to take warfarin 6mg every evening and continue Lovenox 70mg BID until next INR check.   Patient wrote down instructions, repeated back and voiced understanding.     19 - hosp follow up  She was recently discharged from this hospital, was sent home on Lovenox bridge with warfarin anticoagulation for a subtherapeutic INR of 1.2 upon discharge. Patient reports taking warfarin and Lovenox as she was supposed to, she followed up with Dr. Banda or, cardiologist two days ago for routine post hospital discharge clinic visit.  Routine laboratory workup revealed elevated LFTs with AST and ALT in the 600s, hence she was advised to present to the ED emergently.  Repeat AST/ALT in the 600s, INR 7.0.  Hemoglobin stable at 10.5.  BP stable.  In addition patient reports multiple loose nonbloody bowel movements for the past few weeks.  Denies recent use of antibiotics.     Admitting diagnosis warfarin toxicity, elevated LFTs.     Patient was kept inpatient for coumadin toxicity under the care of Hospital Medicine. Her coumadin was held, no Vit K at present as pt is not actively bleeding. Pharmacy to manage coumadin.  Pt also  Having diarrhea, imodium added, along with questran and probiotic. C diff negative. 19 The patients INR was 1.5 this  "morning. Will start heparin gtt to bridge until patient becomes therapeutic with coumadin. Vitamin K was given today. Liver enzymes continue to trend down. The patient reports that her diarrhea is now controlled. Patient insisting on leaving to attend her father's  in [place]. Cardiology consulted as INR is not therapeutic and pt requesting to be dc'd with lovenox bridge - which is not standard for mechanical valve. Discussed with pt that we would prefer to keep her on heparin GTT until INR is therapeutic. She is anxious with high stress level in dealing with the death of her father and she will leave with lovenox bridge. Cardiology and pharmacy went over lovenox bridge in detail. Pt will see PCP for f/u on liver enzymes in 2 days and will go to [facility] for coumadin check on Thursday.    Has been wearing NTG patches. She stopped the pulm fibrosis meds. She had a beast mass/lump and will have mammogram concerned for malignancy.   She has stopped Crestor. Discussed will restart Crestor once LFTs improve.     20  Last month had hosp stay - several episodes of Bradycardia in upper 40's. Patient complaining of "sinking" feeling. B/P has been 100's. BB held. Cardiology consulted and recommended holding BB and keeping overnight. Negative orthostatic B/P. Reevaluate in am. As of , pt. Feeling better today, symptoms improved. BP stable in the 120's. Heart rate 60-70s. Case discussed with cardiology who recommends decreasing home dose of metoprolol to 12.5 mg daily and lasix to 40 mg daily.    Still had recent visit with PCP and had psych meds changed for depression/anxiety. She will need teeth extraction will need to be bridged with Lovenox.   She has more ankle swelling and has tried taking more lasix. She feels mild dyspnea more today she overdid it.     Patient has dec exercise tolerance.    Patient is compliant with medications.    ECG - NSR, RBBB    RHC 19    AIR REST:  RA:  " (11)  RV: 57  RVEDP: 13     PW: 15/29 (16)  PA: 58/16 (34)      50-59% stenosis within the right internal carotid artery  **Categories of stenosis (0-15%, 16-49%, 50-69% and 70-99% ) are based on published criteria that have been internally validated.  Degree of stenosis is based on NASCET criteria and refers to the degree of luminal diameter narrowing as a percentage of the normal ICA distal to the stenosis and any area of post stenotic dilation.      Nuclear Quantitative Functional Analysis:   LVEF: 65 %    Impression: NORMAL MYOCARDIAL PERFUSION  1. The perfusion scan is free of evidence for myocardial ischemia or injury.   2. Resting wall motion is physiologic.   3. Resting LV function is normal.   4. The ventricular volumes are normal at rest and stress.   5. The extracardiac distribution of radioactivity is normal.       This document has been electronically    SIGNED BY: Hermilo Pete MD On: 02/25/2019 16:47    CONCLUSIONS     1 - Mild left atrial enlargement.     2 - Eccentric hypertrophy.     3 - No wall motion abnormalities.     4 - Normal left ventricular systolic function (EF 55-60%).     5 - Normal right ventricular systolic function .     6 - Aortic valve prosthesis, CATINA = 1.61 cm2, AVAi = 0.95 cm2/m2, peak velocity = 2.48 m/s, mean gradient = 15 mmHg.     7 - Moderate tricuspid regurgitation.     8 - Pulmonary hypertension. The estimated PA systolic pressure is 61 mmHg.     This document has been electronically    SIGNED BY: Hermilo Pete MD On: 02/25/2019 14:50    TEST DESCRIPTION   PREDOMINANT RHYTHM  1. Sinus rhythm with heart rates varying between 66 and 105 bpm with an average of 78 bpm.   VENTRICULAR ARRHYTHMIAS  1. There were very frequent PVCs totalling 80532 and averaging 489 per hour.  There were 23 couplets. There were 5 triplets.   2. There were no episodes of ventricular tachycardia.    SUPRA VENTRICULAR ARRHYTHMIAS  1. There were rare PACs totalling 382 and averaging 7 per  hour.   2. There were no episodes of sustained supraventricular tachycardia.    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, implants and grafts, initial encounter     Hyperlipidemia     Hypertension     Sleep apnea syndrome 2019    Stroke        Past Surgical History:   Procedure Laterality Date    AORTIC VALVE REPLACEMENT      mechanical    CARDIAC CATHETERIZATION      CORONARY ANGIOPLASTY      CORONARY ARTERY BYPASS GRAFT      HYSTERECTOMY      RIGHT HEART CATHETERIZATION Right 2019    Procedure: INSERTION, CATHETER, RIGHT HEART;  Surgeon: Derek Brown MD;  Location: [facility] CATH LAB;  Service: Cardiology;  Laterality: Right;  malur pt/detailed hx       Social History     Tobacco Use    Smoking status: Smoker, Current Status Unknown     Packs/day: 1.00     Years: 30.00     Pack years: 30.00     Types: Cigarettes     Last attempt to quit: 2018     Years since quittin.7    Smokeless tobacco: Never Used   Substance Use Topics    Alcohol use: Not Currently     Frequency: 2-4 times a month     Drinks per session: 1 or 2     Binge frequency: Never    Drug use: Not Currently       Family History   Problem Relation Age of Onset    Heart disease Mother     Breast cancer Sister     Coronary artery disease Father     Lung cancer Brother        Patient's Medications   New Prescriptions    ENOXAPARIN (LOVENOX) 100 MG/ML SYRG    Inject 0.7 mLs (70 mg total) into the skin 2 (two) times daily. Start 3 days prior and continue 3 days after   Previous Medications    ALBUTEROL (PROVENTIL) 2.5 MG /3 ML (0.083 %) NEBULIZER SOLUTION    Take 3 mLs (2.5 mg total) by nebulization every 4 (four) hours as needed for Wheezing or Shortness of Breath (Cough). Rescue    ALBUTEROL (VENTOLIN HFA) 90 MCG/ACTUATION INHALER    Inhale 2 puffs  into the lungs every 6 (six) hours as needed for Wheezing. Rescue    AMITRIPTYLINE (ELAVIL) 25 MG TABLET    Take 25 mg by mouth every evening.     ANORO ELLIPTA 62.5-25 MCG/ACTUATION DSDV    INHALE 1 PUFF INTO THE LUNGS ONCE A DAY. CONTROLLER.    BUSPIRONE (BUSPAR) 15 MG TABLET    Take 15 mg by mouth 2 (two) times daily.     CHOLESTYRAMINE (QUESTRAN) 4 GRAM PACKET    Take 1 packet (4 g total) by mouth 2 (two) times daily.    CLONAZEPAM (KLONOPIN) 1 MG TABLET    Take 1 mg by mouth 3 (three) times daily.     DESVENLAFAXINE SUCCINATE (PRISTIQ) 100 MG TB24    Take 1 tablet (100 mg total) by mouth once daily.    DICLOFENAC SODIUM (VOLTAREN) 1 % GEL    Apply 2 g topically 4 (four) times daily as needed.     FLUTICASONE (FLONASE) 50 MCG/ACTUATION NASAL SPRAY    1 spray by Each Nare route once daily.    HYDROCODONE-ACETAMINOPHEN (NORCO)  MG PER TABLET    Take 1 tablet by mouth every 6 (six) hours as needed for Pain.    METOPROLOL SUCCINATE (TOPROL-XL) 25 MG 24 HR TABLET    Take 0.5 tablets (12.5 mg total) by mouth once daily.    NICOTINE (NICODERM CQ) 21 MG/24 HR    Place 1 patch onto the skin once daily.    NITROGLYCERIN (NITROSTAT) 0.4 MG SL TABLET        NITROGLYCERIN 0.4 MG/HR TD PT24 (NITRODUR) 0.4 MG/HR    Place 1 patch onto the skin once daily.    ONDANSETRON (ZOFRAN) 4 MG TABLET    TAKE 1 BY MOUTH EVERY 8 HOURS AS NEEDED FOR NAUSEA.    ROSUVASTATIN (CRESTOR) 20 MG TABLET    Take 20 mg by mouth.    SUMATRIPTAN (IMITREX) 100 MG TABLET    Take 100 mg by mouth every 2 (two) hours as needed.    TIZANIDINE (ZANAFLEX) 4 MG TABLET        TOPIRAMATE (TOPAMAX) 100 MG TABLET    Take 100 mg by mouth 2 (two) times daily.    TRAMADOL (ULTRAM) 50 MG TABLET    Take 50 mg by mouth every 4 (four) hours as needed.     TRAZODONE (DESYREL) 150 MG TABLET    Take 150 mg by mouth.    VORTIOXETINE (TRINTELLIX) 10 MG TAB    Take 10 mg by mouth.   Modified Medications    Modified Medication Previous Medication    FUROSEMIDE (LASIX) 40  MG TABLET furosemide (LASIX) 40 MG tablet       Take 2 tablets (80 mg total) by mouth 2 (two) times daily. Take 80 mg twice a day    Take 1 tablet (40 mg total) by mouth once daily. Take two 40 mg tablets by mouth twice a day    PANTOPRAZOLE (PROTONIX) 40 MG TABLET pantoprazole (PROTONIX) 40 MG tablet       Take 1 tablet (40 mg total) by mouth 2 (two) times daily.    Take 1 tablet (40 mg total) by mouth 2 (two) times daily.    POTASSIUM CHLORIDE SA (K-DUR,KLOR-CON) 20 MEQ TABLET potassium chloride SA (K-DUR,KLOR-CON) 20 MEQ tablet       Take 2 tablets (40 mEq total) by mouth 2 (two) times daily.    Take 20 mEq by mouth 2 (two) times daily.    WARFARIN (COUMADIN) 3 MG TABLET warfarin (COUMADIN) 3 MG tablet       Take 1 tablet (3 mg total) by mouth every Mon, Wed, Fri.    Take 1 tablet (3 mg total) by mouth every Mon, Wed, Fri, Sun.    WARFARIN (COUMADIN) 6 MG TABLET warfarin (COUMADIN) 6 MG tablet       Take 1 tablet (6 mg total) by mouth every Tuesday, Thursday, Saturday, Sunday.    Take 1 tablet (6 mg total) by mouth every Tues, Thurs, Sat.   Discontinued Medications    No medications on file       Review of Systems   Constitutional: Negative.    HENT: Negative.    Eyes: Negative.    Respiratory: Positive for shortness of breath.    Cardiovascular: Positive for chest pain and palpitations.   Gastrointestinal: Negative.    Genitourinary: Negative.    Musculoskeletal: Negative.    Skin: Negative.    Neurological: Positive for dizziness.   Endo/Heme/Allergies: Negative.    Psychiatric/Behavioral: The patient is nervous/anxious.    All 12 systems otherwise negative.      Wt Readings from Last 3 Encounters:   02/21/20 73.6 kg (162 lb 4.1 oz)   01/26/20 76 kg (167 lb 8 oz)   01/14/20 69.9 kg (154 lb 1.6 oz)     Temp Readings from Last 3 Encounters:   02/05/20 97.9 °F (36.6 °C) (Temporal)   01/28/20 98.4 °F (36.9 °C) (Oral)   12/16/19 97.5 °F (36.4 °C) (Axillary)     BP Readings from Last 3 Encounters:   02/21/20 130/86    02/05/20 138/86   01/28/20 131/65     Pulse Readings from Last 3 Encounters:   02/21/20 71   02/05/20 91   01/28/20 71       /86 (BP Location: Right arm, Patient Position: Sitting, BP Method: Small (Manual))   Pulse 71   Ht 5' (1.524 m)   Wt 73.6 kg (162 lb 4.1 oz)   LMP  (LMP Unknown)   SpO2 95%   BMI 31.69 kg/m²     Objective:   Physical Exam   Constitutional: She is oriented to person, place, and time. She appears well-developed and well-nourished. No distress.   HENT:   Head: Normocephalic and atraumatic.   Nose: Nose normal.   Mouth/Throat: Oropharynx is clear and moist.   Eyes: Conjunctivae and EOM are normal. No scleral icterus.   Neck: Normal range of motion. Neck supple. No JVD present. No thyromegaly present.   Cardiovascular: Normal rate, regular rhythm, S1 normal and S2 normal. Exam reveals no gallop, no S3, no S4 and no friction rub.   Murmur heard.  Pulmonary/Chest: Effort normal and breath sounds normal. No stridor. No respiratory distress. She has no wheezes. She has no rales. She exhibits no tenderness.   Abdominal: Soft. Bowel sounds are normal. She exhibits no distension and no mass. There is no tenderness. There is no rebound.   Genitourinary:   Genitourinary Comments: Deferred   Musculoskeletal: Normal range of motion. She exhibits no edema, tenderness or deformity.   Lymphadenopathy:     She has no cervical adenopathy.   Neurological: She is alert and oriented to person, place, and time. She exhibits normal muscle tone. Coordination normal.   Skin: Skin is warm and dry. No rash noted. She is not diaphoretic. No erythema. No pallor.   Psychiatric: She has a normal mood and affect. Her behavior is normal. Judgment and thought content normal.   Nursing note and vitals reviewed.      Lab Results   Component Value Date     (L) 01/27/2020    K 3.4 (L) 01/27/2020     01/27/2020    CO2 23 01/27/2020    BUN 17 01/27/2020    CREATININE 1.0 01/27/2020     (H) 01/27/2020     HGBA1C 5.2 06/22/2019    MG 2.0 01/27/2020    AST 49 (H) 01/27/2020    ALT 34 01/27/2020    ALBUMIN 3.1 (L) 01/27/2020    PROT 6.5 01/27/2020    BILITOT 1.1 (H) 01/27/2020    WBC 6.37 01/28/2020    HGB 10.1 (L) 01/28/2020    HCT 34.7 (L) 01/28/2020     (H) 01/28/2020     (L) 01/28/2020    INR 1.7 02/13/2020    TSH 1.982 06/22/2019    CHOL 172 11/04/2019    HDL 39 (L) 11/04/2019    LDLCALC 107.2 11/04/2019    TRIG 129 11/04/2019     (H) 01/26/2020     Assessment:      1. Coronary artery disease of native artery of native heart with stable angina pectoris    2. Essential hypertension    3. Hx of mechanical aortic valve replacement    4. Status post coronary artery bypass graft    5. PVC (premature ventricular contraction)    6. H/O mitral valve repair    7. Nonrheumatic aortic insufficiency with aortic stenosis    8. Other hyperlipidemia    9. Chronic diastolic heart failure    10. Pulmonary HTN    11. Pulmonary fibrosis    12. Congestive heart failure, unspecified HF chronicity, unspecified heart failure type    13. Long term (current) use of anticoagulants        Plan:   1. CAD s/p CABG  - cont current meds  - exercise nuclear stress to r/o ischemia - negative  - cannot tolerate Ranexa; cont NTG patch and PRN NTG tabs    2. HFpEF with overload symptoms now  - need to double lasix 80 BID now and extra  - cont lasix PRN and K   - low salt diet   - labs on Wed    3. PVCs  - not candidate for ablation  - amiodarone high risk for pulm toxicity    4. PulmHTN/fibrosis  - RHC  With PH and elevated filling pressures  - f/u with pulm as needed - started on new IPF med  - f/u with pulm for BANDAR    5. [facility] AVR  - recent echo gradient WNL  - cont coumadin clinic   - will need bridging if any A/C interrupted  - March 13th extraction    6. HLD  - repeat CMP to eval LFTs before starting statin   - low kyle diet      Thank you for allowing me to participate in this patient's care. Please do not hesitate to  contact me with any questions or concerns. Consult note has been forwarded to the referral physician.

## 2020-02-21 NOTE — PATIENT INSTRUCTIONS
Stop coumadin 5 days before dental extraction  Start Lovenox 3 days before and continue 3 days after   Restart Coumadin day of extrasction

## 2020-02-24 ENCOUNTER — PATIENT MESSAGE (OUTPATIENT)
Dept: CARDIOLOGY | Facility: CLINIC | Age: 50
End: 2020-02-24

## 2020-02-26 ENCOUNTER — LAB VISIT (OUTPATIENT)
Dept: LAB | Facility: HOSPITAL | Age: 50
End: 2020-02-26
Attending: INTERNAL MEDICINE
Payer: COMMERCIAL

## 2020-02-26 ENCOUNTER — ANTI-COAG VISIT (OUTPATIENT)
Dept: CARDIOLOGY | Facility: CLINIC | Age: 50
End: 2020-02-26
Payer: COMMERCIAL

## 2020-02-26 DIAGNOSIS — I25.10 CORONARY ATHEROSCLEROSIS OF NATIVE CORONARY ARTERY: ICD-10-CM

## 2020-02-26 DIAGNOSIS — Z95.2 HX OF MECHANICAL AORTIC VALVE REPLACEMENT: Chronic | ICD-10-CM

## 2020-02-26 DIAGNOSIS — J96.21 ACUTE AND CHRONIC RESPIRATORY FAILURE WITH HYPOXIA: Primary | ICD-10-CM

## 2020-02-26 DIAGNOSIS — Z79.01 LONG TERM (CURRENT) USE OF ANTICOAGULANTS: ICD-10-CM

## 2020-02-26 LAB
ANION GAP SERPL CALC-SCNC: 12 MMOL/L (ref 8–16)
BNP SERPL-MCNC: 321 PG/ML (ref 0–99)
BUN SERPL-MCNC: 14 MG/DL (ref 6–20)
CALCIUM SERPL-MCNC: 9.6 MG/DL (ref 8.7–10.5)
CHLORIDE SERPL-SCNC: 101 MMOL/L (ref 95–110)
CO2 SERPL-SCNC: 26 MMOL/L (ref 23–29)
CREAT SERPL-MCNC: 0.9 MG/DL (ref 0.5–1.4)
EST. GFR  (AFRICAN AMERICAN): >60 ML/MIN/1.73 M^2
EST. GFR  (NON AFRICAN AMERICAN): >60 ML/MIN/1.73 M^2
GLUCOSE SERPL-MCNC: 134 MG/DL (ref 70–110)
INR PPP: 1.8
POTASSIUM SERPL-SCNC: 4.4 MMOL/L (ref 3.5–5.1)
SODIUM SERPL-SCNC: 139 MMOL/L (ref 136–145)

## 2020-02-26 PROCEDURE — 83880 ASSAY OF NATRIURETIC PEPTIDE: CPT | Mod: NTX

## 2020-02-26 PROCEDURE — 93793 PR ANTICOAGULANT MGMT FOR PT TAKING WARFARIN: ICD-10-PCS | Mod: S$GLB,,,

## 2020-02-26 PROCEDURE — 80048 BASIC METABOLIC PNL TOTAL CA: CPT | Mod: TXP

## 2020-02-26 PROCEDURE — 93793 ANTICOAG MGMT PT WARFARIN: CPT | Mod: S$GLB,,,

## 2020-02-26 NOTE — PROGRESS NOTES
Verbal results taken from Jazmín with [facility].  PT: 22.1 / INR 1.8.  Date drawn 2/26/2020.    No missed doses or significant changes reported.  Orders given: Increase dose to warfarin 3mg on Mondays; and 6mg on all other days of the week.  Recheck INR on 3/4/2020.

## 2020-02-27 ENCOUNTER — TELEPHONE (OUTPATIENT)
Dept: HOME HEALTH SERVICES | Facility: CLINIC | Age: 50
End: 2020-02-27

## 2020-02-27 DIAGNOSIS — I50.9 CONGESTIVE HEART FAILURE, UNSPECIFIED HF CHRONICITY, UNSPECIFIED HEART FAILURE TYPE: ICD-10-CM

## 2020-02-27 RX ORDER — METOPROLOL SUCCINATE 25 MG/1
12.5 TABLET, EXTENDED RELEASE ORAL DAILY
Qty: 30 TABLET | Refills: 6 | Status: SHIPPED | OUTPATIENT
Start: 2020-02-27 | End: 2021-02-26

## 2020-02-27 RX ORDER — METOPROLOL SUCCINATE 25 MG/1
12.5 TABLET, EXTENDED RELEASE ORAL DAILY
Qty: 30 TABLET | Refills: 6 | Status: SHIPPED | OUTPATIENT
Start: 2020-02-27 | End: 2020-02-27 | Stop reason: SDUPTHER

## 2020-02-27 NOTE — TELEPHONE ENCOUNTER
4th attempt this week to contact patient at all available contact numbers  to confirm home visit scheduled for tomorrow unsuccessful. Message left on answering machine to call when she receives this message.

## 2020-03-03 NOTE — HPI
48 yo female, consult for syncope. F/u with Dr. Banda at cardiology office.  PMH CAD s/p CABGx2, s/p mechanical AVR and MV annuloplasty in 2014, normal EF, frequent PVCs, interstitial lung fibrosis,  And HTN.  C/o chest pain multiple times a day, at rest and with exertion. Over a yr. Also f/u with EP for PVCs.  Verapamil added in  for PVCs and NTG paste added one day ago. Could not tolerate Ranexa.  One ago, at night woke up due to GERD and blacked out after standing up from the bed. Woke up by herself and called the cardiology office in the morning and came to ER yesterday.  Now some chest and back pain.  SBP at 90 to 99 mmHg  EKG NSR And PVC.  CXr and brain CT negative   Troponin negative  BNP chronic elevation     Abdominal Pain, N/V/D

## 2020-03-04 ENCOUNTER — ANTI-COAG VISIT (OUTPATIENT)
Dept: CARDIOLOGY | Facility: CLINIC | Age: 50
End: 2020-03-04
Payer: COMMERCIAL

## 2020-03-04 DIAGNOSIS — Z79.01 LONG TERM (CURRENT) USE OF ANTICOAGULANTS: ICD-10-CM

## 2020-03-04 DIAGNOSIS — Z95.2 HX OF MECHANICAL AORTIC VALVE REPLACEMENT: Chronic | ICD-10-CM

## 2020-03-04 LAB — INR PPP: 1.4

## 2020-03-04 PROCEDURE — 93793 ANTICOAG MGMT PT WARFARIN: CPT | Mod: S$GLB,,,

## 2020-03-04 PROCEDURE — 93793 PR ANTICOAGULANT MGMT FOR PT TAKING WARFARIN: ICD-10-PCS | Mod: S$GLB,,,

## 2020-03-04 NOTE — PROGRESS NOTES
Verbal results taken from Jazmín with [facility].  PT: 17.3 / INR 1.4.  Date drawn 3/4/2020.   INR remains subtherapeutic despite dose increase.  Patient contacted.  Mrs. Sal states she has started drinking 1 Ensure per day.  Patient agrees to remain consistent with intake.  Instructions given for patient to take warfarin 9mg on today; then increase dose of warfarin to 6mg every evening.      Mrs Sal will be having 5 teeth extracted on 3/13.  (cardiac clearance 2/21) Warfarin will be held 5 days prior and patient bridged with Lovenox 70mg BID.    Lovenox Dose= 70mg every 12 hours  Dental extraction       Pre-Procedure Instructions:  Hold coumadin per calendar (3/8-3/12) ; resume dose per calendar (3/13) UNLESS performing MD advises otherwise.    Start enoxaparin injections the evening of 3/9/2020  Enoxaparin dose is: 70mg Every 12 hours (Twice Daily)  Last dose of enoxaparin will be the morning of  3/12  (AM ONLY   *24 hours prior to the procedure)                         Post-Procedure Instructions:     Restart warfarin the evening after procedure, 3/13            At a dose of    6mg                         Unless directed otherwise by your physician  3/14-3/16: 9mg  3/17: 6mg     Restart lovenox injections on the morning of    3/14  Unless directed otherwise by your physician  Continue Lovenox 70mg BID 3/14-3/16     *Call the coumadin clinic if your physician has different recommendations post procedure    Patient wrote instructions down and repeated back.  Orders also provided to Jazmín with [facility].  Recheck INR on 3/18/2020.

## 2020-03-13 DIAGNOSIS — I50.9 CONGESTIVE HEART FAILURE, UNSPECIFIED HF CHRONICITY, UNSPECIFIED HEART FAILURE TYPE: ICD-10-CM

## 2020-03-13 RX ORDER — FUROSEMIDE 40 MG/1
80 TABLET ORAL 2 TIMES DAILY
Qty: 120 TABLET | Refills: 3 | Status: SHIPPED | OUTPATIENT
Start: 2020-03-13

## 2020-03-13 NOTE — TELEPHONE ENCOUNTER
----- Message from Fred Meraz sent at 3/13/2020  2:41 PM CDT -----  Contact: pt   ..Type:  RX Refill Request    Who Called:  Pt   Refill or New Rx: refill   RX Name and Strength: furosemide (LASIX) 40 MG tablet / nitroglycerin patches   How is the patient currently taking it? (ex. 1XDay): 4 x day   Is this a 30 day or 90 day RX: 30 days   Preferred Pharmacy with phone number: [facility]   Local or Mail Order: local   Ordering Provider: deejay   Would the patient rather a call back or a response via [facility]? Callback   Best Call Back Number: [phone]    Additional Information:            [phone]      .      [facility] - [facility] [id number] [facility] [address]  [address] [address] [address]  Phone: [phone] Fax: [phone]

## 2020-03-18 ENCOUNTER — ANTI-COAG VISIT (OUTPATIENT)
Dept: CARDIOLOGY | Facility: CLINIC | Age: 50
End: 2020-03-18

## 2020-03-18 NOTE — PROGRESS NOTES
Jazmín at [facility] contacted coumadin clinic. Patient  earlier this week.  No other information available.  Anticoagulation episode resolved.

## 2020-12-17 ENCOUNTER — TELEPHONE (OUTPATIENT)
Dept: SMOKING CESSATION | Facility: CLINIC | Age: 50
End: 2020-12-17

## 2021-01-21 NOTE — PLAN OF CARE
Patient had uneventful shift. Patient awake, alert and oriented x 4. VS stable. Patient complains of back pain, controlled with prn pain medications. Patient on telemetry, NSR on the monitor with frequent PVCs. Patient ambulates with assistance. Fall precautions in place. Bed alarm active and audible. Patient free from fall/injury. Plan of care reviewed with patient. Patient has no questions at this time. Will continue to monitor.    Abdominal Pain, N/V/D

## 2021-10-27 NOTE — PROGRESS NOTES
Chief Complaint   Patient presents with    New Patient     Get established. She would like to talk about weight loss. Talk about getting some blood work done to check her insulin resistance and hormone levels. Talk about a possible psychiatrist and possible ADD. SUBJECTIVE     Alexia New is a 26 y. o.female      Patient presents to establish care. Has not seen a PCP in quite some time. She reports significant weight gain over the past few years. She has been less mobile recently due to a knee injury and some knee surgery. She has not been watching her diet closely and reports that her diet is heavy in starches and carbohydrates. She has a history of insulin resistance in the past and took Metformin for a time. She is interested in updating her blood testing and starting a program to work on getting her weight down. Non-smoker but she does vape. She reports difficulty with attention and concentration and wonders if she could possibly have ADD. She would like to see someone for testing. She also reports a history of anxiety and depression. She has taken medication and done counseling in the past.  She would like to address these issues but is hesitant to take medication. She is interested in possibly restarting counseling. BMI 43.81. Current medical problems include:  Patient Active Problem List   Diagnosis    Insulin resistance    Morbid obesity ([facility] [address]           Past Medical History:   Diagnosis Date    Anxiety     Depression     Insulin resistance     Obesity        Past Surgical History:   Procedure Laterality Date    KNEE ARTHROSCOPY Right 08/2021    Has had 2 surgeries most recent was August 2021.     WISDOM TOOTH EXTRACTION         Allergies   Allergen Reactions    Norco [Hydrocodone-Acetaminophen] Shortness Of Breath    Nsaids Nausea And Vomiting    Vicodin [Hydrocodone-Acetaminophen]       Outpatient Medications Prior to Visit   Medication Sig Dispense Refill    Verbal results received from Meena ZELAYA with [facility] at [phone].  Reports PT:  35.0 // INR:  2.5 (therapeutic).  Tested on 8/01/2019.  Patient has taken medication as previously instructed.  No other changes reported.  Will maintain current dose of Warfarin 6 mg every evening.  Recheck on Thursday, 8/08/2019.  Fax orders to follow.   acetaminophen (APAP EXTRA STRENGTH) 500 MG tablet Take 2 tablets by mouth 4 times daily as needed for Pain or Fever 30 tablet 0    cyclobenzaprine (FLEXERIL) 10 MG tablet as needed      ibuprofen (IBU) 800 MG tablet Take 1 tablet by mouth every 8 hours as needed for Pain (Patient not taking: Reported on 10/26/2021) 30 tablet 0    Chlorphen-Phenyleph-ASA (SAUL-SELTZER PLUS COLD PO) Take by mouth (Patient not taking: Reported on 10/26/2021)      MedroxyPROGESTERone Acetate (DEPO-PROVERA IM) Inject into the muscle (Patient not taking: Reported on 10/26/2021)       No facility-administered medications prior to visit. Family History   Problem Relation Age of Onset    Other Mother         autoimmune disease    Diabetes Father     Heart Disease Father     High Blood Pressure Father          Social History     Tobacco Use    Smoking status: Former Smoker     Packs/day: 0.50     Years: 13.00     Pack years: 6.50     Types: Cigarettes    Smokeless tobacco: Never Used   Vaping Use    Vaping Use: Every day    Substances: Always   Substance Use Topics    Alcohol use: Yes     Comment: RARE'    Drug use: No         Review of Systems   Constitutional: Positive for unexpected weight change (gain). Negative for chills, fatigue and fever. HENT: Negative. Eyes: Negative. Respiratory: Negative for shortness of breath. Cardiovascular: Negative for chest pain, palpitations and leg swelling. Gastrointestinal: Negative for abdominal pain, blood in stool, diarrhea, nausea and vomiting. Genitourinary: Negative for dysuria. Musculoskeletal: Positive for arthralgias and gait problem. Negative for myalgias. Skin: Negative for rash. Neurological: Negative for dizziness and headaches. Psychiatric/Behavioral: Positive for dysphoric mood. The patient is nervous/anxious. All other systems reviewed and are negative.           OBJECTIVE     /76   Pulse 60   Temp 97.9 °F (36.6 °C) (Oral)   Resp 14 Ht 5' 6\" (1.676 m)   Wt 271 lb 6.4 oz (123.1 kg)   BMI 43.81 kg/m²     Physical Exam  Vitals and nursing note reviewed. Constitutional:       General: She is not in acute distress. Appearance: She is well-developed. She is obese. HENT:      Head: Normocephalic and atraumatic. Right Ear: Tympanic membrane normal.      Left Ear: Tympanic membrane normal.      Mouth/Throat:      Mouth: Mucous membranes are moist.      Pharynx: No posterior oropharyngeal erythema. Eyes:      Conjunctiva/sclera: Conjunctivae normal.   Neck:      Thyroid: No thyromegaly. Vascular: No carotid bruit. Cardiovascular:      Rate and Rhythm: Normal rate and regular rhythm. Heart sounds: No murmur heard. Pulmonary:      Effort: Pulmonary effort is normal.      Breath sounds: Normal breath sounds. No wheezing. Abdominal:      General: Bowel sounds are normal.      Palpations: Abdomen is soft. Tenderness: There is no abdominal tenderness. There is no guarding or rebound. Musculoskeletal:      Cervical back: Neck supple. Right lower leg: No edema. Left lower leg: No edema. Lymphadenopathy:      Cervical: No cervical adenopathy. Skin:     General: Skin is warm and dry. Findings: No rash. Neurological:      Mental Status: She is alert and oriented to person, place, and time.    Psychiatric:         Behavior: Behavior normal.               Immunization History   Administered Date(s) Administered    DTP 01/17/1991, 03/14/1991, 05/23/1991, 04/16/1992    DTaP vaccine 12/07/1995    Hepatitis B Ped/Adol (Engerix-B, Recombivax HB) 06/09/1998, 07/07/1998, 10/20/1999    Hib vaccine 03/14/1991, 05/23/1991, 07/23/1991, 04/16/1992    Influenza Virus Vaccine 10/20/1997, 11/19/2003, 10/27/2004, 11/23/2005, 11/09/2006    Influenza Whole 11/27/1998    MMR 04/16/1992, 06/09/1998    Polio OPV 01/17/1991, 03/14/1991, 04/16/1992, 12/07/1995    Td vaccine (adult) 07/20/2005         Health Maintenance Topic Date Due    Hepatitis C screen  Never done    Varicella vaccine (1 of 2 - 2-dose childhood series) Never done    COVID-19 Vaccine (1) Never done    DTaP/Tdap/Td vaccine (6 - Tdap) 07/21/2005    HIV screen  Never done    Cervical cancer screen  Never done    Flu vaccine (1) 09/01/2021    Hepatitis B vaccine  Completed    Hib vaccine  Completed    Hepatitis A vaccine  Aged Out    Meningococcal (ACWY) vaccine  Aged Out    Pneumococcal 0-64 years Vaccine  Aged Out         ASSESSMENT      1. Annual physical exam    2. Insulin resistance    3. Morbid obesity ([address]    4. Weight gain    5. Depression, unspecified depression type    6. Lack of concentration            PLAN       Proceed with labs as below    She will pursue counseling with her previous counselor in [facility]. She states that she will call to arrange the appointment. She will also check to see if they offer ADD testing there. She will let us know if we need to make a referral elsewhere. We discussed the importance of her diet to help her control her weight. She is currently not able to be active due to her knee issues. I suggested a calorie restricted diet through the use of the [employer] pal katlyn. She will limit her starches and carbs and focus on getting more lean proteins, fruits, and vegetables.     Orders Placed This Encounter   Procedures    CBC Auto Differential     Standing Status:   Future     Standing Expiration Date:   10/27/2022    Comprehensive Metabolic Panel     Standing Status:   Future     Standing Expiration Date:   10/26/2022    TSH without Reflex     Standing Status:   Future     Standing Expiration Date:   10/27/2022    T4, Free     Standing Status:   Future     Standing Expiration Date:   10/26/2022    Insulin, Fasting     Standing Status:   Future     Standing Expiration Date:   10/26/2022    Hemoglobin A1C     Standing Status:   Future     Standing Expiration Date:   10/26/2022     Follow-up pending results of blood tests      Electronically signed by Candida Stephens MD on 10/27/2021 at 10:13 AM

## 2022-10-31 NOTE — PROGRESS NOTES
Pharmacy Brief Progress Note:    Patient/caregiver educated on warfarin indication, side effects, and drug interactions. Discussed importance of medication compliance and INR monitoring and reviewed signs of abnormal bleeding. Patient/caregiver given warfarin educational handout. Patient/caregiver expressed understanding and had no further questions.    Thank you for allowing us to participate in this patient's care.     Lucia Wilcox 11/6/2019 2:20 PM      no point tenderness along rib cage on the left side.

## 2023-05-06 NOTE — TELEPHONE ENCOUNTER
Canceled.   Thank you for choosing [facility]! We appreciate you trusting us with your medical care.     It is important to remember that some problems are difficult to diagnose and may not be found during your first visit. Be sure to follow up with your primary care doctor and review any labs/imaging that was performed during your visit with them. If you do not have a primary care doctor, you may contact the one listed on your discharge paperwork, or you may also call the [facility] Appointment Desk at [phone] to schedule an appointment.     All medications may potentially have side effects and it is impossible to predict which medications may give you side effects. If you feel that you are having a negative effect of any medication you should immediately stop taking them and seek medical attention. Do not drive or make any important decisions for 24 hours if you have received any pain medications, sedatives or mood altering drugs during your ER visit.    We will be happy to take care of you for all of your future medical needs. You may return to the ER at any time for any new/concerning symptoms, worsening condition, or failure to improve. We hope you feel better soon.     Santos Parker MD  Emergency Medicine Physician

## 2023-09-12 NOTE — NURSING
Restarted Heparin @ 9 units/kg/hr. Checked with Bhumika BOJORQUEZ To order aPTT after 6 hours.   Patient/Caregiver provided printed discharge information.

## 2024-02-08 NOTE — TELEPHONE ENCOUNTER
----- Message from Ayaan Banda MD sent at 2/8/2019  6:41 PM CST -----  Please call patient regarding normal result of INR. If he/she has any questions please let me know or have him/her schedule an appt to see me soon to discuss. Thank you   Hepatology Progress Note     Date:  2/8/2024    Referring Provider:  Siegrist, Jeremy F, MD    CC/Reason for Consult:  Cirrhosis, variceal bleed    Subjective: Recently extubated, nods head to some questions, no meaningful subjective obtained    HPI:  Carl Adler is a 60 year old male with a PMHx significant for EtOH abuse, RA, HTN, and gout.    Patient presented to [facility] ED with lightheaded, dizzy, and bloody/black bowel movement. He also reported a fall. He reported drinking earlier that day. He was on Asprin and Meloxicam PTA. Patient was found to be hypothermic, hypotension, elevated lactic acid. He was started on pressors after no response to fluid. He was intubated for airway protection. He had another round of coffee ground emesis.    He ultimately received 6 units PRBCs and underwent EGD with banding x4, noted to have duodenal bulb ulcers as well on 1/30/24.    He underwent paracentesis 1/30, 1/31, 2/1. There was concern for Budd-Chiari syndrome given recurrent massive ascites.    He was also found to have worsening DUSTY.    He was transferred to [facility] for further care.    Hepatology was asked to see the patient in consult / transfer of care for recommendations regarding cirrhosis and variceal bleed.    At time of consult, patient intubated and sedated.    Objective:    Vitals:    02/08/24 0800   BP:    Pulse:    Resp:    Temp: 98.1 °F (36.7 °C)     Physical Exam  General- Resting, nods intermittently.   Skin- Warm and dry to touch. +chronic venous stasis changes to BLE. No jaundice.   CV- S1S2, RRR. No murmurs, rubs or gallops to auscultation.  Pulmonary- On RA, mildly increased respiratory effort with coughing and thick secretions.  Abdomen- Round, soft, non-tender, and mildly distended with palpation. + BS. +soft, reducible umbilical hernia. R sided peritoneal drain capped, in place  Extremities- No erythema, +BL venous stasis changes    Imaging/Diagnostics: Reviewed    Assessment/Plan:  EtOH Cirrhosis.  MELD 3.0 - 17.   -- Last EtOH use PTA per patient report at OSH.     Maddrey Score: 17.72 at 2/8/2024 10:17 AM, no indication for steroids especially in setting of recent GIB   -- Continue folate, and thiamine.   -- CLD partially completed in past PAULA +, RNP Ab + (to be addressed outpatient, recommend Rheumatology), Low Ceruloplasmin, 24 hour urine copper ordered   -- Bilirubin rising monitor, concern for indirect component with large volume blood products recently received   -- Patient is not a transplant candidate at this time due to low MELD.    2. GIB / Portal HTN / Esophageal Varices   -- Last EGD 1/30/24 with Grade 2-3 EVs with one large varix in distal esophagus showing a 2 mm erosion, potential site of bleeding. Moderate large amount of blood noted in gastric fundus and proximal gastric body impairing complete visualization but no obvious GVs noted. Evidence of PHG. 3 mm and 12 mm duodenal bulb ulcers with clean white base. Band ligation of EVs x 4.   -- Completed Rocephin x 5 days for SBP ppx, no on Ceftazidime as below   -- PPI IV BID   -- Previously received 7 units at OSH, Hgb stable   -- GI followed at OSH    -- ECHO 2/4/2024 with normal RVSP and EF of 65%    3. Hepatic Encephalopathy.    -- HE is chronic, secondary to cirrhosis, and without hepatic coma   -- Continue Lactulose and Rifaximin 550 mg BID   -- Currently intubated and sedated    4. Portal HTN / Ascites   -- Last Paracentesis  with removal of 2/2/24 with 7 L, no fluid studies sent    - Paracentesis 1/30/24 with elevated cell count, completed course of Rocephin, will need ppx in future when abx completed    - Paracentesis drain placed yesterday, 4L removed, negative cell count  -- US Liver W Duplex 2/6/24 with patent vasculature   - No Budd-Chiari   -- Continue Lasix 40 mg BID and Spironolactone 50 mg daily   -- 2g Na diet.    5. DUSTY, Cannot rule out HRS   -- Creatinine trended down, stable   -- Octreotide, pressors per CC    6. HCC  Screening   -- AFP was <3 on 2/8/24   -- US Liver W Duplex without focal lesion    7. Pseudomonas Aeruginosa +Sputum. Ceftazidime per CC.    Brittany White PA-C  Abdominal Transplant & Hepatology  Pager: [phone]  2/8/2024    After 3:00 PM please call the answering service and page the [email or web address] on call for the service.    [facility] Hepatology addendum  Patient was seen and examined agree with the above note by Advanced Practice Provider. I have participated in the history,  and care plan agree with above note.    Jose Rossi MD  Hepatology

## 2024-04-12 NOTE — SUBJECTIVE & OBJECTIVE
Interval History: Patient's INR low today at 2.1. Pharmacy managing coumadin. Stopped OFEV which is likely causing elevated liver enzymes and diarrhea. Liver enzymes trending down - discussed with GI and pulmonology.     Review of Systems   Constitutional: Negative for activity change and chills.   HENT: Negative for rhinorrhea and sore throat.    Eyes: Negative for pain and discharge.   Respiratory: Negative for cough and shortness of breath.    Cardiovascular: Negative for chest pain, palpitations and leg swelling.   Gastrointestinal: Positive for diarrhea. Negative for abdominal distention, abdominal pain and blood in stool.   Endocrine: Negative for cold intolerance and heat intolerance.   Genitourinary: Negative for difficulty urinating, dysuria and hematuria.   Musculoskeletal: Negative for arthralgias, gait problem and myalgias.   Skin: Negative for color change and wound.   Allergic/Immunologic: Negative.    Neurological: Negative for dizziness, tremors, seizures, syncope, facial asymmetry, speech difficulty, weakness, light-headedness, numbness and headaches.   Hematological: Negative.    Psychiatric/Behavioral: Negative for agitation, behavioral problems and confusion.     Objective:     Vital Signs (Most Recent):  Temp: 96.9 °F (36.1 °C) (11/17/19 0719)  Pulse: 62 (11/17/19 0732)  Resp: 18 (11/17/19 0732)  BP: (!) 117/57 (11/17/19 0719)  SpO2: 96 % (11/17/19 0732) Vital Signs (24h Range):  Temp:  [96.7 °F (35.9 °C)-97.9 °F (36.6 °C)] 96.9 °F (36.1 °C)  Pulse:  [61-80] 62  Resp:  [16-18] 18  SpO2:  [93 %-98 %] 96 %  BP: (100-117)/(49-57) 117/57     Weight: 77.8 kg (171 lb 8.3 oz)  Body mass index is 33.5 kg/m².    Intake/Output Summary (Last 24 hours) at 11/17/2019 0954  Last data filed at 11/17/2019 0500  Gross per 24 hour   Intake 1226 ml   Output 2700 ml   Net -1474 ml      Physical Exam   Constitutional: She is oriented to person, place, and time. She appears well-developed and well-nourished. No  distress.   HENT:   Head: Normocephalic and atraumatic.   Nose: Nose normal.   Eyes: Conjunctivae and EOM are normal. No scleral icterus.   Neck: Normal range of motion. Neck supple. No tracheal deviation present. No thyromegaly present.   Cardiovascular: Normal rate, regular rhythm and intact distal pulses.   Murmur heard.  Pulmonary/Chest: Effort normal and breath sounds normal. No respiratory distress. She has no wheezes. She has no rales. She exhibits no tenderness.   Abdominal: Soft. Bowel sounds are normal. She exhibits no distension. There is tenderness (mild lower).   Genitourinary:   Genitourinary Comments: deferred   Musculoskeletal: Normal range of motion. She exhibits no edema or tenderness.   Lymphadenopathy:     She has no cervical adenopathy.   Neurological: She is alert and oriented to person, place, and time. No cranial nerve deficit. She exhibits normal muscle tone. Coordination normal.   Skin: Skin is warm and dry. She is not diaphoretic. No erythema.   Chronic bruising noted lower abdominal wall, from recent Lovenox injections.   Psychiatric: She has a normal mood and affect. Her behavior is normal.   Nursing note and vitals reviewed.      Significant Labs:   Recent Lab Results       11/17/19  0431   11/16/19  1121        Anion Gap 9       Baso # 0.04       Basophil% 0.7       BUN, Bld 6       C difficile Toxins A+B, EIA   Negative  Comment:  Testing not recommended for children <24 months old.     C. diff Antigen   Negative     Calcium 8.4       Chloride 104       CO2 30       Creatinine 0.8       Differential Method Automated       eGFR if  >60       eGFR if non  >60  Comment:  Calculation used to obtain the estimated glomerular filtration  rate (eGFR) is the CKD-EPI equation.          Eos # 0.3       Eosinophil% 5.0       Glucose 120       Gran # (ANC) 2.9       Gran% 52.0       Hematocrit 36.0       Hemoglobin 10.0       Immature Grans (Abs)  0.01  Comment:  Mild elevation in immature granulocytes is non specific and   can be seen in a variety of conditions including stress response,   acute inflammation, trauma and pregnancy. Correlation with other   laboratory and clinical findings is essential.         Immature Granulocytes 0.2       Coumadin Monitoring INR 2.1  Comment:  Coumadin Therapy:  2.0 - 3.0 for INR for all indicators except mechanical heart valves  and antiphospholipid syndromes which should use 2.5 - 3.5.         Lymph # 1.7       Lymph% 29.7       MCH 28.7       MCHC 27.8              Mono # 0.7       Mono% 12.4       MPV 10.9       nRBC 0       Platelets 145       Potassium 3.3       Protime 21.9       RBC 3.49       RDW 17.7       Sodium 143       WBC 5.58           All pertinent labs within the past 24 hours have been reviewed.    Significant Imaging: I have reviewed all pertinent imaging results/findings within the past 24 hours.   Patient

## 2025-04-22 NOTE — H&P
[facility] Hospital Medicine  History & Physical    Patient Name: Meg Sal  MRN: [id number]  Admission Date: 12/14/2019  Attending Physician: Saleem Walden MD   Primary Care Provider: Lucero Banda MD         Patient information was obtained from patient, past medical records and ER records.     Subjective:     Principal Problem:Supratherapeutic INR    Chief Complaint:   Chief Complaint   Patient presents with    Fatigue     generalized weakness worsening over the past few days, BLE edema, body aches, cough with yellow sputum        HPI:  Ms. Sal is a 48 yo female with a past medical history of CAD with remote CABG, valvular heart disease with remote mechanical AVR on Coumadin and mitral valve repair, severe pulmonary hypertension, interstitial lung disease with chronic hypoxic respiratory failure on 2 L home O2, hypertension, hyperlipidemia, GERD, anxiety, and chronic back pain.  Patient has had 2 admissions within the past 1 month for dehydration and Coumadin toxicity.  She presented to the ED tonight with complaints of fatigue that has progressively worsened over the past few days.  No aggravating or alleviating factors.  Associated generalized weakness, generalized myalgias, cough producing yellow sputum, chest pain, bilateral lower extremity edema, weight gain (12-15 # over past 5 days), nausea and diarrhea.  Patient reports taking 40mg PO Lasix x 4 tabs daily over the past 2 days with no improvement in symptoms.  Denies any shortness of breath, orthopnea, PND, palpitations, abdominal pain or distention, vomiting, hematemesis, melena, hematochezia, dysuria, hematuria, decreased urinary output, back pain, headache, lightheadedness or dizziness, syncope, rhinorrhea, sore throat, epistaxis, diaphoresis, fever or chills.  Initial workup in the ED resulted INR of 7.7, H&H 9/32.1, platelets 154, creatinine 1.2, BUN 19, , , T bili 1.4, alk-phos 129, troponin 0.039, ,  influenza negative, UA and CXR unremarkable, EKG unrevealing.  Patient received 80 mg IV Lasix in the ED.  Hospital Medicine was called for admission.      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, implants and grafts, initial encounter     Hyperlipidemia     Hypertension     Sleep apnea syndrome 2/8/2019    Stroke        Past Surgical History:   Procedure Laterality Date    AORTIC VALVE REPLACEMENT      mechanical    CARDIAC CATHETERIZATION      CORONARY ANGIOPLASTY      CORONARY ARTERY BYPASS GRAFT      HYSTERECTOMY      RIGHT HEART CATHETERIZATION Right 5/17/2019    Procedure: INSERTION, CATHETER, RIGHT HEART;  Surgeon: Derek Brown MD;  Location: [facility] CATH LAB;  Service: Cardiology;  Laterality: Right;  malur pt/detailed hx       Review of patient's allergies indicates:   Allergen Reactions    Nsaids (non-steroidal anti-inflammatory drug) Other (See Comments)     Mechanical heart valve       No current facility-administered medications on file prior to encounter.      Current Outpatient Medications on File Prior to Encounter   Medication Sig    albuterol (VENTOLIN HFA) 90 mcg/actuation inhaler Inhale 2 puffs into the lungs every 6 (six) hours as needed for Wheezing. Rescue    amitriptyline (ELAVIL) 25 MG tablet Take 25 mg by mouth every evening.     ANORO ELLIPTA 62.5-25 mcg/actuation DsDv INHALE 1 PUFF INTO THE LUNGS ONCE A DAY. CONTROLLER.    busPIRone (BUSPAR) 15 MG tablet Take 15 mg by mouth 2 (two) times daily.     cholestyramine (QUESTRAN) 4 gram packet Take 1 packet (4 g total) by mouth 2 (two) times daily.    clonazePAM (KLONOPIN) 1 MG tablet Take 1 mg by mouth 3 (three) times daily.     desvenlafaxine succinate (PRISTIQ) 100 MG Tb24 Take 1 tablet (100 mg total) by mouth once daily.    diclofenac sodium  (VOLTAREN) 1 % Gel Apply 2 g topically 4 (four) times daily as needed.     fluticasone (FLONASE) 50 mcg/actuation nasal spray 1 spray by Each Nare route once daily.    furosemide (LASIX) 40 MG tablet Take two 40 mg tablets by mouth twice a day    HYDROcodone-acetaminophen (NORCO)  mg per tablet Take 1 tablet by mouth every 6 (six) hours as needed for Pain.    metoprolol succinate (TOPROL-XL) 25 MG 24 hr tablet Take 1 tablet (25 mg total) by mouth once daily.    nicotine (NICODERM CQ) 7 mg/24 hr Place 1 patch onto the skin every 24 hours.    nitroGLYCERIN (NITROSTAT) 0.4 MG SL tablet     nitroGLYCERIN 0.4 MG/HR TD PT24 (NITRODUR) 0.4 mg/hr Place 1 patch onto the skin once daily.    ondansetron (ZOFRAN) 4 MG tablet TAKE 1 BY MOUTH EVERY 8 HOURS AS NEEDED FOR NAUSEA.    pantoprazole (PROTONIX) 40 MG tablet Take 1 tablet (40 mg total) by mouth 2 (two) times daily.    potassium chloride SA (K-DUR,KLOR-CON) 20 MEQ tablet Take 20 mEq by mouth 2 (two) times daily.    rosuvastatin (CRESTOR) 20 MG tablet Take 20 mg by mouth.    sumatriptan (IMITREX) 100 MG tablet Take 100 mg by mouth every 2 (two) hours as needed.    tiZANidine (ZANAFLEX) 4 MG tablet Take 4 mg by mouth every evening.    topiramate (TOPAMAX) 100 MG tablet Take 100 mg by mouth 2 (two) times daily.    traMADol (ULTRAM) 50 mg tablet Take 50 mg by mouth every 4 (four) hours as needed.     traZODone (DESYREL) 150 MG tablet Take 150 mg by mouth.    warfarin (COUMADIN) 3 MG tablet Take 1 tablet (3 mg total) by mouth every Tue, Wed, Fri, Sat, Sun. (Patient taking differently: Take 3 mg by mouth. Every Saturday)    warfarin (COUMADIN) 6 MG tablet Take 1 tablet (6 mg total) by mouth every Mon, Tues, Wed, Thurs, Fri. Except 9mg on Thursdays. (Patient taking differently: Take 6 mg by mouth Daily. Take 1 tablet every evening as directed by the [facility].)     Family History     Problem Relation (Age of Onset)    Breast cancer Sister     Coronary artery disease Father    Heart disease Mother    Lung cancer Brother        Tobacco Use    Smoking status: Former Smoker     Packs/day: 1.00     Types: Cigarettes     Last attempt to quit: 2018     Years since quittin.5    Smokeless tobacco: Never Used   Substance and Sexual Activity    Alcohol use: Not Currently     Frequency: 2-4 times a month     Drinks per session: 1 or 2     Binge frequency: Never    Drug use: Not Currently    Sexual activity: Not on file     Review of Systems   Constitutional: Positive for fatigue and unexpected weight change. Negative for activity change, appetite change, chills, diaphoresis and fever.        (+) Malaise.   HENT: Negative for congestion, postnasal drip, rhinorrhea, sinus pressure and sore throat.    Respiratory: Positive for cough. Negative for chest tightness, shortness of breath and wheezing.    Cardiovascular: Positive for chest pain and leg swelling. Negative for palpitations.   Gastrointestinal: Positive for diarrhea and nausea. Negative for abdominal distention, abdominal pain, blood in stool, constipation and vomiting.   Genitourinary: Negative for decreased urine volume, difficulty urinating, dysuria, frequency, hematuria and urgency.   Musculoskeletal: Positive for myalgias (generalized). Negative for arthralgias, back pain and neck pain.   Skin: Negative for pallor, rash and wound.   Neurological: Positive for weakness (generalized). Negative for dizziness, syncope, light-headedness, numbness and headaches.   Psychiatric/Behavioral: Negative for confusion. The patient is not nervous/anxious.    All other systems reviewed and are negative.    Objective:     Vital Signs (Most Recent):  Temp: 98 °F (36.7 °C)  Pulse: (!) 58   Resp: 18   BP: (!) 121/59   SpO2: 98 %  Vital Signs (24h Range):  Temp:  [98 °F (36.7 °C)-98.9 °F (37.2 °C)] 98 °F (36.7 °C)  Pulse:  [58-63] 58  Resp:  [16-23] 18  SpO2:  [91 %-98 %] 98 %  BP: (102-123)/(57-70) 121/59      Weight: 79.5 kg (175 lb 3.2 oz)  Body mass index is 34.22 kg/m².    Physical Exam   Constitutional: She is oriented to person, place, and time. She appears well-developed and well-nourished. No distress.   HENT:   Head: Normocephalic and atraumatic.   Eyes: Conjunctivae are normal.   PERRL; EOM intact.   Neck: Normal range of motion. Neck supple. No JVD present.   Cardiovascular: Normal rate, regular rhythm, S1 normal, S2 normal and intact distal pulses.  No extrasystoles are present. Exam reveals no gallop and no friction rub.   Murmur heard.  Pulses:       Radial pulses are 2+ on the right side, and 2+ on the left side.        Dorsalis pedis pulses are 2+ on the right side, and 2+ on the left side.        Posterior tibial pulses are 2+ on the right side, and 2+ on the left side.   Pulmonary/Chest: Effort normal and breath sounds normal. No accessory muscle usage. No tachypnea. No respiratory distress. She has no wheezes. She has no rhonchi. She has no rales.   Abdominal: Soft. Bowel sounds are normal. She exhibits no distension. There is no hepatosplenomegaly. There is no tenderness. There is no rigidity, no rebound, no guarding and no CVA tenderness.   Musculoskeletal: Normal range of motion. She exhibits edema (3+ BLE). She exhibits no tenderness or deformity.   Neurological: She is alert and oriented to person, place, and time. No cranial nerve deficit or sensory deficit.   Skin: Skin is warm, dry and intact. Capillary refill takes less than 2 seconds. No rash noted. She is not diaphoretic. No cyanosis or erythema.   Psychiatric: She has a normal mood and affect. Her speech is normal and behavior is normal. Cognition and memory are normal.   Nursing note and vitals reviewed.          Significant Labs:  Results for orders placed or performed during the hospital encounter of 12/14/19   Influenza A & B by Molecular   Result Value Ref Range    Influenza A, Molecular Negative Negative    Influenza B, Molecular  Negative Negative    Flu A & B Source Nasal swab    CBC auto differential   Result Value Ref Range    WBC 7.11 3.90 - 12.70 K/uL    RBC 3.09 (L) 4.00 - 5.40 M/uL    Hemoglobin 9.0 (L) 12.0 - 16.0 g/dL    Hematocrit 32.1 (L) 37.0 - 48.5 %    Mean Corpuscular Volume 104 (H) 82 - 98 fL    Mean Corpuscular Hemoglobin 29.1 27.0 - 31.0 pg    Mean Corpuscular Hemoglobin Conc 28.0 (L) 32.0 - 36.0 g/dL    RDW 19.1 (H) 11.5 - 14.5 %    Platelets 154 150 - 350 K/uL    MPV 10.9 9.2 - 12.9 fL    Immature Granulocytes 0.4 0.0 - 0.5 %    Gran # (ANC) 4.8 1.8 - 7.7 K/uL    Immature Grans (Abs) 0.03 0.00 - 0.04 K/uL    Lymph # 1.4 1.0 - 4.8 K/uL    Mono # 0.7 0.3 - 1.0 K/uL    Eos # 0.2 0.0 - 0.5 K/uL    Baso # 0.04 0.00 - 0.20 K/uL    nRBC 0 0 /100 WBC    Gran% 67.1 38.0 - 73.0 %    Lymph% 19.1 18.0 - 48.0 %    Mono% 9.7 4.0 - 15.0 %    Eosinophil% 3.1 0.0 - 8.0 %    Basophil% 0.6 0.0 - 1.9 %    Differential Method Automated    Comprehensive metabolic panel   Result Value Ref Range    Sodium 134 (L) 136 - 145 mmol/L    Potassium 4.3 3.5 - 5.1 mmol/L    Chloride 101 95 - 110 mmol/L    CO2 23 23 - 29 mmol/L    Glucose 92 70 - 110 mg/dL    BUN, Bld 19 6 - 20 mg/dL    Creatinine 1.2 0.5 - 1.4 mg/dL    Calcium 8.3 (L) 8.7 - 10.5 mg/dL    Total Protein 7.2 6.0 - 8.4 g/dL    Albumin 3.4 (L) 3.5 - 5.2 g/dL    Total Bilirubin 1.4 (H) 0.1 - 1.0 mg/dL    Alkaline Phosphatase 129 55 - 135 U/L     (H) 10 - 40 U/L     (H) 10 - 44 U/L    Anion Gap 10 8 - 16 mmol/L    eGFR if African American >60 >60 mL/min/1.73 m^2    eGFR if non African American 53 (A) >60 mL/min/1.73 m^2   Troponin I #1   Result Value Ref Range    Troponin I 0.039 (H) 0.000 - 0.026 ng/mL   B-Type natriuretic peptide (BNP)   Result Value Ref Range     (H) 0 - 99 pg/mL   Protime-INR   Result Value Ref Range    Prothrombin Time 77.8 (H) 9.0 - 12.5 sec    INR 7.7 (HH) 0.8 - 1.2   APTT   Result Value Ref Range    aPTT 61.7 (H) 21.0 - 32.0 sec   Urinalysis,  Reflex to Urine Culture Urine, Clean Catch   Result Value Ref Range    Specimen UA Urine, Clean Catch     Color, UA Yellow Yellow, Straw, Celena    Appearance, UA Clear Clear    pH, UA 6.0 5.0 - 8.0    Specific Gravity, UA 1.015 1.005 - 1.030    Protein, UA Negative Negative    Glucose, UA Negative Negative    Ketones, UA Negative Negative    Bilirubin (UA) Negative Negative    Occult Blood UA 2+ (A) Negative    Nitrite, UA Negative Negative    Urobilinogen, UA Negative <2.0 EU/dL    Leukocytes, UA Negative Negative   Urinalysis Microscopic   Result Value Ref Range    RBC, UA 10 (H) 0 - 4 /hpf    Microscopic Comment SEE COMMENT       All pertinent labs within the past 24 hours have been reviewed.    Significant Imaging:  Imaging Results          X-Ray Chest AP Portable (Final result)  Result time 12/14/19 19:45:10    Final result by Kayode Vasquez MD (12/14/19 19:45:10)                 Impression:      Stable exam      Electronically signed by: Kayode Vasquez MD  Date:    12/14/2019  Time:    19:45             Narrative:    EXAMINATION:  XR CHEST AP PORTABLE    CLINICAL HISTORY:  Chest Pain;    TECHNIQUE:  Single frontal view of the chest was performed.    COMPARISON:  11/04/2019    FINDINGS:  Postop changes mediastinum with previous valvular replacement.  Mild cardiomegaly.    Stable diffuse interstitial thickening throughout the lungs without focal infiltrate.  Slightly shallower inspiration.    No effusions.                               I have reviewed all pertinent imaging results/findings within the past 24 hours.     EKG: (personally reviewed)  Sinus bradycardia, RBBB (chronic), unchanged inferior and lateral ST depression.  No acute ischemic ST-T changes.  No significant change from previous tracings.              Assessment/Plan:     * Supratherapeutic INR  - Initial INR of 7.7.  No evidence of active bleeding, H&H stable.  Hemodynamically stable.  - Hold Coumadin.  No UFH/LMWH.  Will hold off on vitamin K as  patient is stable with no active bleeding.  - Daily INR.  - Patient with recent admission for Coumadin toxicity on 11/15.  Patient reports taking Coumadin as prescribed.  Will consult pharmacy for Coumadin education.  Patient will need close INR monitoring upon discharge.    Acute on chronic diastolic HFpEF of 55-60%  - Diurese with IV Lasix 40 mg BID.  - Strict I&O's, daily weights, Na/fluid restriction.  - Recent 2D echo reviewed.    Elevated troponin  - Troponin minimally elevated at 0.039, likely secondary to above.  EKG unrevealing.  Chest pain-free on admission.  - Trend serial cardiac enzymes.  - No ASA due to #1.  Continue beta-blocker and nitrate.  Statin on hold due to elevated LFTs.  - Further recs pending clinical course.    Anemia  - H&H stable at 9/32.1.  No evidence of active bleeding.  - Follow daily CBC and transfuse as needed.    Elevated LFTs  - LFTs chronically elevated over the past 1 month.  AST & ALT stable on admission.  - Continue holding statin.  - Follow daily labs.    Essential hypertension  - BP borderline low on admission.  Will continue home Toprol XL with BP parameters.    Hx of mechanical aortic valve replacement  - Coumadin on hold as above.  Pharmacy consult for Coumadin dosing once INR stable.      VTE Risk Mitigation (From admission, onward)         Ordered     IP VTE HIGH RISK PATIENT  Once      12/14/19 2336     Reason for No Pharmacological VTE Prophylaxis  Once     Question:  Reasons:  Answer:  Already adequately anticoagulated on oral Anticoagulants    12/14/19 2336     Place sequential compression device  Until discontinued      12/14/19 2336                   Laurie Antonio NP  Department of Hospital Medicine   [facility]   normal/sensation intact/responds to verbal commands details…

## 2025-07-28 NOTE — ASSESSMENT & PLAN NOTE
-Initial troponin 0.275  -Patient with chronically elevated troponin   -Dr. Van would like to trend enzymes for now   -Check 2D echo  -Treat anemia and hypotension.  Could be having chest pain from anemia, kassandra given her history of CAD  -Resume Statin  -Recommend holding BB for today and will likely need to cut dose to 12.5mg daily   -Hold Coumadin and start Heparin gtt until troponin is trended out   -No ASA due to allergy to NSAID    11/5/19  -Will continue current medical management for now   -Troponin flat and chronically elevated   -Will need to continue Heparin gtt given her history of mechanical AVR  -Will need to monitor H/H closely and transfuse as needed   -2D echo pending    Procedure Date  7/28/25     Impression  Overall Impression:   3 subcentimeter polyps        Recommendation  - Repeat colonoscopy in 5 years  - Discharge patient to home  - Advance diet as tolerated  - Continue present medications  - Await pathology results  - Patient has a contact number available for emergencies. The signs and symptoms of potential delayed complications were discussed with the patient. Return to normal activities tomorrow. Written discharge instructions were provided to the patient.    No driving today, no operating heavy machinery, no signing any legal documents until tomorrow.    Drink lots of fluids, resume regular diet.  Take your normal medications.     Please call our office for any nausea, vomiting or abdominal pain.